# Patient Record
Sex: MALE | Race: WHITE | NOT HISPANIC OR LATINO | Employment: FULL TIME | URBAN - METROPOLITAN AREA
[De-identification: names, ages, dates, MRNs, and addresses within clinical notes are randomized per-mention and may not be internally consistent; named-entity substitution may affect disease eponyms.]

---

## 2019-06-12 ENCOUNTER — CONSULT (OUTPATIENT)
Dept: PLASTIC SURGERY | Facility: HOSPITAL | Age: 59
End: 2019-06-12
Payer: COMMERCIAL

## 2019-06-12 VITALS — WEIGHT: 169.4 LBS | HEIGHT: 72 IN | BODY MASS INDEX: 22.94 KG/M2

## 2019-06-12 DIAGNOSIS — L72.9 CYST OF SKIN: Primary | ICD-10-CM

## 2019-06-12 PROCEDURE — 99204 OFFICE O/P NEW MOD 45 MIN: CPT | Performed by: PHYSICIAN ASSISTANT

## 2019-06-18 PROBLEM — L72.9 FOLLICULAR CYST OF SKIN AND SUBCUTANEOUS TISSUE: Status: ACTIVE | Noted: 2019-06-18

## 2019-07-11 PROCEDURE — NC001 PR NO CHARGE: Performed by: PHYSICIAN ASSISTANT

## 2019-07-11 RX ORDER — LIDOCAINE HYDROCHLORIDE AND EPINEPHRINE 10; 10 MG/ML; UG/ML
20 INJECTION, SOLUTION INFILTRATION; PERINEURAL ONCE
Status: COMPLETED | OUTPATIENT
Start: 2019-07-11 | End: 2019-07-18

## 2019-07-11 NOTE — PRE-PROCEDURE INSTRUCTIONS
Pre-Surgery Instructions:   Medication Instructions    insulin lispro (HUMALOG) 100 units/mL injection Patient was instructed by Physician and understands  Pre shower with hibiclens as instructed by surgeon  You may drive yourself to the hospital   You may take your medications day of surgery  You may eat on the day of your surgery  You may have a dressing, please wear something that will fit over it

## 2019-07-11 NOTE — H&P
Assessment/Plan:  Toshia Brunner is a pleasant 72-year-old male who presents in consultation for a left cheek cyst   He was referred to us by Lucia Au PA-C of Dermatology  Please see HPI  I discussed with him excision of the left cheek cyst with complex closure  He understood and agreed  He would prefer to do this under local anesthesia  We discussed with the patient the options, benefits, and risks of surgery such as anesthesia, bleeding, infection, scarring and the need for additional procedures  Consent was obtained and all questions answered to his satisfaction  We will plan for surgery at his earliest convenience         Diagnoses and all orders for this visit:     Cyst of skin            Subjective:       Patient ID: Blair Faustin is a 64 y o  male      HPI  He reports a cyst on the left side of his chin which has been present for nearly a year  He states that it is increasing in size  It has been infected previously  He sought treatment with Dermatology of whom performed a biopsy  They referred him to us for excision      The following portions of the patient's history were reviewed and updated as appropriate: He  has no past medical history on file  He  has no past surgical history on file  His family history is not on file  He  reports that he has been smoking cigarettes  He has been smoking about 1 00 pack per day  He has never used smokeless tobacco  He reports that he drinks alcohol  He reports that he does not use drugs        Review of Systems   HENT: Negative for hearing loss  Eyes: Negative for visual disturbance  Respiratory: Negative for shortness of breath  Cardiovascular: Negative for chest pain  Gastrointestinal: Negative for abdominal pain, blood in stool, constipation, diarrhea, nausea and vomiting  Genitourinary: Negative for hematuria  Musculoskeletal: Negative for gait problem  Skin:        As per HPI  Neurological: Negative for seizures and headaches  Hematological: Does not bruise/bleed easily  Psychiatric/Behavioral: The patient is not nervous/anxious            Objective:        Ht 6' (1 829 m)   Wt 76 8 kg (169 lb 6 4 oz) Comment: 169 4lb  BMI 22 97 kg/m²             Physical Exam   Constitutional: He is oriented to person, place, and time  He appears well-developed and well-nourished  No distress  HENT:   Head: Normocephalic and atraumatic  Eyes: Pupils are equal, round, and reactive to light  EOM are normal  No scleral icterus  Neck: Neck supple  No tracheal deviation present  No thyromegaly present  Cardiovascular: Normal rate and regular rhythm  Exam reveals no gallop and no friction rub  No murmur heard  Pulmonary/Chest: Effort normal and breath sounds normal  He has no wheezes  He has no rales  Abdominal: Soft  Bowel sounds are normal  He exhibits no distension  There is no tenderness  There is no rebound and no guarding  Musculoskeletal: Normal range of motion  Lymphadenopathy:     He has no cervical adenopathy  Neurological: He is alert and oriented to person, place, and time  No cranial nerve deficit  Skin:   Left jaw just below the ear is a cyst noted  It measures approximately 2 cm  It is currently not infected  Please see photo  It is located approximately 1 cm below the ear lobule  Psychiatric: He has a normal mood and affect

## 2019-07-18 ENCOUNTER — HOSPITAL ENCOUNTER (OUTPATIENT)
Facility: HOSPITAL | Age: 59
Setting detail: OUTPATIENT SURGERY
Discharge: HOME | End: 2019-07-18
Attending: SURGERY | Admitting: SURGERY
Payer: COMMERCIAL

## 2019-07-18 ENCOUNTER — TELEPHONE (OUTPATIENT)
Dept: PLASTIC SURGERY | Facility: CLINIC | Age: 59
End: 2019-07-18

## 2019-07-18 VITALS
SYSTOLIC BLOOD PRESSURE: 180 MMHG | TEMPERATURE: 98.4 F | RESPIRATION RATE: 16 BRPM | HEIGHT: 72 IN | OXYGEN SATURATION: 96 % | HEART RATE: 64 BPM | DIASTOLIC BLOOD PRESSURE: 90 MMHG | WEIGHT: 165 LBS | BODY MASS INDEX: 22.35 KG/M2

## 2019-07-18 DIAGNOSIS — L72.9 FOLLICULAR CYST OF SKIN AND SUBCUTANEOUS TISSUE: ICD-10-CM

## 2019-07-18 LAB — GLUCOSE SERPL-MCNC: 284 MG/DL (ref 65–140)

## 2019-07-18 PROCEDURE — 82948 REAGENT STRIP/BLOOD GLUCOSE: CPT

## 2019-07-18 PROCEDURE — 88304 TISSUE EXAM BY PATHOLOGIST: CPT | Performed by: PATHOLOGY

## 2019-07-18 PROCEDURE — 12052 INTMD RPR FACE/MM 2.6-5.0 CM: CPT | Performed by: SURGERY

## 2019-07-18 PROCEDURE — 11443 EXC FACE-MM B9+MARG 2.1-3 CM: CPT | Performed by: SURGERY

## 2019-07-18 NOTE — INTERIM OP NOTE
EXCISION LEFT CHEEK CYST, COMPLEX CLOSURE LEFT CHEEK  Postoperative Note  PATIENT NAME: Ole Lopez  : 1960  MRN: [id number]  [facility]    Surgery Date: 2019    Preop Diagnosis:  Follicular cyst of skin and subcutaneous tissue [L72 9]    Post-Op Diagnosis Codes:     * Follicular cyst of skin and subcutaneous tissue [L72 9]    Procedure(s) (LRB):  EXCISION LEFT CHEEK CYST (Left)  COMPLEX CLOSURE LEFT CHEEK (Left)    Surgeon(s) and Role:     Shreyas Sebastian MD - Primary    Specimens:  * No specimens in log *    Estimated Blood Loss:   Minimal    Anesthesia Type:   Local     Findings:    None  Complications:   None    SIGNATURE: Kristopher Rider PA-C   DATE: 2019   TIME: 10:24 AM

## 2019-07-18 NOTE — INTERVAL H&P NOTE
H&P reviewed  After examining the patient I find no changes in the patients condition since the H&P had been written  98 7 102 16 181/98 98%

## 2019-07-18 NOTE — DISCHARGE INSTRUCTIONS
[facility]  [facility]   [address] [place] [address] 144, [address]  Phone: [phone]     Postoperative Instructions for Outpatient Surgery     These instructions are being provided by your doctor to give you basic guidelines during your post-op recovery  Please let our office know if your contact information has changed       Please call the office today for an appointment in 7-8 days for postoperative care      Dressings: Xeroform gauze, remove tomorrow afternoon  Then apply bacitracin 3 times daily for 3 days then, stop       Activity Restrictions: Nothing strenuous for 48 hours      Bathing:  May shower tomorrow after dressing removal  Pat incision dry after       Medications:    Resume pre-op medications  You may take tylenol, aleve, or ibuprofen for pain control                 Other: May apply ice to area at 15 minute intervals as needed for pain or swelling  Elevate head of bed at night to sleep over the next 48 hours

## 2019-07-18 NOTE — OP NOTE
OPERATIVE REPORT  PATIENT NAME: Maxi Soto    :  1960  MRN: [id number]  Pt Location:  [facility] ROOM 01    SURGERY DATE: 2019    Surgeon(s) and Role:     Navdeep Young MD - Primary    Preop Diagnosis:  Follicular cyst of skin and subcutaneous tissue [L72 9]    Post-Op Diagnosis Codes:     * Follicular cyst of skin and subcutaneous tissue [L72 9]    Procedure:  Excision subfascial mass/cyst of left cheek to include overlying skin 2 2 cm, 3 0 cm intermediate, layered closure left cheek  Specimen(s):  ID Type Source Tests Collected by Time Destination   1 : left cheek cyst Tissue Head TISSUE EXAM Khadijah Dillon MD 2019 1020        Estimated Blood Loss:   Minimal    Drains:  * No LDAs found *    Anesthesia Type:   Local    Operative Indications: Follicular cyst of skin and subcutaneous tissue [L72 9]      Operative Findings:  As above    Complications:   None    Procedure and Technique:  Danya Plasencia was seen preoperatively, the surgical site was marked with his participation, and we reviewed the planned procedure as well as potential risks, complications limitations  The surgical site was prepped and draped in sterile fashion a proper time-out was performed  2 5 loupe magnification was used aid visualization of the area was marked for excision and infiltrated with xylocaine with epinephrine  An ellipse of skin overlying the area of maximum prominence, and including the punctum was excised sharply with 15 blade  This was taken down through the superficial fascia, and dissection proceeded around the circumferential  mass utilizing combination of dissection with a 15 blade as well as curved iris scissors  Portion of the cyst wall was adherent to the deep bed, due to its recurrent nature, this was excised in a cyst at a separate fashion utilizing curved iris scissors  Fragments were sent to pathology  The total size of the mass was 2 2 cm    Hemostasis was assured the Bovie cautery, the wound was thoroughly irrigated and closure was then undertaken with 5 O Vicryl sutures buried at the level of the deep dermis this was followed by 6 0 nylon skin sutures  The med and the patient was discharged     I was present for the entire procedure    Patient Disposition:  PACU     SIGNATURE: Jeremias Brandt MD  DATE: July 18, 2019  TIME: 11:01 AM

## 2019-07-18 NOTE — TELEPHONE ENCOUNTER
Spoke with patient today to set up his post op appt and to check on him from his surgery today  He states he is doing well, and is encouraged to call with questions or concerns  He is scheduled for Thursday 7/25

## 2019-07-25 ENCOUNTER — OFFICE VISIT (OUTPATIENT)
Dept: PLASTIC SURGERY | Facility: CLINIC | Age: 59
End: 2019-07-25

## 2019-07-25 DIAGNOSIS — Z98.890 POST-OPERATIVE STATE: Primary | ICD-10-CM

## 2019-07-25 PROCEDURE — 99024 POSTOP FOLLOW-UP VISIT: CPT

## 2019-07-25 NOTE — PROGRESS NOTES
Patient was seen today for suture removal from excision of a cyst on the left cheek on 7/18/19  Sutures were identified and removed  Incision appeared clean, dry and intact  Scar instructions were reviewed and photos were taken  An appointment was set up for the end of August in [facility] per patients preference for a follow up

## 2019-08-28 ENCOUNTER — OFFICE VISIT (OUTPATIENT)
Dept: PLASTIC SURGERY | Facility: HOSPITAL | Age: 59
End: 2019-08-28

## 2019-08-28 DIAGNOSIS — L72.9 FOLLICULAR CYST OF SKIN AND SUBCUTANEOUS TISSUE: Primary | ICD-10-CM

## 2019-08-28 PROCEDURE — 99024 POSTOP FOLLOW-UP VISIT: CPT | Performed by: PHYSICIAN ASSISTANT

## 2019-08-28 NOTE — LETTER
August 28, 2019     Félix Mendez MD  [address] [facility] [address]  [address] [facility]    Patient: Mele Guzmán   YOB: 1960   Date of Visit: 8/28/2019       Dear Dr Roldan Parker: Thank you for referring Mele Guzmán to me for evaluation  Below are my notes for this consultation  If you have questions, please do not hesitate to call me  I look forward to following your patient along with you  Sincerely,        Luke Ernandez PA-C        CC: No Recipients  Ramón Mccabe  8/28/2019  9:05 AM  Sign at close encounter  Assessment/Plan:   Tim Leblanc is a 65-year-old male who is 6 weeks status post excision of a left cheek cyst with complex closure  Please see HPI  I have a reviewed the pathology report which revealed an epidermal inclusion cyst   I recommended he use silicone scar gel however, he is not concerned about the scar  I have also instructed him to avoid sun exposure  He would prefer to follow up as needed  I encouraged him to follow up with any concerns  Diagnoses and all orders for this visit:    Follicular cyst of skin and subcutaneous tissue          Subjective:     Patient ID: Mele Guzmán is a 64 y o  male  HPI   He reports a small bump underneath the skin at the excision site  Otherwise, he feels well  Review of Systems   Skin:        As per HPI  Objective:     Physical Exam   Skin:   Left cheek scar is healing well  No change in pigment noted

## 2019-08-28 NOTE — PROGRESS NOTES
Assessment/Plan:   Jem Rosas is a 16-year-old male who is 6 weeks status post excision of a left cheek cyst with complex closure  Please see HPI  I have a reviewed the pathology report which revealed an epidermal inclusion cyst   I recommended he use silicone scar gel however, he is not concerned about the scar  I have also instructed him to avoid sun exposure  He would prefer to follow up as needed  I encouraged him to follow up with any concerns  Diagnoses and all orders for this visit:    Follicular cyst of skin and subcutaneous tissue          Subjective:     Patient ID: Rommel Purvis is a 64 y o  male  HPI   He reports a small bump underneath the skin at the excision site  Otherwise, he feels well  Review of Systems   Skin:        As per HPI  Objective:     Physical Exam   Skin:   Left cheek scar is healing well  No change in pigment noted

## 2021-01-15 DIAGNOSIS — Z23 ENCOUNTER FOR IMMUNIZATION: ICD-10-CM

## 2021-01-19 ENCOUNTER — IMMUNIZATIONS (OUTPATIENT)
Dept: FAMILY MEDICINE CLINIC | Facility: HOSPITAL | Age: 61
End: 2021-01-19

## 2021-01-19 DIAGNOSIS — Z23 ENCOUNTER FOR IMMUNIZATION: Primary | ICD-10-CM

## 2021-01-19 PROCEDURE — 91300 SARS-COV-2 / COVID-19 MRNA VACCINE (PFIZER-BIONTECH) 30 MCG: CPT

## 2021-01-19 PROCEDURE — 0001A SARS-COV-2 / COVID-19 MRNA VACCINE (PFIZER-BIONTECH) 30 MCG: CPT

## 2021-01-20 ENCOUNTER — LAB REQUISITION (OUTPATIENT)
Dept: LAB | Facility: HOSPITAL | Age: 61
End: 2021-01-20
Payer: COMMERCIAL

## 2021-01-20 DIAGNOSIS — Z11.59 ENCOUNTER FOR SCREENING FOR OTHER VIRAL DISEASES: ICD-10-CM

## 2021-01-20 PROCEDURE — U0005 INFEC AGEN DETEC AMPLI PROBE: HCPCS | Performed by: INTERNAL MEDICINE

## 2021-01-20 PROCEDURE — U0003 INFECTIOUS AGENT DETECTION BY NUCLEIC ACID (DNA OR RNA); SEVERE ACUTE RESPIRATORY SYNDROME CORONAVIRUS 2 (SARS-COV-2) (CORONAVIRUS DISEASE [COVID-19]), AMPLIFIED PROBE TECHNIQUE, MAKING USE OF HIGH THROUGHPUT TECHNOLOGIES AS DESCRIBED BY CMS-2020-01-R: HCPCS | Performed by: INTERNAL MEDICINE

## 2021-01-22 LAB — SARS-COV-2 RNA RESP QL NAA+PROBE: NEGATIVE

## 2021-01-26 ENCOUNTER — LAB REQUISITION (OUTPATIENT)
Dept: LAB | Facility: HOSPITAL | Age: 61
End: 2021-01-26
Payer: COMMERCIAL

## 2021-01-26 DIAGNOSIS — Z11.59 ENCOUNTER FOR SCREENING FOR OTHER VIRAL DISEASES: ICD-10-CM

## 2021-01-26 PROCEDURE — U0003 INFECTIOUS AGENT DETECTION BY NUCLEIC ACID (DNA OR RNA); SEVERE ACUTE RESPIRATORY SYNDROME CORONAVIRUS 2 (SARS-COV-2) (CORONAVIRUS DISEASE [COVID-19]), AMPLIFIED PROBE TECHNIQUE, MAKING USE OF HIGH THROUGHPUT TECHNOLOGIES AS DESCRIBED BY CMS-2020-01-R: HCPCS | Performed by: INTERNAL MEDICINE

## 2021-01-26 PROCEDURE — U0005 INFEC AGEN DETEC AMPLI PROBE: HCPCS | Performed by: INTERNAL MEDICINE

## 2021-01-27 LAB — SARS-COV-2 RNA RESP QL NAA+PROBE: NEGATIVE

## 2021-02-03 PROCEDURE — U0005 INFEC AGEN DETEC AMPLI PROBE: HCPCS | Performed by: INTERNAL MEDICINE

## 2021-02-03 PROCEDURE — U0003 INFECTIOUS AGENT DETECTION BY NUCLEIC ACID (DNA OR RNA); SEVERE ACUTE RESPIRATORY SYNDROME CORONAVIRUS 2 (SARS-COV-2) (CORONAVIRUS DISEASE [COVID-19]), AMPLIFIED PROBE TECHNIQUE, MAKING USE OF HIGH THROUGHPUT TECHNOLOGIES AS DESCRIBED BY CMS-2020-01-R: HCPCS | Performed by: INTERNAL MEDICINE

## 2021-02-04 ENCOUNTER — LAB REQUISITION (OUTPATIENT)
Dept: LAB | Facility: HOSPITAL | Age: 61
End: 2021-02-04
Payer: COMMERCIAL

## 2021-02-04 DIAGNOSIS — Z11.59 ENCOUNTER FOR SCREENING FOR OTHER VIRAL DISEASES: ICD-10-CM

## 2021-02-04 LAB — SARS-COV-2 RNA RESP QL NAA+PROBE: NEGATIVE

## 2021-02-08 ENCOUNTER — IMMUNIZATIONS (OUTPATIENT)
Dept: FAMILY MEDICINE CLINIC | Facility: HOSPITAL | Age: 61
End: 2021-02-08

## 2021-02-08 DIAGNOSIS — Z23 ENCOUNTER FOR IMMUNIZATION: Primary | ICD-10-CM

## 2021-02-08 PROCEDURE — 0002A SARS-COV-2 / COVID-19 MRNA VACCINE (PFIZER-BIONTECH) 30 MCG: CPT

## 2021-02-08 PROCEDURE — 91300 SARS-COV-2 / COVID-19 MRNA VACCINE (PFIZER-BIONTECH) 30 MCG: CPT

## 2021-02-10 PROCEDURE — U0003 INFECTIOUS AGENT DETECTION BY NUCLEIC ACID (DNA OR RNA); SEVERE ACUTE RESPIRATORY SYNDROME CORONAVIRUS 2 (SARS-COV-2) (CORONAVIRUS DISEASE [COVID-19]), AMPLIFIED PROBE TECHNIQUE, MAKING USE OF HIGH THROUGHPUT TECHNOLOGIES AS DESCRIBED BY CMS-2020-01-R: HCPCS | Performed by: INTERNAL MEDICINE

## 2021-02-10 PROCEDURE — U0005 INFEC AGEN DETEC AMPLI PROBE: HCPCS | Performed by: INTERNAL MEDICINE

## 2021-02-11 ENCOUNTER — LAB REQUISITION (OUTPATIENT)
Dept: LAB | Facility: HOSPITAL | Age: 61
End: 2021-02-11
Payer: COMMERCIAL

## 2021-02-11 DIAGNOSIS — Z11.59 ENCOUNTER FOR SCREENING FOR OTHER VIRAL DISEASES: ICD-10-CM

## 2021-02-11 LAB — SARS-COV-2 RNA RESP QL NAA+PROBE: NEGATIVE

## 2021-02-16 ENCOUNTER — LAB REQUISITION (OUTPATIENT)
Dept: LAB | Facility: HOSPITAL | Age: 61
End: 2021-02-16
Payer: COMMERCIAL

## 2021-02-16 DIAGNOSIS — U07.1 COVID-19: ICD-10-CM

## 2021-02-16 PROCEDURE — U0003 INFECTIOUS AGENT DETECTION BY NUCLEIC ACID (DNA OR RNA); SEVERE ACUTE RESPIRATORY SYNDROME CORONAVIRUS 2 (SARS-COV-2) (CORONAVIRUS DISEASE [COVID-19]), AMPLIFIED PROBE TECHNIQUE, MAKING USE OF HIGH THROUGHPUT TECHNOLOGIES AS DESCRIBED BY CMS-2020-01-R: HCPCS | Performed by: INTERNAL MEDICINE

## 2021-02-16 PROCEDURE — U0005 INFEC AGEN DETEC AMPLI PROBE: HCPCS | Performed by: INTERNAL MEDICINE

## 2021-02-17 LAB — SARS-COV-2 RNA RESP QL NAA+PROBE: NEGATIVE

## 2021-02-23 PROCEDURE — U0005 INFEC AGEN DETEC AMPLI PROBE: HCPCS | Performed by: INTERNAL MEDICINE

## 2021-02-23 PROCEDURE — U0003 INFECTIOUS AGENT DETECTION BY NUCLEIC ACID (DNA OR RNA); SEVERE ACUTE RESPIRATORY SYNDROME CORONAVIRUS 2 (SARS-COV-2) (CORONAVIRUS DISEASE [COVID-19]), AMPLIFIED PROBE TECHNIQUE, MAKING USE OF HIGH THROUGHPUT TECHNOLOGIES AS DESCRIBED BY CMS-2020-01-R: HCPCS | Performed by: INTERNAL MEDICINE

## 2021-02-24 ENCOUNTER — LAB REQUISITION (OUTPATIENT)
Dept: LAB | Facility: HOSPITAL | Age: 61
End: 2021-02-24
Payer: COMMERCIAL

## 2021-02-24 DIAGNOSIS — Z11.59 ENCOUNTER FOR SCREENING FOR OTHER VIRAL DISEASES: ICD-10-CM

## 2021-02-24 LAB — SARS-COV-2 RNA RESP QL NAA+PROBE: NEGATIVE

## 2021-03-02 ENCOUNTER — LAB REQUISITION (OUTPATIENT)
Dept: LAB | Facility: HOSPITAL | Age: 61
End: 2021-03-02
Payer: COMMERCIAL

## 2021-03-02 DIAGNOSIS — Z11.59 ENCOUNTER FOR SCREENING FOR OTHER VIRAL DISEASES: ICD-10-CM

## 2021-03-02 PROCEDURE — U0003 INFECTIOUS AGENT DETECTION BY NUCLEIC ACID (DNA OR RNA); SEVERE ACUTE RESPIRATORY SYNDROME CORONAVIRUS 2 (SARS-COV-2) (CORONAVIRUS DISEASE [COVID-19]), AMPLIFIED PROBE TECHNIQUE, MAKING USE OF HIGH THROUGHPUT TECHNOLOGIES AS DESCRIBED BY CMS-2020-01-R: HCPCS | Performed by: INTERNAL MEDICINE

## 2021-03-02 PROCEDURE — U0005 INFEC AGEN DETEC AMPLI PROBE: HCPCS | Performed by: INTERNAL MEDICINE

## 2021-03-03 LAB — SARS-COV-2 RNA RESP QL NAA+PROBE: NEGATIVE

## 2021-03-17 ENCOUNTER — LAB REQUISITION (OUTPATIENT)
Dept: LAB | Facility: HOSPITAL | Age: 61
End: 2021-03-17
Payer: COMMERCIAL

## 2021-03-17 DIAGNOSIS — Z11.59 ENCOUNTER FOR SCREENING FOR OTHER VIRAL DISEASES: ICD-10-CM

## 2021-03-17 PROCEDURE — U0003 INFECTIOUS AGENT DETECTION BY NUCLEIC ACID (DNA OR RNA); SEVERE ACUTE RESPIRATORY SYNDROME CORONAVIRUS 2 (SARS-COV-2) (CORONAVIRUS DISEASE [COVID-19]), AMPLIFIED PROBE TECHNIQUE, MAKING USE OF HIGH THROUGHPUT TECHNOLOGIES AS DESCRIBED BY CMS-2020-01-R: HCPCS | Performed by: INTERNAL MEDICINE

## 2021-03-17 PROCEDURE — U0005 INFEC AGEN DETEC AMPLI PROBE: HCPCS | Performed by: INTERNAL MEDICINE

## 2021-03-18 LAB — SARS-COV-2 RNA RESP QL NAA+PROBE: NEGATIVE

## 2021-04-07 ENCOUNTER — LAB REQUISITION (OUTPATIENT)
Dept: LAB | Facility: HOSPITAL | Age: 61
End: 2021-04-07
Payer: COMMERCIAL

## 2021-04-07 DIAGNOSIS — Z11.59 ENCOUNTER FOR SCREENING FOR OTHER VIRAL DISEASES: ICD-10-CM

## 2021-04-07 PROCEDURE — U0003 INFECTIOUS AGENT DETECTION BY NUCLEIC ACID (DNA OR RNA); SEVERE ACUTE RESPIRATORY SYNDROME CORONAVIRUS 2 (SARS-COV-2) (CORONAVIRUS DISEASE [COVID-19]), AMPLIFIED PROBE TECHNIQUE, MAKING USE OF HIGH THROUGHPUT TECHNOLOGIES AS DESCRIBED BY CMS-2020-01-R: HCPCS | Performed by: INTERNAL MEDICINE

## 2021-04-07 PROCEDURE — U0005 INFEC AGEN DETEC AMPLI PROBE: HCPCS | Performed by: INTERNAL MEDICINE

## 2021-04-08 LAB — SARS-COV-2 RNA RESP QL NAA+PROBE: NEGATIVE

## 2021-04-14 ENCOUNTER — LAB REQUISITION (OUTPATIENT)
Dept: LAB | Facility: HOSPITAL | Age: 61
End: 2021-04-14
Payer: COMMERCIAL

## 2021-04-14 DIAGNOSIS — Z11.59 ENCOUNTER FOR SCREENING FOR OTHER VIRAL DISEASES: ICD-10-CM

## 2021-04-14 PROCEDURE — U0003 INFECTIOUS AGENT DETECTION BY NUCLEIC ACID (DNA OR RNA); SEVERE ACUTE RESPIRATORY SYNDROME CORONAVIRUS 2 (SARS-COV-2) (CORONAVIRUS DISEASE [COVID-19]), AMPLIFIED PROBE TECHNIQUE, MAKING USE OF HIGH THROUGHPUT TECHNOLOGIES AS DESCRIBED BY CMS-2020-01-R: HCPCS | Performed by: INTERNAL MEDICINE

## 2021-04-14 PROCEDURE — U0005 INFEC AGEN DETEC AMPLI PROBE: HCPCS | Performed by: INTERNAL MEDICINE

## 2021-04-15 LAB — SARS-COV-2 RNA RESP QL NAA+PROBE: NEGATIVE

## 2021-04-21 PROCEDURE — U0003 INFECTIOUS AGENT DETECTION BY NUCLEIC ACID (DNA OR RNA); SEVERE ACUTE RESPIRATORY SYNDROME CORONAVIRUS 2 (SARS-COV-2) (CORONAVIRUS DISEASE [COVID-19]), AMPLIFIED PROBE TECHNIQUE, MAKING USE OF HIGH THROUGHPUT TECHNOLOGIES AS DESCRIBED BY CMS-2020-01-R: HCPCS | Performed by: INTERNAL MEDICINE

## 2021-04-21 PROCEDURE — U0005 INFEC AGEN DETEC AMPLI PROBE: HCPCS | Performed by: INTERNAL MEDICINE

## 2021-04-22 ENCOUNTER — LAB REQUISITION (OUTPATIENT)
Dept: LAB | Facility: HOSPITAL | Age: 61
End: 2021-04-22
Payer: COMMERCIAL

## 2021-04-22 DIAGNOSIS — Z11.59 ENCOUNTER FOR SCREENING FOR OTHER VIRAL DISEASES: ICD-10-CM

## 2021-04-22 LAB — SARS-COV-2 RNA RESP QL NAA+PROBE: NEGATIVE

## 2021-04-27 ENCOUNTER — LAB REQUISITION (OUTPATIENT)
Dept: LAB | Facility: HOSPITAL | Age: 61
End: 2021-04-27
Payer: COMMERCIAL

## 2021-04-27 DIAGNOSIS — Z11.59 ENCOUNTER FOR SCREENING FOR OTHER VIRAL DISEASES: ICD-10-CM

## 2021-04-27 PROCEDURE — U0003 INFECTIOUS AGENT DETECTION BY NUCLEIC ACID (DNA OR RNA); SEVERE ACUTE RESPIRATORY SYNDROME CORONAVIRUS 2 (SARS-COV-2) (CORONAVIRUS DISEASE [COVID-19]), AMPLIFIED PROBE TECHNIQUE, MAKING USE OF HIGH THROUGHPUT TECHNOLOGIES AS DESCRIBED BY CMS-2020-01-R: HCPCS | Performed by: INTERNAL MEDICINE

## 2021-04-27 PROCEDURE — U0005 INFEC AGEN DETEC AMPLI PROBE: HCPCS | Performed by: INTERNAL MEDICINE

## 2021-04-28 LAB — SARS-COV-2 RNA RESP QL NAA+PROBE: NEGATIVE

## 2021-05-03 PROCEDURE — U0005 INFEC AGEN DETEC AMPLI PROBE: HCPCS | Performed by: INTERNAL MEDICINE

## 2021-05-03 PROCEDURE — U0003 INFECTIOUS AGENT DETECTION BY NUCLEIC ACID (DNA OR RNA); SEVERE ACUTE RESPIRATORY SYNDROME CORONAVIRUS 2 (SARS-COV-2) (CORONAVIRUS DISEASE [COVID-19]), AMPLIFIED PROBE TECHNIQUE, MAKING USE OF HIGH THROUGHPUT TECHNOLOGIES AS DESCRIBED BY CMS-2020-01-R: HCPCS | Performed by: INTERNAL MEDICINE

## 2021-05-04 ENCOUNTER — LAB REQUISITION (OUTPATIENT)
Dept: LAB | Facility: HOSPITAL | Age: 61
End: 2021-05-04
Payer: COMMERCIAL

## 2021-05-04 DIAGNOSIS — Z11.59 ENCOUNTER FOR SCREENING FOR OTHER VIRAL DISEASES: ICD-10-CM

## 2021-05-04 LAB — SARS-COV-2 RNA RESP QL NAA+PROBE: NEGATIVE

## 2021-05-12 ENCOUNTER — LAB REQUISITION (OUTPATIENT)
Dept: LAB | Facility: HOSPITAL | Age: 61
End: 2021-05-12
Payer: COMMERCIAL

## 2021-05-12 DIAGNOSIS — Z11.59 ENCOUNTER FOR SCREENING FOR OTHER VIRAL DISEASES: ICD-10-CM

## 2021-05-12 PROCEDURE — U0005 INFEC AGEN DETEC AMPLI PROBE: HCPCS | Performed by: INTERNAL MEDICINE

## 2021-05-12 PROCEDURE — U0003 INFECTIOUS AGENT DETECTION BY NUCLEIC ACID (DNA OR RNA); SEVERE ACUTE RESPIRATORY SYNDROME CORONAVIRUS 2 (SARS-COV-2) (CORONAVIRUS DISEASE [COVID-19]), AMPLIFIED PROBE TECHNIQUE, MAKING USE OF HIGH THROUGHPUT TECHNOLOGIES AS DESCRIBED BY CMS-2020-01-R: HCPCS | Performed by: INTERNAL MEDICINE

## 2021-05-13 LAB — SARS-COV-2 RNA RESP QL NAA+PROBE: NEGATIVE

## 2021-06-04 ENCOUNTER — HOSPITAL ENCOUNTER (EMERGENCY)
Facility: HOSPITAL | Age: 61
Discharge: HOME | End: 2021-06-04
Attending: EMERGENCY MEDICINE | Admitting: EMERGENCY MEDICINE
Payer: COMMERCIAL

## 2021-06-04 VITALS
WEIGHT: 170 LBS | OXYGEN SATURATION: 97 % | RESPIRATION RATE: 19 BRPM | HEIGHT: 72 IN | TEMPERATURE: 98.1 F | SYSTOLIC BLOOD PRESSURE: 148 MMHG | DIASTOLIC BLOOD PRESSURE: 80 MMHG | HEART RATE: 93 BPM | BODY MASS INDEX: 23.03 KG/M2

## 2021-06-04 DIAGNOSIS — E11.9 DIABETES (HCC): ICD-10-CM

## 2021-06-04 DIAGNOSIS — R53.1 WEAKNESS: ICD-10-CM

## 2021-06-04 DIAGNOSIS — E87.1 HYPONATREMIA: Primary | ICD-10-CM

## 2021-06-04 LAB
ANION GAP SERPL CALCULATED.3IONS-SCNC: 6 MMOL/L (ref 4–13)
ATRIAL RATE: 111 BPM
BASOPHILS # BLD AUTO: 0.12 THOUSANDS/ΜL (ref 0–0.1)
BASOPHILS NFR BLD AUTO: 2 % (ref 0–1)
BILIRUB UR QL STRIP: NEGATIVE
BUN SERPL-MCNC: 10 MG/DL (ref 5–25)
CALCIUM SERPL-MCNC: 8.6 MG/DL (ref 8.3–10.1)
CHLORIDE SERPL-SCNC: 92 MMOL/L (ref 100–108)
CLARITY UR: CLEAR
CO2 SERPL-SCNC: 30 MMOL/L (ref 21–32)
COLOR UR: YELLOW
CREAT SERPL-MCNC: 1.26 MG/DL (ref 0.6–1.3)
EOSINOPHIL # BLD AUTO: 0.31 THOUSAND/ΜL (ref 0–0.61)
EOSINOPHIL NFR BLD AUTO: 4 % (ref 0–6)
ERYTHROCYTE [DISTWIDTH] IN BLOOD BY AUTOMATED COUNT: 11.4 % (ref 11.6–15.1)
GFR SERPL CREATININE-BSD FRML MDRD: 62 ML/MIN/1.73SQ M
GLUCOSE SERPL-MCNC: 95 MG/DL (ref 65–140)
GLUCOSE UR STRIP-MCNC: ABNORMAL MG/DL
HCT VFR BLD AUTO: 42.9 % (ref 36.5–49.3)
HGB BLD-MCNC: 15.1 G/DL (ref 12–17)
HGB UR QL STRIP.AUTO: NEGATIVE
IMM GRANULOCYTES # BLD AUTO: 0.03 THOUSAND/UL (ref 0–0.2)
IMM GRANULOCYTES NFR BLD AUTO: 0 % (ref 0–2)
KETONES UR STRIP-MCNC: NEGATIVE MG/DL
LEUKOCYTE ESTERASE UR QL STRIP: NEGATIVE
LYMPHOCYTES # BLD AUTO: 1.65 THOUSANDS/ΜL (ref 0.6–4.47)
LYMPHOCYTES NFR BLD AUTO: 20 % (ref 14–44)
MAGNESIUM SERPL-MCNC: 1.8 MG/DL (ref 1.6–2.6)
MCH RBC QN AUTO: 33.1 PG (ref 26.8–34.3)
MCHC RBC AUTO-ENTMCNC: 35.2 G/DL (ref 31.4–37.4)
MCV RBC AUTO: 94 FL (ref 82–98)
MONOCYTES # BLD AUTO: 1.28 THOUSAND/ΜL (ref 0.17–1.22)
MONOCYTES NFR BLD AUTO: 16 % (ref 4–12)
NEUTROPHILS # BLD AUTO: 4.76 THOUSANDS/ΜL (ref 1.85–7.62)
NEUTS SEG NFR BLD AUTO: 58 % (ref 43–75)
NITRITE UR QL STRIP: NEGATIVE
NRBC BLD AUTO-RTO: 0 /100 WBCS
P AXIS: 61 DEGREES
PH UR STRIP.AUTO: 7.5 [PH]
PHOSPHATE SERPL-MCNC: 3.2 MG/DL (ref 2.7–4.5)
PLATELET # BLD AUTO: 314 THOUSANDS/UL (ref 149–390)
PMV BLD AUTO: 9.2 FL (ref 8.9–12.7)
POTASSIUM SERPL-SCNC: 4.7 MMOL/L (ref 3.5–5.3)
PR INTERVAL: 170 MS
PROT UR STRIP-MCNC: NEGATIVE MG/DL
QRS AXIS: 99 DEGREES
QRSD INTERVAL: 80 MS
QT INTERVAL: 312 MS
QTC INTERVAL: 424 MS
RBC # BLD AUTO: 4.56 MILLION/UL (ref 3.88–5.62)
SODIUM SERPL-SCNC: 128 MMOL/L (ref 136–145)
SP GR UR STRIP.AUTO: 1.01 (ref 1–1.03)
T WAVE AXIS: 42 DEGREES
UROBILINOGEN UR QL STRIP.AUTO: 0.2 E.U./DL
VENTRICULAR RATE: 111 BPM
WBC # BLD AUTO: 8.15 THOUSAND/UL (ref 4.31–10.16)

## 2021-06-04 PROCEDURE — 84100 ASSAY OF PHOSPHORUS: CPT | Performed by: EMERGENCY MEDICINE

## 2021-06-04 PROCEDURE — 81003 URINALYSIS AUTO W/O SCOPE: CPT | Performed by: EMERGENCY MEDICINE

## 2021-06-04 PROCEDURE — 99284 EMERGENCY DEPT VISIT MOD MDM: CPT | Performed by: EMERGENCY MEDICINE

## 2021-06-04 PROCEDURE — 80048 BASIC METABOLIC PNL TOTAL CA: CPT | Performed by: EMERGENCY MEDICINE

## 2021-06-04 PROCEDURE — 93005 ELECTROCARDIOGRAM TRACING: CPT

## 2021-06-04 PROCEDURE — 85025 COMPLETE CBC W/AUTO DIFF WBC: CPT | Performed by: EMERGENCY MEDICINE

## 2021-06-04 PROCEDURE — 36415 COLL VENOUS BLD VENIPUNCTURE: CPT | Performed by: EMERGENCY MEDICINE

## 2021-06-04 PROCEDURE — 99284 EMERGENCY DEPT VISIT MOD MDM: CPT

## 2021-06-04 PROCEDURE — 83735 ASSAY OF MAGNESIUM: CPT | Performed by: EMERGENCY MEDICINE

## 2021-06-04 PROCEDURE — 93010 ELECTROCARDIOGRAM REPORT: CPT | Performed by: INTERNAL MEDICINE

## 2021-06-04 RX ORDER — ATORVASTATIN CALCIUM 40 MG/1
TABLET, FILM COATED ORAL EVERY 24 HOURS
COMMUNITY
End: 2022-03-07

## 2021-06-04 NOTE — ED PROVIDER NOTES
History  Chief Complaint   Patient presents with    Abnormal Lab     Patient states that he was told to come in due to abnormally high sodium and potassium     57-year-old male presents for evaluation of abnormal outpatient lab work  The patient was told that he had a low sodium and high potassium on outpatient blood work that was drawn  The patient talked to his family doctor because he was having fatigue since the weekend  The patient denies any associated chest pain or pressure  The patient denies any change in urinary output, fevers or chills  The patient is a diabetic on a pump and is compliant  He denies any history of electrolyte problems or specific kidney problems  Prior to Admission Medications   Prescriptions Last Dose Informant Patient Reported? Taking?   atorvastatin (LIPITOR) 40 mg tablet   Yes No   Sig: every 24 hours   insulin lispro (HUMALOG) 100 units/mL injection   Yes No   Sig: Inject 80 units per day via medtronic pump E10 65      Facility-Administered Medications: None       Past Medical History:   Diagnosis Date    Diabetes mellitus ([facility] [address] )     High cholesterol     Hypertension        Past Surgical History:   Procedure Laterality Date    COMPLEX WOUND CLOSURE TO EXTREMITY Left 7/18/2019    Procedure: COMPLEX CLOSURE LEFT CHEEK;  Surgeon: Jasson Guadarrama MD;  [facility];  Service: Plastics    FACIAL/NECK BIOPSY Left 7/18/2019    Procedure: EXCISION LEFT CHEEK CYST;  Surgeon: Jasson Guadarrama MD;  [facility];  Service: Plastics    HERNIA REPAIR      KNEE ARTHROSCOPY Left     KNEE SURGERY         Family History   Problem Relation Age of Onset    Diabetes Father     Heart disease Father      I have reviewed and agree with the history as documented      E-Cigarette/Vaping     E-Cigarette/Vaping Substances     Social History     Tobacco Use    Smoking status: Current Every Day Smoker     Packs/day: 1 00     Types: Cigarettes    Smokeless tobacco: Never Used    Tobacco comment: encouraged smoking cessation   Substance Use Topics    Alcohol use: Yes     Frequency: 4 or more times a week     Drinks per session: 1 or 2    Drug use: Never       Review of Systems   Constitutional: Positive for fatigue  Negative for chills and fever  HENT: Negative for sore throat  Respiratory: Negative for cough, chest tightness and shortness of breath  Cardiovascular: Negative for chest pain and palpitations  Gastrointestinal: Negative for abdominal pain, constipation, diarrhea, nausea and vomiting  Genitourinary: Negative for difficulty urinating and dysuria  Musculoskeletal: Negative for back pain  Skin: Negative for rash  Neurological: Positive for weakness  Negative for dizziness, seizures, syncope and headaches  All other systems reviewed and are negative  Physical Exam  Physical Exam  Vitals signs and nursing note reviewed  Constitutional:       General: He is not in acute distress  Appearance: He is well-developed  HENT:      Head: Normocephalic and atraumatic  Right Ear: External ear normal       Left Ear: External ear normal       Mouth/Throat:      Pharynx: No oropharyngeal exudate  Eyes:      General: No scleral icterus  Pupils: Pupils are equal, round, and reactive to light  Neck:      Musculoskeletal: Normal range of motion and neck supple  Cardiovascular:      Rate and Rhythm: Normal rate and regular rhythm  Heart sounds: Normal heart sounds  Pulmonary:      Effort: Pulmonary effort is normal  No respiratory distress  Breath sounds: Normal breath sounds  Abdominal:      General: Bowel sounds are normal       Palpations: Abdomen is soft  Tenderness: There is no abdominal tenderness  There is no guarding or rebound  Musculoskeletal: Normal range of motion  Skin:     General: Skin is warm and dry  Findings: No rash     Neurological:      Mental Status: He is alert and oriented to person, place, and time           Vital Signs  ED Triage Vitals   Temperature Pulse Respirations Blood Pressure SpO2   06/04/21 0828 06/04/21 0828 06/04/21 0828 06/04/21 0828 06/04/21 0828   98 1 °F (36 7 °C) 105 18 152/72 100 %      Temp Source Heart Rate Source Patient Position - Orthostatic VS BP Location FiO2 (%)   06/04/21 0828 06/04/21 0915 06/04/21 0828 06/04/21 0828 --   Temporal Monitor Lying Right arm       Pain Score       --                  Vitals:    06/04/21 0828 06/04/21 0915 06/04/21 0930   BP: 152/72  148/80   Pulse: 105 94 93   Patient Position - Orthostatic VS: Lying  Sitting         Visual Acuity      ED Medications  Medications - No data to display    Diagnostic Studies  Results Reviewed     Procedure Component Value Units Date/Time    UA (URINE) with reflex to Scope [[id number]]  (Abnormal) Collected: 06/04/21 0947    Lab Status: Final result Specimen: Urine, Clean Catch Updated: 06/04/21 0954     Color, UA Yellow     Clarity, UA Clear     Specific Gravity, UA 1 010     pH, UA 7 5     Leukocytes, UA Negative     Nitrite, UA Negative     Protein, UA Negative mg/dl      Glucose,  (1/10%) mg/dl      Ketones, UA Negative mg/dl      Urobilinogen, UA 0 2 E U /dl      Bilirubin, UA Negative     Blood, UA Negative    Basic metabolic panel [[id number]]  (Abnormal) Collected: 06/04/21 0848    Lab Status: Final result Specimen: Blood from Arm, Left Updated: 06/04/21 0930     Sodium 128 mmol/L      Potassium 4 7 mmol/L      Chloride 92 mmol/L      CO2 30 mmol/L      ANION GAP 6 mmol/L      BUN 10 mg/dL      Creatinine 1 26 mg/dL      Glucose 95 mg/dL      Calcium 8 6 mg/dL      eGFR 62 ml/min/1 73sq m     Narrative:      Ricarda guidelines for Chronic Kidney Disease (CKD):     Stage 1 with normal or high GFR (GFR > 90 mL/min/1 73 square meters)    Stage 2 Mild CKD (GFR = 60-89 mL/min/1 73 square meters)    Stage 3A Moderate CKD (GFR = 45-59 mL/min/1 73 square meters)    Stage 3B Moderate CKD (GFR = 30-44 mL/min/1 73 square meters)    Stage 4 Severe CKD (GFR = 15-29 mL/min/1 73 square meters)    Stage 5 End Stage CKD (GFR <15 mL/min/1 73 square meters)  Note: GFR calculation is accurate only with a steady state creatinine    Magnesium [[id number]]  (Normal) Collected: 06/04/21 0848    Lab Status: Final result Specimen: Blood from Arm, Left Updated: 06/04/21 0930     Magnesium 1 8 mg/dL     Phosphorus [[id number]]  (Normal) Collected: 06/04/21 0848    Lab Status: Final result Specimen: Blood from Arm, Left Updated: 06/04/21 0918     Phosphorus 3 2 mg/dL     CBC and differential [[id number]]  (Abnormal) Collected: 06/04/21 0848    Lab Status: Final result Specimen: Blood from Arm, Left Updated: 06/04/21 0854     WBC 8 15 Thousand/uL      RBC 4 56 Million/uL      Hemoglobin 15 1 g/dL      Hematocrit 42 9 %      MCV 94 fL      MCH 33 1 pg      MCHC 35 2 g/dL      RDW 11 4 %      MPV 9 2 fL      Platelets 956 Thousands/uL      nRBC 0 /100 WBCs      Neutrophils Relative 58 %      Immat GRANS % 0 %      Lymphocytes Relative 20 %      Monocytes Relative 16 %      Eosinophils Relative 4 %      Basophils Relative 2 %      Neutrophils Absolute 4 76 Thousands/µL      Immature Grans Absolute 0 03 Thousand/uL      Lymphocytes Absolute 1 65 Thousands/µL      Monocytes Absolute 1 28 Thousand/µL      Eosinophils Absolute 0 31 Thousand/µL      Basophils Absolute 0 12 Thousands/µL                  No orders to display              Procedures  ECG 12 Lead Documentation Only    Date/Time: 6/4/2021 8:40 AM  Performed by: Karin Plummer DO  Authorized by: Karin Plummer DO     Indications / Diagnosis:  Electrolyte abnormality  ECG reviewed by me, the ED Provider: yes    Patient location:  ED  Previous ECG:     Previous ECG:  Compared to current    Comparison ECG info: Increased amplitude T-waves with slight pointing when compared to previous from 10/14/2011    There are no new acute ischemic changes Similarity:  Changes noted  Interpretation:     Interpretation: normal    Rate:     ECG rate:  111    ECG rate assessment: tachycardic    Rhythm:     Rhythm: sinus tachycardia    Ectopy:     Ectopy: none    QRS:     QRS axis:  Right    QRS intervals:  Normal  Conduction:     Conduction: normal    ST segments:     ST segments:  Normal  T waves:     T waves: peaked               ED Course                             SBIRT 20yo+      Most Recent Value   SBIRT (22 yo +)   In order to provide better care to our patients, we are screening all of our patients for alcohol and drug use  Would it be okay to ask you these screening questions? No Filed at: 06/04/2021 0949                    [facility]  Number of Diagnoses or Management Options  Diabetes [facility]): Hyponatremia:   Weakness:   Diagnosis management comments: Plan is to recheck laboratories and urinalysis as well as EKG    Diagnostics reviewed  I note the patient's potassium is now normal   He has a mildly depressed sodium  I discussed the patient's alcohol use and to decrease his alcohol use as this may be contributing to the hyponatremia  The patient was also advised to follow up with his primary care provider  The patient (and any family present) verbalized understanding of the discharge instructions and warnings that would necessitate return to the Emergency Department  All questions were answered prior to discharge         Amount and/or Complexity of Data Reviewed  Clinical lab tests: reviewed  Review and summarize past medical records: yes        Disposition  Final diagnoses:   Hyponatremia   Weakness   Diabetes ([place] [address] )     Time reflects when diagnosis was documented in both MDM as applicable and the Disposition within this note     Time User Action Codes Description Comment    6/4/2021  9:58 AM Comfort Soja B Add [E87 1] Hyponatremia     6/4/2021  9:58 AM Dmitry Chelsey Add [R53 1] Weakness     6/4/2021  9:58 AM Comfort Soja B Add [E11 9] [facility] Disposition     ED Disposition Condition Date/Time Comment    Discharge Stable Fri Jun 4, 2021  9:58 AM Rafael Car discharge to home/self care  Follow-up Information     Follow up With Specialties Details Why Contact Info    Maggie Galan MD [facility] Medicine Schedule an appointment as soon as possible for a visit  For further evaluation [address] [facility] [address]  [phone]            Discharge Medication List as of 6/4/2021  9:59 AM      CONTINUE these medications which have NOT CHANGED    Details   atorvastatin (LIPITOR) 40 mg tablet every 24 hours, Historical Med      insulin lispro (HUMALOG) 100 units/mL injection Inject 80 units per day via medtronic pump E10 65, Historical Med           No discharge procedures on file      PDMP Review     None          ED Provider  Electronically Signed by           Eric Gandhi DO  06/04/21 [id number]

## 2021-09-14 PROCEDURE — U0005 INFEC AGEN DETEC AMPLI PROBE: HCPCS | Performed by: INTERNAL MEDICINE

## 2021-09-14 PROCEDURE — U0003 INFECTIOUS AGENT DETECTION BY NUCLEIC ACID (DNA OR RNA); SEVERE ACUTE RESPIRATORY SYNDROME CORONAVIRUS 2 (SARS-COV-2) (CORONAVIRUS DISEASE [COVID-19]), AMPLIFIED PROBE TECHNIQUE, MAKING USE OF HIGH THROUGHPUT TECHNOLOGIES AS DESCRIBED BY CMS-2020-01-R: HCPCS | Performed by: INTERNAL MEDICINE

## 2021-09-15 ENCOUNTER — LAB REQUISITION (OUTPATIENT)
Dept: LAB | Facility: HOSPITAL | Age: 61
End: 2021-09-15
Payer: COMMERCIAL

## 2021-09-15 DIAGNOSIS — Z11.59 ENCOUNTER FOR SCREENING FOR OTHER VIRAL DISEASES: ICD-10-CM

## 2021-09-15 LAB — SARS-COV-2 RNA RESP QL NAA+PROBE: NEGATIVE

## 2021-09-22 ENCOUNTER — LAB REQUISITION (OUTPATIENT)
Dept: LAB | Facility: HOSPITAL | Age: 61
End: 2021-09-22
Payer: COMMERCIAL

## 2021-09-22 DIAGNOSIS — Z11.59 ENCOUNTER FOR SCREENING FOR OTHER VIRAL DISEASES: ICD-10-CM

## 2021-09-22 PROCEDURE — U0003 INFECTIOUS AGENT DETECTION BY NUCLEIC ACID (DNA OR RNA); SEVERE ACUTE RESPIRATORY SYNDROME CORONAVIRUS 2 (SARS-COV-2) (CORONAVIRUS DISEASE [COVID-19]), AMPLIFIED PROBE TECHNIQUE, MAKING USE OF HIGH THROUGHPUT TECHNOLOGIES AS DESCRIBED BY CMS-2020-01-R: HCPCS | Performed by: INTERNAL MEDICINE

## 2021-09-22 PROCEDURE — U0005 INFEC AGEN DETEC AMPLI PROBE: HCPCS | Performed by: INTERNAL MEDICINE

## 2021-09-23 LAB — SARS-COV-2 RNA RESP QL NAA+PROBE: NEGATIVE

## 2021-12-09 LAB
LEFT EYE DIABETIC RETINOPATHY: NORMAL
RIGHT EYE DIABETIC RETINOPATHY: NORMAL

## 2022-01-12 ENCOUNTER — APPOINTMENT (OUTPATIENT)
Dept: LAB | Facility: CLINIC | Age: 62
End: 2022-01-12

## 2022-01-12 ENCOUNTER — OCCMED (OUTPATIENT)
Dept: URGENT CARE | Facility: CLINIC | Age: 62
End: 2022-01-12

## 2022-01-12 DIAGNOSIS — Z23 ENCOUNTER FOR IMMUNIZATION: Primary | ICD-10-CM

## 2022-01-12 DIAGNOSIS — Z23 ENCOUNTER FOR IMMUNIZATION: ICD-10-CM

## 2022-01-12 LAB — RUBV IGG SERPL IA-ACNC: >175 IU/ML

## 2022-01-12 PROCEDURE — 86735 MUMPS ANTIBODY: CPT

## 2022-01-12 PROCEDURE — 86762 RUBELLA ANTIBODY: CPT

## 2022-01-12 PROCEDURE — 86480 TB TEST CELL IMMUN MEASURE: CPT

## 2022-01-12 PROCEDURE — 36415 COLL VENOUS BLD VENIPUNCTURE: CPT

## 2022-01-12 PROCEDURE — 86787 VARICELLA-ZOSTER ANTIBODY: CPT

## 2022-01-12 PROCEDURE — 86765 RUBEOLA ANTIBODY: CPT

## 2022-01-13 LAB
GAMMA INTERFERON BACKGROUND BLD IA-ACNC: 0.06 IU/ML
M TB IFN-G BLD-IMP: NEGATIVE
M TB IFN-G CD4+ BCKGRND COR BLD-ACNC: -0.01 IU/ML
M TB IFN-G CD4+ BCKGRND COR BLD-ACNC: -0.01 IU/ML
MEV IGG SER QL: NORMAL
MITOGEN IGNF BCKGRD COR BLD-ACNC: 8.3 IU/ML
MUV IGG SER QL: NORMAL
VZV IGG SER IA-ACNC: NORMAL

## 2022-02-08 ENCOUNTER — APPOINTMENT (OUTPATIENT)
Dept: RADIOLOGY | Facility: CLINIC | Age: 62
End: 2022-02-08
Payer: COMMERCIAL

## 2022-02-08 DIAGNOSIS — M25.571 RIGHT ANKLE PAIN: ICD-10-CM

## 2022-02-08 PROCEDURE — 73610 X-RAY EXAM OF ANKLE: CPT

## 2022-02-21 PROBLEM — E10.3219: Status: ACTIVE | Noted: 2022-02-21

## 2022-02-21 PROBLEM — L97.509 CHRONIC FOOT ULCER (HCC): Status: ACTIVE | Noted: 2018-09-07

## 2022-02-21 PROBLEM — G89.29 CHRONIC PAIN: Status: ACTIVE | Noted: 2022-02-21

## 2022-02-21 PROBLEM — N40.1 LOWER URINARY TRACT SYMPTOMS DUE TO BENIGN PROSTATIC HYPERPLASIA: Status: ACTIVE | Noted: 2022-02-21

## 2022-02-21 PROBLEM — Z96.41 INSULIN PUMP STATUS: Status: ACTIVE | Noted: 2018-09-07

## 2022-02-21 PROBLEM — G93.32 CHRONIC FATIGUE SYNDROME: Status: ACTIVE | Noted: 2022-02-21

## 2022-02-21 PROBLEM — R53.82 CHRONIC FATIGUE SYNDROME: Status: ACTIVE | Noted: 2022-02-21

## 2022-03-07 ENCOUNTER — OFFICE VISIT (OUTPATIENT)
Dept: FAMILY MEDICINE CLINIC | Facility: HOSPITAL | Age: 62
End: 2022-03-07
Payer: COMMERCIAL

## 2022-03-07 ENCOUNTER — TELEPHONE (OUTPATIENT)
Dept: FAMILY MEDICINE CLINIC | Facility: HOSPITAL | Age: 62
End: 2022-03-07

## 2022-03-07 VITALS
HEIGHT: 70 IN | TEMPERATURE: 97.5 F | DIASTOLIC BLOOD PRESSURE: 70 MMHG | WEIGHT: 153.4 LBS | HEART RATE: 99 BPM | SYSTOLIC BLOOD PRESSURE: 118 MMHG | BODY MASS INDEX: 21.96 KG/M2

## 2022-03-07 DIAGNOSIS — I10 BENIGN ESSENTIAL HYPERTENSION: ICD-10-CM

## 2022-03-07 DIAGNOSIS — E78.5 HYPERLIPIDEMIA, UNSPECIFIED HYPERLIPIDEMIA TYPE: ICD-10-CM

## 2022-03-07 DIAGNOSIS — E10.3213 TYPE 1 DIABETES MELLITUS WITH MILD NONPROLIFERATIVE RETINOPATHY OF BOTH EYES AND MACULAR EDEMA (HCC): ICD-10-CM

## 2022-03-07 DIAGNOSIS — Z12.11 SCREENING FOR COLON CANCER: ICD-10-CM

## 2022-03-07 DIAGNOSIS — E10.42 TYPE 1 DM WITH POLYNEUROPATHY (HCC): Primary | ICD-10-CM

## 2022-03-07 DIAGNOSIS — Z96.41 INSULIN PUMP IN PLACE: ICD-10-CM

## 2022-03-07 DIAGNOSIS — M10.9 GOUT OF RIGHT ANKLE, UNSPECIFIED CAUSE, UNSPECIFIED CHRONICITY: ICD-10-CM

## 2022-03-07 DIAGNOSIS — E78.2 MIXED HYPERLIPIDEMIA: ICD-10-CM

## 2022-03-07 DIAGNOSIS — F17.211 CIGARETTE NICOTINE DEPENDENCE IN REMISSION: ICD-10-CM

## 2022-03-07 DIAGNOSIS — Z12.5 SCREENING PSA (PROSTATE SPECIFIC ANTIGEN): ICD-10-CM

## 2022-03-07 DIAGNOSIS — Z11.4 SCREENING FOR HIV (HUMAN IMMUNODEFICIENCY VIRUS): ICD-10-CM

## 2022-03-07 PROBLEM — L97.509 CHRONIC FOOT ULCER (HCC): Status: RESOLVED | Noted: 2018-09-07 | Resolved: 2022-03-07

## 2022-03-07 PROCEDURE — 99204 OFFICE O/P NEW MOD 45 MIN: CPT | Performed by: FAMILY MEDICINE

## 2022-03-07 RX ORDER — ATORVASTATIN CALCIUM 40 MG/1
40 TABLET, FILM COATED ORAL DAILY
Qty: 30 TABLET | Refills: 0 | Status: SHIPPED | OUTPATIENT
Start: 2022-03-07 | End: 2022-04-13 | Stop reason: SDUPTHER

## 2022-03-07 RX ORDER — METOPROLOL SUCCINATE 50 MG/1
50 TABLET, EXTENDED RELEASE ORAL DAILY
Qty: 30 TABLET | Refills: 0 | Status: SHIPPED | OUTPATIENT
Start: 2022-03-07 | End: 2022-03-07 | Stop reason: SDUPTHER

## 2022-03-07 RX ORDER — METOPROLOL SUCCINATE 50 MG/1
50 TABLET, EXTENDED RELEASE ORAL DAILY
Qty: 30 TABLET | Refills: 0 | Status: SHIPPED | OUTPATIENT
Start: 2022-03-07 | End: 2022-04-13 | Stop reason: SDUPTHER

## 2022-03-07 RX ORDER — ATORVASTATIN CALCIUM 40 MG/1
40 TABLET, FILM COATED ORAL DAILY
Qty: 30 TABLET | Refills: 0 | Status: SHIPPED | OUTPATIENT
Start: 2022-03-07 | End: 2022-03-07 | Stop reason: SDUPTHER

## 2022-03-07 RX ORDER — LISINOPRIL 20 MG/1
20 TABLET ORAL DAILY
Qty: 30 TABLET | Refills: 0 | Status: SHIPPED | OUTPATIENT
Start: 2022-03-07 | End: 2022-03-07 | Stop reason: SDUPTHER

## 2022-03-07 RX ORDER — LISINOPRIL 20 MG/1
20 TABLET ORAL DAILY
Qty: 30 TABLET | Refills: 0 | Status: SHIPPED | OUTPATIENT
Start: 2022-03-07 | End: 2022-04-13 | Stop reason: SDUPTHER

## 2022-03-07 NOTE — TELEPHONE ENCOUNTER
Pt was seen this am and meds were called in to [facility]  Pt would like them sent to [address] [place] [address] instead

## 2022-03-07 NOTE — PROGRESS NOTES
Assessment/Plan:      Problem List Items Addressed This Visit        Endocrine    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)    Relevant Orders    CBC    Comprehensive metabolic panel    Hemoglobin A1C    Microalbumin / creatinine urine ratio    TSH, 3rd generation with Free T4 reflex       Cardiovascular and Mediastinum    Benign essential hypertension    Relevant Medications    metoprolol succinate (TOPROL-XL) 50 mg 24 hr tablet    lisinopril (ZESTRIL) 20 mg tablet    Other Relevant Orders    Lipid Panel with Direct LDL reflex    TSH, 3rd generation with Free T4 reflex       Musculoskeletal and Integument    Gout of ankle    Relevant Orders    Uric acid       Other    Cigarette nicotine dependence in remission    Insulin pump in place    Mixed hyperlipidemia    Relevant Medications    atorvastatin (LIPITOR) 40 mg tablet      Other Visit Diagnoses     Type 1 DM with polyneuropathy (HCC)    -  Primary    Screening for colon cancer        Relevant Orders    Cologuard    Hyperlipidemia, unspecified hyperlipidemia type        Relevant Medications    atorvastatin (LIPITOR) 40 mg tablet    Screening for HIV (human immunodeficiency virus)        Relevant Orders    Rapid HIV 1/2 AB-AG Combo    Screening PSA (prostate specific antigen)        Relevant Orders    PSA, Total Screen           Plan/Discussion:  Chronic conditions are stable  Fu with endo as scheduled  Update labs as outlined  No medication changes made  Refills sent  Subjective:   Chief Complaint   Patient presents with    Establish Care        Patient ID: Riddhi Chong is a 63 y o  male  Pt is establishing care  Known type 1 diabetic  On insulin pump  Switching Endo and has apt coming up  Reports doing well with this and has made improvements in diabetic control               The following portions of the patient's history were reviewed and updated as appropriate: allergies, current medications, past family history, past medical history, past social history, past surgical history and problem list     Review of Systems   Constitutional: Negative  Negative for activity change, appetite change, chills and diaphoresis  HENT: Negative for congestion and dental problem  Respiratory: Negative  Negative for apnea, chest tightness, shortness of breath and wheezing  Cardiovascular: Negative  Negative for chest pain, palpitations and leg swelling  Gastrointestinal: Negative  Negative for abdominal distention, abdominal pain, constipation, diarrhea and nausea  Genitourinary: Negative  Negative for difficulty urinating, dysuria and frequency  Objective:  Vitals:    03/07/22 0858   BP: 118/70   Pulse: 99   Temp: 97 5 °F (36 4 °C)   Weight: 69 6 kg (153 lb 6 4 oz)   Height: 5' 10" (1 778 m)     BP Readings from Last 6 Encounters:   03/07/22 118/70   06/04/21 148/80   07/18/19 (!) 180/90      Wt Readings from Last 6 Encounters:   03/07/22 69 6 kg (153 lb 6 4 oz)   06/04/21 77 1 kg (170 lb)   07/18/19 74 8 kg (165 lb)   06/12/19 76 8 kg (169 lb 6 4 oz)             Physical Exam  Vitals and nursing note reviewed  Constitutional:       General: He is not in acute distress  Appearance: He is well-developed  He is not ill-appearing  HENT:      Head: Normocephalic and atraumatic  Right Ear: Tympanic membrane, ear canal and external ear normal       Left Ear: Tympanic membrane, ear canal and external ear normal       Nose: Nose normal  No congestion or rhinorrhea  Mouth/Throat:      Mouth: Mucous membranes are moist       Pharynx: No oropharyngeal exudate or posterior oropharyngeal erythema  Eyes:      Extraocular Movements: Extraocular movements intact  Conjunctiva/sclera: Conjunctivae normal       Pupils: Pupils are equal, round, and reactive to light  Cardiovascular:      Rate and Rhythm: Normal rate and regular rhythm        Pulses: no weak pulses Dorsalis pedis pulses are 1+ on the right side and 1+ on the left side  Posterior tibial pulses are 1+ on the right side and 1+ on the left side  Heart sounds: Normal heart sounds  No murmur heard  No friction rub  No gallop  Pulmonary:      Effort: Pulmonary effort is normal  No respiratory distress  Breath sounds: Normal breath sounds  No wheezing or rales  Chest:      Chest wall: No tenderness  Abdominal:      General: Bowel sounds are normal  There is no distension  Palpations: Abdomen is soft  There is no mass  Tenderness: There is no abdominal tenderness  There is no guarding or rebound  Musculoskeletal:         General: Normal range of motion  Cervical back: Normal range of motion and neck supple  Feet:      Right foot:      Skin integrity: No ulcer, skin breakdown, erythema, warmth, callus or dry skin  Left foot:      Skin integrity: No ulcer, skin breakdown, erythema, warmth, callus or dry skin  Skin:     General: Skin is warm  Capillary Refill: Capillary refill takes less than 2 seconds  Neurological:      Mental Status: He is alert and oriented to person, place, and time  Psychiatric:         Mood and Affect: Mood normal          Behavior: Behavior normal        Patient's shoes and socks removed  Right Foot/Ankle   Right Foot Inspection  Skin Exam: skin normal and skin intact  No dry skin, no warmth, no callus, no erythema, no maceration, no abnormal color, no pre-ulcer, no ulcer and no callus  Toe Exam: ROM and strength within normal limits  Sensory   Vibration: diminished  Proprioception: intact  Monofilament testing: diminished    Vascular  Capillary refills: < 3 seconds  The right DP pulse is 1+  The right PT pulse is 1+  Left Foot/Ankle  Left Foot Inspection  Skin Exam: skin normal and skin intact  No dry skin, no warmth, no erythema, no maceration, normal color, no pre-ulcer, no ulcer and no callus       Toe Exam: ROM and strength within normal limits  Sensory   Vibration: diminished  Proprioception: intact  Monofilament testing: diminished    Vascular  Capillary refills: < 3 seconds  The left DP pulse is 1+  The left PT pulse is 1+       Assign Risk Category  No deformity present  No loss of protective sensation  No weak pulses  Risk: 0

## 2022-04-13 DIAGNOSIS — I10 BENIGN ESSENTIAL HYPERTENSION: ICD-10-CM

## 2022-04-13 DIAGNOSIS — E78.5 HYPERLIPIDEMIA, UNSPECIFIED HYPERLIPIDEMIA TYPE: ICD-10-CM

## 2022-04-14 RX ORDER — ATORVASTATIN CALCIUM 40 MG/1
40 TABLET, FILM COATED ORAL DAILY
Qty: 30 TABLET | Refills: 0 | Status: SHIPPED | OUTPATIENT
Start: 2022-04-14

## 2022-04-14 RX ORDER — LISINOPRIL 20 MG/1
20 TABLET ORAL DAILY
Qty: 30 TABLET | Refills: 0 | Status: SHIPPED | OUTPATIENT
Start: 2022-04-14

## 2022-04-14 RX ORDER — METOPROLOL SUCCINATE 50 MG/1
50 TABLET, EXTENDED RELEASE ORAL DAILY
Qty: 30 TABLET | Refills: 0 | Status: SHIPPED | OUTPATIENT
Start: 2022-04-14

## 2022-04-15 ENCOUNTER — APPOINTMENT (OUTPATIENT)
Dept: LAB | Facility: HOSPITAL | Age: 62
End: 2022-04-15
Payer: COMMERCIAL

## 2022-04-15 DIAGNOSIS — Z12.5 SCREENING PSA (PROSTATE SPECIFIC ANTIGEN): ICD-10-CM

## 2022-04-15 DIAGNOSIS — Z11.4 SCREENING FOR HIV (HUMAN IMMUNODEFICIENCY VIRUS): ICD-10-CM

## 2022-04-15 DIAGNOSIS — M10.9 GOUT OF RIGHT ANKLE, UNSPECIFIED CAUSE, UNSPECIFIED CHRONICITY: ICD-10-CM

## 2022-04-15 DIAGNOSIS — I10 BENIGN ESSENTIAL HYPERTENSION: ICD-10-CM

## 2022-04-15 DIAGNOSIS — E10.3213 TYPE 1 DIABETES MELLITUS WITH MILD NONPROLIFERATIVE RETINOPATHY OF BOTH EYES AND MACULAR EDEMA (HCC): ICD-10-CM

## 2022-04-15 LAB
ALBUMIN SERPL BCP-MCNC: 3.6 G/DL (ref 3.5–5)
ALP SERPL-CCNC: 97 U/L (ref 46–116)
ALT SERPL W P-5'-P-CCNC: 21 U/L (ref 12–78)
ANION GAP SERPL CALCULATED.3IONS-SCNC: 7 MMOL/L (ref 4–13)
AST SERPL W P-5'-P-CCNC: 21 U/L (ref 5–45)
BILIRUB SERPL-MCNC: 0.5 MG/DL (ref 0.2–1)
BUN SERPL-MCNC: 9 MG/DL (ref 5–25)
CALCIUM SERPL-MCNC: 8.3 MG/DL (ref 8.3–10.1)
CHLORIDE SERPL-SCNC: 96 MMOL/L (ref 100–108)
CHOLEST SERPL-MCNC: 145 MG/DL
CO2 SERPL-SCNC: 27 MMOL/L (ref 21–32)
CREAT SERPL-MCNC: 1.54 MG/DL (ref 0.6–1.3)
CREAT UR-MCNC: 70.7 MG/DL
ERYTHROCYTE [DISTWIDTH] IN BLOOD BY AUTOMATED COUNT: 11.9 % (ref 11.6–15.1)
EST. AVERAGE GLUCOSE BLD GHB EST-MCNC: 212 MG/DL
GFR SERPL CREATININE-BSD FRML MDRD: 48 ML/MIN/1.73SQ M
GLUCOSE P FAST SERPL-MCNC: 168 MG/DL (ref 65–99)
HBA1C MFR BLD: 9 %
HCT VFR BLD AUTO: 41.5 % (ref 36.5–49.3)
HDLC SERPL-MCNC: 68 MG/DL
HGB BLD-MCNC: 14 G/DL (ref 12–17)
LDLC SERPL CALC-MCNC: 66 MG/DL (ref 0–100)
MCH RBC QN AUTO: 31.7 PG (ref 26.8–34.3)
MCHC RBC AUTO-ENTMCNC: 33.7 G/DL (ref 31.4–37.4)
MCV RBC AUTO: 94 FL (ref 82–98)
MICROALBUMIN UR-MCNC: 36.1 MG/L (ref 0–20)
MICROALBUMIN/CREAT 24H UR: 51 MG/G CREATININE (ref 0–30)
PLATELET # BLD AUTO: 343 THOUSANDS/UL (ref 149–390)
PMV BLD AUTO: 9.3 FL (ref 8.9–12.7)
POTASSIUM SERPL-SCNC: 5.2 MMOL/L (ref 3.5–5.3)
PROT SERPL-MCNC: 6.5 G/DL (ref 6.4–8.2)
PSA SERPL-MCNC: 2.1 NG/ML (ref 0–4)
RBC # BLD AUTO: 4.41 MILLION/UL (ref 3.88–5.62)
SODIUM SERPL-SCNC: 130 MMOL/L (ref 136–145)
TRIGL SERPL-MCNC: 53 MG/DL
TSH SERPL DL<=0.05 MIU/L-ACNC: 0.98 UIU/ML (ref 0.45–4.5)
URATE SERPL-MCNC: 6.5 MG/DL (ref 4.2–8)
WBC # BLD AUTO: 7.5 THOUSAND/UL (ref 4.31–10.16)

## 2022-04-15 PROCEDURE — 82043 UR ALBUMIN QUANTITATIVE: CPT | Performed by: FAMILY MEDICINE

## 2022-04-15 PROCEDURE — 82570 ASSAY OF URINE CREATININE: CPT | Performed by: FAMILY MEDICINE

## 2022-04-15 PROCEDURE — 83036 HEMOGLOBIN GLYCOSYLATED A1C: CPT

## 2022-04-15 PROCEDURE — 84443 ASSAY THYROID STIM HORMONE: CPT

## 2022-04-15 PROCEDURE — 80061 LIPID PANEL: CPT

## 2022-04-15 PROCEDURE — 80053 COMPREHEN METABOLIC PANEL: CPT

## 2022-04-15 PROCEDURE — 87389 HIV-1 AG W/HIV-1&-2 AB AG IA: CPT

## 2022-04-15 PROCEDURE — G0103 PSA SCREENING: HCPCS

## 2022-04-15 PROCEDURE — 85027 COMPLETE CBC AUTOMATED: CPT

## 2022-04-15 PROCEDURE — 84550 ASSAY OF BLOOD/URIC ACID: CPT

## 2022-04-15 PROCEDURE — 36415 COLL VENOUS BLD VENIPUNCTURE: CPT

## 2022-04-16 LAB — HIV 1+2 AB+HIV1 P24 AG SERPL QL IA: NORMAL

## 2022-04-19 ENCOUNTER — OFFICE VISIT (OUTPATIENT)
Dept: ENDOCRINOLOGY | Facility: HOSPITAL | Age: 62
End: 2022-04-19
Payer: COMMERCIAL

## 2022-04-19 ENCOUNTER — TELEPHONE (OUTPATIENT)
Dept: ADMINISTRATIVE | Facility: OTHER | Age: 62
End: 2022-04-19

## 2022-04-19 VITALS
WEIGHT: 158.2 LBS | HEIGHT: 70 IN | BODY MASS INDEX: 22.65 KG/M2 | DIASTOLIC BLOOD PRESSURE: 84 MMHG | HEART RATE: 104 BPM | SYSTOLIC BLOOD PRESSURE: 164 MMHG

## 2022-04-19 DIAGNOSIS — E78.2 MIXED HYPERLIPIDEMIA: ICD-10-CM

## 2022-04-19 DIAGNOSIS — E10.649 HYPOGLYCEMIA UNAWARENESS ASSOCIATED WITH TYPE 1 DIABETES MELLITUS (HCC): ICD-10-CM

## 2022-04-19 DIAGNOSIS — R80.9 MICROALBUMINURIA DUE TO TYPE 1 DIABETES MELLITUS (HCC): ICD-10-CM

## 2022-04-19 DIAGNOSIS — E10.29 MICROALBUMINURIA DUE TO TYPE 1 DIABETES MELLITUS (HCC): ICD-10-CM

## 2022-04-19 DIAGNOSIS — Z96.41 INSULIN PUMP IN PLACE: ICD-10-CM

## 2022-04-19 DIAGNOSIS — E10.42 DIABETIC POLYNEUROPATHY ASSOCIATED WITH TYPE 1 DIABETES MELLITUS (HCC): ICD-10-CM

## 2022-04-19 DIAGNOSIS — E10.65 TYPE 1 DIABETES MELLITUS WITH HYPERGLYCEMIA (HCC): Primary | ICD-10-CM

## 2022-04-19 DIAGNOSIS — I10 BENIGN ESSENTIAL HYPERTENSION: ICD-10-CM

## 2022-04-19 PROCEDURE — 99245 OFF/OP CONSLTJ NEW/EST HI 55: CPT | Performed by: INTERNAL MEDICINE

## 2022-04-19 PROCEDURE — 95251 CONT GLUC MNTR ANALYSIS I&R: CPT | Performed by: INTERNAL MEDICINE

## 2022-04-19 NOTE — PATIENT INSTRUCTIONS
The last hgba1c is 9 0%  this is too high  I restarted automode, it will take several to kick back in  This will help correct highs  Work on using the bolus wizard regularly  No more manual boluses  Download the pump in about 2 weeks and call us to get the report so I can look at it to see if any changes need to be made  Follow up in 3 months with blood work

## 2022-04-19 NOTE — LETTER
Diabetic Eye Exam Form    Date Requested: 22  Patient: Madeline Gale  Patient : [id number]   Referring Provider: April Funk MD [facility]    DIABETIC Eye Exam Date _______________________________    Type of Exam MUST be documented for Diabetic Eye Exams  Please CHECK ONE  Retinal Exam       Dilated Retinal Exam       OCT       Optomap-Iris Exam      Fundus Photography     Left Eye - Please check Retinopathy AND Type or No Retinopathy      Exam did show retinopathy    Exam did not show retinopathy         Mild     Proliferative           Moderate    Severe            None         Right Eye - Please check Retinopathy AND Type or No Retinopathy     Exam did show retinopathy    Exam did not show retinopathy         Mild     Proliferative        Moderate    Severe        None       Comments __________________________________________________________    Practice Providing Exam ______________________________________________    Exam Performed By (print name) _______________________________________      Provider Signature ___________________________________________________    These reports are needed for  compliance  Please fax this completed form and a copy of the Diabetic Eye Exam report to our office located at [place] [address] as soon as possible via [phone] zabrina Arellano: Phone [phone]  We thank you for your assistance in treating our mutual patient

## 2022-04-19 NOTE — TELEPHONE ENCOUNTER
Upon review of the In Basket request and the patient's chart, initial outreach has been made via fax, please see Contacts section for details       Thank you  Adeel Gutierrez MA

## 2022-04-19 NOTE — PROGRESS NOTES
4/19/2022    Assessment/Plan      Diagnoses and all orders for this visit:    Type 1 diabetes mellitus with hyperglycemia ([place] [address] )  -     HEMOGLOBIN A1C W/ EAG ESTIMATION Lab Collect; Future  -     Comprehensive metabolic panel Lab Collect; Future  -     insulin lispro (HumaLOG) 100 units/mL injection; Use via the insulin pump, use up to 80 units daily    Benign essential hypertension  -     HEMOGLOBIN A1C W/ EAG ESTIMATION Lab Collect; Future  -     Comprehensive metabolic panel Lab Collect; Future    Insulin pump in place  -     HEMOGLOBIN A1C W/ EAG ESTIMATION Lab Collect; Future  -     Comprehensive metabolic panel Lab Collect; Future    Mixed hyperlipidemia  -     HEMOGLOBIN A1C W/ EAG ESTIMATION Lab Collect; Future  -     Comprehensive metabolic panel Lab Collect; Future    Diabetic polyneuropathy associated with type 1 diabetes mellitus (HCC)  -     HEMOGLOBIN A1C W/ EAG ESTIMATION Lab Collect; Future  -     Comprehensive metabolic panel Lab Collect; Future    Hypoglycemia unawareness associated with type 1 diabetes mellitus ([place] [address] )  -     HEMOGLOBIN A1C W/ EAG ESTIMATION Lab Collect; Future  -     Comprehensive metabolic panel Lab Collect; Future    Microalbuminuria due to type 1 diabetes mellitus ([place] [address] )  -     HEMOGLOBIN A1C W/ EAG ESTIMATION Lab Collect; Future  -     Comprehensive metabolic panel Lab Collect; Future        Assessment/Plan:  1  Type 1 diabetes  Hemoglobin A1c is 9% demonstrating poorly controlled diabetes  It is possible that the hemoglobin A1c went up from previous hemoglobin A1c in the fall at 7 9% because his insulin pump is currently not in auto mode and he has not been utilizing the bolus wizard function of his insulin pump and thus he is only been using about 50% of his insulin pump settings  For now, I will not adjust his basal or bolus rates  I have asked him to start using the bolus wizard and counting carbohydrates effectively    I have turned the auto mode function on in his insulin pump today so that auto mode will commence  For now, he will continue the same a Medtronic 770 G insulin pump with Medtronic sensor integrated with the insulin pump  He was offered Diabetes Education if he needs any help with remembering how to utilize his pump or with carbohydrate counting but he has refused at this point  2  Diabetic neuropathy  He has no significant neuropathic symptoms  Diabetic foot exam was performed in the office today and he does have evidence of diabetic neuropathy  Education was performed to encourage him to see Podiatry if needed but to keep a close eye on his feet and visually inspect them regularly  3  Microalbuminuria  He does have mild elevation in urine microalbumin to creatinine ratio and is currently on lisinopril 20 mg daily  Hopefully, with improvement of his hemoglobin A1c, this will improve  4  Hypoglycemic unawareness  He has hypoglycemic unawareness and is utilizing a Medtronic sensor which is appropriate for safety sake  5  Hypertension  He is mildly hypertensive in the office but was nervous seeing a new doctor so for now he will continue same lisinopril and metoprolol  6  Hyperlipidemia  Lipid profile is quite good  He will continue same atorvastatin 40 mg daily  I have asked him to follow up in 3 months with preceding hemoglobin A1c and CMP  CC: Diabetes Consult    History of Present Illness     HPI: Denise Parra is a 63y o  year old male with type 1 diabetes with neuropathy, microalbuminuria, retinopathy, and hypoglycemic unawareness for 30 years, hypertension, hyperlipidemia for evaluation/consult  He reports that he was diagnosed with type 1 diabetes around the age of 29  At that time, he had thirst and 30 lb weight loss in 3 months  He saw Endocrinology and was started on insulin and eventually started on an insulin pump about 15 years ago    He was seeing [facility] endocrinology and last visit there was November 2021  He is here at [facility] endocrinology for transfer of care  He is on insulin at home and takes Humalog insulin via the insulin pump  He denies any polyuria, polydipsia, polyphagia, and blurry vision  He will have nocturia at times  He denies numbness or tingling of the feet but says his feet are always somewhat cold  He denies chest pain or shortness of breath  He denies heart attack, stroke and claudication but does admit to neuropathy, nephropathy and retinopathy and hypoglycemic unawareness  He is currently on insulin pump therapy using a Medtronic 770 G insulin pump with [employer] sensor integrated with pump  He received this pump within the last 6 months and has been on insulin pump therapy for about 15 years  Basal rates:  12:00 a m  to 3:30 a m  0 55 units/hour, 3:30 a m  to 8:00 a m  0 75 units/hour, 8:00 a m  to 12:00 p m  0 9 units/hour, 12:00 p m  to 10:30 p m  0 9 units/hour, 10:30 p m  to 12:00 a m  0 6 units/hour  Bolus rates:  1 unit for every 10 g carbohydrate with each meal     Insulin sensitivity factor:  1 unit for every 32 blood sugar points for a target blood glucose of 110-120  Insulin action:  4 hours  He currently does not have his bolus wizard active and is currently in manual mode 100% of the time and auto mode 0% of the time  Hypoglycemic episodes: Yes occasional USusally if not eating correctly  H/o of hypoglycemia causing hospitalization or intervention such as glucagon injection  or ambulance call  Yes  Over 1 year ago while asleep, wife called and as low as 28  Hypoglycemia symptoms: sweating  Will be sweating in the 40s or less, no symptoms , he just knows when low  Treatment of hypoglycemia: will drink a sugared soda  Glucagon:Yes  Medic alert tag: recommended,Yes  The patient's last eye exam was several months ago for new glasses    The patient's last foot exam was in the distant past when he had a foot ulcer and saw Podiatry but has not seen Podiatry in quite some time  Last A1C was   Lab Results   Component Value Date    HGBA1C 9 0 (H) 04/15/2022     Blood Sugar/Glucometer/Pump/CGM review:  He utilizes a Medtronic sensor integrated with his Medtronic 770 G insulin pump to test his blood sugars at least 6 to 8 times a day and has had this pump for over 6 months  Insulin pump and sensor download from 04/06/2022 through 04/19/2022 was reviewed the office  Average glucose is 180 mg/dL with of variability of 49 mg/dL  47% of blood sugars are in target range, 44% high, 8% very high, 1% low, and 0% very low  He does also test via fingerstick up to 4 to 5 times a day as needed to calibrate his insulin pump  He has hyperlipidemia and takes atorvastatin 40 mg daily  He denies chest pain or shortness of breath  He has hypertension and takes lisinopril 20 mg daily and metoprolol 50 mg daily  He denies headache or stroke-like symptoms  Review of Systems   Constitutional: Negative for fatigue and unexpected weight change  HENT: Positive for hearing loss  Negative for tinnitus and trouble swallowing  Some decrease in hearing  Eyes: Negative for visual disturbance  Wears glasses  Respiratory: Negative for chest tightness and shortness of breath  Cardiovascular: Negative for chest pain and leg swelling  Gastrointestinal: Negative for abdominal pain, constipation, diarrhea and nausea  Endocrine: Negative for polydipsia, polyphagia and polyuria  Nocturia at times  Musculoskeletal: Positive for arthralgias  Negative for back pain  Left knee pain at times  Skin: Negative for rash and wound  Neurological: Negative for dizziness, tremors, weakness, light-headedness, numbness and headaches  Feet are always cold  Psychiatric/Behavioral: Negative for dysphoric mood and sleep disturbance  The patient is not nervous/anxious          Historical Information   Past Medical History: Diagnosis Date    Chronic foot ulcer ([facility] [address] ) 2018    Diabetes mellitus ([facility] )     Gout     High cholesterol     Hypertension      Past Surgical History:   Procedure Laterality Date    COMPLEX WOUND CLOSURE TO EXTREMITY Left 2019    Procedure: COMPLEX CLOSURE LEFT CHEEK;  Surgeon: Khalif Aguirre MD;  Location:  MAIN OR;  Service: Plastics    FACIAL/NECK BIOPSY Left 2019    Procedure: EXCISION LEFT CHEEK CYST;  Surgeon: Khalif Aguirre MD;  Location:  MAIN OR;  Service: Plastics    HERNIA REPAIR Left     several times    KNEE ARTHROSCOPY Left     torn meniscus     Social History   Social History     Substance and Sexual Activity   Alcohol Use Yes    Comment: "some beers"     Social History     Substance and Sexual Activity   Drug Use Never     Social History     Tobacco Use   Smoking Status Former Smoker    Packs/day: 1 00    Types: Cigarettes    Quit date: 2022    Years since quittin 2   Smokeless Tobacco Never Used   Tobacco Comment    encouraged smoking cessation     Family History:   Family History   Problem Relation Age of Onset    Heart disease Father     Diabetes type I Father     Diabetes type II Mother     No Known Problems Sister     Alcohol abuse Brother     Diabetes type I Brother     No Known Problems Brother     No Known Problems Son     No Known Problems Daughter        Meds/Allergies   Current Outpatient Medications   Medication Sig Dispense Refill    atorvastatin (LIPITOR) 40 mg tablet Take 1 tablet (40 mg total) by mouth daily 30 tablet 0    Glucagon, rDNA, (Glucagon Emergency) 1 MG KIT as directed      insulin lispro (HumaLOG) 100 units/mL injection Use via the insulin pump, use up to 80 units daily 60 mL 3    lisinopril (ZESTRIL) 20 mg tablet Take 1 tablet (20 mg total) by mouth daily 30 tablet 0    metoprolol succinate (TOPROL-XL) 50 mg 24 hr tablet Take 1 tablet (50 mg total) by mouth daily 30 tablet 0    colchicine (COLCRYS) 0 6 mg tablet Use as directed for gout flares (Patient not taking: Reported on 4/19/2022 )       No current facility-administered medications for this visit  Allergies   Allergen Reactions    Cefuroxime Other (See Comments)    Augmentin [Amoxicillin-Pot Clavulanate] Nausea Only       Objective   Vitals: Blood pressure 164/84, pulse 104, height 5' 10" (1 778 m), weight 71 8 kg (158 lb 3 2 oz)  Invasive Devices  Report    None                 Physical Exam  Vitals reviewed  Constitutional:       Appearance: Normal appearance  He is well-developed and normal weight  HENT:      Head: Normocephalic and atraumatic  Eyes:      Extraocular Movements: Extraocular movements intact  Conjunctiva/sclera: Conjunctivae normal       Comments: No lid lag, stare, proptosis, or periorbital edema  Neck:      Thyroid: No thyromegaly  Vascular: No carotid bruit  Comments: Thyroid normal in size  No palpable thyroid nodules  No bruits over the thyroid gland  Cardiovascular:      Rate and Rhythm: Normal rate and regular rhythm  Pulses: Pulses are weak  Dorsalis pedis pulses are 1+ on the right side and 1+ on the left side  Posterior tibial pulses are 1+ on the right side and 1+ on the left side  Heart sounds: Normal heart sounds  No murmur heard  Comments: 1+ dorsalis pedis and posterior tibialis pulses bilaterally  Pulmonary:      Effort: Pulmonary effort is normal       Breath sounds: Normal breath sounds  No wheezing  Abdominal:      General: Bowel sounds are normal       Palpations: Abdomen is soft  Tenderness: There is no abdominal tenderness  Musculoskeletal:         General: No deformity  Normal range of motion  Cervical back: Normal range of motion and neck supple  Right lower leg: No edema  Left lower leg: No edema  Comments: Healing scab dorsum left foot  No ulcerations of the feet  No tremor of the outstretched hands    No spinous process tenderness  No CVA tenderness  Feet:      Right foot:      Skin integrity: No ulcer, skin breakdown, erythema, warmth, callus or dry skin  Left foot:      Skin integrity: No ulcer, skin breakdown, erythema, warmth, callus or dry skin  Lymphadenopathy:      Cervical: No cervical adenopathy  Skin:     General: Skin is warm and dry  Findings: No erythema or rash  Comments: Some subcutaneous hypertrophy medially on his abdomen at some of his insulin pump sites   Neurological:      Mental Status: He is alert and oriented to person, place, and time  Deep Tendon Reflexes: Reflexes are normal and symmetric  Comments: Vibration sensation markedly diminished to the 1st toe DIP joint bilaterally  Microfilament sensation intact to the right heel, arch, 1st and 5th MP joint and left heel , arch, and 3rd toe but was otherwise absent bilaterally  Deep tendon reflexes normal       Patient's shoes and socks removed  Right Foot/Ankle   Right Foot Inspection  Skin Exam: skin normal and skin intact  No dry skin, no warmth, no callus, no erythema, no maceration, no abnormal color, no pre-ulcer, no ulcer and no callus  Toe Exam: ROM and strength within normal limits  No swelling and  no right toe deformity    Sensory   Vibration: diminished  Monofilament testing: diminished    Vascular  Capillary refills: < 3 seconds  The right DP pulse is 1+  The right PT pulse is 1+  Left Foot/Ankle  Left Foot Inspection  Skin Exam: skin normal and skin intact  No dry skin, no warmth, no erythema, no maceration, normal color, no pre-ulcer, no ulcer and no callus  Toe Exam: ROM and strength within normal limits  No swelling and no left toe deformity  Sensory   Vibration: diminished  Monofilament testing: diminished    Vascular  Capillary refills: < 3 seconds  The left DP pulse is 1+  The left PT pulse is 1+       Assign Risk Category  No deformity present  Loss of protective sensation  Weak pulses  Risk: 2      The history was obtained from the review of the chart and from the patient  Lab Results:    Most recent Alc is  Lab Results   Component Value Date    HGBA1C 9 0 (H) 04/15/2022           Blood work done on 04/15/2022 showed a CMP with a glucose of 168 fasting, creatinine 1 54, GFR 48, sodium 130, chloride 96, but was otherwise normal     Lab Results   Component Value Date    CREATININE 1 54 (H) 04/15/2022    CREATININE 1 26 06/04/2021    BUN 9 04/15/2022    K 5 2 04/15/2022    CL 96 (L) 04/15/2022    CO2 27 04/15/2022     eGFR   Date Value Ref Range Status   04/15/2022 48 ml/min/1 73sq m Final     Total cholesterol 145, LDL cholesterol 66  Lab Results   Component Value Date    HDL 68 04/15/2022    TRIG 53 04/15/2022       Lab Results   Component Value Date    ALT 21 04/15/2022    AST 21 04/15/2022    ALKPHOS 97 04/15/2022     TSH is 0 981  CBC is normal     Urine microalbumin to creatinine ratio is 51        Future Appointments   Date Time Provider Ching Mcgrawi   6/6/2022  9:00 AM Derek Amaro MD [facility]   7/18/2022  8:40 AM Jan Herrera MD [facility]

## 2022-04-19 NOTE — TELEPHONE ENCOUNTER
----- Message from [address] sent at 4/19/2022 10:08 AM EDT -----  Regarding: DM EYE EXAM  04/19/22 10:09 AM    Hello, our patient Tee Cam has had a DM Eye Exam performed at [address] [place] [address] in [facility]  Their number is [phone]      Thank you,  [address]   [facility] [place]

## 2022-04-19 NOTE — LETTER
Diabetic Eye Exam Form       Second Request  Date Requested: 22  Patient: Gilles Webber  Patient : 1960   Referring Provider: Jan Herrera MD [facility]      DIABETIC Eye Exam Date _______________________________    Type of Exam MUST be documented for Diabetic Eye Exams  Please CHECK ONE  Retinal Exam       Dilated Retinal Exam       OCT       Optomap-Iris Exam      Fundus Photography     Left Eye - Please check Retinopathy AND Type or No Retinopathy      Exam did show retinopathy    Exam did not show retinopathy         Mild     Proliferative           Moderate    Severe            None         Right Eye - Please check Retinopathy AND Type or No Retinopathy     Exam did show retinopathy    Exam did not show retinopathy         Mild     Proliferative        Moderate    Severe        None       Comments __________________________________________________________    Practice Providing Exam ______________________________________________    Exam Performed By (print name) _______________________________________      Provider Signature ___________________________________________________    These reports are needed for  compliance  Please fax this completed form and a copy of the Diabetic Eye Exam report to our office located at [place] [address] as soon as possible via [phone] attention Con Pelt: Phone [phone]  We thank you for your assistance in treating our mutual patient

## 2022-04-22 ENCOUNTER — TELEPHONE (OUTPATIENT)
Dept: ENDOCRINOLOGY | Facility: HOSPITAL | Age: 62
End: 2022-04-22

## 2022-04-25 DIAGNOSIS — E10.65 TYPE 1 DIABETES MELLITUS WITH HYPERGLYCEMIA (HCC): Primary | ICD-10-CM

## 2022-04-27 NOTE — TELEPHONE ENCOUNTER
As a follow-up, a second attempt has been made for outreach via fax, please see Contacts section for details      Thank you  Gabriel Gary MA

## 2022-04-27 NOTE — TELEPHONE ENCOUNTER
Upon review of the In Basket request we were able to locate, review, and update the patient chart as requested for Diabetic Eye Exam     Any additional questions or concerns should be emailed to the Practice Liaisons via [email or web address] email, please do not reply via In Basket      Thank you  Maxine Martinez MA

## 2022-06-06 ENCOUNTER — OFFICE VISIT (OUTPATIENT)
Dept: FAMILY MEDICINE CLINIC | Facility: HOSPITAL | Age: 62
End: 2022-06-06
Payer: COMMERCIAL

## 2022-06-06 VITALS
WEIGHT: 154 LBS | HEART RATE: 66 BPM | BODY MASS INDEX: 22.05 KG/M2 | DIASTOLIC BLOOD PRESSURE: 78 MMHG | TEMPERATURE: 96 F | HEIGHT: 70 IN | SYSTOLIC BLOOD PRESSURE: 130 MMHG

## 2022-06-06 DIAGNOSIS — E10.65 TYPE 1 DIABETES MELLITUS WITH HYPERGLYCEMIA (HCC): ICD-10-CM

## 2022-06-06 DIAGNOSIS — Z12.11 SCREENING FOR COLON CANCER: Primary | ICD-10-CM

## 2022-06-06 DIAGNOSIS — M10.9 GOUT OF ANKLE, UNSPECIFIED CAUSE, UNSPECIFIED CHRONICITY, UNSPECIFIED LATERALITY: ICD-10-CM

## 2022-06-06 DIAGNOSIS — F17.211 CIGARETTE NICOTINE DEPENDENCE IN REMISSION: ICD-10-CM

## 2022-06-06 DIAGNOSIS — I10 BENIGN ESSENTIAL HYPERTENSION: ICD-10-CM

## 2022-06-06 DIAGNOSIS — E78.2 MIXED HYPERLIPIDEMIA: ICD-10-CM

## 2022-06-06 PROCEDURE — 99214 OFFICE O/P EST MOD 30 MIN: CPT | Performed by: FAMILY MEDICINE

## 2022-06-06 NOTE — PROGRESS NOTES
Assessment/Plan:      Problem List Items Addressed This Visit        Endocrine    Type 1 diabetes mellitus with hyperglycemia ([address] )       Cardiovascular and Mediastinum    Benign essential hypertension       Musculoskeletal and Integument    Gout of ankle       Other    Cigarette nicotine dependence in remission    Relevant Orders    CT lung screening program    Mixed hyperlipidemia      Other Visit Diagnoses     Screening for colon cancer    -  Primary    Relevant Orders    [place]           Plan/Discussion:  Chronic patient working with endocrinology for better diabetic control  Reports has had weight loss over the past year  Over the last 3 months that I have seen the patient his weight has been stable  Discussed caloric intake  He should be at at least at 2100 calories per day total   Make sure he is getting at least 35-70 g of protein in today  Which would be 0 5-1 milligram/kilogram   Will continue to monitor  Overall feeling well  However appropriate cancer screening should be done  Cologuard is ordered  He has some long history of nicotine dependence  He quit about 4 years ago  However he should get a CT lung screening  He has agreed to this  Discussed pros and cons of this  Hypertension  Stable  Continue on the same  Hyperlipidemia  On statin  Labs are up-to-date  Gout  Stable  No episodes of gouty attacks  Subjective:   Chief Complaint   Patient presents with    Follow-up     3 month         Patient ID: Esau Fetlon is a 63 y o  male  Patient is here for follow-up of chronic conditions  Over the course of the year he reports having weight loss  He lost about 30 lb over this past year  On review the last time use here he gained about a lb  This was about 3 months ago  Overall he reports he is feeling well  He has no chronic cough  No chest congestion  Bowels are normal   He has no hematuria       He has not had a CT lung screening or colon cancer screening  He reports his diabetes is better controlled  His A1c was elevated last time  It was 9  On review use A1cs have been running about that over the past year  He has had better control with his current regimen  The following portions of the patient's history were reviewed and updated as appropriate: allergies, current medications, past family history, past medical history, past social history, past surgical history and problem list     Review of Systems   Constitutional: Negative  Negative for activity change, appetite change, chills and diaphoresis  HENT: Negative for congestion and dental problem  Respiratory: Negative  Negative for apnea, chest tightness, shortness of breath and wheezing  Cardiovascular: Negative  Negative for chest pain, palpitations and leg swelling  Gastrointestinal: Negative  Negative for abdominal distention, abdominal pain, constipation, diarrhea and nausea  Genitourinary: Negative  Negative for difficulty urinating, dysuria and frequency  Objective:  Vitals:    06/06/22 0901   BP: 130/78   Pulse: 66   Temp: (!) 96 °F (35 6 °C)   Weight: 69 9 kg (154 lb)   Height: 5' 10" (1 778 m)     BP Readings from Last 6 Encounters:   06/06/22 130/78   04/19/22 164/84   03/07/22 118/70   06/04/21 148/80   07/18/19 (!) 180/90      Wt Readings from Last 6 Encounters:   06/06/22 69 9 kg (154 lb)   04/19/22 71 8 kg (158 lb 3 2 oz)   03/07/22 69 6 kg (153 lb 6 4 oz)   06/04/21 77 1 kg (170 lb)   07/18/19 74 8 kg (165 lb)   06/12/19 76 8 kg (169 lb 6 4 oz)             Physical Exam  Vitals and nursing note reviewed  Constitutional:       General: He is not in acute distress  Appearance: Normal appearance  He is well-developed and normal weight  He is not ill-appearing  HENT:      Head: Normocephalic and atraumatic  Right Ear: External ear normal       Left Ear: External ear normal       Nose: Nose normal  No congestion or rhinorrhea  Mouth/Throat:      Mouth: Mucous membranes are moist       Pharynx: No oropharyngeal exudate or posterior oropharyngeal erythema  Eyes:      Extraocular Movements: Extraocular movements intact  Conjunctiva/sclera: Conjunctivae normal       Pupils: Pupils are equal, round, and reactive to light  Cardiovascular:      Rate and Rhythm: Normal rate and regular rhythm  Heart sounds: Normal heart sounds  No murmur heard  No friction rub  No gallop  Pulmonary:      Effort: Pulmonary effort is normal  No respiratory distress  Breath sounds: Normal breath sounds  No wheezing or rales  Chest:      Chest wall: No tenderness  Abdominal:      General: Bowel sounds are normal  There is no distension  Palpations: Abdomen is soft  There is no mass  Tenderness: There is no abdominal tenderness  There is no guarding or rebound  Musculoskeletal:         General: Normal range of motion  Cervical back: Normal range of motion and neck supple  Skin:     General: Skin is warm  Capillary Refill: Capillary refill takes less than 2 seconds  Neurological:      Mental Status: He is alert and oriented to person, place, and time     Psychiatric:         Mood and Affect: Mood normal          Behavior: Behavior normal

## 2022-07-01 ENCOUNTER — APPOINTMENT (OUTPATIENT)
Dept: URGENT CARE | Facility: CLINIC | Age: 62
End: 2022-07-01
Payer: OTHER MISCELLANEOUS

## 2022-07-01 PROCEDURE — 99283 EMERGENCY DEPT VISIT LOW MDM: CPT

## 2022-07-01 PROCEDURE — G0382 LEV 3 HOSP TYPE B ED VISIT: HCPCS

## 2022-07-03 ENCOUNTER — APPOINTMENT (OUTPATIENT)
Dept: URGENT CARE | Facility: CLINIC | Age: 62
End: 2022-07-03
Payer: OTHER MISCELLANEOUS

## 2022-07-03 PROCEDURE — 99213 OFFICE O/P EST LOW 20 MIN: CPT

## 2022-07-08 ENCOUNTER — APPOINTMENT (OUTPATIENT)
Dept: URGENT CARE | Facility: CLINIC | Age: 62
End: 2022-07-08
Payer: OTHER MISCELLANEOUS

## 2022-07-08 PROCEDURE — 99213 OFFICE O/P EST LOW 20 MIN: CPT | Performed by: FAMILY MEDICINE

## 2022-10-07 DIAGNOSIS — E10.65 TYPE 1 DIABETES MELLITUS WITH HYPERGLYCEMIA (HCC): ICD-10-CM

## 2022-10-07 NOTE — TELEPHONE ENCOUNTER
Requested medication(s) are due for refill today: Yes  Patient has already received a courtesy refill: No  Other reason request has been forwarded to provider: failed protocols

## 2022-10-26 ENCOUNTER — APPOINTMENT (OUTPATIENT)
Dept: URGENT CARE | Facility: CLINIC | Age: 62
End: 2022-10-26

## 2022-10-29 ENCOUNTER — APPOINTMENT (OUTPATIENT)
Dept: URGENT CARE | Facility: CLINIC | Age: 62
End: 2022-10-29

## 2022-11-03 DIAGNOSIS — M10.9 GOUT OF ANKLE, UNSPECIFIED CAUSE, UNSPECIFIED CHRONICITY, UNSPECIFIED LATERALITY: Primary | ICD-10-CM

## 2022-11-03 DIAGNOSIS — E78.5 HYPERLIPIDEMIA, UNSPECIFIED HYPERLIPIDEMIA TYPE: ICD-10-CM

## 2022-11-03 DIAGNOSIS — I10 BENIGN ESSENTIAL HYPERTENSION: ICD-10-CM

## 2022-11-03 RX ORDER — METOPROLOL SUCCINATE 50 MG/1
50 TABLET, EXTENDED RELEASE ORAL DAILY
Qty: 30 TABLET | Refills: 0 | Status: SHIPPED | OUTPATIENT
Start: 2022-11-03

## 2022-11-03 RX ORDER — ATORVASTATIN CALCIUM 40 MG/1
40 TABLET, FILM COATED ORAL DAILY
Qty: 30 TABLET | Refills: 0 | Status: SHIPPED | OUTPATIENT
Start: 2022-11-03 | End: 2022-11-26

## 2022-11-04 ENCOUNTER — TELEPHONE (OUTPATIENT)
Dept: FAMILY MEDICINE CLINIC | Facility: HOSPITAL | Age: 62
End: 2022-11-04

## 2022-11-04 DIAGNOSIS — I10 BENIGN ESSENTIAL HYPERTENSION: ICD-10-CM

## 2022-11-04 LAB
LEFT EYE DIABETIC RETINOPATHY: NORMAL
RIGHT EYE DIABETIC RETINOPATHY: NORMAL

## 2022-11-04 RX ORDER — LISINOPRIL 20 MG/1
20 TABLET ORAL DAILY
Qty: 30 TABLET | Refills: 5 | Status: SHIPPED | OUTPATIENT
Start: 2022-11-04

## 2022-11-04 NOTE — TELEPHONE ENCOUNTER
Pt was wondering if he could have something for gout flare up also  Are you able to review, Dr Bessie Matute left for the day already

## 2022-11-07 ENCOUNTER — OFFICE VISIT (OUTPATIENT)
Dept: FAMILY MEDICINE CLINIC | Facility: HOSPITAL | Age: 62
End: 2022-11-07

## 2022-11-07 ENCOUNTER — HOSPITAL ENCOUNTER (OUTPATIENT)
Dept: RADIOLOGY | Facility: HOSPITAL | Age: 62
Discharge: HOME | End: 2022-11-07

## 2022-11-07 VITALS
WEIGHT: 150.2 LBS | SYSTOLIC BLOOD PRESSURE: 160 MMHG | HEIGHT: 70 IN | TEMPERATURE: 97.6 F | BODY MASS INDEX: 21.5 KG/M2 | OXYGEN SATURATION: 98 % | HEART RATE: 102 BPM | DIASTOLIC BLOOD PRESSURE: 82 MMHG

## 2022-11-07 DIAGNOSIS — L03.116 CELLULITIS OF LEFT FOOT: ICD-10-CM

## 2022-11-07 DIAGNOSIS — L03.116 CELLULITIS OF LEFT FOOT: Primary | ICD-10-CM

## 2022-11-07 RX ORDER — COLCHICINE 0.6 MG/1
0.6 TABLET ORAL EVERY 6 HOURS PRN
Qty: 30 TABLET | Refills: 0 | Status: SHIPPED | OUTPATIENT
Start: 2022-11-07

## 2022-11-07 RX ORDER — CLINDAMYCIN HYDROCHLORIDE 300 MG/1
300 CAPSULE ORAL 4 TIMES DAILY
Qty: 40 CAPSULE | Refills: 0 | Status: SHIPPED | OUTPATIENT
Start: 2022-11-07 | End: 2022-11-11

## 2022-11-07 RX ORDER — LISINOPRIL 20 MG/1
20 TABLET ORAL DAILY
Qty: 30 TABLET | Refills: 0 | Status: SHIPPED | OUTPATIENT
Start: 2022-11-07 | End: 2022-11-17 | Stop reason: SDUPTHER

## 2022-11-07 RX ORDER — SULFAMETHOXAZOLE AND TRIMETHOPRIM 800; 160 MG/1; MG/1
1 TABLET ORAL EVERY 12 HOURS SCHEDULED
Qty: 20 TABLET | Refills: 0 | Status: SHIPPED | OUTPATIENT
Start: 2022-11-07 | End: 2022-11-11

## 2022-11-07 NOTE — PROGRESS NOTES
Assessment/Plan:     Appears as cellulitis  Given severity and DM will start with both bactrim and clindamycin  Given extreme pain check foot xray to r/o osteo  He is to go to ER if pain and redness worsens or no better in 48 hours  Diagnoses and all orders for this visit:    Cellulitis of left foot  -     sulfamethoxazole-trimethoprim (BACTRIM DS) 800-160 mg per tablet; Take 1 tablet by mouth every 12 (twelve) hours for 10 days  -     clindamycin (CLEOCIN) 300 MG capsule; Take 1 capsule (300 mg total) by mouth 4 (four) times a day for 10 days  -     XR foot 3+ vw left; Future          Subjective:     Patient ID: Shannon Bush is a 63 y o  male  Started with pain for about 1 week  Swelling and redness started about 2 days ago  Swelling is going up to ankle  Was seen in [address] [place] [address] urgent care and thought to be gout and given colchicine w/o relief  H/o gout in ankle  Denies fever/chills/ malaise  DM  He admits to picking dead skin on the bottom of his left foot 2 days ago  Denies any injury to foot  Review of Systems   Constitutional: Negative for chills and fever  Musculoskeletal: Positive for arthralgias (left foot) and joint swelling (left foot/ankle)  Skin:        Left foot redness         The following portions of the patient's history were reviewed and updated as appropriate: allergies, current medications, past family history, past medical history, past social history, past surgical history and problem list     Objective:  Vitals:    11/07/22 0942   BP: 160/82   Pulse: 102   Temp: 97 6 °F (36 4 °C)   SpO2: 98%      Physical Exam  Vitals reviewed  Constitutional:       Appearance: Normal appearance  Cardiovascular:      Rate and Rhythm: Normal rate and regular rhythm  Heart sounds: Normal heart sounds  Pulmonary:      Effort: Pulmonary effort is normal       Breath sounds: Normal breath sounds  Musculoskeletal:      Left ankle: Swelling present        Left foot: Swelling and tenderness present  Normal pulse  Legs:    Neurological:      Mental Status: He is alert and oriented to person, place, and time  Psychiatric:         Mood and Affect: Mood normal          Behavior: Behavior normal          Thought Content:  Thought content normal          Judgment: Judgment normal

## 2022-11-07 NOTE — LETTER
November 7, 2022     Patient: Edward Rowe  YOB: 1960  Date of Visit: 11/7/2022      To Whom it May Concern:    Edward Rowe is under my professional care  Billyjarrett Escobar was seen in my office on 11/7/2022  Billyjarrett Escobar may return to work on 11/14/22  If you have any questions or concerns, please don't hesitate to call           Sincerely,          KAMILLE Goddard        CC: No Recipients

## 2022-11-10 ENCOUNTER — TELEPHONE (OUTPATIENT)
Dept: FAMILY MEDICINE CLINIC | Facility: HOSPITAL | Age: 62
End: 2022-11-10

## 2022-11-10 NOTE — TELEPHONE ENCOUNTER
Patient states that overall his foot is improving, however, the swelling is now in his ankle  Has not radiated up leg, but has concerns as the swelling as migrated  Please advise if he should continue on same abx

## 2022-11-11 ENCOUNTER — OFFICE VISIT (OUTPATIENT)
Dept: FAMILY MEDICINE CLINIC | Facility: HOSPITAL | Age: 62
End: 2022-11-11

## 2022-11-11 ENCOUNTER — TELEPHONE (OUTPATIENT)
Dept: FAMILY MEDICINE CLINIC | Facility: HOSPITAL | Age: 62
End: 2022-11-11

## 2022-11-11 ENCOUNTER — HOSPITAL ENCOUNTER (OUTPATIENT)
Dept: NON INVASIVE DIAGNOSTICS | Facility: HOSPITAL | Age: 62
Discharge: HOME | End: 2022-11-11

## 2022-11-11 VITALS
SYSTOLIC BLOOD PRESSURE: 152 MMHG | HEART RATE: 102 BPM | BODY MASS INDEX: 21.7 KG/M2 | DIASTOLIC BLOOD PRESSURE: 70 MMHG | TEMPERATURE: 97.1 F | HEIGHT: 70 IN | WEIGHT: 151.6 LBS

## 2022-11-11 DIAGNOSIS — I70.219 ATHEROSCLEROSIS OF LOWER EXTREMITY WITH CLAUDICATION (HCC): ICD-10-CM

## 2022-11-11 DIAGNOSIS — L03.116 CELLULITIS OF LEFT LOWER EXTREMITY: ICD-10-CM

## 2022-11-11 DIAGNOSIS — L03.116 CELLULITIS OF LEFT LOWER EXTREMITY: Primary | ICD-10-CM

## 2022-11-11 DIAGNOSIS — E10.42 DIABETIC POLYNEUROPATHY ASSOCIATED WITH TYPE 1 DIABETES MELLITUS (HCC): ICD-10-CM

## 2022-11-11 RX ORDER — LEVOFLOXACIN 500 MG/1
500 TABLET, FILM COATED ORAL EVERY 24 HOURS
Qty: 7 TABLET | Refills: 0 | Status: SHIPPED | OUTPATIENT
Start: 2022-11-11 | End: 2022-11-18

## 2022-11-11 NOTE — TELEPHONE ENCOUNTER
Please call  Continue with same abx as given  To make sure he does elevate the leg as much as he can  Advise fu visit early next week for reevaluation  ER precuations for further increase redness/swelling or fever

## 2022-11-11 NOTE — PROGRESS NOTES
Name: Raissa Sellers      : 1960      MRN: [id number]  Encounter Provider: Manny Jay MD  Encounter Date: 2022   Encounter department: [facility]      1  Cellulitis of left lower extremity  -     levofloxacin (LEVAQUIN) 500 mg tablet; Take 1 tablet (500 mg total) by mouth every 24 hours for 7 days  -     VAS lower limb arterial duplex, limited/unilateral; Future; Expected date: 2022    2  Diabetic polyneuropathy associated with type 1 diabetes mellitus (HCC)  -     levofloxacin (LEVAQUIN) 500 mg tablet; Take 1 tablet (500 mg total) by mouth every 24 hours for 7 days    3  Atherosclerosis of lower extremity with claudication (HCC)  -     VAS lower limb arterial duplex, limited/unilateral; Future; Expected date: 2022  has not had significant improvement of cellulitis  No improvement with use of bactrim/clindamycin  Patient with underlying diabetes  Will replace with levaquin 500 mg daily x 7 days  Concern for PAD causing sytmpoms /discoloration of the foot rather than cellulitis  Stat arterial duplex to be done today  Fu in our office on Monday  Subjective      Pt seen for fu  Treated for cellulitis 2022  Started on clindamycin and bactrim  Patient has no fever , no chills  Has not seen any singificant benefit from the abx  There is pain   No warmth  Does have underlying type 1 diabetes, neuropathy, smoker, hld  Review of Systems   Constitutional: Positive for activity change  Negative for appetite change, chills, fatigue and unexpected weight change         Current Outpatient Medications on File Prior to Visit   Medication Sig   • atorvastatin (LIPITOR) 40 mg tablet Take 1 tablet (40 mg total) by mouth daily   • colchicine (COLCRYS) 0 6 mg tablet Take 1 tablet (0 6 mg total) by mouth every 6 (six) hours as needed (gouty attack) Use as directed for gout flares   • Glucagon, rDNA, (Glucagon Emergency) 1 MG KIT as directed   • insulin aspart (NovoLOG) 100 units/mL injection Use up to 80 units daily via insulin pump  • insulin lispro (HumaLOG) 100 units/mL injection Use via the insulin pump, use up to 80 units daily   • lisinopril (ZESTRIL) 20 mg tablet Take 1 tablet (20 mg total) by mouth daily   • lisinopril (ZESTRIL) 20 mg tablet Take 1 tablet (20 mg total) by mouth daily   • metoprolol succinate (TOPROL-XL) 50 mg 24 hr tablet Take 1 tablet (50 mg total) by mouth daily       Objective     /70   Pulse 102   Temp (!) 97 1 °F (36 2 °C)   Ht 5' 10" (1 778 m)   Wt 68 8 kg (151 lb 9 6 oz)   BMI 21 75 kg/m²     Physical Exam  Vitals and nursing note reviewed  Constitutional:       Appearance: Normal appearance  Musculoskeletal:        Feet:    Feet:      Comments: Left foot:   A little cool to touch  Unable to palpate dorsalis pedis and PT pulses  Erythematous, dusky appearance over the toes, forefoot, midfoot up to the beginning of the ankle  Callus over the 1st MTP with small skin opening in the area  Neurological:      Mental Status: He is alert         Malina Dent MD

## 2022-11-11 NOTE — TELEPHONE ENCOUNTER
Please call  No significant findings on arterial testing  To take levaquin as prescribed  Dc the other two abx

## 2022-11-11 NOTE — TELEPHONE ENCOUNTER
[facility] CALLED    Testing today showed mild diffuse disease, negative for any acute findings

## 2022-11-14 ENCOUNTER — OFFICE VISIT (OUTPATIENT)
Dept: FAMILY MEDICINE CLINIC | Facility: HOSPITAL | Age: 62
End: 2022-11-14

## 2022-11-14 VITALS
SYSTOLIC BLOOD PRESSURE: 156 MMHG | TEMPERATURE: 97 F | OXYGEN SATURATION: 97 % | BODY MASS INDEX: 21.67 KG/M2 | WEIGHT: 151.4 LBS | DIASTOLIC BLOOD PRESSURE: 84 MMHG | HEIGHT: 70 IN | HEART RATE: 110 BPM

## 2022-11-14 DIAGNOSIS — L03.116 CELLULITIS OF LEFT LOWER EXTREMITY: Primary | ICD-10-CM

## 2022-11-14 DIAGNOSIS — Z23 ENCOUNTER FOR IMMUNIZATION: ICD-10-CM

## 2022-11-14 NOTE — LETTER
November 14, 2022     Patient: Mihc Canchola  YOB: 1960  Date of Visit: 11/14/2022      To Whom it May Concern:    Mich Canchola is under my professional care  Skylar Chamorro was seen in my office on 11/14/2022  Skylar Chamorro may return to work on 11//21/22  If you have any questions or concerns, please don't hesitate to call           Sincerely,          KAMILLE Ernst        CC: No Recipients

## 2022-11-14 NOTE — PROGRESS NOTES
Assessment/Plan: At this point overall infection is slowly resolving  I do feel he would benefit from wound care consultation given blistering, skin sloughing and callus (which was likely source of infection)  Will have office call wound care to get asap appointment  Diagnoses and all orders for this visit:    Cellulitis of left lower extremity  -     Ambulatory Referral to Wound Care; Future          Subjective:     Patient ID: Sabine Lopez is a 63 y o  male  Seen last week for what appeared to be cellulitis of his left foot  He was started on Bactrim and clindamycin  Foot xray was negative  He noticed no improvement and was seen again  Was switched to Levaquin  Thought possible r/t PAD  Stat arterial doppler showed mild disease w/o occlusion  He continues on Levaquin  Swelling has improved  Still with pain in foot  Redness remains  Has blistering on plantar aspect with peeling of skin  He denies fever, chills, malaise  He is a DM type 1  Review of Systems   Constitutional: Negative for chills and fever  Musculoskeletal: Positive for arthralgias (left foot)  Negative for joint swelling  Skin: Positive for color change and wound  The following portions of the patient's history were reviewed and updated as appropriate: allergies, current medications, past family history, past medical history, past social history, past surgical history and problem list     Objective:  Vitals:    11/14/22 0829   BP: 156/84   Pulse: (!) 110   Temp: (!) 97 °F (36 1 °C)   SpO2: 97%      Physical Exam  Vitals reviewed  Constitutional:       Appearance: Normal appearance  Cardiovascular:      Rate and Rhythm: Normal rate and regular rhythm  Heart sounds: Normal heart sounds  Pulmonary:      Effort: Pulmonary effort is normal       Breath sounds: Normal breath sounds  Musculoskeletal:      Left foot: Normal range of motion and normal capillary refill   No swelling, deformity, tenderness or bony tenderness  Normal pulse  Feet:    Skin:     General: Skin is warm and dry  Neurological:      Mental Status: He is alert and oriented to person, place, and time  Psychiatric:         Mood and Affect: Mood normal          Behavior: Behavior normal          Thought Content:  Thought content normal          Judgment: Judgment normal

## 2022-11-16 ENCOUNTER — OFFICE VISIT (OUTPATIENT)
Dept: WOUND CARE | Facility: HOSPITAL | Age: 62
End: 2022-11-16

## 2022-11-16 VITALS
HEART RATE: 102 BPM | RESPIRATION RATE: 16 BRPM | SYSTOLIC BLOOD PRESSURE: 150 MMHG | DIASTOLIC BLOOD PRESSURE: 80 MMHG | TEMPERATURE: 97.7 F | WEIGHT: 155 LBS | HEIGHT: 72 IN | BODY MASS INDEX: 20.99 KG/M2

## 2022-11-16 DIAGNOSIS — L97.422 DIABETIC ULCER OF LEFT MIDFOOT ASSOCIATED WITH TYPE 1 DIABETES MELLITUS, WITH FAT LAYER EXPOSED (HCC): ICD-10-CM

## 2022-11-16 DIAGNOSIS — E10.42 DIABETIC POLYNEUROPATHY ASSOCIATED WITH TYPE 1 DIABETES MELLITUS (HCC): Primary | ICD-10-CM

## 2022-11-16 DIAGNOSIS — E10.621 DIABETIC ULCER OF LEFT MIDFOOT ASSOCIATED WITH TYPE 1 DIABETES MELLITUS, WITH FAT LAYER EXPOSED (HCC): ICD-10-CM

## 2022-11-16 LAB — HBA1C MFR BLD HPLC: 8.5 %

## 2022-11-16 NOTE — PROGRESS NOTES
Patient ID: Lonnie Castellanos is a 63 y o  male Date of Birth 1960       Chief Complaint   Patient presents with   • New Patient Visit     Here for left plantar foot, had cellulitis 2 weeks ago and was told to come here  Allergies  Cefuroxime and Amoxicillin-pot clavulanate    Diagnosis:  1  Diabetic polyneuropathy associated with type 1 diabetes mellitus ([address] )  -     Ambulatory Referral to Podiatry; Future  -     Wound cleansing and dressings; Future    2  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, with fat layer exposed ([address] )  -     Wound cleansing and dressings; Future        Diagnosis ICD-10-CM Associated Orders   1  Diabetic polyneuropathy associated with type 1 diabetes mellitus ([address] )  E10 42 Ambulatory Referral to Podiatry     Wound cleansing and dressings      2  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, with fat layer exposed ([address] )  G92 750 Wound cleansing and dressings    L97 422            Assessment & Plan:  1  Initial wound care consultation, diabetic foot exam   2  Diabetic Veloz grade 2 ulceration plantar submetatarsal head 1/to left foot, wound was debrided subcuticular tissues and dressed with Medihoney dry dressing, wife will do the same daily  Patient to use a cast cover, do not get the foot wet in shower  3  Patient given a work excuse note to excuse from work for 2 weeks  4  Deep culture taken of tissue for culture and sensitivity  Patient complete all Levaquin as prescribed by PCP  5  X-ray ordered to rule out osteomyelitis  6  I personally reviewed patient's lower extremity arterial Doppler results from November 11th and his x-ray results and images from November 7th as well as reviewed his medical history and chart in epic    7  Patient educated on proper diabetic foot care, shoe gear, daily foot inspection, frequent elevation, low-sodium diet, proper protein intake, proper glycemic control (most recent A1c was 9 0 on April 15th) minimal to nonweightbearing to left foot to remove pressure from area of ulceration to allow to heal   Smoking cessation was also discussed and   8  Patient wife understand and agree with the plan and will follow up in 1 week  They were advised if they notice any signs of infection, nausea, vomiting, fever, chills, increase in blood sugars, purulence to go directly to the ER  Debridement   Wound 11/16/22 Diabetic Ulcer Left;Posterior    Universal Protocol:  Consent: Verbal consent obtained  Risks and benefits: risks, benefits and alternatives were discussed  Consent given by: patient  Time out: Immediately prior to procedure a "time out" was called to verify the correct patient, procedure, equipment, support staff and site/side marked as required    Patient understanding: patient states understanding of the procedure being performed  Patient identity confirmed: verbally with patient      Performed by: physician  Debridement type: surgical  Level of debridement: subcutaneous tissue  Pain control: none  Pre-debridement measurements  Depth (cm): 0      Post-debridement measurements  Length (cm): 2  Width (cm): 0 9  Depth (cm): 1 1  Percent debrided: 100%  Surface Area (cm^2): 1 8  Area debrided (cm^2): 1 8  Volume (cm^3): 1 98  Tissue and other material debrided: subcutaneous tissue  Devitalized tissue debrided: biofilm, callus, fibrin, necrotic debris, slough and eschar  Instrument(s) utilized: blade, forceps and scissors  Bleeding: small  Hemostasis obtained with: pressure  Procedural pain (0-10): 3  Post-procedural pain: 0   Response to treatment: procedure was tolerated well                 Subjective:   Celyfloyd Potter presents today for care of left foot cellulitis, he states that started approximately 2-3 weeks ago, he does not know what may have caused it or how it started, he does not recall any trauma or history of open wounds to the area, he states after he got the cellulitis he developed a big blister on the bottom of his foot  He states he has been on to different antibiotics and most recent 1 which he started on November 11th has very much helped his foot and the swelling has decreased  He states he has been out of work for 2 weeks  Patient continues to smoke, he states he is diabetic and well controlled        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)   • Insulin pump in place   • ED (erectile dysfunction) of organic origin   • Chronic pain   • Benign essential hypertension   • Cigarette nicotine dependence in remission   • Gout of ankle   • Type 1 diabetes mellitus with hyperglycemia ([facility])   • Diabetic polyneuropathy associated with type 1 diabetes mellitus ([address] )   • Microalbuminuria due to type 1 diabetes mellitus ([address] )   • Hypoglycemia unawareness associated with type 1 diabetes mellitus ([address] )     Past Medical History:   Diagnosis Date   • Chronic foot ulcer ([facility] ) 9/7/2018   • Diabetes mellitus ([facility] )    • Gout    • High cholesterol    • Hypertension      Past Surgical History:   Procedure Laterality Date   • COMPLEX WOUND CLOSURE TO EXTREMITY Left 7/18/2019    Procedure: COMPLEX CLOSURE LEFT CHEEK;  Surgeon: Sena Najera MD;  Location:  MAIN OR;  Service: Plastics   • FACIAL/NECK BIOPSY Left 7/18/2019    Procedure: EXCISION LEFT CHEEK CYST;  Surgeon: Sena Najera MD;  Location:  MAIN OR;  Service: Plastics   • HERNIA REPAIR Left     several times   • KNEE ARTHROSCOPY Left     torn meniscus     Social History     Socioeconomic History   • Marital status: /Civil Union     Spouse name: Not on file   • Number of children: Not on file   • Years of education: Not on file   • Highest education level: Not on file   Occupational History   • Occupation: cafeteria/kitchen at [employer]     Comment: [employer]   Tobacco Use   • Smoking status: Every Day     Packs/day: 1 00     Types: Cigarettes     Last attempt to quit: 2022     Years since quittin 8   • Smokeless tobacco: Never   • Tobacco comments:     encouraged smoking cessation   Vaping Use   • Vaping Use: Never used   Substance and Sexual Activity   • Alcohol use: Yes     Comment: "some beers"   • Drug use: Never   • Sexual activity: Not on file   Other Topics Concern   • Not on file   Social History Narrative   • Not on file     Social Determinants of Health     Financial Resource Strain: Not on file   Food Insecurity: Not on file   Transportation Needs: Not on file   Physical Activity: Not on file   Stress: Not on file   Social Connections: Not on file   Intimate Partner Violence: Not on file   Housing Stability: Not on file        Current Outpatient Medications:   •  atorvastatin (LIPITOR) 40 mg tablet, Take 1 tablet (40 mg total) by mouth daily, Disp: 30 tablet, Rfl: 0  •  colchicine (COLCRYS) 0 6 mg tablet, Take 1 tablet (0 6 mg total) by mouth every 6 (six) hours as needed (gouty attack) Use as directed for gout flares, Disp: 30 tablet, Rfl: 0  •  Glucagon, rDNA, (Glucagon Emergency) 1 MG KIT, as directed, Disp: , Rfl:   •  insulin aspart (NovoLOG) 100 units/mL injection, Use up to 80 units daily via insulin pump , Disp: 60 mL, Rfl: 3  •  insulin lispro (HumaLOG) 100 units/mL injection, Use via the insulin pump, use up to 80 units daily, Disp: 60 mL, Rfl: 3  •  levofloxacin (LEVAQUIN) 500 mg tablet, Take 1 tablet (500 mg total) by mouth every 24 hours for 7 days, Disp: 7 tablet, Rfl: 0  •  lisinopril (ZESTRIL) 20 mg tablet, Take 1 tablet (20 mg total) by mouth daily, Disp: 30 tablet, Rfl: 0  •  lisinopril (ZESTRIL) 20 mg tablet, Take 1 tablet (20 mg total) by mouth daily, Disp: 30 tablet, Rfl: 5  •  metoprolol succinate (TOPROL-XL) 50 mg 24 hr tablet, Take 1 tablet (50 mg total) by mouth daily, Disp: 30 tablet, Rfl: 0  Family History   Problem Relation Age of Onset   • Heart disease Father    • Diabetes type I Father    • Diabetes type II Mother    • No Known Problems Sister    • Alcohol abuse Brother    • Diabetes type I Brother    • No Known Problems Brother    • No Known Problems Son    • No Known Problems Daughter        Review of Systems   Constitutional: Negative for chills and fever  HENT: Negative for ear pain and sore throat  Eyes: Negative for pain and visual disturbance  Respiratory: Negative for cough and shortness of breath  Cardiovascular: Negative for chest pain and palpitations  Gastrointestinal: Negative for abdominal pain and vomiting  Genitourinary: Negative for dysuria and hematuria  Musculoskeletal: Negative for arthralgias and back pain  Skin: Positive for color change and wound  Negative for rash  Neurological: Positive for numbness  Negative for seizures and syncope  Psychiatric/Behavioral: Negative  All other systems reviewed and are negative  Objective:  /80   Pulse 102   Temp 97 7 °F (36 5 °C)   Resp 16   Ht 6' (1 829 m)   Wt 70 3 kg (155 lb)   BMI 21 02 kg/m²     Physical Exam  Constitutional:       Appearance: Normal appearance  He is normal weight  HENT:      Head: Normocephalic and atraumatic  Right Ear: External ear normal       Left Ear: External ear normal       Nose: Nose normal    Eyes:      Conjunctiva/sclera: Conjunctivae normal    Cardiovascular:      Pulses: Normal pulses  Dorsalis pedis pulses are 2+ on the right side and 2+ on the left side  Posterior tibial pulses are 2+ on the right side and 2+ on the left side  Pulmonary:      Effort: Pulmonary effort is normal    Musculoskeletal:      Cervical back: Normal range of motion  Right lower leg: No edema  Left lower leg: No edema     Feet:      Right foot:      Skin integrity: No ulcer, skin breakdown, erythema, warmth, callus or dry skin  Left foot:      Skin integrity: Ulcer present  Skin:     General: Skin is warm and dry  Capillary Refill: Capillary refill takes less than 2 seconds  Comments: See detailed wound assessment, post debridement the wound probes deep, very close to bone in interspace 1 into, but does not probe to bone  Neurological:      General: No focal deficit present  Mental Status: He is alert and oriented to person, place, and time  Mental status is at baseline  Sensory: Sensory deficit present  Coordination: Coordination abnormal       Gait: Gait abnormal    Psychiatric:         Mood and Affect: Mood normal          Behavior: Behavior normal          Thought Content: Thought content normal          Judgment: Judgment normal        Diabetic Foot Exam    Patient's shoes and socks removed  Right Foot/Ankle   Right Foot Inspection  Skin Exam: skin normal and skin intact  No dry skin, no warmth, no callus, no erythema, no maceration, no abnormal color, no pre-ulcer, no ulcer and no callus  Toe Exam: ROM and strength within normal limits  Sensory   Vibration: diminished  Proprioception: diminished  Monofilament testing: absent    Vascular  Capillary refills: < 3 seconds  The right DP pulse is 2+  The right PT pulse is 2+  Right Toe  - Comprehensive Exam  Ecchymosis: none  Arch: pes cavus  Hammertoes: first toe, second toe, fourth toe, fifth toe and third toe  Swelling: none         Left Foot/Ankle  Left Foot Inspection  Skin Exam: ulcer  Toe Exam: ROM and strength within normal limits  Sensory   Vibration: diminished  Proprioception: diminished  Monofilament testing: absent    Vascular  Capillary refills: < 3 seconds  The left DP pulse is 2+  The left PT pulse is 2+       Left Toe  - Comprehensive Exam  Ecchymosis: none  Arch: pes cavus  Hammertoes: first toe, second toe, third toe, fourth toe and fifth toe  Swelling: none           Assign Risk Category  Deformity present  Loss of protective sensation  Risk: 3      Wound 11/16/22 Diabetic Ulcer Left;Posterior (Active)   Wound Image   11/16/22 1353   Wound Description Granulation tissue;Slough 11/16/22 1353   Dee Dee-wound Assessment Intact 11/16/22 1353   Wound Length (cm) 2 cm 11/16/22 1353   Wound Width (cm) 0 9 cm 11/16/22 1353   Wound Depth (cm) 1 1 cm 11/16/22 1353   Wound Surface Area (cm^2) 1 8 cm^2 11/16/22 1353   Wound Volume (cm^3) 1 98 cm^3 11/16/22 1353   Calculated Wound Volume (cm^3) 1 98 cm^3 11/16/22 1353   Drainage Amount Moderate 11/16/22 1353   Drainage Description Serosanguineous; Yellow 11/16/22 1353   Non-staged Wound Description Full thickness 11/16/22 1353                 XR foot 3+ vw left    Result Date: 11/7/2022  Narrative: LEFT FOOT INDICATION:   L03 116: Cellulitis of left lower limb   redness on left foot with pain since this past saturday / no injury COMPARISON:  None VIEWS:  XR FOOT 3+ VW LEFT FINDINGS: There is no acute fracture or dislocation  No significant degenerative changes  No periosteal reaction or cortical destruction to suggest osteomyelitis  There are atherosclerotic calcifications  Soft tissues are otherwise unremarkable  Impression: No radiographic evidence of osteomyelitis  Workstation performed: [id number]     VAS lower limb arterial duplex, limited/unilateral    Result Date: 11/12/2022  Narrative:  [facility] REPORT CLINICAL: Indications:  Patient presents with diminished/absent pedal pulses on physical examination  Patient reports sudden onset of left foot pain a week ago that has not improved  He reports no leg pain with walking  Operative History: no reported cardiovascular surgeries Risk Factors The patient has history of HTN, Diabetes (IDDM), Hyperlipidemia and smoking (current) 1-2 ppd  Clinical Right Pressure:  141/ mm Hg, Left Pressure:  153/ mm Hg    FINDINGS:  Left                   Waveform PSV (cm/s)  Common Femoral Artery  Triphasic         146  Prox Profunda          Triphasic         181  Prox SFA               Triphasic         138  Mid SFA                Triphasic         115  Dist SFA               Triphasic          76  Proximal Pop           Triphasic         122  Distal Pop             Triphasic          84  Tibioperoneal          Triphasic          78  Prox Post Tibial       Triphasic         107  Dist Post Tibial       Triphasic          74  Prox  Ant  Tibial      Triphasic          79  Dist  Ant  Tibial      Triphasic          78     CONCLUSION: Impression: RIGHT LOWER LIMB: Ankle/Brachial index: 1 04 Metatarsal pressure of 62mmHg Great toe pressure of 78mmHg, within healing range  PVR/ PPG tracings are normal   LEFT LOWER LIMB: This resting evaluation shows evidence of diffuse disease throughout the femoral, popliteal and tibial arteries with no areas of focal stenosis  Ankle/Brachial index: 1 25 Metatarsal pressure of 97mmHg Great toe pressure of 56mmHg, within healing range  PVR/ PPG tracings are normal   There is no previous study for comparison  Jared Manifold SIGNATURE: Electronically Signed by: Nita Gaffney on 2022-11-12 10:42:06 AM      Wound Instructions:  Orders Placed This Encounter   Procedures   • Wound cleansing and dressings     Left foot wound  Wash your hands with soap and water  Remove old dressing, discard into plastic bag and place in trash  Cleanse the wound with saline prior to applying a clean dressing  Do not use tissue or cotton balls  Do not scrub the wound  Pat dry using gauze  Shower no     Apply medioney to the left foot wound    Cover with abd  Secure with taya and tape  Change dressing daily     Standing Status:   Future     Standing Expiration Date:   11/16/2023   • Ambulatory Referral to Podiatry     Standing Status:   Future     Standing Expiration Date:   11/16/2023     Referral Priority:   Routine     Referral Type:   Consult - AMB     Referral Reason: Specialty Services Required     Referred to Provider:   Kayleen Graham DPM     Requested Specialty:   Podiatry     Number of Visits Requested:   1     Expiration Date:   11/16/2023         Kayleen Graham DPM,NOAH, NIA    Portions of the record may have been created with voice recognition software  Occasional wrong word or "sound a like" substitutions may have occurred due to the inherent limitations of voice recognition software  Read the chart carefully and recognize, using context, where substitutions have occurred

## 2022-11-16 NOTE — LETTER
November 16, 2022     Patient: Kalyani Boogie  YOB: 1960  Date of Visit: 11/16/2022      To Whom it May Concern:    Kalyani Boogie is under my professional care  Veronica Clement was seen in my office on 11/16/2022  Veronica Vaughan is advised to be out of work for 2 weeks with the return date of 11/30/22  If you have any questions or concerns, please don't hesitate to call           Sincerely,          Ailyn Brock DPM        CC: No Recipients

## 2022-11-16 NOTE — PATIENT INSTRUCTIONS
Orders Placed This Encounter   Procedures    Wound cleansing and dressings     Left foot wound  Wash your hands with soap and water  Remove old dressing, discard into plastic bag and place in trash  Cleanse the wound with saline prior to applying a clean dressing  Do not use tissue or cotton balls  Do not scrub the wound  Pat dry using gauze  Shower no     Apply medioney to the left foot wound    Cover with abd  Secure with taya and tape  Change dressing daily     Standing Status:   Future     Standing Expiration Date:   11/16/2023    Ambulatory Referral to Podiatry     Standing Status:   Future     Standing Expiration Date:   11/16/2023     Referral Priority:   Routine     Referral Type:   Consult - AMB     Referral Reason:   Specialty Services Required     Referred to Provider:   Marina Murillo DPM     Requested Specialty:   Podiatry     Number of Visits Requested:   1     Expiration Date:   11/16/2023

## 2022-11-17 ENCOUNTER — HOSPITAL ENCOUNTER (OUTPATIENT)
Dept: RADIOLOGY | Facility: HOSPITAL | Age: 62
Discharge: HOME | End: 2022-11-17
Attending: PODIATRIST

## 2022-11-17 ENCOUNTER — OFFICE VISIT (OUTPATIENT)
Dept: ENDOCRINOLOGY | Facility: HOSPITAL | Age: 62
End: 2022-11-17

## 2022-11-17 VITALS
SYSTOLIC BLOOD PRESSURE: 116 MMHG | BODY MASS INDEX: 20.99 KG/M2 | HEIGHT: 72 IN | WEIGHT: 155 LBS | HEART RATE: 106 BPM | DIASTOLIC BLOOD PRESSURE: 70 MMHG

## 2022-11-17 DIAGNOSIS — E78.2 MIXED HYPERLIPIDEMIA: ICD-10-CM

## 2022-11-17 DIAGNOSIS — E10.42 DIABETIC POLYNEUROPATHY ASSOCIATED WITH TYPE 1 DIABETES MELLITUS (HCC): ICD-10-CM

## 2022-11-17 DIAGNOSIS — E10.621 DIABETIC ULCER OF LEFT MIDFOOT ASSOCIATED WITH TYPE 1 DIABETES MELLITUS, WITH FAT LAYER EXPOSED (HCC): ICD-10-CM

## 2022-11-17 DIAGNOSIS — L97.422 DIABETIC ULCER OF LEFT MIDFOOT ASSOCIATED WITH TYPE 1 DIABETES MELLITUS, WITH FAT LAYER EXPOSED (HCC): ICD-10-CM

## 2022-11-17 DIAGNOSIS — E10.65 TYPE 1 DIABETES MELLITUS WITH HYPERGLYCEMIA (HCC): Primary | ICD-10-CM

## 2022-11-17 DIAGNOSIS — I10 BENIGN ESSENTIAL HYPERTENSION: ICD-10-CM

## 2022-11-17 DIAGNOSIS — E10.29 MICROALBUMINURIA DUE TO TYPE 1 DIABETES MELLITUS (HCC): ICD-10-CM

## 2022-11-17 DIAGNOSIS — E10.649 HYPOGLYCEMIA UNAWARENESS ASSOCIATED WITH TYPE 1 DIABETES MELLITUS (HCC): ICD-10-CM

## 2022-11-17 DIAGNOSIS — R80.9 MICROALBUMINURIA DUE TO TYPE 1 DIABETES MELLITUS (HCC): ICD-10-CM

## 2022-11-17 NOTE — PATIENT INSTRUCTIONS
I await the hgba1c, I bet it is going to be better  The potassium is very high  This can affect the heart  Decrease the lisinopril 20 mg to 1/2 tablet daily and follow up with Dr Cassandra Hickey  We'll recheck the blood work in 1-2 weeks  No change in insulin pump for now  Follow up in 3 months with blood work

## 2022-11-17 NOTE — PROGRESS NOTES
11/19/2022    Assessment/Plan      Diagnoses and all orders for this visit:    Type 1 diabetes mellitus with hyperglycemia ([place] [address] )  -     Comprehensive metabolic panel Lab Collect; Future  -     HEMOGLOBIN A1C W/ EAG ESTIMATION Lab Collect; Future  -     Comprehensive metabolic panel Lab Collect; Future  -     CBC and differential Lab Collect; Future  -     insulin lispro (HumaLOG) 100 units/mL injection; Use via the insulin pump, use up to 80 units daily    Microalbuminuria due to type 1 diabetes mellitus ([place] [address] )  -     HEMOGLOBIN A1C W/ EAG ESTIMATION Lab Collect; Future  -     Comprehensive metabolic panel Lab Collect; Future  -     CBC and differential Lab Collect; Future    Diabetic polyneuropathy associated with type 1 diabetes mellitus (HCC)  -     HEMOGLOBIN A1C W/ EAG ESTIMATION Lab Collect; Future  -     Comprehensive metabolic panel Lab Collect; Future  -     CBC and differential Lab Collect; Future    Hypoglycemia unawareness associated with type 1 diabetes mellitus ([place] [address] )  -     HEMOGLOBIN A1C W/ EAG ESTIMATION Lab Collect; Future  -     Comprehensive metabolic panel Lab Collect; Future  -     CBC and differential Lab Collect; Future    Benign essential hypertension  -     HEMOGLOBIN A1C W/ EAG ESTIMATION Lab Collect; Future  -     Comprehensive metabolic panel Lab Collect; Future  -     CBC and differential Lab Collect; Future    Mixed hyperlipidemia  -     HEMOGLOBIN A1C W/ EAG ESTIMATION Lab Collect; Future  -     Comprehensive metabolic panel Lab Collect; Future  -     CBC and differential Lab Collect; Future        Assessment/Plan:  1  Type 1 diabetes  Blood work is still pending so I do not have a hemoglobin A1c report at time of the visit  Based on his insulin pump and sensor download, his blood sugars are doing much better although there is still some variability and for now I will not adjust insulin rates in his pump until I get his hemoglobin A1c    We may need to adjust some bolus rates in the future  2  Diabetic neuropathy  He denies neuropathic symptoms  Diabetic foot exams are up-to-date  3  Diabetic microalbuminuria  He has an elevated potassium on his most recent blood work and I have asked him to at least temporarily early decrease his lisinopril to 20 mg half tablet daily  I will repeat his blood work in 1-2 weeks and have him follow up with his primary care physician  4  Hypoglycemic unawareness  He is utilizing his Med[employer] sensor continuously at this point  5  Hypertension  He is normotensive in the office on his current dose of lisinopril and metoprolol  6  Hyperlipidemia  He will continue the same atorvastatin 40 mg daily  I will have him repeat a CMP in 1-2 weeks  I have asked him to follow up in 3 months with preceding hemoglobin A1c, CMP, and CBC  CC: Diabetes type 1, blood pressure, lipid follow-up    History of Present Illness     HPI: Willard Armando is a 63y o  year old male with type 1 diabetes with retinopathy, neuropathy, hypoglycemic unawareness, microalbuminuria for 30 years, hypertension, hyperlipidemia for follow-up visit  He is on insulin at home and takes Humalog insulin via the insulin pump  He denies any polyuria, polydipsia, polyphagia, and blurry vision  He has occasional nocturia  He denies numbness or tingling of the feet but feet are always cold  He denies chest pain or shortness of breath  He denies heart attack, stroke and claudication but does admit to neuropathy, nephropathy and retinopathy hypoglycemic unawareness  He is currently on the insulin pump therapy using the Medtronic 770 G insulin pump with Medtronic sensor integrated with the pump  He received this pump within the last year and has been on insulin pump therapy for 15 years  He is on automode now  Auto mode is in used 98% of the time      Basal rates:  12:00 a m  to 3:30 a m  0 55 units/hour, 3:30 a m  to 8:00 a m  0 75 units/hour, 8:00 a m  to 8:30 p  m  0 9 units/hour, 8:30 p m  to 12:00 a m  0 6 units/hour  Bolus rates:  1 unit per 10 g carbohydrate with each meal     Insulin sensitivity factor:  1 unit for 32 blood sugar points for a target blood glucose of 110-120  Insulin action:  4 hours  Total daily insulin dosage 29 5 units with 44% bolus and 56% basal     Had foot edema and foot paralysis of the feet  Has an infection in the foot and was to wound care yesterday  Has a lot of left foot pain  Hypoglycemic episodes: Yes occasional     The patient's last eye exam was in spring 2022  The patient's last foot exam was in April 2022 at endocrine office visit  Last A1C was   Lab Results   Component Value Date    HGBA1C 9 0 (H) 04/15/2022     Blood Sugar/Glucometer/Pump/CGM review:  He is utilizing a [employer] insulin pump and sensor integrated with the insulin pump to test his blood sugars throughout the day  Insulin pump and sensor download from 11/04/2022 through 11/17/2022 was reviewed the office today  Average glucose was 200 mg/dL with 51 mg/dL variability  68% of blood sugars are in target range, 28% high, 4% very high, and 0% low  He has hyperlipidemia and takes atorvastatin 40 mg daily  He has hypertension and takes lisinopril 20 mg daily and metoprolol 50 mg daily  Review of Systems   Constitutional: Negative for fatigue and unexpected weight change  HENT: Negative for trouble swallowing  Eyes: Negative for visual disturbance  Wears glasses  Has cataracts  For surgery in jan 2023  Respiratory: Negative for chest tightness and shortness of breath  Cardiovascular: Negative for chest pain and leg swelling  Gastrointestinal: Negative for abdominal pain, constipation, diarrhea and nausea  Endocrine: Negative for polydipsia, polyphagia and polyuria  Nocturia occasionally  Musculoskeletal: Positive for arthralgias  Skin: Positive for wound  Left foot pain with a wound at present  Neurological: Negative for dizziness, weakness, light-headedness, numbness and headaches  Feet always cold  Psychiatric/Behavioral: Negative for sleep disturbance         Historical Information   Past Medical History:   Diagnosis Date   • Chronic foot ulcer ([facility] [address] ) 2018   • Diabetes mellitus ([facility] )    • Gout    • High cholesterol    • Hypertension      Past Surgical History:   Procedure Laterality Date   • COMPLEX WOUND CLOSURE TO EXTREMITY Left 2019    Procedure: COMPLEX CLOSURE LEFT CHEEK;  Surgeon: Sena García MD;  Location:  MAIN OR;  Service: Plastics   • FACIAL/NECK BIOPSY Left 2019    Procedure: EXCISION LEFT CHEEK CYST;  Surgeon: Sena García MD;  Location:  MAIN OR;  Service: Plastics   • HERNIA REPAIR Left     several times   • KNEE ARTHROSCOPY Left     torn meniscus     Social History   Social History     Substance and Sexual Activity   Alcohol Use Yes    Comment: "some beers"     Social History     Substance and Sexual Activity   Drug Use Never     Social History     Tobacco Use   Smoking Status Every Day   • Packs/day: 1 00   • Types: Cigarettes   • Last attempt to quit: 2022   • Years since quittin 8   Smokeless Tobacco Never   Tobacco Comments    encouraged smoking cessation     Family History:   Family History   Problem Relation Age of Onset   • Heart disease Father    • Diabetes type I Father    • Diabetes type II Mother    • No Known Problems Sister    • Alcohol abuse Brother    • Diabetes type I Brother    • No Known Problems Brother    • No Known Problems Son    • No Known Problems Daughter        Meds/Allergies   Current Outpatient Medications   Medication Sig Dispense Refill   • atorvastatin (LIPITOR) 40 mg tablet Take 1 tablet (40 mg total) by mouth daily 30 tablet 0   • colchicine (COLCRYS) 0 6 mg tablet Take 1 tablet (0 6 mg total) by mouth every 6 (six) hours as needed (gouty attack) Use as directed for gout flares 30 tablet 0   • Glucagon, rDNA, (Glucagon Emergency) 1 MG KIT as directed     • insulin lispro (HumaLOG) 100 units/mL injection Use via the insulin pump, use up to 80 units daily 60 mL 3   • lisinopril (ZESTRIL) 20 mg tablet Take 1 tablet (20 mg total) by mouth daily 30 tablet 5   • metoprolol succinate (TOPROL-XL) 50 mg 24 hr tablet Take 1 tablet (50 mg total) by mouth daily 30 tablet 0     No current facility-administered medications for this visit  Allergies   Allergen Reactions   • Cefuroxime Other (See Comments) and GI Intolerance   • Amoxicillin-Pot Clavulanate Nausea Only and GI Intolerance       Objective   Vitals: Blood pressure 116/70, pulse (!) 106, height 6' (1 829 m), weight 70 3 kg (155 lb)  Invasive Devices     None                 Physical Exam  Vitals reviewed  Constitutional:       Appearance: Normal appearance  He is well-developed and well-nourished  HENT:      Head: Normocephalic and atraumatic  Eyes:      Extraocular Movements: EOM normal       Conjunctiva/sclera: Conjunctivae normal    Neck:      Thyroid: No thyromegaly  Vascular: No carotid bruit  Comments: Thyroid normal in size  Cardiovascular:      Rate and Rhythm: Normal rate and regular rhythm  Pulses: Intact distal pulses  Heart sounds: Normal heart sounds  No murmur heard  Pulmonary:      Effort: Pulmonary effort is normal       Breath sounds: Normal breath sounds  No wheezing  Abdominal:      Palpations: Abdomen is soft  Musculoskeletal:         General: No deformity  Cervical back: Normal range of motion and neck supple  Right lower leg: No edema  Left lower leg: Edema present  Comments: Trace left lower extremity edema  Lymphadenopathy:      Cervical: No cervical adenopathy  Skin:     General: Skin is warm and dry  Findings: No erythema or rash  Neurological:      Mental Status: He is alert and oriented to person, place, and time  Deep Tendon Reflexes: Reflexes are normal and symmetric  The history was obtained from the review of the chart and from the patient  Lab Results:     Hemoglobin A1c is still pending today      CMP done on 11/16/2022 showed a glucose of 75 fasting, creatinine 1 33, sodium 130, potassium 6, chloride 97, alkaline phosphatase 164, GFR 61, but was otherwise normal          Most recent Alc is  Lab Results   Component Value Date    HGBA1C 9 0 (H) 04/15/2022             Lab Results   Component Value Date    CREATININE 1 54 (H) 04/15/2022    CREATININE 1 26 06/04/2021    BUN 9 04/15/2022    K 5 2 04/15/2022    CL 96 (L) 04/15/2022    CO2 27 04/15/2022     eGFR   Date Value Ref Range Status   04/15/2022 48 ml/min/1 73sq m Final         Lab Results   Component Value Date    HDL 68 04/15/2022    TRIG 53 04/15/2022       Lab Results   Component Value Date    ALT 21 04/15/2022    AST 21 04/15/2022    ALKPHOS 97 04/15/2022       No results found for: TSH, Rashida Anthony          Future Appointments   Date Time Provider Ching Cardenas   11/23/2022  2:15 PM Malina Kothari DPM [facility]   12/7/2022  8:00 AM Danie Valles MD [place] [id number] [facility]   3/16/2023  8:40 AM Jigna Kilpatrick MD [facility]

## 2022-11-19 LAB — BACTERIA TISS AEROBE CULT: NORMAL

## 2022-11-23 ENCOUNTER — OFFICE VISIT (OUTPATIENT)
Dept: WOUND CARE | Facility: HOSPITAL | Age: 62
End: 2022-11-23

## 2022-11-23 VITALS
DIASTOLIC BLOOD PRESSURE: 70 MMHG | RESPIRATION RATE: 18 BRPM | TEMPERATURE: 97.5 F | SYSTOLIC BLOOD PRESSURE: 158 MMHG | HEART RATE: 82 BPM

## 2022-11-23 DIAGNOSIS — E10.621 DIABETIC ULCER OF LEFT MIDFOOT ASSOCIATED WITH TYPE 1 DIABETES MELLITUS, WITH FAT LAYER EXPOSED (HCC): ICD-10-CM

## 2022-11-23 DIAGNOSIS — E10.42 DIABETIC POLYNEUROPATHY ASSOCIATED WITH TYPE 1 DIABETES MELLITUS (HCC): ICD-10-CM

## 2022-11-23 DIAGNOSIS — L97.422 DIABETIC ULCER OF LEFT MIDFOOT ASSOCIATED WITH TYPE 1 DIABETES MELLITUS, WITH FAT LAYER EXPOSED (HCC): ICD-10-CM

## 2022-11-23 RX ORDER — LIDOCAINE HYDROCHLORIDE 40 MG/ML
5 SOLUTION TOPICAL ONCE
Status: COMPLETED | OUTPATIENT
Start: 2022-11-23 | End: 2022-11-23

## 2022-11-23 RX ADMIN — LIDOCAINE HYDROCHLORIDE 5 ML: 40 SOLUTION TOPICAL at 14:30

## 2022-11-23 NOTE — PROGRESS NOTES
Patient ID: Gilles Webber is a 63 y o  male Date of Birth 1960       Chief Complaint   Patient presents with   • Follow Up Wound Care Visit     Wound care       Allergies:  Cefuroxime and Amoxicillin-pot clavulanate    Diagnosis:  1  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, with fat layer exposed ([address] )  -     Wound cleansing and dressings; Future  -     lidocaine (XYLOCAINE) 4 % topical solution 5 mL    2  Diabetic polyneuropathy associated with type 1 diabetes mellitus (HCC)  -     Wound cleansing and dressings; Future       Diagnosis ICD-10-CM Associated Orders   1  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, with fat layer exposed ([facility] [address] )  R55 980 Wound cleansing and dressings    L97 422 lidocaine (XYLOCAINE) 4 % topical solution 5 mL      2  Diabetic polyneuropathy associated with type 1 diabetes mellitus (HCC)  E10 42 Wound cleansing and dressings           Assessment & Plan:  1  Diabetic Veloz grade 2 ulceration plantar submetatarsal head 1 left foot, wound was debrided through subcuticular tissues and dressed with alginate dry dressing, he will continue with Medihoney daily at home  We will use a Tubigrip to help keep dressing in place  He will use a cast cover, do not get the foot wet in the shower  Frequent elevation, low-sodium diet, proper protein intake, minimal weightbear only for ADLs was reviewed with patient  He understands and agrees with the plan  2  I personally reviewed patient's culture and x-ray results and x-ray images from last week's visit, x-ray does not show any signs of osteomyelitis or infection, culture had no growth  No further antibiotics necessary  Debridement   Wound 11/16/22 Diabetic Ulcer Left;Posterior    Universal Protocol:  Consent: Verbal consent obtained    Risks and benefits: risks, benefits and alternatives were discussed  Consent given by: patient  Time out: Immediately prior to procedure a "time out" was called to verify the correct patient, procedure, equipment, support staff and site/side marked as required  Patient understanding: patient states understanding of the procedure being performed  Patient identity confirmed: verbally with patient      Performed by: physician  Debridement type: surgical  Level of debridement: subcutaneous tissue  Pain control: lidocaine 4%  Pre-debridement measurements  Length (cm): 1 5  Width (cm): 0 7  Depth (cm): 0 2  Surface Area (cm^2): 1 05  Volume (cm^3): 0 21    Post-debridement measurements  Length (cm): 1 7  Width (cm): 0 9  Depth (cm): 0 9  Percent debrided: 100%  Surface Area (cm^2): 1 53  Area debrided (cm^2): 1 53  Volume (cm^3): 1 38  Tissue and other material debrided: subcutaneous tissue  Devitalized tissue debrided: biofilm, callus, fibrin, necrotic debris and slough  Instrument(s) utilized: blade and forceps  Bleeding: small  Hemostasis obtained with: pressure  Procedural pain (0-10): insensate  Post-procedural pain: insensate   Response to treatment: procedure was tolerated well                   Subjective:   Patient presents today for care of left foot diabetic ulceration, he has complete all antibiotics, he has been changing his dressings daily although he states he only wore cast cover for few days and then got his foot wet in the shower  He states he is having a hard time keeping the dressing in place  He states blood sugars are well controlled no new complaints          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)   • Insulin pump in place   • ED (erectile dysfunction) of organic origin   • Chronic pain   • Benign essential hypertension   • Cigarette nicotine dependence in remission   • Gout of ankle   • Type 1 diabetes mellitus with hyperglycemia (HCC)   • Diabetic polyneuropathy associated with type 1 diabetes mellitus (HCC)   • Microalbuminuria due to type 1 diabetes mellitus ([facility] [address] )   • Hypoglycemia unawareness associated with type 1 diabetes mellitus ([facility] [address] )     Past Medical History:   Diagnosis Date   • Chronic foot ulcer ([facility] [address] ) 2018   • Diabetes mellitus ([facility] )    • Gout    • High cholesterol    • Hypertension      Past Surgical History:   Procedure Laterality Date   • COMPLEX WOUND CLOSURE TO EXTREMITY Left 2019    Procedure: COMPLEX CLOSURE LEFT CHEEK;  Surgeon: Zohreh Campos MD;  Location:  MAIN OR;  Service: Plastics   • FACIAL/NECK BIOPSY Left 2019    Procedure: EXCISION LEFT CHEEK CYST;  Surgeon: Zohreh Campos MD;  Location:  MAIN OR;  Service: Plastics   • HERNIA REPAIR Left     several times   • KNEE ARTHROSCOPY Left     torn meniscus     Social History     Socioeconomic History   • Marital status: /Civil Union     Spouse name: Not on file   • Number of children: Not on file   • Years of education: Not on file   • Highest education level: Not on file   Occupational History   • Occupation: cafeteria/kitchen at [employer]     Comment: [employer]   Tobacco Use   • Smoking status: Every Day     Packs/day: 1 00     Types: Cigarettes     Last attempt to quit: 2022     Years since quittin 8   • Smokeless tobacco: Never   • Tobacco comments:     encouraged smoking cessation   Vaping Use   • Vaping Use: Never used   Substance and Sexual Activity   • Alcohol use: Yes     Comment: "some beers"   • Drug use: Never   • Sexual activity: Not on file   Other Topics Concern   • Not on file   Social History Narrative   • Not on file     Social Determinants of Health     Financial Resource Strain: Not on file   Food Insecurity: Not on file   Transportation Needs: Not on file   Physical Activity: Not on file   Stress: Not on file Social Connections: Not on file   Intimate Partner Violence: Not on file   Housing Stability: Not on file        Current Outpatient Medications:   •  atorvastatin (LIPITOR) 40 mg tablet, Take 1 tablet (40 mg total) by mouth daily, Disp: 30 tablet, Rfl: 0  •  colchicine (COLCRYS) 0 6 mg tablet, Take 1 tablet (0 6 mg total) by mouth every 6 (six) hours as needed (gouty attack) Use as directed for gout flares, Disp: 30 tablet, Rfl: 0  •  Glucagon, rDNA, (Glucagon Emergency) 1 MG KIT, as directed, Disp: , Rfl:   •  insulin lispro (HumaLOG) 100 units/mL injection, Use via the insulin pump, use up to 80 units daily, Disp: 60 mL, Rfl: 3  •  lisinopril (ZESTRIL) 20 mg tablet, Take 1 tablet (20 mg total) by mouth daily, Disp: 30 tablet, Rfl: 5  •  metoprolol succinate (TOPROL-XL) 50 mg 24 hr tablet, Take 1 tablet (50 mg total) by mouth daily, Disp: 30 tablet, Rfl: 0  No current facility-administered medications for this visit  Family History   Problem Relation Age of Onset   • Heart disease Father    • Diabetes type I Father    • Diabetes type II Mother    • No Known Problems Sister    • Alcohol abuse Brother    • Diabetes type I Brother    • No Known Problems Brother    • No Known Problems Son    • No Known Problems Daughter        Review of Systems   Constitutional: Negative for chills and fever  HENT: Negative for ear pain and sore throat  Eyes: Negative for pain and visual disturbance  Respiratory: Negative for cough and shortness of breath  Cardiovascular: Negative for chest pain and palpitations  Gastrointestinal: Negative for abdominal pain and vomiting  Genitourinary: Negative for dysuria and hematuria  Musculoskeletal: Negative for arthralgias and back pain  Skin: Positive for color change and wound  Negative for rash  Neurological: Positive for numbness  Negative for seizures and syncope  Psychiatric/Behavioral: Negative  All other systems reviewed and are negative          Objective:  BP 158/70   Pulse 82   Temp 97 5 °F (36 4 °C)   Resp 18     Physical Exam  Constitutional:       Appearance: Normal appearance  He is normal weight  HENT:      Head: Normocephalic and atraumatic  Right Ear: External ear normal       Left Ear: External ear normal       Nose: Nose normal    Eyes:      Conjunctiva/sclera: Conjunctivae normal    Cardiovascular:      Pulses: Normal pulses  Dorsalis pedis pulses are 2+ on the right side and 2+ on the left side  Posterior tibial pulses are 2+ on the right side and 2+ on the left side  Pulmonary:      Effort: Pulmonary effort is normal    Musculoskeletal:         General: Normal range of motion  Cervical back: Normal range of motion  Right lower leg: No edema  Left lower leg: No edema  Comments: Pes cavus foot type with fat pad atrophy submetatarsal heads 1 through 5 bilateral   Skin:     General: Skin is warm and dry  Capillary Refill: Capillary refill takes less than 2 seconds  Comments: See detailed wound assessment   Neurological:      General: No focal deficit present  Mental Status: He is alert and oriented to person, place, and time  Mental status is at baseline  Sensory: Sensory deficit present  Coordination: Coordination abnormal       Gait: Gait abnormal    Psychiatric:         Mood and Affect: Mood normal          Behavior: Behavior normal          Thought Content:  Thought content normal          Judgment: Judgment normal            Wound 11/16/22 Diabetic Ulcer Left;Posterior (Active)   Wound Image   11/23/22 1452   Wound Description Slough;Pink 11/23/22 1414   Dee Dee-wound Assessment Callus 11/23/22 1414   Wound Length (cm) 1 5 cm 11/23/22 1414   Wound Width (cm) 0 7 cm 11/23/22 1414   Wound Depth (cm) 0 2 cm 11/23/22 1414   Wound Surface Area (cm^2) 1 05 cm^2 11/23/22 1414   Wound Volume (cm^3) 0 21 cm^3 11/23/22 1414   Calculated Wound Volume (cm^3) 0 21 cm^3 11/23/22 1414   Drainage Amount Small 11/23/22 1414   Drainage Description Serosanguineous 11/23/22 1414   Non-staged Wound Description Full thickness 11/23/22 1414   Treatments Cleansed 11/23/22 1414   Wound packed? No 11/23/22 1414   Dressing Changed Changed 11/23/22 1414   Patient Tolerance Tolerated well 11/23/22 1414   Dressing Status Intact 11/23/22 1414                XR foot 3+ vw left    Result Date: 11/17/2022  Narrative: LEFT FOOT INDICATION:   E10 621: Type 1 diabetes mellitus with foot ulcer L97 422: Non-pressure chronic ulcer of left heel and midfoot with fat layer exposed  Wound on plantar aspect of left foot near 1st and 2nd metatarsals  Evaluate for osteomyelitis  COMPARISON:  Radiographs of the left foot from 11/7/2022  VIEWS:  XR FOOT 3+ VW LEFT performed with weightbearing Images: 3 FINDINGS: No fractures or dislocations  The joint spaces are preserved with smooth articular margins  No lytic or sclerotic osseous lesions  There is no periosteal reaction  There is no subcutaneous emphysema  Vascular calcifications are seen  Impression: No radiographic evidence of osteomyelitis  No subcutaneous emphysema  Workstation performed: [id number]     XR foot 3+ vw left    Result Date: 11/7/2022  Narrative: LEFT FOOT INDICATION:   L03 116: Cellulitis of left lower limb   redness on left foot with pain since this past saturday / no injury COMPARISON:  None VIEWS:  XR FOOT 3+ VW LEFT FINDINGS: There is no acute fracture or dislocation  No significant degenerative changes  No periosteal reaction or cortical destruction to suggest osteomyelitis  There are atherosclerotic calcifications  Soft tissues are otherwise unremarkable  Impression: No radiographic evidence of osteomyelitis  Workstation performed: [id number]     VAS lower limb arterial duplex, limited/unilateral    Result Date: 11/12/2022  Narrative:  [facility] REPORT CLINICAL: Indications:  Patient presents with diminished/absent pedal pulses on physical examination  Patient reports sudden onset of left foot pain a week ago that has not improved  He reports no leg pain with walking  Operative History: no reported cardiovascular surgeries Risk Factors The patient has history of HTN, Diabetes (IDDM), Hyperlipidemia and smoking (current) 1-2 ppd  Clinical Right Pressure:  141/ mm Hg, Left Pressure:  153/ mm Hg  FINDINGS:  Left                   Waveform   PSV (cm/s)  Common Femoral Artery  Triphasic         146  Prox Profunda          Triphasic         181  Prox SFA               Triphasic         138  Mid SFA                Triphasic         115  Dist SFA               Triphasic          76  Proximal Pop           Triphasic         122  Distal Pop             Triphasic          84  Tibioperoneal          Triphasic          78  Prox Post Tibial       Triphasic         107  Dist Post Tibial       Triphasic          74  Prox  Ant  Tibial      Triphasic          79  Dist  Ant  Tibial      Triphasic          78     CONCLUSION: Impression: RIGHT LOWER LIMB: Ankle/Brachial index: 1 04 Metatarsal pressure of 62mmHg Great toe pressure of 78mmHg, within healing range  PVR/ PPG tracings are normal   LEFT LOWER LIMB: This resting evaluation shows evidence of diffuse disease throughout the femoral, popliteal and tibial arteries with no areas of focal stenosis  Ankle/Brachial index: 1 25 Metatarsal pressure of 97mmHg Great toe pressure of 56mmHg, within healing range  PVR/ PPG tracings are normal   There is no previous study for comparison  Madie Lynch SIGNATURE: Electronically Signed by: Evaristo Austin on 2022-11-12 10:42:06 AM      Wound Instructions:  Orders Placed This Encounter   Procedures   • Wound cleansing and dressings     Wound cleansing and dressings      Left foot wound  Wash your hands with soap and water  Remove old dressing, discard into plastic bag and place in trash  Cleanse the wound with saline prior to applying a clean dressing  Do not use tissue or cotton balls   Do not scrub the wound  Pat dry using gauze  Shower no      Apply medioney to the left foot wound  Cover with gauze and abd  Secure with taya and tape  Secure with Spandagrip G  Change dressing daily    Compression Stocking  Spandagrip G    Remove compression stockings every night HS and re-apply first thing qAM  Follow daily skin care as instructed  Avoid prolonged standing in one place  Elevate leg(s) above the level of the heart when sitting or as much as possible  Done today   Return in 1 week  For  today visit only  Medihoney and maxorb applied  Standing Status:   Future     Standing Expiration Date:   11/23/2023   • Debridement     This order was created via procedure documentation         Cynthia Griffin, DPKEV, DPM, FACFAS    Portions of the record may have been created with voice recognition software  Occasional wrong word or "sound a like" substitutions may have occurred due to the inherent limitations of voice recognition software  Read the chart carefully and recognize, using context, where substitutions have occurred

## 2022-11-23 NOTE — PATIENT INSTRUCTIONS
Orders Placed This Encounter   Procedures    Wound cleansing and dressings     Wound cleansing and dressings      Left foot wound  Wash your hands with soap and water  Remove old dressing, discard into plastic bag and place in trash  Cleanse the wound with saline prior to applying a clean dressing  Do not use tissue or cotton balls  Do not scrub the wound  Pat dry using gauze  Shower no      Apply medioney to the left foot wound  Cover with gauze and abd  Secure with taya and tape  Secure with Spandagrip G  Change dressing daily    Compression Stocking  Spandagrip G    Remove compression stockings every night HS and re-apply first thing qAM  Follow daily skin care as instructed  Avoid prolonged standing in one place  Elevate leg(s) above the level of the heart when sitting or as much as possible  Done today   Return in 1 week  For  today visit only  Medihoney and maxorb applied       Standing Status:   Future     Standing Expiration Date:   11/23/2023    Debridement     This order was created via procedure documentation

## 2022-11-26 DIAGNOSIS — E78.5 HYPERLIPIDEMIA, UNSPECIFIED HYPERLIPIDEMIA TYPE: ICD-10-CM

## 2022-11-26 RX ORDER — ATORVASTATIN CALCIUM 40 MG/1
TABLET, FILM COATED ORAL
Qty: 90 TABLET | Refills: 1 | Status: SHIPPED | OUTPATIENT
Start: 2022-11-26

## 2022-11-30 ENCOUNTER — OFFICE VISIT (OUTPATIENT)
Dept: WOUND CARE | Facility: HOSPITAL | Age: 62
End: 2022-11-30

## 2022-11-30 VITALS
HEART RATE: 92 BPM | DIASTOLIC BLOOD PRESSURE: 76 MMHG | TEMPERATURE: 96.2 F | SYSTOLIC BLOOD PRESSURE: 160 MMHG | RESPIRATION RATE: 18 BRPM

## 2022-11-30 DIAGNOSIS — L97.422 DIABETIC ULCER OF LEFT MIDFOOT ASSOCIATED WITH TYPE 1 DIABETES MELLITUS, WITH FAT LAYER EXPOSED (HCC): Primary | ICD-10-CM

## 2022-11-30 DIAGNOSIS — E10.42 DIABETIC POLYNEUROPATHY ASSOCIATED WITH TYPE 1 DIABETES MELLITUS (HCC): ICD-10-CM

## 2022-11-30 DIAGNOSIS — E10.621 DIABETIC ULCER OF LEFT MIDFOOT ASSOCIATED WITH TYPE 1 DIABETES MELLITUS, WITH FAT LAYER EXPOSED (HCC): Primary | ICD-10-CM

## 2022-11-30 NOTE — LETTER
November 30, 2022     Patient: Madeline Gale  YOB: 1960  Date of Visit: 11/30/2022      To Whom it May Concern:    Madeline Gale is under my professional care  Lisaroselia Aguilaalejandra was seen in my office on 11/30/2022  Krystle Cummins cannot return to work until 12/8/22  If you have any questions or concerns, please don't hesitate to call           Sincerely,          Jonathan Beckett DPM        CC: No Recipients

## 2022-11-30 NOTE — PROGRESS NOTES
Patient ID: Jerardo Cooley is a 63 y o  male Date of Birth 1960       Chief Complaint   Patient presents with   • Follow Up Wound Care Visit     Left plantar foot ulcer       Allergies:  Cefuroxime and Amoxicillin-pot clavulanate    Diagnosis:  1  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, with fat layer exposed ([facility] [address] )  -     Wound cleansing and dressings; Future  -     Wound off loading; Future    2  Diabetic polyneuropathy associated with type 1 diabetes mellitus (HCC)  -     Wound cleansing and dressings; Future  -     Wound off loading; Future       Diagnosis ICD-10-CM Associated Orders   1  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, with fat layer exposed ([facility] [address] )  S04 946 Wound cleansing and dressings    L97 422 Wound off loading      2  Diabetic polyneuropathy associated with type 1 diabetes mellitus (HCC)  E10 42 Wound cleansing and dressings     Wound off loading           Assessment & Plan:  1  Diabetic Veloz grade 2 ulceration plantar submetatarsal head 1 left foot, wound was debrided subcuticular tissues dressed with Endoform, alginate dry dressing and modified bulky dressing was applied, patient will leave this dry clean intact we surgical shoe for offloading and pressure reduction  2  Frequent elevation, low-sodium diet, proper protein intake, proper glycemic control, weightbear only for ADLs was reviewed with patient, he understands and agrees with the plan and will follow up in 1 week  Debridement   Wound 11/16/22 Diabetic Ulcer Foot Left;Plantar    Universal Protocol:  Consent: Verbal consent obtained  Risks and benefits: risks, benefits and alternatives were discussed  Consent given by: patient  Time out: Immediately prior to procedure a "time out" was called to verify the correct patient, procedure, equipment, support staff and site/side marked as required    Patient understanding: patient states understanding of the procedure being performed  Patient identity confirmed: verbally with patient      Performed by: physician  Debridement type: surgical  Level of debridement: subcutaneous tissue  Pain control: lidocaine 4%  Pre-debridement measurements  Length (cm): 1 4  Width (cm): 0 4  Depth (cm): 1  Surface Area (cm^2): 0 56  Volume (cm^3): 0 56    Post-debridement measurements  Length (cm): 1 5  Width (cm): 0 7  Depth (cm): 1 1  Percent debrided: 100%  Surface Area (cm^2): 1 05  Area debrided (cm^2): 1 05  Volume (cm^3): 1 16  Tissue and other material debrided: subcutaneous tissue  Devitalized tissue debrided: biofilm, callus, fibrin, necrotic debris and slough  Instrument(s) utilized: blade  Bleeding: small  Hemostasis obtained with: pressure  Procedural pain (0-10): insensate  Post-procedural pain: insensate   Response to treatment: procedure was tolerated well           Subjective:   Patient presents today for care of left foot diabetic ulceration, he has been changing dressings as directed  No new complaints          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)   • Insulin pump in place   • ED (erectile dysfunction) of organic origin   • Chronic pain   • Benign essential hypertension   • Cigarette nicotine dependence in remission   • Gout of ankle   • Type 1 diabetes mellitus with hyperglycemia ([facility])   • Diabetic polyneuropathy associated with type 1 diabetes mellitus ([facility] [address] )   • Microalbuminuria due to type 1 diabetes mellitus ([facility] [address] )   • Hypoglycemia unawareness associated with type 1 diabetes mellitus ([address] )     Past Medical History:   Diagnosis Date   • Chronic foot ulcer ([address] ) 9/7/2018   • Diabetes mellitus ([address] )    • Gout    • High cholesterol • Hypertension      Past Surgical History:   Procedure Laterality Date   • COMPLEX WOUND CLOSURE TO EXTREMITY Left 2019    Procedure: COMPLEX CLOSURE LEFT CHEEK;  Surgeon: Mayra Nichols MD;  Location: [facility];  Service: Plastics   • FACIAL/NECK BIOPSY Left 2019    Procedure: EXCISION LEFT CHEEK CYST;  Surgeon: Mayra Nichols MD;  Location: [facility];  Service: Plastics   • HERNIA REPAIR Left     several times   • KNEE ARTHROSCOPY Left     torn meniscus     Social History     Socioeconomic History   • Marital status: /Civil Union     Spouse name: None   • Number of children: None   • Years of education: None   • Highest education level: None   Occupational History   • Occupation: cafeteria/kitchen at [employer]     Comment: [employer]   Tobacco Use   • Smoking status: Every Day     Packs/day: 1 00     Types: Cigarettes     Last attempt to quit: 2022     Years since quittin 9   • Smokeless tobacco: Never   • Tobacco comments:     encouraged smoking cessation   Vaping Use   • Vaping Use: Never used   Substance and Sexual Activity   • Alcohol use: Yes     Comment: "some beers"   • Drug use: Never   • Sexual activity: None   Other Topics Concern   • None   Social History Narrative   • None     Social Determinants of Health     Financial Resource Strain: Not on file   Food Insecurity: Not on file   Transportation Needs: Not on file   Physical Activity: Not on file   Stress: Not on file   Social Connections: Not on file   Intimate Partner Violence: Not on file   Housing Stability: Not on file        Current Outpatient Medications:   •  atorvastatin (LIPITOR) 40 mg tablet, TAKE 1 TABLET BY MOUTH EVERY DAY, Disp: 90 tablet, Rfl: 1  •  colchicine (COLCRYS) 0 6 mg tablet, Take 1 tablet (0 6 mg total) by mouth every 6 (six) hours as needed (gouty attack) Use as directed for gout flares, Disp: 30 tablet, Rfl: 0  •  Glucagon, rDNA, (Glucagon Emergency) 1 MG KIT, as directed, Disp: , Rfl: •  insulin lispro (HumaLOG) 100 units/mL injection, Use via the insulin pump, use up to 80 units daily, Disp: 60 mL, Rfl: 3  •  lisinopril (ZESTRIL) 20 mg tablet, Take 1 tablet (20 mg total) by mouth daily, Disp: 30 tablet, Rfl: 5  •  metoprolol succinate (TOPROL-XL) 50 mg 24 hr tablet, Take 1 tablet (50 mg total) by mouth daily, Disp: 30 tablet, Rfl: 0  Family History   Problem Relation Age of Onset   • Heart disease Father    • Diabetes type I Father    • Diabetes type II Mother    • No Known Problems Sister    • Alcohol abuse Brother    • Diabetes type I Brother    • No Known Problems Brother    • No Known Problems Son    • No Known Problems Daughter        Review of Systems   Constitutional: Negative for chills and fever  HENT: Negative for ear pain and sore throat  Eyes: Negative for pain and visual disturbance  Respiratory: Negative for cough and shortness of breath  Cardiovascular: Negative for chest pain and palpitations  Gastrointestinal: Negative for abdominal pain and vomiting  Genitourinary: Negative for dysuria and hematuria  Musculoskeletal: Positive for gait problem  Negative for arthralgias and back pain  Skin: Positive for color change and wound  Negative for rash  Neurological: Negative for seizures and syncope  Psychiatric/Behavioral: Negative  All other systems reviewed and are negative  Objective:  /76   Pulse 92   Temp (!) 96 2 °F (35 7 °C)   Resp 18     Physical Exam  Constitutional:       Appearance: Normal appearance  He is normal weight  HENT:      Head: Normocephalic and atraumatic  Right Ear: External ear normal       Left Ear: External ear normal       Nose: Nose normal    Eyes:      Conjunctiva/sclera: Conjunctivae normal    Cardiovascular:      Pulses: Normal pulses  Dorsalis pedis pulses are 2+ on the right side and 2+ on the left side  Posterior tibial pulses are 2+ on the right side and 2+ on the left side  Pulmonary:      Effort: Pulmonary effort is normal    Musculoskeletal:      Cervical back: Normal range of motion  Right lower leg: No edema  Left lower leg: No edema  Comments: Pes cavus bilateral  Bilateral fat pad atrophy submetatarsal heads 1 through 5   Skin:     General: Skin is warm and dry  Capillary Refill: Capillary refill takes less than 2 seconds  Comments: See detailed wound assessment   Neurological:      General: No focal deficit present  Mental Status: He is alert and oriented to person, place, and time  Mental status is at baseline  Sensory: Sensory deficit present  Coordination: Coordination abnormal       Gait: Gait abnormal       Deep Tendon Reflexes: Reflexes abnormal    Psychiatric:         Mood and Affect: Mood normal          Behavior: Behavior normal          Thought Content: Thought content normal          Judgment: Judgment normal            Wound 11/16/22 Diabetic Ulcer Foot Left;Plantar (Active)   Wound Image   11/30/22 1517   Wound Description Yellow 11/30/22 1517   Dee Dee-wound Assessment Callus;Pink 11/30/22 1517   Wound Length (cm) 1 4 cm 11/30/22 1517   Wound Width (cm) 0 4 cm 11/30/22 1517   Wound Depth (cm) 1 cm 11/30/22 1517   Wound Surface Area (cm^2) 0 56 cm^2 11/30/22 1517   Wound Volume (cm^3) 0 56 cm^3 11/30/22 1517   Calculated Wound Volume (cm^3) 0 56 cm^3 11/30/22 1517   Drainage Amount Small 11/30/22 1517   Drainage Description Carbajal;Yellow 11/30/22 1517   Non-staged Wound Description Full thickness 11/30/22 1517   Treatments Cleansed 11/23/22 1414   Wound packed?  No 11/23/22 1414   Dressing Changed Changed 11/23/22 1414   Patient Tolerance Tolerated well 11/23/22 1414   Dressing Status Intact 11/30/22 1517                XR foot 3+ vw left    Result Date: 11/17/2022  Narrative: LEFT FOOT INDICATION:   E10 621: Type 1 diabetes mellitus with foot ulcer L97 422: Non-pressure chronic ulcer of left heel and midfoot with fat layer exposed  Wound on plantar aspect of left foot near 1st and 2nd metatarsals  Evaluate for osteomyelitis  COMPARISON:  Radiographs of the left foot from 11/7/2022  VIEWS:  XR FOOT 3+ VW LEFT performed with weightbearing Images: 3 FINDINGS: No fractures or dislocations  The joint spaces are preserved with smooth articular margins  No lytic or sclerotic osseous lesions  There is no periosteal reaction  There is no subcutaneous emphysema  Vascular calcifications are seen  Impression: No radiographic evidence of osteomyelitis  No subcutaneous emphysema  Workstation performed: [id number]     XR foot 3+ vw left    Result Date: 11/7/2022  Narrative: LEFT FOOT INDICATION:   L03 116: Cellulitis of left lower limb   redness on left foot with pain since this past saturday / no injury COMPARISON:  None VIEWS:  XR FOOT 3+ VW LEFT FINDINGS: There is no acute fracture or dislocation  No significant degenerative changes  No periosteal reaction or cortical destruction to suggest osteomyelitis  There are atherosclerotic calcifications  Soft tissues are otherwise unremarkable  Impression: No radiographic evidence of osteomyelitis  Workstation performed: [id number]     VAS lower limb arterial duplex, limited/unilateral    Result Date: 11/12/2022  Narrative:  [facility] REPORT CLINICAL: Indications:  Patient presents with diminished/absent pedal pulses on physical examination  Patient reports sudden onset of left foot pain a week ago that has not improved  He reports no leg pain with walking  Operative History: no reported cardiovascular surgeries Risk Factors The patient has history of HTN, Diabetes (IDDM), Hyperlipidemia and smoking (current) 1-2 ppd  Clinical Right Pressure:  141/ mm Hg, Left Pressure:  153/ mm Hg    FINDINGS:  Left                   Waveform   PSV (cm/s)  Common Femoral Artery  Triphasic         146  Prox Profunda          Triphasic         181  Prox SFA               Triphasic         138  Mid SFA Triphasic         115  Dist SFA               Triphasic          76  Proximal Pop           Triphasic         122  Distal Pop             Triphasic          84  Tibioperoneal          Triphasic          78  Prox Post Tibial       Triphasic         107  Dist Post Tibial       Triphasic          74  Prox  Ant  Tibial      Triphasic          79  Dist  Ant  Tibial      Triphasic          78     CONCLUSION: Impression: RIGHT LOWER LIMB: Ankle/Brachial index: 1 04 Metatarsal pressure of 62mmHg Great toe pressure of 78mmHg, within healing range  PVR/ PPG tracings are normal   LEFT LOWER LIMB: This resting evaluation shows evidence of diffuse disease throughout the femoral, popliteal and tibial arteries with no areas of focal stenosis  Ankle/Brachial index: 1 25 Metatarsal pressure of 97mmHg Great toe pressure of 56mmHg, within healing range  PVR/ PPG tracings are normal   There is no previous study for comparison  Ta Cuello SIGNATURE: Electronically Signed by: Aurora Joseph on 2022-11-12 10:42:06 AM      Wound Instructions:  Orders Placed This Encounter   Procedures   • Wound cleansing and dressings     LEFT FOOT WOUND  Wash your hands with soap and water  Remove old dressing, discard into plastic bag and place in trash  Cleanse the wound with saline prior to applying a clean dressing  Do not use tissue or cotton balls  Do not scrub the wound  Pat dry using gauze  Shower Yes use cast cover       Discontinue medihoney  Apply endoform and maxorb to the left foot wound    Cover with gauze and abd  Secure with cast padding, kerlix, coflex   Change dressing weekly at the [facility]  Done today   Return in 1 week         Standing Status:   Future     Standing Expiration Date:   11/30/2023   • Wound off loading     Wear post op shoe as much as possible to offload wound     Standing Status:   Future     Standing Expiration Date:   11/30/2023         Loren Skinner DPM, DPM, FACFAS    Portions of the record may have been created with voice recognition software  Occasional wrong word or "sound a like" substitutions may have occurred due to the inherent limitations of voice recognition software  Read the chart carefully and recognize, using context, where substitutions have occurred

## 2022-11-30 NOTE — PATIENT INSTRUCTIONS
Orders Placed This Encounter   Procedures    Wound cleansing and dressings     LEFT FOOT WOUND  Wash your hands with soap and water  Remove old dressing, discard into plastic bag and place in trash  Cleanse the wound with saline prior to applying a clean dressing  Do not use tissue or cotton balls  Do not scrub the wound  Pat dry using gauze  Shower Yes use cast cover  Discontinue medihoney  Apply endoform and maxorb to the left foot wound  Cover with gauze and abd  Secure with cast padding, kerlix, coflex   Change dressing weekly at the [facility]  Done today   Return in 1 week  Standing Status:   Future     Standing Expiration Date:   11/30/2023    Wound off loading     Wear post op shoe as much as possible to offload wound     Standing Status:   Future     Standing Expiration Date:   11/30/2023    Wound compression and edema control     Compression Stocking  Spandagrip G     Remove compression stockings every night HS and re-apply first thing qAM  Follow daily skin care as instructed  Avoid prolonged standing in one place  Elevate leg(s) above the level of the heart when sitting or as much as possible       Standing Status:   Future     Standing Expiration Date:   11/30/2023

## 2022-12-07 ENCOUNTER — OFFICE VISIT (OUTPATIENT)
Dept: WOUND CARE | Facility: HOSPITAL | Age: 62
End: 2022-12-07

## 2022-12-07 VITALS
DIASTOLIC BLOOD PRESSURE: 76 MMHG | HEART RATE: 80 BPM | TEMPERATURE: 97.6 F | RESPIRATION RATE: 16 BRPM | SYSTOLIC BLOOD PRESSURE: 140 MMHG

## 2022-12-07 DIAGNOSIS — E10.621 DIABETIC ULCER OF LEFT MIDFOOT ASSOCIATED WITH TYPE 1 DIABETES MELLITUS, WITH FAT LAYER EXPOSED (HCC): Primary | ICD-10-CM

## 2022-12-07 DIAGNOSIS — L97.422 DIABETIC ULCER OF LEFT MIDFOOT ASSOCIATED WITH TYPE 1 DIABETES MELLITUS, WITH FAT LAYER EXPOSED (HCC): Primary | ICD-10-CM

## 2022-12-07 DIAGNOSIS — E10.42 DIABETIC POLYNEUROPATHY ASSOCIATED WITH TYPE 1 DIABETES MELLITUS (HCC): ICD-10-CM

## 2022-12-07 RX ORDER — LIDOCAINE HYDROCHLORIDE 40 MG/ML
5 SOLUTION TOPICAL ONCE
Status: COMPLETED | OUTPATIENT
Start: 2022-12-07 | End: 2022-12-07

## 2022-12-07 RX ADMIN — LIDOCAINE HYDROCHLORIDE 5 ML: 40 SOLUTION TOPICAL at 15:50

## 2022-12-07 NOTE — PATIENT INSTRUCTIONS
Orders Placed This Encounter   Procedures    Wound cleansing and dressings     Left foot  Wash your hands with soap and water  Remove old dressing, discard into plastic bag and place in trash  Cleanse the wound with soap and water prior to applying a clean dressing  Do not use tissue or cotton balls  Do not scrub the wound  Pat dry using gauze  Shower yes with protection  Apply felt offloading pad to skin surrounding wound  Apply endoform, masorb ag, foam and abd to the left foot wound    Cover with football dressing, cast padding, kerlix coban    Change dressing weekly  Done today     Standing Status:   Future     Standing Expiration Date:   12/7/2023

## 2022-12-07 NOTE — PROGRESS NOTES
Patient ID: Wilman Becerril is a 63 y o  male Date of Birth 1960       Chief Complaint   Patient presents with   • Follow Up Wound Care Visit     Football dressing intact on arrival   Complains of some soreness in the foot  Allergies:  Cefuroxime and Amoxicillin-pot clavulanate    Diagnosis:  1  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, with fat layer exposed ([address] )  -     lidocaine (XYLOCAINE) 4 % topical solution 5 mL  -     Wound cleansing and dressings; Future    2  Diabetic polyneuropathy associated with type 1 diabetes mellitus (HCC)  -     lidocaine (XYLOCAINE) 4 % topical solution 5 mL  -     Wound cleansing and dressings; Future       Diagnosis ICD-10-CM Associated Orders   1  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, with fat layer exposed ([facility] [address] )  E10 621 lidocaine (XYLOCAINE) 4 % topical solution 5 mL    L97 422 Wound cleansing and dressings      2  Diabetic polyneuropathy associated with type 1 diabetes mellitus ([facility])  E10 42 lidocaine (XYLOCAINE) 4 % topical solution 5 mL     Wound cleansing and dressings           Assessment & Plan:  1  Diabetic Veloz grade 2 ulceration plantar submetatarsal head 1 left foot, wound was debrided subcuticular tissues dressed with Endoform, alginate dry dressing with 1/4 inch felt offloading to forefoot and modified bulky dressing was applied, patient will leave this dry clean intact we surgical shoe for offloading and pressure reduction  2  Frequent elevation, low-sodium diet, proper protein intake, proper glycemic control, weightbear only for ADLs was reviewed with patient, he understands and agrees with the plan and will follow up in 1 week  Debridement   Wound 11/16/22 Diabetic Ulcer Foot Left;Plantar    Universal Protocol:  Consent: Verbal consent obtained    Risks and benefits: risks, benefits and alternatives were discussed  Consent given by: patient  Time out: Immediately prior to procedure a "time out" was called to verify the correct patient, procedure, equipment, support staff and site/side marked as required    Patient understanding: patient states understanding of the procedure being performed  Patient identity confirmed: verbally with patient      Performed by: physician  Debridement type: surgical  Level of debridement: subcutaneous tissue  Pain control: lidocaine 4%  Pre-debridement measurements  Length (cm): 1  Width (cm): 0 2  Depth (cm): 0 5  Surface Area (cm^2): 0 2  Volume (cm^3): 0 1    Post-debridement measurements  Length (cm): 1 3  Width (cm): 0 5  Depth (cm): 0 6  Percent debrided: 100%  Surface Area (cm^2): 0 65  Area debrided (cm^2): 0 65  Volume (cm^3): 0 39  Tissue and other material debrided: subcutaneous tissue  Devitalized tissue debrided: biofilm, callus, fibrin, necrotic debris and slough  Instrument(s) utilized: blade  Bleeding: small  Hemostasis obtained with: pressure and silver nitrate  Procedural pain (0-10): insensate  Post-procedural pain: insensate   Response to treatment: procedure was tolerated well                   Subjective:   HPI      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)   • Insulin pump in place   • ED (erectile dysfunction) of organic origin   • Chronic pain   • Benign essential hypertension   • Cigarette nicotine dependence in remission   • Gout of ankle   • Type 1 diabetes mellitus with hyperglycemia (HCC)   • Diabetic polyneuropathy associated with type 1 diabetes mellitus ([facility] [address] )   • Microalbuminuria due to type 1 diabetes mellitus ([facility] [address] )   • Hypoglycemia unawareness associated with type 1 diabetes mellitus ([facility] [address] )     Past Medical History:   Diagnosis Date • Chronic foot ulcer ([facility] [address] ) 2018   • Diabetes mellitus ([facility] )    • Gout    • High cholesterol    • Hypertension      Past Surgical History:   Procedure Laterality Date   • COMPLEX WOUND CLOSURE TO EXTREMITY Left 2019    Procedure: COMPLEX CLOSURE LEFT CHEEK;  Surgeon: Akilah Yi MD;  Location:  MAIN OR;  Service: Plastics   • FACIAL/NECK BIOPSY Left 2019    Procedure: EXCISION LEFT CHEEK CYST;  Surgeon: Akilah Yi MD;  Location:  MAIN OR;  Service: Plastics   • HERNIA REPAIR Left     several times   • KNEE ARTHROSCOPY Left     torn meniscus     Social History     Socioeconomic History   • Marital status: /Civil Union     Spouse name: Not on file   • Number of children: Not on file   • Years of education: Not on file   • Highest education level: Not on file   Occupational History   • Occupation: cafeteria/kitchen at [employer]     Comment: [employer]   Tobacco Use   • Smoking status: Every Day     Packs/day: 1 00     Types: Cigarettes     Last attempt to quit: 2022     Years since quittin 9   • Smokeless tobacco: Never   • Tobacco comments:     encouraged smoking cessation   Vaping Use   • Vaping Use: Never used   Substance and Sexual Activity   • Alcohol use: Yes     Comment: "some beers"   • Drug use: Never   • Sexual activity: Not on file   Other Topics Concern   • Not on file   Social History Narrative   • Not on file     Social Determinants of Health     Financial Resource Strain: Not on file   Food Insecurity: Not on file   Transportation Needs: Not on file   Physical Activity: Not on file   Stress: Not on file   Social Connections: Not on file   Intimate Partner Violence: Not on file   Housing Stability: Not on file        Current Outpatient Medications:   •  atorvastatin (LIPITOR) 40 mg tablet, TAKE 1 TABLET BY MOUTH EVERY DAY, Disp: 90 tablet, Rfl: 1  •  colchicine (COLCRYS) 0 6 mg tablet, Take 1 tablet (0 6 mg total) by mouth every 6 (six) hours as needed (gouty attack) Use as directed for gout flares, Disp: 30 tablet, Rfl: 0  •  Glucagon, rDNA, (Glucagon Emergency) 1 MG KIT, as directed, Disp: , Rfl:   •  insulin lispro (HumaLOG) 100 units/mL injection, Use via the insulin pump, use up to 80 units daily, Disp: 60 mL, Rfl: 3  •  lisinopril (ZESTRIL) 20 mg tablet, Take 1 tablet (20 mg total) by mouth daily, Disp: 30 tablet, Rfl: 5  •  metoprolol succinate (TOPROL-XL) 50 mg 24 hr tablet, Take 1 tablet (50 mg total) by mouth daily, Disp: 30 tablet, Rfl: 0  No current facility-administered medications for this visit  Family History   Problem Relation Age of Onset   • Heart disease Father    • Diabetes type I Father    • Diabetes type II Mother    • No Known Problems Sister    • Alcohol abuse Brother    • Diabetes type I Brother    • No Known Problems Brother    • No Known Problems Son    • No Known Problems Daughter        Review of Systems   Constitutional: Negative for chills and fever  HENT: Negative for ear pain and sore throat  Eyes: Negative for pain and visual disturbance  Respiratory: Negative for cough and shortness of breath  Cardiovascular: Negative for chest pain and palpitations  Gastrointestinal: Negative for abdominal pain and vomiting  Genitourinary: Negative for dysuria and hematuria  Musculoskeletal: Negative for arthralgias and back pain  Skin: Positive for color change and wound  Negative for rash  Neurological: Positive for numbness  Negative for seizures and syncope  Psychiatric/Behavioral: Negative  All other systems reviewed and are negative  Objective:  /76   Pulse 80   Temp 97 6 °F (36 4 °C)   Resp 16     Physical Exam  Constitutional:       Appearance: Normal appearance  He is normal weight  HENT:      Head: Normocephalic and atraumatic        Right Ear: External ear normal       Left Ear: External ear normal       Nose: Nose normal       Mouth/Throat:      Mouth: Mucous membranes are moist  Pharynx: Oropharynx is clear  Eyes:      Conjunctiva/sclera: Conjunctivae normal    Cardiovascular:      Pulses: Normal pulses  Dorsalis pedis pulses are 2+ on the right side and 2+ on the left side  Posterior tibial pulses are 2+ on the right side and 2+ on the left side  Pulmonary:      Effort: Pulmonary effort is normal    Musculoskeletal:      Cervical back: Normal range of motion  Right lower leg: No edema  Left lower leg: No edema  Skin:     General: Skin is warm and dry  Capillary Refill: Capillary refill takes less than 2 seconds  Comments: See detailed wound assessment   Neurological:      General: No focal deficit present  Mental Status: He is alert and oriented to person, place, and time  Mental status is at baseline  Sensory: Sensory deficit present  Coordination: Coordination abnormal       Gait: Gait abnormal       Deep Tendon Reflexes: Reflexes abnormal    Psychiatric:         Mood and Affect: Mood normal          Behavior: Behavior normal          Thought Content: Thought content normal          Judgment: Judgment normal            Wound 11/16/22 Diabetic Ulcer Foot Left;Plantar (Active)   Wound Image   12/07/22 1609   Wound Description Pink 12/07/22 1540   Dee Dee-wound Assessment Intact 12/07/22 1540   Wound Length (cm) 1 cm 12/07/22 1540   Wound Width (cm) 0 2 cm 12/07/22 1540   Wound Depth (cm) 0 5cm 12/07/22 1540   Wound Surface Area (cm^2) 0 2 cm^2 12/07/22 1540   Wound Volume (cm^3) 1 cm^3 12/07/22 1540   Calculated Wound Volume (cm^3) 1 cm^3 12/07/22 1540   Drainage Amount Moderate 12/07/22 1540   Drainage Description Serosanguineous 12/07/22 1540   Non-staged Wound Description Full thickness 12/07/22 1540   Treatments Cleansed 11/23/22 1414   Wound packed?  No 11/23/22 1414   Dressing Changed Changed 11/23/22 1414   Patient Tolerance Tolerated well 11/23/22 1414   Dressing Status Intact 12/07/22 1540                XR foot 3+ vw left    Result Date: 11/17/2022  Narrative: LEFT FOOT INDICATION:   E10 621: Type 1 diabetes mellitus with foot ulcer L97 422: Non-pressure chronic ulcer of left heel and midfoot with fat layer exposed  Wound on plantar aspect of left foot near 1st and 2nd metatarsals  Evaluate for osteomyelitis  COMPARISON:  Radiographs of the left foot from 11/7/2022  VIEWS:  XR FOOT 3+ VW LEFT performed with weightbearing Images: 3 FINDINGS: No fractures or dislocations  The joint spaces are preserved with smooth articular margins  No lytic or sclerotic osseous lesions  There is no periosteal reaction  There is no subcutaneous emphysema  Vascular calcifications are seen  Impression: No radiographic evidence of osteomyelitis  No subcutaneous emphysema  Workstation performed: [id number]     VAS lower limb arterial duplex, limited/unilateral    Result Date: 11/12/2022  Narrative:  [facility] REPORT CLINICAL: Indications:  Patient presents with diminished/absent pedal pulses on physical examination  Patient reports sudden onset of left foot pain a week ago that has not improved  He reports no leg pain with walking  Operative History: no reported cardiovascular surgeries Risk Factors The patient has history of HTN, Diabetes (IDDM), Hyperlipidemia and smoking (current) 1-2 ppd  Clinical Right Pressure:  141/ mm Hg, Left Pressure:  153/ mm Hg  FINDINGS:  Left                   Waveform   PSV (cm/s)  Common Femoral Artery  Triphasic         146  Prox Profunda          Triphasic         181  Prox SFA               Triphasic         138  Mid SFA                Triphasic         115  Dist SFA               Triphasic          76  Proximal Pop           Triphasic         122  Distal Pop             Triphasic          84  Tibioperoneal          Triphasic          78  Prox Post Tibial       Triphasic         107  Dist Post Tibial       Triphasic          74  Prox  Ant  Tibial      Triphasic          79  Dist  Ant   Tibial Triphasic          78     CONCLUSION: Impression: RIGHT LOWER LIMB: Ankle/Brachial index: 1 04 Metatarsal pressure of 62mmHg Great toe pressure of 78mmHg, within healing range  PVR/ PPG tracings are normal   LEFT LOWER LIMB: This resting evaluation shows evidence of diffuse disease throughout the femoral, popliteal and tibial arteries with no areas of focal stenosis  Ankle/Brachial index: 1 25 Metatarsal pressure of 97mmHg Great toe pressure of 56mmHg, within healing range  PVR/ PPG tracings are normal   There is no previous study for comparison  Oziel Alexis SIGNATURE: Electronically Signed by: Janet Zhong on 2022-11-12 10:42:06 AM      Wound Instructions:  Orders Placed This Encounter   Procedures   • Wound cleansing and dressings     Left foot  Wash your hands with soap and water  Remove old dressing, discard into plastic bag and place in trash  Cleanse the wound with soap and water prior to applying a clean dressing  Do not use tissue or cotton balls  Do not scrub the wound  Pat dry using gauze  Shower yes with protection  Apply felt offloading pad to skin surrounding wound  Apply endoform, masorb ag, foam and abd to the left foot wound  Cover with football dressing, cast padding, kerlix coban    Change dressing weekly  Done today     Standing Status:   Future     Standing Expiration Date:   12/7/2023         Darline Edward DPM, DPKEV, FACFAS    Portions of the record may have been created with voice recognition software  Occasional wrong word or "sound a like" substitutions may have occurred due to the inherent limitations of voice recognition software  Read the chart carefully and recognize, using context, where substitutions have occurred

## 2022-12-07 NOTE — LETTER
December 7, 2022     Patient: Camille Allen  YOB: 1960  Date of Visit: 12/7/2022      To Whom it May Concern:    Camille Allen is under my professional care  Susanrajinder Mcclelland was seen in my office on 12/7/2022  Susan Mcclelland may return to work on 12/15/2022  If you have any questions or concerns, please don't hesitate to call           Sincerely,          Luis Jeffrey DPM        CC: No Recipients

## 2022-12-12 ENCOUNTER — OFFICE VISIT (OUTPATIENT)
Dept: WOUND CARE | Facility: HOSPITAL | Age: 62
End: 2022-12-12

## 2022-12-12 VITALS
TEMPERATURE: 98.3 F | DIASTOLIC BLOOD PRESSURE: 78 MMHG | SYSTOLIC BLOOD PRESSURE: 138 MMHG | RESPIRATION RATE: 18 BRPM | HEART RATE: 104 BPM

## 2022-12-12 DIAGNOSIS — E10.621 DIABETIC ULCER OF LEFT MIDFOOT ASSOCIATED WITH TYPE 1 DIABETES MELLITUS, WITH FAT LAYER EXPOSED (HCC): Primary | ICD-10-CM

## 2022-12-12 DIAGNOSIS — L97.422 DIABETIC ULCER OF LEFT MIDFOOT ASSOCIATED WITH TYPE 1 DIABETES MELLITUS, WITH FAT LAYER EXPOSED (HCC): Primary | ICD-10-CM

## 2022-12-12 DIAGNOSIS — E10.42 DIABETIC POLYNEUROPATHY ASSOCIATED WITH TYPE 1 DIABETES MELLITUS (HCC): ICD-10-CM

## 2022-12-12 RX ORDER — LIDOCAINE 40 MG/G
CREAM TOPICAL ONCE
Status: COMPLETED | OUTPATIENT
Start: 2022-12-12 | End: 2022-12-12

## 2022-12-12 RX ADMIN — LIDOCAINE 1 APPLICATION: 40 CREAM TOPICAL at 12:30

## 2022-12-12 NOTE — LETTER
December 12, 2022     Patient: Lin Bowman  YOB: 1960  Date of Visit: 12/12/2022      To Whom it May Concern:    Lin Bowman is under the professional care of the physicians and providers at the [address] [place] [address], mainly Dr Isabel Miles  He was seen here today, 12/12/2022, and will remain under the care of myself and the other providers here until further notice  Cely Potter may return to work on 12/12/2022 with no restrictions with the total contact cast in place  His main provider, Dr Kenna Cha, or any other provider here, has the liberty to direct his care otherwise, if necessary  If you have any questions or concerns, please don't hesitate to call           Sincerely,          Chavez Gonzalez DO        CC: No Recipients

## 2022-12-12 NOTE — PATIENT INSTRUCTIONS
Orders Placed This Encounter   Procedures    Wound cleansing and dressings     Wound cleansing and dressings       Left foot  Wash your hands with soap and water  Remove old dressing, discard into plastic bag and place in trash  Cleanse the wound with soap and water prior to applying a clean dressing  Do not use tissue or cotton balls  Do not scrub the wound  Pat dry using gauze  Shower yes with protection  Apply felt offloading pad to skin surrounding wound  Apply endoform, Maxorb AG, foam and ABD to the left foot wound  Total contact cast applied today  Return to wound center on Wednesday for visit with Dr Mckinley Oneill for cast change and wound assessment     Change dressing weekly  Done today     Standing Status:   Future     Standing Expiration Date:   12/12/2023

## 2022-12-12 NOTE — PROGRESS NOTES
Patient ID: Latrice Cardenas is a 63 y o  male Date of Birth 1960     Chief Complaint  Chief Complaint   Patient presents with   • Follow Up Wound Care Visit     Left foot wound       Allergies  Cefuroxime and Amoxicillin-pot clavulanate    Assessment:    No problem-specific Assessment & Plan notes found for this encounter  Diagnoses and all orders for this visit:    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, with fat layer exposed ([address] )  -     Cancel: Wound cleansing and dressings; Future  -     lidocaine (LMX) 4 % cream  -     Debridement    Diabetic polyneuropathy associated with type 1 diabetes mellitus (HCC)  -     Cancel: Wound cleansing and dressings; Future  -     lidocaine (LMX) 4 % cream              Debridement   Wound 11/16/22 Diabetic Ulcer Foot Left;Plantar    Universal Protocol:  Consent: Verbal consent obtained  Consent given by: patient  Time out: Immediately prior to procedure a "time out" was called to verify the correct patient, procedure, equipment, support staff and site/side marked as required    Timeout called at: 12/12/2022 11:51 AM   Patient understanding: patient states understanding of the procedure being performed  Patient identity confirmed: verbally with patient      Performed by: physician  Debridement type: surgical  Level of debridement: subcutaneous tissue  Pain control: lidocaine 4%  Post-debridement measurements  Length (cm): 1 4  Width (cm): 0 5  Depth (cm): 0 8  Percent debrided: 100%  Surface Area (cm^2): 0 7  Area debrided (cm^2): 0 7  Volume (cm^3): 0 56  Tissue and other material debrided: subcutaneous tissue  Devitalized tissue debrided: exudate and slough  Instrument(s) utilized: curette  Bleeding: small  Hemostasis obtained with: pressure  Response to treatment: procedure was tolerated well          Plan:  Wound is larger today  Surgical debridement completed today  Discussed the case with Dr Shaista Marquez with endoform and silver alginate, see wound orders below  Initial application of TCC completed today for proper offloading  Extensively discussed the importance of tight diabetic control  A1C results reviewed with the patient today  Letter written for patient today to allow him to return to work while still under the care of Dr Mckinley Oneill and the wound management team  Follow-up in 2 days with Dr Mckinley Oneill recheck of the wound after initial application of TCC  Call sooner with any questions or concerns    Wound 11/16/22 Diabetic Ulcer Foot Left;Plantar (Active)   Wound Image Images linked 12/12/22 1214   Wound Description Pink;Yellow 12/12/22 1215   Dee Dee-wound Assessment Maceration 12/12/22 1215   Wound Length (cm) 1 4 cm 12/12/22 1215   Wound Width (cm) 0 5 cm 12/12/22 1215   Wound Depth (cm) 0 7 cm 12/12/22 1215   Wound Surface Area (cm^2) 0 7 cm^2 12/12/22 1215   Wound Volume (cm^3) 0 49 cm^3 12/12/22 1215   Calculated Wound Volume (cm^3) 0 49 cm^3 12/12/22 1215   Drainage Amount Moderate 12/12/22 1215   Drainage Description Serosanguineous 12/12/22 1215   Non-staged Wound Description Full thickness 12/12/22 1215       Wound 11/16/22 Diabetic Ulcer Foot Left;Plantar (Active)   Date First Assessed/Time First Assessed: 11/16/22 1315   Pre-Existing Wound: Yes  Primary Wound Type: Diabetic Ulcer  Location: Foot  Wound Location Orientation: Left;Plantar       [REMOVED] Wound 07/18/19 Other (Comment) Left (Removed)   Resolved Date/Resolved Time: 11/16/22 1314  Date First Assessed/Time First Assessed: 07/18/19 1035   Location: Other (Comment)  Wound Location Orientation: Left  Wound Description (Comments): left cheek sutured , triple antibiotic ointment and xerofor    Subjective:        Patient is a patient of Dr Mckinley Oneill who presents today for follow-up of a Veloz 1 diabetic foot ulcer of the left plantar foot  Patient reports no significant pain  No increased drainage    Has had football dressing in place this past week with endoform and alginate on the wound itself  The following portions of the patient's history were reviewed and updated as appropriate:   He  has a past medical history of Chronic foot ulcer ([facility] ) (9/7/2018), Diabetes mellitus ([facility] ), Gout, High cholesterol, and Hypertension  He   Patient Active Problem List    Diagnosis Date Noted   • Type 1 diabetes mellitus with hyperglycemia ([place] [address] ) 04/19/2022   • Diabetic polyneuropathy associated with type 1 diabetes mellitus ([place] [address] ) 04/19/2022   • Microalbuminuria due to type 1 diabetes mellitus ([place] [address] ) 04/19/2022   • Hypoglycemia unawareness associated with type 1 diabetes mellitus ([place] [address] ) 04/19/2022   • Cigarette nicotine dependence without complication 49/46/3146   • Gout of ankle 03/07/2022   • Chronic fatigue syndrome 02/21/2022   • Lower urinary tract symptoms due to benign prostatic hyperplasia 02/21/2022   • 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 ([place] [address] ) 02/21/2022   • Chronic pain 02/21/2022   • Insulin pump in place 09/07/2018   • Benign essential hypertension 01/17/2011   • Mixed hyperlipidemia 10/25/2010   • ED (erectile dysfunction) of organic origin 10/25/2010     He  reports that he has been smoking cigarettes  He has been smoking an average of 1 pack per day  He has never used smokeless tobacco  He reports current alcohol use  He reports that he does not use drugs    Current Outpatient Medications   Medication Sig Dispense Refill   • atorvastatin (LIPITOR) 40 mg tablet TAKE 1 TABLET BY MOUTH EVERY DAY 90 tablet 1   • colchicine (COLCRYS) 0 6 mg tablet Take 1 tablet (0 6 mg total) by mouth every 6 (six) hours as needed (gouty attack) Use as directed for gout flares 30 tablet 0   • Glucagon, rDNA, (Glucagon Emergency) 1 MG KIT as directed     • insulin lispro (HumaLOG) 100 units/mL injection Use via the insulin pump, use up to 80 units daily 60 mL 3   • lisinopril (ZESTRIL) 20 mg tablet Take 0 5 tablets (10 mg total) by mouth daily 30 tablet 5   • metoprolol succinate (TOPROL-XL) 100 mg 24 hr tablet Take 1 tablet (100 mg total) by mouth daily 30 tablet 1     No current facility-administered medications for this visit  He is allergic to cefuroxime and amoxicillin-pot clavulanate       Review of Systems   Constitutional: Negative for chills and fever  HENT: Negative for congestion and sneezing  Respiratory: Negative for cough  Skin: Positive for wound  Psychiatric/Behavioral: Negative for agitation  Objective:       Wound 11/16/22 Diabetic Ulcer Foot Left;Plantar (Active)   Wound Image Images linked 12/12/22 1214   Wound Description Pink;Yellow 12/12/22 1215   Dee Dee-wound Assessment Maceration 12/12/22 1215   Wound Length (cm) 1 4 cm 12/12/22 1215   Wound Width (cm) 0 5 cm 12/12/22 1215   Wound Depth (cm) 0 7 cm 12/12/22 1215   Wound Surface Area (cm^2) 0 7 cm^2 12/12/22 1215   Wound Volume (cm^3) 0 49 cm^3 12/12/22 1215   Calculated Wound Volume (cm^3) 0 49 cm^3 12/12/22 1215   Drainage Amount Moderate 12/12/22 1215   Drainage Description Serosanguineous 12/12/22 1215   Non-staged Wound Description Full thickness 12/12/22 1215       /78 (BP Location: Left arm)   Pulse 104   Temp 98 3 °F (36 8 °C)   Resp 18     Physical Exam        Wound 11/16/22 Diabetic Ulcer Foot Left;Plantar (Active)   Wound Image   12/14/22 1559   Wound Description Granulation tissue 12/14/22 1559   Dee Dee-wound Assessment Maceration 12/14/22 1559   Wound Length (cm) 1 cm 12/14/22 1559   Wound Width (cm) 0 2 cm 12/14/22 1559   Wound Depth (cm) 0 3 cm 12/14/22 1559   Wound Surface Area (cm^2) 0 2 cm^2 12/14/22 1559   Wound Volume (cm^3) 0 06 cm^3 12/14/22 1559   Calculated Wound Volume (cm^3) 0 06 cm^3 12/14/22 1559   Drainage Amount Moderate 12/14/22 1559   Drainage Description Tan 12/14/22 1559   Non-staged Wound Description Full thickness 12/14/22 1559   Treatments Cleansed 11/23/22 1414   Wound packed?  No 11/23/22 1414   Dressing Changed Changed 11/23/22 1414   Patient Tolerance Tolerated well 11/23/22 1414   Dressing Status Intact 12/14/22 1553                       Wound Instructions:  Orders Placed This Encounter   Procedures   • Debridement     This order was created via procedure documentation        Diagnosis ICD-10-CM Associated Orders   1  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, with fat layer exposed ([facility] )  E10 621 lidocaine (LMX) 4 % cream    L97 422 Debridement      2   Diabetic polyneuropathy associated with type 1 diabetes mellitus ([facility])  E10 42 lidocaine (LMX) 4 % cream

## 2022-12-14 ENCOUNTER — OFFICE VISIT (OUTPATIENT)
Dept: WOUND CARE | Facility: HOSPITAL | Age: 62
End: 2022-12-14

## 2022-12-14 VITALS
DIASTOLIC BLOOD PRESSURE: 84 MMHG | SYSTOLIC BLOOD PRESSURE: 140 MMHG | RESPIRATION RATE: 16 BRPM | TEMPERATURE: 97.4 F | HEART RATE: 100 BPM

## 2022-12-14 DIAGNOSIS — E10.42 DIABETIC POLYNEUROPATHY ASSOCIATED WITH TYPE 1 DIABETES MELLITUS (HCC): ICD-10-CM

## 2022-12-14 DIAGNOSIS — E10.621 DIABETIC ULCER OF LEFT MIDFOOT ASSOCIATED WITH TYPE 1 DIABETES MELLITUS, WITH FAT LAYER EXPOSED (HCC): Primary | ICD-10-CM

## 2022-12-14 DIAGNOSIS — L97.422 DIABETIC ULCER OF LEFT MIDFOOT ASSOCIATED WITH TYPE 1 DIABETES MELLITUS, WITH FAT LAYER EXPOSED (HCC): Primary | ICD-10-CM

## 2022-12-14 RX ORDER — LIDOCAINE HYDROCHLORIDE 40 MG/ML
5 SOLUTION TOPICAL ONCE
Status: COMPLETED | OUTPATIENT
Start: 2022-12-14 | End: 2022-12-14

## 2022-12-14 RX ADMIN — LIDOCAINE HYDROCHLORIDE 5 ML: 40 SOLUTION TOPICAL at 16:07

## 2022-12-14 NOTE — PATIENT INSTRUCTIONS
Orders Placed This Encounter   Procedures    Wound cleansing and dressings     Left foot wound    Wash your hands with soap and water  Remove old dressing, discard into plastic bag and place in trash  Cleanse the wound with dakins solution at [facility] prior to applying a clean dressing  Do not use tissue or cotton balls  Do not scrub the wound  Pat dry using gauze  Shower yes with protection  Apply felt offloading pad to skin surrounding wound  Apply endoform and quick to the left foot wound  Cover with abd  Secure with medfix tape and TCC EZ  Change dressing weekly  Done today  Total Contact Cast Procedure:    Before application, JIGNESH and/or TBI determined to be adequate for healing and application of a Total Contact cast   A Total Contact Cast procedure was performed for to the left foot  The procedure was tolerated well with pain level of 0 throughout and a pain level of 0 following the procedure       Standing Status:   Future     Standing Expiration Date:   12/14/2023

## 2022-12-14 NOTE — PROGRESS NOTES
Patient ID: Will Root is a 63 y o  male Date of Birth 1960       Chief Complaint   Patient presents with   • Follow Up Wound Care Visit     TCC EZ intact on arrival, denies any problem with wound or TCC  Reports he is back to work  Allergies:  Cefuroxime and Amoxicillin-pot clavulanate    Diagnosis:  1  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, with fat layer exposed ([address] )  -     lidocaine (XYLOCAINE) 4 % topical solution 5 mL  -     Wound cleansing and dressings; Future    2  Diabetic polyneuropathy associated with type 1 diabetes mellitus ([address] )       Diagnosis ICD-10-CM Associated Orders   1  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, with fat layer exposed ([address] )  E10 621 lidocaine (XYLOCAINE) 4 % topical solution 5 mL    L97 422 Wound cleansing and dressings      2  Diabetic polyneuropathy associated with type 1 diabetes mellitus ([facility])  E10 42            Assessment & Plan:  1  Diabetic Veloz grade 2 ulceration submetatarsal head 1 left foot, wound was debrided subcuticular tissues, 1/4 inch felt offloading pad was applied, wound was dressed with qwik, dry dressing and TCC EZ cast, he may use a cast cover to shower, he is to maintain the cast in place, he patient is going to work, I have asked him to try and minimize his weightbearing although he states if he does not go to work he will lose his home  I explained it will take longer for his wound to heal secondary to the weightbearing status although he is maximally offloaded with a total contact cast   Frequent elevation, low-sodium diet, proper protein intake, proper glycemic control, smoking cessation was reviewed with patient, he understands and agrees with the plan and will follow up in 1 week  Debridement   Wound 11/16/22 Diabetic Ulcer Foot Left;Plantar    Universal Protocol:  Consent: Verbal consent obtained    Risks and benefits: risks, benefits and alternatives were discussed  Consent given by: patient  Time out: Immediately prior to procedure a "time out" was called to verify the correct patient, procedure, equipment, support staff and site/side marked as required  Patient understanding: patient states understanding of the procedure being performed  Patient identity confirmed: verbally with patient      Performed by: physician  Debridement type: surgical  Level of debridement: subcutaneous tissue  Pain control: lidocaine 4%  Pre-debridement measurements  Length (cm): 1  Width (cm): 0 2  Depth (cm): 0 3  Surface Area (cm^2): 0 2  Volume (cm^3): 0 06    Post-debridement measurements  Length (cm): 1 2  Width (cm): 0 4  Depth (cm): 0 4  Percent debrided: 100%  Surface Area (cm^2): 0 48  Area debrided (cm^2): 0 48  Volume (cm^3): 0 19  Tissue and other material debrided: subcutaneous tissue  Devitalized tissue debrided: biofilm, callus, fibrin, necrotic debris and slough  Instrument(s) utilized: blade  Bleeding: small  Hemostasis obtained with: pressure  Procedural pain (0-10): insensate  Post-procedural pain: insensate   Response to treatment: procedure was tolerated well           Subjective:   Patient presents today for care of left foot diabetic ulceration, he had TCC EZ cast placed on Monday, states it is tolerable, he is just tired of this wound  He is states he is gone back to work even though he knows he needs to stay off of it but if he does not work he loses home  He does not complain of any nausea, vomiting, fever, chills, patient states he still continues to smoke but he has reduced          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)   • Insulin pump in place   • ED (erectile dysfunction) of organic origin   • Chronic pain   • Benign essential hypertension   • Cigarette nicotine dependence in remission   • Gout of ankle   • Type 1 diabetes mellitus with hyperglycemia (HCC)   • Diabetic polyneuropathy associated with type 1 diabetes mellitus (HCC)   • Microalbuminuria due to type 1 diabetes mellitus ([facility] )   • Hypoglycemia unawareness associated with type 1 diabetes mellitus ([facility] )     Past Medical History:   Diagnosis Date   • Chronic foot ulcer ([facility] ) 2018   • Diabetes mellitus ([facility] )    • Gout    • High cholesterol    • Hypertension      Past Surgical History:   Procedure Laterality Date   • COMPLEX WOUND CLOSURE TO EXTREMITY Left 2019    Procedure: COMPLEX CLOSURE LEFT CHEEK;  Surgeon: Callie Urrutia MD;  Location: [facility];  Service: Plastics   • FACIAL/NECK BIOPSY Left 2019    Procedure: EXCISION LEFT CHEEK CYST;  Surgeon: Callie Urrutia MD;  Location: [facility];  Service: Plastics   • HERNIA REPAIR Left     several times   • KNEE ARTHROSCOPY Left     torn meniscus     Social History     Socioeconomic History   • Marital status: /Civil Union     Spouse name: Not on file   • Number of children: Not on file   • Years of education: Not on file   • Highest education level: Not on file   Occupational History   • Occupation: cafeteria/kitchen at [employer]     Comment: [employer]   Tobacco Use   • Smoking status: Every Day     Packs/day: 1 00     Types: Cigarettes     Last attempt to quit: 2022     Years since quittin 9   • Smokeless tobacco: Never   • Tobacco comments:     encouraged smoking cessation   Vaping Use   • Vaping Use: Never used   Substance and Sexual Activity   • Alcohol use: Yes     Comment: "some beers"   • Drug use: Never   • Sexual activity: Not on file   Other Topics Concern   • Not on file   Social History Narrative   • Not on file     Social Determinants of Health     Financial Resource Strain: Not on file   Food Insecurity: Not on file   Transportation Needs: Not on file   Physical Activity: Not on file   Stress: Not on file   Social Connections: Not on file   Intimate Partner Violence: Not on file   Housing Stability: Not on file        Current Outpatient Medications:   •  atorvastatin (LIPITOR) 40 mg tablet, TAKE 1 TABLET BY MOUTH EVERY DAY, Disp: 90 tablet, Rfl: 1  •  colchicine (COLCRYS) 0 6 mg tablet, Take 1 tablet (0 6 mg total) by mouth every 6 (six) hours as needed (gouty attack) Use as directed for gout flares, Disp: 30 tablet, Rfl: 0  •  Glucagon, rDNA, (Glucagon Emergency) 1 MG KIT, as directed, Disp: , Rfl:   •  insulin lispro (HumaLOG) 100 units/mL injection, Use via the insulin pump, use up to 80 units daily, Disp: 60 mL, Rfl: 3  •  lisinopril (ZESTRIL) 20 mg tablet, Take 1 tablet (20 mg total) by mouth daily, Disp: 30 tablet, Rfl: 5  •  metoprolol succinate (TOPROL-XL) 50 mg 24 hr tablet, Take 1 tablet (50 mg total) by mouth daily, Disp: 30 tablet, Rfl: 0  No current facility-administered medications for this visit  Family History   Problem Relation Age of Onset   • Heart disease Father    • Diabetes type I Father    • Diabetes type II Mother    • No Known Problems Sister    • Alcohol abuse Brother    • Diabetes type I Brother    • No Known Problems Brother    • No Known Problems Son    • No Known Problems Daughter        Review of Systems   Constitutional: Negative for chills and fever  HENT: Negative for ear pain and sore throat  Eyes: Negative for pain and visual disturbance  Respiratory: Negative for cough and shortness of breath  Cardiovascular: Negative for chest pain and palpitations  Gastrointestinal: Negative for abdominal pain and vomiting  Genitourinary: Negative for dysuria and hematuria  Musculoskeletal: Positive for gait problem  Negative for arthralgias and back pain  Skin: Positive for color change and wound  Negative for rash  Neurological: Positive for numbness   Negative for seizures and syncope  Psychiatric/Behavioral: Negative  All other systems reviewed and are negative  Objective:  /84   Pulse 100   Temp (!) 97 4 °F (36 3 °C)   Resp 16     Physical Exam  Constitutional:       Appearance: Normal appearance  He is normal weight  HENT:      Head: Normocephalic and atraumatic  Right Ear: External ear normal       Left Ear: External ear normal       Nose: Nose normal       Mouth/Throat:      Mouth: Mucous membranes are moist       Pharynx: Oropharynx is clear  Eyes:      Conjunctiva/sclera: Conjunctivae normal    Cardiovascular:      Pulses: Normal pulses  Dorsalis pedis pulses are 2+ on the right side and 2+ on the left side  Posterior tibial pulses are 2+ on the right side and 2+ on the left side  Pulmonary:      Effort: Pulmonary effort is normal    Musculoskeletal:      Cervical back: Normal range of motion  Right lower leg: No edema  Left lower leg: No edema  Comments: Fat pad atrophy submetatarsal heads 1 through 5 bilateral   Skin:     General: Skin is warm and dry  Capillary Refill: Capillary refill takes less than 2 seconds  Comments: See detailed wound assessment   Neurological:      General: No focal deficit present  Mental Status: He is alert and oriented to person, place, and time  Mental status is at baseline  Sensory: Sensory deficit present  Coordination: Coordination abnormal       Gait: Gait abnormal       Deep Tendon Reflexes: Reflexes abnormal    Psychiatric:         Mood and Affect: Mood normal          Behavior: Behavior normal          Thought Content:  Thought content normal          Judgment: Judgment normal            Wound 11/16/22 Diabetic Ulcer Foot Left;Plantar (Active)   Wound Image   12/14/22 0179   Wound Description Granulation tissue 12/14/22 1559   Dee Dee-wound Assessment Maceration 12/14/22 1559   Wound Length (cm) 1 cm 12/14/22 1559   Wound Width (cm) 0 2 cm 12/14/22 1559   Wound Depth (cm) 0 3 cm 12/14/22 1559   Wound Surface Area (cm^2) 0 2 cm^2 12/14/22 1559   Wound Volume (cm^3) 0 06 cm^3 12/14/22 1559   Calculated Wound Volume (cm^3) 0 06 cm^3 12/14/22 1559   Drainage Amount Moderate 12/14/22 1559   Drainage Description Tan 12/14/22 1559   Non-staged Wound Description Full thickness 12/14/22 1559   Treatments Cleansed 11/23/22 1414   Wound packed? No 11/23/22 1414   Dressing Changed Changed 11/23/22 1414   Patient Tolerance Tolerated well 11/23/22 1414   Dressing Status Intact 12/14/22 1559                XR foot 3+ vw left    Result Date: 11/17/2022  Narrative: LEFT FOOT INDICATION:   E10 621: Type 1 diabetes mellitus with foot ulcer L97 422: Non-pressure chronic ulcer of left heel and midfoot with fat layer exposed  Wound on plantar aspect of left foot near 1st and 2nd metatarsals  Evaluate for osteomyelitis  COMPARISON:  Radiographs of the left foot from 11/7/2022  VIEWS:  XR FOOT 3+ VW LEFT performed with weightbearing Images: 3 FINDINGS: No fractures or dislocations  The joint spaces are preserved with smooth articular margins  No lytic or sclerotic osseous lesions  There is no periosteal reaction  There is no subcutaneous emphysema  Vascular calcifications are seen  Impression: No radiographic evidence of osteomyelitis  No subcutaneous emphysema  Workstation performed: [id number]       Wound Instructions:  Orders Placed This Encounter   Procedures   • Wound cleansing and dressings     Left foot wound    Wash your hands with soap and water  Remove old dressing, discard into plastic bag and place in trash  Cleanse the wound with dakins solution at [facility] prior to applying a clean dressing  Do not use tissue or cotton balls  Do not scrub the wound  Pat dry using gauze  Shower yes with protection  Apply felt offloading pad to skin surrounding wound  Apply endoform and quick to the left foot wound    Cover with abd  Secure with medfix tape and TCC EZ  Change dressing weekly  Done today  Total Contact Cast Procedure:    Before application, JIGNESH and/or TBI determined to be adequate for healing and application of a Total Contact cast   A Total Contact Cast procedure was performed for to the left foot  The procedure was tolerated well with pain level of 0 throughout and a pain level of 0 following the procedure  Standing Status:   Future     Standing Expiration Date:   12/14/2023         Teodoro Angle, DPM, DPM, FACFAS    Portions of the record may have been created with voice recognition software  Occasional wrong word or "sound a like" substitutions may have occurred due to the inherent limitations of voice recognition software  Read the chart carefully and recognize, using context, where substitutions have occurred

## 2022-12-15 ENCOUNTER — OFFICE VISIT (OUTPATIENT)
Dept: FAMILY MEDICINE CLINIC | Facility: HOSPITAL | Age: 62
End: 2022-12-15

## 2022-12-15 VITALS
TEMPERATURE: 96.1 F | BODY MASS INDEX: 22.56 KG/M2 | HEART RATE: 82 BPM | HEIGHT: 70 IN | SYSTOLIC BLOOD PRESSURE: 140 MMHG | WEIGHT: 157.6 LBS | DIASTOLIC BLOOD PRESSURE: 92 MMHG

## 2022-12-15 DIAGNOSIS — I10 BENIGN ESSENTIAL HYPERTENSION: ICD-10-CM

## 2022-12-15 DIAGNOSIS — F17.210 CIGARETTE NICOTINE DEPENDENCE WITHOUT COMPLICATION: ICD-10-CM

## 2022-12-15 DIAGNOSIS — Z01.818 PRE-OP EXAMINATION: ICD-10-CM

## 2022-12-15 DIAGNOSIS — E10.3213 TYPE 1 DIABETES MELLITUS WITH MILD NONPROLIFERATIVE RETINOPATHY OF BOTH EYES AND MACULAR EDEMA (HCC): ICD-10-CM

## 2022-12-15 DIAGNOSIS — Z23 ENCOUNTER FOR IMMUNIZATION: ICD-10-CM

## 2022-12-15 DIAGNOSIS — Z12.11 SCREENING FOR COLON CANCER: Primary | ICD-10-CM

## 2022-12-15 RX ORDER — METOPROLOL SUCCINATE 100 MG/1
100 TABLET, EXTENDED RELEASE ORAL DAILY
Qty: 30 TABLET | Refills: 1 | Status: SHIPPED | OUTPATIENT
Start: 2022-12-15

## 2022-12-15 RX ORDER — LISINOPRIL 20 MG/1
10 TABLET ORAL DAILY
Qty: 30 TABLET | Refills: 5
Start: 2022-12-15

## 2022-12-16 ENCOUNTER — HOSPITAL ENCOUNTER (EMERGENCY)
Facility: HOSPITAL | Age: 62
Discharge: HOME | End: 2022-12-16
Attending: EMERGENCY MEDICINE

## 2022-12-16 ENCOUNTER — TELEPHONE (OUTPATIENT)
Dept: FAMILY MEDICINE CLINIC | Facility: HOSPITAL | Age: 62
End: 2022-12-16

## 2022-12-16 VITALS
RESPIRATION RATE: 18 BRPM | WEIGHT: 157 LBS | DIASTOLIC BLOOD PRESSURE: 87 MMHG | OXYGEN SATURATION: 100 % | HEIGHT: 70 IN | TEMPERATURE: 97.6 F | BODY MASS INDEX: 22.48 KG/M2 | SYSTOLIC BLOOD PRESSURE: 192 MMHG | HEART RATE: 94 BPM

## 2022-12-16 DIAGNOSIS — Z48.00 ENCOUNTER FOR CAST CARE: Primary | ICD-10-CM

## 2022-12-16 PROBLEM — F17.210 CIGARETTE NICOTINE DEPENDENCE WITHOUT COMPLICATION: Status: ACTIVE | Noted: 2022-03-07

## 2022-12-16 NOTE — Clinical Note
Nicole Hernandez was seen and treated in our emergency department on 12/16/2022  Diagnosis:     Roman Ruffin  may return to work on return date  He may return on this date: 12/19/2022         If you have any questions or concerns, please don't hesitate to call        Laith Walker, DO    ______________________________           _______________          _______________  Hospital Representative                              Date                                Time

## 2022-12-16 NOTE — ED PROVIDER NOTES
History  Chief Complaint   Patient presents with   • Cast Check     Pt to er with reports of cast being put on left foot on Wednesday for an ulcer, and states that he notied today that it "buckled"  Patient states that he "just needs a new cast put back on"  States his ankle hurts from the cast       Patient has left lower extremity cast for diabetic foot ulcer  He is walking on it with using a boot as well  This morning he woke up and noticed it had collapsed and he has pain at the ankle where it is collapsed  Cast was placed at wound care in [facility] 2 days ago  Prior to Admission Medications   Prescriptions Last Dose Informant Patient Reported? Taking?    Glucagon, rDNA, (Glucagon Emergency) 1 MG KIT   Yes No   Sig: as directed   atorvastatin (LIPITOR) 40 mg tablet   No No   Sig: TAKE 1 TABLET BY MOUTH EVERY DAY   colchicine (COLCRYS) 0 6 mg tablet   No No   Sig: Take 1 tablet (0 6 mg total) by mouth every 6 (six) hours as needed (gouty attack) Use as directed for gout flares   insulin lispro (HumaLOG) 100 units/mL injection   No No   Sig: Use via the insulin pump, use up to 80 units daily   lisinopril (ZESTRIL) 20 mg tablet   No No   Sig: Take 0 5 tablets (10 mg total) by mouth daily   metoprolol succinate (TOPROL-XL) 100 mg 24 hr tablet   No No   Sig: Take 1 tablet (100 mg total) by mouth daily      Facility-Administered Medications: None       Past Medical History:   Diagnosis Date   • Chronic foot ulcer ([facility] [address] ) 9/7/2018   • Diabetes mellitus ([facility] [address] )    • Gout    • High cholesterol    • Hypertension        Past Surgical History:   Procedure Laterality Date   • COMPLEX WOUND CLOSURE TO EXTREMITY Left 7/18/2019    Procedure: COMPLEX CLOSURE LEFT CHEEK;  Surgeon: Meron Castaneda MD;  Location: [facility];  Service: Plastics   • FACIAL/NECK BIOPSY Left 7/18/2019    Procedure: EXCISION LEFT CHEEK CYST;  Surgeon: Meron Castaneda MD;  Location: [facility];  Service: Plastics   • HERNIA REPAIR Left several times   • KNEE ARTHROSCOPY Left     torn meniscus       Family History   Problem Relation Age of Onset   • Heart disease Father    • Diabetes type I Father    • Diabetes type II Mother    • No Known Problems Sister    • Alcohol abuse Brother    • Diabetes type I Brother    • No Known Problems Brother    • No Known Problems Son    • No Known Problems Daughter      I have reviewed and agree with the history as documented  E-Cigarette/Vaping   • E-Cigarette Use Never User      E-Cigarette/Vaping Substances     Social History     Tobacco Use   • Smoking status: Every Day     Packs/day: 1 00     Types: Cigarettes     Last attempt to quit: 2022     Years since quittin 9   • Smokeless tobacco: Never   • Tobacco comments:     encouraged smoking cessation   Vaping Use   • Vaping Use: Never used   Substance Use Topics   • Alcohol use: Yes     Comment: "some beers"   • Drug use: Never       Review of Systems   Constitutional: Negative for chills and fever  HENT: Negative for rhinorrhea and sore throat  Respiratory: Negative for shortness of breath  Cardiovascular: Negative for chest pain  Gastrointestinal: Negative for constipation, diarrhea, nausea and vomiting  Genitourinary: Negative for dysuria and frequency  Skin: Negative for rash  All other systems reviewed and are negative  Physical Exam  Physical Exam  Vitals and nursing note reviewed  Constitutional:       Appearance: He is well-developed  HENT:      Head: Normocephalic and atraumatic  Right Ear: External ear normal       Left Ear: External ear normal       Nose: Nose normal    Eyes:      Conjunctiva/sclera: Conjunctivae normal       Pupils: Pupils are equal, round, and reactive to light  Cardiovascular:      Rate and Rhythm: Normal rate and regular rhythm  Heart sounds: Normal heart sounds  Pulmonary:      Effort: Pulmonary effort is normal  No respiratory distress  Breath sounds: Normal breath sounds  No wheezing  Abdominal:      General: Bowel sounds are normal  There is no distension  Palpations: Abdomen is soft  Tenderness: There is no abdominal tenderness  Musculoskeletal:         General: No deformity  Normal range of motion  Cervical back: Normal range of motion and neck supple  No spinous process tenderness  Comments: Left lower extremity cast appears deformed and collapsed in  Cast removed by me in the ER with a cast saw without any complications  Dressings removed except for the wound care that was applied to the sole of the foot  That area appears clean and dry  A new posterior splint was placed and it fit well into patient's boot  He had relief of pain once cast was removed  Skin:     General: Skin is warm and dry  Findings: No rash  Neurological:      General: No focal deficit present  Mental Status: He is alert  GCS: GCS eye subscore is 4  GCS verbal subscore is 5  GCS motor subscore is 6  Sensory: No sensory deficit  Psychiatric:         Mood and Affect: Mood normal          Vital Signs  ED Triage Vitals [12/16/22 1224]   Temperature Pulse Respirations Blood Pressure SpO2   97 6 °F (36 4 °C) 101 18 (!) 191/91 100 %      Temp Source Heart Rate Source Patient Position - Orthostatic VS BP Location FiO2 (%)   Temporal Monitor Sitting Left arm --      Pain Score       --           Vitals:    12/16/22 1224 12/16/22 1304   BP: (!) 191/91 (!) 192/87   Pulse: 101 94   Patient Position - Orthostatic VS: Sitting          Visual Acuity      ED Medications  Medications - No data to display    Diagnostic Studies  Results Reviewed     None                 No orders to display              Procedures  Splint application    Date/Time: 12/16/2022 2:06 PM  Performed by: Vallie Dubin, DO  Authorized by: Vallie Dubin, DO   Universal Protocol:  Consent: Verbal consent obtained    Risks and benefits: risks, benefits and alternatives were discussed  Consent given by: patient  Patient understanding: patient states understanding of the procedure being performed  Patient identity confirmed: verbally with patient      Pre-procedure details:     Sensation:  Decreased circulation    Skin color:  Normal - no skin breakdown at ankle/ lower leg where cast was pushing against patient  Procedure details:     Laterality:  Left    Location:  Ankle    Ankle:  L ankle    Splint type:  Short leg    Supplies:  Cotton padding and Ortho-Glass             ED Course                               SBIRT 20yo+    Flowsheet Row Most Recent Value   SBIRT (25 yo +)    In order to provide better care to our patients, we are screening all of our patients for alcohol and drug use  Would it be okay to ask you these screening questions? Yes Filed at: 12/16/2022 1303   Initial Alcohol Screen: US AUDIT-C     1  How often do you have a drink containing alcohol? 0 Filed at: 12/16/2022 1303   2  How many drinks containing alcohol do you have on a typical day you are drinking? 0 Filed at: 12/16/2022 1303   3a  Male UNDER 65: How often do you have five or more drinks on one occasion? 0 Filed at: 12/16/2022 1303   3b  FEMALE Any Age, or MALE 65+: How often do you have 4 or more drinks on one occassion? 0 Filed at: 12/16/2022 1303   Audit-C Score 0 Filed at: 12/16/2022 1303   PRINCESS: How many times in the past year have you    Used an illegal drug or used a prescription medication for non-medical reasons?  Never Filed at: 12/16/2022 1303                    MDM  Number of Diagnoses or Management Options  Encounter for cast care: new and does not require workup  Patient Progress  Patient progress: improved      Disposition  Final diagnoses:   Encounter for cast care - left lower extremity     Time reflects when diagnosis was documented in both MDM as applicable and the Disposition within this note     Time User Action Codes Description Comment    12/16/2022  1:36 PM Katy Portal Add [Z48 00] Encounter for cast care 12/16/2022  1:36 PM Néstor Cano Modify [Z48 00] Encounter for cast care left lower extremity      ED Disposition     ED Disposition   Discharge    Condition   Stable    Date/Time   Fri Dec 16, 2022  1:36 PM    Comment   Boby Payton discharge to home/self care  Follow-up Information     Follow up With Specialties Details Why Contact Info    as scheduled Monday with wound care  Go to             Discharge Medication List as of 12/16/2022  1:37 PM      CONTINUE these medications which have NOT CHANGED    Details   atorvastatin (LIPITOR) 40 mg tablet TAKE 1 TABLET BY MOUTH EVERY DAY, Normal      colchicine (COLCRYS) 0 6 mg tablet Take 1 tablet (0 6 mg total) by mouth every 6 (six) hours as needed (gouty attack) Use as directed for gout flares, Starting Mon 11/7/2022, Normal      Glucagon, rDNA, (Glucagon Emergency) 1 MG KIT as directed, Historical Med      insulin lispro (HumaLOG) 100 units/mL injection Use via the insulin pump, use up to 80 units daily, Normal      lisinopril (ZESTRIL) 20 mg tablet Take 0 5 tablets (10 mg total) by mouth daily, Starting Thu 12/15/2022, No Print      metoprolol succinate (TOPROL-XL) 100 mg 24 hr tablet Take 1 tablet (100 mg total) by mouth daily, Starting u 12/15/2022, Normal             No discharge procedures on file      PDMP Review     None          ED Provider  Electronically Signed by           Bolivar Buchanan DO  12/16/22 3060

## 2022-12-16 NOTE — TELEPHONE ENCOUNTER
Spoke with pt, states that when he woke up this morning and took the boot off he had on over the cast, around the ankle it was collapsed on one side and bulging on the other side  Pt is also complaining of a lot of pain in that ankle  Wound care at [facility] in closed today, was unable to reach anyone at the [place] location  I called Dr Asher King [place] office in [place], pt saw her at wound care, they said they were unable to help me because pt wasn't seen in their office  I advised pt to go to ER to have them cut cast off and see what the issue is underneath  Pt aware   JOEY

## 2022-12-16 NOTE — TELEPHONE ENCOUNTER
Pt had cast put on by wound care on Wednesday  States that when he woke up this am it seems like it "collapsed" at the bottom  Didn't get it wet or anything but is bulging and causing pain  Wound care is not in, asking for advice from PCP what to do   PCB

## 2022-12-16 NOTE — PROGRESS NOTES
Name: Chris Hartmann      : 1960      MRN: [id number]  Encounter Provider: Montse De La Rosa MD  Encounter Date: 12/15/2022   Encounter department: [facility] [employer]      1  Screening for colon cancer  -     Cologuard    2  Encounter for immunization  -     Zoster Vaccine Recombinant IM  -     Pneumococcal Conjugate Vaccine 20-valent (Pcv20)    3  Benign essential hypertension  -     lisinopril (ZESTRIL) 20 mg tablet; Take 0 5 tablets (10 mg total) by mouth daily  -     metoprolol succinate (TOPROL-XL) 100 mg 24 hr tablet; Take 1 tablet (100 mg total) by mouth daily    4  Pre-op examination    5  Type 1 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 (HCC)    6  Cigarette nicotine dependence without complication     Patient is advised to proceed with cataract surgery as scheduled  Medically clear  EKG completed: NSR, LAD  Continue fu with Endocrinology  Recent decrease in lisinopril due to elevated K    bp is elevated  Increase toprol to 100 mg daily  cotninue with lisinopril 10 mg daily  Advised regarding smoking cessation  Fu in 6 months  Subjective      Pt here for fu of chronic conditions and preop  He will be having cataract surgery  No hx of cad  No chest pain, no sob, no palpitations  Surgical hx reviewed: no complications from anesthesia  Functionally independent of iadls and adls  Can do > 4 mets  Not on anticoagulation, no easy bleeding or bruising  Chronic conditions to include diabetes, type 1  Doing well improved although last A1c was elevated his blood sugar monitoring has improved  Working with   Endo on this  Insulin dosing recently adjusted  Review of Systems   Constitutional: Negative  Negative for activity change, appetite change, chills and diaphoresis  HENT: Negative for congestion and dental problem  Respiratory: Negative  Negative for apnea, chest tightness, shortness of breath and wheezing  Cardiovascular: Negative  Negative for chest pain, palpitations and leg swelling  Gastrointestinal: Negative  Negative for abdominal distention, abdominal pain, constipation, diarrhea and nausea  Genitourinary: Negative  Negative for difficulty urinating, dysuria and frequency  Current Outpatient Medications on File Prior to Visit   Medication Sig   • atorvastatin (LIPITOR) 40 mg tablet TAKE 1 TABLET BY MOUTH EVERY DAY   • colchicine (COLCRYS) 0 6 mg tablet Take 1 tablet (0 6 mg total) by mouth every 6 (six) hours as needed (gouty attack) Use as directed for gout flares   • Glucagon, rDNA, (Glucagon Emergency) 1 MG KIT as directed   • insulin lispro (HumaLOG) 100 units/mL injection Use via the insulin pump, use up to 80 units daily       Objective     /92   Pulse 82   Temp (!) 96 1 °F (35 6 °C)   Ht 5' 10" (1 778 m)   Wt 71 5 kg (157 lb 9 6 oz)   BMI 22 61 kg/m²     Physical Exam  Vitals and nursing note reviewed  Constitutional:       General: He is not in acute distress  Appearance: He is well-developed  He is not ill-appearing  HENT:      Head: Normocephalic and atraumatic  Right Ear: Tympanic membrane, ear canal and external ear normal       Left Ear: Tympanic membrane, ear canal and external ear normal       Nose: Nose normal  No congestion or rhinorrhea  Mouth/Throat:      Mouth: Mucous membranes are moist       Pharynx: No oropharyngeal exudate or posterior oropharyngeal erythema  Eyes:      Extraocular Movements: Extraocular movements intact  Conjunctiva/sclera: Conjunctivae normal       Pupils: Pupils are equal, round, and reactive to light  Cardiovascular:      Rate and Rhythm: Normal rate and regular rhythm  Heart sounds: Normal heart sounds  No murmur heard  No friction rub  No gallop  Pulmonary:      Effort: Pulmonary effort is normal  No respiratory distress  Breath sounds: Normal breath sounds  No wheezing or rales  Chest:      Chest wall: No tenderness  Abdominal:      General: Bowel sounds are normal  There is no distension  Palpations: Abdomen is soft  There is no mass  Tenderness: There is no abdominal tenderness  There is no guarding or rebound  Musculoskeletal:         General: Normal range of motion  Cervical back: Normal range of motion and neck supple  Skin:     General: Skin is warm  Capillary Refill: Capillary refill takes less than 2 seconds  Neurological:      Mental Status: He is alert and oriented to person, place, and time     Psychiatric:         Mood and Affect: Mood normal          Behavior: Behavior normal        Ruben Corado MD

## 2022-12-21 ENCOUNTER — OFFICE VISIT (OUTPATIENT)
Dept: WOUND CARE | Facility: HOSPITAL | Age: 62
End: 2022-12-21

## 2022-12-21 VITALS
DIASTOLIC BLOOD PRESSURE: 70 MMHG | SYSTOLIC BLOOD PRESSURE: 120 MMHG | HEART RATE: 80 BPM | RESPIRATION RATE: 16 BRPM | TEMPERATURE: 97.2 F

## 2022-12-21 DIAGNOSIS — E10.42 DIABETIC POLYNEUROPATHY ASSOCIATED WITH TYPE 1 DIABETES MELLITUS (HCC): ICD-10-CM

## 2022-12-21 DIAGNOSIS — L97.422 DIABETIC ULCER OF LEFT MIDFOOT ASSOCIATED WITH TYPE 1 DIABETES MELLITUS, WITH FAT LAYER EXPOSED (HCC): Primary | ICD-10-CM

## 2022-12-21 DIAGNOSIS — E10.621 DIABETIC ULCER OF LEFT MIDFOOT ASSOCIATED WITH TYPE 1 DIABETES MELLITUS, WITH FAT LAYER EXPOSED (HCC): Primary | ICD-10-CM

## 2022-12-21 NOTE — PROGRESS NOTES
Patient ID: Alberto Potts is a 63 y o  male Date of Birth 1960       Chief Complaint   Patient presents with   • Follow Up Wound Care Visit     Had TCC taken off on Friday due to pain in ankle  Arrives with dressing and splint applied in ER  Denies any pain in wound but states the pain is in his ankle  Allergies:  Cefuroxime and Amoxicillin-pot clavulanate    Diagnosis:  1  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, with fat layer exposed ([address] )  -     Wound cleansing and dressings; Future  -     Cast Application    2  Diabetic polyneuropathy associated with type 1 diabetes mellitus ([address] )       Diagnosis ICD-10-CM Associated Orders   1  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, with fat layer exposed ([facility] [address] )  V24 602 Wound cleansing and dressings    P97 900 Cast Application      2  Diabetic polyneuropathy associated with type 1 diabetes mellitus ([facility])  E10 42            Assessment & Plan:  1  Diabetic Veloz grade 2 ulceration plantar submetatarsal head 1 left foot, wound is with granular base, no debridement was performed  TCC EZ cast was applied  Patient was instructed on how to ambulate in the total contact cast with outer walking boot, he is to wear this at all times when he is weightbearing  Patient advised to get a new cast cover or to avoid putting his leg in the shower to avoid getting wet and recurrence of breakdown of his cast   2   Today I reviewed frequent elevation, low-sodium diet, proper protein intake and proper glycemic control, patient's most recent A1c was 8 5 on November 16, I explained to him how uncontrolled blood sugars will cause complications with healing as well as put him at higher risk for recurrence and or new wounds  Patient understands and agrees with the plan and will follow up in 1 week      Cast Application    Date/Time: 12/21/2022 1:40 PM  Performed by: Emigdio Rodgers DPM  Authorized by: Emigdio Rodgers DPM   Universal Protocol:  Consent: Verbal consent obtained  Risks and benefits: risks, benefits and alternatives were discussed  Consent given by: patient  Time out: Immediately prior to procedure a "time out" was called to verify the correct patient, procedure, equipment, support staff and site/side marked as required  Patient understanding: patient states understanding of the procedure being performed  Patient identity confirmed: verbally with patient      Pre-procedure details:     Pre-procedure CMS: Patient with known peripheral neuropathy  Procedure details:     Laterality:  Left    Location:  Leg    Leg:  L lower leg    Strapping: no  Cast type: TCC EZ cast     Supplies used: TCC EZ cast   Post-procedure details:     Post-procedure CMS: At baseline  Patient tolerance of procedure: Tolerated well, no immediate complications         Subjective:   Cammy Richardson presents today for care of diabetic ulceration plantar aspect left foot, he states he had to go to the emergency room on Friday as the cast was dented in and putting pressure over his ankle  He states it was fine on Wednesday, Thursday and when he woke up on Friday he noticed this  He states he has been wearing the walking boot at all times  He does state he noticed the cast cover was leaking, after much discussion we suspect the cast cover was leaking when cast was in place last week causing the breakdown of the cast   He states he is working to get his blood sugars under control  No nausea, vomiting, fever, chills          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)   • Insulin pump in place   • ED (erectile dysfunction) of organic origin   • Chronic pain   • Benign essential hypertension   • Cigarette nicotine dependence without complication   • Gout of ankle   • Type 1 diabetes mellitus with hyperglycemia (HCC)   • Diabetic polyneuropathy associated with type 1 diabetes mellitus (HCC)   • Microalbuminuria due to type 1 diabetes mellitus ([facility] )   • Hypoglycemia unawareness associated with type 1 diabetes mellitus ([facility] )     Past Medical History:   Diagnosis Date   • Chronic foot ulcer ([facility] ) 2018   • Diabetes mellitus ([facility] )    • Gout    • High cholesterol    • Hypertension      Past Surgical History:   Procedure Laterality Date   • COMPLEX WOUND CLOSURE TO EXTREMITY Left 2019    Procedure: COMPLEX CLOSURE LEFT CHEEK;  Surgeon: Mike Faye MD;  Location:  MAIN OR;  Service: Plastics   • FACIAL/NECK BIOPSY Left 2019    Procedure: EXCISION LEFT CHEEK CYST;  Surgeon: Mike Faye MD;  Location:  MAIN OR;  Service: Plastics   • HERNIA REPAIR Left     several times   • KNEE ARTHROSCOPY Left     torn meniscus     Social History     Socioeconomic History   • Marital status: /Civil Union     Spouse name: Not on file   • Number of children: Not on file   • Years of education: Not on file   • Highest education level: Not on file   Occupational History   • Occupation: cafeteria/kitchen at [employer]     Comment: [employer]   Tobacco Use   • Smoking status: Every Day     Packs/day: 1 00     Types: Cigarettes     Last attempt to quit: 2022     Years since quittin 9   • Smokeless tobacco: Never   • Tobacco comments:     encouraged smoking cessation   Vaping Use   • Vaping Use: Never used   Substance and Sexual Activity   • Alcohol use: Yes     Comment: "some beers"   • Drug use: Never   • Sexual activity: Not on file   Other Topics Concern   • Not on file   Social History Narrative   • Not on file     Social Determinants of Health     Financial Resource Strain: Not on file   Food Insecurity: Not on file   Transportation Needs: Not on file   Physical Activity: Not on file   Stress: Not on file   Social Connections: Not on file   Intimate Partner Violence: Not on file   Housing Stability: Not on file        Current Outpatient Medications:   •  atorvastatin (LIPITOR) 40 mg tablet, TAKE 1 TABLET BY MOUTH EVERY DAY, Disp: 90 tablet, Rfl: 1  •  colchicine (COLCRYS) 0 6 mg tablet, Take 1 tablet (0 6 mg total) by mouth every 6 (six) hours as needed (gouty attack) Use as directed for gout flares, Disp: 30 tablet, Rfl: 0  •  Glucagon, rDNA, (Glucagon Emergency) 1 MG KIT, as directed, Disp: , Rfl:   •  insulin lispro (HumaLOG) 100 units/mL injection, Use via the insulin pump, use up to 80 units daily, Disp: 60 mL, Rfl: 3  •  lisinopril (ZESTRIL) 20 mg tablet, Take 0 5 tablets (10 mg total) by mouth daily, Disp: 30 tablet, Rfl: 5  •  metoprolol succinate (TOPROL-XL) 100 mg 24 hr tablet, Take 1 tablet (100 mg total) by mouth daily, Disp: 30 tablet, Rfl: 1  Family History   Problem Relation Age of Onset   • Heart disease Father    • Diabetes type I Father    • Diabetes type II Mother    • No Known Problems Sister    • Alcohol abuse Brother    • Diabetes type I Brother    • No Known Problems Brother    • No Known Problems Son    • No Known Problems Daughter        Review of Systems   Constitutional: Negative for chills and fever  HENT: Negative for ear pain and sore throat  Eyes: Negative for pain and visual disturbance  Respiratory: Negative for cough and shortness of breath  Cardiovascular: Negative for chest pain and palpitations  Gastrointestinal: Negative for abdominal pain and vomiting  Genitourinary: Negative for dysuria and hematuria  Musculoskeletal: Negative for arthralgias and back pain  Skin: Positive for color change  Negative for rash  Neurological: Positive for numbness  Negative for seizures and syncope  Psychiatric/Behavioral: Negative  All other systems reviewed and are negative          Objective:  /70   Pulse 80   Temp (!) 97 2 °F (36 2 °C)   Resp 16     Physical Exam  Constitutional:       Appearance: Normal appearance  He is normal weight  HENT:      Head: Normocephalic and atraumatic  Right Ear: External ear normal       Left Ear: External ear normal       Nose: Nose normal       Mouth/Throat:      Mouth: Mucous membranes are moist       Pharynx: Oropharynx is clear  Eyes:      Conjunctiva/sclera: Conjunctivae normal    Cardiovascular:      Pulses: Normal pulses  Dorsalis pedis pulses are 2+ on the right side and 2+ on the left side  Posterior tibial pulses are 2+ on the right side and 2+ on the left side  Pulmonary:      Effort: Pulmonary effort is normal    Musculoskeletal:      Cervical back: Normal range of motion  Right lower leg: No edema  Left lower leg: No edema  Skin:     General: Skin is warm and dry  Capillary Refill: Capillary refill takes less than 2 seconds  Comments: See detailed wound assessment   Neurological:      General: No focal deficit present  Mental Status: He is alert and oriented to person, place, and time  Mental status is at baseline  Sensory: Sensory deficit present  Coordination: Coordination abnormal       Gait: Gait abnormal       Deep Tendon Reflexes: Reflexes abnormal    Psychiatric:         Mood and Affect: Mood normal          Behavior: Behavior normal          Thought Content:  Thought content normal          Judgment: Judgment normal            Wound 11/16/22 Diabetic Ulcer Foot Left;Plantar (Active)   Wound Image   12/21/22 1302   Wound Description [place] 12/21/22 1302   Dee Dee-wound Assessment Maceration 12/21/22 1302   Wound Length (cm) 0 8 cm 12/21/22 1302   Wound Width (cm) 0 4 cm 12/21/22 1302   Wound Depth (cm) 0 1 cm 12/21/22 1302   Wound Surface Area (cm^2) 0 32 cm^2 12/21/22 1302   Wound Volume (cm^3) 0 032 cm^3 12/21/22 1302   Calculated Wound Volume (cm^3) 0 03 cm^3 12/21/22 1302   Drainage Amount Small 12/21/22 1302   Drainage Description Serosanguineous 12/21/22 1302   Non-staged Wound Description Full thickness 12/21/22 1302   Treatments Cleansed 11/23/22 1414   Wound packed? No 11/23/22 1414   Dressing Changed Changed 11/23/22 1414   Patient Tolerance Tolerated well 11/23/22 1414   Dressing Status Intact 12/21/22 1302                No results found  Wound Instructions:  Orders Placed This Encounter   Procedures   • Wound cleansing and dressings     Left foot wound      Shower yes   Apply felt off loading pad to skin surrounding wound  Apply endoform to wound bed, covered with quick and abd to the left foot wound  Secure with medfix tape  Change dressing weekly    Total Contact Cast Procedure:    Before application, JIGNESH and/or TBI determined to be adequate for healing and application of a Total Contact cast   A Total Contact Cast procedure was performed for to the left foot and lower leg  The procedure was tolerated well with pain level of 0 throughout and a pain level of 0 following the procedure  Done today     Standing Status:   Future     Standing Expiration Date:   67/11/0878   • Cast Application     This order was created via procedure documentation         Sukhdev Bullock DPM, NOAH, FACFAS    Portions of the record may have been created with voice recognition software  Occasional wrong word or "sound a like" substitutions may have occurred due to the inherent limitations of voice recognition software  Read the chart carefully and recognize, using context, where substitutions have occurred

## 2022-12-21 NOTE — PATIENT INSTRUCTIONS
Orders Placed This Encounter   Procedures    Wound cleansing and dressings     Left foot wound      Shower yes   Apply felt off loading pad to skin surrounding wound  Apply endoform to wound bed, covered with quick and abd to the left foot wound  Secure with medfix tape  Change dressing weekly    Total Contact Cast Procedure:    Before application, JIGNESH and/or TBI determined to be adequate for healing and application of a Total Contact cast   A Total Contact Cast procedure was performed for to the left foot and lower leg  The procedure was tolerated well with pain level of 0 throughout and a pain level of 0 following the procedure       Done today     Standing Status:   Future     Standing Expiration Date:   12/21/2023

## 2022-12-28 ENCOUNTER — OFFICE VISIT (OUTPATIENT)
Dept: WOUND CARE | Facility: HOSPITAL | Age: 62
End: 2022-12-28

## 2022-12-28 VITALS
DIASTOLIC BLOOD PRESSURE: 84 MMHG | SYSTOLIC BLOOD PRESSURE: 130 MMHG | RESPIRATION RATE: 16 BRPM | TEMPERATURE: 98.2 F | HEART RATE: 72 BPM

## 2022-12-28 DIAGNOSIS — E10.40 TYPE 1 DIABETES MELLITUS WITH DIABETIC NEUROPATHY (HCC): ICD-10-CM

## 2022-12-28 DIAGNOSIS — E10.621 DIABETIC ULCER OF OTHER PART OF LEFT FOOT ASSOCIATED WITH TYPE 1 DIABETES MELLITUS, WITH FAT LAYER EXPOSED (HCC): Primary | ICD-10-CM

## 2022-12-28 DIAGNOSIS — L97.522 DIABETIC ULCER OF OTHER PART OF LEFT FOOT ASSOCIATED WITH TYPE 1 DIABETES MELLITUS, WITH FAT LAYER EXPOSED (HCC): Primary | ICD-10-CM

## 2022-12-28 NOTE — LETTER
January 2, 2023     Patient: Rafael Hutchison  YOB: 1960  Date of Visit: 12/28/2022      To Whom it May Concern:    Rafael Hutchison is under my professional care  Laure Vicenta was seen in my office on 12/28/2022  Laure Yadav may return to work on 1/5/2023  If you have any questions or concerns, please don't hesitate to call           Sincerely,          Pankaj De La Rosa, NOAH        CC: No Recipients

## 2022-12-28 NOTE — PATIENT INSTRUCTIONS
Orders Placed This Encounter   Procedures    Wound cleansing and dressings     Left foot wound    Wash your hands with soap and water  Remove old dressing, discard into plastic bag and place in trash  Cleanse the wound with soap and water prior to applying a clean dressing  Do not use tissue or cotton balls  Do not scrub the wound  Pat dry using gauze  Shower yes with protection  Apply felt offloading to skin surrounding wound  Apply endoform to the left foot wound  Cover with qwick and abd  Secure with medfix tape and TCC EZ  Change dressing weekly    Total Contact Cast Procedure:    Before application, JIGNESH and/or TBI determined to be adequate for healing and application of a Total Contact cast   A Total Contact Cast procedure was performed for to the left foot and lower leg  The procedure was tolerated well with pain level of 0 throughout and a pain level of 0     following the procedure       Done today     Standing Status:   Future     Standing Expiration Date:   12/28/2023

## 2023-01-02 NOTE — PROGRESS NOTES
Patient ID: Sabine Lopez is a 63 y o  male Date of Birth 1960     Chief Complaint  Chief Complaint   Patient presents with   • Follow Up Wound Care Visit     TCC intact on arrival, denies any problem with TCC this week  Allergies  Cefuroxime and Amoxicillin-pot clavulanate    Assessment:    Diabetic ulcer of other part of left foot associated with type 1 diabetes mellitus, with fat layer exposed ([facility] [address] )  -     Wound cleansing and dressings; Future    Type 1 diabetes mellitus with diabetic neuropathy ([facility] [address] )    Other orders  -     Cast Application        Plan:  Continue with total contact cast with endoform/quick/ABD applied under the cast   Follow-up in 1 week with Dr Becka Smith Application    Date/Time: 12/28/2022 2:53 PM  Performed by: Lisseth Shearer DPM  Authorized by: Lisseth Shearer DPM   Universal Protocol:  Consent: Verbal consent obtained  Written consent obtained  Risks and benefits: risks, benefits and alternatives were discussed  Consent given by: patient  Time out: Immediately prior to procedure a "time out" was called to verify the correct patient, procedure, equipment, support staff and site/side marked as required  Patient understanding: patient states understanding of the procedure being performed  Patient identity confirmed: verbally with patient      Procedure details:     Laterality:  Left    Location:  Foot    Foot:  L footCast type: Total contact    Post-procedure details:     Pain:  Improved    Sensation:  Numbness    Patient tolerance of procedure:   Tolerated well, no immediate complications        Plan:     Wound 11/16/22 Diabetic Ulcer Foot Left;Plantar (Active)   Wound Image Images linked 12/28/22 1453   Wound Description Granulation tissue 12/28/22 1453   Dee Dee-wound Assessment Intact 12/28/22 1453   Wound Length (cm) 0 2 cm 12/28/22 1453   Wound Width (cm) 2 cm 12/28/22 1453   Wound Depth (cm) 0 cm 12/28/22 1453   Wound Surface Area (cm^2) 0 4 cm^2 12/28/22 1453 Wound Volume (cm^3) 0 cm^3 12/28/22 1453   Calculated Wound Volume (cm^3) 0 cm^3 12/28/22 1453   Drainage Amount Small 12/28/22 1453   Non-staged Wound Description Full thickness 12/28/22 1453   Dressing Status Intact 12/28/22 1453       Wound 11/16/22 Diabetic Ulcer Foot Left;Plantar (Active)   Date First Assessed/Time First Assessed: 11/16/22 1315   Pre-Existing Wound: Yes  Primary Wound Type: Diabetic Ulcer  Location: Foot  Wound Location Orientation: Left;Plantar       [REMOVED] Wound 07/18/19 Other (Comment) Left (Removed)   Resolved Date/Resolved Time: 11/16/22 1314  Date First Assessed/Time First Assessed: 07/18/19 1035   Location: Other (Comment)  Wound Location Orientation: Left  Wound Description (Comments): left cheek sutured , triple antibiotic ointment and xerofor    Subjective:           63year-old type I diabetic presents for follow-up evaluation of chronic forefoot ulcer involving his left foot  Reports good progress over the past week  Denies any fever or shortness of breath  Presents today with total contact cast intact with no breakdown  The following portions of the patient's history were reviewed and updated as appropriate: allergies, current medications, past family history, past medical history, past social history, past surgical history and problem list     Review of Systems   Constitutional: Negative for chills and fever  HENT: Negative for ear pain and sore throat  Eyes: Negative for pain and visual disturbance  Respiratory: Negative for cough and shortness of breath  Cardiovascular: Negative for chest pain and palpitations  Gastrointestinal: Negative for abdominal pain and vomiting  Genitourinary: Negative for dysuria and hematuria  Musculoskeletal: Negative for arthralgias and back pain  Skin: Positive for wound  Negative for color change and rash  Neurological: Positive for numbness  Negative for seizures and syncope     All other systems reviewed and are negative  Objective:       Wound 11/16/22 Diabetic Ulcer Foot Left;Plantar (Active)   Wound Image Images linked 12/28/22 1453   Wound Description Granulation tissue 12/28/22 1453   Dee Dee-wound Assessment Intact 12/28/22 1453   Wound Length (cm) 0 2 cm 12/28/22 1453   Wound Width (cm) 2 cm 12/28/22 1453   Wound Depth (cm) 0 cm 12/28/22 1453   Wound Surface Area (cm^2) 0 4 cm^2 12/28/22 1453   Wound Volume (cm^3) 0 cm^3 12/28/22 1453   Calculated Wound Volume (cm^3) 0 cm^3 12/28/22 1453   Drainage Amount Small 12/28/22 1453   Non-staged Wound Description Full thickness 12/28/22 1453   Dressing Status Intact 12/28/22 1453                 /84   Pulse 72   Temp 98 2 °F (36 8 °C)   Resp 16     Physical Exam  Constitutional:       Appearance: Normal appearance  HENT:      Head: Normocephalic  Right Ear: Tympanic membrane normal       Left Ear: Tympanic membrane normal       Nose: No congestion  Mouth/Throat:      Mouth: Mucous membranes are moist       Pharynx: No oropharyngeal exudate or posterior oropharyngeal erythema  Eyes:      Extraocular Movements: Extraocular movements intact  Conjunctiva/sclera: Conjunctivae normal       Pupils: Pupils are equal, round, and reactive to light  Cardiovascular:      Rate and Rhythm: Normal rate and regular rhythm  Pulses:           Dorsalis pedis pulses are 1+ on the right side and 1+ on the left side  Posterior tibial pulses are 0 on the right side and 0 on the left side  Pulmonary:      Effort: Pulmonary effort is normal    Abdominal:      Palpations: Abdomen is soft  Musculoskeletal:      Left foot: Prominent metatarsal heads present  Feet:    Feet:      Right foot:      Protective Sensation: 3 sites tested  9 sites sensed  Left foot:      Protective Sensation: 3 sites tested  9 sites sensed        Skin integrity: Ulcer (Partial depth breakdown subfirst met, nearly healed, no debridement necessary, good new skin formation noted ) present  Comments: Arterial duplex from 11/11/2022 showed adequate perfusion for healing with triphasic waveforms DP and PT left foot  Skin:     General: Skin is warm and dry  Capillary Refill: Capillary refill takes 2 to 3 seconds  Coloration: Skin is not pale  Findings: No bruising, erythema, lesion or rash  Neurological:      General: No focal deficit present  Mental Status: He is alert  Cranial Nerves: No cranial nerve deficit  Sensory: No sensory deficit  Motor: No weakness  Gait: Gait normal       Deep Tendon Reflexes: Reflexes normal    Psychiatric:         Mood and Affect: Mood normal          Behavior: Behavior normal          Judgment: Judgment normal            Wound Instructions:  Orders Placed This Encounter   Procedures   • Wound cleansing and dressings     Left foot wound    Wash your hands with soap and water  Remove old dressing, discard into plastic bag and place in trash  Cleanse the wound with soap and water prior to applying a clean dressing  Do not use tissue or cotton balls  Do not scrub the wound  Pat dry using gauze  Shower yes with protection  Apply felt offloading to skin surrounding wound  Apply endoform to the left foot wound  Cover with qwick and abd  Secure with medfix tape and TCC EZ  Change dressing weekly    Total Contact Cast Procedure:    Before application, JIGNESH and/or TBI determined to be adequate for healing and application of a Total Contact cast   A Total Contact Cast procedure was performed for to the left foot and lower leg  The procedure was tolerated well with pain level of 0 throughout and a pain level of 0     following the procedure  Done today     Standing Status:   Future     Standing Expiration Date:   13/19/1439   • Cast Application     This order was created via procedure documentation        Diagnosis ICD-10-CM Associated Orders   1   Diabetic ulcer of other part of left foot associated with type 1 diabetes mellitus, with fat layer exposed [facility] )  E27 359 Wound cleansing and dressings    M12 809 Cast Application      2   Type 1 diabetes mellitus with diabetic neuropathy (HCC)  E10 40

## 2023-01-04 ENCOUNTER — OFFICE VISIT (OUTPATIENT)
Dept: WOUND CARE | Facility: HOSPITAL | Age: 63
End: 2023-01-04

## 2023-01-04 VITALS
HEART RATE: 96 BPM | TEMPERATURE: 96.8 F | SYSTOLIC BLOOD PRESSURE: 130 MMHG | DIASTOLIC BLOOD PRESSURE: 80 MMHG | RESPIRATION RATE: 20 BRPM

## 2023-01-04 DIAGNOSIS — L97.422 DIABETIC ULCER OF LEFT MIDFOOT ASSOCIATED WITH TYPE 1 DIABETES MELLITUS, WITH FAT LAYER EXPOSED (HCC): ICD-10-CM

## 2023-01-04 DIAGNOSIS — E10.40 TYPE 1 DIABETES MELLITUS WITH DIABETIC NEUROPATHY (HCC): Primary | ICD-10-CM

## 2023-01-04 DIAGNOSIS — E10.621 DIABETIC ULCER OF LEFT MIDFOOT ASSOCIATED WITH TYPE 1 DIABETES MELLITUS, WITH FAT LAYER EXPOSED (HCC): ICD-10-CM

## 2023-01-04 NOTE — PROGRESS NOTES
Patient ID: Will Root is a 63 y o  male Date of Birth 1960       Chief Complaint   Patient presents with   • Follow Up Wound Care Visit     Wound left foot       Allergies:  Cefuroxime and Amoxicillin-pot clavulanate    Diagnosis:  1  Type 1 diabetes mellitus with diabetic neuropathy (HCC)  -     Wound cleansing and dressings; Future  -     Diabetic Shoe    2  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, with fat layer exposed ([address] )  -     Wound cleansing and dressings; Future       Diagnosis ICD-10-CM Associated Orders   1  Type 1 diabetes mellitus with diabetic neuropathy (HCC)  E10 40 Wound cleansing and dressings     Diabetic Shoe      2  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, with fat layer exposed ([address] )  G10 392 Wound cleansing and dressings    L97 422            Assessment & Plan:  1  Diabetic Veloz grade 0 ulceration plantar left foot, wound is well epithelialized and healed, discontinue all dressings  He may get the foot wet in shower, he is to moisturize  2   Patient with fat pad atrophy plantar bilateral feet, he is advised to keep extra padding in the area of the wound, 4 x 4 and paper tape when he is at work  He is advised to add memory foam inserts to his sneakers  3   Patient given a prescription for 1 pair diabetic shoes, 3 pair custom molded inserts and the information for  to obtain these  4   Patient to call [facility] podiatry to be scheduled for diabetic foot care, I have asked him to make this appointment to be seen in the next 3 to 4 weeks  We offered to make an appointment, he deferred as he does not have his work schedule  5   Today I discussed frequent elevation, low-sodium diet, proper protein intake, strict pressure and friction reduction, proper glycemic control, biomechanics and high risk for recurrence of ulceration    At this time as patient's wound is well-healed he is discharged from [facility] wound management center, he is advised if he has any new or recurrent wounds he is to call us immediately  Patient understands and agrees with the plan  Procedures  -none      Subjective:   Edna Nguyen presents today for care of left foot diabetic ulceration, he is tolerating TCC well  He states his blood sugars are well controlled  He states he has not been seen by  for diabetic shoes yet as he has had a total contact cast in place          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)   • Insulin pump in place   • ED (erectile dysfunction) of organic origin   • Chronic pain   • Benign essential hypertension   • Cigarette nicotine dependence without complication   • Gout of ankle   • Type 1 diabetes mellitus with hyperglycemia ([facility])   • Diabetic polyneuropathy associated with type 1 diabetes mellitus [address] )   • Microalbuminuria due to type 1 diabetes mellitus ([facility] )   • Hypoglycemia unawareness associated with type 1 diabetes mellitus ([address] )     Past Medical History:   Diagnosis Date   • Chronic foot ulcer ([address] ) 9/7/2018   • Diabetes mellitus ([address] )    • Gout    • High cholesterol    • Hypertension      Past Surgical History:   Procedure Laterality Date   • COMPLEX WOUND CLOSURE TO EXTREMITY Left 7/18/2019    Procedure: COMPLEX CLOSURE LEFT CHEEK;  Surgeon: Alvarez Liao MD;  Location:  MAIN OR;  Service: Plastics   • FACIAL/NECK BIOPSY Left 7/18/2019    Procedure: EXCISION LEFT CHEEK CYST;  Surgeon: Alvarez Liao MD;  Location:  MAIN OR;  Service: Plastics   • HERNIA REPAIR Left     several times   • KNEE ARTHROSCOPY Left     torn meniscus     Social History     Socioeconomic History   • Marital status: /Civil Union     Spouse name: Not on file   • Number of children: Not on file   • Years of education: Not on file   • Highest education level: Not on file   Occupational History   • Occupation: cafeteria/kitchen at [employer]     Comment: [employer]   Tobacco Use   • Smoking status: Every Day     Packs/day: 1 00     Types: Cigarettes     Last attempt to quit: 2022     Years since quittin 0   • Smokeless tobacco: Never   • Tobacco comments:     encouraged smoking cessation   Vaping Use   • Vaping Use: Never used   Substance and Sexual Activity   • Alcohol use: Yes     Comment: "some beers"   • Drug use: Never   • Sexual activity: Not on file   Other Topics Concern   • Not on file   Social History Narrative   • Not on file     Social Determinants of Health     Financial Resource Strain: Not on file   Food Insecurity: Not on file   Transportation Needs: Not on file   Physical Activity: Not on file   Stress: Not on file   Social Connections: Not on file   Intimate Partner Violence: Not on file   Housing Stability: Not on file        Current Outpatient Medications:   •  atorvastatin (LIPITOR) 40 mg tablet, TAKE 1 TABLET BY MOUTH EVERY DAY, Disp: 90 tablet, Rfl: 1  •  colchicine (COLCRYS) 0 6 mg tablet, Take 1 tablet (0 6 mg total) by mouth every 6 (six) hours as needed (gouty attack) Use as directed for gout flares, Disp: 30 tablet, Rfl: 0  •  Glucagon, rDNA, (Glucagon Emergency) 1 MG KIT, as directed, Disp: , Rfl:   •  insulin lispro (HumaLOG) 100 units/mL injection, Use via the insulin pump, use up to 80 units daily, Disp: 60 mL, Rfl: 3  •  lisinopril (ZESTRIL) 20 mg tablet, Take 0 5 tablets (10 mg total) by mouth daily, Disp: 30 tablet, Rfl: 5  •  metoprolol succinate (TOPROL-XL) 100 mg 24 hr tablet, Take 1 tablet (100 mg total) by mouth daily, Disp: 30 tablet, Rfl: 1  Family History   Problem Relation Age of Onset   • Heart disease Father    • Diabetes type I Father    • Diabetes type II Mother • No Known Problems Sister    • Alcohol abuse Brother    • Diabetes type I Brother    • No Known Problems Brother    • No Known Problems Son    • No Known Problems Daughter        Review of Systems   Constitutional: Negative for chills and fever  HENT: Negative for ear pain and sore throat  Eyes: Negative for pain and visual disturbance  Respiratory: Negative for cough and shortness of breath  Cardiovascular: Negative for chest pain and palpitations  Gastrointestinal: Negative for abdominal pain and vomiting  Genitourinary: Negative for dysuria and hematuria  Musculoskeletal: Positive for gait problem  Negative for arthralgias and back pain  Skin: Positive for color change and wound  Negative for rash  Neurological: Positive for numbness  Negative for seizures and syncope  Psychiatric/Behavioral: Negative  All other systems reviewed and are negative  Objective:  /80   Pulse 96   Temp (!) 96 8 °F (36 °C)   Resp 20     Physical Exam  Constitutional:       Appearance: Normal appearance  He is normal weight  HENT:      Head: Normocephalic and atraumatic  Right Ear: External ear normal       Left Ear: External ear normal       Nose: Nose normal       Mouth/Throat:      Mouth: Mucous membranes are moist       Pharynx: Oropharynx is clear  Eyes:      Conjunctiva/sclera: Conjunctivae normal    Cardiovascular:      Pulses: Normal pulses  Dorsalis pedis pulses are 2+ on the right side and 2+ on the left side  Posterior tibial pulses are 2+ on the right side and 2+ on the left side  Pulmonary:      Effort: Pulmonary effort is normal    Musculoskeletal:      Cervical back: Normal range of motion  Right lower leg: No edema  Left lower leg: No edema  Comments: Fat pad atrophy submetatarsal heads 1 through 5 bilateral, hallux limitus bilateral, pes planovalgus bilateral   Skin:     General: Skin is warm and dry        Capillary Refill: Capillary refill takes less than 2 seconds  Comments: See detailed wound assessment   Neurological:      General: No focal deficit present  Mental Status: He is alert and oriented to person, place, and time  Mental status is at baseline  Sensory: Sensory deficit present  Coordination: Coordination abnormal       Gait: Gait abnormal       Deep Tendon Reflexes: Reflexes abnormal    Psychiatric:         Mood and Affect: Mood normal          Behavior: Behavior normal          Thought Content: Thought content normal          Judgment: Judgment normal            Wound 11/16/22 Diabetic Ulcer Foot Left;Plantar (Active)   Wound Image   01/04/23 1308   Wound Description Epithelialization;Pink 01/04/23 1311   Dee Dee-wound Assessment Callus 01/04/23 1311   Wound Length (cm) 0 2 cm 12/28/22 1453   Wound Width (cm) 2 cm 12/28/22 1453   Wound Depth (cm) 0 cm 12/28/22 1453   Wound Surface Area (cm^2) 0 4 cm^2 12/28/22 1453   Wound Volume (cm^3) 0 cm^3 12/28/22 1453   Calculated Wound Volume (cm^3) 0 cm^3 12/28/22 1453   Drainage Amount None 01/04/23 1311   Drainage Description Serosanguineous 12/21/22 1302   Non-staged Wound Description Full thickness 12/28/22 1453   Treatments Irrigation with NSS 01/04/23 1311   Wound packed? No 11/23/22 1414   Dressing Changed Changed 11/23/22 1414   Patient Tolerance Tolerated well 11/23/22 1414   Dressing Status Intact 12/28/22 1453                No results found  Wound Instructions:  Orders Placed This Encounter   Procedures   • Wound cleansing and dressings     Wound cleansing and dressings    Wound is healed on left foot  May wash and shower  Dry well  Apply gauze and paper tape on bottom of foot  Go to  and get feet measured for diabetic shoes  Get Dr Lindsay Asp shoe inserts in the meantime for shoes until diabetic shoes are obtained  Memory foam slipper is also OK  Do not walk with barefoot  Follow up with podiatrist in next 3-4 weeks   Referred to   Mendez Hannah    Please call us if any new wounds reopen  [facility]  Standing Status:   Future     Standing Expiration Date:   1/4/2024   • Diabetic Shoe     1 pair diabetic shoes  3 pair custom molded inserts, patient does have fat pad atrophy submetatarsal heads 1 through 5 bilateral, please offload, patient has history of ulceration plantar left submetatarsal head 1  Order Specific Question:   Type     Answer: Off the [facility], DPM, DPM, FACFAS    Portions of the record may have been created with voice recognition software  Occasional wrong word or "sound a like" substitutions may have occurred due to the inherent limitations of voice recognition software  Read the chart carefully and recognize, using context, where substitutions have occurred

## 2023-01-18 DIAGNOSIS — E10.65 TYPE 1 DIABETES MELLITUS WITH HYPERGLYCEMIA (HCC): ICD-10-CM

## 2023-01-27 ENCOUNTER — OFFICE VISIT (OUTPATIENT)
Dept: PODIATRY | Facility: CLINIC | Age: 63
End: 2023-01-27

## 2023-01-27 VITALS
BODY MASS INDEX: 22.19 KG/M2 | DIASTOLIC BLOOD PRESSURE: 72 MMHG | HEIGHT: 70 IN | WEIGHT: 155 LBS | SYSTOLIC BLOOD PRESSURE: 140 MMHG

## 2023-01-27 DIAGNOSIS — B35.1 ONYCHOMYCOSIS: ICD-10-CM

## 2023-01-27 DIAGNOSIS — E10.42 DIABETIC POLYNEUROPATHY ASSOCIATED WITH TYPE 1 DIABETES MELLITUS (HCC): Primary | ICD-10-CM

## 2023-01-30 NOTE — PROGRESS NOTES
Assessment/Plan:    Diabetic polyneuropathy associated with type 1 diabetes mellitus ([address] )  Recommend diabetic foot care every 3 months  Importance of preventive foot care reviewed with patient in detail  Call immediately if any signs of recurrent ulceration noted  Follow-up in 3 months  Onychomycosis    Debridement mycotic nails x6+, see note below       Procedure: All mycotic toenails were reduced and debrided in length, width, and girth using a nail nipper and electric dremel  Patient tolerated procedure(s) well without complications  Class Findings  A)  Has the patient had a previous amputation of the toe, foot, or leg? No  B)  Does the patient have absent pedal pulses DP or PT? Yes        Does the patient have three of the following? Yes        1  Hair growth (increased or decreased), 2   Nail changes (thickening) and 4  Skin texture (thin, shiny)  C)  Does the patient have two of the following and one above? No             Subjective:      Patient ID: Alberto Potts is a 63 y o  male  81-year-old white male type II I diabetic presents for diabetic foot care having recently been discharged from the wound care center for an ulceration  Reports no further issues with the wounds but has difficulty with his nails due to thickness  The following portions of the patient's history were reviewed and updated as appropriate: allergies, current medications, past family history, past medical history, past social history, past surgical history and problem list     Review of Systems   Constitutional: Negative for chills and fever  HENT: Negative for ear pain and sore throat  Eyes: Negative for pain and visual disturbance  Respiratory: Negative for cough and shortness of breath  Cardiovascular: Negative for chest pain and palpitations  Gastrointestinal: Negative for abdominal pain and vomiting  Genitourinary: Negative for dysuria and hematuria     Musculoskeletal: Negative for arthralgias and back pain  Digital contractures consistent with hammertoes   Skin: Negative for color change and rash  Thick painful nails  Neurological: Negative for seizures and syncope  All other systems reviewed and are negative  Objective:      /72   Ht 5' 10" (1 778 m)   Wt 70 3 kg (155 lb)   BMI 22 24 kg/m²          Physical Exam  Constitutional:       Appearance: Normal appearance  HENT:      Head: Normocephalic  Right Ear: Tympanic membrane normal       Left Ear: Tympanic membrane normal       Nose: No congestion  Mouth/Throat:      Mouth: Mucous membranes are moist       Pharynx: No oropharyngeal exudate or posterior oropharyngeal erythema  Eyes:      Extraocular Movements: Extraocular movements intact  Conjunctiva/sclera: Conjunctivae normal       Pupils: Pupils are equal, round, and reactive to light  Cardiovascular:      Rate and Rhythm: Normal rate and regular rhythm  Pulses:           Dorsalis pedis pulses are 1+ on the right side and 1+ on the left side  Posterior tibial pulses are 0 on the right side and 0 on the left side  Pulmonary:      Effort: Pulmonary effort is normal    Abdominal:      Palpations: Abdomen is soft  Feet:      Right foot:      Protective Sensation: 10 sites tested  10 sites sensed  Toenail Condition: Right toenails are abnormally thick  Fungal disease present  Left foot:      Protective Sensation: 10 sites tested  10 sites sensed  Toenail Condition: Left toenails are abnormally thick  Fungal disease present  Skin:     General: Skin is warm and dry  Capillary Refill: Capillary refill takes 2 to 3 seconds  Coloration: Skin is not pale  Findings: No bruising, erythema, lesion or rash  Comments: Severely dystrophic nails x6+0 4 cm average thickness, moderate subungual debris, yellow discoloration, and brittle nature noted  Fungal odor is appreciated    Right foot 1-2 3 and 5, left foot 1-3 and 5 also  Diminished texture tone and turgor bilateral no digital hair noted  Moderate atrophic changes noted  Neurological:      Mental Status: He is alert and oriented to person, place, and time  Cranial Nerves: No cranial nerve deficit  Sensory: Sensory deficit (No vibratory sensation appreciated  Monofilament intact ) present  Motor: No weakness        Gait: Gait normal       Deep Tendon Reflexes: Reflexes normal    Psychiatric:         Mood and Affect: Mood normal          Behavior: Behavior normal          Judgment: Judgment normal

## 2023-03-15 ENCOUNTER — OFFICE VISIT (OUTPATIENT)
Dept: FAMILY MEDICINE CLINIC | Facility: HOSPITAL | Age: 63
End: 2023-03-15

## 2023-03-15 VITALS
HEIGHT: 70 IN | HEART RATE: 87 BPM | SYSTOLIC BLOOD PRESSURE: 132 MMHG | TEMPERATURE: 96.6 F | BODY MASS INDEX: 21.62 KG/M2 | WEIGHT: 151 LBS | DIASTOLIC BLOOD PRESSURE: 80 MMHG

## 2023-03-15 DIAGNOSIS — I10 BENIGN ESSENTIAL HYPERTENSION: ICD-10-CM

## 2023-03-15 DIAGNOSIS — E10.42 DIABETIC POLYNEUROPATHY ASSOCIATED WITH TYPE 1 DIABETES MELLITUS (HCC): ICD-10-CM

## 2023-03-15 DIAGNOSIS — Z12.11 SCREENING FOR COLON CANCER: Primary | ICD-10-CM

## 2023-03-15 DIAGNOSIS — E10.65 TYPE 1 DIABETES MELLITUS WITH HYPERGLYCEMIA (HCC): ICD-10-CM

## 2023-03-15 DIAGNOSIS — Z23 ENCOUNTER FOR IMMUNIZATION: ICD-10-CM

## 2023-03-15 DIAGNOSIS — M25.512 ACUTE PAIN OF LEFT SHOULDER: ICD-10-CM

## 2023-03-15 RX ORDER — LISINOPRIL 10 MG/1
10 TABLET ORAL DAILY
Qty: 90 TABLET | Refills: 1 | Status: SHIPPED | OUTPATIENT
Start: 2023-03-15

## 2023-03-15 RX ORDER — METOPROLOL SUCCINATE 100 MG/1
100 TABLET, EXTENDED RELEASE ORAL DAILY
Qty: 90 TABLET | Refills: 1 | Status: SHIPPED | OUTPATIENT
Start: 2023-03-15

## 2023-03-15 NOTE — PROGRESS NOTES
Name: Kalyani Boogie      : 1960      MRN: [id number]  Encounter Provider: Nilson Connelly MD  Encounter Date: 3/15/2023   Encounter department: [facility]      1  Screening for colon cancer  -     Cologuard    2  Acute pain of left shoulder  -     XR shoulder 2+ vw left; Future; Expected date: 03/15/2023    3  Benign essential hypertension  -     lisinopril (ZESTRIL) 10 mg tablet; Take 1 tablet (10 mg total) by mouth daily  -     metoprolol succinate (TOPROL-XL) 100 mg 24 hr tablet; Take 1 tablet (100 mg total) by mouth daily    4  Type 1 diabetes mellitus with hyperglycemia ([facility])    5  Diabetic polyneuropathy associated with type 1 diabetes mellitus ([facility] )    6  Encounter for immunization  -     Zoster Vaccine Recombinant IM       Shoulder pain  Check plain filsm  May continue with aleve prn    sa bursitis vs supraspinatus tendonitis  Consider steroid injection  To call if pain not improving and would like to try this  considedr physical therapy  Subjective      Patient is here for follow-up  Since the last visit his blood pressure has been improved  Continues on lisinopril and metoprolol  Diabetes mellitus  Managed by endocrinology  Has an appointment in April  Blood work is pending  Diabetic foot ulcer  Improved  Reviewed podiatry consultations  Today patient complains of left shoulder pain  No trauma no fall  Pain on certain movements especially above the head activities  No numbness tingling or weakness  Review of Systems   Constitutional: Negative  Negative for activity change, appetite change, chills and diaphoresis  HENT: Negative for congestion and dental problem  Respiratory: Negative  Negative for apnea, chest tightness, shortness of breath and wheezing  Cardiovascular: Negative  Negative for chest pain, palpitations and leg swelling  Gastrointestinal: Negative    Negative for abdominal distention, abdominal pain, constipation, diarrhea and nausea  Genitourinary: Negative  Negative for difficulty urinating, dysuria and frequency  Current Outpatient Medications on File Prior to Visit   Medication Sig   • atorvastatin (LIPITOR) 40 mg tablet TAKE 1 TABLET BY MOUTH EVERY DAY   • colchicine (COLCRYS) 0 6 mg tablet Take 1 tablet (0 6 mg total) by mouth every 6 (six) hours as needed (gouty attack) Use as directed for gout flares   • Glucagon, rDNA, (Glucagon Emergency) 1 MG KIT as directed   • insulin lispro (HumaLOG) 100 units/mL injection Use via the insulin pump, use up to 80 units daily   • [DISCONTINUED] lisinopril (ZESTRIL) 20 mg tablet Take 0 5 tablets (10 mg total) by mouth daily   • [DISCONTINUED] metoprolol succinate (TOPROL-XL) 100 mg 24 hr tablet Take 1 tablet (100 mg total) by mouth daily       Objective     /80   Pulse 87   Temp (!) 96 6 °F (35 9 °C)   Ht 5' 10" (1 778 m)   Wt 68 5 kg (151 lb)   BMI 21 67 kg/m²     Physical Exam  Vitals and nursing note reviewed  Constitutional:       General: He is not in acute distress  Appearance: Normal appearance  He is well-developed  He is not ill-appearing  HENT:      Head: Normocephalic and atraumatic  Right Ear: Tympanic membrane, ear canal and external ear normal       Left Ear: Tympanic membrane, ear canal and external ear normal       Nose: Nose normal  No congestion or rhinorrhea  Mouth/Throat:      Mouth: Mucous membranes are moist       Pharynx: No oropharyngeal exudate or posterior oropharyngeal erythema  Eyes:      Extraocular Movements: Extraocular movements intact  Conjunctiva/sclera: Conjunctivae normal       Pupils: Pupils are equal, round, and reactive to light  Cardiovascular:      Rate and Rhythm: Normal rate and regular rhythm  Pulses: no weak pulses          Dorsalis pedis pulses are 1+ on the right side and 1+ on the left side  Heart sounds: Normal heart sounds   No murmur heard     No friction rub  No gallop  Pulmonary:      Effort: Pulmonary effort is normal  No respiratory distress  Breath sounds: Normal breath sounds  No wheezing or rales  Chest:      Chest wall: No tenderness  Abdominal:      General: Bowel sounds are normal  There is no distension  Palpations: Abdomen is soft  There is no mass  Tenderness: There is no abdominal tenderness  There is no guarding or rebound  Musculoskeletal:      Left shoulder: Tenderness present  No swelling, deformity, effusion or laceration  Decreased range of motion  Cervical back: Normal range of motion and neck supple  Comments: ttp over ac joint and sa joint   Feet:      Right foot:      Skin integrity: Erythema, callus and dry skin present  Left foot:      Skin integrity: Erythema, callus and dry skin present  Skin:     General: Skin is warm  Capillary Refill: Capillary refill takes less than 2 seconds  Neurological:      Mental Status: He is alert and oriented to person, place, and time  Psychiatric:         Mood and Affect: Mood normal          Behavior: Behavior normal       Diabetic Foot Exam    Patient's shoes and socks removed  Right Foot/Ankle   Right Foot Inspection  Skin Exam: skin intact, dry skin, callus, erythema and callus  Toe Exam: right toe deformity  No tenderness    Sensory   Vibration: diminished  Proprioception: intact  Monofilament testing: intact    Vascular  Capillary refills: < 3 seconds  The right DP pulse is 1+  Left Foot/Ankle  Left Foot Inspection  Skin Exam: skin intact, dry skin, erythema and callus  Toe Exam: left toe deformity  No swelling and no erythema  Sensory   Vibration: diminished  Proprioception: intact  Monofilament testing: intact    Vascular  Capillary refills: < 3 seconds  The left DP pulse is 1+  Assign Risk Category  Deformity present  No loss of protective sensation  No weak pulses  Risk: 3  with hx of foot ulcerat ion  Juanito Lopez MD

## 2023-03-22 ENCOUNTER — OFFICE VISIT (OUTPATIENT)
Dept: FAMILY MEDICINE CLINIC | Facility: HOSPITAL | Age: 63
End: 2023-03-22

## 2023-03-22 VITALS
BODY MASS INDEX: 21.67 KG/M2 | SYSTOLIC BLOOD PRESSURE: 162 MMHG | HEIGHT: 70 IN | WEIGHT: 151.4 LBS | TEMPERATURE: 96.2 F | DIASTOLIC BLOOD PRESSURE: 80 MMHG | HEART RATE: 112 BPM

## 2023-03-22 DIAGNOSIS — M25.512 ACUTE PAIN OF LEFT SHOULDER: Primary | ICD-10-CM

## 2023-03-22 RX ORDER — METHYLPREDNISOLONE ACETATE 40 MG/ML
1 INJECTION, SUSPENSION INTRA-ARTICULAR; INTRALESIONAL; INTRAMUSCULAR; SOFT TISSUE
Status: COMPLETED | OUTPATIENT
Start: 2023-03-22 | End: 2023-03-22

## 2023-03-22 RX ADMIN — METHYLPREDNISOLONE ACETATE 1 ML: 40 INJECTION, SUSPENSION INTRA-ARTICULAR; INTRALESIONAL; INTRAMUSCULAR; SOFT TISSUE at 17:17

## 2023-03-22 NOTE — PROGRESS NOTES
Name: Tyler Josue      : 1960      MRN: [id number]  Encounter Provider: Dhara Grace MD  Encounter Date: 3/22/2023   Encounter department: [facility] [address] [facility]  Acute pain of left shoulder  -     Large joint arthrocentesis: L subacromial bursa  -     methylPREDNISolone acetate (DEPO-MEDROL) injection 1 mL     concern for rotator cuff tendonitis  Discussed use of steroi injection  Completed today  Large joint arthrocentesis: L subacromial bursa  Universal Protocol:  Consent given by: patient  Time out: Immediately prior to procedure a "time out" was called to verify the correct patient, procedure, equipment, support staff and site/side marked as required  Site marked: the operative site was marked  Patient identity confirmed: verbally with patient    Supporting Documentation  Indications: pain and diagnostic evaluation   Procedure Details  Location: shoulder - L subacromial bursa  Needle size: 22 G  Ultrasound guidance: no  Approach: posterior  Medications administered: 1 mL methylPREDNISolone acetate 40 mg/mL    Patient tolerance: patient tolerated the procedure well with no immediate complications  Dressing:  Sterile dressing applied            Subjective      Pt with ongoing shoulder pain  As discussed previously we can do a steroid injection to possibly help  Review of Systems   Constitutional: Negative  Negative for activity change, appetite change, chills and diaphoresis  HENT: Negative for congestion and dental problem  Respiratory: Negative  Negative for apnea, chest tightness, shortness of breath and wheezing  Cardiovascular: Negative  Negative for chest pain, palpitations and leg swelling  Gastrointestinal: Negative  Negative for abdominal distention, abdominal pain, constipation, diarrhea and nausea  Genitourinary: Negative  Negative for difficulty urinating, dysuria and frequency         Current Outpatient Medications on File Prior to Visit   Medication Sig   • atorvastatin (LIPITOR) 40 mg tablet TAKE 1 TABLET BY MOUTH EVERY DAY   • colchicine (COLCRYS) 0 6 mg tablet Take 1 tablet (0 6 mg total) by mouth every 6 (six) hours as needed (gouty attack) Use as directed for gout flares   • Glucagon, rDNA, (Glucagon Emergency) 1 MG KIT as directed   • insulin lispro (HumaLOG) 100 units/mL injection Use via the insulin pump, use up to 80 units daily   • lisinopril (ZESTRIL) 10 mg tablet Take 1 tablet (10 mg total) by mouth daily   • metoprolol succinate (TOPROL-XL) 100 mg 24 hr tablet Take 1 tablet (100 mg total) by mouth daily       Objective     /80   Pulse (!) 112   Temp (!) 96 2 °F (35 7 °C)   Ht 5' 10" (1 778 m)   Wt 68 7 kg (151 lb 6 4 oz)   BMI 21 72 kg/m²     Physical Exam  Vitals and nursing note reviewed         Nilson Connelly MD

## 2023-03-31 LAB
EXTERNAL EGFR: 59
HBA1C MFR BLD HPLC: 7.9 %

## 2023-04-03 ENCOUNTER — OFFICE VISIT (OUTPATIENT)
Dept: FAMILY MEDICINE CLINIC | Facility: HOSPITAL | Age: 63
End: 2023-04-03

## 2023-04-03 VITALS
WEIGHT: 149.4 LBS | SYSTOLIC BLOOD PRESSURE: 162 MMHG | HEART RATE: 110 BPM | TEMPERATURE: 97.2 F | HEIGHT: 70 IN | BODY MASS INDEX: 21.39 KG/M2 | DIASTOLIC BLOOD PRESSURE: 84 MMHG

## 2023-04-03 DIAGNOSIS — E10.621 DIABETIC ULCER OF LEFT MIDFOOT ASSOCIATED WITH TYPE 1 DIABETES MELLITUS, UNSPECIFIED ULCER STAGE (HCC): ICD-10-CM

## 2023-04-03 DIAGNOSIS — M25.512 ACUTE PAIN OF LEFT SHOULDER: ICD-10-CM

## 2023-04-03 DIAGNOSIS — E10.65 TYPE 1 DIABETES MELLITUS WITH HYPERGLYCEMIA (HCC): Primary | ICD-10-CM

## 2023-04-03 DIAGNOSIS — L97.429 DIABETIC ULCER OF LEFT MIDFOOT ASSOCIATED WITH TYPE 1 DIABETES MELLITUS, UNSPECIFIED ULCER STAGE (HCC): ICD-10-CM

## 2023-04-03 RX ORDER — DOXYCYCLINE 100 MG/1
100 TABLET ORAL 2 TIMES DAILY
Qty: 20 TABLET | Refills: 0 | Status: SHIPPED | OUTPATIENT
Start: 2023-04-03 | End: 2023-04-06

## 2023-04-03 NOTE — LETTER
April 3, 2023     Patient: Gissel Stack  YOB: 1960  Date of Visit: 4/3/2023      To Whom it May Concern:    Gissel Stack is under my professional care  Roseanne Ortiz was seen in my office on 4/3/2023  Roseanne Ortiz may return to work on April 10, 2023         Sincerely,          Ashish Loya MD        CC: No Recipients

## 2023-04-03 NOTE — PROGRESS NOTES
Name: Bucky Clark      : 1960      MRN: [id number]  Encounter Provider: Hernandez Shannon MD  Encounter Date: 4/3/2023   [facility] [address]     1  Type 1 diabetes mellitus with hyperglycemia ([place] [address] )  -     Ambulatory Referral to Wound Care; Future    2  Acute pain of left shoulder  -     Ambulatory referral to Physical Therapy; Future    3  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, unspecified ulcer stage ([place] [address] )  -     Ambulatory Referral to Wound Care; Future  -     doxycycline (ADOXA) 100 MG tablet; Take 1 tablet (100 mg total) by mouth 2 (two) times a day for 10 days  -     Wound culture and Gram stain; Future     diabetic foot ulcer with concern for infection  Wound culture taken  Start on doxy  Referral to wound center  Left shoulder pain  No improvement with steroid injection  Referral to physical therapy  No work at this time as he mostly is up on his feet for several hours  Needs to offload the wound  Subjective      Patient is here with a recurrence of a foot ulcer  There has been purulent discharge  Blister formation  Pain in the foot  No fever no chills  Waiting on his new diabetic shoes  At work he is on his feet all day standing  Review of Systems   Constitutional: Negative  Negative for activity change, appetite change, chills and diaphoresis  HENT: Negative for congestion and dental problem  Respiratory: Negative  Negative for apnea, chest tightness, shortness of breath and wheezing  Cardiovascular: Negative  Negative for chest pain, palpitations and leg swelling  Gastrointestinal: Negative  Negative for abdominal distention, abdominal pain, constipation, diarrhea and nausea  Genitourinary: Negative  Negative for difficulty urinating, dysuria and frequency         Current Outpatient Medications on File Prior to Visit   Medication Sig   • "atorvastatin (LIPITOR) 40 mg tablet TAKE 1 TABLET BY MOUTH EVERY DAY   • colchicine (COLCRYS) 0 6 mg tablet Take 1 tablet (0 6 mg total) by mouth every 6 (six) hours as needed (gouty attack) Use as directed for gout flares   • Glucagon, rDNA, (Glucagon Emergency) 1 MG KIT as directed   • insulin lispro (HumaLOG) 100 units/mL injection Use via the insulin pump, use up to 80 units daily   • lisinopril (ZESTRIL) 10 mg tablet Take 1 tablet (10 mg total) by mouth daily   • metoprolol succinate (TOPROL-XL) 100 mg 24 hr tablet Take 1 tablet (100 mg total) by mouth daily       Objective     /84   Pulse (!) 110   Temp (!) 97 2 °F (36 2 °C)   Ht 5' 10\" (1 778 m)   Wt 67 8 kg (149 lb 6 4 oz)   BMI 21 44 kg/m²     Physical Exam  Vitals and nursing note reviewed  Constitutional:       Appearance: Normal appearance  HENT:      Head: Normocephalic  Feet:      Comments: Ulceration over the first MTP  Open wound  Mild fluctuance with purulence seen on palpation and addition of a little pressure  Neurological:      Mental Status: He is alert         Zaria Rocha MD  "

## 2023-04-06 ENCOUNTER — OFFICE VISIT (OUTPATIENT)
Dept: WOUND CARE | Facility: HOSPITAL | Age: 63
End: 2023-04-06

## 2023-04-06 VITALS
SYSTOLIC BLOOD PRESSURE: 132 MMHG | DIASTOLIC BLOOD PRESSURE: 70 MMHG | RESPIRATION RATE: 18 BRPM | BODY MASS INDEX: 20.32 KG/M2 | HEIGHT: 72 IN | WEIGHT: 150 LBS | HEART RATE: 100 BPM | TEMPERATURE: 98.8 F

## 2023-04-06 DIAGNOSIS — E10.621 DIABETIC ULCER OF LEFT MIDFOOT ASSOCIATED WITH TYPE 1 DIABETES MELLITUS, WITH FAT LAYER EXPOSED (HCC): Primary | ICD-10-CM

## 2023-04-06 DIAGNOSIS — L97.422 DIABETIC ULCER OF LEFT MIDFOOT ASSOCIATED WITH TYPE 1 DIABETES MELLITUS, WITH FAT LAYER EXPOSED (HCC): Primary | ICD-10-CM

## 2023-04-06 DIAGNOSIS — E10.40 TYPE 1 DIABETES MELLITUS WITH DIABETIC NEUROPATHY (HCC): ICD-10-CM

## 2023-04-06 DIAGNOSIS — L08.9 DIABETIC FOOT INFECTION (HCC): Primary | ICD-10-CM

## 2023-04-06 DIAGNOSIS — E11.628 DIABETIC FOOT INFECTION (HCC): Primary | ICD-10-CM

## 2023-04-06 LAB
BACTERIA WND AEROBE CULT: ABNORMAL
BACTERIA WND AEROBE CULT: ABNORMAL
GRAM STN SPEC: ABNORMAL
GRAM STN SPEC: ABNORMAL

## 2023-04-06 RX ORDER — SULFAMETHOXAZOLE AND TRIMETHOPRIM 800; 160 MG/1; MG/1
1 TABLET ORAL EVERY 12 HOURS SCHEDULED
Qty: 20 TABLET | Refills: 0 | Status: SHIPPED | OUTPATIENT
Start: 2023-04-06 | End: 2023-04-16

## 2023-04-06 NOTE — LETTER
April 6, 2023     Patient: Tracy Broussard  YOB: 1960  Date of Visit: 4/6/2023      To Whom it May Concern:    Tracy Broussard is under my professional care  Supriya Miranda was seen in my office on 4/6/2023  Supriya Jean may return to work on 4/13/2023  If you have any questions or concerns, please don't hesitate to call           Sincerely,          Imelda Gonzales DO        CC: No Recipients

## 2023-04-06 NOTE — PROGRESS NOTES
"Patient ID: Geneva Flor is a 66 y o  male Date of Birth 1960     Chief Complaint  Chief Complaint   Patient presents with   • Follow Up Wound Care Visit     Left foot wound   • New Patient Visit     Left foot wound       Allergies  Cefuroxime and Amoxicillin-pot clavulanate    Assessment:    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, with fat layer exposed ([address] )    Lab Results   Component Value Date    HGBA1C 7 9 (H) 03/31/2023        Diagnoses and all orders for this visit:    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, with fat layer exposed ([address] )  -     Wound cleansing and dressings; Future  -     Wound off loading; Future    Type 1 diabetes mellitus with diabetic neuropathy (HCC)  -     Wound cleansing and dressings; Future  -     Wound off loading; Future    Other orders  -     Debridement              Debridement   Wound 04/06/23 Diabetic Ulcer Heel Left    Universal Protocol:  Consent: Verbal consent obtained  Consent given by: patient  Time out: Immediately prior to procedure a \"time out\" was called to verify the correct patient, procedure, equipment, support staff and site/side marked as required    Timeout called at: 4/6/2023 3:10 PM   Patient understanding: patient states understanding of the procedure being performed  Patient identity confirmed: verbally with patient      Performed by: physician  Debridement type: surgical  Level of debridement: subcutaneous tissue  Pain control: lidocaine 4%  Post-debridement measurements  Length (cm): 5  Width (cm): 5 3  Depth (cm): 0 2  Percent debrided: 100%  Surface Area (cm^2): 26 5  Area debrided (cm^2): 26 5  Volume (cm^3): 5 3  Tissue and other material debrided: subcutaneous tissue  Devitalized tissue debrided: callus, exudate and slough  Instrument(s) utilized: curette  Bleeding: small  Hemostasis obtained with: pressure  Response to treatment: procedure was tolerated " well          Plan:  Initial evaluation  Wound debrided as above  Wound management with silver alginate, see wound orders below  Discussed the importance of proper offloading and tight diabetic control   A1C results reviewed with the patient today  Follow-up in 1 week with Dr Daniel Schultz or call sooner with questions or concerns    Wound 04/06/23 Diabetic Ulcer Foot Left (Active)   Wound Image Images linked 04/06/23 1524   Wound Description Dry;Epithelialization (open and closed blisters) 04/06/23 1501   Dee Dee-wound Assessment Dry 04/06/23 1501   Wound Length (cm) 5 cm 04/06/23 1501   Wound Width (cm) 5 3 cm 04/06/23 1501   Wound Depth (cm) 0 1 cm 04/06/23 1501   Wound Surface Area (cm^2) 26 5 cm^2 04/06/23 1501   Wound Volume (cm^3) 2 65 cm^3 04/06/23 1501   Calculated Wound Volume (cm^3) 2 65 cm^3 04/06/23 1501   Drainage Amount Scant 04/06/23 1501   Drainage Description Serous 04/06/23 1501   Non-staged Wound Description Full thickness 04/06/23 1501       Wound 04/06/23 Diabetic Ulcer Foot Left (Active)   Date First Assessed/Time First Assessed: 04/06/23 1501   Primary Wound Type: Diabetic Ulcer  Location: Foot  Wound Location Orientation: Left       [REMOVED] Wound 07/18/19 Other (Comment) Left (Removed)   Resolved Date/Resolved Time: 11/16/22 1314  Date First Assessed/Time First Assessed: 07/18/19 1035   Location: Other (Comment)  Wound Location Orientation: Left  Wound Description (Comments): left cheek sutured , triple antibiotic ointment and xerofor    [REMOVED] Wound 11/16/22 Diabetic Ulcer Foot Left;Plantar (Removed)   Resolved Date: 01/04/23  Date First Assessed/Time First Assessed: 11/16/22 1315   Pre-Existing Wound: Yes  Primary Wound Type: Diabetic Ulcer  Location: Foot  Wound Location Orientation: Left;Plantar  Wound Outcome: Healed       Subjective:        Patient presents for initial evaluation of a diabetic foot ulcer of the left midfoot that he noticed about 4-5 days ago    Has not been putting anything on the wound itself  Saw PCP who did a culture which grew MRSA  PCP started doxycycline 3 days ago which she changed to Bactrim based on culture results today  Patient works as a   He had a wound in this area that was healed as of January 2023 after which time he saw Dr Lorenzo Rojas and diabetic shoes were ordered but patient did not obtain these yet  Blood sugars are typically in the low to mid 100s  The following portions of the patient's history were reviewed and updated as appropriate:   He  has a past medical history of Chronic foot ulcer ([facility] ) (09/07/2018), Diabetes mellitus ([facility] ), Gout, High cholesterol, Hypertension, and Visual impairment (11/1/2022)  He   Patient Active Problem List    Diagnosis Date Noted   • 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, with fat layer exposed ([place] [address] ) 04/06/2023   • Type 1 diabetes mellitus with hyperglycemia ([place] [address] ) 04/19/2022   • Diabetic polyneuropathy associated with type 1 diabetes mellitus ([place] [address] ) 04/19/2022   • Microalbuminuria due to type 1 diabetes mellitus ([place] [address] ) 04/19/2022   • Hypoglycemia unawareness associated with type 1 diabetes mellitus ([place] [address] ) 04/19/2022   • Cigarette nicotine dependence without complication 33/86/5837   • Gout of ankle 03/07/2022   • Chronic fatigue syndrome 02/21/2022   • Lower urinary tract symptoms due to benign prostatic hyperplasia 02/21/2022   • 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 ([place] [address] ) 02/21/2022   • Chronic pain 02/21/2022   • Insulin pump in place 09/07/2018   • Benign essential hypertension 01/17/2011   • Mixed hyperlipidemia 10/25/2010   • ED (erectile dysfunction) of organic origin 10/25/2010     He  reports that he has been smoking cigarettes  He has a 20 00 pack-year smoking history  He has never used smokeless tobacco  He reports current alcohol use  He reports that he does not use drugs    Current Outpatient Medications   Medication Sig Dispense Refill   • atorvastatin (LIPITOR) 40 mg tablet TAKE 1 TABLET BY MOUTH EVERY DAY 90 tablet 1   • colchicine (COLCRYS) 0 6 mg tablet Take 1 tablet (0 6 mg total) by mouth every 6 (six) hours as needed (gouty attack) Use as directed for gout flares 30 tablet 0   • Glucagon, rDNA, (Glucagon Emergency) 1 MG KIT as directed     • insulin lispro (HumaLOG) 100 units/mL injection Use via the insulin pump, use up to 80 units daily 80 mL 1   • lisinopril (ZESTRIL) 10 mg tablet Take 1 tablet (10 mg total) by mouth daily 90 tablet 1   • metoprolol succinate (TOPROL-XL) 100 mg 24 hr tablet Take 1 tablet (100 mg total) by mouth daily 90 tablet 1   • sulfamethoxazole-trimethoprim (BACTRIM DS) 800-160 mg per tablet Take 1 tablet by mouth every 12 (twelve) hours for 10 days 20 tablet 0     No current facility-administered medications for this visit  He is allergic to cefuroxime and amoxicillin-pot clavulanate       Review of Systems   Constitutional: Negative for chills and fever  HENT: Negative for congestion and sneezing  Respiratory: Negative for cough  Musculoskeletal: Negative for gait problem  Skin: Positive for wound  Psychiatric/Behavioral: Negative for agitation           Objective:       Wound 04/06/23 Diabetic Ulcer Foot Left (Active)   Wound Image Images linked 04/06/23 1524   Wound Description Dry;Epithelialization (open and closed blisters) 04/06/23 1501   Dee Dee-wound Assessment Dry 04/06/23 1501   Wound Length (cm) 5 cm 04/06/23 1501   Wound Width (cm) 5 3 cm 04/06/23 1501   Wound Depth (cm) 0 1 cm 04/06/23 1501   Wound Surface Area (cm^2) 26 5 cm^2 04/06/23 1501   Wound Volume (cm^3) 2 65 cm^3 04/06/23 1501   Calculated Wound Volume (cm^3) 2 65 cm^3 04/06/23 1501   Drainage Amount Scant 04/06/23 1501   Drainage Description Serous 04/06/23 1501   Non-staged Wound Description Full thickness 04/06/23 1501       /70   Pulse 100   Temp 98 8 °F (37 1 °C)   Resp 18   Ht 6' (1 829 m)   [facility] 68 kg (150 lb)   BMI 20 34 kg/m²     Physical Exam  Vitals reviewed  Constitutional:       General: He is not in acute distress  Appearance: Normal appearance  He is not ill-appearing, toxic-appearing or diaphoretic  HENT:      Head: Normocephalic and atraumatic  Right Ear: External ear normal       Left Ear: External ear normal    Eyes:      Conjunctiva/sclera: Conjunctivae normal    Cardiovascular:      Pulses:           Dorsalis pedis pulses are 2+ on the left side  Posterior tibial pulses are 2+ on the left side  Pulmonary:      Effort: Pulmonary effort is normal  No respiratory distress  Musculoskeletal:      Cervical back: Neck supple  Skin:     Comments: See wound assessment   Neurological:      Mental Status: He is alert  Gait: Gait normal    Psychiatric:         Mood and Affect: Mood normal          Behavior: Behavior normal            Wound 04/06/23 Diabetic Ulcer Foot Left (Active)   Wound Image   04/06/23 1524   Wound Description Dry;Epithelialization 04/06/23 1501   Dee Dee-wound Assessment Dry 04/06/23 1501   Wound Length (cm) 5 cm 04/06/23 1501   Wound Width (cm) 5 3 cm 04/06/23 1501   Wound Depth (cm) 0 1 cm 04/06/23 1501   Wound Surface Area (cm^2) 26 5 cm^2 04/06/23 1501   Wound Volume (cm^3) 2 65 cm^3 04/06/23 1501   Calculated Wound Volume (cm^3) 2 65 cm^3 04/06/23 1501   Drainage Amount Scant 04/06/23 1501   Drainage Description Serous 04/06/23 1501   Non-staged Wound Description Full thickness 04/06/23 1501                         Wound Instructions:  Orders Placed This Encounter   Procedures   • Wound cleansing and dressings     Wash your hands with soap and water  Remove old dressing, discard into plastic bag and place in trash  Cleanse the wound with mild soap and water prior to applying a clean dressing  Do not use tissue or cotton balls  Do not scrub the wound  Pat dry using gauze    Shower no (unless protected from water)     Apply silver alginate to the left foot wound  Cover with ABD  Secure with kerlix and tape  Change dressing every other day  This was done today  Standing Status:   Future     Standing Expiration Date:   4/6/2024   • Wound off loading     Off-loading Instructions:    Keep weight and pressure off wound as much as possible  Put pressure on left heel for walking  Standing Status:   Future     Standing Expiration Date:   4/6/2024   • Debridement     This order was created via procedure documentation        Diagnosis ICD-10-CM Associated Orders   1  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, with fat layer exposed ([facility] )  N91 526 Wound cleansing and dressings    L97 422 Wound off loading      2   Type 1 diabetes mellitus with diabetic neuropathy (HCC)  E10 40 Wound cleansing and dressings     Wound off loading

## 2023-04-12 ENCOUNTER — OFFICE VISIT (OUTPATIENT)
Dept: WOUND CARE | Facility: HOSPITAL | Age: 63
End: 2023-04-12

## 2023-04-12 VITALS
RESPIRATION RATE: 18 BRPM | TEMPERATURE: 98.5 F | SYSTOLIC BLOOD PRESSURE: 138 MMHG | HEART RATE: 88 BPM | DIASTOLIC BLOOD PRESSURE: 70 MMHG

## 2023-04-12 DIAGNOSIS — L97.522 DIABETIC ULCER OF OTHER PART OF LEFT FOOT ASSOCIATED WITH TYPE 1 DIABETES MELLITUS, WITH FAT LAYER EXPOSED (HCC): Primary | ICD-10-CM

## 2023-04-12 DIAGNOSIS — E10.40 TYPE 1 DIABETES MELLITUS WITH DIABETIC NEUROPATHY (HCC): ICD-10-CM

## 2023-04-12 DIAGNOSIS — E10.621 DIABETIC ULCER OF OTHER PART OF LEFT FOOT ASSOCIATED WITH TYPE 1 DIABETES MELLITUS, WITH FAT LAYER EXPOSED (HCC): Primary | ICD-10-CM

## 2023-04-12 RX ORDER — LIDOCAINE 40 MG/G
CREAM TOPICAL ONCE
Status: COMPLETED | OUTPATIENT
Start: 2023-04-12 | End: 2023-04-12

## 2023-04-12 RX ADMIN — LIDOCAINE: 40 CREAM TOPICAL at 10:03

## 2023-04-12 NOTE — PATIENT INSTRUCTIONS
Orders Placed This Encounter   Procedures    Wound cleansing and dressings     LEFT PLANTAR FOOT    Wash your hands with soap and water  Remove old dressing, discard into plastic bag and place in trash  Cleanse the wound with mild soap and water prior to applying a clean dressing  Do not use tissue or cotton balls  Do not scrub the wound  Pat dry using gauze  Shower no (unless protected from water)     Apply DERMAGRANto the left foot wound  Cover with GAUZE  Secure with MEDFIX TAPE OR CELI AND TAPE  Change dressing every other day  Dressing above applied today at [facility]  Next appt  In 1 weeks  Bring left shoe and insert to next visit  Standing Status:   Future     Standing Expiration Date:   4/12/2024    Wound off loading     Off-loading Instructions:     Keep weight and pressure off wound as much as possible  Put pressure on left heel for walking       Standing Status:   Future     Standing Expiration Date:   4/12/2024

## 2023-04-12 NOTE — PROGRESS NOTES
"Patient ID: Beck Villalba is a 66 y o  male Date of Birth 1960     Chief Complaint  Chief Complaint   Patient presents with   • Follow Up Wound Care Visit     Left foot wound       Allergies  Cefuroxime and Amoxicillin-pot clavulanate    Assessment: :    Diabetic ulcer of other part of left foot associated with type 1 diabetes mellitus, with fat layer exposed ([address] )  -     lidocaine (LMX) 4 % cream  -     Wound cleansing and dressings; Future  -     Wound off loading; Future    Type 1 diabetes mellitus with diabetic neuropathy (HCC)  -     Encouraged good blood sugar management and regular diabetic foot care for preventing pedal complications     Other orders  -     Debridement      Plan:  Dermagran gauze every other day left foot  Follow-up in 2 weeks  Recommend patient obtain diabetic shoe gear  Call if any signs of infection are noted  Debridement   Universal Protocol:  Consent: Verbal consent obtained  Risks and benefits: risks, benefits and alternatives were discussed  Consent given by: patient  Time out: Immediately prior to procedure a \"time out\" was called to verify the correct patient, procedure, equipment, support staff and site/side marked as required    Patient understanding: patient states understanding of the procedure being performed  Patient identity confirmed: verbally with patient      Performed by: physician  Debridement type: surgical  Level of debridement: subcutaneous tissue  Pain control: lidocaine 4%  Post-debridement measurements  Length (cm): 0 7  Width (cm): 0 8  Depth (cm): 0 2  Percent debrided: 100%  Surface Area (cm^2): 0 56  Area debrided (cm^2): 0 56  Volume (cm^3): 0 11  Tissue and other material debrided: subcutaneous tissue  Devitalized tissue debrided: callus, necrotic debris and slough  Instrument(s) utilized: nippers, curette and blade  Bleeding: medium  Hemostasis obtained with: pressure  Procedural pain (0-10): 3  Post-procedural pain: 3   Response to " treatment: procedure was tolerated well           Wound 04/06/23 Diabetic Ulcer Foot Left (Active)   Wound Image Images linked 04/12/23 1050   Wound Description Slough;Pink;Granulation tissue; Yellow 04/12/23 0959   Dee Dee-wound Assessment Callus;Dry 04/12/23 0959   Wound Length (cm) 0 7 cm 04/12/23 0959   Wound Width (cm) 0 8 cm 04/12/23 0959   Wound Depth (cm) 0 1 cm 04/12/23 0959   Wound Surface Area (cm^2) 0 56 cm^2 04/12/23 0959   Wound Volume (cm^3) 0 056 cm^3 04/12/23 0959   Calculated Wound Volume (cm^3) 0 06 cm^3 04/12/23 0959   Change in Wound Size % 97 74 04/12/23 0959   Drainage Amount Small 04/12/23 0959   Drainage Description Serosanguineous 04/12/23 0959   Non-staged Wound Description Full thickness 04/12/23 0959   Dressing Status Intact 04/12/23 0959       Wound 04/06/23 Diabetic Ulcer Foot Left (Active)   Date First Assessed/Time First Assessed: 04/06/23 1501   Primary Wound Type: Diabetic Ulcer  Location: Foot  Wound Location Orientation: Left       Subjective:           4/12/2023: 66year-old type I diabetic presents for evaluation and treatment of recurrent DFU left forefoot  Patient saw Dr Pernell Hanna 1 week ago and was referred for continued care  Patient has not yet obtained his new diabetic shoes which were ordered back in January  Patient reports his wound has diminished in size since he was last seen a week ago  Reports his blood sugars average between 70 and 200 and he currently has an insulin pump  4/6/2023: (Dr Pernell Hanna) Patient presents for initial evaluation of a diabetic foot ulcer of the left midfoot that he noticed about 4-5 days ago  Has not been putting anything on the wound itself  Saw PCP who did a culture which grew MRSA  PCP started doxycycline 3 days ago which she changed to Bactrim based on culture results today  Patient works as a     He had a wound in this area that was healed as of January 2023 after which time he saw Dr Anne Rivero and diabetic shoes were ordered but patient did not obtain these yet  Blood sugars are typically in the low to mid 100s  The following portions of the patient's history were reviewed and updated as appropriate: allergies, current medications, past family history, past medical history, past social history, past surgical history and problem list     Review of Systems   Constitutional: Negative for chills and fever  HENT: Negative for ear pain and sore throat  Eyes: Negative for pain and visual disturbance  Respiratory: Negative for cough and shortness of breath  Cardiovascular: Negative for chest pain and palpitations  Gastrointestinal: Negative for abdominal pain and vomiting  Endocrine:        Type 1 diabetes   Genitourinary: Negative for dysuria and hematuria  Musculoskeletal: Negative for arthralgias and back pain  Skin: Positive for wound (Diabetic ulcer left forefoot)  Negative for color change and rash  Neurological: Positive for numbness  Negative for seizures and syncope  All other systems reviewed and are negative  Objective:       Wound 04/06/23 Diabetic Ulcer Foot Left (Active)   Wound Image Images linked 04/12/23 1050   Wound Description Slough;Pink;Granulation tissue; Yellow 04/12/23 0959   Dee Dee-wound Assessment Callus;Dry 04/12/23 0959   Wound Length (cm) 0 7 cm 04/12/23 0959   Wound Width (cm) 0 8 cm 04/12/23 0959   Wound Depth (cm) 0 1 cm 04/12/23 0959   Wound Surface Area (cm^2) 0 56 cm^2 04/12/23 0959   Wound Volume (cm^3) 0 056 cm^3 04/12/23 0959   Calculated Wound Volume (cm^3) 0 06 cm^3 04/12/23 0959   Change in Wound Size % 97 74 04/12/23 0959   Drainage Amount Small 04/12/23 0959   Drainage Description Serosanguineous 04/12/23 0959   Non-staged Wound Description Full thickness 04/12/23 0959   Dressing Status Intact 04/12/23 0959                 /70   Pulse 88   Temp 98 5 °F (36 9 °C)   Resp 18     Physical Exam  Constitutional:       Appearance: Normal appearance   He is normal weight  HENT:      Head: Normocephalic  Right Ear: Tympanic membrane normal       Left Ear: Tympanic membrane normal       Nose: No congestion  Mouth/Throat:      Pharynx: No oropharyngeal exudate or posterior oropharyngeal erythema  Eyes:      Extraocular Movements: Extraocular movements intact  Conjunctiva/sclera: Conjunctivae normal       Pupils: Pupils are equal, round, and reactive to light  Cardiovascular:      Rate and Rhythm: Normal rate and regular rhythm  Pulses:           Dorsalis pedis pulses are 1+ on the right side and 1+ on the left side  Posterior tibial pulses are 0 on the right side and 0 on the left side  Pulmonary:      Effort: Pulmonary effort is normal    Musculoskeletal:         General: Normal range of motion  Feet:      Right foot:      Protective Sensation: 10 sites tested  10 sites sensed  Toenail Condition: Right toenails are abnormally thick  Fungal disease present  Left foot:      Protective Sensation: 10 sites tested  10 sites sensed  Skin integrity: Ulcer (Left foot Sub first MPJ full-thickness ulcerative breakdown, no signs of infection, hyperkeratotic margin, diminished size when compared to measurements from last week ) present  Toenail Condition: Left toenails are abnormally thick  Fungal disease present  Skin:     General: Skin is warm and dry  Capillary Refill: Capillary refill takes 2 to 3 seconds  Coloration: Skin is not pale  Findings: No bruising, erythema, lesion or rash  Comments:   Diminished texture tone and turgor bilateral, no digital hair noted  Moderate atrophic changes noted  Neurological:      Mental Status: He is alert and oriented to person, place, and time  Cranial Nerves: No cranial nerve deficit  Sensory: Sensory deficit (No vibratory sensation appreciated  Monofilament intact ) present  Motor: No weakness        Gait: Gait normal       Deep Tendon Reflexes: Reflexes normal    Psychiatric:         Mood and Affect: Mood normal          Behavior: Behavior normal          Judgment: Judgment normal            Wound Instructions:  Orders Placed This Encounter   Procedures   • Wound cleansing and dressings     LEFT PLANTAR FOOT    Wash your hands with soap and water  Remove old dressing, discard into plastic bag and place in trash  Cleanse the wound with mild soap and water prior to applying a clean dressing  Do not use tissue or cotton balls  Do not scrub the wound  Pat dry using gauze  Shower no (unless protected from water)     Apply DERMAGRANto the left foot wound  Cover with GAUZE  Secure with MEDFIX TAPE OR CELI AND TAPE  Change dressing every other day      Dressing above applied today at [facility]  Next appt  In 1 weeks  Bring left shoe and insert to next visit  Standing Status:   Future     Standing Expiration Date:   4/12/2024   • Wound off loading     Off-loading Instructions:     Keep weight and pressure off wound as much as possible  Put pressure on left heel for walking  Standing Status:   Future     Standing Expiration Date:   4/12/2024   • Debridement     This order was created via procedure documentation        Diagnosis ICD-10-CM Associated Orders   1  Diabetic ulcer of other part of left foot associated with type 1 diabetes mellitus, with fat layer exposed ([facility] )  E10 621 lidocaine (LMX) 4 % cream    L97 522 Wound cleansing and dressings     Wound off loading     Debridement      2   Type 1 diabetes mellitus with diabetic neuropathy (HCC)  E10 40

## 2023-04-12 NOTE — LETTER
April 12, 2023     Patient: Connie Espinoza  YOB: 1960  Date of Visit: 4/12/2023      To Whom it May Concern:    Connie Espinoza is under my professional care  Asaf Johnson was seen in my office on 4/12/2023  Asaf Johnson may return to work on April 25  This is assuming that his foot has adequately healed to withstand the stress/pressure of standing on his foot for 8 to 9 hours a day  Next appointment is scheduled for April 24  If you have any questions or concerns, please don't hesitate to call           Sincerely,          Shiloh Drake DPM        CC: No Recipients

## 2023-04-24 ENCOUNTER — OFFICE VISIT (OUTPATIENT)
Dept: FAMILY MEDICINE CLINIC | Facility: HOSPITAL | Age: 63
End: 2023-04-24

## 2023-04-24 ENCOUNTER — OFFICE VISIT (OUTPATIENT)
Dept: WOUND CARE | Facility: HOSPITAL | Age: 63
End: 2023-04-24

## 2023-04-24 VITALS
DIASTOLIC BLOOD PRESSURE: 78 MMHG | HEIGHT: 72 IN | WEIGHT: 151.4 LBS | SYSTOLIC BLOOD PRESSURE: 158 MMHG | HEART RATE: 80 BPM | TEMPERATURE: 98.2 F | BODY MASS INDEX: 20.51 KG/M2

## 2023-04-24 VITALS
DIASTOLIC BLOOD PRESSURE: 78 MMHG | TEMPERATURE: 97.6 F | SYSTOLIC BLOOD PRESSURE: 142 MMHG | RESPIRATION RATE: 18 BRPM | HEART RATE: 76 BPM

## 2023-04-24 DIAGNOSIS — E10.621 DIABETIC ULCER OF OTHER PART OF LEFT FOOT ASSOCIATED WITH TYPE 1 DIABETES MELLITUS, WITH FAT LAYER EXPOSED (HCC): Primary | ICD-10-CM

## 2023-04-24 DIAGNOSIS — L97.522 DIABETIC ULCER OF OTHER PART OF LEFT FOOT ASSOCIATED WITH TYPE 1 DIABETES MELLITUS, WITH FAT LAYER EXPOSED (HCC): Primary | ICD-10-CM

## 2023-04-24 DIAGNOSIS — R23.3 EASY BRUISING: Primary | ICD-10-CM

## 2023-04-24 DIAGNOSIS — E10.40 TYPE 1 DIABETES MELLITUS WITH DIABETIC NEUROPATHY (HCC): ICD-10-CM

## 2023-04-24 DIAGNOSIS — F17.210 CIGARETTE NICOTINE DEPENDENCE WITHOUT COMPLICATION: ICD-10-CM

## 2023-04-24 RX ORDER — INSULIN LISPRO 100 [IU]/ML
INJECTION, SOLUTION INTRAVENOUS; SUBCUTANEOUS
COMMUNITY
Start: 2023-03-31

## 2023-04-24 RX ORDER — LIDOCAINE 40 MG/G
CREAM TOPICAL ONCE
Status: COMPLETED | OUTPATIENT
Start: 2023-04-24 | End: 2023-04-24

## 2023-04-24 RX ADMIN — LIDOCAINE: 40 CREAM TOPICAL at 14:14

## 2023-04-24 NOTE — PROGRESS NOTES
Name: Sweta Lo      : 1960      MRN: [id number]  Encounter Provider: KAMILLE Flores  Encounter Date: 2023   Encounter department: [address] [place] [address]  203     Assessment & Plan     1  Easy bruising  Comments:  evaluate further w/labs as ordered & will determine further plan pending results  Orders:  -     CBC and differential; Future  -     Comprehensive metabolic panel; Future  -     Sedimentation rate, automated; Future  -     Protime-INR; Future  -     APTT; Future    2  Cigarette nicotine dependence without complication  Assessment & Plan:  Previously ordered lung CT scan order reprinted and advise he call to schedule           Subjective      Woke with bruised left arm 2 days ago  Denies injury or reason for bruises  Has had bruises on arms previously for no reason  States they normally resolve in about a week  No bruising elsewhere  No bleeding elsewhere - from nose, gums or in stool  Denies aspirin or blood thinners  Takes advil once/day for his shoulder  Last labs done in March for diabetes  States A1C is finally good  Takes all meds as rx'd  No other vitamins or supplements  Review of Systems   HENT: Negative for nosebleeds  Gastrointestinal: Negative for blood in stool  Hematological: Bruises/bleeds easily         Current Outpatient Medications on File Prior to Visit   Medication Sig   • atorvastatin (LIPITOR) 40 mg tablet TAKE 1 TABLET BY MOUTH EVERY DAY   • Glucagon, rDNA, (Glucagon Emergency) 1 MG KIT as directed   • HumaLOG 100 UNIT/ML injection inject up to 80 units in INSULIN PUMP daily as directed by prescriber   • insulin lispro (HumaLOG) 100 units/mL injection Use via the insulin pump, use up to 80 units daily   • lisinopril (ZESTRIL) 10 mg tablet Take 1 tablet (10 mg total) by mouth daily   • metoprolol succinate (TOPROL-XL) 100 mg 24 hr tablet Take 1 tablet (100 mg total) by mouth daily   • colchicine (COLCRYS) 0 6 mg tablet Take 1 tablet (0 6 mg total) by mouth every 6 (six) hours as needed (gouty attack) Use as directed for gout flares (Patient not taking: Reported on 4/24/2023)       Objective     /78   Pulse 80   Temp 98 2 °F (36 8 °C)   Ht 6' (1 829 m)   Wt 68 7 kg (151 lb 6 4 oz)   BMI 20 53 kg/m²       Physical Exam  Vitals reviewed  Constitutional:       General: He is not in acute distress  Appearance: Normal appearance  Eyes:      General: No scleral icterus  Pulmonary:      Effort: Pulmonary effort is normal  No respiratory distress  Comments: No cough  Skin:     General: Skin is warm and dry  Findings: Bruising present  Neurological:      General: No focal deficit present  Mental Status: He is alert and oriented to person, place, and time     Psychiatric:         Mood and Affect: Mood normal           KAMILLE Zarate

## 2023-04-24 NOTE — PATIENT INSTRUCTIONS
Orders Placed This Encounter   Procedures    Wound cleansing and dressings     Wound cleansing and dressings      LEFT PLANTAR FOOT     Wash your hands with soap and water  Remove old dressing, discard into plastic bag and place in trash  Cleanse the wound with mild soap and water prior to applying a clean dressing  Do not use tissue or cotton balls  Do not scrub the wound  Pat dry using gauze  Shower no (unless protected from water)     [address] the left foot wound  Cover with GAUZE  Secure with MEDFIX TAPE OR CELI AND TAPE  Change dressing every other day  Dressing above applied today at [facility]  Next appt  In 2 weeks          Standing Status:   Future     Standing Expiration Date:   4/24/2024

## 2023-04-24 NOTE — PROGRESS NOTES
"Patient ID: Jude Meng is a 66 y o  male Date of Birth 1960     Chief Complaint  Chief Complaint   Patient presents with   • Follow Up Wound Care Visit     Wound care       Allergies  Cefuroxime and Amoxicillin-pot clavulanate    Assessment:    Diabetic ulcer of other part of left foot associated with type 1 diabetes mellitus, with fat layer exposed ([address] )  -     Wound cleansing and dressings; Future  -     lidocaine (LMX) 4 % cream    Type 1 diabetes mellitus with diabetic neuropathy (HCC)    Plan:  Puracol/Dermagran dressing to be applied every other day  Continue with diabetic shoes for proper offloading  Follow-up in 1 week  Return to work next Monday though patient is advised that should the wound start to increase in size that we will have to revisit that decision  Debridement   Universal Protocol:  Consent: Verbal consent obtained  Risks and benefits: risks, benefits and alternatives were discussed  Consent given by: patient  Time out: Immediately prior to procedure a \"time out\" was called to verify the correct patient, procedure, equipment, support staff and site/side marked as required    Patient understanding: patient states understanding of the procedure being performed  Patient identity confirmed: verbally with patient      Performed by: physician  Debridement type: surgical  Level of debridement: subcutaneous tissue  Pain control: lidocaine 4%  Post-debridement measurements  Length (cm): 0 8  Width (cm): 0 8  Depth (cm): 0 2  Percent debrided: 100%  Surface Area (cm^2): 0 64  Area debrided (cm^2): 0 64  Volume (cm^3): 0 13  Tissue and other material debrided: subcutaneous tissue  Devitalized tissue debrided: callus, necrotic debris and slough  Instrument(s) utilized: nippers, curette and blade  Bleeding: medium  Hemostasis obtained with: pressure  Procedural pain (0-10): 3  Post-procedural pain: 3   Response to treatment: procedure was tolerated well           Wound 04/06/23 Diabetic " Ulcer Foot Left (Active)   Wound Image Images linked 04/24/23 1354   Wound Description Pink;Granulation tissue 04/24/23 1400   Dee Dee-wound Assessment Callus 04/24/23 1354   Wound Length (cm) 0 8 cm 04/24/23 1400   Wound Width (cm) 0 8 cm 04/24/23 1400   Wound Depth (cm) 0 2 cm 04/24/23 1400   Wound Surface Area (cm^2) 0 64 cm^2 04/24/23 1400   Wound Volume (cm^3) 0 128 cm^3 04/24/23 1400   Calculated Wound Volume (cm^3) 0 13 cm^3 04/24/23 1400   Change in Wound Size % 95 09 04/24/23 1400   Drainage Amount Small 04/24/23 1354   Drainage Description Serosanguineous 04/24/23 1354   Non-staged Wound Description Full thickness 04/24/23 1354   Treatments Cleansed 04/24/23 1354   Dressing Changed Changed 04/24/23 1354   Patient Tolerance Tolerated well 04/24/23 1354   Dressing Status Intact 04/24/23 1354       Wound 04/06/23 Diabetic Ulcer Foot Left (Active)   Date First Assessed/Time First Assessed: 04/06/23 1501   Primary Wound Type: Diabetic Ulcer  Location: Foot  Wound Location Orientation: Left       Subjective:           4/24/2023: 66year-old type I diabetic seen for follow-up chronic DFU left forefoot Sub first MPJ  Reports good progress and has since acquiring new diabetic shoes  Request return to work next Monday 4/12/2023: 53-year-old type I diabetic presents for evaluation and treatment of recurrent DFU left forefoot  Patient saw Dr Sarah Perry 1 week ago and was referred for continued care  Patient has not yet obtained his new diabetic shoes which were ordered back in January  Patient reports his wound has diminished in size since he was last seen a week ago  Reports his blood sugars average between 70 and 200 and he currently has an insulin pump  4/6/2023: (Dr Sarah Perry) Patient presents for initial evaluation of a diabetic foot ulcer of the left midfoot that he noticed about 4-5 days ago  Has not been putting anything on the wound itself  Saw PCP who did a culture which grew MRSA    PCP started doxycycline 3 days ago which she changed to Bactrim based on culture results today  Patient works as a   He had a wound in this area that was healed as of January 2023 after which time he saw Dr Veronika Zheng and diabetic shoes were ordered but patient did not obtain these yet  Blood sugars are typically in the low to mid 100s  The following portions of the patient's history were reviewed and updated as appropriate: allergies, current medications, past family history, past medical history, past social history, past surgical history and problem list     Review of Systems   Constitutional: Negative for chills and fever  HENT: Negative for ear pain and sore throat  Eyes: Negative for pain and visual disturbance  Respiratory: Negative for cough and shortness of breath  Cardiovascular: Negative for chest pain and palpitations  Gastrointestinal: Negative for abdominal pain and vomiting  Endocrine:        Type 1 diabetes   Genitourinary: Negative for dysuria and hematuria  Musculoskeletal: Negative for arthralgias and back pain  Skin: Positive for wound (Diabetic ulcer left forefoot)  Negative for color change and rash  Neurological: Positive for numbness  Negative for seizures and syncope  All other systems reviewed and are negative          Objective:       Wound 04/06/23 Diabetic Ulcer Foot Left (Active)   Wound Image Images linked 04/24/23 1354   Wound Description Pink;Granulation tissue 04/24/23 1400   Dee Dee-wound Assessment Callus 04/24/23 1354   Wound Length (cm) 0 8 cm 04/24/23 1400   Wound Width (cm) 0 8 cm 04/24/23 1400   Wound Depth (cm) 0 2 cm 04/24/23 1400   Wound Surface Area (cm^2) 0 64 cm^2 04/24/23 1400   Wound Volume (cm^3) 0 128 cm^3 04/24/23 1400   Calculated Wound Volume (cm^3) 0 13 cm^3 04/24/23 1400   Change in Wound Size % 95 09 04/24/23 1400   Drainage Amount Small 04/24/23 1354   Drainage Description Serosanguineous 04/24/23 1354   Non-staged Wound Description Full thickness 04/24/23 1354   Treatments Cleansed 04/24/23 1354   Dressing Changed Changed 04/24/23 1354   Patient Tolerance Tolerated well 04/24/23 1354   Dressing Status Intact 04/24/23 1354       /78   Pulse 76   Temp 97 6 °F (36 4 °C)   Resp 18     Physical Exam  Constitutional:       Appearance: Normal appearance  He is normal weight  HENT:      Head: Normocephalic  Right Ear: Tympanic membrane normal       Left Ear: Tympanic membrane normal       Nose: No congestion  Mouth/Throat:      Pharynx: No oropharyngeal exudate or posterior oropharyngeal erythema  Eyes:      Extraocular Movements: Extraocular movements intact  Conjunctiva/sclera: Conjunctivae normal       Pupils: Pupils are equal, round, and reactive to light  Cardiovascular:      Rate and Rhythm: Normal rate and regular rhythm  Pulses:           Dorsalis pedis pulses are 1+ on the right side and 1+ on the left side  Posterior tibial pulses are 0 on the right side and 0 on the left side  Pulmonary:      Effort: Pulmonary effort is normal    Musculoskeletal:         General: Normal range of motion  Feet:      Right foot:      Protective Sensation: 10 sites tested  10 sites sensed  Toenail Condition: Right toenails are abnormally thick  Fungal disease present  Left foot:      Protective Sensation: 10 sites tested  10 sites sensed  Skin integrity: Ulcer (Left foot Sub first MPJ SQ breakdown, no signs of infection, hyperkeratotic margin, diminished size (0 3 x 0 3 x 0 1 cm) when compared to measurements from last week ) present  Toenail Condition: Left toenails are abnormally thick  Fungal disease present  Skin:     General: Skin is warm and dry  Capillary Refill: Capillary refill takes 2 to 3 seconds  Coloration: Skin is not pale  Findings: No bruising, erythema, lesion or rash  Comments:   Diminished texture tone and turgor bilateral, no digital hair noted    Moderate atrophic changes noted  Neurological:      Mental Status: He is alert and oriented to person, place, and time  Cranial Nerves: No cranial nerve deficit  Sensory: Sensory deficit (No vibratory sensation appreciated  Monofilament intact ) present  Motor: No weakness  Gait: Gait normal       Deep Tendon Reflexes: Reflexes normal    Psychiatric:         Mood and Affect: Mood normal          Behavior: Behavior normal          Judgment: Judgment normal            Wound Instructions:  Orders Placed This Encounter   Procedures   • Wound cleansing and dressings     Wound cleansing and dressings      LEFT PLANTAR FOOT     Wash your hands with soap and water  Remove old dressing, discard into plastic bag and place in trash  Cleanse the wound with mild soap and water prior to applying a clean dressing  Do not use tissue or cotton balls  Do not scrub the wound  Pat dry using gauze  Shower no (unless protected from water)     [address] the left foot wound  Cover with GAUZE  Secure with MEDFIX TAPE OR CELI AND TAPE  Change dressing every other day      Dressing above applied today at [facility]  Next appt  In 2 weeks         Standing Status:   Future     Standing Expiration Date:   4/24/2024   • Debridement     This order was created via procedure documentation        Diagnosis ICD-10-CM Associated Orders   1  Diabetic ulcer of other part of left foot associated with type 1 diabetes mellitus, with fat layer exposed ([facility] )  N65 582 Wound cleansing and dressings    L97 522 lidocaine (LMX) 4 % cream     Debridement      2   Type 1 diabetes mellitus with diabetic neuropathy (HCC)  E10 40

## 2023-04-24 NOTE — LETTER
April 24, 2023     Patient: Nakul Valerio  YOB: 1960  Date of Visit: 4/24/2023      To Whom it May Concern:    Nakul Valerio is under my professional care  Elba Mohr was seen in my office on 4/24/2023  Elba Mohr may return to work on May 1, 2023  If you have any questions or concerns, please don't hesitate to call           Sincerely,          Laurie Alvarado DPM        CC: No Recipients

## 2023-04-28 ENCOUNTER — TELEPHONE (OUTPATIENT)
Dept: FAMILY MEDICINE CLINIC | Facility: HOSPITAL | Age: 63
End: 2023-04-28

## 2023-05-01 ENCOUNTER — EVALUATION (OUTPATIENT)
Dept: PHYSICAL THERAPY | Facility: CLINIC | Age: 63
End: 2023-05-01

## 2023-05-01 DIAGNOSIS — M25.512 ACUTE PAIN OF LEFT SHOULDER: ICD-10-CM

## 2023-05-01 NOTE — PROGRESS NOTES
PT Evaluation     Today's date: 2023  Patient name: Bautista Ji  : 1960  MRN: [id number]  Referring provider: Breanna Garcia MD  Dx:   Encounter Diagnosis     ICD-10-CM    1  Acute pain of left shoulder  M25 512 Ambulatory referral to Physical Therapy                     Assessment  Assessment details: Bautista Ji is a pleasant 66 y o  male who presents with acute left shoulder pain beginning about 6 weeks ago  They have a primary movement diagnosis of left shoulder hypomobility resulting in limited shoulder abduction, flexion, and external rotation ROM and strength limiting his ability to perform household tasks, ADLs, 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  These signs and symptoms are consistent with Acute pain of left shoulder and possible RTC pathology  He will benefit from skilled PT to address impairments and return to PLOF        Impairments: abnormal or restricted ROM, activity intolerance, impaired physical strength, lacks appropriate home exercise program, pain with function and poor body mechanics  Prognosis details: Positive prognostic indicators include positive attitude toward recovery, motivated to improve, high self-efficacy, good understanding of condition, realistic expectations  Negative prognostic indicators include high symptom irritability      Goals  STG to be met in 4 weeks  -Patient will be independent with HEP  -Patient will have full L shoulder AROM with little pain  -Patient will have improved L shoulder strength to 4+/5    LTG to be met in 8 weeks  -Patient will be able to complete work tasks with little pain/difficulty  -Patient will be able to open doors with little pain  -Patient will be able to complete daily activities with little pain    Plan  Plan details: Prognosis above based on 2x/week tapering to 1x/week over the next 8 weeks  Patient would benefit from: skilled physical therapy  Planned modality interventions: cryotherapy and thermotherapy: hydrocollator packs  Planned therapy interventions: balance, home exercise program, gait training, functional ROM exercises, body mechanics training, joint mobilization, manual therapy, neuromuscular re-education, therapeutic exercise, therapeutic activities, stretching, strengthening and patient education  Plan of Care beginning date: 2023  Plan of Care expiration date: 2023  Treatment plan discussed with: patient        Subjective Evaluation    History of Present Illness  Date of onset: 3/20/2023  Mechanism of injury: Patient reports onset of L shoulder pain beginning about 6 weeks ago  Notes no trauma or injury - he notes waking up one morning with shoulder pain  Notes a constant throbbing pain in his shoulder that worsens with movement like opening his car door, reaching overhead, and doing any daily or work tasks with his left arm  He is right handing  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 [employer]    He had a subacromial bursa injection on 3/22 which provided no relief  Pain  Current pain rating: 3  At worst pain ratin  Quality: dull ache      Diagnostic Tests  No diagnostic tests performed  Treatments  Previous treatment: injection treatment  Patient Goals  Patient goal: Patient would like to make his shoulder feel better  Objective     Tenderness     Additional Tenderness Details  TTP to L posterolateral arm    Active Range of Motion   Cervical/Thoracic Spine       Cervical    Flexion:  WFL  Extension:  WFL  Left rotation:  WFL  Right rotation:  [facility]  Left Shoulder   Flexion: with pain  Abduction: with pain  External rotation 90°: with pain    Right Shoulder   Normal active range of motion    Additional Active Range of Motion Details  Mildly limited L shoulder ER, flexion, and abduction primarily due to pain  No change with distraction or posterior glide    Joint Play   Left Shoulder  Hypomobile in the posterior capsule      Strength/Myotome Testing     Left Shoulder     Planes of Motion   Flexion: 4 (pain)   Abduction: 3+ (pain)   External rotation at 0°: 4- (pain)   Internal rotation at 0°: 5     Right Shoulder     Planes of Motion   Flexion: 5   Abduction: 5   External rotation at 0°: 5   Internal rotation at 0°: 5     Left Elbow   Flexion: 5  Extension: 5 (pain)    Right Elbow   Flexion: 5  Extension: 5             Precautions: Diabetes, HTN  Functional Limitations: opening doors, household chores, daily tasks, work tasks  Impairments: left shoulder mobility and strength  [facility] [id number]   POC expiration: 6/26/2023      Manuals 5/1            L shoulder PROM                                                    Neuro Re-Ed                                                                                                        Ther Ex             Posterior capsule stretch HEP            Doorway pec stretch HEP                                                                                          Ther Activity                                       Gait Training                                       Modalities

## 2023-05-04 ENCOUNTER — OFFICE VISIT (OUTPATIENT)
Dept: PHYSICAL THERAPY | Facility: CLINIC | Age: 63
End: 2023-05-04

## 2023-05-04 DIAGNOSIS — M25.512 ACUTE PAIN OF LEFT SHOULDER: Primary | ICD-10-CM

## 2023-05-04 NOTE — PROGRESS NOTES
"Daily Note     Today's date: 2023  Patient name: Julieta Vaz  : 1960  MRN: [id number]  Referring provider: Ruth Ramirez MD  Dx:   Encounter Diagnosis     ICD-10-CM    1  Acute pain of left shoulder  M25 512                      Subjective: Patient reports shoulder is about the same - feels sore overall  No issues with HEP      Objective: See treatment diary below      Assessment: Patient tolerated treatment fair today  Soreness in left lateral arm reported with STM to this area  He tolerated joint mobilizations well overall  Report of pain with theraband shoulder extension - performed isometric exercises instead which he reported increased soreness with shoulder abduction isometric  No changes to HEP   Will benefit from continued skilled PT to address impairments to return to PLOF      Plan: Continue per POC     Precautions: Diabetes, HTN  Functional Limitations: opening doors, household chores, daily tasks, work tasks  Impairments: left shoulder mobility and strength  [facility] [employer] Code: [id number]   POC expiration: 2023      Manuals  5/           L shoulder PROM  4'                                                  Neuro Re-Ed                                                                                                        Ther Ex             Posterior capsule stretch HEP            Doorway pec stretch HEP            TB row  GTB 2x10           TB shld  Hold, pain           shld flex and abd iso  5\"x10                                                  Ther Activity                                       Gait Training                                       Modalities                                            "

## 2023-05-08 ENCOUNTER — OFFICE VISIT (OUTPATIENT)
Dept: WOUND CARE | Facility: HOSPITAL | Age: 63
End: 2023-05-08

## 2023-05-08 VITALS — HEART RATE: 68 BPM | TEMPERATURE: 98.5 F | RESPIRATION RATE: 18 BRPM

## 2023-05-08 DIAGNOSIS — L97.522 DIABETIC ULCER OF OTHER PART OF LEFT FOOT ASSOCIATED WITH TYPE 1 DIABETES MELLITUS, WITH FAT LAYER EXPOSED (HCC): Primary | ICD-10-CM

## 2023-05-08 DIAGNOSIS — S91.312A TEAR OF SKIN OF PLANTAR ASPECT OF LEFT FOOT, INITIAL ENCOUNTER: ICD-10-CM

## 2023-05-08 DIAGNOSIS — E10.621 DIABETIC ULCER OF OTHER PART OF LEFT FOOT ASSOCIATED WITH TYPE 1 DIABETES MELLITUS, WITH FAT LAYER EXPOSED (HCC): Primary | ICD-10-CM

## 2023-05-08 PROBLEM — L97.509 DIABETIC FOOT ULCER (HCC): Status: ACTIVE | Noted: 2023-05-08

## 2023-05-08 PROBLEM — E11.621 DIABETIC FOOT ULCER (HCC): Status: ACTIVE | Noted: 2023-05-08

## 2023-05-08 RX ORDER — LIDOCAINE HYDROCHLORIDE 40 MG/ML
5 SOLUTION TOPICAL ONCE
Status: COMPLETED | OUTPATIENT
Start: 2023-05-08 | End: 2023-05-08

## 2023-05-08 RX ADMIN — LIDOCAINE HYDROCHLORIDE 5 ML: 40 SOLUTION TOPICAL at 15:39

## 2023-05-08 NOTE — PROGRESS NOTES
"Patient ID: Diego Lewis is a 66 y o  male Date of Birth 1960     Chief Complaint  Chief Complaint   Patient presents with   • Follow Up Wound Care Visit     WOUND CARE       Allergies  Cefuroxime and Amoxicillin-pot clavulanate    Assessment:    Diabetic ulcer of other part of left foot associated with type 1 diabetes mellitus, with fat layer exposed ([address] )  -     lidocaine (XYLOCAINE) 4 % topical solution 5 mL  -     Wound cleansing and dressings; Future    Tear of skin of plantar aspect of left foot, initial encounter  -     Wound cleansing and dressings; Future    Other orders  -     Debridement  - Cam Boot      Plan:  Puracol/Dermagran gauze dressing every other day for both wounds  Felt accommodative padding applied to offload avoiding skin tear  Follow-up in 2 weeks  Rx Cam walker which should help offload his foot, patient should wear this to work instead of his regular diabetic shoes which did not proved to be adequate offloading for the past 2 weeks  Request patient speak with his employer to help facilitate an easier schedule for him, returning to work and going 6 days straight was too much for his foot to handle  Will add accommodative padding to CAM Walker to offload further Sub first MPJ left foot          Debridement   Universal Protocol:  Consent: Verbal consent obtained  Risks and benefits: risks, benefits and alternatives were discussed  Consent given by: patient  Time out: Immediately prior to procedure a \"time out\" was called to verify the correct patient, procedure, equipment, support staff and site/side marked as required    Patient understanding: patient states understanding of the procedure being performed  Patient identity confirmed: verbally with patient      Performed by: physician  Debridement type: surgical  Level of debridement: subcutaneous tissue  Pain control: lidocaine 4%  Post-debridement measurements  Length (cm): 1 2  Width (cm): 1  Depth (cm): 0 3  Percent debrided: " 100%  Surface Area (cm^2): 1 2  Area debrided (cm^2): 1 2  Volume (cm^3): 0 36  Tissue and other material debrided: subcutaneous tissue  Devitalized tissue debrided: callus, necrotic debris and slough  Instrument(s) utilized: nippers, curette and blade  Bleeding: small  Hemostasis obtained with: pressure  Procedural pain (0-10): 3  Post-procedural pain: 3   Response to treatment: procedure was tolerated well             Wound 04/06/23 Diabetic Ulcer Foot Left (Active)   Wound Image Images linked 05/08/23 1505   Wound Description Granulation tissue 05/08/23 1505   Dee Dee-wound Assessment Callus 05/08/23 1431   Wound Length (cm) 1 2 cm 05/08/23 1505   Wound Width (cm) 1 cm 05/08/23 1505   Wound Depth (cm) 0 3 cm 05/08/23 1505   Wound Surface Area (cm^2) 1 2 cm^2 05/08/23 1505   Wound Volume (cm^3) 0 36 cm^3 05/08/23 1505   Calculated Wound Volume (cm^3) 0 36 cm^3 05/08/23 1505   Change in Wound Size % 86 42 05/08/23 1505   Drainage Amount None 05/08/23 1431   Non-staged Wound Description Full thickness 05/08/23 1431   Treatments Cleansed 05/08/23 1431   Wound packed? No 05/08/23 1431   Dressing Changed Changed 05/08/23 1431   Patient Tolerance Tolerated well 05/08/23 1431   Dressing Status Intact 05/08/23 1431       Wound 05/08/23 Skin Tear Foot Left;Plantar (Active)   Wound Image Images linked 05/08/23 1439   Wound Description Granulation tissue 05/08/23 1437   Dee Dee-wound Assessment Intact 05/08/23 1437   Wound Length (cm) 0 2 cm 05/08/23 1437   Wound Width (cm) 0 5 cm 05/08/23 1437   Wound Depth (cm) 0 1 cm 05/08/23 1437   Wound Surface Area (cm^2) 0 1 cm^2 05/08/23 1437   Wound Volume (cm^3) 0 01 cm^3 05/08/23 1437   Calculated Wound Volume (cm^3) 0 01 cm^3 05/08/23 1437   Drainage Amount Small 05/08/23 1437   Drainage Description Serous 05/08/23 1437   Non-staged Wound Description Full thickness 05/08/23 1437   Treatments Cleansed 05/08/23 1437   Wound packed?  No 05/08/23 1437   Dressing Changed Changed 05/08/23 1437   Patient Tolerance Tolerated well 05/08/23 1437   Dressing Status Intact 05/08/23 1437       Wound 04/06/23 Diabetic Ulcer Foot Left (Active)   Date First Assessed/Time First Assessed: 04/06/23 1501   Primary Wound Type: Diabetic Ulcer  Location: Foot  Wound Location Orientation: Left       Wound 05/08/23 Skin Tear Foot Left;Plantar (Active)   Date First Assessed/Time First Assessed: 05/08/23 1437   Primary Wound Type: Skin Tear  Location: Foot  Wound Location Orientation: Left;Plantar       Subjective:           118/2023: 66year-old type I diabetic seen for follow-up DFU left foot Sub first MPJ and new wound lateral forefoot  Feels it was the tape that was holding his bandage on that gave him the skin tear  Reports only scant drainage from ulcer site but reports that when he went back to work he had to work 6 days straight     4/24/2023: 46-year-old type I diabetic seen for follow-up chronic DFU left forefoot Sub first MPJ  Reports good progress and has since acquiring new diabetic shoes  Request return to work next Monday 4/12/2023: 46-year-old type I diabetic presents for evaluation and treatment of recurrent DFU left forefoot  Patient saw Dr Soha Bae 1 week ago and was referred for continued care  Patient has not yet obtained his new diabetic shoes which were ordered back in January  Patient reports his wound has diminished in size since he was last seen a week ago  Reports his blood sugars average between 70 and 200 and he currently has an insulin pump  4/6/2023: (Dr Soha Bae) Patient presents for initial evaluation of a diabetic foot ulcer of the left midfoot that he noticed about 4-5 days ago  Has not been putting anything on the wound itself  Saw PCP who did a culture which grew MRSA  PCP started doxycycline 3 days ago which she changed to Bactrim based on culture results today  Patient works as a     He had a wound in this area that was healed as of January 2023 after which time he saw Dr Belle Floras and diabetic shoes were ordered but patient did not obtain these yet  Blood sugars are typically in the low to mid 100s  The following portions of the patient's history were reviewed and updated as appropriate: allergies, current medications, past family history, past medical history, past social history, past surgical history and problem list     Review of Systems   Constitutional: Negative for chills and fever  HENT: Negative for ear pain and sore throat  Eyes: Negative for pain and visual disturbance  Respiratory: Negative for cough and shortness of breath  Cardiovascular: Negative for chest pain and palpitations  Gastrointestinal: Negative for abdominal pain and vomiting  Endocrine:        Type 1 diabetes   Genitourinary: Negative for dysuria and hematuria  Musculoskeletal: Negative for arthralgias and back pain  Skin: Positive for wound (Diabetic ulcer left forefoot and new wound)  Negative for color change and rash  Neurological: Positive for numbness  Negative for seizures and syncope  All other systems reviewed and are negative  Objective:       Wound 04/06/23 Diabetic Ulcer Foot Left (Active)   Wound Image Images linked 05/08/23 1505   Wound Description Granulation tissue 05/08/23 1505   Dee Dee-wound Assessment Callus 05/08/23 1431   Wound Length (cm) 1 2 cm 05/08/23 1505   Wound Width (cm) 1 cm 05/08/23 1505   Wound Depth (cm) 0 3 cm 05/08/23 1505   Wound Surface Area (cm^2) 1 2 cm^2 05/08/23 1505   Wound Volume (cm^3) 0 36 cm^3 05/08/23 1505   Calculated Wound Volume (cm^3) 0 36 cm^3 05/08/23 1505   Change in Wound Size % 86 42 05/08/23 1505   Drainage Amount None 05/08/23 1431   Non-staged Wound Description Full thickness 05/08/23 1431   Treatments Cleansed 05/08/23 1431   Wound packed?  No 05/08/23 1431   Dressing Changed Changed 05/08/23 1431   Patient Tolerance Tolerated well 05/08/23 1431   Dressing Status Intact 05/08/23 1431       Wound 05/08/23 Skin Tear Foot Left;Plantar (Active)   Wound Image Images linked 05/08/23 1439   Wound Description Granulation tissue 05/08/23 1437   Dee Dee-wound Assessment Intact 05/08/23 1437   Wound Length (cm) 0 2 cm 05/08/23 1437   Wound Width (cm) 0 5 cm 05/08/23 1437   Wound Depth (cm) 0 1 cm 05/08/23 1437   Wound Surface Area (cm^2) 0 1 cm^2 05/08/23 1437   Wound Volume (cm^3) 0 01 cm^3 05/08/23 1437   Calculated Wound Volume (cm^3) 0 01 cm^3 05/08/23 1437   Drainage Amount Small 05/08/23 1437   Drainage Description Serous 05/08/23 1437   Non-staged Wound Description Full thickness 05/08/23 1437   Treatments Cleansed 05/08/23 1437   Wound packed? No 05/08/23 1437   Dressing Changed Changed 05/08/23 1437   Patient Tolerance Tolerated well 05/08/23 1437   Dressing Status Intact 05/08/23 1437       Pulse 68   Temp 98 5 °F (36 9 °C)   Resp 18     Physical Exam  Constitutional:       Appearance: Normal appearance  He is normal weight  HENT:      Head: Normocephalic  Right Ear: Tympanic membrane normal       Left Ear: Tympanic membrane normal       Nose: No congestion  Mouth/Throat:      Pharynx: No oropharyngeal exudate or posterior oropharyngeal erythema  Eyes:      Extraocular Movements: Extraocular movements intact  Conjunctiva/sclera: Conjunctivae normal       Pupils: Pupils are equal, round, and reactive to light  Cardiovascular:      Rate and Rhythm: Normal rate and regular rhythm  Pulses:           Dorsalis pedis pulses are 1+ on the right side and 1+ on the left side  Posterior tibial pulses are 0 on the right side and 0 on the left side  Pulmonary:      Effort: Pulmonary effort is normal    Musculoskeletal:         General: Normal range of motion  Feet:    Feet:      Right foot:      Protective Sensation: 10 sites tested  5 sites sensed  Toenail Condition: Right toenails are abnormally thick  Fungal disease present       Left foot:      Protective Sensation: 10 sites tested  5 sites sensed  Skin integrity: Ulcer (Left foot Sub first MPJ SQ breakdown, no signs of infection, hyperkeratotic margin, increased size since last evaluation ) and skin breakdown (Small skin tear Sub 4/5 MPJ) present  Toenail Condition: Left toenails are abnormally thick  Fungal disease present  Skin:     General: Skin is warm and dry  Capillary Refill: Capillary refill takes 2 to 3 seconds  Coloration: Skin is not pale  Findings: No bruising, erythema, lesion or rash  Comments:   Diminished texture tone and turgor bilateral, no digital hair noted  Moderate atrophic changes noted  Neurological:      Mental Status: He is alert and oriented to person, place, and time  Cranial Nerves: No cranial nerve deficit  Sensory: Sensory deficit (No vibratory sensation appreciated  Monofilament intact ) present  Motor: No weakness  Gait: Gait normal       Deep Tendon Reflexes: Reflexes normal    Psychiatric:         Mood and Affect: Mood normal          Behavior: Behavior normal          Judgment: Judgment normal            Wound Instructions:  Orders Placed This Encounter   Procedures   • Wound cleansing and dressings     Wound cleansing and dressings                           LEFT PLANTAR FOOT WOUNDS     Wash your hands with soap and water  Remove old dressing, discard into plastic bag and place in trash  Cleanse the wound with mild soap and water prior to applying a clean dressing  Do not use tissue or cotton balls  Do not scrub the wound  Pat dry using gauze  Shower no (unless protected from water)     Apply felt to off load wound site  Apply PURACOL THEN  Rebbecca Brady to the left foot wounds  Cover with GAUZE  Secure with MEDFIX TAPE OR CELI AND TAPE  Change dressing every other day      Dressing above applied today at [facility]  Next appt  In 2 weeks       Standing Status:   Future     Standing Expiration Date:   5/8/2024   • Debridement     This order was created via procedure documentation   • Cam Boot     Order Specific Question:   Size     Answer:   Medium     Order Specific Question:   Type     Answer:   [place]ide          Diagnosis ICD-10-CM Associated Orders   1  Diabetic ulcer of other part of left foot associated with type 1 diabetes mellitus, with fat layer exposed ([facility] )  E10 621 lidocaine (XYLOCAINE) 4 % topical solution 5 mL    L97 522 Wound cleansing and dressings     Debridement     Cam Boot      2   Tear of skin of plantar aspect of left foot, initial encounter  S91 312A Wound cleansing and dressings

## 2023-05-08 NOTE — PATIENT INSTRUCTIONS
Orders Placed This Encounter   Procedures    Wound cleansing and dressings     Wound cleansing and dressings                           LEFT PLANTAR FOOT WOUNDS     Wash your hands with soap and water  Remove old dressing, discard into plastic bag and place in trash  Cleanse the wound with mild soap and water prior to applying a clean dressing  Do not use tissue or cotton balls  Do not scrub the wound  Pat dry using gauze  Shower no (unless protected from water)     Apply felt to off load wound site  Apply PURACOL THEN  Donnita Lesser to the left foot wounds  Cover with GAUZE  Secure with MEDFIX TAPE OR CELI AND TAPE  Change dressing every other day  Dressing above applied today at [facility]  Next appt  In 2 weeks       Standing Status:   Future     Standing Expiration Date:   5/8/2024

## 2023-05-11 ENCOUNTER — OFFICE VISIT (OUTPATIENT)
Dept: PHYSICAL THERAPY | Facility: CLINIC | Age: 63
End: 2023-05-11

## 2023-05-11 DIAGNOSIS — M25.512 ACUTE PAIN OF LEFT SHOULDER: Primary | ICD-10-CM

## 2023-05-11 NOTE — PROGRESS NOTES
"Daily Note     Today's date: 2023  Patient name: Spring Gross  : 1960  MRN: [id number]  Referring provider: Lincoln Irene MD  Dx:   Encounter Diagnosis     ICD-10-CM    1  Acute pain of left shoulder  M25 512                      Subjective: Patient reports shoulder feels about the same  Objective: See treatment diary below      Assessment: Patient tolerated treatment fair today  TTP to anterior shoulder/proximal arm which decreased with STM  Able to perform theraband shoulder extension for scapular strengthening today which he was unable to do last visit due to increased pain  Note significant soreness after appointment today due to fatigue   Will benefit from continued skilled PT to address impairments and return to PLOF      Plan: Continue per POC     Precautions: Diabetes, HTN  Functional Limitations: opening doors, household chores, daily tasks, work tasks  Impairments: left shoulder mobility and strength  [facility] [id number]   POC expiration: 2023      Manuals 5/ 5          L shoulder PROM  4' 4'          L anterior shoulder STM   4'          L GHJ AP mobs   2'  Grade 4                       Neuro Re-Ed                                                                                                        Ther Ex             Posterior capsule stretch HEP            Doorway pec stretch HEP            TB row  GTB 2x10 GTB 2x10          TB shld ext  Hold, pain GTB 2x10          shld flex and abd iso  5\"x10 5\"x10          shld IR/ER iso                                       Ther Activity                                       Gait Training                                       Modalities                                            "

## 2023-05-15 ENCOUNTER — OFFICE VISIT (OUTPATIENT)
Dept: PHYSICAL THERAPY | Facility: CLINIC | Age: 63
End: 2023-05-15

## 2023-05-15 DIAGNOSIS — M25.512 ACUTE PAIN OF LEFT SHOULDER: Primary | ICD-10-CM

## 2023-05-15 NOTE — PROGRESS NOTES
"Daily Note     Today's date: 5/15/2023  Patient name: Noe Aguilar  : 1960  MRN: [id number]  Referring provider: Starla Apgar, MD  Dx:   Encounter Diagnosis     ICD-10-CM    1  Acute pain of left shoulder  M25 512                      Subjective: Patient reports shoulder feels extra sore today - he is not sure why      Objective: See treatment diary below      Assessment: Patient tolerated treatment fair today  Trial of pulleys for ROM which increased pain  Did not tolerate exercise today  Applied moist hot pack to left shoulder for pain  Will assess response to treatment next visit and progress as tolerated        Plan: Continue per POC     Precautions: Diabetes, HTN  Functional Limitations: opening doors, household chores, daily tasks, work tasks  Impairments: left shoulder mobility and strength  [facility] Code: [id number]   POC expiration: 2023      Manuals 5/1 5/4 5/11 5/15         L shoulder PROM  4' 4' 4'         L anterior shoulder STM   4' 5'         L GHJ AP mobs   2'  Grade 4 2' grade 4                      Neuro Re-Ed                                                                                                        Ther Ex             Posterior capsule stretch HEP            Doorway pec stretch HEP            TB row  GTB 2x10 GTB 2x10          TB shld ext  Hold, pain GTB 2x10          shld flex and abd iso  5\"x10 5\"x10          shld IR/ER iso             Seated thoracic ext    10x                      Ther Activity                                       Gait Training                                       Modalities             MHP    10'                           " You can reach your [facility] Team any time of the day by calling [phone]. This number will put you in touch with the 24 hour nurse line if the clinic is closed.    To contact your OB/GYN Surgery Scheduler please call [phone]. This is a direct number for your care team between 8 a.m. and 4 p.m. Monday through Friday.    [facility] is open for your convenience: [phone]  Monday through Friday 8 a.m. to 8:30 p.m.  Saturday 9 a.m. to 6 p.m.  Sunday Noon to 6 p.m.    They are closed on all major holidays.

## 2023-05-18 ENCOUNTER — APPOINTMENT (OUTPATIENT)
Dept: PHYSICAL THERAPY | Facility: CLINIC | Age: 63
End: 2023-05-18
Payer: COMMERCIAL

## 2023-05-18 ENCOUNTER — OFFICE VISIT (OUTPATIENT)
Dept: WOUND CARE | Facility: HOSPITAL | Age: 63
End: 2023-05-18

## 2023-05-18 VITALS
DIASTOLIC BLOOD PRESSURE: 80 MMHG | TEMPERATURE: 97.8 F | SYSTOLIC BLOOD PRESSURE: 145 MMHG | HEART RATE: 75 BPM | RESPIRATION RATE: 12 BRPM

## 2023-05-18 DIAGNOSIS — E10.621 DIABETIC ULCER OF OTHER PART OF LEFT FOOT ASSOCIATED WITH TYPE 1 DIABETES MELLITUS, WITH FAT LAYER EXPOSED (HCC): Primary | ICD-10-CM

## 2023-05-18 DIAGNOSIS — E10.40 TYPE 1 DIABETES MELLITUS WITH DIABETIC NEUROPATHY (HCC): ICD-10-CM

## 2023-05-18 DIAGNOSIS — L97.522 DIABETIC ULCER OF OTHER PART OF LEFT FOOT ASSOCIATED WITH TYPE 1 DIABETES MELLITUS, WITH FAT LAYER EXPOSED (HCC): Primary | ICD-10-CM

## 2023-05-18 RX ORDER — LIDOCAINE 40 MG/G
CREAM TOPICAL ONCE
Status: COMPLETED | OUTPATIENT
Start: 2023-05-18 | End: 2023-05-18

## 2023-05-18 RX ORDER — DOXYCYCLINE HYCLATE 100 MG/1
100 CAPSULE ORAL EVERY 12 HOURS SCHEDULED
Qty: 14 CAPSULE | Refills: 0 | Status: SHIPPED | OUTPATIENT
Start: 2023-05-18 | End: 2023-05-24 | Stop reason: SDUPTHER

## 2023-05-18 RX ADMIN — LIDOCAINE 1 APPLICATION.: 40 CREAM TOPICAL at 11:12

## 2023-05-18 NOTE — PATIENT INSTRUCTIONS
Orders Placed This Encounter   Procedures    Wound cleansing and dressings     Left Foot plantar wound is healed  Wound location: LEFT FOOT WOUNDS  The dressing must stay dry and intact  Shower no  You may shower with dressing protected by cast cover or bag  Or you may sponge bathe  Wash your hands with soap and water  Remove old dressing, discard into plastic bag and place in trash  Cleanse the wound with mild soap and water prior to applying a clean dressing  Do not use tissue or cotton balls  Do not scrub the wound  Pat dry using gauze  Apply Betadine to emigdio wound  Gently pack with Mesalt strip to the Left foot wound  Cover with Abd pad  Secure with Role gauze and tape  Change dressing daily    X-ray and Antibiotics were ordered  Dressing above applied today at [facility]  Standing Status:   Future     Standing Expiration Date:   5/18/2024    Wound off loading     Off-loading Instructions:    Keep weight and pressure off wound at all times  Wear Cam boot on left lower leg as directed by your physician  Do Not Stand for Long Periods of Time  Standing Status:   Future     Standing Expiration Date:   5/18/2024    XR foot 3+ vw left     Worsening diabetic foot ulcer of left first met head  Rule out osteomyelitis     Standing Status:   Future     Standing Expiration Date:   5/18/2027     Scheduling Instructions:      Bring along any outside films relating to this procedure

## 2023-05-18 NOTE — LETTER
May 18, 2023     Patient: Ge Delvalle  YOB: 1960  Date of Visit: 5/18/2023      To Whom it May Concern:    Ge Delvalle is under my professional care  Du Kincaid was seen in my office on 5/18/2023  Du Codi may return to work on 5/25/23  If you have any questions or concerns, please don't hesitate to call           Sincerely,          KAMILLE Snider        CC: No Recipients

## 2023-05-18 NOTE — PROGRESS NOTES
"Patient ID: Primitivo Young is a 66 y o  male Date of Birth 1960     Chief Complaint  Chief Complaint   Patient presents with   • Follow Up Wound Care Visit     LLE wound       Allergies  Cefuroxime and Amoxicillin-pot clavulanate    Assessment:     Diagnoses and all orders for this visit:    Diabetic ulcer of other part of left foot associated with type 1 diabetes mellitus, with fat layer exposed ([address] )  -     lidocaine (LMX) 4 % cream  -     Wound cleansing and dressings; Future  -     Wound off loading; Future  -     XR foot 3+ vw left; Future  -     doxycycline hyclate (VIBRAMYCIN) 100 mg capsule; Take 1 capsule (100 mg total) by mouth every 12 (twelve) hours for 7 days    Type 1 diabetes mellitus with diabetic neuropathy ([facility] )    Other orders  -     Debridement              Debridement   Wound 04/06/23 Diabetic Ulcer Foot Left    Universal Protocol:  Consent: Written consent obtained  Consent given by: patient  Time out: Immediately prior to procedure a \"time out\" was called to verify the correct patient, procedure, equipment, support staff and site/side marked as required  Timeout called at: 5/18/2023 12:51 PM   Patient identity confirmed: verbally with patient      Performed by: NP  Debridement type: selective  Pain control: lidocaine 4%  Pre-debridement measurements  Length (cm): 0 5  Width (cm): 0 7  Depth (cm): 1 3  Surface Area (cm^2): 0 35  Volume (cm^3): 0 46    Post-debridement measurements  Length (cm): 0 5  Width (cm): 0 7  Depth (cm): 1 3  Percent debrided: 100%  Surface Area (cm^2): 0 35  Area debrided (cm^2): 0 35  Volume (cm^3): 0 46  Devitalized tissue debrided: biofilm, callus, fibrin and slough  Instrument(s) utilized: blade and curette  Bleeding: small  Hemostasis obtained with: pressure  Procedural pain (0-10): 0  Post-procedural pain: 0   Response to treatment: procedure was tolerated well          Plan:  1  F/U visit  Wound debrided    Wound measuring larger with increased periwound " maceration possibly due to drainage getting trapped underneath foam adhesive pad  Patient also has increased erythema of left great toe  Concern for possible infection  2   We will change treatment plan to Mesalt gently tucked into wound covered with ABD and Neal change daily  Periwound is to be painted with Betadine daily  3  We will prescribe 100 mg doxycycline p o  twice daily x7 days  4  We will also order an x-ray of his left foot to rule out osteomyelitis  5  Patient will be excused from work x1 week to offload pressure from his wound  Counseled patient to minimize ambulation is much as possible  6  Patient will follow-up in 1 week with Dr Pete Grover     Wound 04/06/23 Diabetic Ulcer Foot Left (Active)   Wound Image Images linked 05/18/23 1043   Wound Description Granulation tissue; Epithelialization 05/18/23 1040   Dee Dee-wound Assessment Maceration; White;Callus 05/18/23 1040   Wound Length (cm) 0 5 cm 05/18/23 1040   Wound Width (cm) 0 7 cm 05/18/23 1040   Wound Depth (cm) 0 3 cm 05/18/23 1040   Wound Surface Area (cm^2) 0 35 cm^2 05/18/23 1040   Wound Volume (cm^3) 0 105 cm^3 05/18/23 1040   Calculated Wound Volume (cm^3) 0 11 cm^3 05/18/23 1040   Change in Wound Size % 95 85 05/18/23 1040   Drainage Amount Moderate 05/18/23 1040   Drainage Description Serosanguineous 05/18/23 1040   Non-staged Wound Description Full thickness 05/18/23 1040   Patient Tolerance Tolerated well 05/18/23 1040   Dressing Status Intact 05/18/23 1040       Wound 05/08/23 Skin Tear Foot Left;Plantar (Active)   Wound Image Images linked 05/18/23 1043   Wound Description Dry;Eschar;Brown 05/18/23 1041   Dee Dee-wound Assessment Dry; Intact 05/18/23 1041   Wound Length (cm) 0 cm 05/18/23 1041   Wound Width (cm) 0 cm 05/18/23 1041   Wound Depth (cm) 0 cm 05/18/23 1041   Wound Surface Area (cm^2) 0 cm^2 05/18/23 1041   Wound Volume (cm^3) 0 cm^3 05/18/23 1041   Calculated Wound Volume (cm^3) 0 cm^3 05/18/23 1041   Change in Wound Size % 100 05/18/23 1041   Drainage Amount None 05/18/23 1041   Non-staged Wound Description Not applicable 06/37/34 1647   Patient Tolerance Tolerated well 05/18/23 1041   Dressing Status Removed 05/18/23 1041       Wound 04/06/23 Diabetic Ulcer Foot Left (Active)   Date First Assessed/Time First Assessed: 04/06/23 1501   Primary Wound Type: Diabetic Ulcer  Location: Foot  Wound Location Orientation: Left       Wound 05/08/23 Skin Tear Foot Left;Plantar (Active)   Date First Assessed/Time First Assessed: 05/08/23 1437   Primary Wound Type: Skin Tear  Location: Foot  Wound Location Orientation: Left;Plantar       Subjective:        F/u visit for diabetic foot ulcer of first metatarsal head  Patient presents to the wound center today concerned about his wound  He reports he is noticing increased odor from his wound  Patient feels since he started wearing his new cam boot 2 weeks ago he feels his wound has deteriorated  He also reports 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  He has been changing his dressing as recommended  He denies any pain, fevers, or chills  The following portions of the patient's history were reviewed and updated as appropriate:   He  has a past medical history of Chronic foot ulcer ([facility] ) (09/07/2018), Diabetes mellitus ([facility] ), Gout, High cholesterol, Hypertension, and Visual impairment (11/1/2022)    He   Patient Active Problem List    Diagnosis Date Noted   • Diabetic foot ulcer ([facility] ) 05/08/2023   • 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, with fat layer exposed ([facility] ) 04/06/2023   • Type 1 diabetes mellitus with hyperglycemia ([facility] ) 04/19/2022   • Diabetic polyneuropathy associated with type 1 diabetes mellitus ([facility] ) 04/19/2022   • Microalbuminuria due to type 1 diabetes mellitus ([facility] ) 04/19/2022   • Hypoglycemia unawareness associated with type 1 diabetes mellitus ([facility] ) 04/19/2022   • Cigarette nicotine dependence without complication 33/40/7781   • Gout of ankle 03/07/2022   • Chronic fatigue syndrome 02/21/2022   • Lower urinary tract symptoms due to benign prostatic hyperplasia 02/21/2022   • 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 ([facility] ) 02/21/2022   • Chronic pain 02/21/2022   • Insulin pump in place 09/07/2018   • Benign essential hypertension 01/17/2011   • Mixed hyperlipidemia 10/25/2010   • ED (erectile dysfunction) of organic origin 10/25/2010     He  has a past surgical history that includes Hernia repair (Left); Knee arthroscopy (Left); FACIAL/NECK BIOPSY (Left, 07/18/2019); COMPLEX WOUND CLOSURE TO EXTREMITY (Left, 07/18/2019); and Cataract extraction (Right, 01/2023)  His family history includes Alcohol abuse in his brother; Diabetes type I in his brother and father; Diabetes type II in his mother; Heart disease in his father; No Known Problems in his brother, daughter, sister, and son  He  reports that he has been smoking cigarettes  He has a 20 00 pack-year smoking history  He has never used smokeless tobacco  He reports current alcohol use  He reports that he does not use drugs    Current Outpatient Medications   Medication Sig Dispense Refill   • doxycycline hyclate (VIBRAMYCIN) 100 mg capsule Take 1 capsule (100 mg total) by mouth every 12 (twelve) hours for 7 days 14 capsule 0   • atorvastatin (LIPITOR) 40 mg tablet TAKE 1 TABLET BY MOUTH EVERY DAY 90 tablet 1   • colchicine (COLCRYS) 0 6 mg tablet Take 1 tablet (0 6 mg total) by mouth every 6 (six) hours as needed (gouty attack) Use as directed for gout flares (Patient not taking: Reported on 4/24/2023) 30 tablet 0   • Glucagon, rDNA, (Glucagon Emergency) 1 MG KIT as directed     • HumaLOG 100 UNIT/ML injection inject up to 80 units in INSULIN PUMP daily as directed by prescriber     • insulin lispro (HumaLOG) 100 units/mL injection Use via the insulin pump, use up to 80 units daily 80 mL 1   • lisinopril (ZESTRIL) 10 mg tablet Take 1 tablet (10 mg total) by mouth daily 90 tablet 1   • metoprolol succinate (TOPROL-XL) 100 mg 24 hr tablet Take 1 tablet (100 mg total) by mouth daily 90 tablet 1     No current facility-administered medications for this visit  He is allergic to cefuroxime and amoxicillin-pot clavulanate       Review of Systems   Constitutional: Negative  HENT: Negative for ear pain and hearing loss  Eyes: Negative for pain  Respiratory: Negative for chest tightness and shortness of breath  Cardiovascular: Negative for chest pain, palpitations and leg swelling  Gastrointestinal: Negative for diarrhea, nausea and vomiting  Genitourinary: Negative for dysuria  Musculoskeletal: Negative for gait problem  Skin: Positive for wound  Neurological: Positive for numbness  Negative for tremors and weakness  Psychiatric/Behavioral: Negative for behavioral problems, confusion and suicidal ideas  Objective:       Wound 04/06/23 Diabetic Ulcer Foot Left (Active)   Wound Image Images linked 05/18/23 1043   Wound Description Granulation tissue; Epithelialization 05/18/23 1040   Dee Dee-wound Assessment Maceration; White;Callus 05/18/23 1040   Wound Length (cm) 0 5 cm 05/18/23 1040   Wound Width (cm) 0 7 cm 05/18/23 1040   Wound Depth (cm) 0 3 cm 05/18/23 1040   Wound Surface Area (cm^2) 0 35 cm^2 05/18/23 1040   Wound Volume (cm^3) 0 105 cm^3 05/18/23 1040   Calculated Wound Volume (cm^3) 0 11 cm^3 05/18/23 1040   Change in Wound Size % 95 85 05/18/23 1040   Drainage Amount Moderate 05/18/23 1040   Drainage Description Serosanguineous 05/18/23 1040   Non-staged Wound Description Full thickness 05/18/23 1040   Patient Tolerance Tolerated well 05/18/23 1040   Dressing Status Intact 05/18/23 1040       Wound 05/08/23 Skin Tear Foot Left;Plantar (Active)   Wound Image Images linked 05/18/23 1043   Wound Description Dry;Eschar;Brown 05/18/23 1041   Dee Dee-wound Assessment Dry; Intact 05/18/23 1041 Wound Length (cm) 0 cm 05/18/23 1041   Wound Width (cm) 0 cm 05/18/23 1041   Wound Depth (cm) 0 cm 05/18/23 1041   Wound Surface Area (cm^2) 0 cm^2 05/18/23 1041   Wound Volume (cm^3) 0 cm^3 05/18/23 1041   Calculated Wound Volume (cm^3) 0 cm^3 05/18/23 1041   Change in Wound Size % 100 05/18/23 1041   Drainage Amount None 05/18/23 1041   Non-staged Wound Description Not applicable 57/35/31 0843   Patient Tolerance Tolerated well 05/18/23 1041   Dressing Status Removed 05/18/23 1041       /80   Pulse 75   Temp 97 8 °F (36 6 °C)   Resp 12                 Wound Instructions:  Orders Placed This Encounter   Procedures   • Wound cleansing and dressings     Left Foot plantar wound is healed  Wound location: LEFT FOOT WOUNDS  The dressing must stay dry and intact  Shower no  You may shower with dressing protected by cast cover or bag  Or you may sponge bathe     Wash your hands with soap and water  Remove old dressing, discard into plastic bag and place in trash  Cleanse the wound with mild soap and water prior to applying a clean dressing  Do not use tissue or cotton balls  Do not scrub the wound  Pat dry using gauze      Apply Betadine to emigdio wound  Gently pack with Mesalt strip to the Left foot wound  Cover with Abd pad  Secure with Role gauze and tape  Change dressing daily    X-ray and Antibiotics were ordered      Dressing above applied today at [facility]  Standing Status:   Future     Standing Expiration Date:   5/18/2024   • Wound off loading     Off-loading Instructions:    Keep weight and pressure off wound at all times  Wear Cam boot on left lower leg as directed by your physician  Do Not Stand for Long Periods of Time  Standing Status:   Future     Standing Expiration Date:   5/18/2024   • Debridement     This order was created via procedure documentation   • XR foot 3+ vw left     Worsening diabetic foot ulcer of left first met head   Rule out osteomyelitis     Standing Status:   Future Standing Expiration Date:   5/18/2027     Scheduling Instructions:      Bring along any outside films relating to this procedure  Diagnosis ICD-10-CM Associated Orders   1  Diabetic ulcer of other part of left foot associated with type 1 diabetes mellitus, with fat layer exposed ([facility] )  E10 621 lidocaine (LMX) 4 % cream    L97 522 Wound cleansing and dressings     Wound off loading     XR foot 3+ vw left     doxycycline hyclate (VIBRAMYCIN) 100 mg capsule      2   Type 1 diabetes mellitus with diabetic neuropathy ([facility])  E10 40

## 2023-05-19 ENCOUNTER — HOSPITAL ENCOUNTER (OUTPATIENT)
Dept: RADIOLOGY | Facility: HOSPITAL | Age: 63
Discharge: HOME | End: 2023-05-19

## 2023-05-19 DIAGNOSIS — L97.522 DIABETIC ULCER OF OTHER PART OF LEFT FOOT ASSOCIATED WITH TYPE 1 DIABETES MELLITUS, WITH FAT LAYER EXPOSED (HCC): ICD-10-CM

## 2023-05-19 DIAGNOSIS — E10.621 DIABETIC ULCER OF OTHER PART OF LEFT FOOT ASSOCIATED WITH TYPE 1 DIABETES MELLITUS, WITH FAT LAYER EXPOSED (HCC): ICD-10-CM

## 2023-05-22 ENCOUNTER — OFFICE VISIT (OUTPATIENT)
Dept: PHYSICAL THERAPY | Facility: CLINIC | Age: 63
End: 2023-05-22

## 2023-05-22 DIAGNOSIS — M25.512 ACUTE PAIN OF LEFT SHOULDER: Primary | ICD-10-CM

## 2023-05-22 NOTE — PROGRESS NOTES
"Daily Note     Today's date: 2023  Patient name: Shannan Maynard  : 1960  MRN: [id number]  Referring provider: Summer Lindquist MD  Dx:   Encounter Diagnosis     ICD-10-CM    1  Acute pain of left shoulder  M25 512                      Subjective: Patient reports shoulder feeling better       Objective: See treatment diary below      Assessment: Patient tolerated treatment poor today  Reports worsened pain after appointment  No change after repeated cervical retraction or thoracic extension  Due to worsening symptoms consistently after physical therapy, referred patient to orthopedic surgery and will hold PT until consultation         Plan: hold PT until consultation with orthopedic surgery     Precautions: Diabetes, HTN  Functional Limitations: opening doors, household chores, daily tasks, work tasks  Impairments: left shoulder mobility and strength  MedBridge Code: [email or web address]   POC expiration: 2023      Manuals 5/1 5/4 5/11 5/15 5/22        L shoulder PROM  4' 4' 4'         L anterior shoulder STM   4' 5' 6'        L GHJ AP mobs   2'  Grade 4 2' grade 4         L shoulder posterior capsule stretch     2'        Neuro Re-Ed                                                                                                        Ther Ex             Posterior capsule stretch HEP            Doorway pec stretch HEP            TB row  GTB 2x10 GTB 2x10          TB shld ext  Hold, pain GTB 2x10          shld flex and abd iso  5\"x10 5\"x10          shld IR/ER iso             Seated thoracic ext    10x 10x        Prone row     10x        Prone horz abd     10x        TB horz abd     10x        Supine horz add     10x                                  Ther Activity                                       Gait Training                                       Modalities             MHP    10'                           "

## 2023-05-24 ENCOUNTER — OFFICE VISIT (OUTPATIENT)
Dept: WOUND CARE | Facility: HOSPITAL | Age: 63
End: 2023-05-24

## 2023-05-24 VITALS — TEMPERATURE: 97.6 F | RESPIRATION RATE: 18 BRPM | DIASTOLIC BLOOD PRESSURE: 72 MMHG | SYSTOLIC BLOOD PRESSURE: 105 MMHG

## 2023-05-24 DIAGNOSIS — E10.621 DIABETIC ULCER OF OTHER PART OF LEFT FOOT ASSOCIATED WITH TYPE 1 DIABETES MELLITUS, WITH FAT LAYER EXPOSED (HCC): Primary | ICD-10-CM

## 2023-05-24 DIAGNOSIS — E10.40 TYPE 1 DIABETES MELLITUS WITH DIABETIC NEUROPATHY (HCC): ICD-10-CM

## 2023-05-24 DIAGNOSIS — L97.522 DIABETIC ULCER OF OTHER PART OF LEFT FOOT ASSOCIATED WITH TYPE 1 DIABETES MELLITUS, WITH FAT LAYER EXPOSED (HCC): Primary | ICD-10-CM

## 2023-05-24 RX ORDER — DOXYCYCLINE HYCLATE 100 MG/1
100 CAPSULE ORAL EVERY 12 HOURS SCHEDULED
Qty: 14 CAPSULE | Refills: 0 | Status: ON HOLD | OUTPATIENT
Start: 2023-05-24 | End: 2023-06-01

## 2023-05-24 RX ORDER — LIDOCAINE HYDROCHLORIDE 40 MG/ML
5 SOLUTION TOPICAL ONCE
Status: COMPLETED | OUTPATIENT
Start: 2023-05-24 | End: 2023-05-24

## 2023-05-24 RX ADMIN — LIDOCAINE HYDROCHLORIDE 5 ML: 40 SOLUTION TOPICAL at 10:19

## 2023-05-24 NOTE — PATIENT INSTRUCTIONS
Orders Placed This Encounter   Procedures    Wound cleansing and dressings     Left Foot plantar wound is healed  Wound location: LEFT FOOT WOUNDS  The dressing must stay dry and intact  Shower no  You may shower with dressing protected by cast cover or bag  Or you may sponge bathe  Wash your hands with soap and water  Remove old dressing, discard into plastic bag and place in trash  Cleanse the wound with mild soap and water prior to applying a clean dressing  Do not use tissue or cotton balls  Do not scrub the wound  Pat dry using gauze  Pack left foot wound with silver alginate  Cover with Abd pad and secure with rolled gauze and tape  Change dressing daily      Dressing above applied today at [facility]  Offloading with CAM boot       Standing Status:   Future     Standing Expiration Date:   5/24/2024

## 2023-05-24 NOTE — LETTER
May 24, 2023     Patient: Osmin Castillo  YOB: 1960  Date of Visit: 5/24/2023      To Whom it May Concern:    Osmin Castillo is under my professional care  Liat Yepez was seen in my office on 5/24/2023  Liat Yepez is advised to stay off of his left foot and scheduled for a foot surgery on Monday  Please excuse him from work starting tomorrow, 5/24/2003  If you have any questions or concerns, please don't hesitate to call           Sincerely,          Bayron Parks DPM        CC: No Recipients

## 2023-05-24 NOTE — PROGRESS NOTES
Patient ID: Citlaly Lubin is a 66 y o  male Date of Birth 1960       Chief Complaint   Patient presents with   • Follow Up Wound Care Visit     Left foot wound       Allergies:  Cefuroxime and Amoxicillin-pot clavulanate    Diagnosis:  1  Diabetic ulcer of other part of left foot associated with type 1 diabetes mellitus, with fat layer exposed ([facility] [address] )  -     lidocaine (XYLOCAINE) 4 % topical solution 5 mL  -     Wound cleansing and dressings; Future  -     Transfer to other facility  -     doxycycline hyclate (VIBRAMYCIN) 100 mg capsule; Take 1 capsule (100 mg total) by mouth every 12 (twelve) hours for 7 days    2  Type 1 diabetes mellitus with diabetic neuropathy (HCC)       Diagnosis ICD-10-CM Associated Orders   1  Diabetic ulcer of other part of left foot associated with type 1 diabetes mellitus, with fat layer exposed ([facility] [address] )  E10 621 lidocaine (XYLOCAINE) 4 % topical solution 5 mL    L97 522 Wound cleansing and dressings     Transfer to other facility     doxycycline hyclate (VIBRAMYCIN) 100 mg capsule      2  Type 1 diabetes mellitus with diabetic neuropathy (HCC)  E10 40            Assessment & Plan:  1  Diabetic Veloz grade 3 ulceration submetatarsal head 1 left foot, wound was debrided through subcuticular tissues and dressed with alginate dry dressing  Patient will do the same every other day  2   I personally reviewed patient's x-ray images from 5/19/2023, I believe there are some early signs of osteomyelitis of the medial first metatarsal head with cortical destruction this is seen on the AP view, on the oblique view, there seems to be cortex intact although demineralization of the medial first metatarsal head is noted  Wound has tendon exposed, questionable probe to bone    At this time I discussed with patient because there is significant undermining at minimum he would benefit from an operating room debridement as this area is quite tender, he would need a wide excision, possible cellular "tissue product, possible wound VAC application  I explained patient should be admitted to [place] [address] for IV antibiotics, MRI to rule out osteomyelitis and surgical planning, explained if MRI does show osteomyelitis he will need a partial first ray resection contingent upon the amount of bone involved  Patient states he would like to defer admission until Monday as his wife is out of town and will not be back until Sunday night and he needs her support and would like her to be present  Patient is currently on doxycycline, I have extended the prescription by a renewal and patient was educated on signs of infection, erythema, edema, warmth, purulence, malodor, increased blood sugars, nausea, vomiting, fever, chills, he is advised if he notices any of these do not hesitate and go directly to the emergency department  He states he absolutely will  ADT order was placed for patient to be evaluated at the emergency department and admission under [facility], they are advised to start patient on IV antibiotics and order x-ray and MRI  I explained to patient I would likely take him to surgery on Wednesday or Thursday next week and he will be in the hospital for approximately 1 week  Patient given a work excuse note  Patient to maintain use of Cam boot at all times, avoid pressure to forefoot, he may heel touch weight-bear with cam boot  3   Skin tear submetatarsal head 5 is well-healed and epithelialized, discontinue dressings  4  Patient understands and agrees with the plan and will follow-up after discharge from hospital     Debridement   Wound 04/06/23 Diabetic Ulcer Foot Left    Universal Protocol:  Consent: Verbal consent obtained    Risks and benefits: risks, benefits and alternatives were discussed  Consent given by: patient  Time out: Immediately prior to procedure a \"time out\" was called to verify the correct patient, procedure, equipment, support staff and site/side marked as " required  Patient understanding: patient states understanding of the procedure being performed  Patient identity confirmed: verbally with patient      Performed by: physician  Debridement type: surgical  Level of debridement: subcutaneous tissue  Pain control: benzocaine 20% and lidocaine 4%  Pre-debridement measurements  Length (cm): 1  Width (cm): 0 8  Depth (cm): 0 8  Surface Area (cm^2): 0 8  Volume (cm^3): 0 64    Post-debridement measurements  Length (cm): 1 4  Width (cm): 1 3  Depth (cm): 1 2  Percent debrided: 100%  Surface Area (cm^2): 1 82  Area debrided (cm^2): 1 82  Volume (cm^3): 2 18  Tissue and other material debrided: subcutaneous tissue  Devitalized tissue debrided: biofilm, callus, fibrin, necrotic debris and slough  Instrument(s) utilized: blade and nippers  Bleeding: large  Procedural pain (0-10): insensate  Post-procedural pain: insensate   Response to treatment: procedure was tolerated well  Debridement Comments: Patient with undermining from 9:00 to 3:00 approximately 1 2 cm                 Subjective:   Fabricio Sultana presents today for left foot diabetic ulceration, he has been changing dressings as directed and continue to use cam boot  He is concerned because the wound is not improving  Patient states when I last treated him for diabetic ulcer it stayed healed for a few months, and then the wound reopened  Patient does have new diabetic shoes but he states the wound has been open since before he got his diabetic shoes  He is taking doxycycline as directed and does not have any nausea, vomiting, fever, chills          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)   • Insulin pump in place   • ED (erectile dysfunction) of organic origin   • Chronic pain   • Benign essential hypertension   • Cigarette nicotine dependence without complication   • Gout of ankle   • Type 1 diabetes mellitus with hyperglycemia (HCC)   • Diabetic polyneuropathy associated with type 1 diabetes mellitus ([facility] [address] )   • Microalbuminuria due to type 1 diabetes mellitus ([facility] [address] )   • Hypoglycemia unawareness associated with type 1 diabetes mellitus ([facility] [address] )   • 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, with fat layer exposed ([facility] [address] )   • Diabetic foot ulcer ([facility] )     Past Medical History:   Diagnosis Date   • Chronic foot ulcer ([facility] ) 2018   • Diabetes mellitus ([facility] )    • Gout    • High cholesterol    • Hypertension    • Visual impairment 2022    Cateract     Past Surgical History:   Procedure Laterality Date   • CATARACT EXTRACTION Right 2023   • COMPLEX WOUND CLOSURE TO EXTREMITY Left 2019    Procedure: COMPLEX CLOSURE LEFT CHEEK;  Surgeon: Prateek Cortez MD;  Location: [facility];  Service: Plastics   • FACIAL/NECK BIOPSY Left 2019    Procedure: EXCISION LEFT CHEEK CYST;  Surgeon: Prateek Cortez MD;  Location: [facility];  Service: Plastics   • HERNIA REPAIR Left     several times   • KNEE ARTHROSCOPY Left     torn meniscus     Social History     Socioeconomic History   • Marital status: /Civil Union     Spouse name: None   • Number of children: 2   • Years of education: 12   • Highest education level: High school graduate   Occupational History   • Occupation: cafeteria/kitchen at [employer]     Comment: [employer]   Tobacco Use   • Smoking status: Every Day     Packs/day: 1 00     Years: 20 00     Total pack years: 20 00     Types: Cigarettes     Last attempt to quit: 2022     Years since quittin 3   • Smokeless tobacco: Never   • Tobacco comments:     encouraged smoking cessation   Vaping Use   • Vaping Use: Never used   Substance and Sexual Activity   • Alcohol use: "Yes     Comment: \"some beers\"   • Drug use: Never   • Sexual activity: None   Other Topics Concern   • None   Social History Narrative   • None     Social Determinants of Health     Financial Resource Strain: Not on file   Food Insecurity: Not on file   Transportation Needs: Not on file   Physical Activity: Not on file   Stress: Not on file   Social Connections: Not on file   Intimate Partner Violence: Not on file   Housing Stability: Not on file        Current Outpatient Medications:   •  doxycycline hyclate (VIBRAMYCIN) 100 mg capsule, Take 1 capsule (100 mg total) by mouth every 12 (twelve) hours for 7 days, Disp: 14 capsule, Rfl: 0  •  atorvastatin (LIPITOR) 40 mg tablet, TAKE 1 TABLET BY MOUTH EVERY DAY, Disp: 90 tablet, Rfl: 1  •  colchicine (COLCRYS) 0 6 mg tablet, Take 1 tablet (0 6 mg total) by mouth every 6 (six) hours as needed (gouty attack) Use as directed for gout flares (Patient not taking: Reported on 4/24/2023), Disp: 30 tablet, Rfl: 0  •  Glucagon, rDNA, (Glucagon Emergency) 1 MG KIT, as directed, Disp: , Rfl:   •  HumaLOG 100 UNIT/ML injection, inject up to 80 units in INSULIN PUMP daily as directed by prescriber, Disp: , Rfl:   •  insulin lispro (HumaLOG) 100 units/mL injection, Use via the insulin pump, use up to 80 units daily, Disp: 80 mL, Rfl: 1  •  lisinopril (ZESTRIL) 10 mg tablet, Take 1 tablet (10 mg total) by mouth daily, Disp: 90 tablet, Rfl: 1  •  metoprolol succinate (TOPROL-XL) 100 mg 24 hr tablet, Take 1 tablet (100 mg total) by mouth daily, Disp: 90 tablet, Rfl: 1  No current facility-administered medications for this visit    Family History   Problem Relation Age of Onset   • Heart disease Father    • Diabetes type I Father    • Diabetes type II Mother    • No Known Problems Sister    • Alcohol abuse Brother    • Diabetes type I Brother    • No Known Problems Brother    • No Known Problems Son    • No Known Problems Daughter        Review of Systems   Constitutional: Negative for " chills and fever  HENT: Negative for ear pain and sore throat  Eyes: Negative for pain and visual disturbance  Respiratory: Negative for cough and shortness of breath  Cardiovascular: Negative for chest pain and palpitations  Gastrointestinal: Negative for abdominal pain and vomiting  Genitourinary: Negative for dysuria and hematuria  Musculoskeletal: Positive for gait problem  Negative for arthralgias and back pain  Skin: Positive for color change and wound  Negative for rash  Neurological: Positive for numbness  Negative for seizures and syncope  Psychiatric/Behavioral: Negative  All other systems reviewed and are negative  Objective:  /72   Temp 97 6 °F (36 4 °C)   Resp 18     Physical Exam  Constitutional:       Appearance: Normal appearance  He is normal weight  HENT:      Head: Normocephalic and atraumatic  Right Ear: External ear normal       Left Ear: External ear normal       Nose: Nose normal       Mouth/Throat:      Mouth: Mucous membranes are moist       Pharynx: Oropharynx is clear  Eyes:      Conjunctiva/sclera: Conjunctivae normal    Cardiovascular:      Pulses: Normal pulses  Dorsalis pedis pulses are 2+ on the right side and 2+ on the left side  Posterior tibial pulses are 2+ on the right side and 2+ on the left side  Pulmonary:      Effort: Pulmonary effort is normal    Musculoskeletal:      Cervical back: Normal range of motion  Right lower leg: No edema  Left lower leg: No edema  Comments: Pes cavus with fat pad atrophy submetatarsal heads 1 through 5 bilateral   Skin:     General: Skin is warm and dry  Capillary Refill: Capillary refill takes less than 2 seconds  Comments: See detailed wound assessment   Neurological:      General: No focal deficit present  Mental Status: He is alert and oriented to person, place, and time  Mental status is at baseline  Sensory: Sensory deficit present  Coordination: Coordination abnormal       Gait: Gait abnormal       Deep Tendon Reflexes: Reflexes abnormal    Psychiatric:         Mood and Affect: Mood normal          Behavior: Behavior normal          Thought Content: Thought content normal          Judgment: Judgment normal            Wound 04/06/23 Diabetic Ulcer Foot Left (Active)   Wound Image   05/24/23 1048   Wound Description Exposed tendon 05/24/23 1013   Dee Dee-wound Assessment Maceration; White;Callus 05/24/23 1013   Wound Length (cm) 1 cm 05/24/23 1013   Wound Width (cm) 0 8 cm 05/24/23 1013   Wound Depth (cm) 0 8 cm 05/24/23 1013   Wound Surface Area (cm^2) 0 8 cm^2 05/24/23 1013   Wound Volume (cm^3) 0 64 cm^3 05/24/23 1013   Calculated Wound Volume (cm^3) 0 64 cm^3 05/24/23 1013   Change in Wound Size % 75 85 05/24/23 1013   Undermining 1 2 05/24/23 1013   Undermining is depth extending from 7-4 oc 05/24/23 1013   Drainage Amount Moderate 05/24/23 1013   Drainage Description Serosanguineous 05/18/23 1040   Non-staged Wound Description Full thickness 05/24/23 1013   Treatments Cleansed 05/08/23 1431   Wound packed? No 05/08/23 1431   Dressing Changed Changed 05/08/23 1431   Patient Tolerance Tolerated well 05/18/23 1040   Dressing Status Intact; Old drainage 05/24/23 1013       Wound 05/08/23 Skin Tear Foot Left;Plantar (Active)   Wound Image   05/24/23 1016   Wound Description Epithelialization 05/24/23 1016   Dee Dee-wound Assessment Intact 05/24/23 1016   Wound Length (cm) 0 cm 05/24/23 1016   Wound Width (cm) 0 cm 05/24/23 1016   Wound Depth (cm) 0 cm 05/24/23 1016   Wound Surface Area (cm^2) 0 cm^2 05/24/23 1016   Wound Volume (cm^3) 0 cm^3 05/24/23 1016   Calculated Wound Volume (cm^3) 0 cm^3 05/24/23 1016   Change in Wound Size % 100 05/24/23 1016   Drainage Amount None 05/24/23 1016   Drainage Description Serous 05/08/23 1437   Non-staged Wound Description Partial thickness 05/24/23 1016   Treatments Cleansed 05/08/23 1437   Wound packed?  No 05/08/23 1437   Dressing Changed Changed 05/08/23 1437   Patient Tolerance Tolerated well 05/18/23 1041   Dressing Status Intact 05/24/23 1016          Results from last 6 Months   Lab Units 04/03/23  1038   WOUND CULTURE  3+ Growth of Methicillin Resistant Staphylococcus aureus*  3+ Growth of       XR foot 3+ vw left    Result Date: 5/19/2023  Narrative: LEFT FOOT INDICATION:   E10 621: Type 1 diabetes mellitus with foot ulcer L97 522: Non-pressure chronic ulcer of other part of left foot with fat layer exposed  COMPARISON: 11/17/2022 VIEWS:  XR FOOT 3+ VW LEFT FINDINGS: There is no acute fracture or dislocation  No periosteal reaction or cortical destruction to suggest osteomyelitis  No lytic or blastic osseous lesion  Plantar forefoot soft tissue ulceration noted  Impression: Plantar forefoot soft tissue ulceration  No radiographic evidence of osteomyelitis  Workstation performed: [id number]       Wound Instructions:  Orders Placed This Encounter   Procedures   • Wound cleansing and dressings     Left Foot plantar wound is healed      Wound location: LEFT FOOT WOUNDS  The dressing must stay dry and intact  Shower no  You may shower with dressing protected by cast cover or bag  Or you may sponge bathe     Wash your hands with soap and water  Remove old dressing, discard into plastic bag and place in trash  Cleanse the wound with mild soap and water prior to applying a clean dressing  Do not use tissue or cotton balls  Do not scrub the wound  Pat dry using gauze      Pack left foot wound with silver alginate  Cover with Abd pad and secure with rolled gauze and tape  Change dressing daily      Dressing above applied [facility] at [facility]  Offloading with CAM boot       Standing Status:   Future     Standing Expiration Date:   5/24/2024   • Transfer to other facility     Patient to be evaluated by ED and admitted by [facility] for osteomyelitis first metatarsal left foot, he will need new x-ray, MRI, podiatry consult, IV "antibiotics  Patient will go to ED Monday 5/29/23     Order Specific Question:   Call back Phone Number:     Answer:   [phone]     Order Specific Question:   Request Type: Answer:   Transfer to [address] [place] [address] Road     Order Specific Question:   Hospital Area: Answer:   [place] [[id number]]     Order Specific Question:   Reason for Transfer:     Answer:   ED Eval [17]     Order Specific Question:   Service: Answer:   Emergency Medicine [105]     Order Specific Question:   Level of Care: Answer:   Med Surg [16]         Rodolfo Guevara DPM, ARTUROM, FACFAS    Portions of the record may have been created with voice recognition software  Occasional wrong word or \"sound a like\" substitutions may have occurred due to the inherent limitations of voice recognition software  Read the chart carefully and recognize, using context, where substitutions have occurred      "

## 2023-05-25 ENCOUNTER — APPOINTMENT (OUTPATIENT)
Dept: PHYSICAL THERAPY | Facility: CLINIC | Age: 63
End: 2023-05-25
Payer: COMMERCIAL

## 2023-05-29 ENCOUNTER — APPOINTMENT (INPATIENT)
Dept: MRI IMAGING | Facility: HOSPITAL | Age: 63
DRG: 854 | End: 2023-05-29
Payer: COMMERCIAL

## 2023-05-29 ENCOUNTER — HOSPITAL ENCOUNTER (INPATIENT)
Facility: HOSPITAL | Age: 63
LOS: 3 days | Discharge: HOME HEALTH | DRG: 854 | End: 2023-06-01
Attending: EMERGENCY MEDICINE | Admitting: STUDENT IN AN ORGANIZED HEALTH CARE EDUCATION/TRAINING PROGRAM
Payer: COMMERCIAL

## 2023-05-29 ENCOUNTER — APPOINTMENT (EMERGENCY)
Dept: RADIOLOGY | Facility: HOSPITAL | Age: 63
DRG: 854 | End: 2023-05-29
Payer: COMMERCIAL

## 2023-05-29 DIAGNOSIS — L97.509 DIABETIC FOOT ULCER (HCC): Primary | ICD-10-CM

## 2023-05-29 DIAGNOSIS — Z72.0 NICOTINE ABUSE: ICD-10-CM

## 2023-05-29 DIAGNOSIS — L97.522 DIABETIC ULCER OF OTHER PART OF LEFT FOOT ASSOCIATED WITH TYPE 1 DIABETES MELLITUS, WITH FAT LAYER EXPOSED (HCC): ICD-10-CM

## 2023-05-29 DIAGNOSIS — E10.65 TYPE 1 DIABETES MELLITUS WITH HYPERGLYCEMIA (HCC): ICD-10-CM

## 2023-05-29 DIAGNOSIS — M86.9 OSTEOMYELITIS (HCC): ICD-10-CM

## 2023-05-29 DIAGNOSIS — E10.621 DIABETIC ULCER OF OTHER PART OF LEFT FOOT ASSOCIATED WITH TYPE 1 DIABETES MELLITUS, WITH FAT LAYER EXPOSED (HCC): ICD-10-CM

## 2023-05-29 DIAGNOSIS — E11.621 DIABETIC FOOT ULCER (HCC): Primary | ICD-10-CM

## 2023-05-29 PROBLEM — A41.9 SEVERE SEPSIS (HCC): Status: ACTIVE | Noted: 2023-05-29

## 2023-05-29 PROBLEM — F10.10 ALCOHOL ABUSE: Status: ACTIVE | Noted: 2023-05-29

## 2023-05-29 PROBLEM — R65.20 SEVERE SEPSIS (HCC): Status: ACTIVE | Noted: 2023-05-29

## 2023-05-29 PROBLEM — N18.9 CKD (CHRONIC KIDNEY DISEASE): Status: ACTIVE | Noted: 2023-05-29

## 2023-05-29 PROBLEM — E87.1 HYPONATREMIA: Status: ACTIVE | Noted: 2023-05-29

## 2023-05-29 LAB
ALBUMIN SERPL BCP-MCNC: 4.4 G/DL (ref 3.5–5)
ALP SERPL-CCNC: 99 U/L (ref 34–104)
ALT SERPL W P-5'-P-CCNC: 35 U/L (ref 7–52)
ANION GAP SERPL CALCULATED.3IONS-SCNC: 13 MMOL/L (ref 4–13)
APTT PPP: 28 SECONDS (ref 23–37)
AST SERPL W P-5'-P-CCNC: 68 U/L (ref 13–39)
ATRIAL RATE: 106 BPM
BACTERIA UR QL AUTO: ABNORMAL /HPF
BASOPHILS # BLD AUTO: 0.08 THOUSANDS/ÂΜL (ref 0–0.1)
BASOPHILS NFR BLD AUTO: 1 % (ref 0–1)
BILIRUB SERPL-MCNC: 1.97 MG/DL (ref 0.2–1)
BILIRUB UR QL STRIP: NEGATIVE
BUN SERPL-MCNC: 24 MG/DL (ref 5–25)
CALCIUM SERPL-MCNC: 9.9 MG/DL (ref 8.4–10.2)
CHLORIDE SERPL-SCNC: 92 MMOL/L (ref 96–108)
CLARITY UR: CLEAR
CO2 SERPL-SCNC: 25 MMOL/L (ref 21–32)
COLOR UR: YELLOW
CREAT SERPL-MCNC: 1.38 MG/DL (ref 0.6–1.3)
CRP SERPL QL: <1 MG/L
EOSINOPHIL # BLD AUTO: 0.01 THOUSAND/ÂΜL (ref 0–0.61)
EOSINOPHIL NFR BLD AUTO: 0 % (ref 0–6)
ERYTHROCYTE [DISTWIDTH] IN BLOOD BY AUTOMATED COUNT: 13.4 % (ref 11.6–15.1)
GFR SERPL CREATININE-BSD FRML MDRD: 54 ML/MIN/1.73SQ M
GLUCOSE SERPL-MCNC: 190 MG/DL (ref 65–140)
GLUCOSE UR STRIP-MCNC: ABNORMAL MG/DL
HCT VFR BLD AUTO: 42 % (ref 36.5–49.3)
HGB BLD-MCNC: 14.5 G/DL (ref 12–17)
HGB UR QL STRIP.AUTO: NEGATIVE
IMM GRANULOCYTES # BLD AUTO: 0.05 THOUSAND/UL (ref 0–0.2)
IMM GRANULOCYTES NFR BLD AUTO: 0 % (ref 0–2)
INR PPP: 0.86 (ref 0.84–1.19)
KETONES UR STRIP-MCNC: ABNORMAL MG/DL
LACTATE SERPL-SCNC: 1.7 MMOL/L (ref 0.5–2)
LACTATE SERPL-SCNC: 2.5 MMOL/L (ref 0.5–2)
LEUKOCYTE ESTERASE UR QL STRIP: NEGATIVE
LYMPHOCYTES # BLD AUTO: 0.82 THOUSANDS/ÂΜL (ref 0.6–4.47)
LYMPHOCYTES NFR BLD AUTO: 6 % (ref 14–44)
MCH RBC QN AUTO: 33 PG (ref 26.8–34.3)
MCHC RBC AUTO-ENTMCNC: 34.5 G/DL (ref 31.4–37.4)
MCV RBC AUTO: 96 FL (ref 82–98)
MONOCYTES # BLD AUTO: 1.55 THOUSAND/ÂΜL (ref 0.17–1.22)
MONOCYTES NFR BLD AUTO: 11 % (ref 4–12)
NEUTROPHILS # BLD AUTO: 11.72 THOUSANDS/ÂΜL (ref 1.85–7.62)
NEUTS SEG NFR BLD AUTO: 82 % (ref 43–75)
NITRITE UR QL STRIP: NEGATIVE
NON-SQ EPI CELLS URNS QL MICRO: ABNORMAL /HPF
NRBC BLD AUTO-RTO: 0 /100 WBCS
P AXIS: 79 DEGREES
PH UR STRIP.AUTO: 5.5 [PH]
PLATELET # BLD AUTO: 315 THOUSANDS/UL (ref 149–390)
PMV BLD AUTO: 9.1 FL (ref 8.9–12.7)
POTASSIUM SERPL-SCNC: 4.8 MMOL/L (ref 3.5–5.3)
PR INTERVAL: 164 MS
PROCALCITONIN SERPL-MCNC: 0.35 NG/ML
PROT SERPL-MCNC: 8.1 G/DL (ref 6.4–8.4)
PROT UR STRIP-MCNC: NEGATIVE MG/DL
PROTHROMBIN TIME: 12.3 SECONDS (ref 11.6–14.5)
QRS AXIS: 109 DEGREES
QRSD INTERVAL: 80 MS
QT INTERVAL: 334 MS
QTC INTERVAL: 443 MS
RBC # BLD AUTO: 4.4 MILLION/UL (ref 3.88–5.62)
RBC #/AREA URNS AUTO: ABNORMAL /HPF
SODIUM 24H UR-SCNC: 35 MOL/L
SODIUM SERPL-SCNC: 130 MMOL/L (ref 135–147)
SP GR UR STRIP.AUTO: 1.01 (ref 1–1.03)
T WAVE AXIS: 74 DEGREES
UROBILINOGEN UR STRIP-ACNC: <2 MG/DL
VENTRICULAR RATE: 106 BPM
WBC # BLD AUTO: 14.23 THOUSAND/UL (ref 4.31–10.16)
WBC #/AREA URNS AUTO: ABNORMAL /HPF

## 2023-05-29 PROCEDURE — 96365 THER/PROPH/DIAG IV INF INIT: CPT

## 2023-05-29 PROCEDURE — 73630 X-RAY EXAM OF FOOT: CPT

## 2023-05-29 PROCEDURE — 81001 URINALYSIS AUTO W/SCOPE: CPT | Performed by: PHYSICIAN ASSISTANT

## 2023-05-29 PROCEDURE — 93005 ELECTROCARDIOGRAM TRACING: CPT

## 2023-05-29 PROCEDURE — 85730 THROMBOPLASTIN TIME PARTIAL: CPT | Performed by: EMERGENCY MEDICINE

## 2023-05-29 PROCEDURE — 99285 EMERGENCY DEPT VISIT HI MDM: CPT

## 2023-05-29 PROCEDURE — 84300 ASSAY OF URINE SODIUM: CPT | Performed by: STUDENT IN AN ORGANIZED HEALTH CARE EDUCATION/TRAINING PROGRAM

## 2023-05-29 PROCEDURE — 85025 COMPLETE CBC W/AUTO DIFF WBC: CPT | Performed by: EMERGENCY MEDICINE

## 2023-05-29 PROCEDURE — 80053 COMPREHEN METABOLIC PANEL: CPT | Performed by: EMERGENCY MEDICINE

## 2023-05-29 PROCEDURE — 84145 PROCALCITONIN (PCT): CPT | Performed by: EMERGENCY MEDICINE

## 2023-05-29 PROCEDURE — 36415 COLL VENOUS BLD VENIPUNCTURE: CPT | Performed by: EMERGENCY MEDICINE

## 2023-05-29 PROCEDURE — 83935 ASSAY OF URINE OSMOLALITY: CPT | Performed by: STUDENT IN AN ORGANIZED HEALTH CARE EDUCATION/TRAINING PROGRAM

## 2023-05-29 PROCEDURE — 85610 PROTHROMBIN TIME: CPT | Performed by: EMERGENCY MEDICINE

## 2023-05-29 PROCEDURE — 86140 C-REACTIVE PROTEIN: CPT | Performed by: EMERGENCY MEDICINE

## 2023-05-29 PROCEDURE — 87040 BLOOD CULTURE FOR BACTERIA: CPT | Performed by: EMERGENCY MEDICINE

## 2023-05-29 PROCEDURE — 83605 ASSAY OF LACTIC ACID: CPT | Performed by: EMERGENCY MEDICINE

## 2023-05-29 PROCEDURE — 93010 ELECTROCARDIOGRAM REPORT: CPT | Performed by: INTERNAL MEDICINE

## 2023-05-29 PROCEDURE — 96366 THER/PROPH/DIAG IV INF ADDON: CPT

## 2023-05-29 PROCEDURE — 73718 MRI LOWER EXTREMITY W/O DYE: CPT

## 2023-05-29 RX ORDER — LEVOFLOXACIN 5 MG/ML
750 INJECTION, SOLUTION INTRAVENOUS ONCE
Status: COMPLETED | OUTPATIENT
Start: 2023-05-29 | End: 2023-05-29

## 2023-05-29 RX ORDER — METOPROLOL TARTRATE 50 MG/1
50 TABLET, FILM COATED ORAL EVERY 12 HOURS SCHEDULED
Status: DISCONTINUED | OUTPATIENT
Start: 2023-05-29 | End: 2023-06-01 | Stop reason: HOSPADM

## 2023-05-29 RX ORDER — HEPARIN SODIUM 5000 [USP'U]/ML
5000 INJECTION, SOLUTION INTRAVENOUS; SUBCUTANEOUS EVERY 8 HOURS SCHEDULED
Status: DISCONTINUED | OUTPATIENT
Start: 2023-05-30 | End: 2023-06-01 | Stop reason: HOSPADM

## 2023-05-29 RX ORDER — ATORVASTATIN CALCIUM 40 MG/1
40 TABLET, FILM COATED ORAL
Status: DISCONTINUED | OUTPATIENT
Start: 2023-05-29 | End: 2023-05-30

## 2023-05-29 RX ORDER — LANOLIN ALCOHOL/MO/W.PET/CERES
100 CREAM (GRAM) TOPICAL DAILY
Status: DISCONTINUED | OUTPATIENT
Start: 2023-05-29 | End: 2023-06-01 | Stop reason: HOSPADM

## 2023-05-29 RX ORDER — HYDROMORPHONE HCL/PF 1 MG/ML
0.5 SYRINGE (ML) INJECTION EVERY 6 HOURS PRN
Status: DISCONTINUED | OUTPATIENT
Start: 2023-05-29 | End: 2023-06-01 | Stop reason: HOSPADM

## 2023-05-29 RX ORDER — VANCOMYCIN HYDROCHLORIDE 1 G/200ML
15 INJECTION, SOLUTION INTRAVENOUS ONCE
Status: DISCONTINUED | OUTPATIENT
Start: 2023-05-29 | End: 2023-05-29

## 2023-05-29 RX ORDER — CEFEPIME HYDROCHLORIDE 2 G/50ML
2000 INJECTION, SOLUTION INTRAVENOUS EVERY 12 HOURS
Status: DISCONTINUED | OUTPATIENT
Start: 2023-05-29 | End: 2023-05-30

## 2023-05-29 RX ORDER — ACETAMINOPHEN 325 MG/1
650 TABLET ORAL EVERY 6 HOURS PRN
Status: DISCONTINUED | OUTPATIENT
Start: 2023-05-29 | End: 2023-06-01 | Stop reason: HOSPADM

## 2023-05-29 RX ORDER — FOLIC ACID 1 MG/1
1 TABLET ORAL DAILY
Status: DISCONTINUED | OUTPATIENT
Start: 2023-05-29 | End: 2023-06-01 | Stop reason: HOSPADM

## 2023-05-29 RX ORDER — OXYCODONE HYDROCHLORIDE 5 MG/1
5 TABLET ORAL EVERY 4 HOURS PRN
Status: DISCONTINUED | OUTPATIENT
Start: 2023-05-29 | End: 2023-05-31

## 2023-05-29 RX ORDER — ENOXAPARIN SODIUM 100 MG/ML
40 INJECTION SUBCUTANEOUS DAILY
Status: DISCONTINUED | OUTPATIENT
Start: 2023-05-29 | End: 2023-05-29

## 2023-05-29 RX ORDER — NICOTINE 21 MG/24HR
1 PATCH, TRANSDERMAL 24 HOURS TRANSDERMAL DAILY
Status: DISCONTINUED | OUTPATIENT
Start: 2023-05-29 | End: 2023-06-01 | Stop reason: HOSPADM

## 2023-05-29 RX ADMIN — OXYCODONE HYDROCHLORIDE 5 MG: 5 TABLET ORAL at 23:54

## 2023-05-29 RX ADMIN — METOPROLOL TARTRATE 50 MG: 50 TABLET ORAL at 21:34

## 2023-05-29 RX ADMIN — MULTIPLE VITAMINS W/ MINERALS TAB 1 TABLET: TAB ORAL at 18:29

## 2023-05-29 RX ADMIN — SODIUM CHLORIDE 1000 ML: 0.9 INJECTION, SOLUTION INTRAVENOUS at 13:21

## 2023-05-29 RX ADMIN — OXYCODONE HYDROCHLORIDE 5 MG: 5 TABLET ORAL at 19:35

## 2023-05-29 RX ADMIN — CEFEPIME HYDROCHLORIDE 2000 MG: 2 INJECTION, SOLUTION INTRAVENOUS at 15:07

## 2023-05-29 RX ADMIN — NICOTINE 1 PATCH: 21 PATCH, EXTENDED RELEASE TRANSDERMAL at 15:01

## 2023-05-29 RX ADMIN — VANCOMYCIN HYDROCHLORIDE 1750 MG: 5 INJECTION, POWDER, LYOPHILIZED, FOR SOLUTION INTRAVENOUS at 18:33

## 2023-05-29 RX ADMIN — ATORVASTATIN CALCIUM 40 MG: 40 TABLET, FILM COATED ORAL at 18:29

## 2023-05-29 RX ADMIN — OXYCODONE HYDROCHLORIDE 5 MG: 5 TABLET ORAL at 15:33

## 2023-05-29 RX ADMIN — HYDROMORPHONE HYDROCHLORIDE 0.5 MG: 1 INJECTION, SOLUTION INTRAMUSCULAR; INTRAVENOUS; SUBCUTANEOUS at 18:33

## 2023-05-29 RX ADMIN — LEVOFLOXACIN 750 MG: 750 INJECTION, SOLUTION INTRAVENOUS at 12:55

## 2023-05-29 RX ADMIN — THIAMINE HCL TAB 100 MG 100 MG: 100 TAB at 18:29

## 2023-05-29 RX ADMIN — ENOXAPARIN SODIUM 40 MG: 100 INJECTION SUBCUTANEOUS at 15:06

## 2023-05-29 RX ADMIN — FOLIC ACID 1 MG: 1 TABLET ORAL at 18:29

## 2023-05-29 NOTE — SEPSIS NOTE
"  Sepsis Note   Vance Lopez 66 y o  male MRN: [id number]  Unit/Bed#: ED 03 Encounter: [id number]      Lactic pending, hemodynamically stable  Initial Sepsis Screening     Row Name 05/29/23 1346                Is the patient's history suggestive of a new or worsening infection? Yes (Proceed)  -FIORELLA        Suspected source of infection soft tissue  -FIORELLA        Indicate SIRS criteria Tachycardia > 90 bpm;Leukocytosis (WBC > 63756 IJL) OR Leukopenia (WBC <4000 IJL) OR Bandemia (WBC >10% bands)  -FIORELLA        Are two or more of the above signs & symptoms of infection both present and new to the patient? Yes (Proceed)  -FIORELLA        Assess for evidence of organ dysfunction: Are any of the below criteria present within 6 hours of suspected infection and SIRS criteria that are NOT considered to be chronic conditions? Lactate > 2 0  -FIORELLA        Date of presentation of severe sepsis 05/29/23  -FIORELLA        Time of presentation of severe sepsis 1355  -FIORELLA        Sepsis Note: Click \"NEXT\" below (NOT \"close\") to generate sepsis note based on above information  --              User Key  (r) = Recorded By, (t) = Taken By, (c) = Cosigned By    [address] Name Provider Type    Ely D 25, DO Physician                Default Flowsheet Data (last 720 hours)     Sepsis Reassess     Row Name 05/29/23 1624                   Repeat Volume Status and Tissue Perfusion Assessment Performed    Date of Reassessment: 05/29/23  -        Time of Reassessment: 1624  -SB        Sepsis Reassessment Note: Click \"NEXT\" below (NOT \"close\") to generate sepsis reassessment note  YES (proceed by clicking \"NEXT\")  -SB        Repeat Volume Status and Tissue Perfusion Assessment Performed --              User Key  (r) = Recorded By, (t) = Taken By, (c) = Cosigned By    [address] Name Provider Type    SB Gema Simental PA-C Physician Assistant                Body mass index is 20 48 kg/m²    Wt Readings from Last 1 Encounters:   05/29/23 68 5 kg (151 lb) " IBW (Ideal Body Weight): 77 6 kg    Ideal body weight: 77 6 kg (171 lb 1 2 oz)

## 2023-05-29 NOTE — ASSESSMENT & PLAN NOTE
· SIRS: tachycardia and leukocytosis  · Source: likely osteomyelitis of L foot  · Lactic: 2 5  · Procal: 0 35  · Blood/Wound cultures pending  · Given levofloxacin and vancomycin in ED  · Start Cefepime/Vanc  · Podiatry consulted for surgical planning  · Trend fever curve and WBC count

## 2023-05-29 NOTE — PLAN OF CARE
Problem: Potential for Falls  Goal: Patient will remain free of falls  Description: INTERVENTIONS:  - Educate patient/family on patient safety including physical limitations  - Instruct patient to call for assistance with activity   - Consult OT/PT to assist with strengthening/mobility   - Keep Call bell within reach  - Keep bed low and locked with side rails adjusted as appropriate  - Keep care items and personal belongings within reach  - Initiate and maintain comfort rounds  - Make Fall Risk Sign visible to staff  - Offer Toileting every 2 Hours, in advance of need  - Initiate/Maintain alarm  - Obtain necessary fall risk management equipment  - Apply yellow socks and bracelet for high fall risk patients  - Consider moving patient to room near nurses station  Outcome: Progressing     Problem: PAIN - ADULT  Goal: Verbalizes/displays adequate comfort level or baseline comfort level  Description: Interventions:  - Encourage patient to monitor pain and request assistance  - Assess pain using appropriate pain scale  - Administer analgesics based on type and severity of pain and evaluate response  - Implement non-pharmacological measures as appropriate and evaluate response  - Consider cultural and social influences on pain and pain management  - Notify physician/advanced practitioner if interventions unsuccessful or patient reports new pain  Outcome: Progressing     Problem: INFECTION - ADULT  Goal: Absence or prevention of progression during hospitalization  Description: INTERVENTIONS:  - Assess and monitor for signs and symptoms of infection  - Monitor lab/diagnostic results  - Monitor all insertion sites, i e  indwelling lines, tubes, and drains  - Monitor endotracheal if appropriate and nasal secretions for changes in amount and color  - Tracy appropriate cooling/warming therapies per order  - Administer medications as ordered  - Instruct and encourage patient and family to use good hand hygiene technique  - Identify and instruct in appropriate isolation precautions for identified infection/condition  Outcome: Progressing     Problem: DISCHARGE PLANNING  Goal: Discharge to home or other facility with appropriate resources  Description: INTERVENTIONS:  - Identify barriers to discharge w/patient and caregiver  - Arrange for needed discharge resources and transportation as appropriate  - Identify discharge learning needs (meds, wound care, etc )  - Arrange for interpretive services to assist at discharge as needed  - Refer to Case Management Department for coordinating discharge planning if the patient needs post-hospital services based on physician/advanced practitioner order or complex needs related to functional status, cognitive ability, or social support system  Outcome: Progressing     Problem: METABOLIC, FLUID AND ELECTROLYTES - ADULT  Goal: Electrolytes maintained within normal limits  Description: INTERVENTIONS:  - Monitor labs and assess patient for signs and symptoms of electrolyte imbalances  - Administer electrolyte replacement as ordered  - Monitor response to electrolyte replacements, including repeat lab results as appropriate  - Instruct patient on fluid and nutrition as appropriate  Outcome: Progressing  Goal: Fluid balance maintained  Description: INTERVENTIONS:  - Monitor labs   - Monitor I/O and WT  - Instruct patient on fluid and nutrition as appropriate  - Assess for signs & symptoms of volume excess or deficit  Outcome: Progressing  Goal: Glucose maintained within target range  Description: INTERVENTIONS:  - Monitor Blood Glucose as ordered  - Assess for signs and symptoms of hyperglycemia and hypoglycemia  - Administer ordered medications to maintain glucose within target range  - Assess nutritional intake and initiate nutrition service referral as needed  Outcome: Progressing     Problem: SKIN/TISSUE INTEGRITY - ADULT  Goal: Skin Integrity remains intact(Skin Breakdown Prevention)  Description: Assess:  -Perform Pineda assessment  -Clean and moisturize skin  -Inspect skin when repositioning, toileting, and assisting with ADLS  -Assess under medical devices  -Assess extremities for adequate circulation and sensation     Bed Management:  -Have minimal linens on bed & keep smooth, unwrinkled  -Change linens as needed when moist or perspiring  -Avoid sitting or lying in one position for more than 2 hours while in bed  -Keep HOB at 30 degrees     Toileting:  -Offer bedside commode  -Assess for incontinence  -Use incontinent care products after each incontinent episode    Activity:  -Mobilize patient 3 times a day  -Encourage activity and walks on unit  -Encourage or provide ROM exercises   -Turn and reposition patient every 2 Hours  -Use appropriate equipment to lift or move patient in bed  -Instruct/ Assist with weight shifting when out of bed in chair  -Consider limitation of chair time 2 hour intervals    Skin Care:  -Avoid use of baby powder, tape, friction and shearing, hot water or constrictive clothing  -Relieve pressure over bony prominences  -Do not massage red bony areas    Next Steps:  -Teach patient strategies to minimize risks   -Consider consults to  interdisciplinary teams  Outcome: Progressing  Goal: Incision(s), wounds(s) or drain site(s) healing without S/S of infection  Description: INTERVENTIONS  - Assess and document dressing, incision, wound bed, drain sites and surrounding tissue  - Provide patient and family education  - Perform skin care/dressing changes  Outcome: Progressing     Problem: HEMATOLOGIC - ADULT  Goal: Maintains hematologic stability  Description: INTERVENTIONS  - Assess for signs and symptoms of bleeding or hemorrhage  - Monitor labs  - Administer supportive blood products/factors as ordered and appropriate  Outcome: Progressing

## 2023-05-29 NOTE — ASSESSMENT & PLAN NOTE
· Follows with outpatient podiatry  · Xray concerning for possible osteomyelitis  · Check MRI foot  · Podiatry consulted  · Abx as outlined above

## 2023-05-29 NOTE — ASSESSMENT & PLAN NOTE
"Lab Results   Component Value Date    HGBA1C 7 9 (H) 03/31/2023       No results for input(s): \"POCGLU\" in the last 72 hours      Blood Sugar Average: Last 72 hrs:     · Patient is on home insulin pump  · If plans to go to the OR, transition to SSI vs insulin gtt  · Maintain -180  "

## 2023-05-29 NOTE — SEPSIS NOTE
"  Sepsis Note   Libia Blanton 66 y o  male MRN: [id number]  Unit/Bed#: ED 03 Encounter: [id number]       Initial Sepsis Screening     Row Name 05/29/23 1346                Is the patient's history suggestive of a new or worsening infection? Yes (Proceed)  -FIORELLA        Suspected source of infection soft tissue  -FIORELLA        Indicate SIRS criteria Tachycardia > 90 bpm;Leukocytosis (WBC > 62317 IJL) OR Leukopenia (WBC <4000 IJL) OR Bandemia (WBC >10% bands)  -FIORELLA        Are two or more of the above signs & symptoms of infection both present and new to the patient? Yes (Proceed)  -FIORELLA        Assess for evidence of organ dysfunction: Are any of the below criteria present within 6 hours of suspected infection and SIRS criteria that are NOT considered to be chronic conditions? Lactate > 2 0  -FIORELLA        Date of presentation of severe sepsis 05/29/23  -FIORLELA        Time of presentation of severe sepsis 1355  -FIORELLA        Sepsis Note: Click \"NEXT\" below (NOT \"close\") to generate sepsis note based on above information  --              User Key  (r) = Recorded By, (t) = Taken By, (c) = Cosigned By    [address] Name Provider Type    Ely HATCH 25, DO Physician                    Body mass index is 20 48 kg/m²    Wt Readings from Last 1 Encounters:   05/29/23 68 5 kg (151 lb)        Ideal body weight: 77 6 kg (171 lb 1 2 oz)  "

## 2023-05-29 NOTE — ED PROVIDER NOTES
History  Chief Complaint   Patient presents with   • Wound Check     Pt to ED for wound check of L foot  Pt podiatrist sent him to ED for admission w IV abx, MRI to r/o osteo and surgery per pt  Pt c/o increased pain in foot     This is a 81-year-old male with a history of diabetes who presents for evaluation of ulceration to the left foot was evaluated by podiatry who reviewed his x-rays from the 19th of this month and felt that patient likely had osteomyelitis and recommended admission under medicine for evaluation including MRI and probable surgical revision      History provided by:  Patient and spouse  Medical Problem  Location:  Left foot  Quality:  Ulcer  Severity:  Severe  Onset quality:  Gradual  Timing:  Constant  Progression:  Waxing and waning  Chronicity:  Recurrent  Context:  Left foot ulceration with clinical suspicion for osteomyelitis  Ineffective treatments:  Outpatient antibiotics  Associated symptoms: no fever        Prior to Admission Medications   Prescriptions Last Dose Informant Patient Reported? Taking?    Glucagon, rDNA, (Glucagon Emergency) 1 MG KIT  Self Yes No   Sig: as directed   HumaLOG 100 UNIT/ML injection   Yes No   Sig: inject up to 80 units in INSULIN PUMP daily as directed by prescriber   atorvastatin (LIPITOR) 40 mg tablet  Self No No   Sig: TAKE 1 TABLET BY MOUTH EVERY DAY   colchicine (COLCRYS) 0 6 mg tablet  Self No No   Sig: Take 1 tablet (0 6 mg total) by mouth every 6 (six) hours as needed (gouty attack) Use as directed for gout flares   Patient not taking: Reported on 4/24/2023   doxycycline hyclate (VIBRAMYCIN) 100 mg capsule   No No   Sig: Take 1 capsule (100 mg total) by mouth every 12 (twelve) hours for 7 days   insulin lispro (HumaLOG) 100 units/mL injection  Self No No   Sig: Use via the insulin pump, use up to 80 units daily   lisinopril (ZESTRIL) 10 mg tablet  Self No No   Sig: Take 1 tablet (10 mg total) by mouth daily   metoprolol succinate (TOPROL-XL) 100 mg 24 hr tablet  Self No No   Sig: Take 1 tablet (100 mg total) by mouth daily      Facility-Administered Medications: None       Past Medical History:   Diagnosis Date   • Chronic foot ulcer ([facility] [address] ) 2018   • Diabetes mellitus ([facility] )    • Gout    • High cholesterol    • Hypertension    • Visual impairment 2022    Cateract       Past Surgical History:   Procedure Laterality Date   • CATARACT EXTRACTION Right 2023   • COMPLEX WOUND CLOSURE TO EXTREMITY Left 2019    Procedure: COMPLEX CLOSURE LEFT CHEEK;  Surgeon: Jordan Galindo MD;  Location:  MAIN OR;  Service: Plastics   • FACIAL/NECK BIOPSY Left 2019    Procedure: EXCISION LEFT CHEEK CYST;  Surgeon: Jordan Galindo MD;  Location:  MAIN OR;  Service: Plastics   • HERNIA REPAIR Left     several times   • KNEE ARTHROSCOPY Left     torn meniscus       Family History   Problem Relation Age of Onset   • Heart disease Father    • Diabetes type I Father    • Diabetes type II Mother    • No Known Problems Sister    • Alcohol abuse Brother    • Diabetes type I Brother    • No Known Problems Brother    • No Known Problems Son    • No Known Problems Daughter      I have reviewed and agree with the history as documented  E-Cigarette/Vaping   • E-Cigarette Use Never User      E-Cigarette/Vaping Substances   • Nicotine No    • THC No    • CBD No    • Flavoring No    • Other No    • Unknown No      Social History     Tobacco Use   • Smoking status: Every Day     Packs/day: 1 00     Years: 20 00     Total pack years: 20 00     Types: Cigarettes     Last attempt to quit: 2022     Years since quittin 4   • Smokeless tobacco: Never   • Tobacco comments:     encouraged smoking cessation   Vaping Use   • Vaping Use: Never used   Substance Use Topics   • Alcohol use: Yes     Comment: 3/4 beers a day   • Drug use: Never       Review of Systems   Constitutional: Negative for chills and fever  Skin: Positive for wound     All other systems reviewed and are negative  Physical Exam  Physical Exam  Vitals and nursing note reviewed  Constitutional:       General: He is not in acute distress  Appearance: He is not ill-appearing, toxic-appearing or diaphoretic  HENT:      Head: Normocephalic  Right Ear: External ear normal       Left Ear: External ear normal    Eyes:      General: No scleral icterus  Right eye: No discharge  Left eye: No discharge  Extraocular Movements: Extraocular movements intact  Pupils: Pupils are equal, round, and reactive to light  Cardiovascular:      Rate and Rhythm: Regular rhythm  Tachycardia present  Heart sounds: No murmur heard  No friction rub  No gallop  Comments: Faint bilateral dorsalis pedis pulses by palpation feet are warm to touch  Pulmonary:      Effort: Pulmonary effort is normal  No respiratory distress  Breath sounds: No stridor  No wheezing, rhonchi or rales  Abdominal:      General: Abdomen is flat  There is no distension  Palpations: Abdomen is soft  Tenderness: There is no abdominal tenderness  There is no guarding or rebound  Musculoskeletal:         General: No swelling or deformity  Cervical back: Normal range of motion and neck supple  No rigidity or tenderness  Right lower leg: No edema  Left lower leg: No edema  Skin:     General: Skin is warm  Coloration: Skin is not jaundiced  Findings: Lesion present  No bruising  Comments: Diabetic foot ulceration plantar aspect of the left foot without any drainage   Neurological:      General: No focal deficit present  Mental Status: He is alert and oriented to person, place, and time  Cranial Nerves: No cranial nerve deficit  Coordination: Coordination normal    Psychiatric:         Mood and Affect: Mood normal          Behavior: Behavior normal          Thought Content:  Thought content normal              Vital Signs  ED Triage Vitals [05/29/23 1103] Temperature Pulse Respirations Blood Pressure SpO2   98 4 °F (36 9 °C) (!) 110 18 136/79 98 %      Temp Source Heart Rate Source Patient Position - Orthostatic VS BP Location FiO2 (%)   Oral Monitor Sitting Left arm --      Pain Score       6           Vitals:    05/29/23 1103 05/29/23 1300 05/29/23 1400 05/29/23 1430   BP: 136/79 155/74 161/76 165/77   Pulse: (!) 110 104 105 105   Patient Position - Orthostatic VS: Sitting Sitting Sitting Sitting         Visual Acuity      ED Medications  Medications   cefepime (MAXIPIME) IVPB (premix in dextrose) 2,000 mg 50 mL (0 mg Intravenous Stopped 5/29/23 1537)   vancomycin (VANCOCIN) 1,750 mg in sodium chloride 0 9 % 500 mL IVPB (has no administration in time range)   nicotine (NICODERM CQ) 21 mg/24 hr TD 24 hr patch 1 patch (1 patch Transdermal Medication Applied 5/29/23 1501)   vancomycin (VANCOCIN) 1250 mg in sodium chloride 0 9% 250 mL IVPB (has no administration in time range)   acetaminophen (TYLENOL) tablet 650 mg (has no administration in time range)   oxyCODONE (ROXICODONE) split tablet 2 5 mg ( Oral See Alternative 5/29/23 1533)     Or   oxyCODONE (ROXICODONE) IR tablet 5 mg (5 mg Oral Given 5/29/23 1533)   HYDROmorphone (DILAUDID) injection 0 5 mg (has no administration in time range)   atorvastatin (LIPITOR) tablet 40 mg (has no administration in time range)   metoprolol tartrate (LOPRESSOR) tablet 50 mg (has no administration in time range)   thiamine tablet 100 mg (has no administration in time range)   folic acid (FOLVITE) tablet 1 mg (has no administration in time range)   multivitamin-minerals (CENTRUM) tablet 1 tablet (has no administration in time range)   PATIENT MAINTAINED INSULIN PUMP 1 each (has no administration in time range)   insulin lispro (HumaLOG) FOR PUMP REFILLS 1,000 Units (has no administration in time range)   heparin (porcine) subcutaneous injection 5,000 Units (has no administration in time range)   sodium chloride 0 9 % bolus 1,000 mL "(1,000 mL Intravenous New Bag 5/29/23 1321)   levofloxacin (LEVAQUIN) IVPB (premix in dextrose) 750 mg 150 mL (0 mg Intravenous Stopped 5/29/23 1455)       Diagnostic Studies  Results Reviewed     Procedure Component Value Units Date/Time    Urinalysis with microscopic [[id number]] Collected: 05/29/23 1630    Lab Status: No result Specimen: Urine, Clean Catch     Osmolality-\"If this is regarding a toxic alcohol, STOP  Test is not routinely indicated  Please consult medical  on call for further guidance  \" [[id number]]     Lab Status: No result Specimen: Blood     Sodium, urine, random [[id number]]     Lab Status: No result Specimen: Urine     Osmolality, urine [[id number]]     Lab Status: No result Specimen: Urine     Wound culture and Gram stain [[id number]]     Lab Status: No result Specimen: Wound     Lactic acid, plasma (w/reflex if result > 2 0) [[id number]]  (Abnormal) Collected: 05/29/23 1252    Lab Status: Final result Specimen: Blood from Arm, Right Updated: 05/29/23 1343     LACTIC ACID 2 5 mmol/L     Narrative:      Result may be elevated if tourniquet was used during collection      Lactic acid 2 Hours [[id number]]     Lab Status: No result Specimen: Blood     Procalcitonin [[id number]]  (Abnormal) Collected: 05/29/23 1252    Lab Status: Final result Specimen: Blood from Arm, Right Updated: 05/29/23 1342     Procalcitonin 0 35 ng/ml     Comprehensive metabolic panel [[id number]]  (Abnormal) Collected: 05/29/23 1252    Lab Status: Final result Specimen: Blood from Arm, Right Updated: 05/29/23 1334     Sodium 130 mmol/L      Potassium 4 8 mmol/L      Chloride 92 mmol/L      CO2 25 mmol/L      ANION GAP 13 mmol/L      BUN 24 mg/dL      Creatinine 1 38 mg/dL      Glucose 190 mg/dL      Calcium 9 9 mg/dL      AST 68 U/L      ALT 35 U/L      Alkaline Phosphatase 99 U/L      Total Protein 8 1 g/dL      Albumin 4 4 g/dL      Total Bilirubin 1 97 mg/dL      eGFR 54 ml/min/1 73sq m     Narrative:      National " Kidney Disease Foundation guidelines for Chronic Kidney Disease (CKD):   •  Stage 1 with normal or high GFR (GFR > 90 mL/min/1 73 square meters)  •  Stage 2 Mild CKD (GFR = 60-89 mL/min/1 73 square meters)  •  Stage 3A Moderate CKD (GFR = 45-59 mL/min/1 73 square meters)  •  Stage 3B Moderate CKD (GFR = 30-44 mL/min/1 73 square meters)  •  Stage 4 Severe CKD (GFR = 15-29 mL/min/1 73 square meters)  •  Stage 5 End Stage CKD (GFR <15 mL/min/1 73 square meters)  Note: GFR calculation is accurate only with a steady state creatinine    C-reactive protein [[id number]]  (Normal) Collected: 05/29/23 1252    Lab Status: Final result Specimen: Blood from Arm, Right Updated: 05/29/23 1334     CRP <1 0 mg/L     Protime-INR [[id number]]  (Normal) Collected: 05/29/23 1252    Lab Status: Final result Specimen: Blood from Arm, Right Updated: 05/29/23 1327     Protime 12 3 seconds      INR 0 86    APTT [[id number]]  (Normal) Collected: 05/29/23 1252    Lab Status: Final result Specimen: Blood from Arm, Right Updated: 05/29/23 1327     PTT 28 seconds     CBC and differential [[id number]]  (Abnormal) Collected: 05/29/23 1252    Lab Status: Final result Specimen: Blood from Arm, Right Updated: 05/29/23 1306     WBC 14 23 Thousand/uL      RBC 4 40 Million/uL      Hemoglobin 14 5 g/dL      Hematocrit 42 0 %      MCV 96 fL      MCH 33 0 pg      MCHC 34 5 g/dL      RDW 13 4 %      MPV 9 1 fL      Platelets 429 Thousands/uL      nRBC 0 /100 WBCs      Neutrophils Relative 82 %      Immat GRANS % 0 %      Lymphocytes Relative 6 %      Monocytes Relative 11 %      Eosinophils Relative 0 %      Basophils Relative 1 %      Neutrophils Absolute 11 72 Thousands/µL      Immature Grans Absolute 0 05 Thousand/uL      Lymphocytes Absolute 0 82 Thousands/µL      Monocytes Absolute 1 55 Thousand/µL      Eosinophils Absolute 0 01 Thousand/µL      Basophils Absolute 0 08 Thousands/µL     Blood culture #1 [[id number]] Collected: 05/29/23 1252    Lab Status:  In "process Specimen: Blood from Arm, Right Updated: 05/29/23 1303    Blood culture #2 [[id number]] Collected: 05/29/23 1252    Lab Status: In process Specimen: Blood from Hand, Right Updated: 05/29/23 1303                 XR foot 3+ views LEFT   ED Interpretation by Jackelin Rae DO (05/29 1324)   Early bony destruction head of the third metatarsal consistent with osteomyelitis      MRI foot/forefoot toes left wo contrast    (Results Pending)              Procedures  ECG 12 Lead Documentation Only    Date/Time: 5/29/2023 1:10 PM    Performed by: Jackelin Rae DO  Authorized by: Jackelin Rae DO    ECG reviewed by me, the ED Provider: yes    Patient location:  ED  Rate:     ECG rate:  106    ECG rate assessment: tachycardic    Rhythm:     Rhythm: sinus tachycardia    Conduction:     Conduction: normal    T waves:     T waves: normal               ED Course  ED Course as of 05/29/23 1637   Mon May 29, 2023   1343 Lactic acid 2 5 sepsis alert called patient was given antibiotics                            Initial Sepsis Screening     Row Name 05/29/23 1346                Is the patient's history suggestive of a new or worsening infection? Yes (Proceed)  -FIORELLA        Suspected source of infection soft tissue  -FIORELLA        Indicate SIRS criteria Tachycardia > 90 bpm;Leukocytosis (WBC > 79120 IJL) OR Leukopenia (WBC <4000 IJL) OR Bandemia (WBC >10% bands)  -FIORELLA        Are two or more of the above signs & symptoms of infection both present and new to the patient? Yes (Proceed)  -FIORELLA        Assess for evidence of organ dysfunction: Are any of the below criteria present within 6 hours of suspected infection and SIRS criteria that are NOT considered to be chronic conditions? Lactate > 2 0  -FIORELLA        Date of presentation of severe sepsis 05/29/23  -FIORELLA        Time of presentation of severe sepsis 1355  -FIORELLA        Sepsis Note: Click \"NEXT\" below (NOT \"close\") to generate sepsis note based on above information   --              " "User Key  (r) = Recorded By, (t) = Taken By, (c) = Cosigned By    [address] Name Provider Type    Ely HATCH 25, DO Physician              Default Flowsheet Data (last 720 hours)     Sepsis Reassess     [address] Name 05/29/23 1624                   Repeat Volume Status and Tissue Perfusion Assessment Performed    Date of Reassessment: 05/29/23  -        Time of Reassessment: 1624  -        Sepsis Reassessment Note: Click \"NEXT\" below (NOT \"close\") to generate sepsis reassessment note  YES (proceed by clicking \"NEXT\")  -SB        Repeat Volume Status and Tissue Perfusion Assessment Performed --              User Key  (r) = Recorded By, (t) = Taken By, (c) = Cosigned By    [address]  Name Provider Type    GEOFF Enamorado PA-C Physician Assistant              SBIRT 20yo+    Flowsheet Row Most Recent Value   Initial Alcohol Screen: US AUDIT-C     1  How often do you have a drink containing alcohol? 0 Filed at: 05/29/2023 1105   2  How many drinks containing alcohol do you have on a typical day you are drinking? 0 Filed at: 05/29/2023 1105   3a  Male UNDER 65: How often do you have five or more drinks on one occasion? 0 Filed at: 05/29/2023 1105   3b  FEMALE Any Age, or MALE 65+: How often do you have 4 or more drinks on one occassion? 0 Filed at: 05/29/2023 1105   Audit-C Score 0 Filed at: 05/29/2023 1105   PRINCESS: How many times in the past year have you    Used an illegal drug or used a prescription medication for non-medical reasons? Never Filed at: 05/29/2023 1105                    Medical Decision Making  Diabetic left foot ulceration patient to be admitted for probable osteomyelitis and surgical revision    Amount and/or Complexity of Data Reviewed  Labs: ordered  Radiology: ordered  Risk  Prescription drug management            Disposition  Final diagnoses:   Diabetic foot ulcer ([facility] [address] )   Osteomyelitis ([facility] [address] ) - By clinical suspicion by podiatry     Time reflects when diagnosis was documented in both " MDM as applicable and the Disposition within this note     Time User Action Codes Description Comment    5/29/2023 12:48 PM [address] [O59 [id number],  [id number]] Diabetic foot ulcer ([facility] )     5/29/2023 12:48 PM Lender Bi Add [M86 9] Osteomyelitis ([facility] )     5/29/2023 12:49 PM [address] Bi Modify [M86 9] Osteomyelitis [facility]) By clinical suspicion by podiatry      ED Disposition     ED Disposition   Admit    Condition   Stable    Date/Time   Mon May 29, 2023  2:22 PM    Comment   Case was discussed with Ginny Singh and the patient's admission status was agreed to be Admission Status: inpatient status to the service of Dr Mike Flannery   Follow-up Information    None         Patient's Medications   Discharge Prescriptions    No medications on file       No discharge procedures on file      PDMP Review     None          ED Provider  Electronically Signed by           Dakota Au DO  05/29/23 Eyad Shelley DO  05/29/23 [id number]

## 2023-05-29 NOTE — ASSESSMENT & PLAN NOTE
· Home regimen: TOPROL  mg daily  · Transition to Lopressor 50 mg BID for easier titration in the setting of severe sepsis  · Hold lisinopril

## 2023-05-29 NOTE — ASSESSMENT & PLAN NOTE
· Na 130 on admission  · Chronic hyponatremia, likely partially related to beer potomania  · 128 in 2021, 130 in 2022  · Check urine sodium, urine/serum osms  · Trend daily

## 2023-05-29 NOTE — ASSESSMENT & PLAN NOTE
· Patient admits to drinking 3-4 beers every night  · Last drink last night  · Denies hx of withdrawal  · Monitor on CIWA  · Start thiamine and folic acid

## 2023-05-29 NOTE — H&P
"[place]  H&P  Name: Brittaney Farga 66 y o  male I MRN: [id number]  Unit/Bed#: ED 03 I Date of Admission: 5/29/2023   Date of Service: 5/29/2023 I Hospital Day: 0      Assessment/Plan   * Severe sepsis (HCC)  Assessment & Plan  · SIRS: tachycardia and leukocytosis  · Source: likely osteomyelitis of L foot  · Lactic: 2 5  · Procal: 0 35  · Blood/Wound cultures pending  · Given levofloxacin and vancomycin in ED  · Start Cefepime/Vanc  · Podiatry consulted for surgical planning  · Trend fever curve and WBC count    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, with fat layer exposed ([facility] [address] )  Assessment & Plan  · Follows with outpatient podiatry  · Xray concerning for possible osteomyelitis  · Check MRI foot  · Podiatry consulted  · Abx as outlined above    CKD (chronic kidney disease)  Assessment & Plan  Lab Results   Component Value Date    CREATININE 1 38 (H) 05/29/2023    CREATININE 1 54 (H) 04/15/2022    CREATININE 1 26 06/04/2021    EGFR 54 05/29/2023    EGFR 48 04/15/2022    EGFR 62 06/04/2021     · Baseline Cr 1 1-1 3  · Cr on admission 1 38  · Monitor I/O  · Trend renal indices    Hyponatremia  Assessment & Plan  · Na 130 on admission  · Chronic hyponatremia, likely partially related to beer potomania  · 128 in 2021, 130 in 2022  · Check urine sodium, urine/serum osms  · Trend daily    Type 1 diabetes mellitus with hyperglycemia (HCC)  Assessment & Plan  Lab Results   Component Value Date    HGBA1C 7 9 (H) 03/31/2023       No results for input(s): \"POCGLU\" in the last 72 hours      Blood Sugar Average: Last 72 hrs:     · Patient is on home insulin pump  · If plans to go to the OR, transition to SSI vs insulin gtt  · Maintain -180    Alcohol abuse  Assessment & Plan  · Patient admits to drinking 3-4 beers every night  · Last drink last night  · Denies hx of withdrawal  · Monitor on CIWA  · Start thiamine and folic acid    Nicotine abuse  Assessment & " Plan  · Nicotine patch ordered  · Encourage cessation    Benign essential hypertension  Assessment & Plan  · Home regimen: TOPROL  mg daily  · Transition to Lopressor 50 mg BID for easier titration in the setting of severe sepsis  · Hold lisinopril    Mixed hyperlipidemia  Assessment & Plan  · Continue home statin           History of Present Illness     HPI: Urbano Canas is a 66 y o  with past medical history significant for type 1 diabetes, gout, hypertension, hyperlipidemia who presented to the ED with left foot ulceration  Patient was seen by podiatry on 5/24 with concerns for possible osteomyelitis of the left first submetatarsal head  Podiatry recommended the patient come to the ER at that time for IV antibiotics and MRI to rule out osteomyelitis and surgical planning  The patient decided to hold off until today as his wife was out of town  He has been on doxycycline while outpatient  In the ED he met SIRS criteria with tachycardia and leukocytosis  He was treated with levofloxacin and vancomycin  X-ray of the foot concerning for osteomyelitis  He will be admitted for IV antibiotics, MRI of the foot, and podiatry consult for surgical planning  History obtained from chart review and the patient  Review of Systems   Constitutional: Negative for chills and fever  Respiratory: Negative for cough and shortness of breath  Cardiovascular: Negative for chest pain and palpitations  Gastrointestinal: Negative for abdominal distention, abdominal pain, constipation, diarrhea, nausea and vomiting  Genitourinary: Negative for dysuria  Musculoskeletal:        Foot pain   Neurological: Negative for dizziness, weakness, light-headedness and headaches  All other systems reviewed and are negative        Historical Information   Past Medical History:  09/07/2018: Chronic foot ulcer ([facility] [address] )  No date: Diabetes mellitus ([facility] )  No date: Gout  No date: High cholesterol  No date: Hypertension  11/1/2022: Visual impairment      Comment:  C[facility] Past Surgical History:  2023: CATARACT EXTRACTION; Right  2019: COMPLEX WOUND CLOSURE TO EXTREMITY; Left      Comment:  Procedure: COMPLEX CLOSURE LEFT CHEEK;  Surgeon: Nolan Curling, MD;  Location: [facility];  Service: Plastics  2019: FACIAL/NECK BIOPSY; Left      Comment:  Procedure: EXCISION LEFT CHEEK CYST;  Surgeon: Nolan Curling, MD;  Location: [facility];  Service: Plastics  No date: HERNIA REPAIR; Left      Comment:  several times  No date: KNEE ARTHROSCOPY;  Left      Comment:  torn meniscus   Current Outpatient Medications   Medication Instructions   • atorvastatin (LIPITOR) 40 mg tablet TAKE 1 TABLET BY MOUTH EVERY DAY   • colchicine (COLCRYS) 0 6 mg, Oral, Every 6 hours PRN, Use as directed for gout flares   • doxycycline hyclate (VIBRAMYCIN) 100 mg, Oral, Every 12 hours scheduled   • Glucagon, rDNA, (Glucagon Emergency) 1 MG KIT as directed   • HumaLOG 100 UNIT/ML injection inject up to 80 units in INSULIN PUMP daily as directed by prescriber   • insulin lispro (HumaLOG) 100 units/mL injection Use via the insulin pump, use up to 80 units daily   • lisinopril (ZESTRIL) 10 mg, Oral, Daily   • metoprolol succinate (TOPROL-XL) 100 mg, Oral, Daily    Allergies   Allergen Reactions   • Cefuroxime Other (See Comments) and GI Intolerance   • Amoxicillin-Pot Clavulanate Nausea Only and GI Intolerance      Social History     Tobacco Use   • Smoking status: Every Day     Packs/day: 1 00     Years: 20 00     Total pack years: 20 00     Types: Cigarettes     Last attempt to quit: 2022     Years since quittin 4   • Smokeless tobacco: Never   • Tobacco comments:     encouraged smoking cessation   Vaping Use   • Vaping Use: Never used   Substance Use Topics   • Alcohol use: Yes     Comment: 3/4 beers a day   • Drug use: Never    Family History   Problem Relation Age of Onset   • Heart disease Father    • Diabetes type I Father    • Diabetes type II Mother    • No Known Problems Sister    • Alcohol abuse Brother    • Diabetes type I Brother    • No Known Problems Brother    • No Known Problems Son    • No Known Problems Daughter           Objective                            Vitals I/O      Most Recent Min/Max in 24hrs   Temp 98 4 °F (36 9 °C) Temp  Min: 98 4 °F (36 9 °C)  Max: 98 4 °F (36 9 °C)   Pulse 105 Pulse  Min: 104  Max: 110   Resp 20 Resp  Min: 18  Max: 20   /77 BP  Min: 136/79  Max: 165/77   O2 Sat 98 % SpO2  Min: 97 %  Max: 99 %      Intake/Output Summary (Last 24 hours) at 5/29/2023 1550  Last data filed at 5/29/2023 1537  Gross per 24 hour   Intake 250 ml   Output --   Net 250 ml         Diet Todd/CHO Controlled; Consistent Carbohydrate Diet Level 2 (5 carb servings/75 grams CHO/meal)     Invasive Monitoring Physical exam    Physical Exam  Vitals reviewed  Constitutional:       General: He is not in acute distress  Appearance: Normal appearance  He is not ill-appearing, toxic-appearing or diaphoretic  HENT:      Head: Normocephalic and atraumatic  Nose: Nose normal       Mouth/Throat:      Mouth: Mucous membranes are moist    Eyes:      Extraocular Movements: Extraocular movements intact  Pupils: Pupils are equal, round, and reactive to light  Cardiovascular:      Rate and Rhythm: Normal rate and regular rhythm  Heart sounds: No murmur heard  No friction rub  No gallop  Pulmonary:      Effort: No respiratory distress  Breath sounds: No wheezing, rhonchi or rales  Chest:      Chest wall: No tenderness  Abdominal:      General: Abdomen is flat  There is no distension  Palpations: Abdomen is soft  Tenderness: There is no abdominal tenderness  There is no guarding or rebound  Musculoskeletal:      Right lower leg: No edema  Left lower leg: No edema  Feet:    Skin:     General: Skin is warm and dry        Capillary Refill: Capillary refill takes less than 2 seconds  Neurological:      General: No focal deficit present  Mental Status: He is alert and oriented to person, place, and time  Cranial Nerves: No cranial nerve deficit  Sensory: No sensory deficit  Motor: No weakness  Coordination: Coordination normal    Psychiatric:         Mood and Affect: Mood normal          Behavior: Behavior normal             Diagnostic Studies      EKG: NSR  Imaging:  I have personally reviewed pertinent reports         Medications:  Scheduled PRN   atorvastatin, 40 mg, Daily With Dinner  cefepime, 2,000 mg, Q12H  enoxaparin, 40 mg, Daily  folic acid, 1 mg, Daily  metoprolol tartrate, 50 mg, Q12H ANGELICA  multivitamin-minerals, 1 tablet, Daily  nicotine, 1 patch, Daily  thiamine, 100 mg, Daily  vancomycin, 25 mg/kg, Once  [START ON 5/30/2023] vancomycin, 17 5 mg/kg, Q24H      acetaminophen, 650 mg, Q6H PRN  HYDROmorphone, 0 5 mg, Q6H PRN  oxyCODONE, 2 5 mg, Q4H PRN   Or  oxyCODONE, 5 mg, Q4H PRN       Continuous          Labs:    CBC    Recent Labs     05/29/23  1252   HCT 42 0   HGB 14 5      WBC 14 23*     BMP    Recent Labs     05/29/23  1252   AGAP 13   BUN 24   CALCIUM 9 9   CL 92*   CO2 25   CREATININE 1 38*   K 4 8   SODIUM 130*       Coags    Recent Labs     05/29/23  1252   INR 0 86   PTT 28        Additional Electrolytes  No recent results       Blood Gas    No recent results  No recent results LFTs  Recent Labs     05/29/23  1252   ALB 4 4   ALKPHOS 99   ALT 35   AST 68*   TBILI 1 97*       Infectious  Recent Labs     05/29/23  1252   PROCALCITONI 0 35*     Glucose  Recent Labs     05/29/23  1252   GLUC 190*           Anticipated Length of Stay is > 2 midnights  Amanda Miramontes PA-C

## 2023-05-29 NOTE — PROGRESS NOTES
Chio Banegas is a 66 y o  male who is currently ordered Vancomycin IV with management by the Pharmacy Consult service  Relevant clinical data and objective / subjective history reviewed  Vancomycin Assessment:  Indication and Goal AUC/Trough: Bone/joint infection (goal -600, trough >10), -600, trough >10  Clinical Status: stable  Micro:   Blood culture x2: pending  Renal Function:  SCr: 1 38 mg/dL  CrCl: 54 3 mL/min  Renal replacement: Not on dialysis  Days of Therapy: 1  Current Dose: 1750mg IV once  Vancomycin Plan:  New Dosinmg IV Q24H  Estimated AUC: 472 mcg*hr/mL  Estimated Trough: 13 8 mcg/mL  Next Level: With morning labs at 0600 on 23  Renal Function Monitoring: Daily BMP and [place] will continue to follow closely for s/sx of nephrotoxicity, infusion reactions and appropriateness of therapy  BMP and CBC will be ordered per protocol  We will continue to follow the patient’s culture results and clinical progress daily      Lester Whitten, Pharmacist

## 2023-05-29 NOTE — ASSESSMENT & PLAN NOTE
Lab Results   Component Value Date    CREATININE 1 38 (H) 05/29/2023    CREATININE 1 54 (H) 04/15/2022    CREATININE 1 26 06/04/2021    EGFR 54 05/29/2023    EGFR 48 04/15/2022    EGFR 62 06/04/2021     · Baseline Cr 1 1-1 3  · Cr on admission 1 38  · Monitor I/O  · Trend renal indices

## 2023-05-30 ENCOUNTER — APPOINTMENT (INPATIENT)
Dept: RADIOLOGY | Facility: HOSPITAL | Age: 63
DRG: 854 | End: 2023-05-30
Payer: COMMERCIAL

## 2023-05-30 ENCOUNTER — APPOINTMENT (OUTPATIENT)
Dept: PHYSICAL THERAPY | Facility: CLINIC | Age: 63
End: 2023-05-30
Payer: COMMERCIAL

## 2023-05-30 ENCOUNTER — ANESTHESIA (INPATIENT)
Dept: PERIOP | Facility: HOSPITAL | Age: 63
End: 2023-05-30

## 2023-05-30 ENCOUNTER — ANESTHESIA EVENT (INPATIENT)
Dept: PERIOP | Facility: HOSPITAL | Age: 63
End: 2023-05-30

## 2023-05-30 LAB
ANION GAP SERPL CALCULATED.3IONS-SCNC: 10 MMOL/L (ref 4–13)
BUN SERPL-MCNC: 18 MG/DL (ref 5–25)
CALCIUM SERPL-MCNC: 8.7 MG/DL (ref 8.4–10.2)
CHLORIDE SERPL-SCNC: 99 MMOL/L (ref 96–108)
CO2 SERPL-SCNC: 25 MMOL/L (ref 21–32)
CREAT SERPL-MCNC: 1.14 MG/DL (ref 0.6–1.3)
ERYTHROCYTE [DISTWIDTH] IN BLOOD BY AUTOMATED COUNT: 13.3 % (ref 11.6–15.1)
GFR SERPL CREATININE-BSD FRML MDRD: 69 ML/MIN/1.73SQ M
GLUCOSE SERPL-MCNC: 142 MG/DL (ref 65–140)
GLUCOSE SERPL-MCNC: 145 MG/DL (ref 65–140)
GLUCOSE SERPL-MCNC: 154 MG/DL (ref 65–140)
GLUCOSE SERPL-MCNC: 157 MG/DL (ref 65–140)
HCT VFR BLD AUTO: 39.7 % (ref 36.5–49.3)
HGB BLD-MCNC: 13.5 G/DL (ref 12–17)
MAGNESIUM SERPL-MCNC: 1.8 MG/DL (ref 1.9–2.7)
MCH RBC QN AUTO: 32.9 PG (ref 26.8–34.3)
MCHC RBC AUTO-ENTMCNC: 34 G/DL (ref 31.4–37.4)
MCV RBC AUTO: 97 FL (ref 82–98)
OSMOLALITY UR/SERPL-RTO: 290 MMOL/KG (ref 282–298)
OSMOLALITY UR: 244 MMOL/KG
PLATELET # BLD AUTO: 264 THOUSANDS/UL (ref 149–390)
PMV BLD AUTO: 9.3 FL (ref 8.9–12.7)
POTASSIUM SERPL-SCNC: 4.4 MMOL/L (ref 3.5–5.3)
PROCALCITONIN SERPL-MCNC: 1.2 NG/ML
RBC # BLD AUTO: 4.1 MILLION/UL (ref 3.88–5.62)
SODIUM SERPL-SCNC: 134 MMOL/L (ref 135–147)
WBC # BLD AUTO: 8.39 THOUSAND/UL (ref 4.31–10.16)

## 2023-05-30 PROCEDURE — 85027 COMPLETE CBC AUTOMATED: CPT | Performed by: PHYSICIAN ASSISTANT

## 2023-05-30 PROCEDURE — 36415 COLL VENOUS BLD VENIPUNCTURE: CPT | Performed by: STUDENT IN AN ORGANIZED HEALTH CARE EDUCATION/TRAINING PROGRAM

## 2023-05-30 PROCEDURE — 80048 BASIC METABOLIC PNL TOTAL CA: CPT | Performed by: PHYSICIAN ASSISTANT

## 2023-05-30 PROCEDURE — 87081 CULTURE SCREEN ONLY: CPT | Performed by: STUDENT IN AN ORGANIZED HEALTH CARE EDUCATION/TRAINING PROGRAM

## 2023-05-30 PROCEDURE — 83735 ASSAY OF MAGNESIUM: CPT | Performed by: PHYSICIAN ASSISTANT

## 2023-05-30 PROCEDURE — 88304 TISSUE EXAM BY PATHOLOGIST: CPT | Performed by: PATHOLOGY

## 2023-05-30 PROCEDURE — 99233 SBSQ HOSP IP/OBS HIGH 50: CPT | Performed by: STUDENT IN AN ORGANIZED HEALTH CARE EDUCATION/TRAINING PROGRAM

## 2023-05-30 PROCEDURE — 82948 REAGENT STRIP/BLOOD GLUCOSE: CPT

## 2023-05-30 PROCEDURE — 83930 ASSAY OF BLOOD OSMOLALITY: CPT | Performed by: STUDENT IN AN ORGANIZED HEALTH CARE EDUCATION/TRAINING PROGRAM

## 2023-05-30 PROCEDURE — 0QBN0Z2 EXCISION OF RIGHT METATARSAL, SESAMOID BONE(S) 1ST TOE, OPEN APPROACH: ICD-10-PCS | Performed by: PODIATRIST

## 2023-05-30 PROCEDURE — 73630 X-RAY EXAM OF FOOT: CPT

## 2023-05-30 PROCEDURE — 88311 DECALCIFY TISSUE: CPT | Performed by: PATHOLOGY

## 2023-05-30 PROCEDURE — 73620 X-RAY EXAM OF FOOT: CPT

## 2023-05-30 PROCEDURE — 99223 1ST HOSP IP/OBS HIGH 75: CPT | Performed by: PODIATRIST

## 2023-05-30 PROCEDURE — 28315 REMOVAL OF SESAMOID BONE: CPT | Performed by: PODIATRIST

## 2023-05-30 PROCEDURE — 84145 PROCALCITONIN (PCT): CPT | Performed by: PHYSICIAN ASSISTANT

## 2023-05-30 RX ORDER — CEFEPIME HYDROCHLORIDE 2 G/50ML
2000 INJECTION, SOLUTION INTRAVENOUS EVERY 8 HOURS
Status: DISCONTINUED | OUTPATIENT
Start: 2023-05-30 | End: 2023-06-01 | Stop reason: HOSPADM

## 2023-05-30 RX ORDER — SODIUM CHLORIDE, SODIUM LACTATE, POTASSIUM CHLORIDE, CALCIUM CHLORIDE 600; 310; 30; 20 MG/100ML; MG/100ML; MG/100ML; MG/100ML
50 INJECTION, SOLUTION INTRAVENOUS CONTINUOUS
Status: DISCONTINUED | OUTPATIENT
Start: 2023-05-30 | End: 2023-05-31

## 2023-05-30 RX ORDER — FENTANYL CITRATE/PF 50 MCG/ML
25 SYRINGE (ML) INJECTION
Status: DISCONTINUED | OUTPATIENT
Start: 2023-05-30 | End: 2023-05-30

## 2023-05-30 RX ORDER — SODIUM CHLORIDE, SODIUM LACTATE, POTASSIUM CHLORIDE, CALCIUM CHLORIDE 600; 310; 30; 20 MG/100ML; MG/100ML; MG/100ML; MG/100ML
INJECTION, SOLUTION INTRAVENOUS CONTINUOUS PRN
Status: DISCONTINUED | OUTPATIENT
Start: 2023-05-30 | End: 2023-05-30

## 2023-05-30 RX ORDER — ATORVASTATIN CALCIUM 40 MG/1
40 TABLET, FILM COATED ORAL
Status: DISCONTINUED | OUTPATIENT
Start: 2023-05-30 | End: 2023-06-01 | Stop reason: HOSPADM

## 2023-05-30 RX ORDER — PROPOFOL 10 MG/ML
INJECTION, EMULSION INTRAVENOUS AS NEEDED
Status: DISCONTINUED | OUTPATIENT
Start: 2023-05-30 | End: 2023-05-30

## 2023-05-30 RX ORDER — FENTANYL CITRATE 50 UG/ML
INJECTION, SOLUTION INTRAMUSCULAR; INTRAVENOUS AS NEEDED
Status: DISCONTINUED | OUTPATIENT
Start: 2023-05-30 | End: 2023-05-30

## 2023-05-30 RX ORDER — LIDOCAINE HYDROCHLORIDE 10 MG/ML
INJECTION, SOLUTION EPIDURAL; INFILTRATION; INTRACAUDAL; PERINEURAL AS NEEDED
Status: DISCONTINUED | OUTPATIENT
Start: 2023-05-30 | End: 2023-05-30

## 2023-05-30 RX ORDER — MAGNESIUM SULFATE 1 G/100ML
1 INJECTION INTRAVENOUS ONCE
Status: COMPLETED | OUTPATIENT
Start: 2023-05-30 | End: 2023-05-30

## 2023-05-30 RX ORDER — MIDAZOLAM HYDROCHLORIDE 2 MG/2ML
INJECTION, SOLUTION INTRAMUSCULAR; INTRAVENOUS AS NEEDED
Status: DISCONTINUED | OUTPATIENT
Start: 2023-05-30 | End: 2023-05-30

## 2023-05-30 RX ORDER — ONDANSETRON 2 MG/ML
4 INJECTION INTRAMUSCULAR; INTRAVENOUS ONCE AS NEEDED
Status: DISCONTINUED | OUTPATIENT
Start: 2023-05-30 | End: 2023-05-30

## 2023-05-30 RX ORDER — SODIUM CHLORIDE, SODIUM LACTATE, POTASSIUM CHLORIDE, CALCIUM CHLORIDE 600; 310; 30; 20 MG/100ML; MG/100ML; MG/100ML; MG/100ML
100 INJECTION, SOLUTION INTRAVENOUS CONTINUOUS
Status: DISCONTINUED | OUTPATIENT
Start: 2023-05-30 | End: 2023-05-31

## 2023-05-30 RX ORDER — PROPOFOL 10 MG/ML
INJECTION, EMULSION INTRAVENOUS CONTINUOUS PRN
Status: DISCONTINUED | OUTPATIENT
Start: 2023-05-30 | End: 2023-05-30

## 2023-05-30 RX ADMIN — Medication 1250 MG: at 17:31

## 2023-05-30 RX ADMIN — PROPOFOL 40 MG: 10 INJECTION, EMULSION INTRAVENOUS at 14:14

## 2023-05-30 RX ADMIN — OXYCODONE HYDROCHLORIDE 5 MG: 5 TABLET ORAL at 22:01

## 2023-05-30 RX ADMIN — PROPOFOL 120 MCG/KG/MIN: 10 INJECTION, EMULSION INTRAVENOUS at 14:14

## 2023-05-30 RX ADMIN — METOPROLOL TARTRATE 50 MG: 50 TABLET ORAL at 09:39

## 2023-05-30 RX ADMIN — LIDOCAINE HYDROCHLORIDE 50 MG: 10 INJECTION, SOLUTION EPIDURAL; INFILTRATION; INTRACAUDAL; PERINEURAL at 14:14

## 2023-05-30 RX ADMIN — METOPROLOL TARTRATE 50 MG: 50 TABLET ORAL at 20:11

## 2023-05-30 RX ADMIN — ATORVASTATIN CALCIUM 40 MG: 40 TABLET, FILM COATED ORAL at 17:27

## 2023-05-30 RX ADMIN — SODIUM CHLORIDE, SODIUM LACTATE, POTASSIUM CHLORIDE, AND CALCIUM CHLORIDE: .6; .31; .03; .02 INJECTION, SOLUTION INTRAVENOUS at 13:59

## 2023-05-30 RX ADMIN — CEFEPIME HYDROCHLORIDE 2000 MG: 2 INJECTION, SOLUTION INTRAVENOUS at 20:11

## 2023-05-30 RX ADMIN — MAGNESIUM SULFATE IN DEXTROSE 1 G: 10 INJECTION, SOLUTION INTRAVENOUS at 09:34

## 2023-05-30 RX ADMIN — OXYCODONE HYDROCHLORIDE 5 MG: 5 TABLET ORAL at 04:06

## 2023-05-30 RX ADMIN — FENTANYL CITRATE 25 MCG: 50 INJECTION, SOLUTION INTRAMUSCULAR; INTRAVENOUS at 14:18

## 2023-05-30 RX ADMIN — MIDAZOLAM 2 MG: 1 INJECTION INTRAMUSCULAR; INTRAVENOUS at 14:07

## 2023-05-30 RX ADMIN — OXYCODONE HYDROCHLORIDE 5 MG: 5 TABLET ORAL at 18:35

## 2023-05-30 RX ADMIN — CEFEPIME HYDROCHLORIDE 2000 MG: 2 INJECTION, SOLUTION INTRAVENOUS at 11:58

## 2023-05-30 RX ADMIN — CEFEPIME HYDROCHLORIDE 2000 MG: 2 INJECTION, SOLUTION INTRAVENOUS at 02:33

## 2023-05-30 RX ADMIN — NICOTINE 1 PATCH: 21 PATCH, EXTENDED RELEASE TRANSDERMAL at 09:36

## 2023-05-30 RX ADMIN — HEPARIN SODIUM 5000 UNITS: 5000 INJECTION INTRAVENOUS; SUBCUTANEOUS at 22:02

## 2023-05-30 RX ADMIN — HYDROMORPHONE HYDROCHLORIDE 0.5 MG: 1 INJECTION, SOLUTION INTRAMUSCULAR; INTRAVENOUS; SUBCUTANEOUS at 09:39

## 2023-05-30 RX ADMIN — FENTANYL CITRATE 25 MCG: 50 INJECTION, SOLUTION INTRAMUSCULAR; INTRAVENOUS at 14:56

## 2023-05-30 RX ADMIN — OXYCODONE HYDROCHLORIDE 5 MG: 5 TABLET ORAL at 12:06

## 2023-05-30 NOTE — PROGRESS NOTES
[place]  Progress Note  Name: Lisa James  MRN: [id number]  Unit/Bed#: -01 I Date of Admission: 5/29/2023   Date of Service: 5/30/2023 I Hospital Day: 1    Assessment/Plan   * Severe sepsis ([facility] [address] )  Assessment & Plan  · SIRS: tachycardia and leukocytosis  · Source: likely osteomyelitis of L foot  · Lactic: 2 5-> cleared  · Procal: 0 35  · Blood cultures x2 NGTD @ 24h  · Wound cx ordered but not obtained   · continue Cefepime/Vanc day 1  · Trend fever curve and WBC count    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, with fat layer exposed ([facility] )  Assessment & Plan  · Follows with outpatient podiatry  · Xray concerning for possible osteomyelitis  · MRI foot consistent with osteomyelitis  · Podiatry following  · Postop day 0 of resection of proximal medial bipartite sesamoid for surgical cure of osteomyelitis with wound VAC application  · IS  · Pain control  · PT OT eval  · Abx as outlined above    Nicotine abuse  Assessment & Plan  · Nicotine patch ordered  · Encourage cessation    Alcohol abuse  Assessment & Plan  · Patient admits to drinking 3-4 beers every night  · Last drink last night  · Denies hx of withdrawal  · Monitor on CIWA  · Start thiamine and folic acid    Hyponatremia  Assessment & Plan  · Na 130 on admission  · Chronic hyponatremia, likely partially related to beer potomania  · 128 in 2021, 130 in 2022  · Check urine sodium 35, urine 244  · Trend daily  · improved    CKD (chronic kidney disease)  Assessment & Plan  Lab Results   Component Value Date    CREATININE 1 14 05/30/2023    CREATININE 1 38 (H) 05/29/2023    CREATININE 1 54 (H) 04/15/2022    EGFR 69 05/30/2023    EGFR 54 05/29/2023    EGFR 48 04/15/2022     · Baseline Cr 1 1-1 3  · Cr on admission 1 38  · Monitor I/O  · Trend renal indices  · improved    Type 1 diabetes mellitus with hyperglycemia (HCC)  Assessment & Plan  Lab Results   Component Value Date    HGBA1C 7 9 (H) "03/31/2023       No results for input(s): \"POCGLU\" in the last 72 hours  Blood Sugar Average: Last 72 hrs:     · Patient is on home insulin pump  · If plans to go to the OR, transition to SSI vs insulin gtt  · Maintain -180    Benign essential hypertension  Assessment & Plan  · Home regimen: TOPROL  mg daily  · Transition to Lopressor 50 mg BID for easier titration in the setting of severe sepsis  · Hold lisinopril    Mixed hyperlipidemia  Assessment & Plan  · Continue home statin           VTE Pharmacologic Prophylaxis:   Moderate Risk (Score 3-4) - Pharmacological DVT Prophylaxis Ordered: heparin  Patient Centered Rounds: I performed bedside rounds with nursing staff today  Discussions with Specialists or Other Care Team Provider: Podiatry endo PT OT Case management    Education and Discussions with Family / Patient: Patient declined call to   Total Time Spent on Date of Encounter in care of patient: 45 minutes This time was spent on one or more of the following: performing physical exam; counseling and coordination of care; obtaining or reviewing history; documenting in the medical record; reviewing/ordering tests, medications or procedures; communicating with other healthcare professionals and discussing with patient's family/caregivers  Current Length of Stay: 1 day(s)  Current Patient Status: Inpatient   Certification Statement: The patient will continue to require additional inpatient hospital stay due to osteo  Discharge Plan: Anticipate discharge in 24-48 hrs to discharge location to be determined pending rehab evaluations  Code Status: Level 1 - Full Code    Subjective:   Jesusita Kaur was seen and examined at bedside  No acute events overnight  Discussed plan of care  All questions and concerns were answered and addressed  Discussed insulin regimen and that we will continue the pump placement during OR procedure    Discussed continuation of IV " antibiotics  Objective:     Vitals:   Temp (24hrs), Av °F (36 7 °C), Min:97 6 °F (36 4 °C), Max:98 2 °F (36 8 °C)    Temp:  [97 6 °F (36 4 °C)-98 2 °F (36 8 °C)] 98 2 °F (36 8 °C)  HR:  [] 100  Resp:  [12-22] 18  BP: (122-176)/(64-88) 150/72  SpO2:  [96 %-100 %] 97 %  Body mass index is 20 48 kg/m²  Input and Output Summary (last 24 hours): Intake/Output Summary (Last 24 hours) at 2023 1938  Last data filed at 2023 1822  Gross per 24 hour   Intake 437 54 ml   Output 100 ml   Net 337 54 ml       Physical Exam:   Physical Exam  Vitals and nursing note reviewed  Constitutional:       General: He is not in acute distress  Appearance: Normal appearance  He is not ill-appearing  HENT:      Head: Normocephalic and atraumatic  Cardiovascular:      Rate and Rhythm: Normal rate and regular rhythm  Pulses: Normal pulses  Heart sounds: Normal heart sounds  Pulmonary:      Effort: Pulmonary effort is normal       Breath sounds: Normal breath sounds  Abdominal:      General: Abdomen is flat  Bowel sounds are normal       Palpations: Abdomen is soft  Musculoskeletal:      Right lower leg: No edema  Left lower leg: No edema  Skin:     General: Skin is warm  Findings: Erythema and lesion present  Neurological:      General: No focal deficit present  Mental Status: He is alert and oriented to person, place, and time            Additional Data:     Labs:  Results from last 7 days   Lab Units 23  0613 23  1252   EOS PCT %  --  0   HEMATOCRIT % 39 7 42 0   HEMOGLOBIN g/dL 13 5 14 5   LYMPHS PCT %  --  6*   MONOS PCT %  --  11   NEUTROS PCT %  --  82*   PLATELETS Thousands/uL 264 315   WBC Thousand/uL 8 39 14 23*     Results from last 7 days   Lab Units 23  0613 23  1252   ANION GAP mmol/L 10 13   ALBUMIN g/dL  --  4 4   ALK PHOS U/L  --  99   ALT U/L  --  35   AST U/L  --  68*   BUN mg/dL 18 24   CALCIUM mg/dL 8 7 9 9   CHLORIDE mmol/L 99 92*   CO2 mmol/L 25 25   CREATININE mg/dL 1 14 1 38*   GLUCOSE RANDOM mg/dL 142* 190*   POTASSIUM mmol/L 4 4 4 8   SODIUM mmol/L 134* 130*   TOTAL BILIRUBIN mg/dL  --  1 97*     Results from last 7 days   Lab Units 05/29/23  1252   INR  0 86     Results from last 7 days   Lab Units 05/30/23  1739 05/30/23  1359 05/30/23  1205   POC GLUCOSE mg/dl 145* 157* 154*         Results from last 7 days   Lab Units 05/30/23  0613 05/29/23  1640 05/29/23  1252   LACTIC ACID mmol/L  --  1 7 2 5*   PROCALCITONIN ng/ml 1 20*  --  0 35*       Lines/Drains:  Invasive Devices     Peripheral Intravenous Line  Duration           Peripheral IV 05/29/23 Right Antecubital 1 day                      Imaging: Reviewed radiology reports from this admission including: procedure reports    Recent Cultures (last 7 days):   Results from last 7 days   Lab Units 05/29/23  1252   BLOOD CULTURE  No Growth at 24 hrs  No Growth at 24 hrs         Last 24 Hours Medication List:   Current Facility-Administered Medications   Medication Dose Route Frequency Provider Last Rate   • acetaminophen  650 mg Oral Q6H PRN Asaf Valle DPM     • atorvastatin  40 mg Oral Daily With Yamil Almazan MD     • cefepime  2,000 mg Intravenous Q8H Asaf Valle DPM 2,000 mg (67/33/63 0011)   • folic acid  1 mg Oral Daily Khang Wilson DPM     • heparin (porcine)  5,000 Units Subcutaneous [facility] Asaf Valle DPM     • HYDROmorphone  0 5 mg Intravenous Q6H PRN Asaf Valle DPM     • insulin lispro  10 mL Subcutaneous Insulin Pump Once PRN Asaf Valle DPM     • lactated ringers  50 mL/hr Intravenous Continuous Leeanne , CRNA Stopped (05/30/23 1522)   • lactated ringers  100 mL/hr Intravenous Continuous [place] , CRNA Stopped (05/30/23 1522)   • metoprolol tartrate  50 mg Oral Q12H [facility] Khang Wilson DPM     • multivitamin-minerals  1 tablet Oral Daily Asaf Valle DPM     • nicotine  1 patch Transdermal Daily Cirilo Valle DPM     • oxyCODONE  2 5 mg Oral Q4H PRN Cirilo Valle DPM      Or   • oxyCODONE  5 mg Oral Q4H PRN Yana Traore DPM     • patient maintained insulin pump  1 each Subcutaneous [facility] Cirilo PAK DPM     • thiamine  100 mg Oral Daily Cirilo Valle DPM     • vancomycin  17 5 mg/kg Intravenous Q24H Mckinley Martinez MD 1,250 mg (05/30/23 1656)        Today, Patient Was Seen By: Mckinley Martinez MD    **Please Note: This note may have been constructed using a voice recognition system  **

## 2023-05-30 NOTE — ANESTHESIA PREPROCEDURE EVALUATION
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)      /RENAL   (+) CKD (chronic kidney disease)   (+) Microalbuminuria due to type 1 diabetes mellitus (HCC)      MUSCULOSKELETAL   (+) Gout of ankle      NEURO/PSYCH   (+) Chronic pain   (+) Diabetic polyneuropathy associated with type 1 diabetes mellitus (HCC)      Other   (+) Alcohol abuse   (+) Insulin pump in place   (+) Nicotine abuse        Physical Exam    Airway    Mallampati score: II  TM Distance: >3 FB  Neck ROM: full     Dental   upper dentures and lower dentures,     Cardiovascular      Pulmonary      Other Findings        Anesthesia Plan  ASA Score- 3     Anesthesia Type- IV sedation with anesthesia with ASA Monitors  Additional Monitors:   Airway Plan:     Comment: On Insulin Pump at Basal Rate  [id number]  Plan Factors-Exercise tolerance (METS): >4 METS  Chart reviewed  EKG reviewed  Existing labs reviewed  Patient summary reviewed  Patient is a current smoker (1ppd)  Patient smoked on day of surgery  Induction- intravenous  Postoperative Plan- Plan for postoperative opioid use  Informed Consent- Anesthetic plan and risks discussed with patient and spouse  I personally reviewed this patient with the CRNA  Discussed and agreed on the Anesthesia Plan with the CRNA             Lab Results   Component Value Date    ALT 35 05/29/2023    AST 68 (H) 05/29/2023    BUN 18 05/30/2023    CALCIUM 8 7 05/30/2023    CL 99 05/30/2023    CO2 25 05/30/2023    CREATININE 1 14 05/30/2023    GLUC 142 (H) 05/30/2023    GLUF 168 (H) 04/15/2022    HCT 39 7 05/30/2023    HDL 68 04/15/2022    HGB 13 5 05/30/2023    HGBA1C 7 9 (H) 03/31/2023    INR 0 86 05/29/2023    K 4 4 05/30/2023    MG 1 8 (L) 05/30/2023    PHOS 3 2 06/04/2021     05/30/2023    PSA 2 1 04/15/2022    TRIG 53 04/15/2022    WBC 8 39 05/30/2023

## 2023-05-30 NOTE — ASSESSMENT & PLAN NOTE
· Na 130 on admission  · Chronic hyponatremia, likely partially related to beer potomania  · 128 in 2021, 130 in 2022  · Check urine sodium 35, urine 244  · Trend daily  · improved

## 2023-05-30 NOTE — CONSULTS
[address] [facility] - Consultation    Patient Information:   Madeline Pavon 66 y o  male MRN: [id number]  Unit/Bed#: ED 08 Encounter: [id number]  PCP: Peter Richard MD  Date of Admission:  5/29/2023  Date of Consultation: 05/30/23  Requesting Physician: Phan Magallanes MD      ASSESSMENT:    Madeline Pavon is a 66 y o  male with:    1  Diabetic Veloz grade 3 ulceration submetatarsal head 1 left foot with osteomyelitis of proximal medial (bipartite) sesamoid  2  Sepsis  3  Type 1 diabetes with peripheral neuropathy  4  CKD, hypertension, hyperlipidemia, alcohol use disorder, nicotine abuse    PLAN:    · Initial inpatient hospital consultation and bilateral lower extremity and pedal examination  · I personally reviewed patient's medical records, I reviewed his x-ray from 5/19/23, 5/29/2023, MRI from 5/29/2023, I reviewed images and radiology interpretation on all of these images  I agree with osteomyelitis of proximal medial bipartite sesamoid left foot, of note there is some focal marrow edema plantar aspect second and third metatarsals, there are no signs of an abscess, I believe this is from stress from compensation for changing his gait  · Wound evaluated, Adaptic dry dressing applied  · Today discussed surgical plan with patient for wound debridement, resection of proximal medial bipartite sesamoid for surgical cure of osteomyelitis with wound VAC application  I discussed the risk, benefits and alternatives with patient, consent form will be signed in preop holding area  All questions were answered, he is understanding and agreeing with the plan  · Patient is n p o  from midnight, he will continue to stay this  · Case was discussed with primary team as patient does have insulin pump and will require adjustments of either removing pump fluids or leaving pump intact and close monitoring of his blood sugars  Primary team related they will discuss with endocrinology and critical care    · Patient with resolved leukocytosis, continue IV antibiotics per primary team   · Rest of care per primary team         SUBJECTIVE    History of Present Illness:    Quiana Ratliff is a 66 y o  male with past medical history of type 1 diabetes, gout, hypertension, hyperlipidemia  who presented to emergency department yesterday with left foot ulceration, at his last visit on 5/24/2023 I was concerned for osteomyelitis and recommended IV antibiotics and MRI, patient was noted to have leukocytosis yesterday, he states he was very nervous which is why his heart rate was still high but he is coming down and glad his MRI was done yesterday        Review of Systems:    Constitutional: Negative  HENT: Negative  Eyes: Negative  Respiratory: Negative  Cardiovascular: Negative  Gastrointestinal: Negative  Musculoskeletal: Left foot pain  Skin: diabetic foot ulcer left  Neurological: Numbness to BL feet   Psych: Negative       Past Medical and Surgical History:     Past Medical History:   Diagnosis Date   • Chronic foot ulcer ([facility] [address] ) 09/07/2018   • Diabetes mellitus ([facility] [address] )    • Gout    • High cholesterol    • Hypertension    • Visual impairment 11/1/2022    Cateract       Past Surgical History:   Procedure Laterality Date   • CATARACT EXTRACTION Right 01/2023   • COMPLEX WOUND CLOSURE TO EXTREMITY Left 07/18/2019    Procedure: COMPLEX CLOSURE LEFT CHEEK;  Surgeon: Cathy Main MD;  Location:  MAIN OR;  Service: Plastics   • FACIAL/NECK BIOPSY Left 07/18/2019    Procedure: EXCISION LEFT CHEEK CYST;  Surgeon: Cathy Main MD;  Location:  MAIN OR;  Service: Plastics   • HERNIA REPAIR Left     several times   • KNEE ARTHROSCOPY Left     torn meniscus       Meds/Allergies:    (Not in a hospital admission)      Allergies   Allergen Reactions   • Cefuroxime Other (See Comments) and GI Intolerance   • Amoxicillin-Pot Clavulanate Nausea Only and GI Intolerance       Social History:     Marital Status: /Civil Union    Substance Use History:   Social History     Substance and Sexual Activity   Alcohol Use Yes    Comment: 3/4 beers a day     Social History     Tobacco Use   Smoking Status Every Day   • Packs/day: 1 00   • Years: 20    • Total pack years:    • Types: Cigarettes   • Last attempt to quit: 2022   • Years since quittin 4   Smokeless Tobacco Never   Tobacco Comments    encouraged smoking cessation     Social History     Substance and Sexual Activity   Drug Use Never       Family History:    Family History   Problem Relation Age of Onset   • Heart disease Father    • Diabetes type I Father    • Diabetes type II Mother    • No Known Problems Sister    • Alcohol abuse Brother    • Diabetes type I Brother    • No Known Problems Brother    • No Known Problems Son    • No Known Problems Daughter          OBJECTIVE:    Vitals:   Blood Pressure: 158/88 (23 0410)  Pulse: (!) 108 (23 0410)  Temperature: 98 4 °F (36 9 °C) (23 1103)  Temp Source: Oral (23 1103)  Respirations: 18 (23 0410)  Weight - Scale: 68 5 kg (151 lb) (23 1103)  SpO2: 98 % (23 0410)    Physical Exam    General Appearance: Alert, cooperative, no distress  HEENT: Head normocephalic, atraumatic, without obvious abnormality  Heart: Normal rate and rhythm  Lungs: Non-labored breathing  No respiratory distress  Abdomen: Without distension  Psychiatric: AAOx3  Lower Extremity:  Vascular:   Pedal pulses are biphasic by Doppler  CRT < 3 seconds at the digits  +0/4 edema noted at bilateral lower extremities  Pedal hair is absent  Skin temperature is WNL bilaterally  Musculoskeletal:  MMT is 5/5 in all muscle compartments bilaterally  ROM at the 1st MPJ and ankle joint are WNL bilaterally with the leg extended  No Pain on palpation of BL LE  No gross deformities noted   + pes cavus BL, + hallux limitus BL    Dermatological:   Lower extremity wounds as noted below:    Wound (1) located submet 1 left, "measures approximately 1 5 cm x 1 8 cm x 0 5 cm  without sinus tracking or undermining  Wound bed appears pink/red with serous drainage  Deepest tissue noted in base is sub q  Malodor is not noticed  Wound edge appears Attached  Dee Dee-wound skin appears intact and calloused  Probe to bone is positive  Signs of infection are not present at this time  Neurological:  Gross sensation is absent  Protective sensation is absent  Patient Reports numbness and/or paresthesias  Clinical Images 05/30/23: Additional Data:     Lab Results: I have personally reviewed pertinent labs including:    Results from last 7 days   Lab Units 05/30/23  0613 05/29/23  1252   EOS PCT %  --  0   HEMATOCRIT % 39 7 42 0   HEMOGLOBIN g/dL 13 5 14 5   LYMPHS PCT %  --  6*   MONOS PCT %  --  11   NEUTROS PCT %  --  82*   PLATELETS Thousands/uL 264 315   WBC Thousand/uL 8 39 14 23*     Results from last 7 days   Lab Units 05/30/23  0613 05/29/23  1252   ALK PHOS U/L  --  99   ALT U/L  --  35   AST U/L  --  68*   BUN mg/dL 18 24   CALCIUM mg/dL 8 7 9 9   CHLORIDE mmol/L 99 92*   CO2 mmol/L 25 25   CREATININE mg/dL 1 14 1 38*   POTASSIUM mmol/L 4 4 4 8     Results from last 7 days   Lab Units 05/29/23  1252   INR  0 86       Cultures: I have personally reviewed pertinent cultures including:    Results from last 7 days   Lab Units 05/29/23  1252   BLOOD CULTURE  Received in Microbiology Lab  Culture in Progress  Received in Microbiology Lab  Culture in Progress  Imaging: I have personally reviewed pertinent films in PACS  Pathology, and Other Studies: I have personally reviewed pertinent reports  ** Please Note: Portions of the record may have been created with voice recognition software  Occasional wrong word or \"sound a like\" substitutions may have occurred due to the inherent limitations of voice recognition software  Read the chart carefully and recognize, using context, where substitutions have occurred   **  "

## 2023-05-30 NOTE — PROGRESS NOTES
Dante Wu is a 66 y o  male who is currently ordered Vancomycin IV with management by the Pharmacy Consult service  Relevant clinical data and objective / subjective history reviewed  Vancomycin Assessment:  Indication and Goal AUC/Trough: Bone/joint infection (goal -600, trough >10), -600, trough >10  Clinical Status: stable  Micro:   Pending  Renal Function:  SCr: 1 14 mg/dL  CrCl: 65 9 mL/min  Renal replacement: Not on dialysis  Days of Therapy: 2  Current Dose: 1250mg every 24 hours  Vancomycin Plan:  New Dosinmg IV Q24H  Estimated AUC: 448 mcg*hr/mL  Estimated Trough: 12 5 mcg/mL  Next Level: 23 at 0600  Renal Function Monitoring: Daily BMP and [place] will continue to follow closely for s/sx of nephrotoxicity, infusion reactions and appropriateness of therapy  BMP and CBC will be ordered per protocol  We will continue to follow the patient’s culture results and clinical progress daily  Also, cefepime will be adjusted renally if necessary      Camille Christy, Pharmacist, PharmD, BCPS

## 2023-05-30 NOTE — ASSESSMENT & PLAN NOTE
· SIRS: tachycardia and leukocytosis  · Source: likely osteomyelitis of L foot  · Lactic: 2 5-> cleared  · Procal: 0 35  · Blood cultures x2 NGTD @ 48h  · MRSA cx pending  · Wound cx ordered but not obtained   · continue Cefepime/Vanc day 2  · Trend fever curve and WBC count

## 2023-05-30 NOTE — QUICK NOTE
Patient not seen today, Will most likely see tomorrow as scheduled for surgery today at 2pm      Chart reviewed  Patient with t1DM suboptimally controlled on Picitups 770G- guardian sensor with HLE9N 4 6% with complications of neuropathy, retinopathy, microalbuminuria and hypoglycemia unawareness who is currently admitted for left first submetatarsal head OM scheduled for surgery this afternoon  Endocrine consulted for pump management  Discussed patient on close loop pump and therefore ok to continue insulin pump as long as surgery <3hrs, if >4hrs procedure please d/c pump and start on non DKA drip  Post op period either continue with resumed pump function use if patient able to manipulate pump and is alert, awake and orientated but if under influence of anesthesia and unable to manipulate pump- again start on insulin drip  Pump settings per last Note  Basal rates:  12 AM to 3:30 AM 0 55 units/h, 3:30 AM to 8 AM 0 75 units/h, 8 AM to 12 PM 0 9 units/h, 12 PM to 8:30 PM 0 9 units/h, 8:30 PM to 12 AM 0 6 units/h  Bolus rates:  1 unit per 9-10 g carbohydrate  Insulin sensitivity factor:  1 unit for 32 blood sugar points for target blood glucose of 1 10-1 20  Insulin action:  4 hours  Total daily insulin dosage 27 3 units ±4 9 units with 57% basal and 43% bolus

## 2023-05-30 NOTE — ASSESSMENT & PLAN NOTE
· Follows with outpatient podiatry  · Xray concerning for possible osteomyelitis  · MRI foot consistent with osteomyelitis  · Podiatry following  · Postop day 1 of resection of proximal medial bipartite sesamoid for surgical cure of osteomyelitis with wound VAC application  · IS  · Pain control  · PT OT eval  · Abx as outlined above    D/c on 5 days of doxy

## 2023-05-30 NOTE — ASSESSMENT & PLAN NOTE
· Follows with outpatient podiatry  · Xray concerning for possible osteomyelitis  · MRI foot consistent with osteomyelitis  · Podiatry following  · Postop day 0 of resection of proximal medial bipartite sesamoid for surgical cure of osteomyelitis with wound VAC application  · IS  · Pain control  · PT OT eval  · Abx as outlined above

## 2023-05-30 NOTE — OP NOTE
OPERATIVE REPORT  PATIENT NAME: Marino Mcmillan    :  1960  MRN: [id number]  Pt Location: [facility] OR ROOM 03    SURGERY DATE: 2023    Surgeon(s) and Role:     * Bobo Arizmendi DPM - Primary     * Mesha Montague DPM - Assisting    Preop Diagnosis:  Diabetic foot ulcer ([address] ) [W64 623, L97 509]    Post-Op Diagnosis Codes:     * Diabetic foot ulcer ([address] ) [E11 621, E82 940]    Procedure(s):  Left - DEBRIDEMENT WOUND LEFT FOOT WOUND WITH EXCISION OF INFECTED BONE AND WOUND VAC APPLICATION    Specimen(s):  ID Type Source Tests Collected by Time Destination   1 : Infected Bone Left foot Tissue Foot, Left TISSUE EXAM Bobo Arizmendi DPM 2023 1428        Estimated Blood Loss:   Minimal    Drains:  * No LDAs found *    Anesthesia Type:   Choice    Operative Indications:  Diabetic foot ulcer ([address] ) [K41 409, L97 509]      Operative Findings:  Tibial sesamoid removed, assume cure from osteomyelitis  Follow up tissue exam   No purulence or proximal tracking noted   Plan for VAC change  to left foot  NWB left foot     Complications:   None    Procedure and Technique:  Patient was brought back to the operating room under light sedation and remained on the hospital stretcher for the entire procedure in the supine position  After anesthesia was administered a preinjection timeout was completed with all parties in agreement  10 cc of 1% lidocaine and 0 5% Marcaine in a one-to-one mix was injected as a local block to the left lower extremity  A tourniquet was then applied to the left lower extremity with ample Webril padding  The foot was then prepped and draped in sterile manner  A preincision timeout was completed with all parties in agreement  Attention then was drawn to the left foot  The foot was exsanguinated utilizing an Esmarch bandage and the tourniquet was inflated to 250mmhg  Using a Cashion elevator the edges of the wound were probed  Undermining was noted to the distal aspect of the wound  Utilizing a sterile 15 blade the wound and undermining skin was sharply debrided  Pre-excision measurements were 2 cm x 2 cm x 0 5 cm  Postdebridement measurements 2 5 cm x 2 5 cm x 1 cm  Wound tissue removed includes devitalized tissue, undermining skin and fibrotic tissue  Wound appeared 50% improved  Utilizing C arm evaluation the tibial sesamoid was identified  Utilizing a 15 blade a linear incision was made overlying the tibial sesamoid down to bone  Utilizing a 15 blade and tenotomy scissors all soft tissue structures were reflected off of the sesamoid  Both pieces of the bipartite sesamoid were removed as there is a syndesmosis between the 2 pieces  This was passed for pathological examination  No proximal tracking was noted  The metatarsal head cleared clean hard and white  The foot was then copiously flushed utilizing sterile saline  Deep closure was achieve utilizing 3-0 Monocryl  The tourniquet was then deflated for a time 37 minutes  All bleeding visible vessels were then cauterized  A wound VAC was then applied per  recommendations to the plantar foot wound  The foot was then dressed utilizing 4 x 4 Curlex and lightly wrapped 4 inch Ace wrap  Patient tolerated the procedure well with no immediate complications from anesthesia or surgery  Dr Keiry Washington was present for the entire procedure      Patient Disposition:  PACU         SIGNATURE: Mesha Montague DPM  DATE: May 30, 2023  TIME: 3:21 PM

## 2023-05-30 NOTE — ASSESSMENT & PLAN NOTE
Lab Results   Component Value Date    CREATININE 1 14 05/30/2023    CREATININE 1 38 (H) 05/29/2023    CREATININE 1 54 (H) 04/15/2022    EGFR 69 05/30/2023    EGFR 54 05/29/2023    EGFR 48 04/15/2022     · Baseline Cr 1 1-1 3  · Cr on admission 1 38  · Monitor I/O  · Trend renal indices  · improved

## 2023-05-30 NOTE — ASSESSMENT & PLAN NOTE
· SIRS: tachycardia and leukocytosis  · Source: likely osteomyelitis of L foot  · Lactic: 2 5-> cleared  · Procal: 0 35  · Blood cultures x2 NGTD @ 24h  · Wound cx ordered but not obtained   · continue Cefepime/Vanc day 1  · Trend fever curve and WBC count

## 2023-05-30 NOTE — UTILIZATION REVIEW
Initial Clinical Review    Admission: Date/Time/Statement:   Admission Orders (From admission, onward)     Ordered        05/29/23 1429  INPATIENT ADMISSION  Once                      Orders Placed This Encounter   Procedures   • INPATIENT ADMISSION     Standing Status:   Standing     Number of Occurrences:   1     Order Specific Question:   Level of Care     Answer:   Med Surg [16]     Order Specific Question:   Estimated length of stay     Answer:   More than 2 Midnights     Order Specific Question:   Certification     Answer:   I certify that inpatient services are medically necessary for this patient for a duration of greater than two midnights  See H&P and MD Progress Notes for additional information about the patient's course of treatment  ED Arrival Information     Expected   -    Arrival   5/29/2023 10:51    Acuity   Urgent            Means of arrival   Walk-In    Escorted by   Spouse    Service   Hospitalist    Admission type   Emergency            Arrival complaint   wound check           Chief Complaint   Patient presents with   • Wound Check     Pt to ED for wound check of L foot  Pt podiatrist sent him to ED for admission w IV abx, MRI to r/o osteo and surgery per pt  Pt c/o increased pain in foot       Initial Presentation: 66 y o  male presents to ED from home with left foot ulceration  Saw podiatry on  5/24 with concern for possible osteo  And ED recommended  Patient  Held  Off  ED until  5/29  Was on  Po doxy  Met SIRS  Criteria in ED with  Tachycardia and leukocytosis, treated with  KELECHI  X ray concerning for osteo  PMH  Is nicotine/alcohol  abuse,  CKD, chronic hyponatremia,   DM1, HTN and gout  Last drink the  Night prior to admission  Admit  Ip with  Severe sepsis, source  Osteo l foot, Diabetic ulcer  Left midfoot, Hyponatremia and plan is   Podiatry consult,  KELECHI, monitor labs and vital signs, blood/wound cultures, MVI, CIWA scores and continue home meds            Date:   5/30 Day 2:   Podiatry consult  Continue  KELECHI  Plan is surgical intervention   5/31   for wound debridement, resection of proximal medial bipartite sesamoid for surgical cure of osteomyelitis with wound VAC application  Continue current meds/treatment plan for now  ED Triage Vitals [05/29/23 1103]   Temperature Pulse Respirations Blood Pressure SpO2   98 4 °F (36 9 °C) (!) 110 18 136/79 98 %      Temp Source Heart Rate Source Patient Position - Orthostatic VS BP Location FiO2 (%)   Oral Monitor Sitting Left arm --      Pain Score       6          Wt Readings from Last 1 Encounters:   05/29/23 68 5 kg (151 lb)     Additional Vital Signs:    108 Abnormal  18 158/88 -- 98 % -- --    05/30/23 0007 -- 99 18 176/81 Abnormal  117 99 % -- --   05/29/23 1641 -- 101 20 159/77 111 98 % None (Room air) Sitting   05/29/23 1430 -- 105 20 165/77 111 98 % None (Room air) Sitting   05/29/23 1400 -- 105 20 161/76 111 97 % None (Room air) Sitting   05/29/23 1300 -- 104 20 155/74 107 99 % None (Room air) Sitting   05/29/23 1226 -- -- -- -- -- -- None (Room air) --   05/29/23 1103 98 4 °F (36 9 °C) 110 Abnormal  18 136/79 -- 98 % None (Room air) Sitting     CIWA-Ar Total 0  -CL 0  -CL 0  -DL 1  -DL --   Row Name 05/29/23 1400 05/29/23 1300 05/29/23 1103        Pertinent Labs/Diagnostic Test Results:   MRI foot/forefoot toes left wo contrast   Final Result by Coretta Anthony MD (05/30 0805)      Associated with the first submetatarsal ulcer (series 8 image 23), there is confluent hypointense marrow signal in the proximal half of the bipartite tibial sesamoid bone, consistent with osteomyelitis (series 7 image 23 and series 6 images 20-21 )      No evidence of osteomyelitis in the rest of the sesamoids, nor in the first metatarsal head and hallux proximal phalanx base        There is focal marrow edema on the plantar aspect of the third metatarsal (series 6 image 13,) and second metatarsal (series 6 image 14 ) These are not adjacent to ulcers, therefore are probably stress reaction due to altered weightbearing rather than    osteomyelitis  Workstation performed: [id number]         XR foot 3+ views LEFT   ED Interpretation by Rema Osuna DO (05/29 1324)   Early bony destruction head of the third metatarsal consistent with osteomyelitis      Final Result by Ana Carcamo MD (05/30 4226)      No erosive changes   Ulceration at the plantar aspect of the foot   No radiographic findings of osteomyelitis   MRI may be performed as needed         Workstation performed: [id number]               Results from last 7 days   Lab Units 05/30/23  0613 05/29/23  1252   HEMATOCRIT % 39 7 42 0   HEMOGLOBIN g/dL 13 5 14 5   NEUTROS ABS Thousands/µL  --  11 72*   PLATELETS Thousands/uL 264 315   WBC Thousand/uL 8 39 14 23*         Results from last 7 days   Lab Units 05/30/23  0613 05/29/23  1252   ANION GAP mmol/L 10 13   BUN mg/dL 18 24   CALCIUM mg/dL 8 7 9 9   CHLORIDE mmol/L 99 92*   CO2 mmol/L 25 25   CREATININE mg/dL 1 14 1 38*   EGFR ml/min/1 73sq m 69 54   POTASSIUM mmol/L 4 4 4 8   MAGNESIUM mg/dL 1 8*  --    SODIUM mmol/L 134* 130*     Results from last 7 days   Lab Units 05/29/23  1252   ALBUMIN g/dL 4 4   ALK PHOS U/L 99   ALT U/L 35   AST U/L 68*   TOTAL BILIRUBIN mg/dL 1 97*   TOTAL PROTEIN g/dL 8 1         Results from last 7 days   Lab Units 05/30/23  0613 05/29/23  1252   GLUCOSE RANDOM mg/dL 142* 190*                   Results from last 7 days   Lab Units 05/29/23  1252   INR  0 86   PROTIME seconds 12 3   PTT seconds 28         Results from last 7 days   Lab Units 05/30/23  0613 05/29/23  1252   PROCALCITONIN ng/ml 1 20* 0 35*     Results from last 7 days   Lab Units 05/29/23  1640 05/29/23  1252   LACTIC ACID mmol/L 1 7 2 5*               Results from last 7 days   Lab Units 05/29/23  1252   CRP mg/L <1 0         Results from last 7 days   Lab Units 05/29/23  1630   OSMO UR mmol/*     Results from last 7 days   Lab Units 05/29/23  1630   BACTERIA UA /hpf Occasional   BILIRUBIN UA  Negative   BLOOD UA  Negative   CLARITY UA  Clear   COLOR UA  Yellow   EPITHELIAL CELLS WET PREP /hpf Occasional   GLUCOSE UA mg/dl 300 (3/10%)*   KETONES UA mg/dl Trace*   LEUKOCYTES UA  Negative   SODIUM UR  35   NITRITE UA  Negative   PH UA  5 5   PROTEIN UA mg/dl Negative   RBC UA /hpf 0-1*   SPEC GRAV UA  1 010   UROBILINOGEN UA (BE) mg/dl <2 0   WBC UA /hpf None Seen                                 Results from last 7 days   Lab Units 05/29/23  1252   BLOOD CULTURE  Received in Microbiology Lab  Culture in Progress  Received in Microbiology Lab  Culture in Progress                     ED Treatment:   Medication Administration from 05/29/2023 1051 to 05/30/2023 0949       Date/Time Order Dose Route Action Comments     05/29/2023 1321 EDT sodium chloride 0 9 % bolus 1,000 mL 1,000 mL Intravenous New Bag --     05/29/2023 1455 EDT vancomycin (VANCOCIN) IVPB (premix in dextrose) 1,000 mg 200 mL 1,000 mg Intravenous Not Given order discontinued     05/29/2023 1455 EDT levofloxacin (LEVAQUIN) IVPB (premix in dextrose) 750 mg 150 mL 0 mg Intravenous Stopped --     05/29/2023 1255 EDT levofloxacin (LEVAQUIN) IVPB (premix in dextrose) 750 mg 150 mL 750 mg Intravenous New Bag --     05/30/2023 0303 EDT cefepime (MAXIPIME) IVPB (premix in dextrose) 2,000 mg 50 mL 0 mg Intravenous Stopped --     05/30/2023 0233 EDT cefepime (MAXIPIME) IVPB (premix in dextrose) 2,000 mg 50 mL 2,000 mg Intravenous New Bag --     05/29/2023 1537 EDT cefepime (MAXIPIME) IVPB (premix in dextrose) 2,000 mg 50 mL 0 mg Intravenous Stopped --     05/29/2023 1507 EDT cefepime (MAXIPIME) IVPB (premix in dextrose) 2,000 mg 50 mL 2,000 mg Intravenous New Bag --     05/29/2023 1833 EDT vancomycin (VANCOCIN) 1,750 mg in sodium chloride 0 9 % 500 mL IVPB 1,750 mg Intravenous New Bag --     05/30/2023 0936 EDT nicotine (NICODERM CQ) 21 mg/24 hr TD 24 hr patch 1 patch 1 patch Transdermal Medication Applied --     05/30/2023 0935 EDT nicotine (NICODERM CQ) 21 mg/24 hr TD 24 hr patch 1 patch 1 patch Transdermal Patch Removed --     05/29/2023 1501 EDT nicotine (NICODERM CQ) 21 mg/24 hr TD 24 hr patch 1 patch 1 patch Transdermal Medication Applied --     05/29/2023 1506 EDT enoxaparin (LOVENOX) subcutaneous injection 40 mg 40 mg Subcutaneous Given --     05/30/2023 0406 EDT oxyCODONE (ROXICODONE) split tablet 2 5 mg -- Oral See Alternative --     05/29/2023 2354 EDT oxyCODONE (ROXICODONE) split tablet 2 5 mg -- Oral See Alternative --     05/29/2023 1935 EDT oxyCODONE (ROXICODONE) split tablet 2 5 mg -- Oral See Alternative --     05/29/2023 1533 EDT oxyCODONE (ROXICODONE) split tablet 2 5 mg -- Oral See Alternative --     05/30/2023 0406 EDT oxyCODONE (ROXICODONE) IR tablet 5 mg 5 mg Oral Given --     05/29/2023 2354 EDT oxyCODONE (ROXICODONE) IR tablet 5 mg 5 mg Oral Given --     05/29/2023 1935 EDT oxyCODONE (ROXICODONE) IR tablet 5 mg 5 mg Oral Given --     05/29/2023 1533 EDT oxyCODONE (ROXICODONE) IR tablet 5 mg 5 mg Oral Given --     05/30/2023 0939 EDT HYDROmorphone (DILAUDID) injection 0 5 mg 0 5 mg Intravenous Given --     05/29/2023 1833 EDT HYDROmorphone (DILAUDID) injection 0 5 mg 0 5 mg Intravenous Given --     05/29/2023 1829 EDT atorvastatin (LIPITOR) tablet 40 mg 40 mg Oral Given --     05/30/2023 0939 EDT metoprolol tartrate (LOPRESSOR) tablet 50 mg 50 mg Oral Given --     05/29/2023 2134 EDT metoprolol tartrate (LOPRESSOR) tablet 50 mg 50 mg Oral Given --     05/30/2023 0804 EDT thiamine tablet 100 mg 0 mg Oral Hold --     05/29/2023 1829 EDT thiamine tablet 100 mg 100 mg Oral Given --     82/72/7888 8262 EDT folic acid (FOLVITE) tablet 1 mg 0 mg Oral Hold --     87/87/4742 7316 EDT folic acid (FOLVITE) tablet 1 mg 1 mg Oral Given --     05/30/2023 0804 EDT multivitamin-minerals (CENTRUM) tablet 1 tablet 0 tablet Oral Hold --     05/29/2023 1829 EDT multivitamin-minerals (CENTRUM) tablet 1 tablet 1 tablet Oral Given --     05/30/2023 0641 EDT PATIENT MAINTAINED INSULIN PUMP 1 each 1 each Subcutaneous Not Given bsg 117     05/29/2023 2134 EDT PATIENT MAINTAINED INSULIN PUMP 1 each 1 each Subcutaneous Given Maintained by pt     05/29/2023 1829 EDT PATIENT MAINTAINED INSULIN PUMP 1 each 1 each Subcutaneous Given Self managed     05/30/2023 1400 EDT heparin (porcine) subcutaneous injection 5,000 Units 0 Units Subcutaneous Hold possible OR today     05/30/2023 0557 EDT heparin (porcine) subcutaneous injection 5,000 Units 5,000 Units Subcutaneous Not Given pt to have I&D     05/30/2023 0934 EDT magnesium sulfate IVPB (premix) SOLN 1 g 1 g Intravenous New Bag --        Present on Admission:  • Mixed hyperlipidemia  • Benign essential hypertension  • 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, with fat layer exposed ([facility] [address] )  • Type 1 diabetes mellitus with hyperglycemia (HCC)      Admitting Diagnosis: Visit for wound check [Z51 89]  Age/Sex: 66 y o  male  Admission Orders:  Scheduled Medications:  atorvastatin, 40 mg, Oral, Daily With Dinner  cefepime, 2,000 mg, Intravenous, S4C  folic acid, 1 mg, Oral, Daily  heparin (porcine), 5,000 Units, Subcutaneous, Q8H [facility]  magnesium sulfate, 1 g, Intravenous, Once  metoprolol tartrate, 50 mg, Oral, Q12H [facility]  multivitamin-minerals, 1 tablet, Oral, Daily  nicotine, 1 patch, Transdermal, Daily  patient maintained insulin pump, 1 each, Subcutaneous, Q8H [facility]  thiamine, 100 mg, Oral, Daily  vancomycin, 17 5 mg/kg, Intravenous, Q24H      Continuous IV Infusions:     PRN Meds:  acetaminophen, 650 mg, Oral, Q6H PRN  HYDROmorphone, 0 5 mg, Intravenous, Q6H PRN  insulin lispro, 10 mL, Subcutaneous Insulin Pump, Once PRN  oxyCODONE, 2 5 mg, Oral, Q4H PRN   Or  oxyCODONE, 5 mg, Oral, Q4H PRN        IP CONSULT TO PHARMACY  IP CONSULT TO PODIATRY  IP CONSULT TO ENDOCRINOLOGY    Network Utilization Review Department  ATTENTION: Please call with any questions or concerns to [phone] and carefully listen to the prompts so that you are directed to the right person  All voicemails are confidential   Ravi Daniels all requests for admission clinical reviews, approved or denied determinations and any other requests to dedicated fax number below belonging to the campus where the patient is receiving treatment   List of dedicated fax numbers for the Facilities:  [address] DENIALS (Administrative/Medical Necessity) [phone]   [address] (Maternity/NICU/Pediatrics) [phone]   7 Kareen Damon [phone]   [facility] 77 [phone]   [address] [address] Art Haley [facility] 28 [phone]   [facility]9 [facility] [place] [facility] 134 815 [facility] [phone]

## 2023-05-30 NOTE — ANESTHESIA POSTPROCEDURE EVALUATION
Post-Op Assessment Note    CV Status:  Stable    Pain management: adequate     Mental Status:  Awake and sleepy   Hydration Status:  Euvolemic   PONV Controlled:  Controlled   Airway Patency:  Patent      Post Op Vitals Reviewed: Yes      Staff: Anesthesiologist, CRNA         There were no known notable events for this encounter      /80 (05/30/23 1522)    Temp      Pulse 95 (05/30/23 1522)   Resp 16 (05/30/23 1522)    SpO2 100 % (05/30/23 1522)

## 2023-05-30 NOTE — ASSESSMENT & PLAN NOTE
Lab Results   Component Value Date    HGBA1C 7 9 (H) 03/31/2023       Recent Labs     05/31/23  0411 05/31/23  0735 05/31/23  1229 05/31/23  1609   POCGLU 181* 132 165* 115       Blood Sugar Average: Last 72 hrs:     · Patient is on home insulin pump  · If plans to go to the OR, transition to SSI vs insulin gtt  · Maintain -180  · Endo s/o

## 2023-05-30 NOTE — ASSESSMENT & PLAN NOTE
Lab Results   Component Value Date    CREATININE 1 11 05/31/2023    CREATININE 1 14 05/30/2023    CREATININE 1 38 (H) 05/29/2023    EGFR 71 05/31/2023    EGFR 69 05/30/2023    EGFR 54 05/29/2023     · Baseline Cr 1 1-1 3  · Cr on admission 1 38  · Monitor I/O  · Trend renal indices  · Cr back to baseline

## 2023-05-30 NOTE — PROGRESS NOTES
Pt transported to [address] [place] [address] ([facility]) via stretcher with PCA  Report called to Alaina Mclean RN

## 2023-05-31 ENCOUNTER — HOME HEALTH ADMISSION (OUTPATIENT)
Dept: HOME HEALTH SERVICES | Facility: HOME HEALTHCARE | Age: 63
End: 2023-05-31
Payer: COMMERCIAL

## 2023-05-31 LAB
ALBUMIN SERPL BCP-MCNC: 3.6 G/DL (ref 3.5–5)
ALP SERPL-CCNC: 76 U/L (ref 34–104)
ALT SERPL W P-5'-P-CCNC: 32 U/L (ref 7–52)
ANION GAP SERPL CALCULATED.3IONS-SCNC: 8 MMOL/L (ref 4–13)
AST SERPL W P-5'-P-CCNC: 41 U/L (ref 13–39)
BASOPHILS # BLD AUTO: 0.14 THOUSANDS/ÂΜL (ref 0–0.1)
BASOPHILS NFR BLD AUTO: 1 % (ref 0–1)
BILIRUB SERPL-MCNC: 1.14 MG/DL (ref 0.2–1)
BUN SERPL-MCNC: 13 MG/DL (ref 5–25)
CALCIUM SERPL-MCNC: 8.8 MG/DL (ref 8.4–10.2)
CHLORIDE SERPL-SCNC: 98 MMOL/L (ref 96–108)
CO2 SERPL-SCNC: 28 MMOL/L (ref 21–32)
CREAT SERPL-MCNC: 1.11 MG/DL (ref 0.6–1.3)
EOSINOPHIL # BLD AUTO: 0.22 THOUSAND/ÂΜL (ref 0–0.61)
EOSINOPHIL NFR BLD AUTO: 2 % (ref 0–6)
ERYTHROCYTE [DISTWIDTH] IN BLOOD BY AUTOMATED COUNT: 13.5 % (ref 11.6–15.1)
GFR SERPL CREATININE-BSD FRML MDRD: 71 ML/MIN/1.73SQ M
GLUCOSE SERPL-MCNC: 115 MG/DL (ref 65–140)
GLUCOSE SERPL-MCNC: 132 MG/DL (ref 65–140)
GLUCOSE SERPL-MCNC: 165 MG/DL (ref 65–140)
GLUCOSE SERPL-MCNC: 174 MG/DL (ref 65–140)
GLUCOSE SERPL-MCNC: 181 MG/DL (ref 65–140)
GLUCOSE SERPL-MCNC: 253 MG/DL (ref 65–140)
GLUCOSE SERPL-MCNC: 270 MG/DL (ref 65–140)
GLUCOSE SERPL-MCNC: 277 MG/DL (ref 65–140)
GLUCOSE SERPL-MCNC: 84 MG/DL (ref 65–140)
HCT VFR BLD AUTO: 38.3 % (ref 36.5–49.3)
HGB BLD-MCNC: 13 G/DL (ref 12–17)
IMM GRANULOCYTES # BLD AUTO: 0.05 THOUSAND/UL (ref 0–0.2)
IMM GRANULOCYTES NFR BLD AUTO: 1 % (ref 0–2)
LYMPHOCYTES # BLD AUTO: 1.44 THOUSANDS/ÂΜL (ref 0.6–4.47)
LYMPHOCYTES NFR BLD AUTO: 14 % (ref 14–44)
MCH RBC QN AUTO: 33.2 PG (ref 26.8–34.3)
MCHC RBC AUTO-ENTMCNC: 33.9 G/DL (ref 31.4–37.4)
MCV RBC AUTO: 98 FL (ref 82–98)
MONOCYTES # BLD AUTO: 1.49 THOUSAND/ÂΜL (ref 0.17–1.22)
MONOCYTES NFR BLD AUTO: 15 % (ref 4–12)
NEUTROPHILS # BLD AUTO: 6.74 THOUSANDS/ÂΜL (ref 1.85–7.62)
NEUTS SEG NFR BLD AUTO: 67 % (ref 43–75)
NRBC BLD AUTO-RTO: 0 /100 WBCS
PLATELET # BLD AUTO: 263 THOUSANDS/UL (ref 149–390)
PMV BLD AUTO: 9.1 FL (ref 8.9–12.7)
POTASSIUM SERPL-SCNC: 4.3 MMOL/L (ref 3.5–5.3)
PROT SERPL-MCNC: 6.5 G/DL (ref 6.4–8.4)
RBC # BLD AUTO: 3.91 MILLION/UL (ref 3.88–5.62)
SODIUM SERPL-SCNC: 134 MMOL/L (ref 135–147)
WBC # BLD AUTO: 10.08 THOUSAND/UL (ref 4.31–10.16)

## 2023-05-31 PROCEDURE — 99233 SBSQ HOSP IP/OBS HIGH 50: CPT | Performed by: STUDENT IN AN ORGANIZED HEALTH CARE EDUCATION/TRAINING PROGRAM

## 2023-05-31 PROCEDURE — 80053 COMPREHEN METABOLIC PANEL: CPT | Performed by: STUDENT IN AN ORGANIZED HEALTH CARE EDUCATION/TRAINING PROGRAM

## 2023-05-31 PROCEDURE — 85025 COMPLETE CBC W/AUTO DIFF WBC: CPT | Performed by: STUDENT IN AN ORGANIZED HEALTH CARE EDUCATION/TRAINING PROGRAM

## 2023-05-31 PROCEDURE — 99222 1ST HOSP IP/OBS MODERATE 55: CPT | Performed by: STUDENT IN AN ORGANIZED HEALTH CARE EDUCATION/TRAINING PROGRAM

## 2023-05-31 PROCEDURE — 97167 OT EVAL HIGH COMPLEX 60 MIN: CPT

## 2023-05-31 PROCEDURE — 97163 PT EVAL HIGH COMPLEX 45 MIN: CPT

## 2023-05-31 PROCEDURE — 97760 ORTHOTIC MGMT&TRAING 1ST ENC: CPT

## 2023-05-31 PROCEDURE — 99024 POSTOP FOLLOW-UP VISIT: CPT | Performed by: PODIATRIST

## 2023-05-31 PROCEDURE — 82948 REAGENT STRIP/BLOOD GLUCOSE: CPT

## 2023-05-31 RX ORDER — OXYCODONE HYDROCHLORIDE 5 MG/1
10 TABLET ORAL EVERY 4 HOURS PRN
Status: DISCONTINUED | OUTPATIENT
Start: 2023-05-31 | End: 2023-06-01 | Stop reason: HOSPADM

## 2023-05-31 RX ORDER — OXYCODONE HYDROCHLORIDE 5 MG/1
5 TABLET ORAL EVERY 4 HOURS PRN
Status: DISCONTINUED | OUTPATIENT
Start: 2023-05-31 | End: 2023-06-01 | Stop reason: HOSPADM

## 2023-05-31 RX ADMIN — METOPROLOL TARTRATE 50 MG: 50 TABLET ORAL at 08:57

## 2023-05-31 RX ADMIN — Medication 1250 MG: at 17:27

## 2023-05-31 RX ADMIN — HYDROMORPHONE HYDROCHLORIDE 0.5 MG: 1 INJECTION, SOLUTION INTRAMUSCULAR; INTRAVENOUS; SUBCUTANEOUS at 16:15

## 2023-05-31 RX ADMIN — OXYCODONE HYDROCHLORIDE 10 MG: 5 TABLET ORAL at 17:27

## 2023-05-31 RX ADMIN — OXYCODONE HYDROCHLORIDE 10 MG: 5 TABLET ORAL at 08:57

## 2023-05-31 RX ADMIN — CEFEPIME HYDROCHLORIDE 2000 MG: 2 INJECTION, SOLUTION INTRAVENOUS at 12:14

## 2023-05-31 RX ADMIN — ATORVASTATIN CALCIUM 40 MG: 40 TABLET, FILM COATED ORAL at 16:15

## 2023-05-31 RX ADMIN — METOPROLOL TARTRATE 50 MG: 50 TABLET ORAL at 20:38

## 2023-05-31 RX ADMIN — OXYCODONE HYDROCHLORIDE 5 MG: 5 TABLET ORAL at 03:58

## 2023-05-31 RX ADMIN — HEPARIN SODIUM 5000 UNITS: 5000 INJECTION INTRAVENOUS; SUBCUTANEOUS at 05:53

## 2023-05-31 RX ADMIN — CEFEPIME HYDROCHLORIDE 2000 MG: 2 INJECTION, SOLUTION INTRAVENOUS at 03:59

## 2023-05-31 RX ADMIN — CEFEPIME HYDROCHLORIDE 2000 MG: 2 INJECTION, SOLUTION INTRAVENOUS at 20:38

## 2023-05-31 RX ADMIN — HEPARIN SODIUM 5000 UNITS: 5000 INJECTION INTRAVENOUS; SUBCUTANEOUS at 22:22

## 2023-05-31 RX ADMIN — NICOTINE 1 PATCH: 21 PATCH, EXTENDED RELEASE TRANSDERMAL at 08:58

## 2023-05-31 RX ADMIN — OXYCODONE HYDROCHLORIDE 10 MG: 5 TABLET ORAL at 22:22

## 2023-05-31 RX ADMIN — THIAMINE HCL TAB 100 MG 100 MG: 100 TAB at 08:56

## 2023-05-31 RX ADMIN — FOLIC ACID 1 MG: 1 TABLET ORAL at 08:56

## 2023-05-31 RX ADMIN — OXYCODONE HYDROCHLORIDE 10 MG: 5 TABLET ORAL at 12:43

## 2023-05-31 RX ADMIN — HEPARIN SODIUM 5000 UNITS: 5000 INJECTION INTRAVENOUS; SUBCUTANEOUS at 14:53

## 2023-05-31 RX ADMIN — MULTIPLE VITAMINS W/ MINERALS TAB 1 TABLET: TAB ORAL at 08:56

## 2023-05-31 NOTE — CASE MANAGEMENT
Case Management Assessment & Discharge Planning Note    Patient name Marshall Cho  Location /-01 MRN [id number]  : 1960 Date 2023       Current Admission Date: 2023  Current Admission Diagnosis:Severe sepsis [facility])   Patient Active Problem List    Diagnosis Date Noted   • Severe sepsis ([address] ) 2023   • CKD (chronic kidney disease) 2023   • Hyponatremia 2023   • Alcohol abuse 2023   • Nicotine abuse 2023   • Diabetic foot ulcer ([address] ) 2023   • 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, with fat layer exposed ([address] ) 2023   • Type 1 diabetes mellitus with hyperglycemia ([address] ) 2022   • Diabetic polyneuropathy associated with type 1 diabetes mellitus ([address] ) 2022   • Microalbuminuria due to type 1 diabetes mellitus ([address] ) 2022   • Hypoglycemia unawareness associated with type 1 diabetes mellitus ([address] ) 2022   • Cigarette nicotine dependence without complication    • Gout of ankle 2022   • Chronic fatigue syndrome 2022   • Lower urinary tract symptoms due to benign prostatic hyperplasia 2022   • 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 ([address] ) 2022   • Chronic pain 2022   • Insulin pump in place 2018   • Benign essential hypertension 2011   • Mixed hyperlipidemia 10/25/2010   • ED (erectile dysfunction) of organic origin 10/25/2010      LOS (days): 2  Geometric Mean LOS (GMLOS) (days):   Days to GMLOS:     OBJECTIVE:    Risk of Unplanned Readmission Score: 11 62         Current admission status: Inpatient  Referral Reason: Other (post acute needs)    Preferred Pharmacy:   [facility] [id number] [place] [id number]  Phone: [phone] Fax: [phone]    Primary Care Provider: Frederick Lovett MD    Primary Insurance: [employer]  Secondary Insurance:     ASSESSMENT:  Active Health Care Proxies    There are no active Health Care Proxies on file  Advance Directives  Does patient have a [address] [facility] [address]?: No  Was patient offered paperwork?: Yes (declined)  Does patient currently have a Health Care decision maker?: Yes, please see Health Care Proxy section  Does patient have Advance Directives?: No  Was patient offered paperwork?: Yes (declined)  Primary Contact: Pete Hannahcassia         Readmission Root Cause  30 Day Readmission: No    Patient Information  Admitted from[de-identified] Home  Mental Status: Alert  During Assessment patient was accompanied by: Not accompanied during assessment  Assessment information provided by[de-identified] Patient  Primary Caregiver: Self  Support Systems: Spouse/significant other, Family members  [place] of Residence: [address] [facility] do you live in?: [address] [place] [address] entry access options   Select all that apply : Stairs  Number of steps to enter home : 2  Do the steps have railings?: No  Type of Current Residence: [address] [place] [address] home  Upon entering residence, is there a bedroom on the main floor (no further steps)?: Yes  Upon entering residence, is there a bathroom on the main floor (no further steps)?: Yes  In the last 12 months, was there a time when you were not able to pay the mortgage or rent on time?: No  In the last 12 months, how many places have you lived?: 1  In the last 12 months, was there a time when you did not have a steady place to sleep or slept in a shelter (including now)?: No  Homeless/housing insecurity resource given?: N/A  Living Arrangements: Lives w/ Spouse/significant other  Is patient a ?: Yes  Is patient active with [facility])?: No    Activities of Daily Living Prior to Admission  Functional Status: Independent  Completes ADLs independently?: Yes  Ambulates independently?: Yes  Does patient use assisted devices?: No  Does patient currently own DME?: No  Does patient have a history of Outpatient Therapy (PT/OT)?: Yes  Does the patient have a history of Short-Term Rehab?: No  Does patient have a history of HHC?: No  Does [facility] have [facility] No         Patient Information Continued  Income Source: Employed  Does patient have prescription coverage?: Yes  Within the past 12 months, you worried that your food would run out before you got the money to buy more : Never true  Within the past 12 months, the food you bought just didn't last and you didn't have money to get more : Never true  Food insecurity resource given?: N/A  Does patient receive dialysis treatments?: No  Does patient have a history of substance abuse?: No         Means of Transportation  Means of Transport to Appts[de-identified] Drives Self  In the past 12 months, has lack of transportation kept you from medical appointments or from getting medications?: No  In the past 12 months, has lack of transportation kept you from meetings, work, or from getting things needed for daily living?: No  Was application for public transport provided?: N/A        DISCHARGE DETAILS:    Discharge planning discussed with[de-identified] patient  Freedom of Choice: Yes  Comments - Freedom of Choice: discussed dc planning and role of CM  CM contacted family/caregiver?: No- see comments (pt alert and indpt in room)  Were Treatment Team discharge recommendations reviewed with patient/caregiver?: Yes  Did patient/caregiver verbalize understanding of patient care needs?: Yes       Contacts  Reason/Outcome: Discharge [address]         Is the patient interested in [facility] at discharge? :  (pending surgical plan)    DME Referral Provided  Referral made for DME?: No    Other Referral/Resources/Interventions Provided:  Interventions: HHC  Referral Comments: Pt to have left foot wound debridment today   Needs TBD            Discharge Destination Plan[de-identified] Home with [place] at Discharge : Family                                      Additional Comments: Met with pt in ED  Pt to have left foot wound debridment  Lives with wife in 2[address] with 2ste  Bed and bath on first floor  Flight of stairs down to basement  No ambulatory DME at home  Pt has an insulin pump and gloucometer  Pt reports that he drives and is indpt at baseline  He works fulltime and sees [facility] PCP  Pt uses [employer]   podiatry consulted

## 2023-05-31 NOTE — PROGRESS NOTES
Scarlet Bagley is a 66 y o  male who is currently ordered Vancomycin IV with management by the Pharmacy Consult service  Relevant clinical data and objective / subjective history reviewed  Vancomycin Assessment:  Indication and Goal AUC/Trough: Bone/joint infection (goal -600, trough >10)  Clinical Status: stable  Micro:     Renal Function:  SCr: 1 11 mg/dL  CrCl: 64 9 mL/min  Renal replacement: Not on dialysis  Days of Therapy: 3  Current Dose: 1250 mg q24h  Vancomycin Plan:  Estimated AUC: 450 mcg*hr/mL  Estimated Trough: 12 5 mcg/mL  Next Level: AM labs on 06/01/23  Renal Function Monitoring: Daily BMP and [place] will continue to follow closely for s/sx of nephrotoxicity, infusion reactions and appropriateness of therapy  BMP and CBC will be ordered per protocol  We will continue to follow the patient’s culture results and clinical progress daily      Justus Briceno, Pharmacist

## 2023-05-31 NOTE — PLAN OF CARE
Problem: PAIN - ADULT  Goal: Verbalizes/displays adequate comfort level or baseline comfort level  Description: Interventions:  - Encourage patient to monitor pain and request assistance  - Assess pain using appropriate pain scale  - Administer analgesics based on type and severity of pain and evaluate response  - Implement non-pharmacological measures as appropriate and evaluate response  - Consider cultural and social influences on pain and pain management  - Notify physician/advanced practitioner if interventions unsuccessful or patient reports new pain  Outcome: Progressing     Problem: INFECTION - ADULT  Goal: Absence or prevention of progression during hospitalization  Description: INTERVENTIONS:  - Assess and monitor for signs and symptoms of infection  - Monitor lab/diagnostic results  - Monitor all insertion sites, i e  indwelling lines, tubes, and drains  - Monitor endotracheal if appropriate and nasal secretions for changes in amount and color  - [place] appropriate cooling/warming therapies per order  - Administer medications as ordered  - Instruct and encourage patient and family to use good hand hygiene technique  - Identify and instruct in appropriate isolation precautions for identified infection/condition  Outcome: Progressing  Goal: Absence of fever/infection during neutropenic period  Description: INTERVENTIONS:  - Monitor WBC    Outcome: Progressing     Problem: DISCHARGE PLANNING  Goal: Discharge to home or other facility with appropriate resources  Description: INTERVENTIONS:  - Identify barriers to discharge w/patient and caregiver  - Arrange for needed discharge resources and transportation as appropriate  - Identify discharge learning needs (meds, wound care, etc )  - Arrange for interpretive services to assist at discharge as needed  - Refer to Case Management Department for coordinating discharge planning if the patient needs post-hospital services based on physician/advanced practitioner order or complex needs related to functional status, cognitive ability, or social support system  Outcome: Progressing     Problem: SAFETY ADULT  Goal: Maintain or return to baseline ADL function  Description: INTERVENTIONS:  -  Assess patient's ability to carry out ADLs; assess patient's baseline for ADL function and identify physical deficits which impact ability to perform ADLs (bathing, care of mouth/teeth, toileting, grooming, dressing, etc )  - Assess/evaluate cause of self-care deficits   - Assess range of motion  - Assess patient's mobility; develop plan if impaired  - Assess patient's need for assistive devices and provide as appropriate  - Encourage maximum independence but intervene and supervise when necessary  - Involve family in performance of ADLs  - Assess for home care needs following discharge   - Consider OT consult to assist with ADL evaluation and planning for discharge  - Provide patient education as appropriate  Outcome: Progressing     Problem: Knowledge Deficit  Goal: Patient/family/caregiver demonstrates understanding of disease process, treatment plan, medications, and discharge instructions  Description: Complete learning assessment and assess knowledge base    Interventions:  - Provide teaching at level of understanding  - Provide teaching via preferred learning methods  Outcome: Progressing     Problem: METABOLIC, FLUID AND ELECTROLYTES - ADULT  Goal: Electrolytes maintained within normal limits  Description: INTERVENTIONS:  - Monitor labs and assess patient for signs and symptoms of electrolyte imbalances  - Administer electrolyte replacement as ordered  - Monitor response to electrolyte replacements, including repeat lab results as appropriate  - Instruct patient on fluid and nutrition as appropriate  Outcome: Progressing  Goal: Fluid balance maintained  Description: INTERVENTIONS:  - Monitor labs   - Monitor I/O and WT  - Instruct patient on fluid and nutrition as appropriate  - Assess for signs & symptoms of volume excess or deficit  Outcome: Progressing  Goal: Glucose maintained within target range  Description: INTERVENTIONS:  - Monitor Blood Glucose as ordered  - Assess for signs and symptoms of hyperglycemia and hypoglycemia  - Administer ordered medications to maintain glucose within target range  - Assess nutritional intake and initiate nutrition service referral as needed  Outcome: Progressing     Problem: HEMATOLOGIC - ADULT  Goal: Maintains hematologic stability  Description: INTERVENTIONS  - Assess for signs and symptoms of bleeding or hemorrhage  - Monitor labs  - Administer supportive blood products/factors as ordered and appropriate  Outcome: Progressing     Problem: MOBILITY - ADULT  Goal: Maintain or return to baseline ADL function  Description: INTERVENTIONS:  -  Assess patient's ability to carry out ADLs; assess patient's baseline for ADL function and identify physical deficits which impact ability to perform ADLs (bathing, care of mouth/teeth, toileting, grooming, dressing, etc )  - Assess/evaluate cause of self-care deficits   - Assess range of motion  - Assess patient's mobility; develop plan if impaired  - Assess patient's need for assistive devices and provide as appropriate  - Encourage maximum independence but intervene and supervise when necessary  - Involve family in performance of ADLs  - Assess for home care needs following discharge   - Consider OT consult to assist with ADL evaluation and planning for discharge  - Provide patient education as appropriate  Outcome: Progressing

## 2023-05-31 NOTE — OCCUPATIONAL THERAPY NOTE
Occupational Therapy Evaluation     Patient Name: Libia Blanton  MXOVS'Z Date: 5/31/2023  Problem List  Principal Problem:    Severe sepsis [facility])  Active Problems:    Mixed hyperlipidemia    Benign essential hypertension    Type 1 diabetes mellitus with hyperglycemia ([facility] [address] )    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, with fat layer exposed ([facility] [address] )    CKD (chronic kidney disease)    Hyponatremia    Alcohol abuse    Nicotine abuse    Past Medical History  Past Medical History:   Diagnosis Date    Chronic foot ulcer ([facility] [address] ) 09/07/2018    Diabetes mellitus ([facility] )     Gout     High cholesterol     Hypertension     Visual impairment 11/1/2022    Cateract     Past Surgical History  Past Surgical History:   Procedure Laterality Date    CATARACT EXTRACTION Right 01/2023    COMPLEX WOUND CLOSURE TO EXTREMITY Left 07/18/2019    Procedure: COMPLEX CLOSURE LEFT CHEEK;  Surgeon: Corey Gupta MD;  Location: [facility];  Service: Plastics    FACIAL/NECK BIOPSY Left 07/18/2019    Procedure: EXCISION LEFT CHEEK CYST;  Surgeon: Corey Gupta MD;  [facility];  Service: Plastics    HERNIA REPAIR Left     several times    KNEE ARTHROSCOPY Left     torn meniscus    WOUND DEBRIDEMENT Left 5/30/2023    Procedure: DEBRIDEMENT WOUND LEFT FOOT WOUND WITH EXCISION OF INFECTED BONE AND WOUND VAC APPLICATION;  Surgeon: Tom Valdovinos DPM;  Location: [facility];  Service: Podiatry           05/31/23 1138   OT Last Visit   OT Visit Date 05/31/23   Note Type   Note type Evaluation   Pain Assessment   Pain Assessment Tool 0-10   Pain Score 8   Pain Location/Orientation Orientation: Left; Location: Foot   Restrictions/Precautions   Weight Bearing Precautions Per Order Yes   LLE Weight Bearing Per Order   (Heel weight bearing with darco wedge shoe)   Home Living   Type of [address] [place] [address] Two level;Bed/bath upstairs;1/2 bath on main level  (2 KASSY)   Bathroom Shower/Tub Tub/shower unit "  Bathroom Toilet Standard   Bathroom Equipment Grab bars in shower   Additional Comments No DME   Prior Function   Level of [place] Independent with ADLs; Independent with functional mobility; Independent with IADLS   Lives With Spouse   Receives Help From Family   IADLs Independent with driving; Independent with meal prep; Independent with medication management   Falls in the last 6 months 0   Vocational Full time employment   Lifestyle   Autonomy Independent at baseline   Reciprocal Relationships Lives with spouse   Service to Others [address]   Family/Caregiver Present No   Subjective   Subjective \"I don't want you to waste your time with me  I've done this before\"   ADL   Eating Assistance 7  Independent   Grooming Assistance 7  Independent   UB Bathing Assistance 7  Independent   LB Bathing Assistance 7  Independent   UB Dressing Assistance 7  Independent   LB Dressing Assistance 7  [address]  7  Independent   Bed Mobility   Supine to Sit 6  Modified independent   Additional items HOB elevated   Sit to Supine 6  Modified independent   Additional items HOB elevated   Additional Comments Pt declines sitting OOB & returns to supine   Transfers   Sit to Stand 6  Modified independent   Additional items Armrests   Stand to Sit 6  Modified independent   Additional items Armrests   Functional Mobility   Functional Mobility 5  Supervision   Additional Comments with & without DME  Pt attempted to walk over/around wound vac cords to \"test\" himself  Educated pt this is unsafe   S for line management   Additional items [facility]   Balance   Static Sitting Normal   Dynamic Sitting Normal   Static Standing Good   Dynamic Standing Good   Ambulatory Good   Activity Tolerance   Activity Tolerance Patient tolerated treatment well   Medical Staff Made Aware PT Emi   Nurse Made Aware MEGAN Canchola   RUE Assessment   RUE Assessment WFL   LUE Assessment   LUE Assessment " WFL   Vision-Basic Assessment   Current Vision Wears glasses all the time   Cognition   Overall Cognitive Status WFL   Arousal/Participation Alert   Attention Within functional limits   Orientation Level Oriented X4   Memory Within functional limits   Following Commands Follows all commands and directions without difficulty   Assessment   Prognosis Good   Assessment Pt is a 66 y o  male seen for OT evaluation at [facility], admitted 5/29/2023 w/ wound on L foot  Pt is POD#1 L foot wound debridement & excision of infected bone with wound vac  OT completed extensive review of pt's medical and social history  Comorbidities affecting pt's functional performance at time of assessment include: HLD, HTN, DM1, CKD, hyponatremia, alcohol abuse, nicotine abuse, chronic fatigue syndrome, chronic pain, polyneuropathy  Prior to admission, pt was living with his spouse in a [facility] with 2 KASSY  Pt was independent with ADL/IADLs/Mobility  Upon evaluation, pt presents to OT at an overall Mod I level for ADLs/Mobility  Based on findings, pt is of high complexity  The patient's raw score on the AM-PAC Daily Activity inpatient short form is 23, standardized score is 51 12, greater than 39 4  Patients at this level are likely to benefit from DC to home  Please refer to the recommendation of the Occupational Therapist for safe DC planning  At this time, OT recommendations at time of discharge are DC with level 4 resources  No further acute OT needs indicated at this time - Recommend pt continue to be OOB for meals, ambulation to/from BR, perform self care tasks, and mobility in hallway with nursing  D/C from OT caseload with above recommendations     Goals   Patient Goals Pt wants to go home today   Plan   OT Frequency Eval only   Recommendation   UB Rehab Discharge Recommendation (PT/OT) Level 4   AM-PAC Daily Activity Inpatient   Lower Body Dressing 3   Bathing 4   Toileting 4   Upper Body Dressing 4   Grooming 4   Eating 4 Daily Activity Raw Score 23   Daily Activity Standardized Score (Calc for Raw Score >=11) 51 12   AM-PAC Applied Cognition Inpatient   Following a Speech/Presentation 4   Understanding Ordinary Conversation 4   Taking Medications 4   Remembering Where Things Are Placed or Put Away 4   Remembering List of 4-5 Errands 4   Taking Care of Complicated Tasks 4   Applied Cognition Raw Score 24   Applied Cognition Standardized Score 62 21   End of Consult   Education Provided Yes   Patient Position at End of Consult Supine;Bed/Chair alarm activated; All needs within reach   Nurse Communication Nurse aware of consult     ERUM Mg/DELFINO

## 2023-05-31 NOTE — CASE MANAGEMENT
Case Management Discharge Planning Note    Patient name Scarlet Fuel  Location [address] [id number] MRN [id number]  : 1960 Date 2023       Current Admission Date: 2023  Current Admission Diagnosis:Severe sepsis [facility])   Patient Active Problem List    Diagnosis Date Noted   • Severe sepsis ([address] ) 2023   • CKD (chronic kidney disease) 2023   • Hyponatremia 2023   • Alcohol abuse 2023   • Nicotine abuse 2023   • Diabetic foot ulcer ([address] ) 2023   • 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, with fat layer exposed ([address] ) 2023   • Type 1 diabetes mellitus with hyperglycemia ([address] ) 2022   • Diabetic polyneuropathy associated with type 1 diabetes mellitus ([address] ) 2022   • Microalbuminuria due to type 1 diabetes mellitus ([address] ) 2022   • Hypoglycemia unawareness associated with type 1 diabetes mellitus ([address] ) 2022   • Cigarette nicotine dependence without complication    • Gout of ankle 2022   • Chronic fatigue syndrome 2022   • Lower urinary tract symptoms due to benign prostatic hyperplasia 2022   • 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 ([facility] [address] ) 2022   • Chronic pain 2022   • Insulin pump in place 2018   • Benign essential hypertension 2011   • Mixed hyperlipidemia 10/25/2010   • ED (erectile dysfunction) of organic origin 10/25/2010      LOS (days): 2  Geometric Mean LOS (GMLOS) (days):   Days to GMLOS:     OBJECTIVE:  Risk of Unplanned Readmission Score: 11 4      Current admission status: Inpatient   Preferred Pharmacy:   [facility] [id number], [address] - [address] [facility]  [address] [place] [address],[address] [place] [address] [address] [address]  Phone: [phone] Fax: [phone]    Primary Care Provider: Car Arauz MD    Primary Insurance: [place]  Secondary Insurance:     DISCHARGE DETAILS:    Discharge planning discussed with[de-identified] pt  Freedom of Choice: Yes  Comments - Freedom of Choice: chose [facility]  CM contacted family/caregiver?: No- see comments  Were Treatment Team discharge recommendations reviewed with patient/caregiver?: Yes  Did patient/caregiver verbalize understanding of patient care needs?: Yes  Were patient/caregiver advised of the risks associated with not following Treatment Team discharge recommendations?: Yes    [address]         Is the patient interested in [facility] at discharge?: Yes  [address] requested[de-identified] [address] Name[de-identified] [address] [facility] Provider[de-identified] PCP  Home Health Services Needed[de-identified] Wound/Ostomy Care  Homebound Criteria Met[de-identified] Requires the Assistance of Another Person for Safe Ambulation or to Leave the Home, Uses an Assist Device (i e  cane, walker, etc)  Supporting Clincal Findings[de-identified] Limited Endurance, Fatigues Easliy in [employer]    DME Referral Provided  Referral made for DME?: No    Other Referral/Resources/Interventions Provided:  Interventions: C  Referral Comments: Pt will have wound vac and [facility]  Treatment Team Recommendation: Home  Discharge Destination Plan[de-identified] Home  Transport at Discharge : Family     Additional Comments: Pt had wound vac placed yesterday and cm placed referral to [id number] to order through [facility]  Cm also reserved [facility] [address] per pt choice

## 2023-05-31 NOTE — PHYSICAL THERAPY NOTE
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)   • Diabetic polyneuropathy associated with type 1 diabetes mellitus ([facility])   • Microalbuminuria due to type 1 diabetes mellitus ([address] )   • Hypoglycemia unawareness associated with type 1 diabetes mellitus ([address] )   • 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, with fat layer exposed ([facility] [address] )   • Diabetic foot ulcer ([facility] [address] )   • Severe sepsis ([facility] [address] )   • CKD (chronic kidney disease)   • Hyponatremia   • Alcohol abuse   • Nicotine abuse       Past Medical History:   Diagnosis Date   • Chronic foot ulcer ([facility] [address] ) 09/07/2018   • Diabetes mellitus ([facility] [address] )    • Gout    • High cholesterol    • Hypertension    • Visual impairment 11/1/2022    Cateract       Past Surgical History:   Procedure Laterality Date   • CATARACT EXTRACTION Right 01/2023   • COMPLEX WOUND CLOSURE TO EXTREMITY Left 07/18/2019    Procedure: COMPLEX CLOSURE LEFT CHEEK;  Surgeon: Angel Iqbal MD;  Location:  MAIN OR;  Service: Plastics   • FACIAL/NECK BIOPSY Left 07/18/2019    Procedure: EXCISION LEFT CHEEK CYST;  Surgeon: Angel Iqbal MD;  Location:  MAIN OR;  Service: Plastics   • HERNIA REPAIR Left     several times   • KNEE ARTHROSCOPY Left     torn meniscus   • WOUND DEBRIDEMENT Left 5/30/2023    Procedure: DEBRIDEMENT WOUND LEFT "FOOT WOUND WITH EXCISION OF INFECTED BONE AND WOUND VAC APPLICATION;  Surgeon: Juan Carlos Nash DPM;  Location: [facility];  Service: Podiatry          05/31/23 1158   PT Last Visit   PT Visit Date 05/31/23   Note Type   Note type Evaluation   Pain Assessment   Pain Assessment Tool 0-10   Pain Score 8   Pain Location/Orientation Orientation: Left; Location: Foot   Hospital Pain Intervention(s) Medication (See MAR)   Restrictions/Precautions   Weight Bearing Precautions Per Order Yes   LLE Weight Bearing Per Order   (Heel weight bearing with darco wedge shoe)   Home Living   Type of [address] [place] [address] Two level;Bed/bath upstairs;1/2 bath on main level  (2STE)   Bathroom Shower/Tub Tub/shower unit   Bathroom Toilet Standard   Bathroom Equipment Grab bars in shower   Additional Comments no DME   Prior Function   Level of [place] Independent with ADLs   Lives With Spouse   Receives Help From Family   IADLs Independent with driving; Independent with meal prep; Independent with medication management   Falls in the last 6 months 0   Vocational Full time employment   General   Family/Caregiver Present No   Cognition   Overall Cognitive Status WFL   Arousal/Participation Alert   Attention Within functional limits   Orientation Level Oriented X4   Memory Within functional limits   Following Commands Follows all commands and directions without difficulty   Comments Impulsive, not open to education  Subjective   Subjective \"I've done this many times before  \"   RLE Assessment   RLE Assessment WFL   Bed Mobility   Supine to Sit 6  Modified independent   Additional items HOB elevated   Sit to Supine 6  Modified independent   Additional items HOB elevated   Transfers   Sit to Stand 6  Modified independent   Additional items Armrests; Verbal cues   Stand to Sit 6  Modified independent   Additional items Armrests; Verbal cues   Ambulation/Elevation   Gait pattern   (Step to pattern)   Gait Assistance 6  Modified " independent  (Initially supervision and progressed to a mod I level)   Assistive Device None  (SPC)   Distance 30ft without an AD, 10ft with SPC   Ambulation/Elevation Additional Comments Issued SPC   Balance   Static Sitting Normal   Dynamic Sitting Normal   Static Standing Good   Dynamic Standing Good   Ambulatory Good   Endurance Deficit   Endurance Deficit No   Activity Tolerance   Activity Tolerance Patient tolerated treatment well   Medical Staff Made Aware OT, Tere   Nurse Made Aware RN   AssessmentPatient is a 67y/o M who is s/p L foot wound debridement and wound vac placement for osteomyelitis  Hx of alcohol abuse  Patient resides with spouse in a home with steps to enter  He was independent prior to admission  Current medical status includes mild agitation, heel weight bearing with darco shoe, wound vac, poor insight, decreased balance and mobility  Patient mildly agitated and not open to education  Darco shoe was issued and instruction provided for how to don/doff  Educated on weight bearing status  Unclear if patient will be compliant  Therapist moved lines out of way during ambulation, and patient reported he wanted to make a point by stepping over them despite patient being educated on fall risk  Patient did perform all mobility at a mod I level and was issued a SPC  At this time, there are no further inpatient P T  needs  Will discharge orders  The patient's AM-PAC Basic Mobility Inpatient Short Form Raw Score is 24  A Raw score of greater than 17suggests the patient may benefit from discharge to home  Please also refer to the recommendation of the Physical Therapist for safe discharge planning           Prognosis Good   Problem List Impaired balance;Decreased mobility;Pain;Orthopedic restrictions   Barriers to Discharge None   Goals   Patient Goals To go home   Plan   Treatment/Interventions   (D/C P T )   Recommendation   UB Rehab Discharge Recommendation (PT/OT) Level 4   AM-PAC Basic Mobility Inpatient   Turning in Flat Bed Without Bedrails 4   Lying on Back to Sitting on Edge of Flat Bed Without Bedrails 4   Moving Bed to Chair 4   Standing Up From Chair Using Arms 4   Walk in Room 4   Climb 3-5 Stairs With Railing 4   Basic Mobility Inpatient Raw Score 24   Basic Mobility Standardized Score 57 68   Highest Level Of Mobility   JH-HLM Goal 8: Walk 250 feet or more   JH-HLM Achieved 7: Walk 25 feet or more   Additional Treatment Session   Start Time 1149   End Time 1157   Treatment Assessment Issued darco wedge shoe  Sized appropriately and educated on how to don/doff   End of Consult   Patient Position at End of Consult Supine; All needs within reach     Daina Medina, PT             Patient Name: Ellie MOHAMUDY Date: 5/31/2023

## 2023-05-31 NOTE — PLAN OF CARE
Problem: Potential for Falls  Goal: Patient will remain free of falls  Description: INTERVENTIONS:  - Educate patient/family on patient safety including physical limitations  - Instruct patient to call for assistance with activity   - Consult OT/PT to assist with strengthening/mobility   - Keep Call bell within reach  - Keep bed low and locked with side rails adjusted as appropriate  - Keep care items and personal belongings within reach  - Initiate and maintain comfort rounds  - Make Fall Risk Sign visible to staff  - Offer Toileting every 2 Hours, in advance of need  - Initiate/Maintain alarm  - Obtain necessary fall risk management equipment:   - Apply yellow socks and bracelet for high fall risk patients  - Consider moving patient to room near nurses station  Outcome: Progressing     Problem: PAIN - ADULT  Goal: Verbalizes/displays adequate comfort level or baseline comfort level  Description: Interventions:  - Encourage patient to monitor pain and request assistance  - Assess pain using appropriate pain scale  - Administer analgesics based on type and severity of pain and evaluate response  - Implement non-pharmacological measures as appropriate and evaluate response  - Consider cultural and social influences on pain and pain management  - Notify physician/advanced practitioner if interventions unsuccessful or patient reports new pain  Outcome: Progressing     Problem: INFECTION - ADULT  Goal: Absence or prevention of progression during hospitalization  Description: INTERVENTIONS:  - Assess and monitor for signs and symptoms of infection  - Monitor lab/diagnostic results  - Monitor all insertion sites, i e  indwelling lines, tubes, and drains  - Monitor endotracheal if appropriate and nasal secretions for changes in amount and color  - [place] appropriate cooling/warming therapies per order  - Administer medications as ordered  - Instruct and encourage patient and family to use good hand hygiene technique  - Identify and instruct in appropriate isolation precautions for identified infection/condition  Outcome: Progressing  Goal: Absence of fever/infection during neutropenic period  Description: INTERVENTIONS:  - Monitor WBC    Outcome: Progressing     Problem: DISCHARGE PLANNING  Goal: Discharge to home or other facility with appropriate resources  Description: INTERVENTIONS:  - Identify barriers to discharge w/patient and caregiver  - Arrange for needed discharge resources and transportation as appropriate  - Identify discharge learning needs (meds, wound care, etc )  - Arrange for interpretive services to assist at discharge as needed  - Refer to Case Management Department for coordinating discharge planning if the patient needs post-hospital services based on physician/advanced practitioner order or complex needs related to functional status, cognitive ability, or social support system  Outcome: Progressing     Problem: METABOLIC, FLUID AND ELECTROLYTES - ADULT  Goal: Electrolytes maintained within normal limits  Description: INTERVENTIONS:  - Monitor labs and assess patient for signs and symptoms of electrolyte imbalances  - Administer electrolyte replacement as ordered  - Monitor response to electrolyte replacements, including repeat lab results as appropriate  - Instruct patient on fluid and nutrition as appropriate  Outcome: Progressing  Goal: Fluid balance maintained  Description: INTERVENTIONS:  - Monitor labs   - Monitor I/O and WT  - Instruct patient on fluid and nutrition as appropriate  - Assess for signs & symptoms of volume excess or deficit  Outcome: Progressing  Goal: Glucose maintained within target range  Description: INTERVENTIONS:  - Monitor Blood Glucose as ordered  - Assess for signs and symptoms of hyperglycemia and hypoglycemia  - Administer ordered medications to maintain glucose within target range  - Assess nutritional intake and initiate nutrition service referral as needed  Outcome: Progressing     Problem: SKIN/TISSUE INTEGRITY - ADULT  Goal: Skin Integrity remains intact(Skin Breakdown Prevention)  Description: Assess:  -Perform Pineda assessment every   -Clean and moisturize skin every   -Inspect skin when repositioning, toileting, and assisting with ADLS  -Assess under medical devices   -Assess extremities for adequate circulation and sensation     Bed Management:  -Have minimal linens on bed & keep smooth, unwrinkled  -Change linens as needed when moist or perspiring  -Avoid sitting or lying in one position for more than hours while in bed  -Keep HOB elevated    Toileting:  -Offer bedside commode  -Assess for incontinence every   -Use incontinent care products after each incontinent episode     Activity:  -Mobilize patient 2 times a day  -Encourage activity and walks on unit  -Encourage or provide ROM exercises   -Turn and reposition patient every 2 Hours  -Use appropriate equipment to lift or move patient in bed  -Instruct/ Assist with weight shifting every 2 when out of bed in chair  -Consider limitation of chair time 2 hour intervals    Skin Care:  -Avoid use of baby powder, tape, friction and shearing, hot water or constrictive clothing  -Relieve pressure over bony prominences   -Do not massage red bony areas    Next Steps:  -Teach patient strategies to minimize risks  -Consider consults to  interdisciplinary teams   Outcome: Progressing  Goal: Incision(s), wounds(s) or drain site(s) healing without S/S of infection  Description: INTERVENTIONS  - Assess and document dressing, incision, wound bed, drain sites and surrounding tissue  - Provide patient and family education  - Perform skin care/dressing changes   Outcome: Progressing     Problem: HEMATOLOGIC - ADULT  Goal: Maintains hematologic stability  Description: INTERVENTIONS  - Assess for signs and symptoms of bleeding or hemorrhage  - Monitor labs  - Administer supportive blood products/factors as ordered and appropriate  Outcome: Progressing     Problem: SAFETY ADULT  Goal: Patient will remain free of falls  Description: INTERVENTIONS:  - Educate patient/family on patient safety including physical limitations  - Instruct patient to call for assistance with activity   - Consult OT/PT to assist with strengthening/mobility   - Keep Call bell within reach  - Keep bed low and locked with side rails adjusted as appropriate  - Keep care items and personal belongings within reach  - Initiate and maintain comfort rounds  - Make Fall Risk Sign visible to staff  - Offer Toileting every 2 Hours, in advance of need  - Initiate/Maintain alarm  - Obtain necessary fall risk management equipment:   - Apply yellow socks and bracelet for high fall risk patients  - Consider moving patient to room near nurses station  Outcome: Progressing  Goal: Maintain or return to baseline ADL function  Description: INTERVENTIONS:  - Educate patient/family on patient safety including physical limitations  - Instruct patient to call for assistance with activity   - Consult OT/PT to assist with strengthening/mobility   - Keep Call bell within reach  - Keep bed low and locked with side rails adjusted as appropriate  - Keep care items and personal belongings within reach  - Initiate and maintain comfort rounds  - Make Fall Risk Sign visible to staff  - Offer Toileting every 2 Hours, in advance of need  - Initiate/Maintain alarm  - Obtain necessary fall risk management equipment:   - Apply yellow socks and bracelet for high fall risk patients  - Consider moving patient to room near nurses station  Outcome: Progressing  Goal: Maintains/Returns to pre admission functional level  Description: INTERVENTIONS:  -  Assess patient's ability to carry out ADLs; assess patient's baseline for ADL function and identify physical deficits which impact ability to perform ADLs (bathing, care of mouth/teeth, toileting, grooming, dressing, etc )  - Assess/evaluate cause of self-care deficits   - Assess range of motion  - Assess patient's mobility; develop plan if impaired  - Assess patient's need for assistive devices and provide as appropriate  - Encourage maximum independence but intervene and supervise when necessary  - Involve family in performance of ADLs  - Assess for home care needs following discharge   - Consider OT consult to assist with ADL evaluation and planning for discharge  - Provide patient education as appropriate  Outcome: Progressing     Problem: Knowledge Deficit  Goal: Patient/family/caregiver demonstrates understanding of disease process, treatment plan, medications, and discharge instructions  Description: Complete learning assessment and assess knowledge base    Interventions:  - Provide teaching at level of understanding  - Provide teaching via preferred learning methods  Outcome: Progressing     Problem: MOBILITY - ADULT  Goal: Maintain or return to baseline ADL function  Description: INTERVENTIONS:  - Educate patient/family on patient safety including physical limitations  - Instruct patient to call for assistance with activity   - Consult OT/PT to assist with strengthening/mobility   - Keep Call bell within reach  - Keep bed low and locked with side rails adjusted as appropriate  - Keep care items and personal belongings within reach  - Initiate and maintain comfort rounds  - Make Fall Risk Sign visible to staff  - Offer Toileting every 2 Hours, in advance of need  - Initiate/Maintain alarm  - Obtain necessary fall risk management equipment:   - Apply yellow socks and bracelet for high fall risk patients  - Consider moving patient to room near nurses station  Outcome: Progressing  Goal: Maintains/Returns to pre admission functional level  Description: INTERVENTIONS:  -  Assess patient's ability to carry out ADLs; assess patient's baseline for ADL function and identify physical deficits which impact ability to perform ADLs (bathing, care of mouth/teeth, toileting, grooming, dressing, etc )  - Assess/evaluate cause of self-care deficits   - Assess range of motion  - Assess patient's mobility; develop plan if impaired  - Assess patient's need for assistive devices and provide as appropriate  - Encourage maximum independence but intervene and supervise when necessary  - Involve family in performance of ADLs  - Assess for home care needs following discharge   - Consider OT consult to assist with ADL evaluation and planning for discharge  - Provide patient education as appropriate  Outcome: Progressing

## 2023-05-31 NOTE — PROGRESS NOTES
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, with fat layer exposed ([facility] )  Assessment & Plan  · Follows with outpatient podiatry  · Xray concerning for possible osteomyelitis  · MRI foot consistent with osteomyelitis  · Podiatry following  · Postop day 1 of resection of proximal medial bipartite sesamoid for surgical cure of osteomyelitis with wound VAC application  · IS  · Pain control  · PT OT eval  · Abx as outlined above    D/c on 5 days of doxy    Nicotine abuse  Assessment & Plan  · Nicotine patch ordered  · Encourage cessation    Alcohol abuse  Assessment & Plan  · Patient admits to drinking 3-4 beers every night  · Last drink last night  · Denies hx of withdrawal  · Monitor on CIWA  · Start thiamine and folic acid    Hyponatremia  Assessment & Plan  · Na 130 on admission  · Chronic hyponatremia, likely partially related to beer potomania  · 128 in 2021, 130 in 2022  · Check urine sodium 35, urine 244  · Trend daily  · improved    CKD (chronic kidney disease)  Assessment & Plan  Lab Results   Component Value Date    CREATININE 1 11 05/31/2023    CREATININE 1 14 05/30/2023    CREATININE 1 38 (H) 05/29/2023    EGFR 71 05/31/2023    EGFR 69 05/30/2023    EGFR 54 05/29/2023     · Baseline Cr 1 1-1 3  · Cr on admission 1 38  · Monitor I/O  · Trend renal indices  · Cr back to baseline    Type 1 diabetes mellitus with hyperglycemia (HCC)  Assessment & Plan  Lab Results   Component Value Date    HGBA1C 7 9 (H) 03/31/2023       Recent Labs     05/31/23  0411 05/31/23  0735 05/31/23  1229 05/31/23  1609   POCGLU 181* 132 165* 115       Blood Sugar Average: Last 72 hrs:     · Patient is on home insulin pump  · If plans to go to the OR, transition to SSI vs insulin gtt  · Maintain -180  · Endo s/o    Benign essential hypertension  Assessment & Plan  · Home regimen: TOPROL  mg daily  · Transition to Lopressor 50 mg BID for easier titration in the setting of severe sepsis  · Hold lisinopril    Mixed hyperlipidemia  Assessment & Plan  · Continue home statin           VTE Pharmacologic Prophylaxis:   Moderate Risk (Score 3-4) - Pharmacological DVT Prophylaxis Ordered: heparin  Patient Centered Rounds: I performed bedside rounds with nursing staff today  Discussions with Specialists or Other Care Team Provider: Podiatry endo PT OT CM    Education and Discussions with Family / Patient: Patient declined call to   Total Time Spent on Date of Encounter in care of patient: 35 minutes This time was spent on one or more of the following: performing physical exam; counseling and coordination of care; obtaining or reviewing history; documenting in the medical record; reviewing/ordering tests, medications or procedures; communicating with other healthcare professionals and discussing with patient's family/caregivers  Current Length of Stay: 2 day(s)  Current Patient Status: Inpatient   Certification Statement: The patient will continue to require additional inpatient hospital stay due to osteo  Discharge Plan: Anticipate discharge tomorrow to home with home services  Code Status: Level 1 - Full Code    Subjective:   Mary Malik was seen and examined at bedside  No acute events overnight  Discussed plan of care  All questions and concerns were answered and addressed    He states that he is continuing to have severe pain especially when that there is compression with the wound VAC increased pain management regimen  Objective:     Vitals:   Temp (24hrs), Av 2 °F (36 8 °C), Min:98 °F (36 7 °C), Max:98 4 °F (36 9 °C)    Temp:  [98 °F (36 7 °C)-98 4 °F (36 9 °C)] 98 °F (36 7 °C)  HR:  [] 97  Resp:  [16-18] 16  BP: (129-151)/(72-85) 139/83  SpO2:  [95 %-99 %] 99 %  Body mass index is 19 67 kg/m²  Input and Output Summary (last 24 hours): Intake/Output Summary (Last 24 hours) at 2023 1749  Last data filed at 2023 1501  Gross per 24 hour   Intake 300 ml   Output 150 ml   Net 150 ml       Physical Exam:   Physical Exam   Vitals and nursing note reviewed  Constitutional:       General: He is not in acute distress  Appearance: Normal appearance  He is not ill-appearing  HENT:      Head: Normocephalic and atraumatic  Cardiovascular:      Rate and Rhythm: Normal rate and regular rhythm  Pulses: Normal pulses  Heart sounds: Normal heart sounds  Pulmonary:      Effort: Pulmonary effort is normal       Breath sounds: Normal breath sounds  Abdominal:      General: Abdomen is flat  Bowel sounds are normal       Palpations: Abdomen is soft  Musculoskeletal:      Right lower leg: No edema  Left lower leg: No edema  Skin:     General: Skin is warm  Findings: left foot bandaged with wound vac placed  Neurological:      General: No focal deficit present  Mental Status: He is alert and oriented to person, place, and time       Additional Data:     Labs:  Results from last 7 days   Lab Units 23  0357   EOS PCT % 2   HEMATOCRIT % 38 3   HEMOGLOBIN g/dL 13 0   LYMPHS PCT % 14   MONOS PCT % 15*   NEUTROS PCT % 67   PLATELETS Thousands/uL 263   WBC Thousand/uL 10 08     Results from last 7 days   Lab Units 23  0357   ANION GAP mmol/L 8   ALBUMIN g/dL 3 6   ALK PHOS U/L 76   ALT U/L 32   AST U/L 41*   BUN mg/dL 13   CALCIUM mg/dL 8 8   CHLORIDE mmol/L 98   CO2 mmol/L 28   CREATININE mg/dL 1 11   GLUCOSE RANDOM mg/dL 174*   POTASSIUM mmol/L 4 3   SODIUM mmol/L 134*   TOTAL BILIRUBIN mg/dL 1 14*     Results from last 7 days   Lab Units 05/29/23  1252   INR  0 86     Results from last 7 days   Lab Units 05/31/23  1609 05/31/23  1229 05/31/23  0735 05/31/23  0411 05/31/23  0011 05/30/23  2051 05/30/23  1739 05/30/23  1359 05/30/23  1205   POC GLUCOSE mg/dl 115 165* 132 181* 84 253* 145* 157* 154*         Results from last 7 days   Lab Units 05/30/23  0613 05/29/23  1640 05/29/23  1252   LACTIC ACID mmol/L  --  1 7 2 5*   PROCALCITONIN ng/ml 1 20*  --  0 35*       Lines/Drains:  Invasive Devices     Peripheral Intravenous Line  Duration           Peripheral IV 05/29/23 Right Antecubital 2 days                      Imaging: Reviewed radiology reports from this admission including: procedure reports    Recent Cultures (last 7 days):   Results from last 7 days   Lab Units 05/29/23  1252   BLOOD CULTURE  No Growth at 48 hrs  No Growth at 48 hrs         Last 24 Hours Medication List:   Current Facility-Administered Medications   Medication Dose Route Frequency Provider Last Rate   • acetaminophen  650 mg Oral Q6H PRN Evlyn Maxx Valle DPM     • atorvastatin  40 mg Oral Daily With Shanda Rothman MD     • cefepime  2,000 mg Intravenous Q8H Evlyn Maxx Valle DPM 2,000 mg (28/47/70 3265)   • folic acid  1 mg Oral Daily Liyah Dumont DPM     • heparin (porcine)  5,000 Units Subcutaneous Pending sale to [facility] Evlyn Maxx Valle DPM     • HYDROmorphone  0 5 mg Intravenous Q6H PRN Evlyn Maxx Valle DPM     • insulin lispro  10 mL Subcutaneous Insulin Pump Once PRN Evelainen Maxx Valle DPM     • lactated ringers  100 mL/hr Intravenous Continuous Nick Wick CRNA Stopped (05/30/23 1522)   • metoprolol tartrate  50 mg Oral Q12H [address] Liyah Dumont DPM     • multivitamin-minerals  1 tablet Oral Daily Evlyn Maxx Valle DPM     • nicotine  1 patch Transdermal Daily Evlyn ARTURO MarrufoM     • oxyCODONE  5 mg Oral Q4H PRN Joane Lennox, MD      Or   • oxyCODONE  10 mg Oral Q4H PRN Joane Lennox, MD     • patient maintained insulin pump  1 each Subcutaneous [facility] Landon PAK DPM     • thiamine  100 mg Oral Daily Landon Valle DPM     • vancomycin  17 5 mg/kg Intravenous Q24H Joane Lennox, MD 1,250 mg (05/30/23 8628)        Today, Patient Was Seen By: Joane Lennox, MD    **Please Note: This note may have been constructed using a voice recognition system  **

## 2023-05-31 NOTE — CONSULTS
Consultation - Scarlet Bagley 66 y o  male MRN: [id number]    Unit/Bed#: -01 Encounter: [id number]      Assessment/Plan     Assessment: This is a 66y o -year-old male with diabetes with hyperglycemia On Medtronic 770g with guardian sensor admitted for left foot OM s/p excision POD 1  Patients BG have been well managed on home insulin pump without any pump setting changes  Patient able to manipulate pump, is alert, awake and oriented and pump functioning well  Plan:  - continue home insulin pump use at setting below  - discharge on this  - Endocrine to sign out as BG appears to be well controlled, if concerned please reach out to me via [employer]  Consults    CC: T1DM on insulin Pump    History of Present Illness     HPI: Scarlet Bagley is a 66y o  year old male with type 1DM on Medtronic &&)g with guardian sensor with complications of neuropathy, retinopathy and microalbuminuria who was admitted for left foot OM s/p excision of tibial sesamoid bone with Wound VAC placement POD1  Endocrine consulted for insulin pump management  Given limited OR time, patient was maintained on home insulin pump intra and post op with no setting changes  BG since last 24hrs overall in range    Patient reports eating well, no nausea vomiting, able to manipulate his pump  See's Dr Dawson Duff as OP, HbA1C 7 9% 03/23  Current pump settings:-   Basal rates:  12 AM to 3:30 AM 0 55 units/h, 3:30 AM to 8 AM 0 75 units/h, 8 AM to 12 PM 0 9 units/h, 12 PM to 8:30 PM 0 9 units/h, 8:30 PM to 12 AM 0 6 units/h  Bolus rates:  1 unit per 9-10 g carbohydrate  Insulin sensitivity factor:  1 unit for 32 blood sugar points for target blood glucose of 1 10-1 20  Insulin action:  4 hours  Review of Systems   Constitutional: Negative for diaphoresis, fatigue and unexpected weight change  Respiratory: Negative for shortness of breath  Cardiovascular: Negative for chest pain and palpitations     Gastrointestinal: Negative for constipation and diarrhea  Endocrine: Negative for polydipsia and polyuria         Historical Information   Past Medical History:   Diagnosis Date   • Chronic foot ulcer ([facility] [address] ) 2018   • Diabetes mellitus ([facility] [address] )    • Gout    • High cholesterol    • Hypertension    • Visual impairment 2022    Cateract     Past Surgical History:   Procedure Laterality Date   • CATARACT EXTRACTION Right 2023   • COMPLEX WOUND CLOSURE TO EXTREMITY Left 2019    Procedure: COMPLEX CLOSURE LEFT CHEEK;  Surgeon: Lo Cristina MD;  Location:  MAIN OR;  Service: Plastics   • FACIAL/NECK BIOPSY Left 2019    Procedure: EXCISION LEFT CHEEK CYST;  Surgeon: Lo Cirstina MD;  Location:  MAIN OR;  Service: Plastics   • HERNIA REPAIR Left     several times   • KNEE ARTHROSCOPY Left     torn meniscus   • WOUND DEBRIDEMENT Left 2023    Procedure: DEBRIDEMENT WOUND LEFT FOOT WOUND WITH EXCISION OF INFECTED BONE AND WOUND VAC APPLICATION;  Surgeon: Alexa Stoner DPM;  Location:  MAIN OR;  Service: Podiatry     Social History   Social History     Substance and Sexual Activity   Alcohol Use Yes    Comment: 3/4 beers a day     Social History     Substance and Sexual Activity   Drug Use Never     Social History     Tobacco Use   Smoking Status Every Day   • Packs/day: 1 00   • Years: 20 00   • Total pack years: 20 00   • Types: Cigarettes   • Last attempt to quit: 2022   • Years since quittin 4   Smokeless Tobacco Never   Tobacco Comments    encouraged smoking cessation     Family History:   Family History   Problem Relation Age of Onset   • Heart disease Father    • Diabetes type I Father    • Diabetes type II Mother    • No Known Problems Sister    • Alcohol abuse Brother    • Diabetes type I Brother    • No Known Problems Brother    • No Known Problems Son    • No Known Problems Daughter        Meds/Allergies   Current Facility-Administered Medications   Medication Dose Route Frequency Provider Last Rate Last Admin   • acetaminophen (TYLENOL) tablet 650 mg  650 mg Oral Q6H PRN Jacky Osgood Wotring, DPM       • atorvastatin (LIPITOR) tablet 40 mg  40 mg Oral Daily With Sherly Valladares MD   40 mg at 05/30/23 1727   • cefepime (MAXIPIME) IVPB (premix in dextrose) 2,000 mg 50 mL  2,000 mg Intravenous Q8H Jacky Osgood Wotring,  mL/hr at 05/31/23 1214 2,000 mg at 83/20/16 7024   • folic acid (FOLVITE) tablet 1 mg  1 mg Oral Daily Jacky Osgood Wotring, DPM   1 mg at 05/31/23 8280   • heparin (porcine) subcutaneous injection 5,000 Units  5,000 Units Subcutaneous [facility] HenARTURO ManleyM   5,000 Units at 05/31/23 1453   • HYDROmorphone (DILAUDID) injection 0 5 mg  0 5 mg Intravenous Q6H PRN Jacky Osgood Wotring, DPM   0 5 mg at 05/30/23 5231   • insulin lispro (HumaLOG) FOR PUMP REFILLS 1,000 Units  10 mL Subcutaneous Insulin Pump Once PRN Jacky Osgood Wotring, DPM       • lactated ringers infusion  100 mL/hr Intravenous Continuous Fabby NELI Jerry   Stopped at 05/30/23 1522   • metoprolol tartrate (LOPRESSOR) tablet 50 mg  50 mg Oral Q12H [facility] Jacky Osgood Wotring, DPM   50 mg at 05/31/23 7330   • multivitamin-minerals (CENTRUM) tablet 1 tablet  1 tablet Oral Daily Jacky Osgood Wotring, DPM   1 tablet at 05/31/23 2692   • nicotine (NICODERM CQ) 21 mg/24 hr TD 24 hr patch 1 patch  1 patch Transdermal Daily Jacky Osgood Wotring, DPM   1 patch at 05/31/23 6191   • oxyCODONE (ROXICODONE) IR tablet 5 mg  5 mg Oral Q4H PRN Claudy eRes MD        Or   • oxyCODONE (ROXICODONE) IR tablet 10 mg  10 mg Oral Q4H PRN Claudy Rees MD   10 mg at 05/31/23 1243   • PATIENT MAINTAINED INSULIN PUMP 1 each  1 each Subcutaneous [facility] Dione Fry DPM   1 each at 05/31/23 1453   • thiamine tablet 100 mg  100 mg Oral Daily Jacky Osgood Wotring, DPM   100 mg at 05/31/23 0856   • vancomycin (VANCOCIN) 1250 mg in sodium chloride 0 9% 250 mL IVPB  17 5 mg/kg Intravenous Q24H Claudy Rees  7 mL/hr at 05/30/23 1731 1,250 mg at 05/30/23 1731     Allergies   Allergen Reactions   • Cefuroxime Other (See Comments) and GI Intolerance   • Amoxicillin-Pot Clavulanate Nausea Only and GI Intolerance       Objective   Vitals: Blood pressure 141/80, pulse 87, temperature 98 4 °F (36 9 °C), resp  rate 16, height 6' (1 829 m), weight 65 8 kg (145 lb 1 oz), SpO2 97 %  Intake/Output Summary (Last 24 hours) at 5/31/2023 1518  Last data filed at 5/31/2023 1501  Gross per 24 hour   Intake 337 54 ml   Output 150 ml   Net 187 54 ml     Invasive Devices     Peripheral Intravenous Line  Duration           Peripheral IV 05/29/23 Right Antecubital 2 days                Physical Exam  Constitutional:       Appearance: Normal appearance  Cardiovascular:      Rate and Rhythm: Normal rate and regular rhythm  Pulses: Normal pulses  Pulmonary:      Effort: Pulmonary effort is normal    Abdominal:      General: Abdomen is flat  Palpations: Abdomen is soft  Skin:     General: Skin is warm  Capillary Refill: Capillary refill takes less than 2 seconds  Neurological:      General: No focal deficit present  Mental Status: He is alert  The history was obtained from the review of the chart, patient      Lab Results:    Latest Reference Range & Units Most Recent   Hemoglobin A1C <5 7 % 7 9 (H) (E)  3/31/23 08:16   eAG, EST AVG Glucose mg/dL 180 (E)  3/31/23 08:16   POC Glucose 65 - 140 mg/dl 165 (H)  5/31/23 12:29   (H): Data is abnormally high  (E): External lab result     Latest Reference Range & Units Most Recent   TSH 3RD GENERATON 0 450 - 4 500 uIU/mL 0 981  4/15/22 08:38      Latest Reference Range & Units 04/15/22 08:38 05/29/23 16:30   EXT Creatinine Urine mg/dL 70 7    MICROALBUMIN/CREATININE RATIO 0 - 30 mg/g creatinine 51 (H)    MICROALBUM ,U,RANDOM 0 0 - 20 0 mg/L 36 1 (H)    Osmolality, Ur 250 - 900 mmol/KG  244 (L)   SODIUM URINE   35   (H): Data is abnormally high  (L): Data is abnormally low        Imaging Studies: I have personally reviewed pertinent reports  Portions of the record may have been created with voice recognition software

## 2023-05-31 NOTE — PLAN OF CARE
Problem: Potential for Falls  Goal: Patient will remain free of falls  Description: INTERVENTIONS:  - Educate patient/family on patient safety including physical limitations  - Instruct patient to call for assistance with activity   - Consult OT/PT to assist with strengthening/mobility   - Keep Call bell within reach  - Keep bed low and locked with side rails adjusted as appropriate  - Keep care items and personal belongings within reach  - Initiate and maintain comfort rounds  - Make Fall Risk Sign visible to staff  - Offer Toileting every  Hours, in advance of need  - Initiate/Maintain alarm  - Obtain necessary fall risk management equipment:   - Apply yellow socks and bracelet for high fall risk patients  - Consider moving patient to room near nurses station  Outcome: Progressing     Problem: PAIN - ADULT  Goal: Verbalizes/displays adequate comfort level or baseline comfort level  Description: Interventions:  - Encourage patient to monitor pain and request assistance  - Assess pain using appropriate pain scale  - Administer analgesics based on type and severity of pain and evaluate response  - Implement non-pharmacological measures as appropriate and evaluate response  - Consider cultural and social influences on pain and pain management  - Notify physician/advanced practitioner if interventions unsuccessful or patient reports new pain  Outcome: Progressing     Problem: INFECTION - ADULT  Goal: Absence or prevention of progression during hospitalization  Description: INTERVENTIONS:  - Assess and monitor for signs and symptoms of infection  - Monitor lab/diagnostic results  - Monitor all insertion sites, i e  indwelling lines, tubes, and drains  - Monitor endotracheal if appropriate and nasal secretions for changes in amount and color  - [place] appropriate cooling/warming therapies per order  - Administer medications as ordered  - Instruct and encourage patient and family to use good hand hygiene technique  - Identify and instruct in appropriate isolation precautions for identified infection/condition  Outcome: Progressing  Goal: Absence of fever/infection during neutropenic period  Description: INTERVENTIONS:  - Monitor WBC    Outcome: Progressing     Problem: DISCHARGE PLANNING  Goal: Discharge to home or other facility with appropriate resources  Description: INTERVENTIONS:  - Identify barriers to discharge w/patient and caregiver  - Arrange for needed discharge resources and transportation as appropriate  - Identify discharge learning needs (meds, wound care, etc )  - Arrange for interpretive services to assist at discharge as needed  - Refer to Case Management Department for coordinating discharge planning if the patient needs post-hospital services based on physician/advanced practitioner order or complex needs related to functional status, cognitive ability, or social support system  Outcome: Progressing     Problem: METABOLIC, FLUID AND ELECTROLYTES - ADULT  Goal: Electrolytes maintained within normal limits  Description: INTERVENTIONS:  - Monitor labs and assess patient for signs and symptoms of electrolyte imbalances  - Administer electrolyte replacement as ordered  - Monitor response to electrolyte replacements, including repeat lab results as appropriate  - Instruct patient on fluid and nutrition as appropriate  Outcome: Progressing  Goal: Fluid balance maintained  Description: INTERVENTIONS:  - Monitor labs   - Monitor I/O and WT  - Instruct patient on fluid and nutrition as appropriate  - Assess for signs & symptoms of volume excess or deficit  Outcome: Progressing  Goal: Glucose maintained within target range  Description: INTERVENTIONS:  - Monitor Blood Glucose as ordered  - Assess for signs and symptoms of hyperglycemia and hypoglycemia  - Administer ordered medications to maintain glucose within target range  - Assess nutritional intake and initiate nutrition service referral as needed  Outcome: Progressing     Problem: SKIN/TISSUE INTEGRITY - ADULT  Goal: Skin Integrity remains intact(Skin Breakdown Prevention)  Description: Assess:  -Perform Pineda assessment every   -Clean and moisturize skin every   -Inspect skin when repositioning, toileting, and assisting with ADLS  -Assess under medical devices such as  every   -Assess extremities for adequate circulation and sensation     Bed Management:  -Have minimal linens on bed & keep smooth, unwrinkled  -Change linens as needed when moist or perspiring  -Avoid sitting or lying in one position for more than  hours while in bed  -Keep HOB at degrees     Toileting:  -Offer bedside commode  -Assess for incontinence every   -Use incontinent care products after each incontinent episode such as     Activity:  -Mobilize patient  times a day  -Encourage activity and walks on unit  -Encourage or provide ROM exercises   -Turn and reposition patient every  Hours  -Use appropriate equipment to lift or move patient in bed  -Instruct/ Assist with weight shifting every  when out of bed in chair  -Consider limitation of chair time  hour intervals    Skin Care:  -Avoid use of baby powder, tape, friction and shearing, hot water or constrictive clothing  -Relieve pressure over bony prominences using   -Do not massage red bony areas    Next Steps:  -Teach patient strategies to minimize risks such as    -Consider consults to  interdisciplinary teams such as   Outcome: Progressing  Goal: Incision(s), wounds(s) or drain site(s) healing without S/S of infection  Description: INTERVENTIONS  - Assess and document dressing, incision, wound bed, drain sites and surrounding tissue  - Provide patient and family education  - Perform skin care/dressing changes every   Outcome: Progressing     Problem: HEMATOLOGIC - ADULT  Goal: Maintains hematologic stability  Description: INTERVENTIONS  - Assess for signs and symptoms of bleeding or hemorrhage  - Monitor labs  - Administer supportive blood products/factors as ordered and appropriate  Outcome: Progressing     Problem: SAFETY ADULT  Goal: Patient will remain free of falls  Description: INTERVENTIONS:  - Educate patient/family on patient safety including physical limitations  - Instruct patient to call for assistance with activity   - Consult OT/PT to assist with strengthening/mobility   - Keep Call bell within reach  - Keep bed low and locked with side rails adjusted as appropriate  - Keep care items and personal belongings within reach  - Initiate and maintain comfort rounds  - Make Fall Risk Sign visible to staff  - Offer Toileting every  Hours, in advance of need  - Initiate/Maintain alarm  - Obtain necessary fall risk management equipment:   - Apply yellow socks and bracelet for high fall risk patients  - Consider moving patient to room near nurses station  Outcome: Progressing  Goal: Maintain or return to baseline ADL function  Description: INTERVENTIONS:  -  Assess patient's ability to carry out ADLs; assess patient's baseline for ADL function and identify physical deficits which impact ability to perform ADLs (bathing, care of mouth/teeth, toileting, grooming, dressing, etc )  - Assess/evaluate cause of self-care deficits   - Assess range of motion  - Assess patient's mobility; develop plan if impaired  - Assess patient's need for assistive devices and provide as appropriate  - Encourage maximum independence but intervene and supervise when necessary  - Involve family in performance of ADLs  - Assess for home care needs following discharge   - Consider OT consult to assist with ADL evaluation and planning for discharge  - Provide patient education as appropriate  Outcome: Progressing  Goal: Maintains/Returns to pre admission functional level  Description: INTERVENTIONS:  - Perform BMAT or MOVE assessment daily    - Set and communicate daily mobility goal to care team and patient/family/caregiver     - Collaborate with rehabilitation services on mobility goals if consulted  - Perform Range of Motion  times a day  - Reposition patient every  hours  - Dangle patient  times a day  - Stand patient  times a day  - Ambulate patient  times a day  - Out of bed to chair  times a day   - Out of bed for meals times a day  - Out of bed for toileting  - Record patient progress and toleration of activity level   Outcome: Progressing     Problem: Knowledge Deficit  Goal: Patient/family/caregiver demonstrates understanding of disease process, treatment plan, medications, and discharge instructions  Description: Complete learning assessment and assess knowledge base  Interventions:  - Provide teaching at level of understanding  - Provide teaching via preferred learning methods  Outcome: Progressing     Problem: MOBILITY - ADULT  Goal: Maintain or return to baseline ADL function  Description: INTERVENTIONS:  -  Assess patient's ability to carry out ADLs; assess patient's baseline for ADL function and identify physical deficits which impact ability to perform ADLs (bathing, care of mouth/teeth, toileting, grooming, dressing, etc )  - Assess/evaluate cause of self-care deficits   - Assess range of motion  - Assess patient's mobility; develop plan if impaired  - Assess patient's need for assistive devices and provide as appropriate  - Encourage maximum independence but intervene and supervise when necessary  - Involve family in performance of ADLs  - Assess for home care needs following discharge   - Consider OT consult to assist with ADL evaluation and planning for discharge  - Provide patient education as appropriate  Outcome: Progressing  Goal: Maintains/Returns to pre admission functional level  Description: INTERVENTIONS:  - Perform BMAT or MOVE assessment daily    - Set and communicate daily mobility goal to care team and patient/family/caregiver  - Collaborate with rehabilitation services on mobility goals if consulted  - Perform Range of Motion  times a day    - Reposition patient every  hours    - Dangle patient  times a day  - Stand patient  times a day  - Ambulate patient  times a day  - Out of bed to chair  times a day   - Out of bed for meals  times a day  - Out of bed for toileting  - Record patient progress and toleration of activity level   Outcome: Progressing

## 2023-05-31 NOTE — CASE MANAGEMENT
Monet Hager has received request for [employer] wound vac from Care Manager  Request submitted via [facility] website     Pending order #: [id number]

## 2023-05-31 NOTE — PROGRESS NOTES
[facility] Podiatry - Progress Note  Patient: Libia Blanton 66 y o  male   MRN: [id number]  PCP: Lane Harris MD  Unit/Bed#: MS John Encounter: [id number]  Date Of Visit: 23    ASSESSMENT:    Libia Blanton is a 66 y o  male with:    1  Status post day 1 wound debridement left foot and medial sesamoid excision for osteomyelitis and wound VAC application  2  Type 1 diabetes with peripheral neuropathy  3  Sepsis which is resolved  4  CKD, hypertension, hyperlipidemia, alcohol use disorder, nicotine abuse    PLAN:    · Post-surgical dressing/wound VAC located on surgical site and affected extremity were left intact today  · Will plan for first complete post-surgical dressing change tomorrow  · Vitals signs stable  Patient afebrile with no leukocytosis  No erythema, edema, or lymphangitis noted either proximal or distal to the dressings  · Pain is well controlled  Continue current pain management regimen  · Continue heel touch weightbearing with Darco wedge shoe and walker to affected extremity with elevation while non-ambulatory  Updated PT order and she was ordered  · Wound VAC paperwork was filled out and given to case management  · Rest of care per primary team       SUBJECTIVE:     The patient was seen, evaluated, and assessed at bedside today  The patient was awake, alert, and in no acute distress  The patient reports no pain at this time  Pain is well controlled with current pain management regimen  Patient reports normal appetite and bowel movement  Patient denies N/V/F/chills/SOB/CP  OBJECTIVE:     Vitals:   /81   Pulse 98   Temp 98 4 °F (36 9 °C)   Resp 16   Ht 6' (1 829 m)   Wt 65 8 kg (145 lb 1 oz)   SpO2 98%   BMI 19 67 kg/m²     Temp (24hrs), Av 1 °F (36 7 °C), Min:97 6 °F (36 4 °C), Max:98 4 °F (36 9 °C)      Dressings on affected extremity: C/D/I    Physical Exam  Cardiovascular status at baseline  Neurological status at baseline   No erythema, edema, or "lymphangitis noted from dressing  Lower extremity temperature WNL  Additional Data:     Labs:    Results from last 7 days   Lab Units 05/31/23  0357   EOS PCT % 2   HEMATOCRIT % 38 3   HEMOGLOBIN g/dL 13 0   LYMPHS PCT % 14   MONOS PCT % 15*   NEUTROS PCT % 67   PLATELETS Thousands/uL 263   WBC Thousand/uL 10 08     Results from last 7 days   Lab Units 05/31/23  0357   ALK PHOS U/L 76   ALT U/L 32   AST U/L 41*   BUN mg/dL 13   CALCIUM mg/dL 8 8   CHLORIDE mmol/L 98   CO2 mmol/L 28   CREATININE mg/dL 1 11   POTASSIUM mmol/L 4 3     Results from last 7 days   Lab Units 05/29/23  1252   INR  0 86       * I Have Reviewed All Lab Data Listed Above  Recent Cultures (last 7 days):     Results from last 7 days   Lab Units 05/29/23  1252   BLOOD CULTURE  No Growth at 24 hrs  No Growth at 24 hrs  Imaging: I have personally reviewed pertinent post-operative films in PACS:  EKG, Pathology, and Other Studies: I have personally reviewed pertinent reports  ** Please Note: Portions of the record may have been created with voice recognition software  Occasional wrong word or \"sound a like\" substitutions may have occurred due to the inherent limitations of voice recognition software  Read the chart carefully and recognize, using context, where substitutions have occurred   **    "

## 2023-06-01 VITALS
WEIGHT: 140.8 LBS | BODY MASS INDEX: 19.07 KG/M2 | HEART RATE: 85 BPM | DIASTOLIC BLOOD PRESSURE: 71 MMHG | SYSTOLIC BLOOD PRESSURE: 146 MMHG | RESPIRATION RATE: 18 BRPM | OXYGEN SATURATION: 98 % | TEMPERATURE: 98.3 F | HEIGHT: 72 IN

## 2023-06-01 PROBLEM — R65.20 SEVERE SEPSIS (HCC): Status: RESOLVED | Noted: 2023-05-29 | Resolved: 2023-06-01

## 2023-06-01 PROBLEM — A41.9 SEVERE SEPSIS (HCC): Status: RESOLVED | Noted: 2023-05-29 | Resolved: 2023-06-01

## 2023-06-01 LAB
ALBUMIN SERPL BCP-MCNC: 3.3 G/DL (ref 3.5–5)
ALP SERPL-CCNC: 84 U/L (ref 34–104)
ALT SERPL W P-5'-P-CCNC: 21 U/L (ref 7–52)
ANION GAP SERPL CALCULATED.3IONS-SCNC: 4 MMOL/L (ref 4–13)
AST SERPL W P-5'-P-CCNC: 19 U/L (ref 13–39)
BASOPHILS # BLD AUTO: 0.1 THOUSANDS/ÂΜL (ref 0–0.1)
BASOPHILS NFR BLD AUTO: 1 % (ref 0–1)
BILIRUB SERPL-MCNC: 0.67 MG/DL (ref 0.2–1)
BUN SERPL-MCNC: 12 MG/DL (ref 5–25)
CALCIUM ALBUM COR SERPL-MCNC: 9.2 MG/DL (ref 8.3–10.1)
CALCIUM SERPL-MCNC: 8.6 MG/DL (ref 8.4–10.2)
CHLORIDE SERPL-SCNC: 98 MMOL/L (ref 96–108)
CO2 SERPL-SCNC: 29 MMOL/L (ref 21–32)
CREAT SERPL-MCNC: 1.11 MG/DL (ref 0.6–1.3)
EOSINOPHIL # BLD AUTO: 0.48 THOUSAND/ÂΜL (ref 0–0.61)
EOSINOPHIL NFR BLD AUTO: 6 % (ref 0–6)
ERYTHROCYTE [DISTWIDTH] IN BLOOD BY AUTOMATED COUNT: 13.2 % (ref 11.6–15.1)
GFR SERPL CREATININE-BSD FRML MDRD: 71 ML/MIN/1.73SQ M
GLUCOSE SERPL-MCNC: 184 MG/DL (ref 65–140)
GLUCOSE SERPL-MCNC: 208 MG/DL (ref 65–140)
GLUCOSE SERPL-MCNC: 218 MG/DL (ref 65–140)
GLUCOSE SERPL-MCNC: 266 MG/DL (ref 65–140)
HCT VFR BLD AUTO: 35.3 % (ref 36.5–49.3)
HGB BLD-MCNC: 11.8 G/DL (ref 12–17)
IMM GRANULOCYTES # BLD AUTO: 0.02 THOUSAND/UL (ref 0–0.2)
IMM GRANULOCYTES NFR BLD AUTO: 0 % (ref 0–2)
LYMPHOCYTES # BLD AUTO: 1.03 THOUSANDS/ÂΜL (ref 0.6–4.47)
LYMPHOCYTES NFR BLD AUTO: 14 % (ref 14–44)
MCH RBC QN AUTO: 32.9 PG (ref 26.8–34.3)
MCHC RBC AUTO-ENTMCNC: 33.4 G/DL (ref 31.4–37.4)
MCV RBC AUTO: 98 FL (ref 82–98)
MONOCYTES # BLD AUTO: 1.2 THOUSAND/ÂΜL (ref 0.17–1.22)
MONOCYTES NFR BLD AUTO: 16 % (ref 4–12)
MRSA NOSE QL CULT: NORMAL
NEUTROPHILS # BLD AUTO: 4.81 THOUSANDS/ÂΜL (ref 1.85–7.62)
NEUTS SEG NFR BLD AUTO: 63 % (ref 43–75)
NRBC BLD AUTO-RTO: 0 /100 WBCS
PLATELET # BLD AUTO: 209 THOUSANDS/UL (ref 149–390)
PMV BLD AUTO: 9.5 FL (ref 8.9–12.7)
POTASSIUM SERPL-SCNC: 4.3 MMOL/L (ref 3.5–5.3)
PROT SERPL-MCNC: 6.3 G/DL (ref 6.4–8.4)
RBC # BLD AUTO: 3.59 MILLION/UL (ref 3.88–5.62)
SODIUM SERPL-SCNC: 131 MMOL/L (ref 135–147)
VANCOMYCIN SERPL-MCNC: 14.2 UG/ML (ref 10–20)
WBC # BLD AUTO: 7.64 THOUSAND/UL (ref 4.31–10.16)

## 2023-06-01 PROCEDURE — 80053 COMPREHEN METABOLIC PANEL: CPT | Performed by: STUDENT IN AN ORGANIZED HEALTH CARE EDUCATION/TRAINING PROGRAM

## 2023-06-01 PROCEDURE — 99239 HOSP IP/OBS DSCHRG MGMT >30: CPT | Performed by: STUDENT IN AN ORGANIZED HEALTH CARE EDUCATION/TRAINING PROGRAM

## 2023-06-01 PROCEDURE — 80202 ASSAY OF VANCOMYCIN: CPT | Performed by: STUDENT IN AN ORGANIZED HEALTH CARE EDUCATION/TRAINING PROGRAM

## 2023-06-01 PROCEDURE — 82948 REAGENT STRIP/BLOOD GLUCOSE: CPT

## 2023-06-01 PROCEDURE — 97605 NEG PRS WND THER DME<=50SQCM: CPT | Performed by: PODIATRIST

## 2023-06-01 PROCEDURE — 85025 COMPLETE CBC W/AUTO DIFF WBC: CPT | Performed by: STUDENT IN AN ORGANIZED HEALTH CARE EDUCATION/TRAINING PROGRAM

## 2023-06-01 RX ORDER — DOXYCYCLINE HYCLATE 100 MG/1
100 CAPSULE ORAL EVERY 12 HOURS SCHEDULED
Qty: 10 CAPSULE | Refills: 0 | Status: SHIPPED | OUTPATIENT
Start: 2023-06-01 | End: 2023-06-06

## 2023-06-01 RX ORDER — OXYCODONE HYDROCHLORIDE 10 MG/1
10 TABLET ORAL EVERY 4 HOURS PRN
Qty: 10 TABLET | Refills: 0 | Status: SHIPPED | OUTPATIENT
Start: 2023-06-01

## 2023-06-01 RX ORDER — TRAMADOL HYDROCHLORIDE 50 MG/1
50 TABLET ORAL EVERY 6 HOURS PRN
Qty: 5 TABLET | Refills: 0 | Status: SHIPPED | OUTPATIENT
Start: 2023-06-01

## 2023-06-01 RX ORDER — NICOTINE 21 MG/24HR
1 PATCH, TRANSDERMAL 24 HOURS TRANSDERMAL DAILY
Qty: 28 PATCH | Refills: 0 | Status: SHIPPED | OUTPATIENT
Start: 2023-06-02

## 2023-06-01 RX ADMIN — METOPROLOL TARTRATE 50 MG: 50 TABLET ORAL at 08:40

## 2023-06-01 RX ADMIN — FOLIC ACID 1 MG: 1 TABLET ORAL at 08:40

## 2023-06-01 RX ADMIN — OXYCODONE HYDROCHLORIDE 10 MG: 5 TABLET ORAL at 08:44

## 2023-06-01 RX ADMIN — CEFEPIME HYDROCHLORIDE 2000 MG: 2 INJECTION, SOLUTION INTRAVENOUS at 12:10

## 2023-06-01 RX ADMIN — MULTIPLE VITAMINS W/ MINERALS TAB 1 TABLET: TAB ORAL at 08:41

## 2023-06-01 RX ADMIN — OXYCODONE HYDROCHLORIDE 10 MG: 5 TABLET ORAL at 04:16

## 2023-06-01 RX ADMIN — NICOTINE 1 PATCH: 21 PATCH, EXTENDED RELEASE TRANSDERMAL at 08:45

## 2023-06-01 RX ADMIN — THIAMINE HCL TAB 100 MG 100 MG: 100 TAB at 08:41

## 2023-06-01 RX ADMIN — HEPARIN SODIUM 5000 UNITS: 5000 INJECTION INTRAVENOUS; SUBCUTANEOUS at 06:28

## 2023-06-01 RX ADMIN — VANCOMYCIN HYDROCHLORIDE 750 MG: 750 INJECTION, SOLUTION INTRAVENOUS at 06:28

## 2023-06-01 RX ADMIN — OXYCODONE HYDROCHLORIDE 10 MG: 5 TABLET ORAL at 13:02

## 2023-06-01 RX ADMIN — CEFEPIME HYDROCHLORIDE 2000 MG: 2 INJECTION, SOLUTION INTRAVENOUS at 04:14

## 2023-06-01 RX ADMIN — HEPARIN SODIUM 5000 UNITS: 5000 INJECTION INTRAVENOUS; SUBCUTANEOUS at 13:02

## 2023-06-01 NOTE — PROGRESS NOTES
[facility] Podiatry - Progress Note  Patient: Scarlet Bagley 66 y o  male   MRN: [id number]  PCP: Car Arauz MD  Unit/Bed#: -01 Encounter: [id number]  Date Of Visit: 06/01/23    ASSESSMENT:    Scarlet Bagley is a 66 y o  male with:    1  Status post day 2 wound debridement left foot and medial sesamoid excision for osteomyelitis and wound VAC application  2  Type 1 diabetes with peripheral neuropathy  3  Sepsis which is resolved  4  CKD, hypertension, hyperlipidemia, alcohol use disorder, nicotine abuse    PLAN:    · Post-surgical dressing/wound VAC was changed on plantar aspect left foot, please see VAC change note  Home VAC was attached  · Vitals signs stable  Patient afebrile with no leukocytosis  No erythema, edema, or lymphangitis noted either proximal or distal to the dressings  · Pain is well controlled  Continue current pain management regimen  · Continue heel touch weightbearing with Darco wedge shoe and walker to affected extremity with elevation while non-ambulatory  Updated PT order and she was ordered  · Patient stable for discharge, this was relayed to primary team     · He will follow at [facility] wound management center on 6/14/2023, visiting nurses will change home VAC 3 times a week  Patient will continue doxycycline for 5 more days  · Rest of care per primary team   WOUND VAC CHANGE      Date 6/1/23  Time 2pm     1 wound with 2 black sponge removed     Wounds inspected and found to be  clean with healthy tissue + bleeding  Measures approximately 2 5x3x0 3     Replaced dressing with 2 # black sponge      Total: 2 black pieces of sponge      Connected to suction running low-continuously at 125mmHg without leaks      Next wound VAC change Saturday by [facility]      SUBJECTIVE:     The patient was seen, evaluated, and assessed at bedside today  The patient was awake, alert, and in no acute distress  The patient reports no pain at this time   Pain is well controlled with current pain management regimen  Patient reports normal appetite and bowel movement  Patient denies N/V/F/chills/SOB/CP  OBJECTIVE:     Vitals:   /71   Pulse 85   Temp 98 3 °F (36 8 °C)   Resp 18   Ht 6' (1 829 m)   Wt 63 9 kg (140 lb 12 8 oz)   SpO2 98%   BMI 19 10 kg/m²     Temp (24hrs), Av 3 °F (36 8 °C), Min:98 °F (36 7 °C), Max:98 5 °F (36 9 °C)      Dressings on affected extremity: C/D/I    Physical Exam  Cardiovascular status at baseline  Neurological status at baseline  No erythema, edema, or lymphangitis noted from dressing  Lower extremity temperature WNL  Wound 1 located plantar submetatarsal head 1 left foot, measures 2 5 cm x 3 cm x 0 3 cm  without sinus tracking or undermining  Wound bed appears granular with slight Serosanguinous exudate  Deepest tissue noted Capsule  Malodor is not noticed  Wound edge appears Attached  Dee Dee-wound skin appears intact  Probe to bone is,  negative   Signs of infection are not present at this time  Additional Data:     Labs:    Results from last 7 days   Lab Units 23  0405   EOS PCT % 6   HEMATOCRIT % 35 3*   HEMOGLOBIN g/dL 11 8*   LYMPHS PCT % 14   MONOS PCT % 16*   NEUTROS PCT % 63   PLATELETS Thousands/uL 209   WBC Thousand/uL 7 64     Results from last 7 days   Lab Units 23  0405   ALK PHOS U/L 84   ALT U/L 21   AST U/L 19   BUN mg/dL 12   CALCIUM mg/dL 8 6   CHLORIDE mmol/L 98   CO2 mmol/L 29   CREATININE mg/dL 1 11   POTASSIUM mmol/L 4 3     Results from last 7 days   Lab Units 23  1252   INR  0 86       * I Have Reviewed All Lab Data Listed Above  Recent Cultures (last 7 days):     Results from last 7 days   Lab Units 23  1252   BLOOD CULTURE  No Growth at 48 hrs  No Growth at 48 hrs  Imaging: I have personally reviewed pertinent post-operative films in PACS:  EKG, Pathology, and Other Studies: I have personally reviewed pertinent reports        ** Please Note: Portions of the record may "have been created with voice recognition software  Occasional wrong word or \"sound a like\" substitutions may have occurred due to the inherent limitations of voice recognition software  Read the chart carefully and recognize, using context, where substitutions have occurred   **    "

## 2023-06-01 NOTE — CASE MANAGEMENT
Case Management Progress Note    Patient name Jose Downing  Location [address] 331/-01 MRN [id number]  : 1960 Date 2023       LOS (days): 3  Geometric Mean LOS (GMLOS) (days):   Days to GMLOS:        OBJECTIVE:     Current admission status: Inpatient  Preferred Pharmacy:   [id number] [address] [place] [id number]  Phone: [phone] Fax: [phone]    Primary Care Provider: Denise Freeman MD    Primary Insurance: [employer]  Secondary Insurance:     PROGRESS NOTE:    Wound Vac #[id number] delivered to pt room from [facility] closet  Pt signed delivery and cm faxed the packet to [phone] and the signature page back to cm d/c support at [phone]

## 2023-06-01 NOTE — ASSESSMENT & PLAN NOTE
· SIRS: tachycardia and leukocytosis  · Source: likely osteomyelitis of L foot  · Lactic: 2 5-> cleared  · Procal: 0 35  · Blood cultures x2 NGTD  · MRSA cx negative  · Wound cx ordered but not obtained   · continue Cefepime/Vanc day 3  · Trend fever curve and WBC count

## 2023-06-01 NOTE — ASSESSMENT & PLAN NOTE
Lab Results   Component Value Date    HGBA1C 7 9 (H) 03/31/2023       Recent Labs     05/31/23 2026 05/31/23  2337 06/01/23  0412 06/01/23  0813   POCGLU 270* 277* 218* 184*       Blood Sugar Average: Last 72 hrs:     · Patient is on home insulin pump  · If plans to go to the OR, transition to SSI vs insulin gtt  · Maintain -180  · Endo s/o

## 2023-06-01 NOTE — ASSESSMENT & PLAN NOTE
Call to patient. Voice mail. Dosing instructions left for patient verbally on voice mail. Clinic number provided and advised to call the clinic with any questions or concerns, medication or dietary changes or if dosing is different than what is listed.     Anticoagulation Summary  As of 2023    INR goal:  2.0-3.0   TTR:  64.9 % (4.9 y)   INR used for dosin.0 (2023)   Warfarin maintenance plan:  1 mg (1 mg x 1) every M, W, F; 0.5 mg (1 mg x 0.5) all other days   Weekly warfarin total:  5 mg   Plan last modified:  Leonor Rosa RN (2022)   Next INR check:  2023   Priority:  Follow-Up - 2 Weeks   Target end date:      Indications    Current use of long term anticoagulation (Resolved) [Z79.01]  Persistent atrial fibrillation (CMS/HCC) [I48.19]  Encounter for therapeutic drug monitoring (Resolved) [Z51.81]  Long term (current) use of anticoagulants (Resolved) [Z79.01]  Long term (current) use of anticoagulants [Z79.01]  Encounter for therapeutic drug monitoring (Resolved) [Z51.81]             Anticoagulation Episode Summary     INR check location:      Preferred lab:      Send INR reminders to:  ANTICOAG (OPEN ENROLLMENT) ACS CARD/EP    Comments:  *New STAC under Damari 22; *CCLab 22; CoaguChek; Keep on the lower end range; 1 mg & 2.5mg tabs; Hematuria on Xarelto; Pt is a nurse; GI bleed on 10/7/19 while on Lovenox. AMIO STOPPED/DIG STARTED 22**      Anticoagulation Care Providers     Provider Role Specialty Phone number    Denise Yoder MD Responsible Cardiovascular Disease [phone]          Supervising provider: Will Lopez MD         · Home regimen: TOPROL  mg daily  · Transition to Lopressor 50 mg BID for easier titration in the setting of severe sepsis  · Hold lisinopril

## 2023-06-01 NOTE — PROGRESS NOTES
Latricia Rodriguez is a 66 y o  male who is currently ordered Vancomycin IV with management by the Pharmacy Consult service  Relevant clinical data and objective / subjective history reviewed  Vancomycin Assessment:  Indication and Goal AUC/Trough: Bone/joint infection (goal -600, trough >10)  Clinical Status: stable  Micro:   NGTD  Renal Function:  SCr: 1 11 mg/dL  CrCl: 63 2 mL/min  Renal replacement: Not on dialysis  Days of Therapy: 4  Current Dose: 1250mg every 24 hours  Vancomycin Plan: random level resulted 14 2- extrapolated trough is ~9, which is subtherapeutic  Thus, dose changed as below  New Dosinmg every 12 hours  Estimated AUC: 464 mcg*hr/mL  Estimated Trough: 14 7 mcg/mL  Next Level: 6/3/23 at 0530  Renal Function Monitoring: Daily BMP and [place] will continue to follow closely for s/sx of nephrotoxicity, infusion reactions and appropriateness of therapy  BMP and CBC will be ordered per protocol  We will continue to follow the patient’s culture results and clinical progress daily  Per hospitalist note, patient will be transitioned to oral doxycyline upon discharge      Radha Soria, Pharmacist, PharmD, BCPS

## 2023-06-01 NOTE — CASE MANAGEMENT
Monet Hager has received approval to release wound vac to patient  Assigned vac #: [id number]  Proof of delivery PPW given to Care Manager to deliver to patient

## 2023-06-01 NOTE — ASSESSMENT & PLAN NOTE
· Follows with outpatient podiatry  · Xray concerning for possible osteomyelitis  · MRI foot consistent with osteomyelitis  · Podiatry following  · Postop day 2 5/30 of resection of proximal medial bipartite sesamoid for surgical cure of osteomyelitis with wound VAC application  · IS  · Pain control  · PT OT eval  · Abx as outlined above    D/c on 5 days of doxy

## 2023-06-01 NOTE — ASSESSMENT & PLAN NOTE
Lab Results   Component Value Date    CREATININE 1 11 06/01/2023    CREATININE 1 11 05/31/2023    CREATININE 1 14 05/30/2023    EGFR 71 06/01/2023    EGFR 71 05/31/2023    EGFR 69 05/30/2023     · Baseline Cr 1 1-1 3  · Cr on admission 1 38  · Monitor I/O  · Trend renal indices  · Cr back to baseline

## 2023-06-01 NOTE — ASSESSMENT & PLAN NOTE
· Na 130 on admission  · Chronic hyponatremia, likely partially related to beer potomania  · 128 in 2021, 130 in 2022  · Check urine sodium 35, urine 244  · Trend daily  · Back to baseline

## 2023-06-01 NOTE — DISCHARGE SUMMARY
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, with fat layer exposed ([address] )  Assessment & Plan  · Follows with outpatient podiatry  · Xray concerning for possible osteomyelitis  · MRI foot consistent with osteomyelitis  · Podiatry following  · Postop day 2 5/30 of resection of proximal medial bipartite sesamoid for surgical cure of osteomyelitis with wound VAC application  · IS  · Pain control  · PT OT eval  · Abx as outlined above    D/c on 5 days of doxy    Nicotine abuse  Assessment & Plan  · Nicotine patch ordered  · Encourage cessation    Alcohol abuse  Assessment & Plan  · Patient admits to drinking 3-4 beers every night  · Last drink last night  · Denies hx of withdrawal  · Monitor on CIWA  · Start thiamine and folic acid    Hyponatremia  Assessment & Plan  · Na 130 on admission  · Chronic hyponatremia, likely partially related to beer potomania  · 128 in 2021, 130 in 2022  · Check urine sodium 35, urine 244  · Trend daily  · Back to baseline    CKD (chronic kidney disease)  Assessment & Plan  Lab Results   Component Value Date    CREATININE 1 11 06/01/2023    CREATININE 1 11 05/31/2023    CREATININE 1 14 05/30/2023    EGFR 71 06/01/2023    EGFR 71 05/31/2023    EGFR 69 05/30/2023     · Baseline Cr 1 1-1 3  · Cr on admission 1 38  · Monitor I/O  · Trend renal indices  · Cr back to baseline    Type 1 diabetes mellitus with hyperglycemia [facility])  Assessment & Plan  Lab Results   Component Value Date    HGBA1C 7 9 (H) 03/31/2023       Recent Labs     05/31/23 2026 05/31/23  2337 06/01/23  0412 06/01/23  0813   POCGLU 270* 277* 218* 184*       Blood Sugar Average: Last 72 hrs:     · Patient is on home insulin pump  · If plans to go to the OR, transition to SSI vs insulin gtt  · Maintain -180  · Endo s/o    Benign essential hypertension  Assessment & Plan  · Home regimen: TOPROL  mg daily  · Transition to Lopressor 50 mg BID for easier titration in the setting of severe sepsis  · Hold lisinopril    Mixed hyperlipidemia  Assessment & Plan  · Continue home statin    Medical Problems     Resolved Problems  Date Reviewed: 5/1/2023          Resolved    * (Principal) Severe sepsis ([facility] [address] ) 6/1/2023     Resolved by  Marilin Craven MD        Discharging Physician / Practitioner: Marilin Craven MD  PCP: Gladys Umaña MD  Admission Date:   Admission Orders (From admission, onward)     Ordered        05/29/23 1429  INPATIENT ADMISSION  Once                      Discharge Date: 06/01/23    Consultations During Hospital Stay:  · Podiatry  · Endocrinology  · Case management  · PT  · OT  ·     Procedures Performed:   XR foot 3+ vw left   Final Result      Postsurgical changes as above  No acute osseous abnormality  Workstation performed: [id number]         XR foot 2 vw left   Final Result      Fluoroscopic guidance provided for procedure guidance  Please refer to the separate procedure notes for additional details           Workstation performed: [id number]         MRI foot/forefoot toes left wo contrast   Final Result      Associated with the first submetatarsal ulcer (series 8 image 23), there is confluent hypointense marrow signal in the proximal half of the bipartite tibial sesamoid bone, consistent with osteomyelitis (series 7 image 23 and series 6 images 20-21 )      No evidence of osteomyelitis in the rest of the sesamoids, nor in the first metatarsal head and hallux proximal phalanx base  There is focal marrow edema on the plantar aspect of the third metatarsal (series 6 image 13,) and second metatarsal (series 6 image 14 ) These are not adjacent to ulcers, therefore are probably stress reaction due to altered weightbearing rather than    osteomyelitis  Workstation performed: [id number]         XR foot 3+ views LEFT   ED Interpretation   Early bony destruction head of the third metatarsal consistent with osteomyelitis      Final Result      No erosive changes   Ulceration at the plantar aspect of the foot   No radiographic findings of osteomyelitis  MRI may be performed as needed         Workstation performed: [id number]         ·   · Postop 5/30 of resection of proximal medial bipartite sesamoid for surgical cure of osteomyelitis with wound VAC application    Significant Findings / Test Results:   · See above    Incidental Findings:   · none   ·     Test Results Pending at Discharge (will require follow up):   · none     Outpatient Tests Requested:  · none    Complications:  None known at this time    Reason for Admission: Osteomyelitis    Hospital Course:   Samira Lubin is a 66 y o  male patient who originally presented to the hospital on 5/29/2023 due to podiatry in the office recommending admission to the ED due to concern for osteomyelitis  In the ED x-rays were ordered and IV antibiotics were initiated  MRI was done to rule out osteomyelitis  Endocrinology was consulted for insulin pump management for preop Intra-Op and postop assessment and management  Following day patient underwent surgical resection and wound VAC was placed  Patient remained hemodynamically stable afebrile and in no acute respiratory distress  Wound was healing well with wound VAC and was medically cleared for discharge by podiatry and internal medicine    He is to follow-up with podiatry outpatient  He will have home health services to help with wound VAC  Please see above list of diagnoses and related plan for additional information  Condition at Discharge: stable    Discharge Day Visit / Exam:   Subjective:  Cristóbal Florez seen and examined at bedside  No acute events overnight  Discussed plan of care  All questions and concerns were answered and addressed  Continues to have significant pain with wound VAC suction  Vitals: Blood Pressure: 146/71 (06/01/23 0717)  Pulse: 85 (06/01/23 0717)  Temperature: 98 3 °F (36 8 °C) (06/01/23 0717)  Temp Source: Oral (06/01/23 0415)  Respirations: 18 (06/01/23 0717)  Height: 6' (182 9 cm) (05/30/23 1353)  Weight - Scale: 63 9 kg (140 lb 12 8 oz) (06/01/23 0423)  SpO2: 98 % (06/01/23 0717)  Exam:   Physical Exam   Vitals and nursing note reviewed  Constitutional:       General: He is not in acute distress      Appearance: Normal appearance  He is not ill-appearing  HENT:      Head: Normocephalic and atraumatic  Cardiovascular:      Rate and Rhythm: Normal rate and regular rhythm       Pulses: Normal pulses       Heart sounds: Normal heart sounds  Pulmonary:      Effort: Pulmonary effort is normal       Breath sounds: Normal breath sounds  Abdominal:      General: Abdomen is flat  Bowel sounds are normal       Palpations: Abdomen is soft  Musculoskeletal:      Right lower leg: No edema       Left lower leg: No edema  Skin:     General: Skin is warm       Findings: left foot bandaged with wound vac placed  Neurological:      General: No focal deficit present       Mental Status: He is alert and oriented to person, place, and time      Discussion with Family: Patient declined call to   Discharge instructions/Information to patient and family:   See after visit summary for information provided to patient and family        Provisions for Follow-Up Care:  See after visit summary for information related to follow-up care and any pertinent home health orders  Disposition:   Home with [facility] Services (Reminder: Complete face to face encounter)    Planned Readmission: no     Discharge Statement:  I spent 35 minutes discharging the patient  This time was spent on the day of discharge  I had direct contact with the patient on the day of discharge  Greater than 50% of the total time was spent examining patient, answering all patient questions, arranging and discussing plan of care with patient as well as directly providing post-discharge instructions  Additional time then spent on discharge activities  Discharge Medications:  See after visit summary for reconciled discharge medications provided to patient and/or family        **Please Note: This note may have been constructed using a voice recognition system**

## 2023-06-01 NOTE — DISCHARGE INSTR - AVS FIRST PAGE
Left foot   Irrigate wound with saline  Apply wound VAC with 1 black sponge in wound tract dorsally with another black sponge, machine set to 125 mmHg low continuous  Dressing is to be changed 3 times a week  Patient may heel touch weight-bear for pivot transfer with use of walker and Darco wedge shoe  Patient to follow-up at [facility] wound management center on 6/14/2023        You are to follow-up with podiatry/wound care    You are to follow-up with family doctor in 1 to 2 weeks    You are to complete 5 days of doxycycline

## 2023-06-02 ENCOUNTER — HOME CARE VISIT (OUTPATIENT)
Dept: HOME HEALTH SERVICES | Facility: HOME HEALTHCARE | Age: 63
End: 2023-06-02

## 2023-06-02 ENCOUNTER — TRANSITIONAL CARE MANAGEMENT (OUTPATIENT)
Dept: FAMILY MEDICINE CLINIC | Facility: HOSPITAL | Age: 63
End: 2023-06-02

## 2023-06-02 NOTE — UTILIZATION REVIEW
NOTIFICATION OF ADMISSION DISCHARGE   This is a Notification of Discharge from [address]  Please be advised that this patient has been discharge from our facility  Below you will find the admission and discharge date and time including the patient’s disposition  UTILIZATION REVIEW CONTACT:  Timoteo Azul  Utilization   Network Utilization Review Department  Phone: [phone] carefully listen to the prompts  All voicemails are confidential   Email: [email or web address]     ADMISSION INFORMATION  PRESENTATION DATE: 5/29/2023 12:21 PM  OBERVATION ADMISSION DATE:   INPATIENT ADMISSION DATE: 5/29/23  2:29 PM   DISCHARGE DATE: 6/1/2023  5:29 PM   DISPOSITION:Home with Home Health Care    IMPORTANT INFORMATION:  Send all requests for admission clinical reviews, approved or denied determinations and any other requests to dedicated fax number below belonging to the campus where the patient is receiving treatment   List of dedicated fax numbers:  [address] DENIALS (Administrative/Medical Necessity) [phone]   [address] (Maternity/NICU/Pediatrics) [phone]   [facility] [phone]   [place] [address] [phone]   [address] [phone]   220 [facility] [phone]   90 [facility] [phone]   [address] [place] [address] [phone]   [facility]  [phone]   4055 [facility] [phone]   412 [facility] [address] [facility] [phone] no

## 2023-06-03 ENCOUNTER — HOME CARE VISIT (OUTPATIENT)
Dept: HOME HEALTH SERVICES | Facility: HOME HEALTHCARE | Age: 63
End: 2023-06-03

## 2023-06-03 VITALS
DIASTOLIC BLOOD PRESSURE: 72 MMHG | HEIGHT: 72 IN | SYSTOLIC BLOOD PRESSURE: 130 MMHG | BODY MASS INDEX: 19.1 KG/M2 | OXYGEN SATURATION: 98 % | TEMPERATURE: 97.7 F | HEART RATE: 88 BPM | RESPIRATION RATE: 18 BRPM

## 2023-06-03 LAB
BACTERIA BLD CULT: NORMAL
BACTERIA BLD CULT: NORMAL

## 2023-06-03 NOTE — CASE COMMUNICATION
[facility] has admitted your patient to [address] [place] [address] service with the following disciplines:      SN  This report is informational only, no responses is needed  Primary focus of home health care: [address]  Patient stated goals of care: wound will heal  Anticipated visit pattern and next visit date: 3w9  next visit: 6/5/23    See medication list - meds in home differ from AVS: patient did not have atorvastatin in home wife to call for refil l monday  Please be advised, EMR (EPIC) identified drug interactions for this patient based on the current medication profile  Potential barriers to goal achievement: pain, anxiety, comorbdities, functional mobility deficit    Thank you for allowing us to participate in the care of your patient        Nirmal Goodman RN with [facility]

## 2023-06-04 ENCOUNTER — HOME CARE VISIT (OUTPATIENT)
Dept: HOME HEALTH SERVICES | Facility: HOME HEALTHCARE | Age: 63
End: 2023-06-04
Payer: COMMERCIAL

## 2023-06-04 VITALS
TEMPERATURE: 97.7 F | DIASTOLIC BLOOD PRESSURE: 82 MMHG | SYSTOLIC BLOOD PRESSURE: 140 MMHG | OXYGEN SATURATION: 99 % | RESPIRATION RATE: 18 BRPM | HEART RATE: 87 BPM

## 2023-06-04 PROCEDURE — G0299 HHS/HOSPICE OF RN EA 15 MIN: HCPCS

## 2023-06-04 NOTE — CASE COMMUNICATION
0237  Basilio Thompson Patient called into [facility] regarding his wound vac  He reports that the vac is running WNL but he is having pain where the wound vac hose is placed  Pain causing patient to have trouble sleeping   Patient to be seen by SN today for assist

## 2023-06-05 PROCEDURE — 88304 TISSUE EXAM BY PATHOLOGIST: CPT | Performed by: PATHOLOGY

## 2023-06-05 PROCEDURE — 88311 DECALCIFY TISSUE: CPT | Performed by: PATHOLOGY

## 2023-06-06 ENCOUNTER — HOME CARE VISIT (OUTPATIENT)
Dept: HOME HEALTH SERVICES | Facility: HOME HEALTHCARE | Age: 63
End: 2023-06-06
Payer: COMMERCIAL

## 2023-06-06 PROCEDURE — G0299 HHS/HOSPICE OF RN EA 15 MIN: HCPCS

## 2023-06-07 ENCOUNTER — OFFICE VISIT (OUTPATIENT)
Dept: WOUND CARE | Facility: HOSPITAL | Age: 63
End: 2023-06-07
Payer: COMMERCIAL

## 2023-06-07 VITALS — RESPIRATION RATE: 18 BRPM | HEART RATE: 72 BPM | TEMPERATURE: 97.7 F

## 2023-06-07 DIAGNOSIS — E10.40 TYPE 1 DIABETES MELLITUS WITH DIABETIC NEUROPATHY (HCC): ICD-10-CM

## 2023-06-07 DIAGNOSIS — L97.522 DIABETIC ULCER OF OTHER PART OF LEFT FOOT ASSOCIATED WITH TYPE 1 DIABETES MELLITUS, WITH FAT LAYER EXPOSED (HCC): Primary | ICD-10-CM

## 2023-06-07 DIAGNOSIS — E10.621 DIABETIC ULCER OF OTHER PART OF LEFT FOOT ASSOCIATED WITH TYPE 1 DIABETES MELLITUS, WITH FAT LAYER EXPOSED (HCC): Primary | ICD-10-CM

## 2023-06-07 PROCEDURE — 11042 DBRDMT SUBQ TIS 1ST 20SQCM/<: CPT | Performed by: PODIATRIST

## 2023-06-07 NOTE — LETTER
June 7, 2023     Patient: Dalila Bhakta  YOB: 1960  Date of Visit: 6/7/2023      To Whom it May Concern:    Dalila Bhakta is under my professional care  Melanie Ruelas was seen in my office on 6/7/2023  Melanierios Ruelas should not return to work until evaluated on 6/16/2023  If you have any questions or concerns, please don't hesitate to call           Sincerely,          Chau Geronimo DPM        CC: No Recipients

## 2023-06-07 NOTE — PROGRESS NOTES
Patient ID: Lisa Walker is a 66 y o  male Date of Birth 1960       Chief Complaint   Patient presents with   • Follow Up Wound Care Visit     Left plantar foot wound       Allergies:  Cefuroxime and Amoxicillin-pot clavulanate    Diagnosis:  1  Diabetic ulcer of other part of left foot associated with type 1 diabetes mellitus, with fat layer exposed ([facility] [address] )  -     Wound cleansing and dressings; Future    2  Type 1 diabetes mellitus with diabetic neuropathy (HCC)       Diagnosis ICD-10-CM Associated Orders   1  Diabetic ulcer of other part of left foot associated with type 1 diabetes mellitus, with fat layer exposed ([facility] [address] )  O34 903 Wound cleansing and dressings    L97 522       2  Type 1 diabetes mellitus with diabetic neuropathy (HCC)  E10 40            Assessment & Plan:  1  Diabetic Veloz grade 3 ulceration plantar left first metatarsal head, wound was debrided through subcuticular tissues and dressed with collagen, alginate dry dressing, wound VAC was put on hold until Wednesday, visiting nurses will resume use of wound VAC with checking a piece of collagen into the depth and reapply wound VAC  2   Patient was educated on proper weightbearing with Darco wedge shoe with a soldier march to keep weight on heel and avoid pressure on forefoot, he is to maintain use of Darco wedge shoe at all times when he is weightbearing  3   I personally reviewed patient's pathology report from excision of sesamoid for osteomyelitis as indicated by MRI  4   Today reviewed frequent elevation, low-sodium diet, proper protein intake, strict pressure infection reduction, proper glycemic control, smoking and alcohol cessation  He understands and agrees with plan and states he will try and do his best   Follow-up in 1 week  Debridement   Wound 05/30/23 Foot Left;Plantar    Universal Protocol:  Consent: Verbal consent obtained    Risks and benefits: risks, benefits and alternatives were discussed  Consent given by: "patient  Time out: Immediately prior to procedure a \"time out\" was called to verify the correct patient, procedure, equipment, support staff and site/side marked as required  Patient understanding: patient states understanding of the procedure being performed  Patient identity confirmed: verbally with patient      Performed by: physician  Debridement type: surgical  Level of debridement: subcutaneous tissue  Pain control: lidocaine 4%  Pre-debridement measurements  Length (cm): 3  Width (cm): 3  Depth (cm): 0 5  Surface Area (cm^2): 9  Volume (cm^3): 4 5    Post-debridement measurements  Length (cm): 3 3  Width (cm): 3 5  Depth (cm): 0 6  Percent debrided: 100%  Surface Area (cm^2): 11 55  Area debrided (cm^2): 11 55  Volume (cm^3): 6 93  Tissue and other material debrided: subcutaneous tissue  Devitalized tissue debrided: biofilm, callus, fibrin, necrotic debris and slough  Instrument(s) utilized: blade, nippers and curette  Bleeding: small  Hemostasis obtained with: pressure  Procedural pain (0-10): insensate  Post-procedural pain: insensate   Response to treatment: procedure was tolerated well                   Subjective:   Patient presents today status post 1 week excision of medial sesamoid for osteomyelitis and nonhealing diabetic ulceration, patient was discharged home last Thursday with wound VAC, visiting nurses have been changing wound VAC as directed, they did reach out to me as he has had some periwound maceration  Patient states he has been minimal weightbearing only within his home for ADLs with use of Darco wedge shoe and not putting any pressure on the forefoot  He states his blood sugars are well controlled  No new complaints          The following portions of the patient's history were reviewed and updated as appropriate:   Patient Active Problem List   Diagnosis   • Chronic fatigue syndrome   • Lower urinary tract symptoms due to benign prostatic hyperplasia   • Mixed hyperlipidemia   • 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)   • Insulin pump in place   • ED (erectile dysfunction) of organic origin   • Chronic pain   • Benign essential hypertension   • Cigarette nicotine dependence without complication   • Gout of ankle   • Type 1 diabetes mellitus with hyperglycemia (HCC)   • Diabetic polyneuropathy associated with type 1 diabetes mellitus ([facility] [address] )   • Microalbuminuria due to type 1 diabetes mellitus ([place] [address] )   • Hypoglycemia unawareness associated with type 1 diabetes mellitus ([facility] )   • 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, with fat layer exposed ([place] [address] )   • Diabetic foot ulcer ([place] [address] )   • CKD (chronic kidney disease)   • Hyponatremia   • Alcohol abuse   • Nicotine abuse     Past Medical History:   Diagnosis Date   • Chronic foot ulcer ([place] [address] ) 09/07/2018   • Diabetes mellitus ([place] [address] )    • Gout    • High cholesterol    • Hypertension    • Visual impairment 11/1/2022    Cateract     Past Surgical History:   Procedure Laterality Date   • CATARACT EXTRACTION Right 01/2023   • COMPLEX WOUND CLOSURE TO EXTREMITY Left 07/18/2019    Procedure: COMPLEX CLOSURE LEFT CHEEK;  Surgeon: Marie Pacheco MD;  Location:  MAIN OR;  Service: Plastics   • FACIAL/NECK BIOPSY Left 07/18/2019    Procedure: EXCISION LEFT CHEEK CYST;  Surgeon: Marie Pacheco MD;  Location:  MAIN OR;  Service: Plastics   • HERNIA REPAIR Left     several times   • KNEE ARTHROSCOPY Left     torn meniscus   • WOUND DEBRIDEMENT Left 5/30/2023    Procedure: DEBRIDEMENT WOUND LEFT FOOT WOUND WITH EXCISION OF INFECTED BONE AND WOUND VAC APPLICATION;  Surgeon: Juan Carlos Nash DPM;  Location:  MAIN OR;  Service: Podiatry     Social History     Socioeconomic History   • Marital status: /Civil Union     Spouse name: Not on file   • Number of children: 2   • Years of education: 12   • Highest education level: High school graduate   Occupational History   • Occupation: cafeteria/kitchen at [employer]     Comment: [employer]   Tobacco Use   • Smoking status: Every Day     Packs/day: 1 00     Years: 20 00     Total pack years: 20 00     Types: Cigarettes     Last attempt to quit: 2022     Years since quittin 4   • Smokeless tobacco: Never   • Tobacco comments:     encouraged smoking cessation   Vaping Use   • Vaping Use: Never used   Substance and Sexual Activity   • Alcohol use: Yes     Comment: 3/4 beers a day   • Drug use: Never   • Sexual activity: Not Currently   Other Topics Concern   • Not on file   Social History Narrative   • Not on file     Social Determinants of Health     Financial Resource Strain: Not on file   Food Insecurity: No Food Insecurity (2023)    Hunger Vital Sign    • Worried About Running Out of Food in the Last Year: Never true    • Ran Out of Food in the Last Year: Never true   Transportation Needs: No Transportation Needs (2023)    PRAPARE - Transportation    • Lack of Transportation (Medical): No    • Lack of Transportation (Non-Medical):  No   Physical Activity: Not on file   Stress: Not on file   Social Connections: Not on file   Intimate Partner Violence: Not on file   Housing Stability: Low Risk  (2023)    Housing Stability Vital Sign    • Unable to Pay for Housing in the Last Year: No    • Number of Places Lived in the Last Year: 1    • Unstable Housing in the Last Year: No        Current Outpatient Medications:   •  atorvastatin (LIPITOR) 40 mg tablet, TAKE 1 TABLET BY MOUTH EVERY DAY, Disp: 90 tablet, Rfl: 1  •  Glucagon, rDNA, (Glucagon Emergency) 1 MG KIT, as directed, Disp: , Rfl:   •  HumaLOG 100 UNIT/ML injection, inject up to 80 units in INSULIN PUMP daily as directed by prescriber, Disp: , Rfl:   •  insulin lispro (HumaLOG) 100 units/mL injection, Use via the insulin pump, use up to 80 units daily, Disp: 80 mL, Rfl: 1  •  lisinopril (ZESTRIL) 10 mg tablet, Take 1 tablet (10 mg total) by mouth daily, Disp: 90 tablet, Rfl: 1  •  metoprolol succinate (TOPROL-XL) 100 mg 24 hr tablet, Take 1 tablet (100 mg total) by mouth daily, Disp: 90 tablet, Rfl: 1  •  nicotine (NICODERM CQ) 21 mg/24 hr TD 24 hr patch, Place 1 patch on the skin over 24 hours daily Do not start before June 2, 2023 , Disp: 28 patch, Rfl: 0  •  oxyCODONE (ROXICODONE) 10 MG TABS, Take 1 tablet (10 mg total) by mouth every 4 (four) hours as needed for severe pain Max Daily Amount: 60 mg, Disp: 10 tablet, Rfl: 0  •  traMADol (Ultram) 50 mg tablet, Take 1 tablet (50 mg total) by mouth every 6 (six) hours as needed for moderate pain, Disp: 5 tablet, Rfl: 0  Family History   Problem Relation Age of Onset   • Heart disease Father    • Diabetes type I Father    • Diabetes type II Mother    • No Known Problems Sister    • Alcohol abuse Brother    • Diabetes type I Brother    • No Known Problems Brother    • No Known Problems Son    • No Known Problems Daughter        Review of Systems   Constitutional: Negative for chills and fever  HENT: Negative for ear pain and sore throat  Eyes: Negative for pain and visual disturbance  Respiratory: Negative for cough and shortness of breath  Cardiovascular: Negative for chest pain and palpitations  Gastrointestinal: Negative for abdominal pain and vomiting  Genitourinary: Negative for dysuria and hematuria  Musculoskeletal: Positive for gait problem  Negative for arthralgias and back pain  Skin: Positive for color change and wound  Negative for rash  Neurological: Positive for numbness  Negative for seizures and syncope  Psychiatric/Behavioral: Negative  All other systems reviewed and are negative  Objective:  Pulse 72   Temp 97 7 °F (36 5 °C)   Resp 18     Physical Exam  Constitutional:       Appearance: Normal appearance  He is normal weight  HENT:      Head: Normocephalic and atraumatic        Right Ear: External ear normal       Left Ear: External ear normal       Nose: Nose normal  Mouth/Throat:      Mouth: Mucous membranes are moist       Pharynx: Oropharynx is clear  Eyes:      Conjunctiva/sclera: Conjunctivae normal    Cardiovascular:      Pulses: Normal pulses  Dorsalis pedis pulses are 2+ on the right side and 2+ on the left side  Posterior tibial pulses are 2+ on the right side and 2+ on the left side  Pulmonary:      Effort: Pulmonary effort is normal    Musculoskeletal:      Cervical back: Normal range of motion  Right lower leg: No edema  Left lower leg: No edema  Skin:     General: Skin is warm and dry  Capillary Refill: Capillary refill takes less than 2 seconds  Comments: See detailed wound assessment   Neurological:      General: No focal deficit present  Mental Status: He is alert and oriented to person, place, and time  Mental status is at baseline  Sensory: Sensory deficit present  Coordination: Coordination abnormal       Gait: Gait abnormal       Deep Tendon Reflexes: Reflexes abnormal    Psychiatric:         Mood and Affect: Mood normal          Behavior: Behavior normal          Thought Content: Thought content normal          Judgment: Judgment normal            Wound 05/30/23 Foot Left;Plantar (Active)   Wound Image   06/07/23 1406   Wound Description Probes to bone;Pink;Granulation tissue;Slough; Yellow 06/07/23 1335   Dee Dee-wound Assessment Maceration; White 06/07/23 1335   Wound Length (cm) 3 cm 06/07/23 1335   Wound Width (cm) 3 cm 06/07/23 1335   Wound Depth (cm) 0 5 cm 06/07/23 1335   Wound Surface Area (cm^2) 9 cm^2 06/07/23 1335   Wound Volume (cm^3) 4 5 cm^3 06/07/23 1335   Calculated Wound Volume (cm^3) 4 5 cm^3 06/07/23 1335   Change in Wound Size % -42 86 06/07/23 1335   Drainage Amount Small 06/07/23 1335   Drainage Description Serosanguineous 06/07/23 1335   Non-staged Wound Description Full thickness 06/07/23 1335   Dressing Status Intact 06/07/23 1335          Results from last 6 Months   Lab Units 04/03/23  1038   WOUND CULTURE  3+ Growth of Methicillin Resistant Staphylococcus aureus*  3+ Growth of       XR foot 3+ vw left    Result Date: 5/31/2023  Narrative: LEFT FOOT INDICATION:  Post operative excision of tibial sesamoid  COMPARISON: Left foot radiographs and MRI 5/29/2023 VIEWS:  XR FOOT 3+ VW LEFT Images: 3 FINDINGS: Interval resection of the tibial sesamoid  Packing/bandage material seen along the postoperative bed  There is no acute fracture or dislocation  No significant degenerative changes  No lytic or blastic osseous lesion  Wound VAC noted along the dorsal midfoot  Atherosclerotic vascular calcifications  Impression: Postsurgical changes as above  No acute osseous abnormality  Workstation performed: [id number]     XR foot 2 vw left    Result Date: 5/30/2023  Narrative: C-ARM -left foot INDICATION: left foot   Procedure guidance  COMPARISON: Left foot radiographs 5/29/2023 TECHNIQUE: FLUOROSCOPY TIME:   13 2 seconds 2 FLUOROSCOPIC IMAGES FINDINGS: Fluoroscopic guidance provided for procedure guidance  Osseous and soft tissue detail limited by technique  Impression: Fluoroscopic guidance provided for procedure guidance  Please refer to the separate procedure notes for additional details  Workstation performed: [id number]     XR foot 3+ views LEFT    Result Date: 5/30/2023  Narrative: LEFT FOOT INDICATION:   infection  COMPARISON:  None VIEWS:  XR FOOT 3+ VW LEFT Images: 3 FINDINGS: There is no acute fracture or dislocation  No significant degenerative changes  No lytic or blastic osseous lesion  Ulceration seen at the plantar aspect of the first MTP  No erosive changes seen Mineralized vasculature Bipartite medial sesamoid    Impression: No erosive changes Ulceration at the plantar aspect of the foot No radiographic findings of osteomyelitis   MRI may be performed as needed Workstation performed: [id number]     MRI foot/forefoot toes left wo contrast    Result Date: 5/30/2023  Narrative: MRI LEFT FOOT INDICATION:   Osteomyelitis (pending surgical decision)  Dr Brianna Jackson note from 5/24/2023 was reviewed  Patient has a first submetatarsal foot ulcer, with wound debrided on that date  There is concern for osteomyelitis  COMPARISON: Left foot plain films from 5/29/2023  TECHNIQUE:  Multiplanar/multisequence MR of the left foot was performed  FINDINGS: SUBCUTANEOUS TISSUES: There is a first submetatarsal ulcer (series 8 image 23 ) BONES: Associated with the first submetatarsal ulcer, there is confluent hypointense marrow signal in the proximal half of the bipartite tibial sesamoid bone, consistent with osteomyelitis (series 7 image 23 and series 6 images 20-21 ) The distal half of the tibial sesamoid is not involved  The fibular sesamoid is not involved  There is also no evidence of osteomyelitis in the first metatarsal head or hallux proximal phalanx base  There is focal marrow edema on the plantar aspect of the third metatarsal (series 6 image 13,) and second metatarsal (series 6 image 14 ) These are not adjacent to ulcers, therefore are probably stress reaction due to altered weightbearing rather than osteomyelitis  FIRST MTP JOINT: Physiologic amount of fluid in this joint  SESAMOID BONES: See above  OTHER ARTICULAR SURFACE:  Normal  PLANTAR FASCIA:  Intact  LISFRANC LIGAMENT:  Intact  FOREFOOT TENDONS: Intact  INTERMETATARSAL REGIONS: No bursitis or Blanton's neuroma  MUSCULATURE: Intact  Impression: Associated with the first submetatarsal ulcer (series 8 image 23), there is confluent hypointense marrow signal in the proximal half of the bipartite tibial sesamoid bone, consistent with osteomyelitis (series 7 image 23 and series 6 images 20-21 ) No evidence of osteomyelitis in the rest of the sesamoids, nor in the first metatarsal head and hallux proximal phalanx base   There is focal marrow edema on the plantar aspect of the third metatarsal (series 6 image 13,) and second "metatarsal (series 6 image 14 ) These are not adjacent to ulcers, therefore are probably stress reaction due to altered weightbearing rather than osteomyelitis  Workstation performed: [id number]     XR foot 3+ vw left    Result Date: 5/19/2023  Narrative: LEFT FOOT INDICATION:   E10 621: Type 1 diabetes mellitus with foot ulcer L97 522: Non-pressure chronic ulcer of other part of left foot with fat layer exposed  COMPARISON: 11/17/2022 VIEWS:  XR FOOT 3+ VW LEFT FINDINGS: There is no acute fracture or dislocation  No periosteal reaction or cortical destruction to suggest osteomyelitis  No lytic or blastic osseous lesion  Plantar forefoot soft tissue ulceration noted  Impression: Plantar forefoot soft tissue ulceration  No radiographic evidence of osteomyelitis  Workstation performed: [id number]       Wound Instructions:  Orders Placed This Encounter   Procedures   • Wound cleansing and dressings     Negative Pressure Wound Therapy Instructions    VAC holiday for 2 days  Reapply on Friday, June 9, 2023  Cleanse with NSS  Pat dry  Pack Puracol to wound depth (centrally) and apply black foam and secure with VAC drape and kerlix wrap  Set NPWT at 125 mmHg continuous  Change 3x/week  Offload left foot using offloading shoe  Today only:  Cleansed wound with NSS  Collagen packed into wound depth and covered with alginate and ABD  Secured with [facility]  Follow up in 1 week  Standing Status:   Future     Standing Expiration Date:   6/7/2024         Gautam Holder DPM, DPM, FACFAS    Portions of the record may have been created with voice recognition software  Occasional wrong word or \"sound a like\" substitutions may have occurred due to the inherent limitations of voice recognition software  Read the chart carefully and recognize, using context, where substitutions have occurred      "

## 2023-06-07 NOTE — PATIENT INSTRUCTIONS
Orders Placed This Encounter   Procedures    Wound cleansing and dressings     Negative Pressure Wound Therapy Instructions    VAC holiday for 2 days  Reapply on Friday, June 9, 2023  Cleanse with NSS  Pat dry  Pack Puracol to wound depth (centrally) and apply black foam and secure with VAC drape and kerlix wrap  Set NPWT at 125 mmHg continuous  Change 3x/week  Offload left foot using offloading shoe  Today only:  Cleansed wound with NSS  Collagen packed into wound depth and covered with alginate and ABD  Secured with [facility]  Follow up in 1 week       Standing Status:   Future     Standing Expiration Date:   6/7/2024

## 2023-06-08 ENCOUNTER — DOCUMENTATION (OUTPATIENT)
Dept: ENDOCRINOLOGY | Facility: HOSPITAL | Age: 63
End: 2023-06-08

## 2023-06-09 ENCOUNTER — HOME CARE VISIT (OUTPATIENT)
Dept: HOME HEALTH SERVICES | Facility: HOME HEALTHCARE | Age: 63
End: 2023-06-09
Payer: COMMERCIAL

## 2023-06-09 VITALS
TEMPERATURE: 97.1 F | RESPIRATION RATE: 18 BRPM | DIASTOLIC BLOOD PRESSURE: 70 MMHG | HEART RATE: 80 BPM | SYSTOLIC BLOOD PRESSURE: 140 MMHG | OXYGEN SATURATION: 98 %

## 2023-06-09 PROCEDURE — G0299 HHS/HOSPICE OF RN EA 15 MIN: HCPCS

## 2023-06-12 ENCOUNTER — HOME CARE VISIT (OUTPATIENT)
Dept: HOME HEALTH SERVICES | Facility: HOME HEALTHCARE | Age: 63
End: 2023-06-12
Payer: COMMERCIAL

## 2023-06-12 VITALS
OXYGEN SATURATION: 97 % | SYSTOLIC BLOOD PRESSURE: 120 MMHG | TEMPERATURE: 97.3 F | RESPIRATION RATE: 20 BRPM | HEART RATE: 88 BPM | DIASTOLIC BLOOD PRESSURE: 60 MMHG

## 2023-06-12 PROCEDURE — G0299 HHS/HOSPICE OF RN EA 15 MIN: HCPCS

## 2023-06-14 ENCOUNTER — TELEPHONE (OUTPATIENT)
Dept: FAMILY MEDICINE CLINIC | Facility: HOSPITAL | Age: 63
End: 2023-06-14

## 2023-06-14 ENCOUNTER — HOME CARE VISIT (OUTPATIENT)
Dept: HOME HEALTH SERVICES | Facility: HOME HEALTHCARE | Age: 63
End: 2023-06-14
Payer: COMMERCIAL

## 2023-06-14 ENCOUNTER — OFFICE VISIT (OUTPATIENT)
Dept: WOUND CARE | Facility: HOSPITAL | Age: 63
End: 2023-06-14
Payer: COMMERCIAL

## 2023-06-14 VITALS
TEMPERATURE: 96.4 F | SYSTOLIC BLOOD PRESSURE: 156 MMHG | RESPIRATION RATE: 16 BRPM | HEART RATE: 70 BPM | DIASTOLIC BLOOD PRESSURE: 70 MMHG

## 2023-06-14 DIAGNOSIS — E10.40 TYPE 1 DIABETES MELLITUS WITH DIABETIC NEUROPATHY (HCC): ICD-10-CM

## 2023-06-14 DIAGNOSIS — L97.522 DIABETIC ULCER OF OTHER PART OF LEFT FOOT ASSOCIATED WITH TYPE 1 DIABETES MELLITUS, WITH FAT LAYER EXPOSED (HCC): Primary | ICD-10-CM

## 2023-06-14 DIAGNOSIS — E10.621 DIABETIC ULCER OF OTHER PART OF LEFT FOOT ASSOCIATED WITH TYPE 1 DIABETES MELLITUS, WITH FAT LAYER EXPOSED (HCC): Primary | ICD-10-CM

## 2023-06-14 PROCEDURE — 11042 DBRDMT SUBQ TIS 1ST 20SQCM/<: CPT | Performed by: PODIATRIST

## 2023-06-14 PROCEDURE — 11045 DBRDMT SUBQ TISS EACH ADDL: CPT | Performed by: PODIATRIST

## 2023-06-14 RX ORDER — LIDOCAINE 40 MG/G
CREAM TOPICAL ONCE
Status: COMPLETED | OUTPATIENT
Start: 2023-06-14 | End: 2023-06-14

## 2023-06-14 RX ADMIN — LIDOCAINE: 40 CREAM TOPICAL at 15:38

## 2023-06-14 NOTE — PROGRESS NOTES
"Patient ID: Kattie Sandifer is a 66 y o  male Date of Birth 1960       Chief Complaint   Patient presents with   • Follow Up Wound Care Visit     Wound care       Allergies:  Cefuroxime and Amoxicillin-pot clavulanate    Diagnosis:  1  Diabetic ulcer of other part of left foot associated with type 1 diabetes mellitus, with fat layer exposed ([address] )  -     lidocaine (LMX) 4 % cream  -     Wound cleansing and dressings; Future    2  Type 1 diabetes mellitus with diabetic neuropathy (HCC)       Diagnosis ICD-10-CM Associated Orders   1  Diabetic ulcer of other part of left foot associated with type 1 diabetes mellitus, with fat layer exposed ([address] )  E10 621 lidocaine (LMX) 4 % cream    L97 522 Wound cleansing and dressings      2  Type 1 diabetes mellitus with diabetic neuropathy (HCC)  E10 40            Assessment & Plan:  1  Diabetic Veloz grade 3 ulceration plantar submetatarsal head 1 left foot, wound was debrided through subcuticular tissues, dressed with collagen, alginate dry dressing, visiting nurses will do the same 3 times a week, discontinue wound VAC, he may send this back  2   Continue with heel touch weight-bear with Darco wedge shoe, he again was reminded to not flip forward into the Darco wedge and to ambulate at the heel by lifting at the knee  3   I have started process of precertification of Apligraf for nonhealing diabetic foot ulceration  4   Patient understands and agrees with the plan and will follow-up in 1 week  Debridement   Wound 05/30/23 Foot Left;Plantar    Universal Protocol:  Consent: Verbal consent obtained  Risks and benefits: risks, benefits and alternatives were discussed  Consent given by: patient  Time out: Immediately prior to procedure a \"time out\" was called to verify the correct patient, procedure, equipment, support staff and site/side marked as required    Patient understanding: patient states understanding of the procedure being performed  Patient identity confirmed: " verbally with patient      Performed by: physician  Debridement type: surgical  Level of debridement: subcutaneous tissue  Pain control: lidocaine 4%  Pre-debridement measurements  Length (cm): 2 8  Width (cm): 2 6  Depth (cm): 0 2  Surface Area (cm^2): 7 28  Volume (cm^3): 1 46    Post-debridement measurements  Length (cm): 2 9  Width (cm): 2 7  Depth (cm): 0 5  Percent debrided: 100%  Surface Area (cm^2): 7 83  Area debrided (cm^2): 7 83  Volume (cm^3): 3 92  Tissue and other material debrided: subcutaneous tissue  Devitalized tissue debrided: biofilm, fibrin, necrotic debris and slough  Instrument(s) utilized: blade and forceps  Bleeding: small  Hemostasis obtained with: pressure  Procedural pain (0-10): insensate  Post-procedural pain: insensate   Response to treatment: procedure was tolerated well           Subjective:   Patient presents today for diabetic foot ulceration plantar aspect left foot status post tibial sesamoidectomy for osteomyelitis, visiting nurses continue with wound VAC, there is periwound maceration and some mild odor and leakage of the wound VAC although is not alarming  Patient states he is tolerating wound VAC well, he continues with Darco wedge shoe and sugars are well controlled          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)   • Insulin pump in place   • ED (erectile dysfunction) of organic origin   • Chronic pain   • Benign essential hypertension   • Cigarette nicotine dependence without complication   • Gout of ankle   • Type 1 diabetes mellitus with hyperglycemia (HCC)   • Diabetic polyneuropathy associated with type 1 diabetes mellitus ([facility] )   • Microalbuminuria due to type 1 diabetes mellitus ([facility] )   • Hypoglycemia unawareness associated with type 1 diabetes mellitus ([place] [address] )   • 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, with fat layer exposed ([place] [address] )   • Diabetic foot ulcer ([place] [address] )   • CKD (chronic kidney disease)   • Hyponatremia   • Alcohol abuse   • Nicotine abuse     Past Medical History:   Diagnosis Date   • Chronic foot ulcer ([place] [address] ) 2018   • Diabetes mellitus ([place] [address] )    • Gout    • High cholesterol    • Hypertension    • Visual impairment 2022    Cateract     Past Surgical History:   Procedure Laterality Date   • CATARACT EXTRACTION Right 2023   • COMPLEX WOUND CLOSURE TO EXTREMITY Left 2019    Procedure: COMPLEX CLOSURE LEFT CHEEK;  Surgeon: Ayo Yanez MD;  Location: [facility];  Service: Plastics   • FACIAL/NECK BIOPSY Left 2019    Procedure: EXCISION LEFT CHEEK CYST;  Surgeon: Ayo Yanez MD;  Location: [facility];  Service: Plastics   • HERNIA REPAIR Left     several times   • KNEE ARTHROSCOPY Left     torn meniscus   • WOUND DEBRIDEMENT Left 2023    Procedure: DEBRIDEMENT WOUND LEFT FOOT WOUND WITH EXCISION OF INFECTED BONE AND WOUND VAC APPLICATION;  Surgeon: Marisabel Hood DPM;  Location:  MAIN OR;  Service: Podiatry     Social History     Socioeconomic History   • Marital status: /Civil Union     Spouse name: Not on file   • Number of children: 2   • Years of education: 12   • Highest education level: High school graduate   Occupational History   • Occupation: cafeteria/kitchen at [employer]     Comment: [employer]   Tobacco Use   • Smoking status: Every Day     Packs/day: 1 00     Years: 20 00     Total pack years: 20 00     Types: Cigarettes     Last attempt to quit: 2022     Years since quittin 4   • Smokeless tobacco: Never   • Tobacco comments:     encouraged smoking cessation   Vaping Use   • Vaping Use: Never used   Substance and Sexual Activity   • Alcohol use: Yes     Comment: 3/4 beers a day   • Drug use: Never   • Sexual activity: Not Currently   Other Topics Concern   • Not on file   Social History Narrative   • Not on file     Social Determinants of Health     Financial Resource Strain: Not on file   Food Insecurity: No Food Insecurity (5/31/2023)    Hunger Vital Sign    • Worried About Running Out of Food in the Last Year: Never true    • Ran Out of Food in the Last Year: Never true   Transportation Needs: No Transportation Needs (5/31/2023)    PRAPARE - Transportation    • Lack of Transportation (Medical): No    • Lack of Transportation (Non-Medical):  No   Physical Activity: Not on file   Stress: Not on file   Social Connections: Not on file   Intimate Partner Violence: Not on file   Housing Stability: Low Risk  (5/31/2023)    Housing Stability Vital Sign    • Unable to Pay for Housing in the Last Year: No    • Number of Places Lived in the Last Year: 1    • Unstable Housing in the Last Year: No        Current Outpatient Medications:   •  atorvastatin (LIPITOR) 40 mg tablet, TAKE 1 TABLET BY MOUTH EVERY DAY, Disp: 90 tablet, Rfl: 1  •  Glucagon, rDNA, (Glucagon Emergency) 1 MG KIT, as directed, Disp: , Rfl:   •  HumaLOG 100 UNIT/ML injection, inject up to 80 units in INSULIN PUMP daily as directed by prescriber, Disp: , Rfl:   •  insulin lispro (HumaLOG) 100 units/mL injection, Use via the insulin pump, use up to 80 units daily, Disp: 80 mL, Rfl: 1  •  lisinopril (ZESTRIL) 10 mg tablet, Take 1 tablet (10 mg total) by mouth daily, Disp: 90 tablet, Rfl: 1  •  metoprolol succinate (TOPROL-XL) 100 mg 24 hr tablet, Take 1 tablet (100 mg total) by mouth daily, Disp: 90 tablet, Rfl: 1  •  nicotine (NICODERM CQ) 21 mg/24 hr TD 24 hr patch, Place 1 patch on the skin over 24 hours daily Do not start before June 2, 2023 , Disp: 28 patch, Rfl: 0  •  oxyCODONE (ROXICODONE) 10 MG TABS, Take 1 tablet (10 mg total) by mouth every 4 (four) hours as needed for severe pain Max Daily Amount: 60 mg, Disp: 10 tablet, Rfl: 0  •  traMADol (Ultram) 50 mg tablet, Take 1 tablet (50 mg total) by mouth every 6 (six) hours as needed for moderate pain, Disp: 5 tablet, Rfl: 0  No current facility-administered medications for this visit  Family History   Problem Relation Age of Onset   • Heart disease Father    • Diabetes type I Father    • Diabetes type II Mother    • No Known Problems Sister    • Alcohol abuse Brother    • Diabetes type I Brother    • No Known Problems Brother    • No Known Problems Son    • No Known Problems Daughter        Review of Systems   Constitutional: Negative for chills and fever  HENT: Negative for ear pain and sore throat  Eyes: Negative for pain and visual disturbance  Respiratory: Negative for cough and shortness of breath  Cardiovascular: Negative for chest pain and palpitations  Gastrointestinal: Negative for abdominal pain and vomiting  Genitourinary: Negative for dysuria and hematuria  Musculoskeletal: Positive for gait problem  Negative for arthralgias and back pain  Skin: Positive for color change and wound  Negative for rash  Neurological: Positive for numbness  Negative for seizures and syncope  Psychiatric/Behavioral: Negative  All other systems reviewed and are negative  Objective:  /70   Pulse 70   Temp (!) 96 4 °F (35 8 °C)   Resp 16     Physical Exam  Constitutional:       Appearance: Normal appearance  He is normal weight  HENT:      Head: Normocephalic and atraumatic  Right Ear: External ear normal       Left Ear: External ear normal       Nose: Nose normal       Mouth/Throat:      Mouth: Mucous membranes are moist    Eyes:      Conjunctiva/sclera: Conjunctivae normal    Cardiovascular:      Pulses: Normal pulses  Dorsalis pedis pulses are 2+ on the right side and 2+ on the left side  Posterior tibial pulses are 2+ on the right side and 2+ on the left side     Pulmonary:      Effort: Pulmonary effort is normal  Musculoskeletal:      Cervical back: Normal range of motion  Right lower leg: No edema  Left lower leg: No edema  Skin:     General: Skin is warm and dry  Capillary Refill: Capillary refill takes less than 2 seconds  Comments: See detailed wound assessment   Neurological:      General: No focal deficit present  Mental Status: He is alert and oriented to person, place, and time  Mental status is at baseline  Sensory: Sensory deficit present  Coordination: Coordination abnormal       Gait: Gait abnormal       Deep Tendon Reflexes: Reflexes abnormal    Psychiatric:         Mood and Affect: Mood normal          Behavior: Behavior normal          Thought Content: Thought content normal          Judgment: Judgment normal            Wound 05/30/23 Foot Left;Plantar (Active)   Wound Image   06/14/23 1516   Wound Description Pink;Granulation tissue;Slough 06/14/23 1516   Dee Dee-wound Assessment White; Maceration 06/14/23 1516   Wound Length (cm) 2 8 cm 06/14/23 1516   Wound Width (cm) 2 6 cm 06/14/23 1516   Wound Depth (cm) 0 2 cm 06/14/23 1516   Wound Surface Area (cm^2) 7 28 cm^2 06/14/23 1516   Wound Volume (cm^3) 1  456 cm^3 06/14/23 1516   Calculated Wound Volume (cm^3) 1 46 cm^3 06/14/23 1516   Change in Wound Size % 53 65 06/14/23 1516   Drainage Amount Small 06/14/23 1516   Drainage Description Serosanguineous; Foul smelling 06/14/23 1516   Non-staged Wound Description Full thickness 06/14/23 1516   Treatments Cleansed 06/14/23 1516   Wound packed? No 06/14/23 1516   Dressing Changed Changed 06/14/23 1516   Patient Tolerance Tolerated well 06/14/23 1516   Dressing Status Intact 06/14/23 1516          Results from last 6 Months   Lab Units 04/03/23  1038   WOUND CULTURE  3+ Growth of Methicillin Resistant Staphylococcus aureus*  3+ Growth of       XR foot 3+ vw left    Result Date: 5/31/2023  Narrative: LEFT FOOT INDICATION:  Post operative excision of tibial sesamoid   COMPARISON: Left foot radiographs and MRI 5/29/2023 VIEWS:  XR FOOT 3+ VW LEFT Images: 3 FINDINGS: Interval resection of the tibial sesamoid  Packing/bandage material seen along the postoperative bed  There is no acute fracture or dislocation  No significant degenerative changes  No lytic or blastic osseous lesion  Wound VAC noted along the dorsal midfoot  Atherosclerotic vascular calcifications  Impression: Postsurgical changes as above  No acute osseous abnormality  Workstation performed: [id number]     XR foot 2 vw left    Result Date: 5/30/2023  Narrative: C-ARM -left foot INDICATION: left foot   Procedure guidance  COMPARISON: Left foot radiographs 5/29/2023 TECHNIQUE: FLUOROSCOPY TIME:   13 2 seconds 2 FLUOROSCOPIC IMAGES FINDINGS: Fluoroscopic guidance provided for procedure guidance  Osseous and soft tissue detail limited by technique  Impression: Fluoroscopic guidance provided for procedure guidance  Please refer to the separate procedure notes for additional details  Workstation performed: [id number]     XR foot 3+ views LEFT    Result Date: 5/30/2023  Narrative: LEFT FOOT INDICATION:   infection  COMPARISON:  None VIEWS:  XR FOOT 3+ VW LEFT Images: 3 FINDINGS: There is no acute fracture or dislocation  No significant degenerative changes  No lytic or blastic osseous lesion  Ulceration seen at the plantar aspect of the first MTP  No erosive changes seen Mineralized vasculature Bipartite medial sesamoid    Impression: No erosive changes Ulceration at the plantar aspect of the foot No radiographic findings of osteomyelitis  MRI may be performed as needed Workstation performed: [id number]     MRI foot/forefoot toes left wo contrast    Result Date: 5/30/2023  Narrative: MRI LEFT FOOT INDICATION:   Osteomyelitis (pending surgical decision)  Dr Donzell Kussmaul note from 5/24/2023 was reviewed  Patient has a first submetatarsal foot ulcer, with wound debrided on that date  There is concern for osteomyelitis  COMPARISON: Left foot plain films from 5/29/2023  TECHNIQUE:  Multiplanar/multisequence MR of the left foot was performed  FINDINGS: SUBCUTANEOUS TISSUES: There is a first submetatarsal ulcer (series 8 image 23 ) BONES: Associated with the first submetatarsal ulcer, there is confluent hypointense marrow signal in the proximal half of the bipartite tibial sesamoid bone, consistent with osteomyelitis (series 7 image 23 and series 6 images 20-21 ) The distal half of the tibial sesamoid is not involved  The fibular sesamoid is not involved  There is also no evidence of osteomyelitis in the first metatarsal head or hallux proximal phalanx base  There is focal marrow edema on the plantar aspect of the third metatarsal (series 6 image 13,) and second metatarsal (series 6 image 14 ) These are not adjacent to ulcers, therefore are probably stress reaction due to altered weightbearing rather than osteomyelitis  FIRST MTP JOINT: Physiologic amount of fluid in this joint  SESAMOID BONES: See above  OTHER ARTICULAR SURFACE:  Normal  PLANTAR FASCIA:  Intact  LISFRANC LIGAMENT:  Intact  FOREFOOT TENDONS: Intact  INTERMETATARSAL REGIONS: No bursitis or Blanton's neuroma  MUSCULATURE: Intact  Impression: Associated with the first submetatarsal ulcer (series 8 image 23), there is confluent hypointense marrow signal in the proximal half of the bipartite tibial sesamoid bone, consistent with osteomyelitis (series 7 image 23 and series 6 images 20-21 ) No evidence of osteomyelitis in the rest of the sesamoids, nor in the first metatarsal head and hallux proximal phalanx base  There is focal marrow edema on the plantar aspect of the third metatarsal (series 6 image 13,) and second metatarsal (series 6 image 14 ) These are not adjacent to ulcers, therefore are probably stress reaction due to altered weightbearing rather than osteomyelitis   Workstation performed: [id number]     XR foot 3+ vw left    Result Date: 5/19/2023  Narrative: "LEFT FOOT INDICATION:   E10 621: Type 1 diabetes mellitus with foot ulcer L97 522: Non-pressure chronic ulcer of other part of left foot with fat layer exposed  COMPARISON: 11/17/2022 VIEWS:  XR FOOT 3+ VW LEFT FINDINGS: There is no acute fracture or dislocation  No periosteal reaction or cortical destruction to suggest osteomyelitis  No lytic or blastic osseous lesion  Plantar forefoot soft tissue ulceration noted  Impression: Plantar forefoot soft tissue ulceration  No radiographic evidence of osteomyelitis  Workstation performed: [id number]       Wound Instructions:  Orders Placed This Encounter   Procedures   • Wound cleansing and dressings     Wound cleansing and dressings      Wash your hands with soap and water  Remove old dressing, discard into plastic bag and place in trash  Cleanse the wound with soap and water prior to applying a clean dressing  Do not use tissue or cotton balls  Do not scrub the wound  Pat dry using gauze  Shower yes with protection  Apply TUCK puracol in depth area  and then apply alginate to the left foot wound  Cover with ABD  Secure with [facility] WEST AND TAPE  Ace wrap to left foot    Change dressing 3XWEEK    RETURN IN 1 WEEK    VAC DC  SENT VAC BACK  Continue visiting nurses for wound care     Standing Status:   Future     Standing Expiration Date:   6/14/2024         Luz Maria Reveles, DPM, DPM, FACFAS    Portions of the record may have been created with voice recognition software  Occasional wrong word or \"sound a like\" substitutions may have occurred due to the inherent limitations of voice recognition software  Read the chart carefully and recognize, using context, where substitutions have occurred      "

## 2023-06-14 NOTE — PATIENT INSTRUCTIONS
Orders Placed This Encounter   Procedures    Wound cleansing and dressings     Wound cleansing and dressings      Wash your hands with soap and water  Remove old dressing, discard into plastic bag and place in trash  Cleanse the wound with soap and water prior to applying a clean dressing  Do not use tissue or cotton balls  Do not scrub the wound  Pat dry using gauze  Shower yes with protection  Apply TUCK puracol in depth area  and then apply alginate to the left foot wound  Cover with ABD  Secure with [facility] WEST AND TAPE  Ace wrap to left foot    Change dressing 3XWEEK    RETURN IN 1 WEEK    VAC DC  SENT VAC BACK    Continue visiting nurses for wound care     Standing Status:   Future     Standing Expiration Date:   6/14/2024

## 2023-06-15 ENCOUNTER — HOME CARE VISIT (OUTPATIENT)
Dept: HOME HEALTH SERVICES | Facility: HOME HEALTHCARE | Age: 63
End: 2023-06-15
Payer: COMMERCIAL

## 2023-06-15 NOTE — TELEPHONE ENCOUNTER
Patient outside of TCM window  I spoke with patient, who states he is doing fine  Does not want to schedule hospital follow-up at this time  Advised to call and let us know if he has any problems or concerns

## 2023-06-15 NOTE — CASE COMMUNICATION
Ship to [facility]  Home   Insurance [employer]     Wound 1 Left Foot      Full   XX         All items are ordered by the each unless otherwise noted    PLEASE DO NOT ORDER BY THE BOX  Request specific quantity needed  For Private Insurances order should be for a 2 week period  For [facility] patients order should be for a 1 week period    Dry Dressings   (Nonsterile gauze not covered by private insurance)  (Sterile gauze may be req uested for insurance rather than nonsterile gauze)  Gauze Neal stretch roll 4inch n/s 12 rolls per unit  [id number]          1  ABD 5x9 [id number]          10      Calcium Alginates  (In place of Aquacel or Maxsorb)  Alginate 4x4 [id number]        2    Sub for Puracol:  TUCK Puracol          2    Miscellaneous Wound Products  4in Ace [id number]        4

## 2023-06-16 ENCOUNTER — HOME CARE VISIT (OUTPATIENT)
Dept: HOME HEALTH SERVICES | Facility: HOME HEALTHCARE | Age: 63
End: 2023-06-16
Payer: COMMERCIAL

## 2023-06-16 VITALS
TEMPERATURE: 98.5 F | HEART RATE: 90 BPM | OXYGEN SATURATION: 97 % | SYSTOLIC BLOOD PRESSURE: 130 MMHG | DIASTOLIC BLOOD PRESSURE: 80 MMHG

## 2023-06-16 PROCEDURE — 10330064 SALINE, IRR SOL STR 100ML (48/CS) MGM37

## 2023-06-16 PROCEDURE — 10330064 TAPE, ADHSV TRANSPORE WHT 1" (12RL/BX 10

## 2023-06-16 PROCEDURE — G0300 HHS/HOSPICE OF LPN EA 15 MIN: HCPCS

## 2023-06-16 PROCEDURE — 10330064 BANDAGE, ELAS SLF-CLSR PREM N/S LF 4X5YD

## 2023-06-16 PROCEDURE — 10330064 SPONGE, GAUZE 8PLY N/S 4"X4" (200/PK 20P

## 2023-06-16 PROCEDURE — 10330064 BANDAGE, GAUZE COTTON  6PLY STR 4"X4YDS

## 2023-06-17 ENCOUNTER — HOME CARE VISIT (OUTPATIENT)
Dept: HOME HEALTH SERVICES | Facility: HOME HEALTHCARE | Age: 63
End: 2023-06-17
Payer: COMMERCIAL

## 2023-06-17 NOTE — CASE COMMUNICATION
1630  Rebeca Jose Ciro Patient called into office regarding his foot wound  States he had wound care yesterday and his dressing is already discolored from drainage and starting to feel wet  He is asking for SN visit tomorrow (Sunday) instead of Monday  Visit moved to Sunday for wound care

## 2023-06-18 ENCOUNTER — HOME CARE VISIT (OUTPATIENT)
Dept: HOME HEALTH SERVICES | Facility: HOME HEALTHCARE | Age: 63
End: 2023-06-18
Payer: COMMERCIAL

## 2023-06-18 VITALS
DIASTOLIC BLOOD PRESSURE: 82 MMHG | RESPIRATION RATE: 16 BRPM | SYSTOLIC BLOOD PRESSURE: 142 MMHG | TEMPERATURE: 97 F | OXYGEN SATURATION: 98 % | HEART RATE: 80 BPM

## 2023-06-18 PROCEDURE — G0299 HHS/HOSPICE OF RN EA 15 MIN: HCPCS

## 2023-06-18 NOTE — CASE COMMUNICATION
Ship to "[employer] Administrators     Wound 1 L foot      Full ___1      Alginate 4x4 [id number] ____1

## 2023-06-19 ENCOUNTER — HOME CARE VISIT (OUTPATIENT)
Dept: HOME HEALTH SERVICES | Facility: HOME HEALTHCARE | Age: 63
End: 2023-06-19
Payer: COMMERCIAL

## 2023-06-19 VITALS
DIASTOLIC BLOOD PRESSURE: 86 MMHG | OXYGEN SATURATION: 98 % | SYSTOLIC BLOOD PRESSURE: 144 MMHG | RESPIRATION RATE: 16 BRPM | TEMPERATURE: 96.4 F

## 2023-06-19 PROCEDURE — 10330064 DRESSING, CALCIUM ALGINATE SHEET 4"X4.75

## 2023-06-21 ENCOUNTER — HOME CARE VISIT (OUTPATIENT)
Dept: HOME HEALTH SERVICES | Facility: HOME HEALTHCARE | Age: 63
End: 2023-06-21
Payer: COMMERCIAL

## 2023-06-21 ENCOUNTER — OFFICE VISIT (OUTPATIENT)
Dept: WOUND CARE | Facility: HOSPITAL | Age: 63
End: 2023-06-21
Payer: COMMERCIAL

## 2023-06-21 VITALS
RESPIRATION RATE: 18 BRPM | TEMPERATURE: 97.8 F | DIASTOLIC BLOOD PRESSURE: 80 MMHG | SYSTOLIC BLOOD PRESSURE: 128 MMHG | HEART RATE: 60 BPM

## 2023-06-21 DIAGNOSIS — L97.522 DIABETIC ULCER OF OTHER PART OF LEFT FOOT ASSOCIATED WITH TYPE 1 DIABETES MELLITUS, WITH FAT LAYER EXPOSED (HCC): Primary | ICD-10-CM

## 2023-06-21 DIAGNOSIS — E10.621 DIABETIC ULCER OF OTHER PART OF LEFT FOOT ASSOCIATED WITH TYPE 1 DIABETES MELLITUS, WITH FAT LAYER EXPOSED (HCC): Primary | ICD-10-CM

## 2023-06-21 DIAGNOSIS — E10.40 TYPE 1 DIABETES MELLITUS WITH DIABETIC NEUROPATHY (HCC): ICD-10-CM

## 2023-06-21 PROCEDURE — 11042 DBRDMT SUBQ TIS 1ST 20SQCM/<: CPT | Performed by: PODIATRIST

## 2023-06-21 PROCEDURE — 11045 DBRDMT SUBQ TISS EACH ADDL: CPT | Performed by: PODIATRIST

## 2023-06-21 PROCEDURE — 99213 OFFICE O/P EST LOW 20 MIN: CPT | Performed by: PODIATRIST

## 2023-06-21 RX ORDER — LIDOCAINE HYDROCHLORIDE 40 MG/ML
5 SOLUTION TOPICAL ONCE
Status: COMPLETED | OUTPATIENT
Start: 2023-06-21 | End: 2023-06-21

## 2023-06-21 RX ADMIN — LIDOCAINE HYDROCHLORIDE 5 ML: 40 SOLUTION TOPICAL at 14:06

## 2023-06-21 NOTE — PROGRESS NOTES
Patient ID: Melissa Grimaldo is a 66 y o  male Date of Birth 1960       Chief Complaint   Patient presents with   • Follow Up Wound Care Visit     LEFT PLANTAR FOOT WOUND       Allergies:  Cefuroxime and Amoxicillin-pot clavulanate    Diagnosis:  1  Diabetic ulcer of other part of left foot associated with type 1 diabetes mellitus, with fat layer exposed ([facility] [address] )  -     lidocaine (XYLOCAINE) 4 % topical solution 5 mL  -     Wound cleansing and dressings; Future  -     Wound off loading; Future  -     Wound home care; Future    2  Type 1 diabetes mellitus with diabetic neuropathy (HCC)  -     lidocaine (XYLOCAINE) 4 % topical solution 5 mL  -     Wound cleansing and dressings; Future  -     Wound off loading; Future  -     Wound home care; Future       Diagnosis ICD-10-CM Associated Orders   1  Diabetic ulcer of other part of left foot associated with type 1 diabetes mellitus, with fat layer exposed ([facility] [address] )  E10 621 lidocaine (XYLOCAINE) 4 % topical solution 5 mL    L97 522 Wound cleansing and dressings     Wound off loading     Wound home care      2  Type 1 diabetes mellitus with diabetic neuropathy (HCC)  E10 40 lidocaine (XYLOCAINE) 4 % topical solution 5 mL     Wound cleansing and dressings     Wound off loading     Wound home care           Assessment & Plan:  1  Diabetic Veloz grade 3 ulceration plantar submetatarsal head 1 left foot, wound was debrided through subcuticular tissues and dressed with Mesalt packing for a week, silver alginate, dry dressing, visiting nurses has been reduced to 2 times a week, patient was shown how to change the outer dressing for drainage  We use prophase as he had mild green drainage and that will be done by visiting nurses at the time of dressing changes as well  2   Patient with new DTI lateral fifth metatarsal head left foot, Skin-Prep and foam was incorporated into dressing    Patient was instructed on how to ambulate properly with Darco wedge shoe, he states he was trying "to walk on the outside of his foot which is likely what causes DTI, explained to him he needs to put his weight on his heel and ambulate from the level of his knee as opposed to trying to have a heel toe gait  3   We will follow-up with insurance on precertification for Apligraf for nonhealing diabetic foot ulceration  4   Today I reviewed frequent elevation, low-sodium diet, proper protein intake, proper glycemic control, smoking and alcohol cessation discussed,  patient understands and agrees with the plan and will follow-up in 1 week  Debridement   Wound 05/30/23 Foot Left;Plantar    Universal Protocol:  Consent: Verbal consent obtained  Consent given by: patient  Time out: Immediately prior to procedure a \"time out\" was called to verify the correct patient, procedure, equipment, support staff and site/side marked as required    Patient understanding: patient states understanding of the procedure being performed  Patient identity confirmed: verbally with patient      Performed by: physician  Debridement type: surgical  Level of debridement: subcutaneous tissue  Pain control: lidocaine 4%  Pre-debridement measurements  Length (cm): 2 4  Width (cm): 3 2  Depth (cm): 0 3  Surface Area (cm^2): 7 68  Volume (cm^3): 2 3    Post-debridement measurements  Length (cm): 2 4  Width (cm): 3 3  Depth (cm): 0 6  Percent debrided: 100%  Surface Area (cm^2): 7 92  Area debrided (cm^2): 7 92  Volume (cm^3): 4 75  Tissue and other material debrided: subcutaneous tissue  Devitalized tissue debrided: biofilm, callus, fibrin, necrotic debris and slough  Instrument(s) utilized: blade and forceps  Bleeding: small  Hemostasis obtained with: pressure  Procedural pain (0-10): insensate  Post-procedural pain: insensate   Response to treatment: procedure was tolerated well                   Subjective:   Patient presents today for care of diabetic foot ulceration plantar left foot, he states he now has some pain and discoloration on " the outside of his foot, he thinks it is from trying to walk on the outside of his foot and avoid pressure under his wound          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 ([facility])   • Insulin pump in place   • ED (erectile dysfunction) of organic origin   • Chronic pain   • Benign essential hypertension   • Cigarette nicotine dependence without complication   • Gout of ankle   • Type 1 diabetes mellitus with hyperglycemia ([facility])   • Diabetic polyneuropathy associated with type 1 diabetes mellitus ([facility] [address] )   • Microalbuminuria due to type 1 diabetes mellitus ([facility] [address] )   • Hypoglycemia unawareness associated with type 1 diabetes mellitus ([facility] [address] )   • 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, with fat layer exposed ([facility] [address] )   • Diabetic foot ulcer ([facility] [address] )   • CKD (chronic kidney disease)   • Hyponatremia   • Alcohol abuse   • Nicotine abuse     Past Medical History:   Diagnosis Date   • Chronic foot ulcer ([facility] [address] ) 09/07/2018   • Diabetes mellitus ([facility] [address] )    • Gout    • High cholesterol    • Hypertension    • Visual impairment 11/1/2022    Cateract     Past Surgical History:   Procedure Laterality Date   • CATARACT EXTRACTION Right 01/2023   • COMPLEX WOUND CLOSURE TO EXTREMITY Left 07/18/2019    Procedure: COMPLEX CLOSURE LEFT CHEEK;  Surgeon: Rebecca Womack MD;  Location:  MAIN OR;  Service: Plastics   • FACIAL/NECK BIOPSY Left 07/18/2019    Procedure: EXCISION LEFT CHEEK CYST;  Surgeon: Rebecca Womack MD;  Location:  MAIN OR;  Service: Plastics   • HERNIA REPAIR Left     several times   • KNEE ARTHROSCOPY Left     torn meniscus   • WOUND DEBRIDEMENT Left 5/30/2023    Procedure: DEBRIDEMENT WOUND LEFT FOOT WOUND WITH EXCISION OF INFECTED BONE AND WOUND VAC APPLICATION;  Surgeon: Martha Viera DPM;  Location:  MAIN OR;  Service: Podiatry     Social History     Socioeconomic History   • Marital status: /Civil Union     Spouse name: None   • Number of children: 2   • Years of education: 12   • Highest education level: High school graduate   Occupational History   • Occupation: cafeteria/kitchen at [employer]     Comment: [employer]   Tobacco Use   • Smoking status: Every Day     Packs/day: 1 00     Years: 20 00     Total pack years: 20 00     Types: Cigarettes     Last attempt to quit: 2022     Years since quittin 4   • Smokeless tobacco: Never   • Tobacco comments:     encouraged smoking cessation   Vaping Use   • Vaping Use: Never used   Substance and Sexual Activity   • Alcohol use: Yes     Comment: 3/4 beers a day   • Drug use: Never   • Sexual activity: Not Currently   Other Topics Concern   • None   Social History Narrative   • None     Social Determinants of Health     Financial Resource Strain: Not on file   Food Insecurity: No Food Insecurity (2023)    Hunger Vital Sign    • Worried About Running Out of Food in the Last Year: Never true    • Ran Out of Food in the Last Year: Never true   Transportation Needs: No Transportation Needs (2023)    PRAPARE - Transportation    • Lack of Transportation (Medical): No    • Lack of Transportation (Non-Medical):  No   Physical Activity: Not on file   Stress: Not on file   Social Connections: Not on file   Intimate Partner Violence: Not on file   Housing Stability: Low Risk  (2023)    Housing Stability Vital Sign    • Unable to Pay for Housing in the Last Year: No    • Number of Places Lived in the Last Year: 1    • Unstable Housing in the Last Year: No        Current Outpatient Medications:   •  atorvastatin (LIPITOR) 40 mg tablet, TAKE 1 TABLET BY MOUTH EVERY DAY, Disp: 90 tablet, Rfl: 1  •  Glucagon, rDNA, (Glucagon Emergency) 1 MG KIT, as directed, Disp: , Rfl:   •  HumaLOG 100 UNIT/ML injection, inject up to 80 units in INSULIN PUMP daily as directed by prescriber, Disp: , Rfl:   •  insulin lispro (HumaLOG) 100 units/mL injection, Use via the insulin pump, use up to 80 units daily, Disp: 80 mL, Rfl: 1  •  lisinopril (ZESTRIL) 10 mg tablet, Take 1 tablet (10 mg total) by mouth daily, Disp: 90 tablet, Rfl: 1  •  metoprolol succinate (TOPROL-XL) 100 mg 24 hr tablet, Take 1 tablet (100 mg total) by mouth daily, Disp: 90 tablet, Rfl: 1  •  nicotine (NICODERM CQ) 21 mg/24 hr TD 24 hr patch, Place 1 patch on the skin over 24 hours daily Do not start before June 2, 2023 , Disp: 28 patch, Rfl: 0  •  oxyCODONE (ROXICODONE) 10 MG TABS, Take 1 tablet (10 mg total) by mouth every 4 (four) hours as needed for severe pain Max Daily Amount: 60 mg, Disp: 10 tablet, Rfl: 0  •  traMADol (Ultram) 50 mg tablet, Take 1 tablet (50 mg total) by mouth every 6 (six) hours as needed for moderate pain, Disp: 5 tablet, Rfl: 0  No current facility-administered medications for this visit  Family History   Problem Relation Age of Onset   • Heart disease Father    • Diabetes type I Father    • Diabetes type II Mother    • No Known Problems Sister    • Alcohol abuse Brother    • Diabetes type I Brother    • No Known Problems Brother    • No Known Problems Son    • No Known Problems Daughter        Review of Systems   Constitutional: Negative for chills and fever  HENT: Negative for ear pain and sore throat  Eyes: Negative for pain and visual disturbance  Respiratory: Negative for cough and shortness of breath  Cardiovascular: Negative for chest pain and palpitations  Gastrointestinal: Negative for abdominal pain and vomiting  Genitourinary: Negative for dysuria and hematuria  Musculoskeletal: Positive for gait problem  Negative for arthralgias and back pain  Skin: Positive for color change and wound  Negative for rash     Neurological: Positive for numbness  Negative for seizures and syncope  Psychiatric/Behavioral: Negative  All other systems reviewed and are negative  Objective:  /80   Pulse 60   Temp 97 8 °F (36 6 °C)   Resp 18     Physical Exam  Constitutional:       Appearance: Normal appearance  He is normal weight  HENT:      Head: Normocephalic and atraumatic  Right Ear: External ear normal       Left Ear: External ear normal       Nose: Nose normal       Mouth/Throat:      Mouth: Mucous membranes are moist       Pharynx: Oropharynx is clear  Eyes:      Conjunctiva/sclera: Conjunctivae normal    Cardiovascular:      Pulses: Normal pulses  Dorsalis pedis pulses are 2+ on the right side and 2+ on the left side  Posterior tibial pulses are 2+ on the right side and 2+ on the left side  Pulmonary:      Effort: Pulmonary effort is normal    Musculoskeletal:      Cervical back: Normal range of motion  Right lower leg: No edema  Left lower leg: No edema  Skin:     General: Skin is warm and dry  Capillary Refill: Capillary refill takes less than 2 seconds  Comments: See detailed wound assessment   Neurological:      General: No focal deficit present  Mental Status: He is alert and oriented to person, place, and time  Mental status is at baseline  Sensory: Sensory deficit present  Coordination: Coordination abnormal       Gait: Gait abnormal    Psychiatric:         Mood and Affect: Mood normal          Behavior: Behavior normal          Thought Content:  Thought content normal          Judgment: Judgment normal              Wound 05/30/23 Foot Left;Plantar (Active)   Wound Image   06/21/23 1414   Wound Description Slough;Pink;Yellow 06/21/23 1358   Dee Dee-wound Assessment Maceration;Callus 06/21/23 1358   Wound Length (cm) 2 4 cm 06/21/23 1358   Wound Width (cm) 3 2 cm 06/21/23 1358   Wound Depth (cm) 0 3 cm 06/21/23 1358   Wound Surface Area (cm^2) 7 68 cm^2 06/21/23 1358   Wound Volume (cm^3) 2  304 cm^3 06/21/23 1358   Calculated Wound Volume (cm^3) 2 3 cm^3 06/21/23 1358   Change in Wound Size % 26 98 06/21/23 1358   Drainage Amount Moderate 06/21/23 1358   Drainage Description Tan;Green;Yellow 06/21/23 1358   Non-staged Wound Description Full thickness 06/21/23 1358   Treatments Cleansed 06/14/23 1516   Wound packed? No 06/14/23 1516   Dressing Changed Changed 06/14/23 1516   Patient Tolerance Tolerated well 06/14/23 1516   Dressing Status Intact; Leaking (Comment) 06/21/23 1358       Wound 06/21/23 Pressure Injury Foot Left;Lateral (Active)   Wound Image   06/21/23 1415   Wound Description Black 06/21/23 1415   Pressure Injury Stage DTPI 06/21/23 1415   Dee Dee-wound Assessment Intact 06/21/23 1415   Wound Length (cm) 1 2 cm 06/21/23 1415   Wound Width (cm) 1 cm 06/21/23 1415   Wound Depth (cm) 0 cm 06/21/23 1415   Wound Surface Area (cm^2) 1 2 cm^2 06/21/23 1415   Wound Volume (cm^3) 0 cm^3 06/21/23 1415   Calculated Wound Volume (cm^3) 0 cm^3 06/21/23 1415   Drainage Amount None 06/21/23 1415   Non-staged Wound Description Not applicable 05/87/57 8649   Dressing Status Other (Comment) 06/21/23 1415          Results from last 6 Months   Lab Units 04/03/23  1038   WOUND CULTURE  3+ Growth of Methicillin Resistant Staphylococcus aureus*  3+ Growth of       XR foot 3+ vw left    Result Date: 5/31/2023  Narrative: LEFT FOOT INDICATION:  Post operative excision of tibial sesamoid  COMPARISON: Left foot radiographs and MRI 5/29/2023 VIEWS:  XR FOOT 3+ VW LEFT Images: 3 FINDINGS: Interval resection of the tibial sesamoid  Packing/bandage material seen along the postoperative bed  There is no acute fracture or dislocation  No significant degenerative changes  No lytic or blastic osseous lesion  Wound VAC noted along the dorsal midfoot  Atherosclerotic vascular calcifications  Impression: Postsurgical changes as above  No acute osseous abnormality   Workstation performed: [id number]     XR foot 2 vw left    Result Date: 5/30/2023  Narrative: C-ARM -left foot INDICATION: left foot   Procedure guidance  COMPARISON: Left foot radiographs 5/29/2023 TECHNIQUE: FLUOROSCOPY TIME:   13 2 seconds 2 FLUOROSCOPIC IMAGES FINDINGS: Fluoroscopic guidance provided for procedure guidance  Osseous and soft tissue detail limited by technique  Impression: Fluoroscopic guidance provided for procedure guidance  Please refer to the separate procedure notes for additional details  Workstation performed: [id number]     XR foot 3+ views LEFT    Result Date: 5/30/2023  Narrative: LEFT FOOT INDICATION:   infection  COMPARISON:  None VIEWS:  XR FOOT 3+ VW LEFT Images: 3 FINDINGS: There is no acute fracture or dislocation  No significant degenerative changes  No lytic or blastic osseous lesion  Ulceration seen at the plantar aspect of the first MTP  No erosive changes seen Mineralized vasculature Bipartite medial sesamoid    Impression: No erosive changes Ulceration at the plantar aspect of the foot No radiographic findings of osteomyelitis  MRI may be performed as needed Workstation performed: [id number]     MRI foot/forefoot toes left wo contrast    Result Date: 5/30/2023  Narrative: MRI LEFT FOOT INDICATION:   Osteomyelitis (pending surgical decision)  Dr Baker General note from 5/24/2023 was reviewed  Patient has a first submetatarsal foot ulcer, with wound debrided on that date  There is concern for osteomyelitis  COMPARISON: Left foot plain films from 5/29/2023  TECHNIQUE:  Multiplanar/multisequence MR of the left foot was performed  FINDINGS: SUBCUTANEOUS TISSUES: There is a first submetatarsal ulcer (series 8 image 23 ) BONES: Associated with the first submetatarsal ulcer, there is confluent hypointense marrow signal in the proximal half of the bipartite tibial sesamoid bone, consistent with osteomyelitis (series 7 image 23 and series 6 images 20-21 ) The distal half of the tibial sesamoid is not involved   The fibular sesamoid is not involved  There is also no evidence of osteomyelitis in the first metatarsal head or hallux proximal phalanx base  There is focal marrow edema on the plantar aspect of the third metatarsal (series 6 image 13,) and second metatarsal (series 6 image 14 ) These are not adjacent to ulcers, therefore are probably stress reaction due to altered weightbearing rather than osteomyelitis  FIRST MTP JOINT: Physiologic amount of fluid in this joint  SESAMOID BONES: See above  OTHER ARTICULAR SURFACE:  Normal  PLANTAR FASCIA:  Intact  LISFRANC LIGAMENT:  Intact  FOREFOOT TENDONS: Intact  INTERMETATARSAL REGIONS: No bursitis or Blanton's neuroma  MUSCULATURE: Intact  Impression: Associated with the first submetatarsal ulcer (series 8 image 23), there is confluent hypointense marrow signal in the proximal half of the bipartite tibial sesamoid bone, consistent with osteomyelitis (series 7 image 23 and series 6 images 20-21 ) No evidence of osteomyelitis in the rest of the sesamoids, nor in the first metatarsal head and hallux proximal phalanx base  There is focal marrow edema on the plantar aspect of the third metatarsal (series 6 image 13,) and second metatarsal (series 6 image 14 ) These are not adjacent to ulcers, therefore are probably stress reaction due to altered weightbearing rather than osteomyelitis  Workstation performed: [id number]       Wound Instructions:  Orders Placed This Encounter   Procedures   • Wound cleansing and dressings     LEFT PLANTAR FOOT  Wash your hands with soap and water  Remove old dressing, discard into plastic bag and place in trash  Cleanse the wound with PROPHASECLEANSER prior to applying a clean dressing  Do not use tissue or cotton balls  Do not scrub the wound  Pat dry using gauze  Shower yes with protection    Apply TUCK MESALT in [id number] [place] [address] (Do not over pack, Sent with patient)  and then apply SILVER ALGINATE to the left foot wound      Cover with "ABD  Secure with [facility] AND TAPE  Ace wrap to left foot     Change dressing 3XWEEK  Patient will change over dressing on off days of VNA or as needed due to drainage  RETURN IN 1 WEEK with Dr Marily Martin    The dressing above applied today at [facility]          Standing Status:   Future     Standing Expiration Date:   6/21/2024   • Wound off loading     CONTINUE TO WEAR WEDGE SHOE  Standing Status:   Future     Standing Expiration Date:   6/21/2024   • Wound home care     Continue visiting nurses for wound care     Standing Status:   Future     Standing Expiration Date:   6/21/2024         Martha Viera DPM, DPM, FACFAS    Portions of the record may have been created with voice recognition software  Occasional wrong word or \"sound a like\" substitutions may have occurred due to the inherent limitations of voice recognition software  Read the chart carefully and recognize, using context, where substitutions have occurred      "

## 2023-06-21 NOTE — PATIENT INSTRUCTIONS
Orders Placed This Encounter   Procedures    Wound cleansing and dressings     LEFT PLANTAR FOOT  Wash your hands with soap and water  Remove old dressing, discard into plastic bag and place in trash  Cleanse the wound with PROPHASECLEANSER prior to applying a clean dressing  Do not use tissue or cotton balls  Do not scrub the wound  Pat dry using gauze  Shower yes with protection    Apply TUCK MESALT in [id number] [facility] (Do not over pack, Sent with patient)  and then apply SILVER ALGINATE to the left foot wound  Cover with ABD  Secure with [facility] AND TAPE  Ace wrap to left foot     Change dressing 3XWEEK  Patient will change over dressing on off days of VNA or as needed due to drainage  RETURN IN 1 WEEK with Dr Marily Martin    The dressing above applied today at [facility]  Standing Status:   Future     Standing Expiration Date:   6/21/2024    Wound off loading     CONTINUE TO WEAR WEDGE SHOE       Standing Status:   Future     Standing Expiration Date:   6/21/2024    Wound home care     Continue visiting nurses for wound care     Standing Status:   Future     Standing Expiration Date:   6/21/2024

## 2023-06-23 ENCOUNTER — HOME CARE VISIT (OUTPATIENT)
Dept: HOME HEALTH SERVICES | Facility: HOME HEALTHCARE | Age: 63
End: 2023-06-23
Payer: COMMERCIAL

## 2023-06-23 VITALS
SYSTOLIC BLOOD PRESSURE: 160 MMHG | OXYGEN SATURATION: 95 % | RESPIRATION RATE: 20 BRPM | DIASTOLIC BLOOD PRESSURE: 80 MMHG | HEART RATE: 108 BPM

## 2023-06-23 PROCEDURE — G0299 HHS/HOSPICE OF RN EA 15 MIN: HCPCS

## 2023-06-26 ENCOUNTER — HOME CARE VISIT (OUTPATIENT)
Dept: HOME HEALTH SERVICES | Facility: HOME HEALTHCARE | Age: 63
End: 2023-06-26
Payer: COMMERCIAL

## 2023-06-26 VITALS
OXYGEN SATURATION: 98 % | DIASTOLIC BLOOD PRESSURE: 68 MMHG | TEMPERATURE: 97.8 F | HEART RATE: 92 BPM | SYSTOLIC BLOOD PRESSURE: 140 MMHG | RESPIRATION RATE: 18 BRPM

## 2023-06-26 DIAGNOSIS — L97.522 DIABETIC ULCER OF OTHER PART OF LEFT FOOT ASSOCIATED WITH TYPE 2 DIABETES MELLITUS, WITH FAT LAYER EXPOSED (HCC): Primary | ICD-10-CM

## 2023-06-26 DIAGNOSIS — E11.621 DIABETIC ULCER OF OTHER PART OF LEFT FOOT ASSOCIATED WITH TYPE 2 DIABETES MELLITUS, WITH FAT LAYER EXPOSED (HCC): Primary | ICD-10-CM

## 2023-06-26 DIAGNOSIS — L03.116 CELLULITIS OF LEFT FOOT: ICD-10-CM

## 2023-06-26 PROCEDURE — G0299 HHS/HOSPICE OF RN EA 15 MIN: HCPCS

## 2023-06-26 RX ORDER — DOXYCYCLINE HYCLATE 100 MG/1
100 CAPSULE ORAL EVERY 12 HOURS SCHEDULED
Qty: 14 CAPSULE | Refills: 0 | Status: SHIPPED | OUTPATIENT
Start: 2023-06-26 | End: 2023-07-04

## 2023-06-26 NOTE — PROGRESS NOTES
Phone call from [facility]NA concerned with increased redness swelling of left foot  Scheduled for wound care this coming Wednesday    Empirically started on doxycycline and VNA to obtain wound culture

## 2023-06-28 ENCOUNTER — OFFICE VISIT (OUTPATIENT)
Dept: WOUND CARE | Facility: HOSPITAL | Age: 63
End: 2023-06-28
Payer: COMMERCIAL

## 2023-06-28 ENCOUNTER — HOME CARE VISIT (OUTPATIENT)
Dept: HOME HEALTH SERVICES | Facility: HOME HEALTHCARE | Age: 63
End: 2023-06-28
Payer: COMMERCIAL

## 2023-06-28 VITALS
TEMPERATURE: 98.6 F | DIASTOLIC BLOOD PRESSURE: 78 MMHG | HEART RATE: 100 BPM | SYSTOLIC BLOOD PRESSURE: 134 MMHG | RESPIRATION RATE: 16 BRPM

## 2023-06-28 DIAGNOSIS — L97.522 DIABETIC ULCER OF OTHER PART OF LEFT FOOT ASSOCIATED WITH TYPE 1 DIABETES MELLITUS, WITH FAT LAYER EXPOSED (HCC): ICD-10-CM

## 2023-06-28 DIAGNOSIS — L03.032 CELLULITIS OF GREAT TOE OF LEFT FOOT: Primary | ICD-10-CM

## 2023-06-28 DIAGNOSIS — E10.621 DIABETIC ULCER OF OTHER PART OF LEFT FOOT ASSOCIATED WITH TYPE 1 DIABETES MELLITUS, WITH FAT LAYER EXPOSED (HCC): ICD-10-CM

## 2023-06-28 DIAGNOSIS — E10.621 DIABETIC ULCER OF OTHER PART OF RIGHT FOOT ASSOCIATED WITH TYPE 1 DIABETES MELLITUS, LIMITED TO BREAKDOWN OF SKIN (HCC): ICD-10-CM

## 2023-06-28 DIAGNOSIS — L97.511 DIABETIC ULCER OF OTHER PART OF RIGHT FOOT ASSOCIATED WITH TYPE 1 DIABETES MELLITUS, LIMITED TO BREAKDOWN OF SKIN (HCC): ICD-10-CM

## 2023-06-28 PROCEDURE — 99024 POSTOP FOLLOW-UP VISIT: CPT | Performed by: PODIATRIST

## 2023-06-28 PROCEDURE — 87205 SMEAR GRAM STAIN: CPT | Performed by: PODIATRIST

## 2023-06-28 PROCEDURE — 87147 CULTURE TYPE IMMUNOLOGIC: CPT | Performed by: PODIATRIST

## 2023-06-28 PROCEDURE — 99213 OFFICE O/P EST LOW 20 MIN: CPT | Performed by: PODIATRIST

## 2023-06-28 PROCEDURE — 87186 SC STD MICRODIL/AGAR DIL: CPT | Performed by: PODIATRIST

## 2023-06-28 PROCEDURE — 87070 CULTURE OTHR SPECIMN AEROBIC: CPT | Performed by: PODIATRIST

## 2023-06-28 RX ORDER — LIDOCAINE HYDROCHLORIDE 40 MG/ML
5 SOLUTION TOPICAL ONCE
Status: DISCONTINUED | OUTPATIENT
Start: 2023-06-28 | End: 2023-06-28

## 2023-06-28 RX ADMIN — LIDOCAINE HYDROCHLORIDE 5 ML: 40 SOLUTION TOPICAL at 12:30

## 2023-06-28 NOTE — PATIENT INSTRUCTIONS
Orders Placed This Encounter   Procedures    Wound cleansing and dressings     Standing Status:   Future     Standing Expiration Date:   6/28/2024    Wound cleansing and dressings     Left foot wounds    bedtadine and silver alginate, abds kerlix and tape  Tubifast yellow  Standing Status:   Future     Standing Expiration Date:   6/28/2024    Wound cleansing and dressings     Left foot wounds    Bedatine, silver alginate abds and kerlix and tape, tubifast yellow  Keep wound dry and intact, go to ER tomorrow morning    Done today     Standing Status:   Future     Standing Expiration Date:   6/28/2024

## 2023-06-28 NOTE — PROGRESS NOTES
Patient ID: Abdullahi Vasquez is a 66 y o  male Date of Birth 1960     Chief Complaint  Chief Complaint   Patient presents with   • Follow Up Wound Care Visit     Dressing intact on arrival, reports taking oral antibiotic since Monday afternoon  VNA changed dressing twice since last visit  Allergies  Cefuroxime and Amoxicillin-pot clavulanate    Assessment:  Wound culture obtained from left foot ulceration which probes deep to bone  Patient instructed to go to ED for hospital admission, patient declined to go today and said he must go home and take care of some stuff but will go to the hospital first thing tomorrow morning  Deep foot [address]oz 3 ulcer infection in a type I diabetic will need IV antibiotics and OR debridement  Possible loss of great toe exists with this infection since the last procedure was intra-articular with the first MPJ  Plan for x-rays and MRI left foot prior to proceeding with any surgical intervention  Most recent operative report and records reviewed from his last sesamoid excision procedure  Arterial studies reviewed, should have adequate perfusion for healing  Most recent x-rays reviewed  No problem-specific Assessment & Plan notes found for this encounter  Diagnoses and all orders for this visit:    Cellulitis of great toe of left foot    Diabetic ulcer of other part of left foot associated with type 1 diabetes mellitus, with fat layer exposed ([address] )  -     lidocaine (XYLOCAINE) 4 % topical solution 5 mL  -     Wound cleansing and dressings; Future  -     Wound cleansing and dressings; Future  -     Wound cleansing and dressings;  Future  -     Wound culture and Gram stain    Diabetic ulcer of other part of right foot associated with type 1 diabetes mellitus, limited to breakdown of skin ([address] )            Procedures    Plan:     Wound 05/30/23 Foot Left;Plantar (Active)   Wound Image Images linked 06/28/23 1218   Wound Description Granulation tissue;Slough 06/28/23 1218 Dee Dee-wound Assessment Erythema;Edema 06/28/23 1218   Wound Length (cm) 2 3 cm 06/28/23 1218   Wound Width (cm) 3 cm 06/28/23 1218   Wound Depth (cm) 1 5 cm 06/28/23 1218   Wound Surface Area (cm^2) 6 9 cm^2 06/28/23 1218   Wound Volume (cm^3) 10 35 cm^3 06/28/23 1218   Calculated Wound Volume (cm^3) 10 35 cm^3 06/28/23 1218   Change in Wound Size % -228 57 06/28/23 1218   Drainage Amount Moderate 06/28/23 1218   Drainage Description Tan 06/28/23 1218   Non-staged Wound Description Full thickness 06/28/23 1218   Dressing Status Intact 06/28/23 1218       Wound 06/21/23 Pressure Injury Foot Left;Lateral (Active)   Wound Image Images linked 06/28/23 1220   Wound Description Epithelialization;Eschar 06/28/23 1220   Dee Dee-wound Assessment Erythema 06/28/23 1220   Wound Length (cm) 2 cm 06/28/23 1220   Wound Width (cm) 3 cm 06/28/23 1220   Wound Depth (cm) 0 1 cm 06/28/23 1220   Wound Surface Area (cm^2) 6 cm^2 06/28/23 1220   Wound Volume (cm^3) 0 6 cm^3 06/28/23 1220   Calculated Wound Volume (cm^3) 0 6 cm^3 06/28/23 1220   Drainage Amount None 06/28/23 1220   Non-staged Wound Description Not applicable 56/78/82 7653   Dressing Status Intact 06/28/23 1220       Wound 05/30/23 Foot Left;Plantar (Active)   Date First Assessed/Time First Assessed: 05/30/23 1458   Location: Foot  Wound Location Orientation: Left;Plantar  Wound Description (Comments):  Wound Vac, 4x4's, Kerlix, Ace       Wound 06/21/23 Pressure Injury Foot Left;Lateral (Active)   Date First Assessed/Time First Assessed: 06/21/23 1414   Primary Wound Type: Pressure Injury  Location: Foot  Wound Location Orientation: Left;Lateral       [REMOVED] Wound 04/06/23 Diabetic Ulcer Foot Left (Removed)   Resolved Date: (c) 06/03/23  Date First Assessed/Time First Assessed: 04/06/23 1501   Primary Wound Type: Diabetic Ulcer  Location: Foot  Wound Location Orientation: Left       [REMOVED] Wound 05/08/23 Skin Tear Foot Left;Plantar (Removed)   Resolved Date: (c) 06/03/23  Date First Assessed/Time First Assessed: 05/08/23 6757   Primary Wound Type: Skin Tear  Location: Foot  Wound Location Orientation: Left;Plantar  Wound Outcome: Healed       Subjective:           29-year-old type I diabetic presents today for evaluation of chronic ulcer plantar aspect of left first MPJ  Patient underwent debridement of sesamoid bones on 5/30/2023 with VAC dressing application  Was progressing well with VNA visits and then this past Monday it was noted become more red and swollen  Was empirically started on doxycycline  Patient reports his foot now looks worse than it did on Monday  The following portions of the patient's history were reviewed and updated as appropriate: allergies, current medications, past family history, past medical history, past social history, past surgical history and problem list     Review of Systems   Constitutional: Negative  HENT: Negative  Eyes: Negative  Respiratory: Negative  Cardiovascular: Negative  Gastrointestinal: Negative  Endocrine: Negative  Genitourinary: Negative  Musculoskeletal: Negative  Skin:        Open wound plantar aspect left foot with yellow-brown drainage, forefoot is hot and swollen   Allergic/Immunologic: Negative  Neurological: Positive for numbness  Hematological: Negative  Psychiatric/Behavioral: Negative            Objective:                 Wound 05/30/23 Foot Left;Plantar (Active)   Wound Image Images linked 06/28/23 1218   Wound Description Granulation tissue;Slough 06/28/23 1218   Dee Dee-wound Assessment Erythema;Edema 06/28/23 1218   Wound Length (cm) 2 3 cm 06/28/23 1218   Wound Width (cm) 3 cm 06/28/23 1218   Wound Depth (cm) 1 5 cm 06/28/23 1218   Wound Surface Area (cm^2) 6 9 cm^2 06/28/23 1218   Wound Volume (cm^3) 10 35 cm^3 06/28/23 1218   Calculated Wound Volume (cm^3) 10 35 cm^3 06/28/23 1218   Change in Wound Size % -228 57 06/28/23 1218   Drainage Amount Moderate 06/28/23 1218 Drainage Description Carbajal 06/28/23 1218   Non-staged Wound Description Full thickness 06/28/23 1218   Dressing Status Intact 06/28/23 1218       Wound 06/21/23 Pressure Injury Foot Left;Lateral (Active)   Wound Image Images linked 06/28/23 1220   Wound Description Epithelialization;Eschar 06/28/23 1220   Dee Dee-wound Assessment Erythema 06/28/23 1220   Wound Length (cm) 2 cm 06/28/23 1220   Wound Width (cm) 3 cm 06/28/23 1220   Wound Depth (cm) 0 1 cm 06/28/23 1220   Wound Surface Area (cm^2) 6 cm^2 06/28/23 1220   Wound Volume (cm^3) 0 6 cm^3 06/28/23 1220   Calculated Wound Volume (cm^3) 0 6 cm^3 06/28/23 1220   Drainage Amount None 06/28/23 1220   Non-staged Wound Description Not applicable 73/00/87 5369   Dressing Status Intact 06/28/23 1220       /78   Pulse 100   Temp 98 6 °F (37 °C)   Resp 16     Physical Exam  Constitutional:       Appearance: Normal appearance  HENT:      Head: Normocephalic  Right Ear: Tympanic membrane normal       Left Ear: Tympanic membrane normal       Nose: No congestion  Mouth/Throat:      Pharynx: No oropharyngeal exudate or posterior oropharyngeal erythema  Eyes:      Conjunctiva/sclera: Conjunctivae normal       Pupils: Pupils are equal, round, and reactive to light  Cardiovascular:      Rate and Rhythm: Normal rate and regular rhythm  Pulses:           Dorsalis pedis pulses are 1+ on the right side and 1+ on the left side  Posterior tibial pulses are 0 on the right side and 0 on the left side  Pulmonary:      Effort: Pulmonary effort is normal    Musculoskeletal:         General: Swelling and tenderness present  Left lower leg: Edema present  Left foot: Decreased range of motion  Comments: Status post sesamoid removal (tibial) on 5/30/2023   Feet:      Right foot:      Protective Sensation: 10 sites tested  3 sites sensed  Left foot:      Protective Sensation: 10 sites tested  3 sites sensed        Skin integrity: Ulcer, erythema and warmth present  Skin:     General: Skin is warm and dry  Capillary Refill: Capillary refill takes 2 to 3 seconds  Coloration: Skin is not pale  Findings: Erythema present  No bruising, lesion or rash  Comments: Full-thickness ulcerative defect left foot Sub first MPJ that probes 1 5 cm to bone  Yellow-brown purulent drainage expressed with palpation and range of motion of the first MPJ  Significant erythema and calor present of the distal medial forefoot including the hallux back to the metatarsal cuneiform joint  Ulceration undermines and probes circumferentially and deepest proximally which I would estimate to be 2 or 3 cm  Neurological:      Mental Status: He is alert  Cranial Nerves: No cranial nerve deficit  Sensory: No sensory deficit  Motor: No weakness  Gait: Gait normal       Deep Tendon Reflexes: Reflexes normal    Psychiatric:         Mood and Affect: Mood normal          Behavior: Behavior normal          Judgment: Judgment normal            Wound Instructions:  Orders Placed This Encounter   Procedures   • Wound cleansing and dressings     Standing Status:   Future     Standing Expiration Date:   6/28/2024   • Wound cleansing and dressings     Left foot wounds    bedtadine and silver alginate, abds kerlix and tape  Tubifast yellow  Standing Status:   Future     Standing Expiration Date:   6/28/2024   • Wound cleansing and dressings     Left foot wounds    Bedatine, silver alginate abds and kerlix and tape, tubifast yellow  Keep wound dry and intact, go to ER tomorrow morning  Done today     Standing Status:   Future     Standing Expiration Date:   6/28/2024   • Wound culture and Gram stain     Left foot Sub 1st MPJ     Order Specific Question:   Release to patient through Mychart     Answer:   Immediate        Diagnosis ICD-10-CM Associated Orders   1  Cellulitis of great toe of left foot  L03 032       2   Diabetic ulcer of other part of left foot associated with type 1 diabetes mellitus, with fat layer exposed ([facility])  E10 621 lidocaine (XYLOCAINE) 4 % topical solution 5 mL    L97 522 Wound cleansing and dressings     Wound cleansing and dressings     Wound cleansing and dressings     Wound culture and Gram stain      3   Diabetic ulcer of other part of right foot associated with type 1 diabetes mellitus, limited to breakdown of skin ([facility] )  V61 082     L96 662

## 2023-06-29 ENCOUNTER — HOME CARE VISIT (OUTPATIENT)
Dept: HOME HEALTH SERVICES | Facility: HOME HEALTHCARE | Age: 63
End: 2023-06-29
Payer: COMMERCIAL

## 2023-06-29 ENCOUNTER — APPOINTMENT (EMERGENCY)
Dept: RADIOLOGY | Facility: HOSPITAL | Age: 63
DRG: 854 | End: 2023-06-29
Payer: COMMERCIAL

## 2023-06-29 ENCOUNTER — HOSPITAL ENCOUNTER (INPATIENT)
Facility: HOSPITAL | Age: 63
LOS: 5 days | Discharge: HOME HEALTH | DRG: 854 | End: 2023-07-04
Attending: EMERGENCY MEDICINE | Admitting: HOSPITALIST
Payer: COMMERCIAL

## 2023-06-29 DIAGNOSIS — E10.621 DIABETIC ULCER OF LEFT MIDFOOT ASSOCIATED WITH TYPE 1 DIABETES MELLITUS, WITH FAT LAYER EXPOSED (HCC): ICD-10-CM

## 2023-06-29 DIAGNOSIS — M86.8X7 OTHER OSTEOMYELITIS OF LEFT FOOT (HCC): ICD-10-CM

## 2023-06-29 DIAGNOSIS — L97.422 DIABETIC ULCER OF LEFT MIDFOOT ASSOCIATED WITH TYPE 1 DIABETES MELLITUS, WITH FAT LAYER EXPOSED (HCC): ICD-10-CM

## 2023-06-29 DIAGNOSIS — Z72.0 NICOTINE ABUSE: ICD-10-CM

## 2023-06-29 DIAGNOSIS — E10.621 DIABETIC ULCER OF OTHER PART OF RIGHT FOOT ASSOCIATED WITH TYPE 1 DIABETES MELLITUS, LIMITED TO BREAKDOWN OF SKIN (HCC): Primary | ICD-10-CM

## 2023-06-29 DIAGNOSIS — L97.511 DIABETIC ULCER OF OTHER PART OF RIGHT FOOT ASSOCIATED WITH TYPE 1 DIABETES MELLITUS, LIMITED TO BREAKDOWN OF SKIN (HCC): Primary | ICD-10-CM

## 2023-06-29 DIAGNOSIS — L97.509 FOOT ULCER (HCC): ICD-10-CM

## 2023-06-29 DIAGNOSIS — E10.649 HYPOGLYCEMIA UNAWARENESS ASSOCIATED WITH TYPE 1 DIABETES MELLITUS (HCC): ICD-10-CM

## 2023-06-29 PROBLEM — A41.9 SEPSIS (HCC): Status: ACTIVE | Noted: 2023-06-29

## 2023-06-29 LAB
ALBUMIN SERPL BCP-MCNC: 3.6 G/DL (ref 3.5–5)
ALP SERPL-CCNC: 112 U/L (ref 34–104)
ALT SERPL W P-5'-P-CCNC: 8 U/L (ref 7–52)
ANION GAP SERPL CALCULATED.3IONS-SCNC: 7 MMOL/L
APTT PPP: 33 SECONDS (ref 23–37)
AST SERPL W P-5'-P-CCNC: 11 U/L (ref 13–39)
BASOPHILS # BLD AUTO: 0.08 THOUSANDS/ÂΜL (ref 0–0.1)
BASOPHILS NFR BLD AUTO: 1 % (ref 0–1)
BILIRUB SERPL-MCNC: 0.55 MG/DL (ref 0.2–1)
BUN SERPL-MCNC: 17 MG/DL (ref 5–25)
CALCIUM SERPL-MCNC: 9.1 MG/DL (ref 8.4–10.2)
CHLORIDE SERPL-SCNC: 91 MMOL/L (ref 96–108)
CO2 SERPL-SCNC: 27 MMOL/L (ref 21–32)
CREAT SERPL-MCNC: 1.22 MG/DL (ref 0.6–1.3)
CRP SERPL QL: 50.4 MG/L
EOSINOPHIL # BLD AUTO: 0.04 THOUSAND/ÂΜL (ref 0–0.61)
EOSINOPHIL NFR BLD AUTO: 0 % (ref 0–6)
ERYTHROCYTE [DISTWIDTH] IN BLOOD BY AUTOMATED COUNT: 12.6 % (ref 11.6–15.1)
ERYTHROCYTE [SEDIMENTATION RATE] IN BLOOD: 69 MM/HOUR (ref 0–19)
GFR SERPL CREATININE-BSD FRML MDRD: 63 ML/MIN/1.73SQ M
GLUCOSE SERPL-MCNC: 182 MG/DL (ref 65–140)
GLUCOSE SERPL-MCNC: 186 MG/DL (ref 65–140)
GLUCOSE SERPL-MCNC: 204 MG/DL (ref 65–140)
GLUCOSE SERPL-MCNC: 218 MG/DL (ref 65–140)
GLUCOSE SERPL-MCNC: 252 MG/DL (ref 65–140)
HCT VFR BLD AUTO: 36.4 % (ref 36.5–49.3)
HGB BLD-MCNC: 12.5 G/DL (ref 12–17)
IMM GRANULOCYTES # BLD AUTO: 0.07 THOUSAND/UL (ref 0–0.2)
IMM GRANULOCYTES NFR BLD AUTO: 1 % (ref 0–2)
INR PPP: 0.93 (ref 0.84–1.19)
LACTATE SERPL-SCNC: 1.8 MMOL/L (ref 0.5–2)
LYMPHOCYTES # BLD AUTO: 1.1 THOUSANDS/ÂΜL (ref 0.6–4.47)
LYMPHOCYTES NFR BLD AUTO: 9 % (ref 14–44)
MCH RBC QN AUTO: 33.5 PG (ref 26.8–34.3)
MCHC RBC AUTO-ENTMCNC: 34.3 G/DL (ref 31.4–37.4)
MCV RBC AUTO: 98 FL (ref 82–98)
MONOCYTES # BLD AUTO: 1.63 THOUSAND/ÂΜL (ref 0.17–1.22)
MONOCYTES NFR BLD AUTO: 13 % (ref 4–12)
NEUTROPHILS # BLD AUTO: 9.43 THOUSANDS/ÂΜL (ref 1.85–7.62)
NEUTS SEG NFR BLD AUTO: 76 % (ref 43–75)
NRBC BLD AUTO-RTO: 0 /100 WBCS
PLATELET # BLD AUTO: 366 THOUSANDS/UL (ref 149–390)
PMV BLD AUTO: 8.5 FL (ref 8.9–12.7)
POTASSIUM SERPL-SCNC: 5 MMOL/L (ref 3.5–5.3)
PROCALCITONIN SERPL-MCNC: 0.15 NG/ML
PROT SERPL-MCNC: 7.6 G/DL (ref 6.4–8.4)
PROTHROMBIN TIME: 13.1 SECONDS (ref 11.6–14.5)
RBC # BLD AUTO: 3.73 MILLION/UL (ref 3.88–5.62)
SODIUM SERPL-SCNC: 125 MMOL/L (ref 135–147)
WBC # BLD AUTO: 12.35 THOUSAND/UL (ref 4.31–10.16)

## 2023-06-29 PROCEDURE — 85025 COMPLETE CBC W/AUTO DIFF WBC: CPT | Performed by: EMERGENCY MEDICINE

## 2023-06-29 PROCEDURE — 99222 1ST HOSP IP/OBS MODERATE 55: CPT | Performed by: HOSPITALIST

## 2023-06-29 PROCEDURE — 85610 PROTHROMBIN TIME: CPT | Performed by: EMERGENCY MEDICINE

## 2023-06-29 PROCEDURE — 85730 THROMBOPLASTIN TIME PARTIAL: CPT | Performed by: EMERGENCY MEDICINE

## 2023-06-29 PROCEDURE — 99285 EMERGENCY DEPT VISIT HI MDM: CPT | Performed by: EMERGENCY MEDICINE

## 2023-06-29 PROCEDURE — 99024 POSTOP FOLLOW-UP VISIT: CPT | Performed by: PODIATRIST

## 2023-06-29 PROCEDURE — 83605 ASSAY OF LACTIC ACID: CPT | Performed by: EMERGENCY MEDICINE

## 2023-06-29 PROCEDURE — 87040 BLOOD CULTURE FOR BACTERIA: CPT | Performed by: EMERGENCY MEDICINE

## 2023-06-29 PROCEDURE — 85652 RBC SED RATE AUTOMATED: CPT | Performed by: EMERGENCY MEDICINE

## 2023-06-29 PROCEDURE — 99222 1ST HOSP IP/OBS MODERATE 55: CPT | Performed by: STUDENT IN AN ORGANIZED HEALTH CARE EDUCATION/TRAINING PROGRAM

## 2023-06-29 PROCEDURE — 86140 C-REACTIVE PROTEIN: CPT | Performed by: EMERGENCY MEDICINE

## 2023-06-29 PROCEDURE — 87081 CULTURE SCREEN ONLY: CPT

## 2023-06-29 PROCEDURE — 84145 PROCALCITONIN (PCT): CPT | Performed by: EMERGENCY MEDICINE

## 2023-06-29 PROCEDURE — 99285 EMERGENCY DEPT VISIT HI MDM: CPT

## 2023-06-29 PROCEDURE — 80053 COMPREHEN METABOLIC PANEL: CPT | Performed by: EMERGENCY MEDICINE

## 2023-06-29 PROCEDURE — 36415 COLL VENOUS BLD VENIPUNCTURE: CPT

## 2023-06-29 PROCEDURE — 73630 X-RAY EXAM OF FOOT: CPT

## 2023-06-29 PROCEDURE — 96365 THER/PROPH/DIAG IV INF INIT: CPT

## 2023-06-29 PROCEDURE — 82948 REAGENT STRIP/BLOOD GLUCOSE: CPT

## 2023-06-29 RX ORDER — HYDROMORPHONE HCL/PF 1 MG/ML
0.5 SYRINGE (ML) INJECTION
Status: DISCONTINUED | OUTPATIENT
Start: 2023-06-29 | End: 2023-06-30

## 2023-06-29 RX ORDER — HYDROMORPHONE HCL/PF 1 MG/ML
0.5 SYRINGE (ML) INJECTION ONCE
Status: COMPLETED | OUTPATIENT
Start: 2023-06-29 | End: 2023-06-29

## 2023-06-29 RX ORDER — NICOTINE 21 MG/24HR
21 PATCH, TRANSDERMAL 24 HOURS TRANSDERMAL DAILY
Status: DISCONTINUED | OUTPATIENT
Start: 2023-06-29 | End: 2023-07-04 | Stop reason: HOSPADM

## 2023-06-29 RX ORDER — ATORVASTATIN CALCIUM 40 MG/1
40 TABLET, FILM COATED ORAL DAILY
Status: DISCONTINUED | OUTPATIENT
Start: 2023-06-29 | End: 2023-07-04 | Stop reason: HOSPADM

## 2023-06-29 RX ORDER — CEFTRIAXONE 1 G/50ML
1000 INJECTION, SOLUTION INTRAVENOUS EVERY 24 HOURS
Status: DISCONTINUED | OUTPATIENT
Start: 2023-06-30 | End: 2023-07-01

## 2023-06-29 RX ORDER — SODIUM CHLORIDE 9 MG/ML
125 INJECTION, SOLUTION INTRAVENOUS CONTINUOUS
Status: DISCONTINUED | OUTPATIENT
Start: 2023-06-29 | End: 2023-07-02

## 2023-06-29 RX ORDER — HEPARIN SODIUM 5000 [USP'U]/ML
5000 INJECTION, SOLUTION INTRAVENOUS; SUBCUTANEOUS EVERY 8 HOURS SCHEDULED
Status: COMPLETED | OUTPATIENT
Start: 2023-06-29 | End: 2023-06-30

## 2023-06-29 RX ORDER — ONDANSETRON 2 MG/ML
4 INJECTION INTRAMUSCULAR; INTRAVENOUS EVERY 6 HOURS PRN
Status: DISCONTINUED | OUTPATIENT
Start: 2023-06-29 | End: 2023-07-04 | Stop reason: HOSPADM

## 2023-06-29 RX ORDER — ACETAMINOPHEN 325 MG/1
650 TABLET ORAL EVERY 6 HOURS PRN
Status: DISCONTINUED | OUTPATIENT
Start: 2023-06-29 | End: 2023-07-04 | Stop reason: HOSPADM

## 2023-06-29 RX ORDER — HEPARIN SODIUM 5000 [USP'U]/ML
5000 INJECTION, SOLUTION INTRAVENOUS; SUBCUTANEOUS EVERY 8 HOURS SCHEDULED
Status: DISCONTINUED | OUTPATIENT
Start: 2023-06-29 | End: 2023-06-29

## 2023-06-29 RX ORDER — METOPROLOL SUCCINATE 50 MG/1
100 TABLET, EXTENDED RELEASE ORAL DAILY
Status: DISCONTINUED | OUTPATIENT
Start: 2023-06-29 | End: 2023-07-04 | Stop reason: HOSPADM

## 2023-06-29 RX ORDER — LISINOPRIL 10 MG/1
10 TABLET ORAL DAILY
Status: DISCONTINUED | OUTPATIENT
Start: 2023-06-29 | End: 2023-07-04 | Stop reason: HOSPADM

## 2023-06-29 RX ADMIN — SODIUM CHLORIDE 125 ML/HR: 0.9 INJECTION, SOLUTION INTRAVENOUS at 15:23

## 2023-06-29 RX ADMIN — METOPROLOL SUCCINATE 100 MG: 50 TABLET, EXTENDED RELEASE ORAL at 15:25

## 2023-06-29 RX ADMIN — PIPERACILLIN AND TAZOBACTAM 3.38 G: 3; .375 INJECTION, POWDER, LYOPHILIZED, FOR SOLUTION INTRAVENOUS at 11:04

## 2023-06-29 RX ADMIN — ATORVASTATIN CALCIUM 40 MG: 40 TABLET, FILM COATED ORAL at 15:25

## 2023-06-29 RX ADMIN — HYDROMORPHONE HYDROCHLORIDE 0.5 MG: 1 INJECTION, SOLUTION INTRAMUSCULAR; INTRAVENOUS; SUBCUTANEOUS at 17:25

## 2023-06-29 RX ADMIN — LISINOPRIL 10 MG: 10 TABLET ORAL at 16:11

## 2023-06-29 RX ADMIN — HEPARIN SODIUM 5000 UNITS: 5000 INJECTION INTRAVENOUS; SUBCUTANEOUS at 21:30

## 2023-06-29 RX ADMIN — VANCOMYCIN HYDROCHLORIDE 1500 MG: 1 INJECTION, POWDER, LYOPHILIZED, FOR SOLUTION INTRAVENOUS at 11:55

## 2023-06-29 RX ADMIN — HYDROMORPHONE HYDROCHLORIDE 0.5 MG: 1 INJECTION, SOLUTION INTRAMUSCULAR; INTRAVENOUS; SUBCUTANEOUS at 20:20

## 2023-06-29 RX ADMIN — HYDROMORPHONE HYDROCHLORIDE 0.5 MG: 1 INJECTION, SOLUTION INTRAMUSCULAR; INTRAVENOUS; SUBCUTANEOUS at 11:58

## 2023-06-29 RX ADMIN — NICOTINE 21 MG: 21 PATCH, EXTENDED RELEASE TRANSDERMAL at 20:20

## 2023-06-29 RX ADMIN — HEPARIN SODIUM 5000 UNITS: 5000 INJECTION INTRAVENOUS; SUBCUTANEOUS at 15:28

## 2023-06-29 RX ADMIN — HYDROMORPHONE HYDROCHLORIDE 0.5 MG: 1 INJECTION, SOLUTION INTRAMUSCULAR; INTRAVENOUS; SUBCUTANEOUS at 23:56

## 2023-06-29 RX ADMIN — SODIUM CHLORIDE 125 ML/HR: 0.9 INJECTION, SOLUTION INTRAVENOUS at 18:47

## 2023-06-29 NOTE — ASSESSMENT & PLAN NOTE
· Meets sepsis criteria with WBC greater than 12 K, heart rate 109 with a likely source infection diabetic foot wound  · We will give gentle IVF  · We will give IV antibiotics (vancomycin and ceftriaxone)  · Lactic acid 1.8  · Procalcitonin 0.5  · CRP 50.4 and sed rate 69  · Afebrile  · We will continue to monitor  · Wound culture and blood cultures pending

## 2023-06-29 NOTE — ASSESSMENT & PLAN NOTE
Lab Results   Component Value Date    HGBA1C 7.9 (H) 03/31/2023       No results for input(s): "POCGLU" in the last 72 hours.     Blood Sugar Average: Last 72 hrs:     Please see note attached to insulin pump in place

## 2023-06-29 NOTE — PLAN OF CARE
Problem: PAIN - ADULT  Goal: Verbalizes/displays adequate comfort level or baseline comfort level  Description: Interventions:  - Encourage patient to monitor pain and request assistance  - Assess pain using appropriate pain scale  - Administer analgesics based on type and severity of pain and evaluate response  - Implement non-pharmacological measures as appropriate and evaluate response  - Consider cultural and social influences on pain and pain management  - Notify physician/advanced practitioner if interventions unsuccessful or patient reports new pain  Outcome: Progressing     Problem: INFECTION - ADULT  Goal: Absence or prevention of progression during hospitalization  Description: INTERVENTIONS:  - Assess and monitor for signs and symptoms of infection  - Monitor lab/diagnostic results  - Monitor all insertion sites, i.e. indwelling lines, tubes, and drains  - Monitor endotracheal if appropriate and nasal secretions for changes in amount and color  - [place] appropriate cooling/warming therapies per order  - Administer medications as ordered  - Instruct and encourage patient and family to use good hand hygiene technique  - Identify and instruct in appropriate isolation precautions for identified infection/condition  Outcome: Progressing     Problem: SAFETY ADULT  Goal: Patient will remain free of falls  Description: INTERVENTIONS:  - Educate patient/family on patient safety including physical limitations  - Instruct patient to call for assistance with activity   - Consult OT/PT to assist with strengthening/mobility   - Keep Call bell within reach  - Keep bed low and locked with side rails adjusted as appropriate  - Keep care items and personal belongings within reach  - Initiate and maintain comfort rounds  - Make Fall Risk Sign visible to staff  - Apply yellow socks and bracelet for high fall risk patients  - Consider moving patient to room near nurses station  Outcome: Progressing

## 2023-06-29 NOTE — ED NOTES
Received call from [facility], states MRI screen form not completed. Attempted to fill out MRI screen form but no form populated for the scan. MRI made aware that screen form cannot be completed because the form did not populate for nursing staff to complete.       Sandra Farias RN  06/29/23 5589

## 2023-06-29 NOTE — ED PROVIDER NOTES
History  Chief Complaint   Patient presents with   • Wound Infection     Patient presents to the ED with c/o left foot infection, states he had surgery on foot in may and states that Monday visiting nurse noticed it was red and swollen, states went to wound care yesterday and wanted patient admitted for infection      21-year-old male presents for evaluation of left foot wound. Patient evaluated at podiatrist office yesterday and advised to come to the hospital for admission and IV antibiotics. Patient reports continued pain at site of open wound. Denies fevers and otherwise has no complaints. Prior to Admission Medications   Prescriptions Last Dose Informant Patient Reported? Taking?    Glucagon, rDNA, (Glucagon Emergency) 1 MG KIT  Self Yes No   Sig: as directed   HumaLOG 100 UNIT/ML injection   Yes No   Sig: inject up to 80 units in INSULIN PUMP daily as directed by prescriber   atorvastatin (LIPITOR) 40 mg tablet 6/28/2023 Self No Yes   Sig: TAKE 1 TABLET BY MOUTH EVERY DAY   doxycycline hyclate (VIBRAMYCIN) 100 mg capsule Not Taking  No No   Sig: Take 1 capsule (100 mg total) by mouth every 12 (twelve) hours for 7 days   Patient not taking: Reported on 6/29/2023   insulin lispro (HumaLOG) 100 units/mL injection  Self No No   Sig: Use via the insulin pump, use up to 80 units daily   lisinopril (ZESTRIL) 10 mg tablet 6/28/2023 Self No Yes   Sig: Take 1 tablet (10 mg total) by mouth daily   metoprolol succinate (TOPROL-XL) 100 mg 24 hr tablet 6/28/2023 Self No Yes   Sig: Take 1 tablet (100 mg total) by mouth daily   oxyCODONE (ROXICODONE) 10 MG TABS Not Taking  No No   Sig: Take 1 tablet (10 mg total) by mouth every 4 (four) hours as needed for severe pain Max Daily Amount: 60 mg   Patient not taking: Reported on 6/29/2023   traMADol (Ultram) 50 mg tablet Not Taking  No No   Sig: Take 1 tablet (50 mg total) by mouth every 6 (six) hours as needed for moderate pain   Patient not taking: Reported on 2023      Facility-Administered Medications Last Administration Doses Remaining   lidocaine (XYLOCAINE) 4 % topical solution 5 mL 2023 12:30 PM 0          Past Medical History:   Diagnosis Date   • Chronic foot ulcer ([address]) 2018   • Diabetes mellitus ([address])    • Gout    • High cholesterol    • Hypertension    • Visual impairment 2022    Cateract       Past Surgical History:   Procedure Laterality Date   • CATARACT EXTRACTION Right 2023   • COMPLEX WOUND CLOSURE TO EXTREMITY Left 2019    Procedure: COMPLEX CLOSURE LEFT CHEEK;  Surgeon: Candace Arce MD;  Location:  MAIN OR;  Service: Plastics   • FACIAL/NECK BIOPSY Left 2019    Procedure: EXCISION LEFT CHEEK CYST;  Surgeon: Candace Arce MD;  Location:  MAIN OR;  Service: Plastics   • HERNIA REPAIR Left     several times   • KNEE ARTHROSCOPY Left     torn meniscus   • WOUND DEBRIDEMENT Left 2023    Procedure: DEBRIDEMENT WOUND LEFT FOOT WOUND WITH EXCISION OF INFECTED BONE AND WOUND VAC APPLICATION;  Surgeon: Richie Mckeon DPM;  Location:  MAIN OR;  Service: Podiatry       Family History   Problem Relation Age of Onset   • Heart disease Father    • Diabetes type I Father    • Diabetes type II Mother    • No Known Problems Sister    • Alcohol abuse Brother    • Diabetes type I Brother    • No Known Problems Brother    • No Known Problems Son    • No Known Problems Daughter      I have reviewed and agree with the history as documented.     E-Cigarette/Vaping   • E-Cigarette Use Never User      E-Cigarette/Vaping Substances   • Nicotine No    • THC No    • CBD No    • Flavoring No    • Other No    • Unknown No      Social History     Tobacco Use   • Smoking status: Every Day     Packs/day: 1.00     Years: 20.00     Total pack years: 20.00     Types: Cigarettes     Last attempt to quit: 2022     Years since quittin.4   • Smokeless tobacco: Never   • Tobacco comments:     encouraged smoking cessation   Vaping Use   • Vaping Use: Never used   Substance Use Topics   • Alcohol use: Yes     Comment: 3/4 beers a day   • Drug use: Never       Review of Systems   Constitutional: Negative for fever. Skin: Positive for wound. Physical Exam  Physical Exam  Vitals and nursing note reviewed. Constitutional:       General: He is not in acute distress. Appearance: He is well-developed. HENT:      Head: Normocephalic and atraumatic. Right Ear: External ear normal.      Left Ear: External ear normal.      Nose: Nose normal.   Eyes:      General: No scleral icterus. Pulmonary:      Effort: Pulmonary effort is normal. No respiratory distress. Abdominal:      General: There is no distension. Palpations: Abdomen is soft. Musculoskeletal:         General: No deformity. Normal range of motion. Cervical back: Normal range of motion and neck supple. Skin:     General: Skin is warm. Findings: Lesion present. No rash. Neurological:      General: No focal deficit present. Mental Status: He is alert.       Gait: Gait normal.   Psychiatric:         Mood and Affect: Mood normal.               Vital Signs  ED Triage Vitals [06/29/23 0902]   Temperature Pulse Respirations Blood Pressure SpO2   98.4 °F (36.9 °C) (!) 108 18 160/87 100 %      Temp Source Heart Rate Source Patient Position - Orthostatic VS BP Location FiO2 (%)   Oral Monitor Sitting Right arm --      Pain Score       7           Vitals:    06/29/23 1545 06/29/23 1611 06/29/23 1615 06/29/23 1839   BP: 141/83 142/70 142/70 123/74   Pulse: 97  94 94   Patient Position - Orthostatic VS:             Visual Acuity      ED Medications  Medications   atorvastatin (LIPITOR) tablet 40 mg (40 mg Oral Given 6/29/23 1525)   lisinopril (ZESTRIL) tablet 10 mg (10 mg Oral Given 6/29/23 1611)   metoprolol succinate (TOPROL-XL) 24 hr tablet 100 mg (100 mg Oral Given 6/29/23 1525)   acetaminophen (TYLENOL) tablet 650 mg (has no administration in time range) ondansetron [facility]) injection 4 mg (has no administration in time range)   sodium chloride 0.9 % infusion (125 mL/hr Intravenous New Bag 6/29/23 1523)   heparin (porcine) subcutaneous injection 5,000 Units (5,000 Units Subcutaneous Given 6/29/23 1528)   vancomycin (VANCOCIN) 1250 mg in sodium chloride 0.9% 250 mL IVPB (has no administration in time range)   cefTRIAXone (ROCEPHIN) IVPB (premix in dextrose) 1,000 mg 50 mL (has no administration in time range)   PATIENT MAINTAINED INSULIN PUMP 1 each (1 each Subcutaneous Given 6/29/23 1531)   insulin lispro (HumaLOG) FOR PUMP REFILLS 25 Units (has no administration in time range)   HYDROmorphone (DILAUDID) injection 0.5 mg (0.5 mg Intravenous Given 6/29/23 1725)   piperacillin-tazobactam (ZOSYN) IVPB 3.375 g (0 g Intravenous Stopped 6/29/23 1205)   vancomycin (VANCOCIN) 1500 mg in sodium chloride 0.9% 250 mL IVPB (0 mg Intravenous Stopped 6/29/23 1522)   HYDROmorphone (DILAUDID) injection 0.5 mg (0.5 mg Intravenous Given 6/29/23 1158)       Diagnostic Studies  Results Reviewed     Procedure Component Value Units Date/Time    MRSA culture [[id number]] Collected: 06/29/23 1838    Lab Status: No result Specimen: Nares from Nose     Blood culture #1 [[id number]] Collected: 06/29/23 1052    Lab Status: Preliminary result Specimen: Blood from Arm, Right Updated: 06/29/23 1801     Blood Culture Received in Microbiology Lab. Culture in Progress. Blood culture #2 [[id number]] Collected: 06/29/23 0906    Lab Status: Preliminary result Specimen: Blood from Arm, Right Updated: 06/29/23 1801     Blood Culture Received in Microbiology Lab. Culture in Progress.     Fingerstick Glucose (POCT) [[id number]]  (Abnormal) Collected: 06/29/23 1435    Lab Status: Final result Updated: 06/29/23 1436     POC Glucose 252 mg/dl     Platelet count [[id number]]     Lab Status: No result Specimen: Blood     Procalcitonin [[id number]]  (Normal) Collected: 06/29/23 1052    Lab Status: Final result Specimen: Blood from Arm, Right Updated: 06/29/23 1133     Procalcitonin 0.15 ng/ml     C-reactive protein [[id number]]  (Abnormal) Collected: 06/29/23 1052    Lab Status: Final result Specimen: Blood from Arm, Right Updated: 06/29/23 1123     CRP 50.4 mg/L     Sedimentation rate, automated [[id number]]  (Abnormal) Collected: 06/29/23 1052    Lab Status: Final result Specimen: Blood from Arm, Right Updated: 06/29/23 1109     Sed Rate 69 mm/hour     Comprehensive metabolic panel [[id number]]  (Abnormal) Collected: 06/29/23 0906    Lab Status: Final result Specimen: Blood from Arm, Right Updated: 06/29/23 0941     Sodium 125 mmol/L      Potassium 5.0 mmol/L      Chloride 91 mmol/L      CO2 27 mmol/L      ANION GAP 7 mmol/L      BUN 17 mg/dL      Creatinine 1.22 mg/dL      Glucose 218 mg/dL      Calcium 9.1 mg/dL      AST 11 U/L      ALT 8 U/L      Alkaline Phosphatase 112 U/L      Total Protein 7.6 g/dL      Albumin 3.6 g/dL      Total Bilirubin 0.55 mg/dL      eGFR 63 ml/min/1.73sq m     Narrative:      [place] guidelines for Chronic Kidney Disease (CKD):   •  Stage 1 with normal or high GFR (GFR > 90 mL/min/1.73 square meters)  •  Stage 2 Mild CKD (GFR = 60-89 mL/min/1.73 square meters)  •  Stage 3A Moderate CKD (GFR = 45-59 mL/min/1.73 square meters)  •  Stage 3B Moderate CKD (GFR = 30-44 mL/min/1.73 square meters)  •  Stage 4 Severe CKD (GFR = 15-29 mL/min/1.73 square meters)  •  Stage 5 End Stage CKD (GFR <15 mL/min/1.73 square meters)  Note: GFR calculation is accurate only with a steady state creatinine    Lactic acid, plasma (w/reflex if result > 2.0) [[id number]]  (Normal) Collected: 06/29/23 0906    Lab Status: Final result Specimen: Blood from Arm, Right Updated: 06/29/23 0941     LACTIC ACID 1.8 mmol/L     Narrative:      Result may be elevated if tourniquet was used during collection.     APTT [[id number]]  (Normal) Collected: 06/29/23 0906    Lab Status: Final result Specimen: Blood from Arm, Right Updated: 06/29/23 0939     PTT 33 seconds     Protime-INR [[id number]]  (Normal) Collected: 06/29/23 0906    Lab Status: Final result Specimen: Blood from Arm, Right Updated: 06/29/23 0939     Protime 13.1 seconds      INR 0.93    CBC and differential [[id number]]  (Abnormal) Collected: 06/29/23 0906    Lab Status: Final result Specimen: Blood from Arm, Right Updated: 06/29/23 0917     WBC 12.35 Thousand/uL      RBC 3.73 Million/uL      Hemoglobin 12.5 g/dL      Hematocrit 36.4 %      MCV 98 fL      MCH 33.5 pg      MCHC 34.3 g/dL      RDW 12.6 %      MPV 8.5 fL      Platelets 081 Thousands/uL      nRBC 0 /100 WBCs      Neutrophils Relative 76 %      Immat GRANS % 1 %      Lymphocytes Relative 9 %      Monocytes Relative 13 %      Eosinophils Relative 0 %      Basophils Relative 1 %      Neutrophils Absolute 9.43 Thousands/µL      Immature Grans Absolute 0.07 Thousand/uL      Lymphocytes Absolute 1.10 Thousands/µL      Monocytes Absolute 1.63 Thousand/µL      Eosinophils Absolute 0.04 Thousand/µL      Basophils Absolute 0.08 Thousands/µL                  XR foot 3+ views LEFT   Final Result by Samantha Simmons MD (06/29 1222)      There is a small cortical defect at the head of the first metatarsal. Cannot exclude osteomyelitis. Consider MRI follow-up. Workstation performed: [id number]         MRI inpatient order    (Results Pending)              Procedures  Procedures         ED Course                               SBIRT 20yo+    Flowsheet Row Most Recent Value   Initial Alcohol Screen: US AUDIT-C     1. How often do you have a drink containing alcohol? 0 Filed at: 06/29/2023 0903   2. How many drinks containing alcohol do you have on a typical day you are drinking? 0 Filed at: 06/29/2023 0903   3a. Male UNDER 65: How often do you have five or more drinks on one occasion? 0 Filed at: 06/29/2023 0903   3b. FEMALE Any Age, or MALE 65+:  How often do you have 4 or more drinks on one occassion? 0 Filed at: 06/29/2023 1918   Audit-C Score 0 Filed at: 06/29/2023 8451   PRINCESS: How many times in the past year have you. .. Used an illegal drug or used a prescription medication for non-medical reasons? Never Filed at: 06/29/2023 4436                    Medical Decision Making  51-year-old male presenting with diabetic foot ulcer. Sepsis evaluation with inflammatory markers. Obtain x-ray. Administer IV antibiotics and admit to [facility]. Foot ulcer ([address]): acute illness or injury  Amount and/or Complexity of Data Reviewed  Labs: ordered. Radiology: ordered. Risk  Prescription drug management. Decision regarding hospitalization. Disposition  Final diagnoses: Foot ulcer ([address])     Time reflects when diagnosis was documented in both MDM as applicable and the Disposition within this note     Time User Action Codes Description Comment    6/29/2023 11:44 AM Shy Appl Add [L97.509] Foot ulcer ([address])     6/29/2023 12:59 PM Wolf Parker Add [E10.621,  L97.422] 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, with fat layer exposed ([address])     6/29/2023 12:59 PM Wolf Parker Add [E10.649] Hypoglycemia unawareness associated with type 1 diabetes mellitus [facility])       ED Disposition     ED Disposition   Admit    Condition   Stable    Date/Time   Thu Jun 29, 2023 11:44 AM    Comment   Case was discussed with [facility] and the patient's admission status was agreed to be Admission Status: inpatient status to the service of Dr. Minnie Haley .            Follow-up Information    None         Current Discharge Medication List      CONTINUE these medications which have NOT CHANGED    Details   atorvastatin (LIPITOR) 40 mg tablet TAKE 1 TABLET BY MOUTH EVERY DAY  Qty: 90 tablet, Refills: 1    Associated Diagnoses: Hyperlipidemia, unspecified hyperlipidemia type      lisinopril (ZESTRIL) 10 mg tablet Take 1 tablet (10 mg total) by mouth daily  Qty: 90 tablet, Refills: 1    Associated Diagnoses: Benign essential hypertension      metoprolol succinate (TOPROL-XL) 100 mg 24 hr tablet Take 1 tablet (100 mg total) by mouth daily  Qty: 90 tablet, Refills: 1    Associated Diagnoses: Benign essential hypertension      doxycycline hyclate (VIBRAMYCIN) 100 mg capsule Take 1 capsule (100 mg total) by mouth every 12 (twelve) hours for 7 days  Qty: 14 capsule, Refills: 0    Associated Diagnoses: Cellulitis of left foot      Glucagon, rDNA, (Glucagon Emergency) 1 MG KIT as directed      HumaLOG 100 UNIT/ML injection inject up to 80 units in INSULIN PUMP daily as directed by prescriber      insulin lispro (HumaLOG) 100 units/mL injection Use via the insulin pump, use up to 80 units daily  Qty: 80 mL, Refills: 1    Associated Diagnoses: Type 1 diabetes mellitus with hyperglycemia (HCC)      oxyCODONE (ROXICODONE) 10 MG TABS Take 1 tablet (10 mg total) by mouth every 4 (four) hours as needed for severe pain Max Daily Amount: 60 mg  Qty: 10 tablet, Refills: 0    Associated Diagnoses: Osteomyelitis (HCC)      traMADol (Ultram) 50 mg tablet Take 1 tablet (50 mg total) by mouth every 6 (six) hours as needed for moderate pain  Qty: 5 tablet, Refills: 0    Associated Diagnoses: Osteomyelitis ([address])             No discharge procedures on file.     PDMP Review     None          ED Provider  Electronically Signed by           Nas Magana DO  06/29/23 9023

## 2023-06-29 NOTE — ASSESSMENT & PLAN NOTE
· Sodium admission is 125, after hyperglycemia correction   · Baseline appears to be hyponatremic at 130  · We will continue to monitor and give IVF

## 2023-06-29 NOTE — LETTER
7/4/2023    Please give this to [facility] when they arrive for your wound care. Thank you  Dr. Rivera Friendly care orders:  [facility] to change dressing at home every 3 or 4 days. Follow-up at wound care in 1 or 2 weeks. Dressing: Betadine, Adaptic, alginate, 4 x 4, Kerlix, and Ace wrap  Must remain completely nonweightbearing on left foot. Call immediately if any signs of increasing infection are noted.  (Dr Selene Gutierrez to [phone])

## 2023-06-29 NOTE — ASSESSMENT & PLAN NOTE
Lab Results   Component Value Date    HGBA1C 7.9 (H) 03/31/2023       No results for input(s): "POCGLU" in the last 72 hours. Blood Sugar Average: Last 72 hrs:     Patient has diabetic foot infection. On 5/30 he had sesamoid bone removal due to diabetic foot infection. Since then he has been following with podiatry and wound care. He reported significant improvement in his wound appeared to be healing, however this Sunday his wound became red, had discharge, and became very tender. He saw Dr. Kellee Meier in office yesterday who recommended he come to the hospital for an MRI and likely amputation of left midfoot.     · Wound culture was obtained in office yesterday, pending results  · Obtain blood cultures  · Obtain MRI  · X-ray of foot showed osseous abnormalities and cannot rule out osteomyelitis  · Consult podiatry  · Obtain MRSA swab  · Start vancomycin and ceftriaxone

## 2023-06-29 NOTE — ASSESSMENT & PLAN NOTE
· Patient currently has insulin pump and is active in the outpatient side, consulted endocrinology and will discontinue insulin pump for likelihood of surgical intervention  · We will start non DKA insulin infusion and will likely transition to injectables tomorrow  · Endocrinology consulted

## 2023-06-29 NOTE — PROGRESS NOTES
Lul Blanton is a 66 y.o. male who is currently ordered Vancomycin IV with management by the Pharmacy Consult service. Relevant clinical data and objective / subjective history reviewed. Vancomycin Assessment:  Indication and Goal AUC/Trough: Bone/joint infection (goal -600, trough >10)  Clinical Status: improving  Micro:     Renal Function:  SCr: 1.22 mg/dL  CrCl: 56.9 mL/min  Renal replacement: Not on dialysis  Days of Therapy: 1  Current Dose: 1500 mg once  Vancomycin Plan:  New Dosin mg Q24H  Estimated AUC: 494 mcg*hr/mL  Estimated Trough: 14.1 mcg/mL  Next Level: on  @0600 AM  Renal Function Monitoring: Daily BMP and [place] will continue to follow closely for s/sx of nephrotoxicity, infusion reactions and appropriateness of therapy. BMP and CBC will be ordered per protocol. We will continue to follow the patient’s culture results and clinical progress daily.     Rosemary Guerrero, Pharmacist

## 2023-06-29 NOTE — ASSESSMENT & PLAN NOTE
Lab Results   Component Value Date    EGFR 63 06/29/2023    EGFR 71 06/01/2023    EGFR 71 05/31/2023    CREATININE 1.22 06/29/2023    CREATININE 1.11 06/01/2023    CREATININE 1.11 05/31/2023     · Creatinine is currently at baseline, can continue to monitor

## 2023-06-29 NOTE — ASSESSMENT & PLAN NOTE
· BP is currently hypertensive at 164/79, patient reports not taking blood pressure medications this a.m.   · Takes metoprolol and lisinopril in the outpatient setting, will continue home regimen  · Continue to monitor BP

## 2023-06-29 NOTE — CONSULTS
Consultation - Peewee Carreon 66 y.o. male MRN: [id number]    Unit/Bed#: ED 12 Encounter: [id number]      Assessment/Plan     Assessment: This is a 66y.o.-year-old male with T1DM on insulin pump, with complication of neuropathy, retinopathy, microalbuminuria  CKD and hypoglycemia unawareness who presents for diabetic foot ulcer pending left mid foot amputation. Endocrine consulted for pump management. Patient currently able to manipulate his pump, on medtronic pump with automode and hence will turn off to avoid low BG. For now ok to continue home insulin pump at home settings. When NPO, given history of hypoglycemic unawareness  recommend d/c insulin pump start on non DKA insulin drip. Once healing from surgery and based on ability to manage pump and PO diet can either resume insulin pump after    Plan:  - c/w insulin pump, d/c start Insulin Drip non DKA when NPO and scheduled for surgery  - HbA1C ordered    Inpatient consult to Endocrinology  Consult performed by: Mary Beth Craft MD  Consult ordered by: Patricia Parker PA-C    Inpatient consult to Endocrinology  Consult performed by: Mary Beth Craft MD  Consult ordered by: Patricia Parker PA-C          CC: T1DM management    History of Present Illness     HPI: Peewee Carreon is a 66y.o. year old male with type 1 DM on medtronic 770g with guardian sensor, with complications neuropathy, retinopathy and microalbuminurria with CKD w history of hypoglycemic unawareness who presented to ED for left foot ulcer pending amputation of left midfoot. Endocrine consulted for diabetes management. HbA1C on admission- 7.9% 03/23  Home regime- Insulin pump  Basal rates:  12 AM to 3:30 AM 0.55 units/h, 3:30 AM to 8 AM 0.75 units/h, 8 AM to 12 PM 0.9 units/h, 12 PM to 8:30 PM 0.9 units/h, 8:30 PM to 12 AM 0.6 units/h. Bolus rates:  1 unit per 9 g carbohydrate. Insulin sensitivity factor:  1 unit for 32 blood sugar points for target blood glucose of 1 10-1 20.     BG since admission- 218  while on insulin pump. Patient admitted for possible surgery, but currently pending MRI and decision regarding when surgery to be performed. He reports BG at home have been well controlled. No hypoglycemia and fasting BG in 120's range. Reports feeling well otherwise, denies any polyuria, polydipsia, fever, chills, nausea, vomiting. Has insulin pump on him currently. Review of Systems   Constitutional: Negative for diaphoresis, fatigue and unexpected weight change. Respiratory: Negative for shortness of breath. Cardiovascular: Negative for chest pain and palpitations. Gastrointestinal: Negative for constipation and diarrhea. Endocrine: Negative for polydipsia and polyuria.        Historical Information   Past Medical History:   Diagnosis Date   • Chronic foot ulcer ([address]) 09/07/2018   • Diabetes mellitus ([address])    • Gout    • High cholesterol    • Hypertension    • Visual impairment 11/1/2022    Cateract     Past Surgical History:   Procedure Laterality Date   • CATARACT EXTRACTION Right 01/2023   • COMPLEX WOUND CLOSURE TO EXTREMITY Left 07/18/2019    Procedure: COMPLEX CLOSURE LEFT CHEEK;  Surgeon: Eris Li MD;  Location:  MAIN OR;  Service: Plastics   • FACIAL/NECK BIOPSY Left 07/18/2019    Procedure: EXCISION LEFT CHEEK CYST;  Surgeon: Eris Li MD;  Location:  MAIN OR;  Service: Plastics   • HERNIA REPAIR Left     several times   • KNEE ARTHROSCOPY Left     torn meniscus   • WOUND DEBRIDEMENT Left 5/30/2023    Procedure: DEBRIDEMENT WOUND LEFT FOOT WOUND WITH EXCISION OF INFECTED BONE AND WOUND VAC APPLICATION;  Surgeon: Adelfo Jules DPM;  Location:  MAIN OR;  Service: Podiatry     Social History   Social History     Substance and Sexual Activity   Alcohol Use Yes    Comment: 3/4 beers a day     Social History     Substance and Sexual Activity   Drug Use Never     Social History     Tobacco Use   Smoking Status Every Day   • Packs/day: 1.00   • Years: 20.00   • Total pack years: 20.00   • Types: Cigarettes   • Last attempt to quit: 2022   • Years since quittin.4   Smokeless Tobacco Never   Tobacco Comments    encouraged smoking cessation     Family History:   Family History   Problem Relation Age of Onset   • Heart disease Father    • Diabetes type I Father    • Diabetes type II Mother    • No Known Problems Sister    • Alcohol abuse Brother    • Diabetes type I Brother    • No Known Problems Brother    • No Known Problems Son    • No Known Problems Daughter        Meds/Allergies   No current facility-administered medications for this encounter.      Current Outpatient Medications   Medication Sig Dispense Refill   • atorvastatin (LIPITOR) 40 mg tablet TAKE 1 TABLET BY MOUTH EVERY DAY 90 tablet 1   • lisinopril (ZESTRIL) 10 mg tablet Take 1 tablet (10 mg total) by mouth daily 90 tablet 1   • metoprolol succinate (TOPROL-XL) 100 mg 24 hr tablet Take 1 tablet (100 mg total) by mouth daily 90 tablet 1   • doxycycline hyclate (VIBRAMYCIN) 100 mg capsule Take 1 capsule (100 mg total) by mouth every 12 (twelve) hours for 7 days (Patient not taking: Reported on 2023) 14 capsule 0   • Glucagon, rDNA, (Glucagon Emergency) 1 MG KIT as directed     • HumaLOG 100 UNIT/ML injection inject up to 80 units in INSULIN PUMP daily as directed by prescriber     • insulin lispro (HumaLOG) 100 units/mL injection Use via the insulin pump, use up to 80 units daily 80 mL 1   • oxyCODONE (ROXICODONE) 10 MG TABS Take 1 tablet (10 mg total) by mouth every 4 (four) hours as needed for severe pain Max Daily Amount: 60 mg (Patient not taking: Reported on 2023) 10 tablet 0   • traMADol (Ultram) 50 mg tablet Take 1 tablet (50 mg total) by mouth every 6 (six) hours as needed for moderate pain (Patient not taking: Reported on 2023) 5 tablet 0     Allergies   Allergen Reactions   • Cefuroxime Other (See Comments) and GI Intolerance   • Amoxicillin-Pot Clavulanate Nausea Only and GI Intolerance       Objective   Vitals: Blood pressure 144/74, pulse 95, temperature 98.5 °F (36.9 °C), temperature source Oral, resp. rate 18, height 6' (1.829 m), weight 63.5 kg (140 lb), SpO2 97 %. No intake or output data in the 24 hours ending 06/29/23 1332  Invasive Devices     Peripheral Intravenous Line  Duration           Peripheral IV 06/29/23 Right Antecubital <1 day                Physical Exam  Constitutional:       Appearance: Normal appearance. He is normal weight. Cardiovascular:      Rate and Rhythm: Normal rate and regular rhythm. Pulses: Normal pulses. Pulmonary:      Effort: Pulmonary effort is normal.   Abdominal:      General: Abdomen is flat. Palpations: Abdomen is soft. Skin:     General: Skin is warm. Capillary Refill: Capillary refill takes less than 2 seconds. Neurological:      General: No focal deficit present. Mental Status: He is alert. The history was obtained from the review of the chart, patient. Lab Results:    Latest Reference Range & Units Most Recent   Hemoglobin A1C <5.7 % 7.9 (H) (E)  3/31/23 08:16   eAG, EST AVG Glucose mg/dL 180 (E)  3/31/23 08:16   POC Glucose 65 - 140 mg/dl 266 (H)  6/1/23 11:19   (H): Data is abnormally high  (E): External lab result  Imaging Studies: I have personally reviewed pertinent reports. Portions of the record may have been created with voice recognition software.

## 2023-06-29 NOTE — H&P
[id number]  H&P  Name: Ta Howe 66 y.o. male I MRN: [id number]  Unit/Bed#: ED 15 I Date of Admission: 6/29/2023   Date of Service: 6/29/2023 I Hospital Day: 0      Assessment/Plan   * 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, with fat layer exposed [facility])  Assessment & Plan  Lab Results   Component Value Date    HGBA1C 7.9 (H) 03/31/2023       No results for input(s): "POCGLU" in the last 72 hours. Blood Sugar Average: Last 72 hrs:     Patient has diabetic foot infection. On 5/30 he had sesamoid bone removal due to diabetic foot infection. Since then he has been following with podiatry and wound care. He reported significant improvement in his wound appeared to be healing, however this Sunday his wound became red, had discharge, and became very tender. He saw Dr. Avelina Apley in office yesterday who recommended he come to the hospital for an MRI and likely amputation of left midfoot.     · Wound culture was obtained in office yesterday, pending results  · Obtain blood cultures  · Obtain MRI  · X-ray of foot showed osseous abnormalities and cannot rule out osteomyelitis  · Consult podiatry  · Obtain MRSA swab  · Start vancomycin and ceftriaxone             Sepsis ([address])  Assessment & Plan  · Meets sepsis criteria with WBC greater than 12 K, heart rate 109 with a likely source infection diabetic foot wound  · We will give gentle IVF  · We will give IV antibiotics (vancomycin and ceftriaxone)  · Lactic acid 1.8  · Procalcitonin 0.5  · CRP 50.4 and sed rate 69  · Afebrile  · We will continue to monitor  · Wound culture and blood cultures pending    Hyponatremia  Assessment & Plan  · Sodium admission is 125, after hyperglycemia correction   · Baseline appears to be hyponatremic at 130  · We will continue to monitor and give IVF    CKD (chronic kidney disease)  Assessment & Plan  Lab Results   Component Value Date    EGFR 63 06/29/2023    EGFR 71 06/01/2023    EGFR 71 05/31/2023    CREATININE 1.22 06/29/2023    CREATININE 1.11 06/01/2023    CREATININE 1.11 05/31/2023     · Creatinine is currently at baseline, can continue to monitor    Type 1 diabetes mellitus with hyperglycemia [facility])  Assessment & Plan  Lab Results   Component Value Date    HGBA1C 7.9 (H) 03/31/2023       No results for input(s): "POCGLU" in the last 72 hours. Blood Sugar Average: Last 72 hrs:     Please see note attached to insulin pump in place    Benign essential hypertension  Assessment & Plan  · BP is currently hypertensive at 164/79, patient reports not taking blood pressure medications this a.m. · Takes metoprolol and lisinopril in the outpatient setting, will continue home regimen  · Continue to monitor BP    Insulin pump in place  Assessment & Plan  · Patient currently has insulin pump and is active in the outpatient side, consulted endocrinology and will discontinue insulin pump for likelihood of surgical intervention  · We will start non DKA insulin infusion and will likely transition to injectables tomorrow  · Endocrinology consulted    Mixed hyperlipidemia  Assessment & Plan  · Continue atorvastatin       VTE Pharmacologic Prophylaxis: VTE Score: 4 Moderate Risk (Score 3-4) - Pharmacological DVT Prophylaxis Ordered: heparin. Code Status: Full Code  Discussion with family: Patient declined call to . Anticipated Length of Stay: Patient will be admitted on an inpatient basis with an anticipated length of stay of greater than 2 midnights secondary to Diabetic foot infection.     Total Time Spent on Date of Encounter in care of patient: 65 minutes This time was spent on one or more of the following: performing physical exam; counseling and coordination of care; obtaining or reviewing history; documenting in the medical record; reviewing/ordering tests, medications or procedures; communicating with other healthcare professionals and discussing with patient's family/caregivers. Chief Complaint: Foot infection    History of Present Illness:  Dandy Gutierrez is a 66 y.o. male with a PMH of gout, type 1 diabetes, hyperlipidemia, hypertension who presents with diabetic foot infection. Patient has diabetic foot infection. On 5/30 he had sesamoid bone removal due to diabetic foot infection. Since then he has been following with podiatry and wound care. He reported significant improvement in his wound appeared to be healing, however this Sunday his wound became red, had discharge, and became very tender. He saw Dr. Tio Lacey in office yesterday who recommended he come to the hospital for an MRI and likely amputation of left midfoot. Review of Systems:  Review of Systems   Constitutional: Negative for chills, fatigue and fever. HENT: Negative for postnasal drip, rhinorrhea and sore throat. Eyes: Negative for photophobia and visual disturbance. Respiratory: Negative for cough, chest tightness, shortness of breath and wheezing. Cardiovascular: Negative for chest pain, palpitations and leg swelling. Gastrointestinal: Negative for abdominal pain, constipation, diarrhea, nausea and vomiting. Endocrine: Negative for polyuria. Genitourinary: Negative for dysuria and frequency. Musculoskeletal: Negative for arthralgias, gait problem and myalgias. Skin: Positive for wound. Negative for pallor and rash. Neurological: Negative for dizziness, weakness and light-headedness.        Past Medical and Surgical History:   Past Medical History:   Diagnosis Date   • Chronic foot ulcer ([address]) 09/07/2018   • Diabetes mellitus ([address] [facility])    • Gout    • High cholesterol    • Hypertension    • Visual impairment 11/1/2022    Cateract       Past Surgical History:   Procedure Laterality Date   • CATARACT EXTRACTION Right 01/2023   • COMPLEX WOUND CLOSURE TO EXTREMITY Left 07/18/2019    Procedure: COMPLEX CLOSURE LEFT CHEEK;  Surgeon: Libia Black MD;  Location:  MAIN OR; Service: Plastics   • FACIAL/NECK BIOPSY Left 07/18/2019    Procedure: EXCISION LEFT CHEEK CYST;  Surgeon: Danya Ibarra MD;  Location:  MAIN OR;  Service: Plastics   • HERNIA REPAIR Left     several times   • KNEE ARTHROSCOPY Left     torn meniscus   • WOUND DEBRIDEMENT Left 5/30/2023    Procedure: DEBRIDEMENT WOUND LEFT FOOT WOUND WITH EXCISION OF INFECTED BONE AND WOUND VAC APPLICATION;  Surgeon: Lord Nelly DPM;  Location:  MAIN OR;  Service: Podiatry       Meds/Allergies:  Prior to Admission medications    Medication Sig Start Date End Date Taking?  Authorizing Provider   atorvastatin (LIPITOR) 40 mg tablet TAKE 1 TABLET BY MOUTH EVERY DAY 11/26/22  Yes Jackson Salgado MD   lisinopril (ZESTRIL) 10 mg tablet Take 1 tablet (10 mg total) by mouth daily 3/15/23  Yes Jackson Salgado MD   metoprolol succinate (TOPROL-XL) 100 mg 24 hr tablet Take 1 tablet (100 mg total) by mouth daily 3/15/23  Yes Jackson Salgado MD   doxycycline hyclate (VIBRAMYCIN) 100 mg capsule Take 1 capsule (100 mg total) by mouth every 12 (twelve) hours for 7 days  Patient not taking: Reported on 6/29/2023 6/26/23 7/3/23  Frutoso CourserNOAH   Glucagon, rDNA, (Glucagon Emergency) 1 MG KIT as directed    Historical Provider, MD   HumaLOG 100 UNIT/ML injection inject up to 80 units in INSULIN PUMP daily as directed by prescriber 3/31/23   Historical Provider, MD   insulin lispro (HumaLOG) 100 units/mL injection Use via the insulin pump, use up to 80 units daily 1/18/23   Erlinda Dubin, MD   oxyCODONE (ROXICODONE) 10 MG TABS Take 1 tablet (10 mg total) by mouth every 4 (four) hours as needed for severe pain Max Daily Amount: 60 mg  Patient not taking: Reported on 6/29/2023 6/1/23   Valdo Arellano MD   traMADol (Ultram) 50 mg tablet Take 1 tablet (50 mg total) by mouth every 6 (six) hours as needed for moderate pain  Patient not taking: Reported on 6/29/2023 6/1/23   Valdo Arellano MD     I have reviewed home medications with patient personally. Allergies: Allergies   Allergen Reactions   • Cefuroxime Other (See Comments) and GI Intolerance   • Amoxicillin-Pot Clavulanate Nausea Only and GI Intolerance       Social History:  Marital Status: /Civil Union   Occupation: N/a  Patient Pre-hospital Living Situation: Home  Patient Pre-hospital Level of Mobility: walks  Patient Pre-hospital Diet Restrictions: None  Substance Use History:   Social History     Substance and Sexual Activity   Alcohol Use Yes    Comment: 3/4 beers a day     Social History     Tobacco Use   Smoking Status Every Day   • Packs/day: 1.00   • Years: 20.00   • Total pack years: 20.00   • Types: Cigarettes   • Last attempt to quit: 2022   • Years since quittin.4   Smokeless Tobacco Never   Tobacco Comments    encouraged smoking cessation     Social History     Substance and Sexual Activity   Drug Use Never       Family History:  Family History   Problem Relation Age of Onset   • Heart disease Father    • Diabetes type I Father    • Diabetes type II Mother    • No Known Problems Sister    • Alcohol abuse Brother    • Diabetes type I Brother    • No Known Problems Brother    • No Known Problems Son    • No Known Problems Daughter        Physical Exam:     Vitals:   Blood Pressure: 144/74 (23 1300)  Pulse: 100 (23 1300)  Temperature: 98.5 °F (36.9 °C) (23 1056)  Temp Source: Oral (23 1056)  Respirations: 18 (23 1300)  Height: 6' (182.9 cm) (23 0902)  Weight - Scale: 63.5 kg (140 lb) (23 0902)  SpO2: 99 % (23 1300)    Physical Exam  Vitals and nursing note reviewed. Constitutional:       Appearance: He is normal weight. HENT:      Head: Normocephalic. Nose: Nose normal.      Mouth/Throat:      Mouth: Mucous membranes are moist.      Pharynx: Oropharynx is clear. Eyes:      General: No scleral icterus.      Conjunctiva/sclera: Conjunctivae normal.      Pupils: Pupils are equal, round, and reactive to light. Cardiovascular:      Rate and Rhythm: Normal rate and regular rhythm. Heart sounds: No murmur heard. No friction rub. No gallop. Pulmonary:      Effort: Pulmonary effort is normal. No respiratory distress. Breath sounds: Normal breath sounds. No stridor. No wheezing, rhonchi or rales. Abdominal:      General: Abdomen is flat. Palpations: Abdomen is soft. Musculoskeletal:         General: Normal range of motion. Cervical back: Normal range of motion and neck supple. Right lower leg: No edema. Left lower leg: No edema. Lymphadenopathy:      Cervical: No cervical adenopathy. Skin:     General: Skin is warm. Coloration: Skin is not jaundiced or pale. Findings: Lesion ( Lesion on left midfoot, purulent discharge, erythema, tender, warmth) present. No bruising or erythema. Neurological:      General: No focal deficit present. Mental Status: He is alert and oriented to person, place, and time. Mental status is at baseline. Cranial Nerves: No cranial nerve deficit. Motor: No weakness. Psychiatric:         Mood and Affect: Mood normal.         Behavior: Behavior normal.         Thought Content:  Thought content normal.          Additional Data:     Lab Results:  Results from last 7 days   Lab Units 06/29/23  0906   WBC Thousand/uL 12.35*   HEMOGLOBIN g/dL 12.5   HEMATOCRIT % 36.4*   PLATELETS Thousands/uL 366   NEUTROS PCT % 76*   LYMPHS PCT % 9*   MONOS PCT % 13*   EOS PCT % 0     Results from last 7 days   Lab Units 06/29/23  0906   SODIUM mmol/L 125*   POTASSIUM mmol/L 5.0   CHLORIDE mmol/L 91*   CO2 mmol/L 27   BUN mg/dL 17   CREATININE mg/dL 1.22   ANION GAP mmol/L 7   CALCIUM mg/dL 9.1   ALBUMIN g/dL 3.6   TOTAL BILIRUBIN mg/dL 0.55   ALK PHOS U/L 112*   ALT U/L 8   AST U/L 11*   GLUCOSE RANDOM mg/dL 218*     Results from last 7 days   Lab Units 06/29/23  0906   INR  0.93             Results from last 7 days   Lab Units 06/29/23  1052 06/29/23  0906   LACTIC ACID mmol/L  --  1.8   PROCALCITONIN ng/ml 0.15  --        Lines/Drains:  Invasive Devices     Peripheral Intravenous Line  Duration           Peripheral IV 06/29/23 Right Antecubital <1 day                    Imaging: Reviewed radiology reports from this admission including: xray(s)  XR foot 3+ views LEFT   Final Result by Quin Link MD (06/29 1222)      There is a small cortical defect at the head of the first metatarsal. Cannot exclude osteomyelitis. Consider MRI follow-up. Workstation performed: [id number]             EKG and Other Studies Reviewed on Admission:   · EKG: No EKG obtained. ** Please Note: This note has been constructed using a voice recognition system.  **

## 2023-06-30 ENCOUNTER — ANESTHESIA (INPATIENT)
Dept: PERIOP | Facility: HOSPITAL | Age: 63
DRG: 854 | End: 2023-06-30
Payer: COMMERCIAL

## 2023-06-30 ENCOUNTER — APPOINTMENT (INPATIENT)
Dept: RADIOLOGY | Facility: HOSPITAL | Age: 63
DRG: 854 | End: 2023-06-30
Payer: COMMERCIAL

## 2023-06-30 ENCOUNTER — APPOINTMENT (INPATIENT)
Dept: MRI IMAGING | Facility: HOSPITAL | Age: 63
DRG: 854 | End: 2023-06-30
Payer: COMMERCIAL

## 2023-06-30 ENCOUNTER — ANESTHESIA EVENT (INPATIENT)
Dept: PERIOP | Facility: HOSPITAL | Age: 63
DRG: 854 | End: 2023-06-30
Payer: COMMERCIAL

## 2023-06-30 PROBLEM — N18.2 STAGE 2 CHRONIC KIDNEY DISEASE: Status: ACTIVE | Noted: 2023-05-29

## 2023-06-30 LAB
ALBUMIN SERPL BCP-MCNC: 3.1 G/DL (ref 3.5–5)
ALP SERPL-CCNC: 93 U/L (ref 34–104)
ALT SERPL W P-5'-P-CCNC: 7 U/L (ref 7–52)
ANION GAP SERPL CALCULATED.3IONS-SCNC: 7 MMOL/L
APTT PPP: 34 SECONDS (ref 23–37)
AST SERPL W P-5'-P-CCNC: 9 U/L (ref 13–39)
BASOPHILS # BLD AUTO: 0.12 THOUSANDS/ÂΜL (ref 0–0.1)
BASOPHILS NFR BLD AUTO: 1 % (ref 0–1)
BILIRUB SERPL-MCNC: 0.6 MG/DL (ref 0.2–1)
BUN SERPL-MCNC: 14 MG/DL (ref 5–25)
CALCIUM ALBUM COR SERPL-MCNC: 9.3 MG/DL (ref 8.3–10.1)
CALCIUM SERPL-MCNC: 8.6 MG/DL (ref 8.4–10.2)
CHLORIDE SERPL-SCNC: 98 MMOL/L (ref 96–108)
CO2 SERPL-SCNC: 26 MMOL/L (ref 21–32)
CREAT SERPL-MCNC: 1.02 MG/DL (ref 0.6–1.3)
EOSINOPHIL # BLD AUTO: 0.18 THOUSAND/ÂΜL (ref 0–0.61)
EOSINOPHIL NFR BLD AUTO: 2 % (ref 0–6)
ERYTHROCYTE [DISTWIDTH] IN BLOOD BY AUTOMATED COUNT: 12.8 % (ref 11.6–15.1)
GFR SERPL CREATININE-BSD FRML MDRD: 78 ML/MIN/1.73SQ M
GLUCOSE SERPL-MCNC: 103 MG/DL (ref 65–140)
GLUCOSE SERPL-MCNC: 111 MG/DL (ref 65–140)
GLUCOSE SERPL-MCNC: 185 MG/DL (ref 65–140)
GLUCOSE SERPL-MCNC: 192 MG/DL (ref 65–140)
GLUCOSE SERPL-MCNC: 211 MG/DL (ref 65–140)
GLUCOSE SERPL-MCNC: 226 MG/DL (ref 65–140)
GLUCOSE SERPL-MCNC: 301 MG/DL (ref 65–140)
GLUCOSE SERPL-MCNC: 58 MG/DL (ref 65–140)
GLUCOSE SERPL-MCNC: 66 MG/DL (ref 65–140)
GLUCOSE SERPL-MCNC: 89 MG/DL (ref 65–140)
HCT VFR BLD AUTO: 31.7 % (ref 36.5–49.3)
HGB BLD-MCNC: 10.3 G/DL (ref 12–17)
IMM GRANULOCYTES # BLD AUTO: 0.04 THOUSAND/UL (ref 0–0.2)
IMM GRANULOCYTES NFR BLD AUTO: 0 % (ref 0–2)
LYMPHOCYTES # BLD AUTO: 1.66 THOUSANDS/ÂΜL (ref 0.6–4.47)
LYMPHOCYTES NFR BLD AUTO: 17 % (ref 14–44)
MCH RBC QN AUTO: 32.5 PG (ref 26.8–34.3)
MCHC RBC AUTO-ENTMCNC: 32.5 G/DL (ref 31.4–37.4)
MCV RBC AUTO: 100 FL (ref 82–98)
MONOCYTES # BLD AUTO: 1.56 THOUSAND/ÂΜL (ref 0.17–1.22)
MONOCYTES NFR BLD AUTO: 16 % (ref 4–12)
NEUTROPHILS # BLD AUTO: 6.26 THOUSANDS/ÂΜL (ref 1.85–7.62)
NEUTS SEG NFR BLD AUTO: 64 % (ref 43–75)
NRBC BLD AUTO-RTO: 0 /100 WBCS
PLATELET # BLD AUTO: 324 THOUSANDS/UL (ref 149–390)
PMV BLD AUTO: 8.7 FL (ref 8.9–12.7)
POTASSIUM SERPL-SCNC: 4.6 MMOL/L (ref 3.5–5.3)
PROT SERPL-MCNC: 6.5 G/DL (ref 6.4–8.4)
RBC # BLD AUTO: 3.17 MILLION/UL (ref 3.88–5.62)
SODIUM SERPL-SCNC: 131 MMOL/L (ref 135–147)
WBC # BLD AUTO: 9.82 THOUSAND/UL (ref 4.31–10.16)

## 2023-06-30 PROCEDURE — 88311 DECALCIFY TISSUE: CPT | Performed by: PATHOLOGY

## 2023-06-30 PROCEDURE — 28805 AMPUTATION THRU METATARSAL: CPT | Performed by: PODIATRIST

## 2023-06-30 PROCEDURE — 82948 REAGENT STRIP/BLOOD GLUCOSE: CPT

## 2023-06-30 PROCEDURE — 73718 MRI LOWER EXTREMITY W/O DYE: CPT

## 2023-06-30 PROCEDURE — 87070 CULTURE OTHR SPECIMN AEROBIC: CPT | Performed by: PODIATRIST

## 2023-06-30 PROCEDURE — 87147 CULTURE TYPE IMMUNOLOGIC: CPT | Performed by: PODIATRIST

## 2023-06-30 PROCEDURE — 73630 X-RAY EXAM OF FOOT: CPT

## 2023-06-30 PROCEDURE — 0Y6N0Z9 DETACHMENT AT LEFT FOOT, PARTIAL 1ST RAY, OPEN APPROACH: ICD-10-PCS | Performed by: PODIATRIST

## 2023-06-30 PROCEDURE — 87077 CULTURE AEROBIC IDENTIFY: CPT | Performed by: HOSPITALIST

## 2023-06-30 PROCEDURE — 88307 TISSUE EXAM BY PATHOLOGIST: CPT | Performed by: PATHOLOGY

## 2023-06-30 PROCEDURE — 87186 SC STD MICRODIL/AGAR DIL: CPT | Performed by: PODIATRIST

## 2023-06-30 PROCEDURE — 99232 SBSQ HOSP IP/OBS MODERATE 35: CPT | Performed by: PHYSICIAN ASSISTANT

## 2023-06-30 PROCEDURE — 87075 CULTR BACTERIA EXCEPT BLOOD: CPT | Performed by: PODIATRIST

## 2023-06-30 PROCEDURE — 80053 COMPREHEN METABOLIC PANEL: CPT

## 2023-06-30 PROCEDURE — NC001 PR NO CHARGE: Performed by: PODIATRIST

## 2023-06-30 PROCEDURE — 85025 COMPLETE CBC W/AUTO DIFF WBC: CPT

## 2023-06-30 PROCEDURE — 87205 SMEAR GRAM STAIN: CPT | Performed by: PODIATRIST

## 2023-06-30 PROCEDURE — 87077 CULTURE AEROBIC IDENTIFY: CPT | Performed by: PODIATRIST

## 2023-06-30 PROCEDURE — 85730 THROMBOPLASTIN TIME PARTIAL: CPT

## 2023-06-30 PROCEDURE — NC001 PR NO CHARGE: Performed by: STUDENT IN AN ORGANIZED HEALTH CARE EDUCATION/TRAINING PROGRAM

## 2023-06-30 RX ORDER — SUCCINYLCHOLINE/SOD CL,ISO/PF 100 MG/5ML
SYRINGE (ML) INTRAVENOUS AS NEEDED
Status: DISCONTINUED | OUTPATIENT
Start: 2023-06-30 | End: 2023-06-30

## 2023-06-30 RX ORDER — PROPOFOL 10 MG/ML
INJECTION, EMULSION INTRAVENOUS AS NEEDED
Status: DISCONTINUED | OUTPATIENT
Start: 2023-06-30 | End: 2023-06-30

## 2023-06-30 RX ORDER — OXYCODONE HCL 10 MG/1
10 TABLET, FILM COATED, EXTENDED RELEASE ORAL EVERY 12 HOURS SCHEDULED
Status: DISCONTINUED | OUTPATIENT
Start: 2023-06-30 | End: 2023-06-30

## 2023-06-30 RX ORDER — LIDOCAINE HYDROCHLORIDE 10 MG/ML
INJECTION, SOLUTION EPIDURAL; INFILTRATION; INTRACAUDAL; PERINEURAL AS NEEDED
Status: DISCONTINUED | OUTPATIENT
Start: 2023-06-30 | End: 2023-06-30

## 2023-06-30 RX ORDER — SODIUM CHLORIDE 9 MG/ML
INJECTION, SOLUTION INTRAVENOUS AS NEEDED
Status: DISCONTINUED | OUTPATIENT
Start: 2023-06-30 | End: 2023-06-30 | Stop reason: HOSPADM

## 2023-06-30 RX ORDER — OXYCODONE HYDROCHLORIDE 5 MG/1
5 TABLET ORAL EVERY 6 HOURS PRN
Status: DISCONTINUED | OUTPATIENT
Start: 2023-06-30 | End: 2023-07-01

## 2023-06-30 RX ORDER — HYDROMORPHONE HCL/PF 1 MG/ML
SYRINGE (ML) INJECTION AS NEEDED
Status: DISCONTINUED | OUTPATIENT
Start: 2023-06-30 | End: 2023-06-30

## 2023-06-30 RX ORDER — OXYCODONE HYDROCHLORIDE 5 MG/1
10 TABLET ORAL EVERY 6 HOURS PRN
Status: DISCONTINUED | OUTPATIENT
Start: 2023-06-30 | End: 2023-07-01

## 2023-06-30 RX ORDER — SODIUM CHLORIDE, SODIUM LACTATE, POTASSIUM CHLORIDE, CALCIUM CHLORIDE 600; 310; 30; 20 MG/100ML; MG/100ML; MG/100ML; MG/100ML
INJECTION, SOLUTION INTRAVENOUS CONTINUOUS PRN
Status: DISCONTINUED | OUTPATIENT
Start: 2023-06-30 | End: 2023-06-30

## 2023-06-30 RX ORDER — ONDANSETRON 2 MG/ML
INJECTION INTRAMUSCULAR; INTRAVENOUS AS NEEDED
Status: DISCONTINUED | OUTPATIENT
Start: 2023-06-30 | End: 2023-06-30

## 2023-06-30 RX ORDER — HYDROMORPHONE HCL/PF 1 MG/ML
1 SYRINGE (ML) INJECTION
Status: DISCONTINUED | OUTPATIENT
Start: 2023-06-30 | End: 2023-07-01

## 2023-06-30 RX ORDER — HYDROMORPHONE HCL/PF 1 MG/ML
0.5 SYRINGE (ML) INJECTION
Status: DISCONTINUED | OUTPATIENT
Start: 2023-06-30 | End: 2023-07-01

## 2023-06-30 RX ORDER — FENTANYL CITRATE/PF 50 MCG/ML
25 SYRINGE (ML) INJECTION
Status: DISCONTINUED | OUTPATIENT
Start: 2023-06-30 | End: 2023-06-30

## 2023-06-30 RX ORDER — FENTANYL CITRATE 50 UG/ML
INJECTION, SOLUTION INTRAMUSCULAR; INTRAVENOUS AS NEEDED
Status: DISCONTINUED | OUTPATIENT
Start: 2023-06-30 | End: 2023-06-30

## 2023-06-30 RX ADMIN — SODIUM CHLORIDE, SODIUM LACTATE, POTASSIUM CHLORIDE, AND CALCIUM CHLORIDE: .6; .31; .03; .02 INJECTION, SOLUTION INTRAVENOUS at 14:08

## 2023-06-30 RX ADMIN — PROPOFOL 200 MG: 10 INJECTION, EMULSION INTRAVENOUS at 14:18

## 2023-06-30 RX ADMIN — ATORVASTATIN CALCIUM 40 MG: 40 TABLET, FILM COATED ORAL at 17:27

## 2023-06-30 RX ADMIN — HYDROMORPHONE HYDROCHLORIDE 0.5 MG: 1 INJECTION, SOLUTION INTRAMUSCULAR; INTRAVENOUS; SUBCUTANEOUS at 03:43

## 2023-06-30 RX ADMIN — FENTANYL CITRATE 25 MCG: 50 INJECTION INTRAMUSCULAR; INTRAVENOUS at 16:16

## 2023-06-30 RX ADMIN — LISINOPRIL 10 MG: 10 TABLET ORAL at 09:20

## 2023-06-30 RX ADMIN — METOPROLOL SUCCINATE 100 MG: 50 TABLET, EXTENDED RELEASE ORAL at 09:20

## 2023-06-30 RX ADMIN — SODIUM CHLORIDE 125 ML/HR: 0.9 INJECTION, SOLUTION INTRAVENOUS at 10:22

## 2023-06-30 RX ADMIN — HYDROMORPHONE HYDROCHLORIDE 1 MG: 1 INJECTION, SOLUTION INTRAMUSCULAR; INTRAVENOUS; SUBCUTANEOUS at 10:12

## 2023-06-30 RX ADMIN — HYDROMORPHONE HYDROCHLORIDE 0.5 MG: 1 INJECTION, SOLUTION INTRAMUSCULAR; INTRAVENOUS; SUBCUTANEOUS at 07:12

## 2023-06-30 RX ADMIN — VANCOMYCIN HYDROCHLORIDE 1250 MG: 5 INJECTION, POWDER, LYOPHILIZED, FOR SOLUTION INTRAVENOUS at 10:22

## 2023-06-30 RX ADMIN — FENTANYL CITRATE 25 MCG: 50 INJECTION INTRAMUSCULAR; INTRAVENOUS at 16:04

## 2023-06-30 RX ADMIN — ONDANSETRON 4 MG: 2 INJECTION INTRAMUSCULAR; INTRAVENOUS at 14:18

## 2023-06-30 RX ADMIN — Medication 100 MG: at 14:18

## 2023-06-30 RX ADMIN — SODIUM CHLORIDE 0.3 UNITS/HR: 9 INJECTION, SOLUTION INTRAVENOUS at 17:00

## 2023-06-30 RX ADMIN — OXYCODONE HYDROCHLORIDE 10 MG: 5 TABLET ORAL at 18:32

## 2023-06-30 RX ADMIN — CEFTRIAXONE 1000 MG: 1 INJECTION, SOLUTION INTRAVENOUS at 05:17

## 2023-06-30 RX ADMIN — PROPOFOL 50 MG: 10 INJECTION, EMULSION INTRAVENOUS at 14:37

## 2023-06-30 RX ADMIN — NICOTINE 21 MG: 21 PATCH, EXTENDED RELEASE TRANSDERMAL at 09:25

## 2023-06-30 RX ADMIN — HYDROMORPHONE HYDROCHLORIDE 1 MG: 1 INJECTION, SOLUTION INTRAMUSCULAR; INTRAVENOUS; SUBCUTANEOUS at 17:00

## 2023-06-30 RX ADMIN — HYDROMORPHONE HYDROCHLORIDE 1 MG: 1 INJECTION, SOLUTION INTRAMUSCULAR; INTRAVENOUS; SUBCUTANEOUS at 23:07

## 2023-06-30 RX ADMIN — FENTANYL CITRATE 100 MCG: 50 INJECTION, SOLUTION INTRAMUSCULAR; INTRAVENOUS at 14:18

## 2023-06-30 RX ADMIN — HYDROMORPHONE HYDROCHLORIDE 1 MG: 1 INJECTION, SOLUTION INTRAMUSCULAR; INTRAVENOUS; SUBCUTANEOUS at 13:48

## 2023-06-30 RX ADMIN — SODIUM CHLORIDE 125 ML/HR: 0.9 INJECTION, SOLUTION INTRAVENOUS at 23:08

## 2023-06-30 RX ADMIN — HYDROMORPHONE HYDROCHLORIDE 0.5 MG: 1 INJECTION, SOLUTION INTRAMUSCULAR; INTRAVENOUS; SUBCUTANEOUS at 15:17

## 2023-06-30 RX ADMIN — SODIUM CHLORIDE 3 UNITS/HR: 9 INJECTION, SOLUTION INTRAVENOUS at 11:06

## 2023-06-30 RX ADMIN — LIDOCAINE HYDROCHLORIDE 50 MG: 10 INJECTION, SOLUTION EPIDURAL; INFILTRATION; INTRACAUDAL; PERINEURAL at 14:16

## 2023-06-30 RX ADMIN — HEPARIN SODIUM 5000 UNITS: 5000 INJECTION INTRAVENOUS; SUBCUTANEOUS at 05:17

## 2023-06-30 RX ADMIN — HYDROMORPHONE HYDROCHLORIDE 1 MG: 1 INJECTION, SOLUTION INTRAMUSCULAR; INTRAVENOUS; SUBCUTANEOUS at 20:03

## 2023-06-30 NOTE — CASE MANAGEMENT
Case Management Assessment & Discharge Planning Note    Patient name Alex Young  Location /-01 MRN [id number]  : 1960 Date 2023       Current Admission Date: 2023  Current Admission Diagnosis: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, with fat layer exposed [facility]   Patient Active Problem List    Diagnosis Date Noted   • Sepsis ([address]) 2023   • CKD (chronic kidney disease) 2023   • Hyponatremia 2023   • Alcohol abuse 2023   • Nicotine abuse 2023   • Diabetic foot ulcer ([address]) 2023   • 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, with fat layer exposed ([address]) 2023   • Type 1 diabetes mellitus with hyperglycemia ([address]) 2022   • Diabetic polyneuropathy associated with type 1 diabetes mellitus ([address]) 2022   • Microalbuminuria due to type 1 diabetes mellitus ([address]) 2022   • Hypoglycemia unawareness associated with type 1 diabetes mellitus ([address]) 2022   • Cigarette nicotine dependence without complication    • Gout of ankle 2022   • Chronic fatigue syndrome 2022   • Lower urinary tract symptoms due to benign prostatic hyperplasia 2022   • 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 ([address]) 2022   • Chronic pain 2022   • Insulin pump in place 2018   • Benign essential hypertension 2011   • Mixed hyperlipidemia 10/25/2010   • ED (erectile dysfunction) of organic origin 10/25/2010      LOS (days): 1  Geometric Mean LOS (GMLOS) (days):   Days to GMLOS:     OBJECTIVE:  PATIENT READMITTED TO HOSPITAL  Risk of Unplanned Readmission Score: 13.62         Current admission status: Inpatient       Preferred [facility]acy:   [id number] [address] [address]  Phone: [phone] Fax: [phone]    Primary Care Provider: Melissa Abrams MD    Primary Insurance: [employer]  Secondary Insurance:     ASSESSMENT:  Active Health Care Proxies    There are no active Health Care Proxies on file. Advance Directives  Does patient have a [address]?: No  Was patient offered paperwork?: Yes (declined)  Does patient currently have a Health Care decision maker?: Yes, please see Health Care Proxy section  Does patient have Advance Directives?: No  Was patient offered paperwork?: Yes (declined)  Primary Contact: Spouse, Dat Wang         Readmission Root Cause  30 Day Readmission: No    Patient Information  Admitted from[de-identified] Home  Mental Status: Alert  During Assessment patient was accompanied by: Not accompanied during assessment  Assessment information provided by[de-identified] Patient  Support Systems: Self, Spouse/significant other, Family members  [place] of Residence: [address] [place] [address] do you live in?: [address] [place] [address] entry access options.  Select all that apply.: Stairs  Number of steps to enter home.: 2  Do the steps have railings?: Yes  Type of Current Residence: [address] [place] [address] home  Upon entering residence, is there a bedroom on the main floor (no further steps)?: Yes  Upon entering residence, is there a bathroom on the main floor (no further steps)?: Yes  In the last 12 months, was there a time when you were not able to pay the mortgage or rent on time?: No  In the last 12 months, how many places have you lived?: 1  In the last 12 months, was there a time when you did not have a steady place to sleep or slept in a shelter (including now)?: No  Living Arrangements: Lives w/ Spouse/significant other  Is patient a ?: Yes  Is patient active with [facility])?: No    Activities of Daily Living Prior to Admission  Functional Status: Independent  Completes ADLs independently?: Yes  Ambulates independently?: Yes  Does patient use assisted devices?: No  Does patient currently own DME?: No  Does patient have a history of Outpatient Therapy (PT/OT)?: Yes  Does the patient have a history of Short-Term Rehab?: No  Does patient have a history of HHC?: No  Does patient currently have [facility]?: No         Patient Information Continued  Income Source: Employed  Does patient have prescription coverage?: Yes  Within the past 12 months, you worried that your food would run out before you got the money to buy more.: Never true  Within the past 12 months, the food you bought just didn't last and you didn't have money to get more.: Never true  Food insecurity resource given?: N/A  Does patient receive dialysis treatments?: No  Does patient have a history of substance abuse?: No  Does patient have a history of Mental Health Diagnosis?: No         Means of Transportation  Means of Transport to Appts[de-identified] Drives Self  In the past 12 months, has lack of transportation kept you from medical appointments or from getting medications?: No  In the past 12 months, has lack of transportation kept you from meetings, work, or from getting things needed for daily living?: No  Was application for public transport provided?: N/A        DISCHARGE DETAILS:    Discharge planning discussed with[de-identified] Patient  Freedom of Choice: Yes     CM contacted family/caregiver?: No- see comments (declined call to contact)  Were Treatment Team discharge recommendations reviewed with patient/caregiver?: Yes  Did patient/caregiver verbalize understanding of patient care needs?: Yes  Were patient/caregiver advised of the risks associated with not following Treatment Team discharge recommendations?: Yes         [address]         Is the patient interested in [facility] at discharge?: No    DME Referral Provided  Referral made for DME?: No              Treatment Team Recommendation: Home  Discharge Destination Plan[de-identified] Home  Transport at Discharge : Family                                      Additional Comments: Met with patient to discuss role of CM and any needs prior to discharge. Pt lives with spouse in [facility] w/ 2STE and 1st flr set up. Indp PTA. Pt has insulin pump and glucometer. Pt works and drives self. Pt has hx OP PT. No hx STR/HH/DA/MH. Pt prefers [employer]. Spouse will transport at discharge.

## 2023-06-30 NOTE — OP NOTE
OPERATIVE REPORT - Podiatry  PATIENT NAME: Lisa Hernández    :  1960  MRN: [id number]  Pt Location: [facility] OR ROOM 02    SURGERY DATE: 2023    Surgeon(s) and Role:     * Jeff Shearer DPM - Primary     * Donta Rust DPM - Assisting    Pre-op Diagnosis:  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, with fat layer exposed ([address]) [M58.466, L97.422]  Other osteomyelitis of left foot ([address]) [M86.8X7]    Post-Op Diagnosis Codes:     * 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, with fat layer exposed ([address]) [J13.149, L97.422]     * Other osteomyelitis of left foot ([address]) [M86.8X7]    Procedure(s) (LRB):  AMPUTATION TRANSMETATARSAL (TMA) (Left)    Specimen(s):  ID Type Source Tests Collected by Time Destination   1 : Left TMA Tissue Foot, Left TISSUE EXAM Jeff Shearer DPM 2023 1530    A : Left TMA swabs Wound Foot, Left ANAEROBIC CULTURE AND GRAM STAIN, WOUND CULTURE ARTURO Phillips 2023 1452    B : Left TMA Bone culture Wound Foot, Left ANAEROBIC CULTURE AND GRAM STAIN, WOUND CULTURE ARTURO Phillips 2023 1452        Estimated Blood Loss:   Minimal    Drains:  * No LDAs found *    Anesthesia Type:   Local ankle block with 15 ml of 0.5% Bupivacaine and 1% Lidocaine in a 1:1 mixture    Hemostasis:  - Atraumatic technique   - Manual compression  - Ankle tourniquet   - Electrocautery    Materials:  * No implants in log *  Nylon suture     Operative Findings:  - Left 1st metatarsal bone grey in appearance with washed out medullary canal, soft on palpation with surgical instruments. Pre-existing wound with purulent drainage, extending into the 1st interspace, with approximately 5cc of purulent fluid expressed intra-operatively. Lesser metatarsals hard, white, and intact. All infected and non-viable tissue resected. Surgical cure obtained with closure.      Complications:   None    Procedure and Technique:     Under mild sedation, the patient was brought into the operating room and placed on the operating room table in the supine position. IV sedation was achieved by anesthesia team and a universal timeout was performed where all parties are in agreement of correct patient, correct procedure and correct site. A pneumatic tourniquet was then placed over the patient's left lower extremity with ample padding. An ankle block was performed consisting of 15 ml of 0.5% Bupivacaine and 1% Lidocaine in a 1:1 mixture. The foot was then prepped and draped in the usual aseptic manner. An esmarch bandage was used to exsangunate the foot and the pneumatic tourniquet was then inflated to 250 mmHg. Attention was then directed to the left foot where a surgical marking pen was utilized to draw a fishmouth type incision over the forefoot. Utilizing a #15 blade a full thickness incision was made down to bone. The forefoot was then sharply dissected out to isolate the metatarsal shafts. Utilizing a key elevator soft tissues were freed from each of the metatarsals. A sagittal saw was then used to make the transmetatarsal amputation osteotomies. Each metatarsal was cut in a dorsal distal to plantar proximal fashion with care taken to maintain the metatarsal parabola. The entire forefoot was then removed from the table and passed off. The 1st metatarsal was then disarticulated from the medial cuneiform, and removed using an osteotome and passed off of the surgical field. The remaining wound bed was then inspected. No remaining purulent sinus tracts were visualized. Any necrotic or nonviable soft tissues were sharply excised from the wound utilizing metzenbaum scissors. Any residual exposed tendons were excised proximally to prevent any infection from tracking proximally. The wound was then flushed with copious amounts of normal sterile saline.      Skin edges were reapproximated and closure was obtained utilizing interrupted retention sutures utilizing Nylon suture. The foot was then cleansed and dried. The incision site was dressed with Betadine soaked Adaptic, 4x4 gauze, ABD, Kerlix, and Coban. The pneumatic ankle tourniquet was deflated and normal hyperemic flush was noted to the plantar flap. The patient tolerated the procedure and anesthesia well without immediate complications and transferred to PACU with vital signs stable. As with many limb salvage procedures, we contemplate the possibility of performing further stages to this procedure. Procedures may include debridements, delayed closure, plastic surgery techniques, or more proximal amputations. This procedure may be considered part of a multi-staged limb salvage treatment plan. Dr. Manolo Landry was present during the entire procedure and participated in all key aspects. SIGNATURE: Angela Ashley DPM  DATE: June 30, 2023  TIME: 3:51 PM      Portions of the record may have been created with voice recognition software. Occasional wrong word or "sound a like" substitutions may have occurred due to the inherent limitations of voice recognition software. Read the chart carefully and recognize, using context, where substitutions have occurred.

## 2023-06-30 NOTE — PROGRESS NOTES
MRI reviewed shows more extensive involvement of bone than previously thought. Will require transmetatarsal amputation. New consent was obtained from patient, all risk benefits alternatives and possible complications to the procedure were reviewed in detail. Chance of nonhealing, delayed healing and infection were reviewed as they are significant risks.

## 2023-06-30 NOTE — CASE MANAGEMENT
Case Management Assessment    Patient name Bryan Hoffman  Location /-01 MRN [id number]  : 1960 Date 2023       Current Admission Date: 2023  Current Admission Diagnosis: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, with fat layer exposed [facility])   Patient Active Problem List    Diagnosis Date Noted   • Sepsis ([address]) 2023   • CKD (chronic kidney disease) 2023   • Hyponatremia 2023   • Alcohol abuse 2023   • Nicotine abuse 2023   • Diabetic foot ulcer ([address]) 2023   • 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, with fat layer exposed ([address]) 2023   • Type 1 diabetes mellitus with hyperglycemia ([address]) 2022   • Diabetic polyneuropathy associated with type 1 diabetes mellitus ([address]) 2022   • Microalbuminuria due to type 1 diabetes mellitus ([address]) 2022   • Hypoglycemia unawareness associated with type 1 diabetes mellitus ([address]) 2022   • Cigarette nicotine dependence without complication    • Gout of ankle 2022   • Chronic fatigue syndrome 2022   • Lower urinary tract symptoms due to benign prostatic hyperplasia 2022   • 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 ([address]) 2022   • Chronic pain 2022   • Insulin pump in place 2018   • Benign essential hypertension 2011   • Mixed hyperlipidemia 10/25/2010   • ED (erectile dysfunction) of organic origin 10/25/2010      LOS (days): 1  Geometric Mean LOS (GMLOS) (days):   Days to GMLOS:     OBJECTIVE:  PATIENT READMITTED TO HOSPITAL  Risk of Unplanned Readmission Score: 13.62         Current admission status: Inpatient       Preferred Pharmacy:   [id number] Abhay Perez [address] [address]  Phone: [phone] Fax: [phone]    Primary Care Provider: Anish Del Valle, MD    Primary Insurance: [employer]  Secondary Insurance:     ASSESSMENT:  Active Health Care Proxies    There are no active Health Care Proxies on file. Advance Directives  Does patient have a [address]?: No  Was patient offered paperwork?: Yes (declined)  Does patient currently have a Health Care decision maker?: Yes, please see Health Care Proxy section  Does patient have Advance Directives?: No  Was patient offered paperwork?: Yes (declined)  Primary Contact: Spouse, Kathryn Leggett         Readmission Root Cause  30 Day Readmission: Yes  Who directed you to return to the hospital?: Specialist (Podiatrist)  Did you understand whom to contact if you had questions or problems?: Yes  Did you get your prescriptions before you left the hospital?: No  Reason[de-identified] Delivery service not offered  Were you able to get your prescriptions filled when you left the hospital?: Yes  Did you take your medications as prescribed?: Yes  Were you able to get to your follow-up appointments?: Yes  During previous admission, was a post-acute recommendation made?: No  Patient was readmitted due to: Diabetic foot ulcer    Patient Information  Admitted from[de-identified] Home  Mental Status: Alert  During Assessment patient was accompanied by: Not accompanied during assessment  Assessment information provided by[de-identified] Patient  Support Systems: Self, Spouse/significant other, Family members  [place] [facility]: [address] [place] [address] Street do you live in?: [address] [facility] entry access options.  Select all that apply.: Stairs  Number of steps to enter home.: 2  Do the steps have railings?: Yes  Type of Current Residence: [address] [place] [address] home  Upon entering residence, is there a bedroom on the main floor (no further steps)?: Yes  Upon entering residence, is there a bathroom on the main floor (no further steps)?: Yes  In the last 12 months, was there a time when you were not able to pay the mortgage or rent on time?: No  In the last 12 months, how many places have you lived?: 1  In the last 12 months, was there a time when you did not have a steady place to sleep or slept in a shelter (including now)?: No  Living Arrangements: Lives w/ Spouse/significant other  Is patient a ?: Yes  Is patient active with [address])?: No    Activities of Daily Living Prior to Admission  Functional Status: Independent  Completes ADLs independently?: Yes  Ambulates independently?: Yes  Does patient use assisted devices?: No  Does patient currently own DME?: No  Does patient have a history of Outpatient Therapy (PT/OT)?: Yes  Does the patient have a history of Short-Term Rehab?: No  Does patient have a history of HHC?: No  Does patient currently have [facility]?: No         Patient Information Continued  Income Source: Employed  Does patient have prescription coverage?: Yes  Within the past 12 months, you worried that your food would run out before you got the money to buy more.: Never true  Within the past 12 months, the food you bought just didn't last and you didn't have money to get more.: Never true  Food insecurity resource given?: N/A  Does patient receive dialysis treatments?: No  Does patient have a history of substance abuse?: No  Does patient have a history of Mental Health Diagnosis?: No         Means of Transportation  Means of Transport to Appts[de-identified] Drives Self  In the past 12 months, has lack of transportation kept you from medical appointments or from getting medications?: No  In the past 12 months, has lack of transportation kept you from meetings, work, or from getting things needed for daily living?: No  Was application for public transport provided?: N/A

## 2023-06-30 NOTE — QUICK NOTE
Patient not seen today, gone for OR. Please follow recommendations regarding insulin drip per consult note.

## 2023-06-30 NOTE — CONSULTS
Consult - Lc Lopez 66 y.o. male MRN: [id number]  Unit/Bed#: -01 Encounter: [id number]    Assessment/Plan     Sepsis/cellulitis left foot/septic arthritis  · Continue with current IV antibiotics  · Infectious disease consult pending  · Plain film x-rays personally reviewed, show changes that are consistent with osteomyelitis with erosions of the medial first metatarsal head. This clinically correlates to the ulceration which probes to this spot with extensive purulent drainage coming from the joint which I believe is septic. 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 We will change MRI to stat with anticipation of taking patient into the OR tomorrow afternoon around 5 PM  · Will most likely require left foot partial first ray amputation. · Case request submitted, will make patient n.p.o. and hold anticoagulation tomorrow  · Consent reviewed in detail with patient and signed all risk benefits alternatives and possible complications discussed. Patient clearly understands that there is a chance of delayed or nonhealing and that he may require further surgical intervention to resolve this bone infection. Pressure ulcer left foot unstageable  · Lateral fifth MPJ left location  · No signs of infection      Hyponatremia, CKD, type 1 diabetes with hyperglycemia, type 1 diabetes with peripheral neuropathy, benign hypertension, mixed hyperlipidemia  · Managed per internal medicine    History of Present Illness     HPI:  Manish Dallas is a 66 y.o. male type I diabetic who presents with a chronic DFU which became infected after sesamoidectomy on 5/30/2023.   Patient underwent debridement of the sesamoid bone with VAC dressing application and was progressing satisfactorily, then over the past week became more red swollen with purulent drainage. [facility] contacted myself regarding his degradation of condition and he was empirically started on doxycycline. Patient presented to the wound care center yesterday where he was noted to have cellulitis with a full-thickness necrotic ulcer probing to bone under the first toe joint. Patient was referred to the ED for IV antibiotics and anticipated surgical intervention. Podiatry consult requested to evaluate left foot infection/chronic ulcer. Patient's chart reviewed noting all pertinent clinical laboratory data. Inpatient consult to Podiatry  Consult performed by: Lauri Gonzalez DPM  Consult ordered by: Hawa Parker PA-C        Review of Systems   Constitutional: Negative. HENT: Negative. Eyes: Negative. Respiratory: Negative. Cardiovascular: History of chronic kidney disease and hypertension, denies any shortness of breath  Gastrointestinal: Negative.     Musculoskeletal: Negative  Skin: Chronic diabetic ulcer left foot  Neurological: Numbness of toes      Historical Information   Past Medical History:   Diagnosis Date   • Chronic foot ulcer ([address]) 09/07/2018   • Diabetes mellitus ([address])    • Gout    • High cholesterol    • Hypertension    • Visual impairment 11/1/2022    Cateract     Past Surgical History:   Procedure Laterality Date   • CATARACT EXTRACTION Right 01/2023   • COMPLEX WOUND CLOSURE TO EXTREMITY Left 07/18/2019    Procedure: COMPLEX CLOSURE LEFT CHEEK;  Surgeon: Melina Brewer MD;  Location:  MAIN OR;  Service: Plastics   • FACIAL/NECK BIOPSY Left 07/18/2019    Procedure: EXCISION LEFT CHEEK CYST;  Surgeon: Melina Brewer MD;  Location:  MAIN OR;  Service: Plastics   • HERNIA REPAIR Left     several times   • KNEE ARTHROSCOPY Left     torn meniscus   • WOUND DEBRIDEMENT Left 5/30/2023    Procedure: DEBRIDEMENT WOUND LEFT FOOT WOUND WITH EXCISION OF INFECTED BONE AND WOUND VAC APPLICATION;  Surgeon: Brett Muse DPM;  Location:  MAIN OR;  Service: Podiatry     Social History   Social History     Substance and Sexual Activity   Alcohol Use Yes    Comment: 3/4 beers a day     Social History     Substance and Sexual Activity   Drug Use Never     Social History     Tobacco Use   Smoking Status Every Day   • Packs/day: 1.00   • Years: 20.00   • Total pack years: 20.00   • Types: Cigarettes   • Last attempt to quit: 2022   • Years since quittin.4   Smokeless Tobacco Never   Tobacco Comments    encouraged smoking cessation     Family History:   Family History   Problem Relation Age of Onset   • Heart disease Father    • Diabetes type I Father    • Diabetes type II Mother    • No Known Problems Sister    • Alcohol abuse Brother    • Diabetes type I Brother    • No Known Problems Brother    • No Known Problems Son    • No Known Problems Daughter        Meds/Allergies   Facility-Administered Medications Prior to Admission   Medication   • [COMPLETED] lidocaine (XYLOCAINE) 4 % topical solution 5 mL     Medications Prior to Admission   Medication   • atorvastatin (LIPITOR) 40 mg tablet   • lisinopril (ZESTRIL) 10 mg tablet   • metoprolol succinate (TOPROL-XL) 100 mg 24 hr tablet   • doxycycline hyclate (VIBRAMYCIN) 100 mg capsule   • Glucagon, rDNA, (Glucagon Emergency) 1 MG KIT   • HumaLOG 100 UNIT/ML injection   • insulin lispro (HumaLOG) 100 units/mL injection   • oxyCODONE (ROXICODONE) 10 MG TABS   • traMADol (Ultram) 50 mg tablet     Allergies   Allergen Reactions   • Cefuroxime Other (See Comments) and GI Intolerance   • Amoxicillin-Pot Clavulanate Nausea Only and GI Intolerance       Objective   First Vitals:   Blood Pressure: 160/87 (23)  Pulse: (!) 108 (23)  Temperature: 98.4 °F (36.9 °C) (23)  Temp Source: Oral (23)  Respirations: 18 (23)  Height: 6' (182.9 cm) (23)  Weight - Scale: 63.5 kg (140 lb) (06/29/23 0902)  SpO2: 100 % (06/29/23 0902)    Current Vitals:   Blood Pressure: 123/74 (06/29/23 1839)  Pulse: 94 (06/29/23 1839)  Temperature: 97.9 °F (36.6 °C) (06/29/23 1839)  Temp Source: Oral (06/29/23 1056)  Respirations: 18 (06/29/23 1615)  Height: 6' (182.9 cm) (06/29/23 0902)  Weight - Scale: 63.5 kg (140 lb) (06/29/23 0902)  SpO2: 98 % (06/29/23 1839)        /74   Pulse 94   Temp 97.9 °F (36.6 °C)   Resp 18   Ht 6' (1.829 m)   Wt 63.5 kg (140 lb)   SpO2 98%   BMI 18.99 kg/m²     General Appearance:    Alert, cooperative, no distress, resting comfortably in bed   Head:    Normocephalic, without obvious abnormality, atraumatic   Eyes:    PERRL, conjunctiva/corneas clear, EOM's intact            Nose:   Moist mucous membranes, no drainage or sinus tenderness   Throat:   No tenderness, no exudates   Neck:   Supple, symmetrical, trachea midline, no JVD   Back:     Symmetric, no CVA tenderness   Lungs:     Respirations unlabored   Chest wall:    No tenderness or deformity   Heart:    Rate and rhythm noted on last vitals. Abdomen:     Soft, non-tender, bowel sounds active all four quadrants,     no masses, no organomegaly       Lower Ext/Ortho:  No gross deformities of either lower extremity noted. Neuro/Vasc: CNII-XII intact. Normal strength  Sensation and reflexes: Diminished  Pulses: Right: DP 1/4, PT 0/4, Left: DP 1/4, PT 0/4  Capillary Filling: 3 sec, Edema: +1 left leg  Multiple vascular calcifications noted on x-ray     Skin: Texture, Tone and Turgor: Slightly diminished, Digital Hair: None  Atrophic Changes: Mild  Lesions: None  Nail Pathology: Multiple dystrophic  Ulcers/Wounds:   · Left DFU Sub 1st MPJ:  Necrotic full-thickness ulcerative breakdown Sub first MPJ measuring 2.3 x 3.0 x 1.5 cm.,  Yellow/brown purulent drainage noted. Probes to joint capsule. Moderate edema, erythema, and calor of the left great toe and distal medial forefoot.         · Left pressure ulcer lateral fifth MPJ: Stable eschar with no drainage or signs of infection. Lab Results:   CBC w/diff  Results from last 7 days   Lab Units 06/29/23  0906   WBC Thousand/uL 12.35*   HEMOGLOBIN g/dL 12.5   HEMATOCRIT % 36.4*   PLATELETS Thousands/uL 366   NEUTROS PCT % 76*   LYMPHS PCT % 9*   MONOS PCT % 13*   EOS PCT % 0     BMP  Results from last 7 days   Lab Units 06/29/23  0906   POTASSIUM mmol/L 5.0   CHLORIDE mmol/L 91*   CO2 mmol/L 27   BUN mg/dL 17   CREATININE mg/dL 1.22   CALCIUM mg/dL 9.1     CMP  Results from last 7 days   Lab Units 06/29/23  0906   POTASSIUM mmol/L 5.0   CHLORIDE mmol/L 91*   CO2 mmol/L 27   BUN mg/dL 17   CREATININE mg/dL 1.22   CALCIUM mg/dL 9.1   ALK PHOS U/L 112*   ALT U/L 8   AST U/L 11*     @Culture@  Lab Results   Component Value Date    BLOODCX Received in Microbiology Lab. Culture in Progress. 06/29/2023    BLOODCX Received in Microbiology Lab. Culture in Progress. 06/29/2023    BLOODCX No Growth After 5 Days. 05/29/2023    BLOODCX No Growth After 5 Days. 05/29/2023     Lab Results   Component Value Date    WOUNDCULT No growth 06/28/2023         Imaging: I have personally reviewed pertinent films in PACS      EKG, Pathology, and Other Studies: I have personally reviewed pertinent reports. Code Status: Level 1 - Full Code  Advance Directive and Living Will:      Power of :      Please Note: Voice to text dictation software was used in the creation of this document.        Tanisha Pitts DPM

## 2023-06-30 NOTE — PLAN OF CARE
Problem: PAIN - ADULT  Goal: Verbalizes/displays adequate comfort level or baseline comfort level  Description: Interventions:  - Encourage patient to monitor pain and request assistance  - Assess pain using appropriate pain scale  - Administer analgesics based on type and severity of pain and evaluate response  - Implement non-pharmacological measures as appropriate and evaluate response  - Consider cultural and social influences on pain and pain management  - Notify physician/advanced practitioner if interventions unsuccessful or patient reports new pain  Outcome: Progressing     Problem: INFECTION - ADULT  Goal: Absence or prevention of progression during hospitalization  Description: INTERVENTIONS:  - Assess and monitor for signs and symptoms of infection  - Monitor lab/diagnostic results  - Monitor all insertion sites, i.e. indwelling lines, tubes, and drains  - Monitor endotracheal if appropriate and nasal secretions for changes in amount and color  - Boaz appropriate cooling/warming therapies per order  - Administer medications as ordered  - Instruct and encourage patient and family to use good hand hygiene technique  - Identify and instruct in appropriate isolation precautions for identified infection/condition  Outcome: Progressing  Goal: Absence of fever/infection during neutropenic period  Description: INTERVENTIONS:  - Monitor WBC    Outcome: Progressing     Problem: SAFETY ADULT  Goal: Patient will remain free of falls  Description: INTERVENTIONS:  - Educate patient/family on patient safety including physical limitations  - Instruct patient to call for assistance with activity   - Consult OT/PT to assist with strengthening/mobility   - Keep Call bell within reach  - Keep bed low and locked with side rails adjusted as appropriate  - Keep care items and personal belongings within reach  - Initiate and maintain comfort rounds  - Make Fall Risk Sign visible to staff  - Offer Toileting every 2 Hours, in advance of need  - Initiate/Maintain alarm  - Obtain necessary fall risk management equipment:   - Apply yellow socks and bracelet for high fall risk patients  - Consider moving patient to room near nurses station  Outcome: Progressing  Goal: Maintain or return to baseline ADL function  Description: INTERVENTIONS:  -  Assess patient's ability to carry out ADLs; assess patient's baseline for ADL function and identify physical deficits which impact ability to perform ADLs (bathing, care of mouth/teeth, toileting, grooming, dressing, etc.)  - Assess/evaluate cause of self-care deficits   - Assess range of motion  - Assess patient's mobility; develop plan if impaired  - Assess patient's need for assistive devices and provide as appropriate  - Encourage maximum independence but intervene and supervise when necessary  - Involve family in performance of ADLs  - Assess for home care needs following discharge   - Consider OT consult to assist with ADL evaluation and planning for discharge  - Provide patient education as appropriate  Outcome: Progressing  Goal: Maintains/Returns to pre admission functional level  Description: INTERVENTIONS:  - Perform BMAT or MOVE assessment daily.   - Set and communicate daily mobility goal to care team and patient/family/caregiver. - Collaborate with rehabilitation services on mobility goals if consulted  - Perform Range of Motion 4 times a day. - Reposition patient every 2 hours.   - Dangle patient 4 times a day  - Stand patient 4 times a day  - Ambulate patient 4 times a day  - Out of bed to chair 4 times a day   - Out of bed for meals 4 times a day  - Out of bed for toileting  - Record patient progress and toleration of activity level   Outcome: Progressing     Problem: DISCHARGE PLANNING  Goal: Discharge to home or other facility with appropriate resources  Description: INTERVENTIONS:  - Identify barriers to discharge w/patient and caregiver  - Arrange for needed discharge resources and transportation as appropriate  - Identify discharge learning needs (meds, wound care, etc.)  - Arrange for interpretive services to assist at discharge as needed  - Refer to Case Management Department for coordinating discharge planning if the patient needs post-hospital services based on physician/advanced practitioner order or complex needs related to functional status, cognitive ability, or social support system  Outcome: Progressing     Problem: Knowledge Deficit  Goal: Patient/family/caregiver demonstrates understanding of disease process, treatment plan, medications, and discharge instructions  Description: Complete learning assessment and assess knowledge base.   Interventions:  - Provide teaching at level of understanding  - Provide teaching via preferred learning methods  Outcome: Progressing

## 2023-06-30 NOTE — ASSESSMENT & PLAN NOTE
Patient has diabetic foot infection. On 5/30 he had sesamoid bone removal due to diabetic foot infection. Since then he has been following with podiatry and wound care. He reported significant improvement in his wound appeared to be healing, however this Sunday his wound became red, had discharge, and became very tender. He saw Dr. Harini Hendrickson in office yesterday who recommended he come to the hospital for an MRI and likely amputation of left midfoot.     · Wound culture pending  · MRI foot pending  · X-ray of foot showed osseous abnormalities and cannot rule out osteomyelitis  · Podiatry following, for OR amputation later today   · Continue vancomycin and ceftriaxone

## 2023-06-30 NOTE — UTILIZATION REVIEW
Initial Clinical Review    Admission: Date/Time/Statement:   Admission Orders (From admission, onward)     Ordered        06/29/23 1144  INPATIENT ADMISSION  Once                      Orders Placed This Encounter   Procedures   • INPATIENT ADMISSION     Standing Status:   Standing     Number of Occurrences:   1     Order Specific Question:   Level of Care     Answer:   Med Surg [16]     Order Specific Question:   Estimated length of stay     Answer:   More than 2 Midnights     Order Specific Question:   Certification     Answer:   I certify that inpatient services are medically necessary for this patient for a duration of greater than two midnights. See H&P and MD Progress Notes for additional information about the patient's course of treatment. ED Arrival Information     Expected   -    Arrival   6/29/2023 08:51    Acuity   Emergent            Means of arrival   Walk-In    Escorted by   Self    Service   Hospitalist    Admission type   Emergency            Arrival complaint   Foot infection            Chief Complaint   Patient presents with   • Wound Infection     Patient presents to the ED with c/o left foot infection, states he had surgery on foot in may and states that Monday visiting nurse noticed it was red and swollen, states went to wound care yesterday and wanted patient admitted for infection        Initial Presentation: 66 y.o. male from home to ED, per Podiatry,  admitted to Sepsis secondary to diabetic ulcer/hyponatremia. PMH of type 1 DM on insulin pump, hpt, CKD. Presented due to left foot wound, has pain at site of open wound. On exam wound bottom of foot as pictured. Na 125. Glucose 218. CRP 50.4. Sed Rate 69. Wbc 12.35. Xray left foot cortical defect head of first metatarsal.   In the ED given Zosyn, vancomycin, Dilaudid. Plan is follow cultures - wound and blood, MRI of foot, consult Podiatry, continue vancomycin and start ceftriaxone. Dc  insulin pump and start IV insulin gtt. .    Start IVF. Trend BMP. Consult Podiatry, Endocrine. 6/29/23 per Endocrine - patient with type 1 DM on insulin pump. Plan is dc insulin pump and  Start non DKA insulin gtt when NPO for surgery     6/29/23 per Podiatry:  Patient with Sepsis/left foot cellulitis/Septic Arthritis/Diabetic foot ulceration with necrosis to bone (Osteomyelitis of first metatarsal)- Veloz 3. Plan is continue antibiotics, consult ID. Check MRI left foot. Plan on OR tomorrow and likely need left foot partial first ray amputation. Date: 6/30/23   Day 2:  Has significant foot pain. On exam erythema left foot, left first metatarsal ulcer. Wbc 9.82. Glucose 185. MRI with extensive involvement of bone. Plan is continued antibiotics. IVF and insulin gtt in progress. Surgical intervention. 6/30/23 ID unable to see patient as in the OR.     6/30/23 Procedure AMPUTATION TRANSMETATARSAL (TMA) (Left)   findings Left 1st metatarsal bone grey I'm appearance with washed out medullary canal, soft on palpation with surgical instruments. Pre-existing wound with purulent drainage, extending into the 1st interspace, with approximately 5cc of purulent fluid expressed intra-operatively.  Lesser metatarsals        ED Triage Vitals [06/29/23 0902]   Temperature Pulse Respirations Blood Pressure SpO2   98.4 °F (36.9 °C) (!) 108 18 160/87 100 %      Temp Source Heart Rate Source Patient Position - Orthostatic VS BP Location FiO2 (%)   Oral Monitor Sitting Right arm --      Pain Score       7          Wt Readings from Last 1 Encounters:   06/29/23 63.5 kg (140 lb)     Additional Vital Signs:   06/30/23 07:23:26 98.1 °F (36.7 °C) 94 16 148/114 Abnormal  125 98 % -- --   06/29/23 22:38:26 98.6 °F (37 °C) 95 -- 135/76 96 99 % -- --   06/29/23 18:39:31 97.9 °F (36.6 °C) 94 -- 123/74 90 98 % -- --   06/29/23 1615 -- 94 18 142/70 99 99 % -- --   06/29/23 1530 -- 98 -- -- -- 100 % None (Room air) --   06/29/23 1300 -- 100 18 144/74 101 99 % None (Room air) Lying   06/29/23 1200 -- 109 Abnormal  18 164/75 108 99 % None (Room air) Lying   06/29/23 1056 98.5 °F (36.9 °C) 100 18 172/82 Abnormal  -- 100 % None (Room air)      Pertinent Labs/Diagnostic Test Results:   MRI foot/forefoot toes left wo contrast   Final Result by Cynthia Pedersen MD (06/30 1038)      Plantar ulceration medial forefoot at the level of the first MTP joint. Surrounding cellulitis and abscess as described above. Osteomyelitis of the great toe proximal phalanx and first metatarsal as described above. Small foci of abnormal marrow signal along the plantar aspects of the second through fourth metatarsals suspicious for osteomyelitis. Osteomyelitis of the lateral hallux sesamoid. Status post excision of the lateral hallux sesamoid. Workstation performed: [id number]         XR foot 3+ views LEFT   Final Result by Penny Salas MD (06/29 1222)      There is a small cortical defect at the head of the first metatarsal. Cannot exclude osteomyelitis. Consider MRI follow-up.             Workstation performed: [id number]             Results from last 7 days   Lab Units 06/30/23  0511 06/29/23  0906   WBC Thousand/uL 9.82 12.35*   HEMOGLOBIN g/dL 10.3* 12.5   HEMATOCRIT % 31.7* 36.4*   PLATELETS Thousands/uL 324 366   NEUTROS ABS Thousands/µL 6.26 9.43*     Results from last 7 days   Lab Units 06/30/23  0511 06/29/23  0906   SODIUM mmol/L 131* 125*   POTASSIUM mmol/L 4.6 5.0   CHLORIDE mmol/L 98 91*   CO2 mmol/L 26 27   ANION GAP mmol/L 7 7   BUN mg/dL 14 17   CREATININE mg/dL 1.02 1.22   EGFR ml/min/1.73sq m 78 63   CALCIUM mg/dL 8.6 9.1     Results from last 7 days   Lab Units 06/30/23  0511 06/29/23  0906   AST U/L 9* 11*   ALT U/L 7 8   ALK PHOS U/L 93 112*   TOTAL PROTEIN g/dL 6.5 7.6   ALBUMIN g/dL 3.1* 3.6   TOTAL BILIRUBIN mg/dL 0.60 0.55     Results from last 7 days   Lab Units 06/30/23  1127 06/30/23  1059 06/30/23  0726 06/29/23  2472 06/29/23 2056 06/29/23  1837 06/29/23  1435   POC GLUCOSE mg/dl 211* 226* 192* 182* 186* 204* 252*     Results from last 7 days   Lab Units 06/30/23  0511 06/29/23  0906   GLUCOSE RANDOM mg/dL 185* 218*     Results from last 7 days   Lab Units 06/30/23  0511 06/29/23  0906   PROTIME seconds  --  13.1   INR   --  0.93   PTT seconds 34 33     Results from last 7 days   Lab Units 06/29/23  1052   PROCALCITONIN ng/ml 0.15     Results from last 7 days   Lab Units 06/29/23  0906   LACTIC ACID mmol/L 1.8     Results from last 7 days   Lab Units 06/29/23  1052   CRP mg/L 50.4*   SED RATE mm/hour 69*     Results from last 7 days   Lab Units 06/29/23  1052 06/29/23  0906 06/28/23  1403   BLOOD CULTURE  Received in Microbiology Lab. Culture in Progress. Received in Microbiology Lab. Culture in Progress.   --    GRAM STAIN RESULT   --   --  2+ Polys  No bacteria seen   WOUND CULTURE   --   --  1+ Growth of Staphylococcus aureus*       ED Treatment:   Medication Administration from 06/29/2023 0851 to 06/29/2023 1825       Date/Time Order Dose Route Action  Comments     06/29/2023 1205 EDT piperacillin-tazobactam (ZOSYN) IVPB 3.375 g 0 g Intravenous Stopped --     06/29/2023 1104 EDT piperacillin-tazobactam (ZOSYN) IVPB 3.375 g 3.375 g Intravenous New Bag --     06/29/2023 1522 EDT vancomycin (VANCOCIN) 1500 mg in sodium chloride 0.9% 250 mL IVPB 0 mg Intravenous Stopped --     06/29/2023 1155 EDT vancomycin (VANCOCIN) 1500 mg in sodium chloride 0.9% 250 mL IVPB 1,500 mg Intravenous New Bag --     06/29/2023 1158 EDT HYDROmorphone (DILAUDID) injection 0.5 mg 0.5 mg Intravenous Given --     06/29/2023 1525 EDT atorvastatin (LIPITOR) tablet 40 mg 40 mg Oral Given --     06/29/2023 1611 EDT lisinopril (ZESTRIL) tablet 10 mg 10 mg Oral Given --     06/29/2023 1525 EDT metoprolol succinate (TOPROL-XL) 24 hr tablet 100 mg 100 mg Oral Given --     06/29/2023 1523 EDT sodium chloride 0.9 % infusion 125 mL/hr Intravenous New Bag -- 06/29/2023 1528 EDT heparin (porcine) subcutaneous injection 5,000 Units 5,000 Units Subcutaneous Given --     06/29/2023 1608 EDT insulin regular (HumuLIN R,NovoLIN R) 1 Units/mL in sodium chloride 0.9 % 100 mL infusion -- Intravenous Not Given --     06/29/2023 1608 EDT insulin regular (HumuLIN R,NovoLIN R) 1 Units/mL in sodium chloride 0.9 % 100 mL infusion -- Intravenous Not Given --     06/29/2023 1531 EDT PATIENT MAINTAINED INSULIN PUMP 1 each 1 each Subcutaneous Given --     06/29/2023 1725 EDT HYDROmorphone (DILAUDID) injection 0.5 mg 0.5 mg Intravenous Given --        Past Medical History:   Diagnosis Date   • Chronic foot ulcer ([address]) 09/07/2018   • Diabetes mellitus ([address])    • Gout    • High cholesterol    • Hypertension    • Visual impairment 11/1/2022    Cateract     Present on Admission:  • Mixed hyperlipidemia  • Type 1 diabetes mellitus with hyperglycemia (HCC)  • 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, with fat layer exposed ([address])  • Hyponatremia  • Stage 2 chronic kidney disease  • Benign essential hypertension      Admitting Diagnosis: Foot ulcer ([address]) [L97.509]  Wound infection [T14. 8XXA, L08.9]  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, with fat layer exposed ([address]) [Y22.021, L97.422]  Hypoglycemia unawareness associated with type 1 diabetes mellitus ([address]) [E10.649]  Age/Sex: 66 y.o. male  Admission Orders:  06/29/23 1144  Inpatient   Scheduled Medications:  atorvastatin, 40 mg, Oral, Daily  cefTRIAXone, 1,000 mg, Intravenous, Q24H  lisinopril, 10 mg, Oral, Daily  metoprolol succinate, 100 mg, Oral, Daily  nicotine, 21 mg, Transdermal, Daily  vancomycin, 1,250 mg, Intravenous, Q24H    PATIENT MAINTAINED INSULIN PUMP 1 each  Dose: 1 each  Freq: Every 8 hours Route: SC  Start: 06/29/23 1500 End: 06/30/23 1019      Continuous IV Infusions:  insulin regular (HumuLIN R,NovoLIN R) 1 Units/mL in sodium chloride 0.9 % 100 mL infusion, 0.3-21 Units/hr, Intravenous, Titrated  sodium chloride, 125 mL/hr, Intravenous, Continuous      PRN Meds:  acetaminophen, 650 mg, Oral, Q6H PRN  HYDROmorphone, 0.5 mg, Intravenous, Q3H PRN x 3 6/29, x 2 6/30/23   HYDROmorphone, 1 mg, Intravenous, Q3H PRN x 2 6/30/23   insulin lispro, 25 Units, Subcutaneous Insulin Pump, Daily PRN  nicotine polacrilex, 2 mg, Oral, Q2H PRN  ondansetron, 4 mg, Intravenous, Q6H PRN        IP CONSULT TO ENDOCRINOLOGY  IP CONSULT TO PODIATRY  IP CONSULT TO INFECTIOUS DISEASES    Network Utilization Review Department  ATTENTION: Please call with any questions or concerns to [phone] and carefully listen to the prompts so that you are directed to the right person. All voicemails are confidential.  Orvel Bibi all requests for admission clinical reviews, approved or denied determinations and any other requests to dedicated fax number below belonging to the campus where the patient is receiving treatment.  List of dedicated fax numbers for the Facilities:  [place] DENIALS (Administrative/Medical Necessity) [phone] [address] [facility] (Maternity/NICU/Pediatrics) [phone]   [address] [place] [address] Drive [phone]   [facility] [address] [facility] [phone] 1505 [facility] [id number] [place] [address] [phone]   [address] [address] [phone]

## 2023-06-30 NOTE — ASSESSMENT & PLAN NOTE
Lab Results   Component Value Date    HGBA1C 7.9 (H) 03/31/2023       Recent Labs     06/29/23  1837 06/29/23 2056 06/29/23  2309 06/30/23  0726   POCGLU 204* 186* 182* 192*       Blood Sugar Average: Last 72 hrs:  (P) 203.2   Please see note attached to insulin pump in place

## 2023-06-30 NOTE — ASSESSMENT & PLAN NOTE
· Meets sepsis criteria with WBC greater than 12 K, heart rate 109 with a likely source infection diabetic foot wound  · Continue vancomycin and ceftriaxone   · wound culture and blood cultures pending

## 2023-06-30 NOTE — ASSESSMENT & PLAN NOTE
· BP stable, 140s  · Takes metoprolol and lisinopril in the outpatient setting, will continue home regimen  · Continue to monitor BP

## 2023-06-30 NOTE — ANESTHESIA POSTPROCEDURE EVALUATION
Post-Op Assessment Note    CV Status:  Stable  Pain Score: 0    Pain management: adequate     Mental Status:  Alert and awake   Hydration Status:  Euvolemic   PONV Controlled:  None   Airway Patency:  Patent      Post Op Vitals Reviewed: Yes      Staff: Anesthesiologist, CRNA   Comments: report given to RN; VSS; 6L/min facemask for transport        No notable events documented      BP  134/63   Temp   99 1   Pulse  99   Resp   20   SpO2   99

## 2023-06-30 NOTE — PROGRESS NOTES
Obdulio Kathleen is a 66 y.o. male who is currently ordered Vancomycin IV with management by the Pharmacy Consult service. Relevant clinical data and objective / subjective history reviewed. Vancomycin Assessment:  Indication and Goal AUC/Trough: Bone/joint infection (goal -600, trough >10)  Clinical Status: stable  Micro:   Pending  Renal Function:  SCr: 1.02 mg/dL  CrCl: 68.3 mL/min  Renal replacement: Not on dialysis  Days of Therapy: 2  Current Dose: 1250mg every 24 hours  Vancomycin Plan:  New Dosing: No change  Estimated AUC: 431 mcg*hr/mL  Estimated Trough: 11.6 mcg/mL  Next Level: 7/1/23 at 0600  Renal Function Monitoring: Daily BMP and [place] will continue to follow closely for s/sx of nephrotoxicity, infusion reactions and appropriateness of therapy. BMP and CBC will be ordered per protocol. We will continue to follow the patient’s culture results and clinical progress daily.     Marcia Engle, Pharmacist, PharmD, BCPS

## 2023-06-30 NOTE — ANESTHESIA PREPROCEDURE EVALUATION
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)      /RENAL   (+) CKD (chronic kidney disease)   (+) Microalbuminuria due to type 1 diabetes mellitus (HCC)      MUSCULOSKELETAL   (+) Gout of ankle      NEURO/PSYCH   (+) Chronic pain   (+) Diabetic polyneuropathy associated with type 1 diabetes mellitus (HCC)      Endocrine   (+) 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, with fat layer exposed (HCC)      Other   (+) Cigarette nicotine dependence without complication   (+) Insulin pump in place        Physical Exam    Airway    Mallampati score: II  TM Distance: >3 FB  Neck ROM: full     Dental   upper dentures and lower dentures,     Cardiovascular      Pulmonary      Other Findings        Anesthesia Plan  ASA Score- 3     Anesthesia Type- general with ASA Monitors  Additional Monitors:   Airway Plan: ETT  Comment: Pt ate at 0800 today  Surgery deemed emergent and necessary per surgeon  Will do ETT/RSI  Nidhi Kaspergeant Plan Factors-    Chart reviewed  EKG reviewed  Existing labs reviewed  Patient summary reviewed  Patient is a current smoker  Induction- intravenous  Postoperative Plan-     Informed Consent- Anesthetic plan and risks discussed with patient  I personally reviewed this patient with the CRNA  Discussed and agreed on the Anesthesia Plan with the CRNA         Lab Results   Component Value Date    GLUC 185 (H) 06/30/2023    GLUF 168 (H) 04/15/2022    ALT 7 06/30/2023    AST 9 (L) 06/30/2023    BUN 14 06/30/2023    CALCIUM 8 6 06/30/2023    CL 98 06/30/2023    CO2 26 06/30/2023    CREATININE 1 02 06/30/2023    HDL 68 04/15/2022    INR 0 93 06/29/2023    HCT 31 7 (L) 06/30/2023    HGB 10 3 (L) 06/30/2023    HGBA1C 7 9 (H) 03/31/2023    MG 1 8 (L) 05/30/2023    PHOS 3 2 06/04/2021     06/30/2023    K 4 6 06/30/2023    PSA 2 1 04/15/2022    TRIG 53 04/15/2022    WBC 9 82 06/30/2023

## 2023-06-30 NOTE — NUTRITION
06/30/23 0741   Recommendations/Interventions   Recommendations to Provider When able to advance PO diet,  please add Glucerna (chocolate and vanilla) BID.  Goal diet: CCD2

## 2023-06-30 NOTE — PROGRESS NOTES
[facility]  Progress Note  Name: Jose Raul Mcclain  MRN: [id number]  Unit/Bed#: -01 I Date of Admission: 6/29/2023   Date of Service: 6/30/2023 I Hospital Day: 1    Assessment/Plan   * 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, with fat layer exposed [facility])  Assessment & Plan  Patient has diabetic foot infection. On 5/30 he had sesamoid bone removal due to diabetic foot infection. Since then he has been following with podiatry and wound care. He reported significant improvement in his wound appeared to be healing, however this Sunday his wound became red, had discharge, and became very tender. He saw Dr. Andrey Arriola in office yesterday who recommended he come to the hospital for an MRI and likely amputation of left midfoot.     · Wound culture pending  · MRI foot pending  · X-ray of foot showed osseous abnormalities and cannot rule out osteomyelitis  · Podiatry following, for OR amputation later today   · Continue vancomycin and ceftriaxone       Sepsis ([address])  Assessment & Plan  · Meets sepsis criteria with WBC greater than 12 K, heart rate 109 with a likely source infection diabetic foot wound  · Continue vancomycin and ceftriaxone   · wound culture and blood cultures pending    Hyponatremia  Assessment & Plan  · Baseline appears to be hyponatremic at 130  · Presently at baseline, monitor BMP    Stage 2 chronic kidney disease  Assessment & Plan  Lab Results   Component Value Date    EGFR 78 06/30/2023    EGFR 63 06/29/2023    EGFR 71 06/01/2023    CREATININE 1.02 06/30/2023    CREATININE 1.22 06/29/2023    CREATININE 1.11 06/01/2023     · Creatinine is currently at baseline, can continue to monitor    Type 1 diabetes mellitus with hyperglycemia [facility])  Assessment & Plan  Lab Results   Component Value Date    HGBA1C 7.9 (H) 03/31/2023       Recent Labs     06/29/23  1837 06/29/23  2056 06/29/23  2309 06/30/23  0726   POCGLU 204* 186* 182* 192* Blood Sugar Average: Last 72 hrs:  (P) 203.2   Please see note attached to insulin pump in place    Benign essential hypertension  Assessment & Plan  · BP stable, 140s  · Takes metoprolol and lisinopril in the outpatient setting, will continue home regimen  · Continue to monitor BP    Insulin pump in place  Assessment & Plan  · Patient currently has insulin pump and is active in the outpatient side, consulted endocrinology and will discontinue insulin pump for likelihood of surgical intervention  · We will start non DKA insulin infusion and will likely transition to injectables tomorrow  · Endocrinology consulted    Mixed hyperlipidemia  Assessment & Plan  · Continue atorvastatin         VTE Pharmacologic Prophylaxis: VTE Score: 4 Moderate Risk (Score 3-4) - Pharmacological DVT Prophylaxis Contraindicated. Sequential Compression Devices Ordered. Patient Centered Rounds: I performed bedside rounds with nursing staff today. Discussions with Specialists or Other Care Team Provider: hill rn    Education and Discussions with Family / Patient: Patient declined call to . Time Spent for Care: 45 minutes. More than 50% of total time spent on counseling and coordination of care as described above. Current Length of Stay: 1 day(s)  Current Patient Status: Inpatient   Certification Statement: The patient will continue to require additional inpatient hospital stay due to Infected diabetic foot ulcer requiring OR intervention, IV antibiotics  Discharge Plan: Anticipate discharge in 48-72 hrs to home with home services. Code Status: Level 1 - Full Code    Subjective:   No overnight events per nursing. Patient is n.p.o. for OR. Still having significant foot pain.     Objective:     Vitals:   Temp (24hrs), Av.3 °F (36.8 °C), Min:97.9 °F (36.6 °C), Max:98.6 °F (37 °C)    Temp:  [97.9 °F (36.6 °C)-98.6 °F (37 °C)] 98.1 °F (36.7 °C)  HR:  [] 94  Resp:  [16-18] 16  BP: (123-192)/() 148/114  SpO2:  [97 %-100 %] 98 %  Body mass index is 18.99 kg/m². Input and Output Summary (last 24 hours): Intake/Output Summary (Last 24 hours) at 6/30/2023 1020  Last data filed at 6/29/2023 1847  Gross per 24 hour   Intake 250.03 ml   Output --   Net 250.03 ml       Physical Exam:   Physical Exam  Vitals and nursing note reviewed. Constitutional:       General: He is not in acute distress. Appearance: Normal appearance. HENT:      Head: Normocephalic. Mouth/Throat:      Mouth: Mucous membranes are moist.   Eyes:      Pupils: Pupils are equal, round, and reactive to light. Cardiovascular:      Rate and Rhythm: Normal rate and regular rhythm. Heart sounds: No murmur heard. Pulmonary:      Effort: Pulmonary effort is normal. No respiratory distress. Breath sounds: Normal breath sounds. No wheezing, rhonchi or rales. Abdominal:      General: Bowel sounds are normal. There is no distension. Palpations: Abdomen is soft. Tenderness: There is no abdominal tenderness. There is no guarding. Musculoskeletal:         General: No deformity. Cervical back: Normal range of motion. Right lower leg: No edema. Left lower leg: No edema. Skin:     Capillary Refill: Capillary refill takes less than 2 seconds. Findings: Erythema (Left first metatarsal ulcer) present. Neurological:      General: No focal deficit present. Mental Status: He is alert and oriented to person, place, and time. Mental status is at baseline.           Additional Data:     Labs:  Results from last 7 days   Lab Units 06/30/23  0511   WBC Thousand/uL 9.82   HEMOGLOBIN g/dL 10.3*   HEMATOCRIT % 31.7*   PLATELETS Thousands/uL 324   NEUTROS PCT % 64   LYMPHS PCT % 17   MONOS PCT % 16*   EOS PCT % 2     Results from last 7 days   Lab Units 06/30/23  0511   SODIUM mmol/L 131*   POTASSIUM mmol/L 4.6   CHLORIDE mmol/L 98   CO2 mmol/L 26   BUN mg/dL 14   CREATININE mg/dL 1.02   ANION GAP mmol/L 7   CALCIUM mg/dL 8.6   ALBUMIN g/dL 3.1*   TOTAL BILIRUBIN mg/dL 0.60   ALK PHOS U/L 93   ALT U/L 7   AST U/L 9*   GLUCOSE RANDOM mg/dL 185*     Results from last 7 days   Lab Units 06/29/23  0906   INR  0.93     Results from last 7 days   Lab Units 06/30/23  0726 06/29/23  2309 06/29/23  2056 06/29/23  1837 06/29/23  1435   POC GLUCOSE mg/dl 192* 182* 186* 204* 252*         Results from last 7 days   Lab Units 06/29/23  1052 06/29/23  0906   LACTIC ACID mmol/L  --  1.8   PROCALCITONIN ng/ml 0.15  --        Lines/Drains:  Invasive Devices     Peripheral Intravenous Line  Duration           Peripheral IV 06/29/23 Right Antecubital <1 day          Line  Duration           Pump Device Insulin pump Anterior; Left Abdomen <1 day                      Imaging: No pertinent imaging reviewed. Recent Cultures (last 7 days):   Results from last 7 days   Lab Units 06/29/23  1052 06/29/23  0906 06/28/23  1403   BLOOD CULTURE  Received in Microbiology Lab. Culture in Progress. Received in Microbiology Lab. Culture in Progress.   --    GRAM STAIN RESULT   --   --  2+ Polys  No bacteria seen   WOUND CULTURE   --   --  No growth       Last 24 Hours Medication List:   Current Facility-Administered Medications   Medication Dose Route Frequency Provider Last Rate   • acetaminophen  650 mg Oral Q6H PRN Yas Parker PA-C     • atorvastatin  40 mg Oral Daily Yas Parker PA-C     • cefTRIAXone  1,000 mg Intravenous Q24H Yas Parker PA-C 1,000 mg (06/30/23 0517)   • HYDROmorphone  0.5 mg Intravenous Q3H PRN Ashly Dickey PA-C     • HYDROmorphone  1 mg Intravenous Q3H PRN Ashly Dickey PA-C     • insulin lispro  25 Units Subcutaneous Insulin Pump Daily PRN Azael Ibarra MD     • insulin regular (HumuLIN R,NovoLIN R) 1 Units/mL in sodium chloride 0.9 % 100 mL infusion  0.3-21 Units/hr Intravenous Titrated Ashly Dickey PA-C     • lisinopril  10 mg Oral Daily Yas Parker PA-C     • metoprolol succinate 100 mg Oral Daily Tyra Parker PA-C     • nicotine  21 mg Transdermal Daily Mahogany Castillo PA-C     • nicotine polacrilex  2 mg Oral Q2H PRN Mahogany Castillo PA-C     • ondansetron  4 mg Intravenous Q6H PRN Tyra Parker PA-C     • sodium chloride  125 mL/hr Intravenous Continuous Jenny Mosqueda 125 mL/hr (06/29/23 1847)   • vancomycin  1,250 mg Intravenous Q24H Tyra Parker PA-C          Today, Patient Was Seen By: Mitchell Shaffer PA-C    **Please Note: This note may have been constructed using a voice recognition system. **

## 2023-06-30 NOTE — ASSESSMENT & PLAN NOTE
Lab Results   Component Value Date    EGFR 78 06/30/2023    EGFR 63 06/29/2023    EGFR 71 06/01/2023    CREATININE 1.02 06/30/2023    CREATININE 1.22 06/29/2023    CREATININE 1.11 06/01/2023     · Creatinine is currently at baseline, can continue to monitor

## 2023-06-30 NOTE — NURSING NOTE
Patient arrived from [facility] for MRI. Patient had a glucose monitor intact to R upper arm, informed him it needed to be removed for MRI. Patient appeared angry that it needed to be removed, but agreed in order to have mri completed. Pt stated he could not remove it himself, so it was removed from arm. Patient was told it was being put in the sharps container due to the needle, said ok, and watched it dropped in. After the scan was completed, the patient stated he needed the monitor back to get the battery from inside it. Told him it was in a container with used needles and was not possible to retreive. Patient insisting he needed it back, that he recharges the battery. Patient MEGAN Emerson notified.

## 2023-07-01 LAB
ANION GAP SERPL CALCULATED.3IONS-SCNC: 7 MMOL/L
BACTERIA SPEC ANAEROBE CULT: NORMAL
BACTERIA SPEC ANAEROBE CULT: NORMAL
BACTERIA WND AEROBE CULT: ABNORMAL
BACTERIA WND AEROBE CULT: ABNORMAL
BASOPHILS # BLD AUTO: 0.07 THOUSANDS/ÂΜL (ref 0–0.1)
BASOPHILS NFR BLD AUTO: 1 % (ref 0–1)
BUN SERPL-MCNC: 12 MG/DL (ref 5–25)
CALCIUM SERPL-MCNC: 8.5 MG/DL (ref 8.4–10.2)
CHLORIDE SERPL-SCNC: 98 MMOL/L (ref 96–108)
CO2 SERPL-SCNC: 27 MMOL/L (ref 21–32)
CREAT SERPL-MCNC: 1.15 MG/DL (ref 0.6–1.3)
EOSINOPHIL # BLD AUTO: 0.14 THOUSAND/ÂΜL (ref 0–0.61)
EOSINOPHIL NFR BLD AUTO: 2 % (ref 0–6)
ERYTHROCYTE [DISTWIDTH] IN BLOOD BY AUTOMATED COUNT: 12.7 % (ref 11.6–15.1)
GFR SERPL CREATININE-BSD FRML MDRD: 68 ML/MIN/1.73SQ M
GLUCOSE SERPL-MCNC: 101 MG/DL (ref 65–140)
GLUCOSE SERPL-MCNC: 112 MG/DL (ref 65–140)
GLUCOSE SERPL-MCNC: 118 MG/DL (ref 65–140)
GLUCOSE SERPL-MCNC: 177 MG/DL (ref 65–140)
GLUCOSE SERPL-MCNC: 178 MG/DL (ref 65–140)
GLUCOSE SERPL-MCNC: 191 MG/DL (ref 65–140)
GLUCOSE SERPL-MCNC: 257 MG/DL (ref 65–140)
GLUCOSE SERPL-MCNC: 51 MG/DL (ref 65–140)
GLUCOSE SERPL-MCNC: 62 MG/DL (ref 65–140)
GLUCOSE SERPL-MCNC: 79 MG/DL (ref 65–140)
GLUCOSE SERPL-MCNC: 85 MG/DL (ref 65–140)
GLUCOSE SERPL-MCNC: 86 MG/DL (ref 65–140)
GLUCOSE SERPL-MCNC: 91 MG/DL (ref 65–140)
GLUCOSE SERPL-MCNC: 96 MG/DL (ref 65–140)
GRAM STN SPEC: ABNORMAL
GRAM STN SPEC: ABNORMAL
HCT VFR BLD AUTO: 30.8 % (ref 36.5–49.3)
HGB BLD-MCNC: 10.1 G/DL (ref 12–17)
IMM GRANULOCYTES # BLD AUTO: 0.05 THOUSAND/UL (ref 0–0.2)
IMM GRANULOCYTES NFR BLD AUTO: 1 % (ref 0–2)
LYMPHOCYTES # BLD AUTO: 1.24 THOUSANDS/ÂΜL (ref 0.6–4.47)
LYMPHOCYTES NFR BLD AUTO: 14 % (ref 14–44)
MCH RBC QN AUTO: 33.4 PG (ref 26.8–34.3)
MCHC RBC AUTO-ENTMCNC: 32.8 G/DL (ref 31.4–37.4)
MCV RBC AUTO: 102 FL (ref 82–98)
MONOCYTES # BLD AUTO: 1.58 THOUSAND/ÂΜL (ref 0.17–1.22)
MONOCYTES NFR BLD AUTO: 18 % (ref 4–12)
MRSA NOSE QL CULT: NORMAL
NEUTROPHILS # BLD AUTO: 5.89 THOUSANDS/ÂΜL (ref 1.85–7.62)
NEUTS SEG NFR BLD AUTO: 64 % (ref 43–75)
NRBC BLD AUTO-RTO: 0 /100 WBCS
PLATELET # BLD AUTO: 334 THOUSANDS/UL (ref 149–390)
PMV BLD AUTO: 8.7 FL (ref 8.9–12.7)
POTASSIUM SERPL-SCNC: 4.2 MMOL/L (ref 3.5–5.3)
RBC # BLD AUTO: 3.02 MILLION/UL (ref 3.88–5.62)
SODIUM SERPL-SCNC: 132 MMOL/L (ref 135–147)
VANCOMYCIN SERPL-MCNC: 10.4 UG/ML (ref 10–20)
WBC # BLD AUTO: 8.97 THOUSAND/UL (ref 4.31–10.16)

## 2023-07-01 PROCEDURE — 82948 REAGENT STRIP/BLOOD GLUCOSE: CPT

## 2023-07-01 PROCEDURE — 99024 POSTOP FOLLOW-UP VISIT: CPT | Performed by: PODIATRIST

## 2023-07-01 PROCEDURE — 80048 BASIC METABOLIC PNL TOTAL CA: CPT

## 2023-07-01 PROCEDURE — 85025 COMPLETE CBC W/AUTO DIFF WBC: CPT

## 2023-07-01 PROCEDURE — 99232 SBSQ HOSP IP/OBS MODERATE 35: CPT | Performed by: INTERNAL MEDICINE

## 2023-07-01 PROCEDURE — 80202 ASSAY OF VANCOMYCIN: CPT | Performed by: INTERNAL MEDICINE

## 2023-07-01 RX ORDER — OXYCODONE HYDROCHLORIDE 5 MG/1
15 TABLET ORAL EVERY 6 HOURS PRN
Status: DISCONTINUED | OUTPATIENT
Start: 2023-07-01 | End: 2023-07-01

## 2023-07-01 RX ORDER — CEFTRIAXONE 2 G/50ML
2000 INJECTION, SOLUTION INTRAVENOUS EVERY 24 HOURS
Status: DISCONTINUED | OUTPATIENT
Start: 2023-07-02 | End: 2023-07-03

## 2023-07-01 RX ORDER — OXYCODONE HYDROCHLORIDE 5 MG/1
10 TABLET ORAL EVERY 6 HOURS PRN
Status: DISCONTINUED | OUTPATIENT
Start: 2023-07-01 | End: 2023-07-01

## 2023-07-01 RX ORDER — HYDROMORPHONE HCL/PF 1 MG/ML
1 SYRINGE (ML) INJECTION
Status: DISCONTINUED | OUTPATIENT
Start: 2023-07-01 | End: 2023-07-04

## 2023-07-01 RX ORDER — HEPARIN SODIUM 5000 [USP'U]/ML
5000 INJECTION, SOLUTION INTRAVENOUS; SUBCUTANEOUS EVERY 8 HOURS SCHEDULED
Status: DISCONTINUED | OUTPATIENT
Start: 2023-07-01 | End: 2023-07-04 | Stop reason: HOSPADM

## 2023-07-01 RX ORDER — OXYCODONE HYDROCHLORIDE 5 MG/1
10 TABLET ORAL EVERY 4 HOURS PRN
Status: DISCONTINUED | OUTPATIENT
Start: 2023-07-01 | End: 2023-07-04 | Stop reason: HOSPADM

## 2023-07-01 RX ORDER — OXYCODONE HYDROCHLORIDE 5 MG/1
15 TABLET ORAL EVERY 4 HOURS PRN
Status: DISCONTINUED | OUTPATIENT
Start: 2023-07-01 | End: 2023-07-04 | Stop reason: HOSPADM

## 2023-07-01 RX ADMIN — OXYCODONE HYDROCHLORIDE 15 MG: 5 TABLET ORAL at 16:37

## 2023-07-01 RX ADMIN — OXYCODONE HYDROCHLORIDE 15 MG: 5 TABLET ORAL at 22:12

## 2023-07-01 RX ADMIN — SODIUM CHLORIDE 125 ML/HR: 0.9 INJECTION, SOLUTION INTRAVENOUS at 08:34

## 2023-07-01 RX ADMIN — NICOTINE 21 MG: 21 PATCH, EXTENDED RELEASE TRANSDERMAL at 08:32

## 2023-07-01 RX ADMIN — CEFTRIAXONE 1000 MG: 1 INJECTION, SOLUTION INTRAVENOUS at 06:39

## 2023-07-01 RX ADMIN — HYDROMORPHONE HYDROCHLORIDE 1 MG: 1 INJECTION, SOLUTION INTRAMUSCULAR; INTRAVENOUS; SUBCUTANEOUS at 14:29

## 2023-07-01 RX ADMIN — VANCOMYCIN HYDROCHLORIDE 1500 MG: 5 INJECTION, POWDER, LYOPHILIZED, FOR SOLUTION INTRAVENOUS at 12:16

## 2023-07-01 RX ADMIN — HYDROMORPHONE HYDROCHLORIDE 0.5 MG: 1 INJECTION, SOLUTION INTRAMUSCULAR; INTRAVENOUS; SUBCUTANEOUS at 05:02

## 2023-07-01 RX ADMIN — SODIUM CHLORIDE 125 ML/HR: 0.9 INJECTION, SOLUTION INTRAVENOUS at 18:14

## 2023-07-01 RX ADMIN — OXYCODONE HYDROCHLORIDE 15 MG: 5 TABLET ORAL at 11:00

## 2023-07-01 RX ADMIN — OXYCODONE HYDROCHLORIDE 10 MG: 5 TABLET ORAL at 00:26

## 2023-07-01 RX ADMIN — HYDROMORPHONE HYDROCHLORIDE 1 MG: 1 INJECTION, SOLUTION INTRAMUSCULAR; INTRAVENOUS; SUBCUTANEOUS at 08:33

## 2023-07-01 RX ADMIN — METOPROLOL SUCCINATE 100 MG: 50 TABLET, EXTENDED RELEASE ORAL at 08:32

## 2023-07-01 RX ADMIN — HEPARIN SODIUM 5000 UNITS: 5000 INJECTION INTRAVENOUS; SUBCUTANEOUS at 20:48

## 2023-07-01 RX ADMIN — OXYCODONE HYDROCHLORIDE 15 MG: 5 TABLET ORAL at 06:38

## 2023-07-01 RX ADMIN — HYDROMORPHONE HYDROCHLORIDE 1 MG: 1 INJECTION, SOLUTION INTRAMUSCULAR; INTRAVENOUS; SUBCUTANEOUS at 20:47

## 2023-07-01 RX ADMIN — LISINOPRIL 10 MG: 10 TABLET ORAL at 08:32

## 2023-07-01 RX ADMIN — HYDROMORPHONE HYDROCHLORIDE 1 MG: 1 INJECTION, SOLUTION INTRAMUSCULAR; INTRAVENOUS; SUBCUTANEOUS at 03:32

## 2023-07-01 RX ADMIN — HEPARIN SODIUM 5000 UNITS: 5000 INJECTION INTRAVENOUS; SUBCUTANEOUS at 14:30

## 2023-07-01 RX ADMIN — ATORVASTATIN CALCIUM 40 MG: 40 TABLET, FILM COATED ORAL at 14:30

## 2023-07-01 NOTE — ASSESSMENT & PLAN NOTE
· Currently on insulin infusion normally on insulin pump  · Insulin pump was discontinued on June 30 and insulin infusion was started for n.p.o. status and surgical procedure

## 2023-07-01 NOTE — ASSESSMENT & PLAN NOTE
Patient has diabetic foot infection. On 5/30 he had sesamoid bone removal due to diabetic foot infection. Since then he has been following with podiatry and wound care. He reported significant improvement in his wound appeared to be healing, however this Sunday his wound became red, had discharge, and became very tender. He saw Dr. J Carlos Schafer in office yesterday who recommended he come to the hospital for an MRI and likely amputation of left midfoot.     · Wound culture MRSA  · MRI foot with abscesses and osteomyelitis  · X-ray of foot showed osseous abnormalities and cannot rule out osteomyelitis  · Podiatry following, post TMA on June 30  · Continue vancomycin and ceftriaxone pending infectious disease final recommendations loading length of therapy

## 2023-07-01 NOTE — ASSESSMENT & PLAN NOTE
Lab Results   Component Value Date    EGFR 68 07/01/2023    EGFR 78 06/30/2023    EGFR 63 06/29/2023    CREATININE 1.15 07/01/2023    CREATININE 1.02 06/30/2023    CREATININE 1.22 06/29/2023     · Creatinine is currently at baseline, can continue to monitor

## 2023-07-01 NOTE — ASSESSMENT & PLAN NOTE
· Meets sepsis criteria with WBC greater than 12 K, heart rate 109 with a likely source infection diabetic foot wound  · Continue vancomycin and ceftriaxone as above  · Sepsis resolved

## 2023-07-01 NOTE — QUICK NOTE
Contacted by RN at [address] [place] [address]. Patient requesting to resume insulin pump. Per RN, patient is awake, alert, overall feeling ok and notes good po intake/appetite. Will enter orders to resume insulin pump and stop insulin gtt 1 hour after pump is started at home settings as below. If patient becomes unable to manage pump, please contact endocrinology for recommendations. Home regimen- Insulin pump  Basal rates:  12 AM to 3:30 AM 0.55 units/h, 3:30 AM to 8 AM 0.75 units/h, 8 AM to 12 PM 0.9 units/h, 12 PM to 8:30 PM 0.9 units/h, 8:30 PM to 12 AM 0.6 units/h. Bolus rates:  1 unit per 9 g carbohydrate. Insulin sensitivity factor:  1 unit for 32 blood sugar points for target blood glucose of 110-1 20.

## 2023-07-01 NOTE — PROGRESS NOTES
Jeannette Chandra is a 66 y.o. male who is currently ordered Vancomycin IV with management by the Pharmacy Consult service. Relevant clinical data and objective / subjective history reviewed. Vancomycin Assessment:  Indication and Goal AUC/Trough: Bone/joint infection (goal -600, trough >10)  Clinical Status: stable  Micro:     Renal Function:  SCr: 1.15 mg/dL  CrCl: 60.6 mL/min  Renal replacement: Not on dialysis  Days of Therapy: 3  Current Dose: 1250mg every 24 hours  Vancomycin Plan: random level resulted 10.4- extrapolated trough is ~ 9.5, subtherapeutic. Thus, dose changed as below. New Dosinmg every 24 hours  Estimated AUC: 525 mcg*hr/mL  Estimated Trough: 14.3 mcg/mL  Next Level: 23 at 1100  Renal Function Monitoring: Daily BMP and [place] will continue to follow closely for s/sx of nephrotoxicity, infusion reactions and appropriateness of therapy. BMP and CBC will be ordered per protocol. We will continue to follow the patient’s culture results and clinical progress daily.     Beck Da Silva, Pharmacist, PharmD, BCPS

## 2023-07-01 NOTE — PHYSICAL THERAPY NOTE
PHYSICAL THERAPY CANCELLATION NOTE    Patient Name: Dandy Gutierrez  NMNWG'O Date: 7/1/2023 07/01/23 1101   PT Last Visit   PT Visit Date 07/01/23   Note Type   Note type Cancelled Session   Additional Comments PT orders received, chart review performed. Spoke to Rigoberto and Mojgan. Pt w/ multiple complaints this AM including significant intractable pain and unable to sleep overnight. Pt to be seen by PT for evaluation. In meantime,pt OK to mobilize w/ RN staff using RW and NWB LLE.        Mathews Severs, PT, DPT

## 2023-07-01 NOTE — PLAN OF CARE
Problem: PAIN - ADULT  Goal: Verbalizes/displays adequate comfort level or baseline comfort level  Description: Interventions:  - Encourage patient to monitor pain and request assistance  - Assess pain using appropriate pain scale  - Administer analgesics based on type and severity of pain and evaluate response  - Implement non-pharmacological measures as appropriate and evaluate response  - Consider cultural and social influences on pain and pain management  - Notify physician/advanced practitioner if interventions unsuccessful or patient reports new pain  Outcome: Progressing     Problem: INFECTION - ADULT  Goal: Absence or prevention of progression during hospitalization  Description: INTERVENTIONS:  - Assess and monitor for signs and symptoms of infection  - Monitor lab/diagnostic results  - Monitor all insertion sites, i.e. indwelling lines, tubes, and drains  - Monitor endotracheal if appropriate and nasal secretions for changes in amount and color  - [place] appropriate cooling/warming therapies per order  - Administer medications as ordered  - Instruct and encourage patient and family to use good hand hygiene technique  - Identify and instruct in appropriate isolation precautions for identified infection/condition  Outcome: Progressing  Goal: Absence of fever/infection during neutropenic period  Description: INTERVENTIONS:  - Monitor WBC    Outcome: Progressing     Problem: SAFETY ADULT  Goal: Patient will remain free of falls  Description: INTERVENTIONS:  - Educate patient/family on patient safety including physical limitations  - Instruct patient to call for assistance with activity   - Consult OT/PT to assist with strengthening/mobility   - Keep Call bell within reach  - Keep bed low and locked with side rails adjusted as appropriate  - Keep care items and personal belongings within reach  - Initiate and maintain comfort rounds  - Make Fall Risk Sign visible to staff  - Offer Toileting every x Hours, in advance of need  - Initiate/Maintain bedalarm  - Obtain necessary fall risk management equipment:   - Apply yellow socks and bracelet for high fall risk patients  - Consider moving patient to room near nurses station  Outcome: Progressing  Goal: Maintain or return to baseline ADL function  Description: INTERVENTIONS:  -  Assess patient's ability to carry out ADLs; assess patient's baseline for ADL function and identify physical deficits which impact ability to perform ADLs (bathing, care of mouth/teeth, toileting, grooming, dressing, etc.)  - Assess/evaluate cause of self-care deficits   - Assess range of motion  - Assess patient's mobility; develop plan if impaired  - Assess patient's need for assistive devices and provide as appropriate  - Encourage maximum independence but intervene and supervise when necessary  - Involve family in performance of ADLs  - Assess for home care needs following discharge   - Consider OT consult to assist with ADL evaluation and planning for discharge  - Provide patient education as appropriate  Outcome: Progressing  Goal: Maintains/Returns to pre admission functional level  Description: INTERVENTIONS:  - Perform BMAT or MOVE assessment daily.   - Set and communicate daily mobility goal to care team and patient/family/caregiver. - Collaborate with rehabilitation services on mobility goals if consulted  - Perform Range of Motion x times a day. - Reposition patient every x hours.   - Dangle patient x times a day  - Stand patient x times a day  - Ambulate patient x times a day  - Out of bed to chair x times a day   - Out of bed for meals x times a day  - Out of bed for toileting  - Record patient progress and toleration of activity level   Outcome: Progressing     Problem: DISCHARGE PLANNING  Goal: Discharge to home or other facility with appropriate resources  Description: INTERVENTIONS:  - Identify barriers to discharge w/patient and caregiver  - Arrange for needed discharge resources and transportation as appropriate  - Identify discharge learning needs (meds, wound care, etc.)  - Arrange for interpretive services to assist at discharge as needed  - Refer to Case Management Department for coordinating discharge planning if the patient needs post-hospital services based on physician/advanced practitioner order or complex needs related to functional status, cognitive ability, or social support system  Outcome: Progressing     Problem: Knowledge Deficit  Goal: Patient/family/caregiver demonstrates understanding of disease process, treatment plan, medications, and discharge instructions  Description: Complete learning assessment and assess knowledge base. Interventions:  - Provide teaching at level of understanding  - Provide teaching via preferred learning methods  Outcome: Progressing     Problem: Nutrition/Hydration-ADULT  Goal: Nutrient/Hydration intake appropriate for improving, restoring or maintaining nutritional needs  Description: Monitor and assess patient's nutrition/hydration status for malnutrition. Collaborate with interdisciplinary team and initiate plan and interventions as ordered. Monitor patient's weight and dietary intake as ordered or per policy. Utilize nutrition screening tool and intervene as necessary. Determine patient's food preferences and provide high-protein, high-caloric foods as appropriate.      INTERVENTIONS:  - Monitor oral intake, urinary output, labs, and treatment plans  - Assess nutrition and hydration status and recommend course of action  - Evaluate amount of meals eaten  - Assist patient with eating if necessary   - Allow adequate time for meals  - Recommend/ encourage appropriate diets, oral nutritional supplements, and vitamin/mineral supplements  - Order, calculate, and assess calorie counts as needed  - Recommend, monitor, and adjust tube feedings and TPN/PPN based on assessed needs  - Assess need for intravenous fluids  - Provide specific nutrition/hydration education as appropriate  - Include patient/family/caregiver in decisions related to nutrition  Outcome: Progressing     Problem: MOBILITY - ADULT  Goal: Maintain or return to baseline ADL function  Description: INTERVENTIONS:  -  Assess patient's ability to carry out ADLs; assess patient's baseline for ADL function and identify physical deficits which impact ability to perform ADLs (bathing, care of mouth/teeth, toileting, grooming, dressing, etc.)  - Assess/evaluate cause of self-care deficits   - Assess range of motion  - Assess patient's mobility; develop plan if impaired  - Assess patient's need for assistive devices and provide as appropriate  - Encourage maximum independence but intervene and supervise when necessary  - Involve family in performance of ADLs  - Assess for home care needs following discharge   - Consider OT consult to assist with ADL evaluation and planning for discharge  - Provide patient education as appropriate  Outcome: Progressing  Goal: Maintains/Returns to pre admission functional level  Description: INTERVENTIONS:  - Perform BMAT or MOVE assessment daily.   - Set and communicate daily mobility goal to care team and patient/family/caregiver. - Collaborate with rehabilitation services on mobility goals if consulted  - Perform Range of Motion x times a day. - Reposition patient every x hours.   - Dangle patient x times a day  - Stand patient x times a day  - Ambulate patient x times a day  - Out of bed to chair x times a day   - Out of bed for meals x times a day  - Out of bed for toileting  - Record patient progress and toleration of activity level   Outcome: Progressing

## 2023-07-01 NOTE — PROGRESS NOTES
[address] [facility]  Progress Note  Name: Leonides Rios  MRN: [id number]  Unit/Bed#: -01 I Date of Admission: 6/29/2023   Date of Service: 7/1/2023 I Hospital Day: 2    Assessment/Plan   Sepsis [facility])  Assessment & Plan  · Meets sepsis criteria with WBC greater than 12 K, heart rate 109 with a likely source infection diabetic foot wound  · Continue vancomycin and ceftriaxone as above  · Sepsis resolved    Hyponatremia  Assessment & Plan  · Sodium stable, will continue to monitor    Stage 2 chronic kidney disease  Assessment & Plan  Lab Results   Component Value Date    EGFR 68 07/01/2023    EGFR 78 06/30/2023    EGFR 63 06/29/2023    CREATININE 1.15 07/01/2023    CREATININE 1.02 06/30/2023    CREATININE 1.22 06/29/2023     · Creatinine is currently at baseline, can continue to monitor    Type 1 diabetes mellitus with hyperglycemia [facility])  Assessment & Plan  Lab Results   Component Value Date    HGBA1C 7.9 (H) 03/31/2023       Recent Labs     07/01/23  0333 07/01/23  0505 07/01/23  0714 07/01/23  0935   POCGLU 118 91 101 177*       Blood Sugar Average: Last 72 hrs:  (P) [id number]   Please see note attached to insulin pump in place    Benign essential hypertension  Assessment & Plan  · BP stable  · Continue metoprolol and lisinopril   · Continue to monitor BP    Insulin pump in place  Assessment & Plan  · Currently on insulin infusion normally on insulin pump  · Insulin pump was discontinued on June 30 and insulin infusion was started for n.p.o. status and surgical procedure    Mixed hyperlipidemia  Assessment & Plan  · Continue atorvastatin    * 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, with fat layer exposed [facility])  Assessment & Plan  Patient has diabetic foot infection. On 5/30 he had sesamoid bone removal due to diabetic foot infection. Since then he has been following with podiatry and wound care.   He reported significant improvement in his wound appeared to be healing, however this  his wound became red, had discharge, and became very tender. He saw Dr. Romina Wilkinson in office yesterday who recommended he come to the hospital for an MRI and likely amputation of left midfoot. · Wound culture MRSA  · MRI foot with abscesses and osteomyelitis  · X-ray of foot showed osseous abnormalities and cannot rule out osteomyelitis  · Podiatry following, post TMA on   · Continue vancomycin and ceftriaxone pending infectious disease final recommendations loading length of therapy                 VTE Pharmacologic Prophylaxis: VTE Score: 4 High Risk (Score >/= 5) - Pharmacological DVT Prophylaxis Ordered: heparin. Sequential Compression Devices Ordered. Patient Centered Rounds: I performed bedside rounds with nursing staff today. Discussions with Specialists or Other Care Team Provider: None    Education and Discussions with Family / Patient: Patient declined call to . Total Time Spent on Date of Encounter in care of patient: 25 minutes This time was spent on one or more of the following: performing physical exam; counseling and coordination of care; obtaining or reviewing history; documenting in the medical record; reviewing/ordering tests, medications or procedures; communicating with other healthcare professionals and discussing with patient's family/caregivers. Current Length of Stay: 2 day(s)  Current Patient Status: Inpatient   Certification Statement: The patient will continue to require additional inpatient hospital stay due to Pending final antibiotic recommendation, PT and OT evaluation after TMA  Discharge Plan: Anticipate discharge in 48 hrs to home with home services.     Code Status: Level 1 - Full Code    Subjective:   Patient seen and examined bedside today, continues to have significant pain at the surgical site    Objective:     Vitals:   Temp (24hrs), Av.2 °F (36.8 °C), Min:97.3 °F (36.3 °C), Max:99.1 °F (37.3 °C)    Temp:  [97.3 °F (36.3 °C)-99.1 °F (37.3 °C)] 97.3 °F (36.3 °C)  HR:  [] 103  Resp:  [14-28] 16  BP: (114-146)/(60-81) 146/76  SpO2:  [93 %-100 %] 93 %  Body mass index is 18.99 kg/m². Input and Output Summary (last 24 hours): Intake/Output Summary (Last 24 hours) at 7/1/2023 1210  Last data filed at 7/1/2023 0843  Gross per 24 hour   Intake 2120 ml   Output 890 ml   Net 1230 ml       Physical Exam  Vitals and nursing note reviewed. Constitutional:       General: He is not in acute distress. Appearance: He is well-developed. HENT:      Head: Normocephalic and atraumatic. Eyes:      Conjunctiva/sclera: Conjunctivae normal.   Cardiovascular:      Rate and Rhythm: Normal rate and regular rhythm. Heart sounds: No murmur heard. Pulmonary:      Effort: Pulmonary effort is normal. No respiratory distress. Breath sounds: Normal breath sounds. Abdominal:      Palpations: Abdomen is soft. Tenderness: There is no abdominal tenderness. Musculoskeletal:         General: No swelling. Cervical back: Neck supple. Comments: Left foot dressed and clean     Skin:     General: Skin is warm and dry. Capillary Refill: Capillary refill takes less than 2 seconds. Neurological:      Mental Status: He is alert.    Psychiatric:         Mood and Affect: Mood normal.           Additional Data:     Labs:  Results from last 7 days   Lab Units 07/01/23  0510   WBC Thousand/uL 8.97   HEMOGLOBIN g/dL 10.1*   HEMATOCRIT % 30.8*   PLATELETS Thousands/uL 334   NEUTROS PCT % 64   LYMPHS PCT % 14   MONOS PCT % 18*   EOS PCT % 2     Results from last 7 days   Lab Units 07/01/23  0510 06/30/23  0511   SODIUM mmol/L 132* 131*   POTASSIUM mmol/L 4.2 4.6   CHLORIDE mmol/L 98 98   CO2 mmol/L 27 26   BUN mg/dL 12 14   CREATININE mg/dL 1.15 1.02   ANION GAP mmol/L 7 7   CALCIUM mg/dL 8.5 8.6   ALBUMIN g/dL  --  3.1*   TOTAL BILIRUBIN mg/dL  --  0.60   ALK PHOS U/L  --  93   ALT U/L  --  7 AST U/L  --  9*   GLUCOSE RANDOM mg/dL 85 185*     Results from last 7 days   Lab Units 06/29/23  0906   INR  0.93     Results from last 7 days   Lab Units 07/01/23  0935 07/01/23  0714 07/01/23  0505 07/01/23  0333 07/01/23  0135 07/01/23  0027 06/30/23  2344 06/30/23  2316 06/30/23  2126 06/30/23  1920 06/30/23  1601 06/30/23  1317   POC GLUCOSE mg/dl 177* 101 91 118 191* 178* 66 58* 111 301* 103 89         Results from last 7 days   Lab Units 06/29/23  1052 06/29/23  0906   LACTIC ACID mmol/L  --  1.8   PROCALCITONIN ng/ml 0.15  --        Lines/Drains:  Invasive Devices     Peripheral Intravenous Line  Duration           Peripheral IV 06/29/23 Right Antecubital 2 days          Line  Duration           Pump Device Insulin pump Anterior; Left Abdomen 1 day                      Imaging: Reviewed radiology reports from this admission including: MRI of the foot    Recent Cultures (last 7 days):   Results from last 7 days   Lab Units 06/30/23  1452 06/29/23  1052 06/29/23  0906 06/28/23  1403   BLOOD CULTURE   --  No Growth at 24 hrs.  No Growth at 24 hrs.  --    GRAM STAIN RESULT  No Polys or Bacteria seen  Rare Polys*  Rare Gram positive rods*  --   --  2+ Polys  No bacteria seen   WOUND CULTURE   --   --   --  1+ Growth of Methicillin Resistant Staphylococcus aureus*  2+ Growth of       Last 24 Hours Medication List:   Current Facility-Administered Medications   Medication Dose Route Frequency Provider Last Rate   • acetaminophen  650 mg Oral Q6H PRN Hero Johnson DPM     • atorvastatin  40 mg Oral Daily Hero Johnson DPM     • cefTRIAXone  1,000 mg Intravenous Q24H Hero Johnson DPM 1,000 mg (07/01/23 6388)   • heparin (porcine)  5,000 Units Subcutaneous [facility] NOAH Lopez     • HYDROmorphone  1 mg Intravenous Q3H PRN Emperatriz Bullock MD     • insulin lispro  25 Units Subcutaneous Insulin Pump Daily PRN Hero Johnson DPM     • insulin regular (HumuLIN R,NovoLIN R) 1 Units/mL in sodium chloride 0.9 % 100 mL infusion  0.3-21 Units/hr Intravenous Titrated Karyn Howard DPM 0.3 Units/hr (07/01/23 0506)   • lisinopril  10 mg Oral Daily Karyn Howard DPM     • metoprolol succinate  100 mg Oral Daily Karyn Howard DPM     • nicotine  21 mg Transdermal Daily Karyn Howard DPM     • nicotine polacrilex  2 mg Oral Q2H PRN Karyn Howard DPM     • ondansetron  4 mg Intravenous Q6H PRN Karyn Howard DPM     • oxyCODONE  10 mg Oral Q4H PRN Juanito Darnell MD     • oxyCODONE  15 mg Oral Q4H PRN Juanito Darnell MD     • sodium chloride  125 mL/hr Intravenous Continuous Karyn Howard  mL/hr (07/01/23 1149)   • vancomycin  1,500 mg Intravenous Q24H Juanito Darnell MD          Today, Patient Was Seen By: Juanito Darnell MD    **Please Note: This note may have been constructed using a voice recognition system. **

## 2023-07-01 NOTE — UTILIZATION REVIEW
NOTIFICATION OF INPATIENT ADMISSION   AUTHORIZATION REQUEST   SERVICING FACILITY:   [address] [place] [address]  [address] [facility],Third [address] [address]  Tax ID: [id number]  NPI: [id number] ATTENDING PROVIDER:  Attending Name and NPI#: Poly Esquivel Md [[id number]]  Address: [facility] [address] [facility]Third [address] [address]  Phone: [phone]   ADMISSION INFORMATION:  Place of [facility]: [facility]patient [address] [facility]   Place of Service Code: 21  Inpatient Admission Date/Time: 6/29/23 11:44 AM  Discharge Date/Time: No discharge date for patient encounter. Admitting Diagnosis Code/Description:  Foot ulcer ([address]) [L97.509]  Wound infection [T14. 8XXA, L08.9]  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, with fat layer exposed ([address]) [H12.038, L97.422]  Hypoglycemia unawareness associated with type 1 diabetes mellitus ([address]) [E10.649]     UTILIZATION REVIEW CONTACT:  Jarrett Ernst Utilization   Network Utilization Review Department  Phone: [phone]  Fax [phone]  Email: [email or web address]  Contact for approvals/pending authorizations, clinical reviews, and discharge. PHYSICIAN ADVISORY SERVICES:  Medical Necessity Denial & [id number]-[id number] Review  Phone: [phone]  Fax: [phone]  Email: [email or web address]

## 2023-07-01 NOTE — PLAN OF CARE
Problem: PAIN - ADULT  Goal: Verbalizes/displays adequate comfort level or baseline comfort level  Description: Interventions:  - Encourage patient to monitor pain and request assistance  - Assess pain using appropriate pain scale  - Administer analgesics based on type and severity of pain and evaluate response  - Implement non-pharmacological measures as appropriate and evaluate response  - Consider cultural and social influences on pain and pain management  - Notify physician/advanced practitioner if interventions unsuccessful or patient reports new pain  Outcome: Progressing     Problem: INFECTION - ADULT  Goal: Absence or prevention of progression during hospitalization  Description: INTERVENTIONS:  - Assess and monitor for signs and symptoms of infection  - Monitor lab/diagnostic results  - Monitor all insertion sites, i.e. indwelling lines, tubes, and drains  - Monitor endotracheal if appropriate and nasal secretions for changes in amount and color  - [place] appropriate cooling/warming therapies per order  - Administer medications as ordered  - Instruct and encourage patient and family to use good hand hygiene technique  - Identify and instruct in appropriate isolation precautions for identified infection/condition  Outcome: Progressing  Goal: Absence of fever/infection during neutropenic period  Description: INTERVENTIONS:  - Monitor WBC    Outcome: Progressing     Problem: SAFETY ADULT  Goal: Patient will remain free of falls  Description: INTERVENTIONS:  - Educate patient/family on patient safety including physical limitations  - Instruct patient to call for assistance with activity   - Consult OT/PT to assist with strengthening/mobility   - Keep Call bell within reach  - Keep bed low and locked with side rails adjusted as appropriate  - Keep care items and personal belongings within reach  - Initiate and maintain comfort rounds  - Make Fall Risk Sign visible to staff  - Offer Toileting every 2 Hours, in advance of need  - Initiate/Maintain bed/ chair alarm  - Obtain necessary fall risk management equipment: walker  - Apply yellow socks and bracelet for high fall risk patients  - Consider moving patient to room near nurses station  Outcome: Progressing  Goal: Maintain or return to baseline ADL function  Description: INTERVENTIONS:  -  Assess patient's ability to carry out ADLs; assess patient's baseline for ADL function and identify physical deficits which impact ability to perform ADLs (bathing, care of mouth/teeth, toileting, grooming, dressing, etc.)  - Assess/evaluate cause of self-care deficits   - Assess range of motion  - Assess patient's mobility; develop plan if impaired  - Assess patient's need for assistive devices and provide as appropriate  - Encourage maximum independence but intervene and supervise when necessary  - Involve family in performance of ADLs  - Assess for home care needs following discharge   - Consider OT consult to assist with ADL evaluation and planning for discharge  - Provide patient education as appropriate  Outcome: Progressing  Goal: Maintains/Returns to pre admission functional level  Description: INTERVENTIONS:  - Perform BMAT or MOVE assessment daily.   - Set and communicate daily mobility goal to care team and patient/family/caregiver. - Collaborate with rehabilitation services on mobility goals if consulted  - Perform Range of Motion 3 times a day. - Reposition patient every 2 hours.   - Dangle patient 3 times a day  - Stand patient 3 times a day  - Ambulate patient 3 times a day  - Out of bed to chair 3 times a day   - Out of bed for meals 3 times a day  - Out of bed for toileting  - Record patient progress and toleration of activity level   Outcome: Progressing     Problem: DISCHARGE PLANNING  Goal: Discharge to home or other facility with appropriate resources  Description: INTERVENTIONS:  - Identify barriers to discharge w/patient and caregiver  - Arrange for needed discharge resources and transportation as appropriate  - Identify discharge learning needs (meds, wound care, etc.)  - Arrange for interpretive services to assist at discharge as needed  - Refer to Case Management Department for coordinating discharge planning if the patient needs post-hospital services based on physician/advanced practitioner order or complex needs related to functional status, cognitive ability, or social support system  Outcome: Progressing     Problem: Knowledge Deficit  Goal: Patient/family/caregiver demonstrates understanding of disease process, treatment plan, medications, and discharge instructions  Description: Complete learning assessment and assess knowledge base. Interventions:  - Provide teaching at level of understanding  - Provide teaching via preferred learning methods  Outcome: Progressing     Problem: Nutrition/Hydration-ADULT  Goal: Nutrient/Hydration intake appropriate for improving, restoring or maintaining nutritional needs  Description: Monitor and assess patient's nutrition/hydration status for malnutrition. Collaborate with interdisciplinary team and initiate plan and interventions as ordered. Monitor patient's weight and dietary intake as ordered or per policy. Utilize nutrition screening tool and intervene as necessary. Determine patient's food preferences and provide high-protein, high-caloric foods as appropriate.      INTERVENTIONS:  - Monitor oral intake, urinary output, labs, and treatment plans  - Assess nutrition and hydration status and recommend course of action  - Evaluate amount of meals eaten  - Assist patient with eating if necessary   - Allow adequate time for meals  - Recommend/ encourage appropriate diets, oral nutritional supplements, and vitamin/mineral supplements  - Order, calculate, and assess calorie counts as needed  - Recommend, monitor, and adjust tube feedings and TPN/PPN based on assessed needs  - Assess need for intravenous fluids  - Provide specific nutrition/hydration education as appropriate  - Include patient/family/caregiver in decisions related to nutrition  Outcome: Progressing     Problem: MOBILITY - ADULT  Goal: Maintain or return to baseline ADL function  Description: INTERVENTIONS:  -  Assess patient's ability to carry out ADLs; assess patient's baseline for ADL function and identify physical deficits which impact ability to perform ADLs (bathing, care of mouth/teeth, toileting, grooming, dressing, etc.)  - Assess/evaluate cause of self-care deficits   - Assess range of motion  - Assess patient's mobility; develop plan if impaired  - Assess patient's need for assistive devices and provide as appropriate  - Encourage maximum independence but intervene and supervise when necessary  - Involve family in performance of ADLs  - Assess for home care needs following discharge   - Consider OT consult to assist with ADL evaluation and planning for discharge  - Provide patient education as appropriate  Outcome: Progressing  Goal: Maintains/Returns to pre admission functional level  Description: INTERVENTIONS:  - Perform BMAT or MOVE assessment daily.   - Set and communicate daily mobility goal to care team and patient/family/caregiver. - Collaborate with rehabilitation services on mobility goals if consulted  - Perform Range of Motion 3 times a day. - Reposition patient every 2 hours. - Dangle patient 3 times a day  - Stand patient 3 times a day  - Ambulate patient 3 times a day  - Out of bed to chair 3 times a day   - Out of bed for meals 3 times a day  - Out of bed for toileting  - Record patient progress and toleration of activity level   Outcome: Progressing     Problem: Nutrition/Hydration-ADULT  Goal: Nutrient/Hydration intake appropriate for improving, restoring or maintaining nutritional needs  Description: Monitor and assess patient's nutrition/hydration status for malnutrition.  Collaborate with interdisciplinary team and initiate plan and interventions as ordered. Monitor patient's weight and dietary intake as ordered or per policy. Utilize nutrition screening tool and intervene as necessary. Determine patient's food preferences and provide high-protein, high-caloric foods as appropriate. INTERVENTIONS:  - Monitor oral intake, urinary output, labs, and treatment plans  - Assess nutrition and hydration status and recommend course of action  - Evaluate amount of meals eaten  - Assist patient with eating if necessary   - Allow adequate time for meals  - Recommend/ encourage appropriate diets, oral nutritional supplements, and vitamin/mineral supplements  - Order, calculate, and assess calorie counts as needed  - Recommend, monitor, and adjust tube feedings and TPN/PPN based on assessed needs  - Assess need for intravenous fluids  - Provide specific nutrition/hydration education as appropriate  - Include patient/family/caregiver in decisions related to nutrition  Outcome: Progressing     Problem: MOBILITY - ADULT  Goal: Maintain or return to baseline ADL function  Description: INTERVENTIONS:  -  Assess patient's ability to carry out ADLs; assess patient's baseline for ADL function and identify physical deficits which impact ability to perform ADLs (bathing, care of mouth/teeth, toileting, grooming, dressing, etc.)  - Assess/evaluate cause of self-care deficits   - Assess range of motion  - Assess patient's mobility; develop plan if impaired  - Assess patient's need for assistive devices and provide as appropriate  - Encourage maximum independence but intervene and supervise when necessary  - Involve family in performance of ADLs  - Assess for home care needs following discharge   - Consider OT consult to assist with ADL evaluation and planning for discharge  - Provide patient education as appropriate  Outcome: Progressing  Goal: Maintains/Returns to pre admission functional level  Description: INTERVENTIONS:  - Perform BMAT or MOVE assessment daily. - Set and communicate daily mobility goal to care team and patient/family/caregiver. - Collaborate with rehabilitation services on mobility goals if consulted  - Perform Range of Motion 3 times a day. - Reposition patient every 2 hours.   - Dangle patient 3 times a day  - Stand patient 3 times a day  - Ambulate patient 3 times a day  - Out of bed to chair 3 times a day   - Out of bed for meals 3 times a day  - Out of bed for toileting  - Record patient progress and toleration of activity level   Outcome: Progressing

## 2023-07-01 NOTE — CONSULTS
Consultation - Infectious Disease   Ta Howe 66 y.o. male MRN: [id number]  Unit/Bed#: -01 Encounter: [id number]      Assessment/Plan   1. Left foot cellulitis/MRSA left great toe wound infxn/Oosteo of left great toe/   Leukocytosis: Pt presented with worsening left great toe wound and redness with pain of left foot c/w cellulitis due to wound infxn. He did not have fever but WBC count was elevated. I reviewed X-ray of left foot which showed defect at head of 1st met. He was placed on Vanco and Rocephin, ESR was 69 and CRP was 50.4. MRI of left foot showed plantar ulcer at 1st MTP joint with cellulitis and osteo with abscess. He went to the OR on 6/30 for TMA. OR cx is pending. Wound cx done PTA on 6/28 grew MRSA. WBC count has decreased to 8.9K. Bld cx's are neg    - Cont current abx for now while OR cx's are pending  - Awaiting final OR cx results - will re-adjust abx according to these results  - Cont local wound care as per Podiatry  - Will discuss with Podiatry whether Pt will need long term IV abx  - Will monitor for the development of toxicity to current abx tx      Discussed case with Dr. Aleida Coelho              History of Present Illness   Physician Requesting Consult: Aleida Coelho MD  Reason for Consult / Principal Problem: Worsening left great toe wound with redness and pain      Discussed case with Dr. Aleida Coelho        HPI: Ta Howe is a 66y.o. year old male with left foot wound following removal of sesamoid bone on 5/30 for tx of infxn, Dm, HTN, HLD, gout, and neuropathy who was admitted on 6/29 with c/o worsening left great toe wound with redness, pain, and drainage since 6/25. On admission, he did not have fever but WBC count was elevated at 12.3K. I reviewed X-ray of left foot which showed defect at head of 1st met. He was placed on Vanco and Rocephin, ESR was 69 and CRP was 50.4.  MRI of left foot showed plantar ulcer at 1st MTP joint with cellulitis and osteo with abscess. He went to the OR on  for TMA. OR cx is pending. Wound cx done PTA on  grew MRSA. WBC count has decreased to 8.9K. Bld cx's are neg    Pt denied fever, chills, cough, SOB, CP, N/V/D, abd pain, or dysuria    Inpatient consult to Infectious Diseases  Consult performed by: Justine Beach MD  Consult ordered by: Albina Bustillo MD          ROS: See HPI. 14 systems reviewed, remainder is neg.     Historical Information   Past Medical History:   Diagnosis Date   • Chronic foot ulcer ([address]) 2018   • Diabetes mellitus ([address])    • Gout    • High cholesterol    • Hypertension    • Visual impairment 2022    Cateract     Past Surgical History:   Procedure Laterality Date   • CATARACT EXTRACTION Right 2023   • COMPLEX WOUND CLOSURE TO EXTREMITY Left 2019    Procedure: COMPLEX CLOSURE LEFT CHEEK;  Surgeon: Soraida Quezada MD;  Location:  MAIN OR;  Service: Plastics   • FACIAL/NECK BIOPSY Left 2019    Procedure: EXCISION LEFT CHEEK CYST;  Surgeon: Soraida Quezada MD;  Location:  MAIN OR;  Service: Plastics   • HERNIA REPAIR Left     several times   • KNEE ARTHROSCOPY Left     torn meniscus   • WOUND DEBRIDEMENT Left 2023    Procedure: DEBRIDEMENT WOUND LEFT FOOT WOUND WITH EXCISION OF INFECTED BONE AND WOUND VAC APPLICATION;  Surgeon: Brett Muse DPM;  Location:  MAIN OR;  Service: Podiatry     Social History   Social History     Substance and Sexual Activity   Alcohol Use Yes    Comment: 3/4 beers a day     Social History     Substance and Sexual Activity   Drug Use Never     Social History     Tobacco Use   Smoking Status Every Day   • Packs/day: 1.00   • Years: 20.00   • Total pack years: 20.00   • Types: Cigarettes   • Last attempt to quit: 2022   • Years since quittin.4   Smokeless Tobacco Never   Tobacco Comments    encouraged smoking cessation     Family History   Problem Relation Age of Onset   • Heart disease Father    • Diabetes type I Father    • Diabetes type II Mother    • No Known Problems Sister    • Alcohol abuse Brother    • Diabetes type I Brother    • No Known Problems Brother    • No Known Problems Son    • No Known Problems Daughter        Meds/Allergies   MEDS:  Vanco: #3  Rocephin; #3      Current Facility-Administered Medications:   •  acetaminophen (TYLENOL) tablet 650 mg, 650 mg, Oral, Q6H PRN, Damaris Tyson DPM  •  atorvastatin (LIPITOR) tablet 40 mg, 40 mg, Oral, Daily, Damaris Tyson DPM, 40 mg at 07/01/23 1430  •  [START ON 7/2/2023] cefTRIAXone (ROCEPHIN) IVPB (premix in dextrose) 2,000 mg 50 mL, 2,000 mg, Intravenous, Q24H, Claud Nissen, MD  •  heparin (porcine) subcutaneous injection 5,000 Units, 5,000 Units, Subcutaneous, Q8H [address], rajinder Chi, DPM, 5,000 Units at 07/01/23 1430  •  HYDROmorphone (DILAUDID) injection 1 mg, 1 mg, Intravenous, Q3H PRN, Favio Martino MD, 1 mg at 07/01/23 1429  •  insulin lispro (HumaLOG) FOR PUMP REFILLS 25 Units, 25 Units, Subcutaneous Insulin Pump, Daily PRN, Damaris Tyson DPM  •  insulin lispro (HumaLOG) FOR PUMP REFILLS 300 Units, 300 Units, Subcutaneous Insulin Pump, Daily PRN, Abraham Estrella DO  •  lisinopril (ZESTRIL) tablet 10 mg, 10 mg, Oral, Daily, Damaris Tyson DPM, 10 mg at 07/01/23 6434  •  metoprolol succinate (TOPROL-XL) 24 hr tablet 100 mg, 100 mg, Oral, Daily, Damaris Tyson DPM, 100 mg at 07/01/23 3144  •  nicotine (NICODERM CQ) 21 mg/24 hr TD 24 hr patch 21 mg, 21 mg, Transdermal, Daily, Damaris Tyson DPM, 21 mg at 07/01/23 3252  •  nicotine polacrilex (NICORETTE) gum 2 mg, 2 mg, Oral, Q2H PRN, Damaris Tyson DPM  •  ondansetron [facility]) injection 4 mg, 4 mg, Intravenous, Q6H PRN, Damaris Tyson DPM  •  oxyCODONE (ROXICODONE) IR tablet 10 mg, 10 mg, Oral, Q4H PRN, Favio Martino MD  •  oxyCODONE (ROXICODONE) IR tablet 15 mg, 15 mg, Oral, Q4H PRN, Favio Martino MD, 15 mg at 07/01/23 1637  •  PATIENT MAINTAINED INSULIN PUMP 1 each, 1 each, Subcutaneous, Q8H, Awilda Samuel, DO, 1 each at 07/01/23 1431  •  sodium chloride 0.9 % infusion, 125 mL/hr, Intravenous, Continuous, ARTURO HassanM, Last Rate: 125 mL/hr at 07/01/23 0834, 125 mL/hr at 07/01/23 0834  •  vancomycin (VANCOCIN) 1500 mg in sodium chloride 0.9% 250 mL IVPB, 1,500 mg, Intravenous, Q24H, Anna Portillo MD, Last Rate: 167.5 mL/hr at 07/01/23 1216, 1,500 mg at 07/01/23 1216    Allergies   Allergen Reactions   • Cefuroxime Other (See Comments) and GI Intolerance   • Amoxicillin-Pot Clavulanate Nausea Only and GI Intolerance         Intake/Output Summary (Last 24 hours) at 7/1/2023 1733  Last data filed at 7/1/2023 1500  Gross per 24 hour   Intake 1540 ml   Output 1565 ml   Net -25 ml       PE:  WD, WN, WM in NAD  VSS, Tmax: 99.1  HEENT:  No scleral icterus, pharynx clear  NECK: Supple  CARDIAC:  RRR, nml S1, S2  LUNGS:  Clear  ABDOMEN:  +BS, soft, nontender  MUSCULOSKELETAL:  No calf tenderness  EXTREMITIES:  +Intact dressing on left foot  SKIN:  No rash  NEURO:  Grossly nonfocal    Invasive Devices:   Peripheral IV 06/29/23 Right Antecubital (Active)   Site Assessment WDL 07/01/23 0800   Dressing Type Transparent 07/01/23 0800   Line Status Flushed; Infusing 07/01/23 0800   Dressing Status Clean;Dry; Intact 07/01/23 0800   Dressing Change Due 07/03/23 07/01/23 0800   Reason Not Rotated Not due 07/01/23 0800       Pump Device Insulin pump Anterior; Left Abdomen (Active)           Lab Results:   Admission on 06/29/2023   Component Date Value   • PTT 06/29/2023 33    • Protime 06/29/2023 13.1    • INR 06/29/2023 0.93    • Blood Culture 06/29/2023 No Growth at 48 hrs. • Blood Culture 06/29/2023 No Growth at 48 hrs.     • WBC 06/29/2023 12.35 (H)    • RBC 06/29/2023 3.73 (L)    • Hemoglobin 06/29/2023 12.5    • Hematocrit 06/29/2023 36.4 (L)    • MCV 06/29/2023 98    • MCH 06/29/2023 33.5    • MCHC 06/29/2023 34.3 • RDW 06/29/2023 12.6    • MPV 06/29/2023 8.5 (L)    • Platelets 60/78/1254 366    • nRBC 06/29/2023 0    • Neutrophils Relative 06/29/2023 76 (H)    • Immat GRANS % 06/29/2023 1    • Lymphocytes Relative 06/29/2023 9 (L)    • Monocytes Relative 06/29/2023 13 (H)    • Eosinophils Relative 06/29/2023 0    • Basophils Relative 06/29/2023 1    • Neutrophils Absolute 06/29/2023 9.43 (H)    • Immature Grans Absolute 06/29/2023 0.07    • Lymphocytes Absolute 06/29/2023 1.10    • Monocytes Absolute 06/29/2023 1.63 (H)    • Eosinophils Absolute 06/29/2023 0.04    • Basophils Absolute 06/29/2023 0.08    • Sodium 06/29/2023 125 (L)    • Potassium 06/29/2023 5.0    • Chloride 06/29/2023 91 (L)    • CO2 06/29/2023 27    • ANION GAP 06/29/2023 7    • BUN 06/29/2023 17    • Creatinine 06/29/2023 1.22    • Glucose 06/29/2023 218 (H)    • Calcium 06/29/2023 9.1    • AST 06/29/2023 11 (L)    • ALT 06/29/2023 8    • Alkaline Phosphatase 06/29/2023 112 (H)    • Total Protein 06/29/2023 7.6    • Albumin 06/29/2023 3.6    • Total Bilirubin 06/29/2023 0.55    • eGFR 06/29/2023 63    • LACTIC ACID 06/29/2023 1.8    • Procalcitonin 06/29/2023 0.15    • Sed Rate 06/29/2023 69 (H)    • CRP 06/29/2023 50.4 (H)    • MRSA Culture Only 06/29/2023 No Methicillin Resistant Staphlyococcus aureus (MRSA) isolated    • POC Glucose 06/29/2023 252 (H)    • POC Glucose 06/29/2023 204 (H)    • POC Glucose 06/29/2023 186 (H)    • POC Glucose 06/29/2023 182 (H)    • Sodium 06/30/2023 131 (L)    • Potassium 06/30/2023 4.6    • Chloride 06/30/2023 98    • CO2 06/30/2023 26    • ANION GAP 06/30/2023 7    • BUN 06/30/2023 14    • Creatinine 06/30/2023 1.02    • Glucose 06/30/2023 185 (H)    • Calcium 06/30/2023 8.6    • Corrected Calcium 06/30/2023 9.3    • AST 06/30/2023 9 (L)    • ALT 06/30/2023 7    • Alkaline Phosphatase 06/30/2023 93    • Total Protein 06/30/2023 6.5    • Albumin 06/30/2023 3.1 (L)    • Total Bilirubin 06/30/2023 0.60    • eGFR 06/30/2023 78    • WBC 06/30/2023 9.82    • RBC 06/30/2023 3.17 (L)    • Hemoglobin 06/30/2023 10.3 (L)    • Hematocrit 06/30/2023 31.7 (L)    • MCV 06/30/2023 100 (H)    • MCH 06/30/2023 32.5    • MCHC 06/30/2023 32.5    • RDW 06/30/2023 12.8    • MPV 06/30/2023 8.7 (L)    • Platelets 89/22/4871 324    • nRBC 06/30/2023 0    • Neutrophils Relative 06/30/2023 64    • Immat GRANS % 06/30/2023 0    • Lymphocytes Relative 06/30/2023 17    • Monocytes Relative 06/30/2023 16 (H)    • Eosinophils Relative 06/30/2023 2    • Basophils Relative 06/30/2023 1    • Neutrophils Absolute 06/30/2023 6.26    • Immature Grans Absolute 06/30/2023 0.04    • Lymphocytes Absolute 06/30/2023 1.66    • Monocytes Absolute 06/30/2023 1.56 (H)    • Eosinophils Absolute 06/30/2023 0.18    • Basophils Absolute 06/30/2023 0.12 (H)    • PTT 06/30/2023 34    • POC Glucose 06/30/2023 192 (H)    • POC Glucose 06/30/2023 226 (H)    • POC Glucose 06/30/2023 211 (H)    • POC Glucose 06/30/2023 89    • Anaerobic Culture 06/30/2023 Culture results to follow. • Anaerobic Culture 06/30/2023 Culture results to follow.     • Wound Culture 06/30/2023 No growth    • Gram Stain Result 06/30/2023 No Polys or Bacteria seen    • Wound Culture 06/30/2023 No growth    • Gram Stain Result 06/30/2023 Rare Polys (A)    • Gram Stain Result 06/30/2023 Rare Gram positive rods (A)    • POC Glucose 06/30/2023 103    • POC Glucose 06/30/2023 301 (H)    • POC Glucose 06/30/2023 111    • POC Glucose 06/30/2023 58 (L)    • POC Glucose 06/30/2023 66    • POC Glucose 07/01/2023 178 (H)    • POC Glucose 07/01/2023 191 (H)    • POC Glucose 07/01/2023 118    • Sodium 07/01/2023 132 (L)    • Potassium 07/01/2023 4.2    • Chloride 07/01/2023 98    • CO2 07/01/2023 27    • ANION GAP 07/01/2023 7    • BUN 07/01/2023 12    • Creatinine 07/01/2023 1.15    • Glucose 07/01/2023 85    • Calcium 07/01/2023 8.5    • eGFR 07/01/2023 68    • WBC 07/01/2023 8.97    • RBC 07/01/2023 3.02 (L) • Hemoglobin 07/01/2023 10.1 (L)    • Hematocrit 07/01/2023 30.8 (L)    • MCV 07/01/2023 102 (H)    • MCH 07/01/2023 33.4    • MCHC 07/01/2023 32.8    • RDW 07/01/2023 12.7    • MPV 07/01/2023 8.7 (L)    • Platelets 55/66/0334 334    • nRBC 07/01/2023 0    • Neutrophils Relative 07/01/2023 64    • Immat GRANS % 07/01/2023 1    • Lymphocytes Relative 07/01/2023 14    • Monocytes Relative 07/01/2023 18 (H)    • Eosinophils Relative 07/01/2023 2    • Basophils Relative 07/01/2023 1    • Neutrophils Absolute 07/01/2023 5.89    • Immature Grans Absolute 07/01/2023 0.05    • Lymphocytes Absolute 07/01/2023 1.24    • Monocytes Absolute 07/01/2023 1.58 (H)    • Eosinophils Absolute 07/01/2023 0.14    • Basophils Absolute 07/01/2023 0.07    • Vancomycin Rm 07/01/2023 10.4    • POC Glucose 07/01/2023 91    • POC Glucose 07/01/2023 101    • POC Glucose 07/01/2023 177 (H)    • POC Glucose 07/01/2023 257 (H)    • POC Glucose 07/01/2023 62 (L)      Imaging Studies: I have personally reviewed pertinent reports. EKG, Pathology, and Other Studies: I have personally reviewed pertinent reports. Culture  Lab Results   Component Value Date    BLOODCX No Growth at 48 hrs. 06/29/2023    BLOODCX No Growth at 48 hrs. 06/29/2023    BLOODCX No Growth After 5 Days. 05/29/2023    BLOODCX No Growth After 5 Days.  05/29/2023     Lab Results   Component Value Date    WOUNDCULT No growth 06/30/2023    WOUNDCULT No growth 06/30/2023    WOUNDCULT (A) 06/28/2023     1+ Growth of Methicillin Resistant Staphylococcus aureus    WOUNDCULT 2+ Growth of 06/28/2023    WOUNDCULT (A) 04/03/2023     3+ Growth of Methicillin Resistant Staphylococcus aureus    WOUNDCULT 3+ Growth of 04/03/2023     No results found for: "URINECX"  No results found for: "SPUTUMCULTUR"    Principal Problem:    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, with fat layer exposed ([address])  Active Problems:    Mixed hyperlipidemia    Insulin pump in place    Benign essential hypertension    Type 1 diabetes mellitus with hyperglycemia (HCC)    Stage 2 chronic kidney disease    Hyponatremia    Sepsis ([address])

## 2023-07-01 NOTE — ASSESSMENT & PLAN NOTE
Lab Results   Component Value Date    HGBA1C 7.9 (H) 03/31/2023       Recent Labs     07/01/23  0333 07/01/23  0505 07/01/23  0714 07/01/23  0935   POCGLU 118 91 101 177*       Blood Sugar Average: Last 72 hrs:  (P) [id number]   Please see note attached to insulin pump in place

## 2023-07-01 NOTE — PROGRESS NOTES
Progress Note - Podiatry  Lion Nuñez 66 y.o. male MRN: [id number]  Unit/Bed#: -01 Encounter: [id number]    Assessment/Plan:    Sepsis/cellulitis left foot/septic arthritis/osteomyelitis  • Continue with current IV antibiotics  • Infectious disease consult ordered  • Wound culture growing MRSA  • MRI showed osteomyelitis of the first metatarsal, second metatarsal and third metatarsal with extensive infection and abscess formation within the first interspace. • WBC 8.9, creatinine 1.15, blood sugar 177    Diabetic foot ulceration with necrosis to bone (osteomyelitis left first metatarsal) -Veloz 3  • POD #1 S/P TMA LFT  • Complete resection of the entire first metatarsal LFTwas performed with the procedure  • Surgical dressing intact with no strikethrough drainage noted  • Plan for first dressing change tomorrow  • PT OT to work with nonweightbearing/crutches     Pressure ulcer left foot unstageable  • Lateral fifth MPJ left location  • No signs of infection        Hyponatremia, CKD, type 1 diabetes with hyperglycemia, type 1 diabetes with peripheral neuropathy, benign hypertension, mixed hyperlipidemia  • Managed per internal medicine  • Heparin 5000 subcu every 8 hours restarted    Subjective/Objective   Chief Complaint:   Chief Complaint   Patient presents with   • Wound Infection     Patient presents to the ED with c/o left foot infection, states he had surgery on foot in may and states that Monday visiting nurse noticed it was red and swollen, states went to wound care yesterday and wanted patient admitted for infection        Subjective: 66year-old type I diabetic postop day #1 status post TMA, reports moderate pain overnight which was fairly well controlled with current pain meds. Denies any shortness of breath or increasing pain/discomfort. Blood pressure 146/76, pulse 103, temperature (!) 97.3 °F (36.3 °C), resp. rate 16, height 6' (1.829 m), weight 63.5 kg (140 lb), SpO2 93 %. ,Body mass index is 18.99 kg/m². Invasive Devices     Peripheral Intravenous Line  Duration           Peripheral IV 06/29/23 Right Antecubital 1 day          Line  Duration           Pump Device Insulin pump Anterior; Left Abdomen 1 day                Physical Exam:   General appearance: alert, cooperative and no distress  Neuro/Vascular: Grossly intact at baseline with no deficiencies, unchanged from preoperative state. Extremity: Surgical dressing intact with no strikethrough drainage noted. Substantially less calor and edema of the left leg noted. Labs and other studies:   CBC w/diff  Results from last 7 days   Lab Units 07/01/23  0510   WBC Thousand/uL 8.97   HEMOGLOBIN g/dL 10.1*   HEMATOCRIT % 30.8*   PLATELETS Thousands/uL 334   NEUTROS PCT % 64   LYMPHS PCT % 14   MONOS PCT % 18*   EOS PCT % 2     BMP  Results from last 7 days   Lab Units 07/01/23  0510   POTASSIUM mmol/L 4.2   CHLORIDE mmol/L 98   CO2 mmol/L 27   BUN mg/dL 12   CREATININE mg/dL 1.15   CALCIUM mg/dL 8.5     CMP  Results from last 7 days   Lab Units 07/01/23  0510 06/30/23  0511   POTASSIUM mmol/L 4.2 4.6   CHLORIDE mmol/L 98 98   CO2 mmol/L 27 26   BUN mg/dL 12 14   CREATININE mg/dL 1.15 1.02   CALCIUM mg/dL 8.5 8.6   ALK PHOS U/L  --  93   ALT U/L  --  7   AST U/L  --  9*       @Culture@  Lab Results   Component Value Date    BLOODCX No Growth at 24 hrs. 06/29/2023    BLOODCX No Growth at 24 hrs. 06/29/2023    BLOODCX No Growth After 5 Days. 05/29/2023    BLOODCX No Growth After 5 Days.  05/29/2023     Lab Results   Component Value Date    WOUNDCULT (A) 06/28/2023     1+ Growth of Methicillin Resistant Staphylococcus aureus    WOUNDCULT 2+ Growth of 06/28/2023         Current Facility-Administered Medications:   •  acetaminophen (TYLENOL) tablet 650 mg, 650 mg, Oral, Q6H PRN, Anjelica Bachelor, DPM  •  atorvastatin (LIPITOR) tablet 40 mg, 40 mg, Oral, Daily, Anjelica Bachelor, DPM, 40 mg at 06/30/23 1727  •  cefTRIAXone (ROCEPHIN) IVPB (premix in dextrose) 1,000 mg 50 mL, 1,000 mg, Intravenous, Q24H, Shala Hunger, DPM, Last Rate: 100 mL/hr at 07/01/23 0639, 1,000 mg at 07/01/23 7953  •  HYDROmorphone (DILAUDID) injection 0.5 mg, 0.5 mg, Intravenous, Q3H PRN, Shala Hunger, DPM, 0.5 mg at 07/01/23 0502  •  HYDROmorphone (DILAUDID) injection 1 mg, 1 mg, Intravenous, Q3H PRN, Marina Elliott MD  •  insulin lispro (HumaLOG) FOR PUMP REFILLS 25 Units, 25 Units, Subcutaneous Insulin Pump, Daily PRN, Shala Hunger, DPM  •  insulin regular (HumuLIN R,NovoLIN R) 1 Units/mL in sodium chloride 0.9 % 100 mL infusion, 0.3-21 Units/hr, Intravenous, Titrated, Shala Hunger, DPM, Last Rate: 0.3 mL/hr at 07/01/23 0506, 0.3 Units/hr at 07/01/23 0506  •  lisinopril (ZESTRIL) tablet 10 mg, 10 mg, Oral, Daily, Shala Hunger, DPM, 10 mg at 07/01/23 5047  •  metoprolol succinate (TOPROL-XL) 24 hr tablet 100 mg, 100 mg, Oral, Daily, Shala Hunger, DPM, 100 mg at 07/01/23 7860  •  nicotine (NICODERM CQ) 21 mg/24 hr TD 24 hr patch 21 mg, 21 mg, Transdermal, Daily, Shala Hunger, DPM, 21 mg at 07/01/23 5753  •  nicotine polacrilex (NICORETTE) gum 2 mg, 2 mg, Oral, Q2H PRN, Shala Hunger, DPM  •  ondansetron [facility]) injection 4 mg, 4 mg, Intravenous, Q6H PRN, Shala Hunger, DPM  •  oxyCODONE (ROXICODONE) IR tablet 10 mg, 10 mg, Oral, Q4H PRN, Marina Elliott MD  •  oxyCODONE (ROXICODONE) IR tablet 15 mg, 15 mg, Oral, Q6H PRN, Marina Elliott MD  •  sodium chloride 0.9 % infusion, 125 mL/hr, Intravenous, Continuous, Shala Matos DPM, Last Rate: 125 mL/hr at 07/01/23 0834, 125 mL/hr at 07/01/23 0834  •  vancomycin (VANCOCIN) 1500 mg in sodium chloride 0.9% 250 mL IVPB, 1,500 mg, Intravenous, Q24H, Marina Elliott MD    Imaging: I have personally reviewed pertinent films in PACS  EKG, Pathology, and Other Studies: I have personally reviewed pertinent reports.   VTE Pharmacologic Prophylaxis: Heparin  VTE Mechanical Prophylaxis: sequential compression device    Eugenio Tsai DPM

## 2023-07-02 LAB
ANION GAP SERPL CALCULATED.3IONS-SCNC: 5 MMOL/L
BASOPHILS # BLD AUTO: 0.07 THOUSANDS/ÂΜL (ref 0–0.1)
BASOPHILS NFR BLD AUTO: 1 % (ref 0–1)
BUN SERPL-MCNC: 9 MG/DL (ref 5–25)
CALCIUM SERPL-MCNC: 8.4 MG/DL (ref 8.4–10.2)
CHLORIDE SERPL-SCNC: 99 MMOL/L (ref 96–108)
CO2 SERPL-SCNC: 25 MMOL/L (ref 21–32)
CREAT SERPL-MCNC: 1.19 MG/DL (ref 0.6–1.3)
EOSINOPHIL # BLD AUTO: 0.29 THOUSAND/ÂΜL (ref 0–0.61)
EOSINOPHIL NFR BLD AUTO: 4 % (ref 0–6)
ERYTHROCYTE [DISTWIDTH] IN BLOOD BY AUTOMATED COUNT: 12.5 % (ref 11.6–15.1)
GFR SERPL CREATININE-BSD FRML MDRD: 65 ML/MIN/1.73SQ M
GLUCOSE SERPL-MCNC: 101 MG/DL (ref 65–140)
GLUCOSE SERPL-MCNC: 103 MG/DL (ref 65–140)
GLUCOSE SERPL-MCNC: 118 MG/DL (ref 65–140)
GLUCOSE SERPL-MCNC: 122 MG/DL (ref 65–140)
GLUCOSE SERPL-MCNC: 124 MG/DL (ref 65–140)
GLUCOSE SERPL-MCNC: 53 MG/DL (ref 65–140)
GLUCOSE SERPL-MCNC: 63 MG/DL (ref 65–140)
GLUCOSE SERPL-MCNC: 66 MG/DL (ref 65–140)
GLUCOSE SERPL-MCNC: 66 MG/DL (ref 65–140)
GLUCOSE SERPL-MCNC: 68 MG/DL (ref 65–140)
GLUCOSE SERPL-MCNC: 70 MG/DL (ref 65–140)
GLUCOSE SERPL-MCNC: 74 MG/DL (ref 65–140)
HCT VFR BLD AUTO: 30.4 % (ref 36.5–49.3)
HGB BLD-MCNC: 9.9 G/DL (ref 12–17)
IMM GRANULOCYTES # BLD AUTO: 0.04 THOUSAND/UL (ref 0–0.2)
IMM GRANULOCYTES NFR BLD AUTO: 1 % (ref 0–2)
LYMPHOCYTES # BLD AUTO: 1.93 THOUSANDS/ÂΜL (ref 0.6–4.47)
LYMPHOCYTES NFR BLD AUTO: 23 % (ref 14–44)
MAGNESIUM SERPL-MCNC: 1.8 MG/DL (ref 1.9–2.7)
MCH RBC QN AUTO: 33.2 PG (ref 26.8–34.3)
MCHC RBC AUTO-ENTMCNC: 32.6 G/DL (ref 31.4–37.4)
MCV RBC AUTO: 102 FL (ref 82–98)
MONOCYTES # BLD AUTO: 1.44 THOUSAND/ÂΜL (ref 0.17–1.22)
MONOCYTES NFR BLD AUTO: 17 % (ref 4–12)
NEUTROPHILS # BLD AUTO: 4.62 THOUSANDS/ÂΜL (ref 1.85–7.62)
NEUTS SEG NFR BLD AUTO: 54 % (ref 43–75)
NRBC BLD AUTO-RTO: 0 /100 WBCS
PHOSPHATE SERPL-MCNC: 3.3 MG/DL (ref 2.3–4.1)
PLATELET # BLD AUTO: 307 THOUSANDS/UL (ref 149–390)
PMV BLD AUTO: 8.5 FL (ref 8.9–12.7)
POTASSIUM SERPL-SCNC: 4.4 MMOL/L (ref 3.5–5.3)
RBC # BLD AUTO: 2.98 MILLION/UL (ref 3.88–5.62)
SODIUM SERPL-SCNC: 129 MMOL/L (ref 135–147)
WBC # BLD AUTO: 8.39 THOUSAND/UL (ref 4.31–10.16)

## 2023-07-02 PROCEDURE — 99024 POSTOP FOLLOW-UP VISIT: CPT | Performed by: PODIATRIST

## 2023-07-02 PROCEDURE — 84100 ASSAY OF PHOSPHORUS: CPT | Performed by: INTERNAL MEDICINE

## 2023-07-02 PROCEDURE — 99232 SBSQ HOSP IP/OBS MODERATE 35: CPT | Performed by: HOSPITALIST

## 2023-07-02 PROCEDURE — 97535 SELF CARE MNGMENT TRAINING: CPT

## 2023-07-02 PROCEDURE — 80048 BASIC METABOLIC PNL TOTAL CA: CPT

## 2023-07-02 PROCEDURE — 85025 COMPLETE CBC W/AUTO DIFF WBC: CPT

## 2023-07-02 PROCEDURE — 97116 GAIT TRAINING THERAPY: CPT

## 2023-07-02 PROCEDURE — 82948 REAGENT STRIP/BLOOD GLUCOSE: CPT

## 2023-07-02 PROCEDURE — 83735 ASSAY OF MAGNESIUM: CPT | Performed by: INTERNAL MEDICINE

## 2023-07-02 PROCEDURE — 97163 PT EVAL HIGH COMPLEX 45 MIN: CPT

## 2023-07-02 RX ADMIN — HEPARIN SODIUM 5000 UNITS: 5000 INJECTION INTRAVENOUS; SUBCUTANEOUS at 21:59

## 2023-07-02 RX ADMIN — CEFTRIAXONE 2000 MG: 2 INJECTION, SOLUTION INTRAVENOUS at 06:01

## 2023-07-02 RX ADMIN — HEPARIN SODIUM 5000 UNITS: 5000 INJECTION INTRAVENOUS; SUBCUTANEOUS at 06:01

## 2023-07-02 RX ADMIN — OXYCODONE HYDROCHLORIDE 15 MG: 5 TABLET ORAL at 06:43

## 2023-07-02 RX ADMIN — HYDROMORPHONE HYDROCHLORIDE 1 MG: 1 INJECTION, SOLUTION INTRAMUSCULAR; INTRAVENOUS; SUBCUTANEOUS at 11:41

## 2023-07-02 RX ADMIN — ATORVASTATIN CALCIUM 40 MG: 40 TABLET, FILM COATED ORAL at 14:33

## 2023-07-02 RX ADMIN — OXYCODONE HYDROCHLORIDE 15 MG: 5 TABLET ORAL at 20:10

## 2023-07-02 RX ADMIN — HYDROMORPHONE HYDROCHLORIDE 1 MG: 1 INJECTION, SOLUTION INTRAMUSCULAR; INTRAVENOUS; SUBCUTANEOUS at 00:05

## 2023-07-02 RX ADMIN — HYDROMORPHONE HYDROCHLORIDE 1 MG: 1 INJECTION, SOLUTION INTRAMUSCULAR; INTRAVENOUS; SUBCUTANEOUS at 21:59

## 2023-07-02 RX ADMIN — OXYCODONE HYDROCHLORIDE 15 MG: 5 TABLET ORAL at 16:08

## 2023-07-02 RX ADMIN — VANCOMYCIN HYDROCHLORIDE 1500 MG: 5 INJECTION, POWDER, LYOPHILIZED, FOR SOLUTION INTRAVENOUS at 11:13

## 2023-07-02 RX ADMIN — HEPARIN SODIUM 5000 UNITS: 5000 INJECTION INTRAVENOUS; SUBCUTANEOUS at 14:24

## 2023-07-02 RX ADMIN — LISINOPRIL 10 MG: 10 TABLET ORAL at 08:02

## 2023-07-02 RX ADMIN — OXYCODONE HYDROCHLORIDE 15 MG: 5 TABLET ORAL at 02:22

## 2023-07-02 RX ADMIN — OXYCODONE HYDROCHLORIDE 15 MG: 5 TABLET ORAL at 10:50

## 2023-07-02 RX ADMIN — NICOTINE 21 MG: 21 PATCH, EXTENDED RELEASE TRANSDERMAL at 08:02

## 2023-07-02 RX ADMIN — HYDROMORPHONE HYDROCHLORIDE 1 MG: 1 INJECTION, SOLUTION INTRAMUSCULAR; INTRAVENOUS; SUBCUTANEOUS at 17:10

## 2023-07-02 RX ADMIN — METOPROLOL SUCCINATE 100 MG: 50 TABLET, EXTENDED RELEASE ORAL at 08:02

## 2023-07-02 RX ADMIN — HYDROMORPHONE HYDROCHLORIDE 1 MG: 1 INJECTION, SOLUTION INTRAMUSCULAR; INTRAVENOUS; SUBCUTANEOUS at 04:28

## 2023-07-02 NOTE — NURSING NOTE
Patients sugar was 70 at lunch, given orange juice. Sugar  At 1615 was 68, patient wanted a Pepsi. Sugar @ 1736 was 66 , patient wanted a Pepsi and a glass of orange juice. Patient only ate 25% of lunch and 25% of dinner trays.

## 2023-07-02 NOTE — PROGRESS NOTES
[facility]  Progress Note  Name: Mallorie Vega  MRN: [id number]  Unit/Bed#: -01 I Date of Admission: 6/29/2023   Date of Service: 7/2/2023 I Hospital Day: 3    Assessment/Plan   Sepsis [facility])  Assessment & Plan  · Meets sepsis criteria with WBC greater than 12 K, heart rate 109 with a likely source infection diabetic foot wound  · Continue vancomycin and ceftriaxone as above  · Sepsis resolved    Hyponatremia  Assessment & Plan  · Sodium stable, will continue to monitor    Stage 2 chronic kidney disease  Assessment & Plan  Lab Results   Component Value Date    EGFR 65 07/02/2023    EGFR 68 07/01/2023    EGFR 78 06/30/2023    CREATININE 1.19 07/02/2023    CREATININE 1.15 07/01/2023    CREATININE 1.02 06/30/2023     · Creatinine is currently at baseline, can continue to monitor    Type 1 diabetes mellitus with hyperglycemia [facility])  Assessment & Plan  Lab Results   Component Value Date    HGBA1C 7.9 (H) 03/31/2023       Recent Labs     07/02/23  0601 07/02/23  0757 07/02/23  1029 07/02/23  1233   POCGLU 103 122 124 70       Blood Sugar Average: Last 72 hrs:  (P) 220.48166   Please see note attached to insulin pump in place    Benign essential hypertension  Assessment & Plan  · BP stable  · Continue metoprolol and lisinopril   · Continue to monitor BP    Insulin pump in place  Assessment & Plan  · Transitioned off gtt back to pump. Mixed hyperlipidemia  Assessment & Plan  · Continue atorvastatin    * 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, with fat layer exposed [facility])  Assessment & Plan  Patient has diabetic foot infection. On 5/30 he had sesamoid bone removal due to diabetic foot infection. Since then he has been following with podiatry and wound care. He reported significant improvement in his wound appeared to be healing, however this Sunday his wound became red, had discharge, and became very tender.   He saw Dr. Manolo Landry in office yesterday who recommended he come to the hospital for an MRI and likely amputation of left midfoot. · Wound culture MRSA  · MRI foot with abscesses and osteomyelitis  · X-ray of foot showed osseous abnormalities and cannot rule out osteomyelitis  · Podiatry following, post TMA on   · Continue vancomycin and ceftriaxone pending infectious disease final recommendations loading length of therapy              VTE  Prophylaxis:   Pharmacologic: in place  Mechanical VTE Prophylaxis in Place: Yes    Patient Centered Rounds: I have performed bedside rounds with nursing staff today. Discussions with Specialists or Other Care Team Provider: case management    Education and Discussions with Family / Patient: pt      Current Length of Stay: 3 day(s)    Current Patient Status: Inpatient        Code Status: Level 1 - Full Code    Discharge Plan: Pt will require continued inpatient hospitalization. Subjective:     Pt denies complaints at this time  Pain control adequate but not exceptional  Afebrile. Patient is seen and examined at bedside. All other ROS are negative. Objective:     Vitals:   Temp (24hrs), Av.9 °F (36.6 °C), Min:97.7 °F (36.5 °C), Max:98.2 °F (36.8 °C)    Temp:  [97.7 °F (36.5 °C)-98.2 °F (36.8 °C)] 98.2 °F (36.8 °C)  HR:  [90-98] 98  Resp:  [16] 16  BP: (108-136)/(67-82) 130/82  SpO2:  [87 %-99 %] 99 %  Body mass index is 18.99 kg/m². Input and Output Summary (last 24 hours): Intake/Output Summary (Last 24 hours) at 2023 1307  Last data filed at 2023 1141  Gross per 24 hour   Intake 1396 ml   Output 1400 ml   Net -4 ml       Physical Exam:       GEN: No acute distress, comfortable  HEEENT: No JVD, PERRLA, no scleral icterus  RESP: Lungs clear to auscultation bilaterally  CV: RRR, +s1/s2   ABD: SOFT NON TENDER, POSITIVE BOWEL SOUNDS, NO DISTENTION  PSYCH: CALM  NEURO: Mentation baseline, NO FOCAL DEFICITS  SKIN: NO RASH  EXTREM: NO EDEMA, TMA site dressed.     Additional Data: Labs:    Results from last 7 days   Lab Units 07/02/23  0436   WBC Thousand/uL 8.39   HEMOGLOBIN g/dL 9.9*   HEMATOCRIT % 30.4*   PLATELETS Thousands/uL 307   NEUTROS PCT % 54   LYMPHS PCT % 23   MONOS PCT % 17*   EOS PCT % 4     Results from last 7 days   Lab Units 07/02/23  0436 07/01/23  0510 06/30/23  0511   SODIUM mmol/L 129*   < > 131*   POTASSIUM mmol/L 4.4   < > 4.6   CHLORIDE mmol/L 99   < > 98   CO2 mmol/L 25   < > 26   BUN mg/dL 9   < > 14   CREATININE mg/dL 1.19   < > 1.02   ANION GAP mmol/L 5   < > 7   CALCIUM mg/dL 8.4   < > 8.6   ALBUMIN g/dL  --   --  3.1*   TOTAL BILIRUBIN mg/dL  --   --  0.60   ALK PHOS U/L  --   --  93   ALT U/L  --   --  7   AST U/L  --   --  9*   GLUCOSE RANDOM mg/dL 53*   < > 185*    < > = values in this interval not displayed. Results from last 7 days   Lab Units 06/29/23  0906   INR  0.93     Results from last 7 days   Lab Units 07/02/23  1233 07/02/23  1029 07/02/23  0757 07/02/23  0601 07/02/23  0417 07/02/23  0204 07/01/23  2354 07/01/23  2207 07/01/23  2049 07/01/23  2008 07/01/23  1801 07/01/23  1617   POC GLUCOSE mg/dl 70 124 122 103 63* 101 86 96 79 51* 112 62*         Results from last 7 days   Lab Units 06/29/23  1052 06/29/23  0906   LACTIC ACID mmol/L  --  1.8   PROCALCITONIN ng/ml 0.15  --        Lines/Drains:  Invasive Devices     Peripheral Intravenous Line  Duration           Peripheral IV 06/29/23 Right Antecubital 3 days          Line  Duration           Pump Device Insulin pump Anterior; Left Abdomen 2 days                Telemetry:        * I Have Reviewed All Lab Data Listed Above. Imaging:     No results found for this or any previous visit. No results found for this or any previous visit. *I have reviewed all imaging reports listed above      Recent Cultures (last 7 days):     Results from last 7 days   Lab Units 06/30/23  1452 06/29/23  1052 06/29/23  0906 06/28/23  1403   BLOOD CULTURE   --  No Growth at 48 hrs.  No Growth at 48 hrs.  --    GRAM STAIN RESULT  Rare Polys*  Rare Gram positive rods*  No Polys or Bacteria seen  --   --  2+ Polys  No bacteria seen   WOUND CULTURE  2 colonies Staphylococcus aureus*  1 colony Staphylococcus aureus*  --   --  1+ Growth of Methicillin Resistant Staphylococcus aureus*  2+ Growth of       Last 24 Hours Medication List:   Current Facility-Administered Medications   Medication Dose Route Frequency Provider Last Rate   • acetaminophen  650 mg Oral Q6H PRN Sandra Mayer DPM     • atorvastatin  40 mg Oral Daily Sandra Mayer DPM     • cefTRIAXone  2,000 mg Intravenous Q24H Justine Beach MD 2,000 mg (07/02/23 0601)   • heparin (porcine)  5,000 Units Subcutaneous [facility] Feliberto Shoulders, DPM     • HYDROmorphone  1 mg Intravenous Q3H PRN Mariama Tsai MD     • insulin lispro  25 Units Subcutaneous Insulin Pump Daily PRN Sandra Mayer DPM     • insulin lispro  300 Units Subcutaneous Insulin Pump Daily PRN Kay Arguelles DO     • lisinopril  10 mg Oral Daily Sandra Mayer DPM     • metoprolol succinate  100 mg Oral Daily Sandra Mayer DPM     • nicotine  21 mg Transdermal Daily Sandra Mayer DPM     • nicotine polacrilex  2 mg Oral Q2H PRN Sandra Mayer DPM     • ondansetron  4 mg Intravenous Q6H PRN Sandra Mayer DPM     • oxyCODONE  10 mg Oral Q4H PRN Mariama Tsai MD     • oxyCODONE  15 mg Oral Q4H PRN Mariama Tsai MD     • patient maintained insulin pump  1 each Subcutaneous Q8H Kay Arguelles DO     • vancomycin  1,500 mg Intravenous Q24H Mariama Tsai MD 1,500 mg (07/01/23 1216)        Today, Patient Was Seen By: Albina Bustillo MD    ** Please Note: Dictation voice to text software may have been used in the creation of this document.  **

## 2023-07-02 NOTE — PHYSICAL THERAPY NOTE
PHYSICAL THERAPY EVALUATION  NAME: Ashley Bernstein  AGE:   66 y.o. MRN:  [id number]  ADMIT DX: Foot ulcer ([address]) [O79.356]  Wound infection [T14. 8XXA, L08.9]  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, with fat layer exposed ([address]) [L61.671, L97.422]  Hypoglycemia unawareness associated with type 1 diabetes mellitus ([address]) [E10.649]    PMH:   Past Medical History:   Diagnosis Date    Chronic foot ulcer ([address]) 09/07/2018    Diabetes mellitus ([address])     Gout     High cholesterol     Hypertension     Visual impairment 11/1/2022    [employer]     LENGTH OF STAY: 3       07/02/23 0941   PT Last Visit   PT Visit Date 07/02/23   Note Type   Note type Evaluation   Pain Assessment   Pain Assessment Tool 0-10   Pain Score 8   Pain Location/Orientation Orientation: Left; Location: Foot   Hospital Pain Intervention(s) Repositioned; Ambulation/increased activity   Restrictions/Precautions   Weight Bearing Precautions Per Order Yes   LLE Weight Bearing Per Order (S)  NWB   Other Precautions Contact/isolation; Impulsive;WBS;Fall Risk;Pain   Home Living   Type of [address] [place] [address] Dr Two level;Stairs to enter with rails  (bilevel home with 1 KASSY and 5 stairs up to main living floor and 5 stairs down to basement)   Bathroom Shower/Tub Tub/shower unit   [employer] Standard   Additional Comments Ambulates independently without AD at baseline. Pt was most recently using a toe offloading shoe in LLE. Prior Function   Level of [place] Independent with ADLs; Independent with functional mobility; Independent with IADLS   Lives With Spouse  (works full time outside of home)   IADLs Independent with driving; Independent with meal prep; Independent with medication management   Falls in the last 6 months 0   General   Additional Pertinent History s/p L TMA   Family/Caregiver Present No   Cognition   Overall Cognitive Status WFL   Arousal/Participation Cooperative   Orientation Level Oriented X4   Memory Within functional limits   Following Commands Follows one step commands without difficulty   Comments Pt identified by name and . Subjective   Subjective Agrees to PT evaluation and is cooperative throughout session. Pt becoming emotional at times during session while talking about his TMA.  "Trust me, I'm going to be as compliant as possible."   RLE Assessment   RLE Assessment X   Strength RLE   RLE Overall Strength 4+/5  (functionally)   LLE Assessment   LLE Assessment X   Strength LLE   LLE Overall Strength 4-/5  (functionally)   Bed Mobility   Supine to Sit Unable to assess  (sitting EOB pre/post session)   Additional Comments Pt initially found standing at side of bed, holding bedrail while using urinal.  Pt weight bearing on LLE in stance. Education provided and pt reports willingness to comply. Transfers   Sit to Stand 5  Supervision   Additional items Verbal cues; Impulsive  (hand placement)   Stand to Sit 5  Supervision   Additional items Verbal cues  (hand placement)   Additional Comments Pt able to maintain NWB LLE during transfers. Ambulation/Elevation   Gait pattern Improper Weight shift;Decreased foot clearance; Short stride; Excessively slow  ("hopping" method to maintain LLE NWB)   Gait Assistance 5  Supervision   Additional items Verbal cues   Assistive Device Rolling walker   Distance 25` x1   Stair Management Assistance Not tested  (Education provided on stair negotiation; see treatment note.)   Ambulation/Elevation Additional Comments Pt at times letting LLE graze the floor as if he was maintaining TTWB. Education provided that the LLE should be completely off of floor to comply with NWB.    Balance   Static Sitting Normal   Dynamic Sitting Good   Static Standing Fair +   Dynamic Standing Fair   Ambulatory Fair -   Endurance Deficit   Endurance Deficit Yes   Endurance Deficit Description limited ambulation distance, fatigue   Activity Tolerance   Activity Tolerance Patient limited by fatigue;Patient limited by pain   Nurse Made Aware Per RN, pt appropriate to evaluate   Assessment   Problem List Decreased strength;Decreased endurance; Impaired balance;Decreased mobility; Decreased safety awareness;Decreased skin integrity;Orthopedic restrictions;Pain   Assessment Pt is a 66 y.o. male seen for PT evaluation s/p admit to [facility] on 8/18/2022 w/ 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, with fat layer exposed ([address] Order placed for PT. Comorbidities affecting pt's physical performance at time of assessment include: DM, HTN and chronic fatigue syndrome. Personal factors affecting pt at time of IE include: multi-level environment, limited home support, inability to perform IADLs and limited insight into impairments. Prior to admission, pt was was independent w/ all functional mobility w/ out AD, lived in multi-level home and lived with wife. Upon evaluation: Pt requires supervision for sit to stand and supervision for ambulation with RW. (Please find full objective findings from PT assessment regarding body systems outlined above). Impairments and limitations also listed above, especially due to  weakness, impaired balance, decreased endurance, gait deviations, pain, decreased activity tolerance, decreased safety awareness, fall risk, orthopedic restrictions and decreased skin integrity. Pt's clinical presentation is currently unstable/unpredictable seen in pt's presentation of fall risk, new WBS, mild impulsivity, and recent TMA. Pt to benefit from continued skilled PT tx while in hospital and upon DC to address deficits as defined above and maximize level of functional mobility. Recommend progression of ambulation and stair training as appropriate.    Goals   Patient Goals to go home, get back to my dog   STG Expiration Date 07/12/23   Short Term Goal #1 Pt will be able to: (1) perform bed mobility with mod I to decrease caregiver burden (2) perform sit to stand with mod I to increase level of independence and promote safe toiletting and transfers (3) ambulate at least 200` with mod I and least restrictive AD to increase activity tolerance and allow for safe community mobilization (4) increase standing balance by 1 grade to decrease risk of falls (5) negotiate at least 5 stairs with supervision while maintaining NWB LLE to allow safe access in and out of home   PT Treatment Day 1   Plan   Treatment/Interventions Functional transfer training;LE strengthening/ROM; Elevations; Therapeutic exercise; Endurance training;Patient/family training;Equipment eval/education; Bed mobility;Gait training   PT Frequency 3-5x/wk   Recommendation   UB Rehab Discharge Recommendation (PT/OT) Level 3   Equipment Recommended Walker   Walker Package Recommended Wheeled walker   Additional Comments shower chair   AM-PAC Basic Mobility Inpatient   Turning in Flat Bed Without Bedrails 4   Lying on Back to Sitting on Edge of Flat Bed Without Bedrails 3   Moving Bed to Chair 3   Standing Up From Chair Using Arms 3   Walk in Room 3   Climb 3-5 Stairs With Railing 3   Basic Mobility Inpatient Raw Score 19   Basic Mobility Standardized Score 42.48   Highest Level Of Mobility   JH-HLM Goal 6: Walk 10 steps or more   JH-HLM Achieved 7: Walk 25 feet or more   Additional Treatment Session   Start Time 0915   End Time 0941   Treatment Assessment Pt agrees to further mobility and treatment. Able to perform sit to stand transfer from low surface toilet with supervision and use of grab bar. Able to ambulate an additional ~25` x1 with supervision and use of RW. Pt able to maintain NWB LLE with 1-2 brief periods of foot grazing the floor. Education reinforced about complete NWB orders. Education provided on use and precautions of RW and emotional support given as needed.   Demonstration and extensive education provided on stair negotiation and technique of "bumping" up/down in order to maintain NWB LLE.  Recommendations made for an additional RW to be left on each level as well as an arm chair near 2nd floor stairwell to assist with floor to standing transfer. Will continue to benefit from ongoing skilled PT to maximize his functional mobility and increase his level of independence. Equipment Use RW   Additional Treatment Day 1   End of Consult   Patient Position at End of Consult Seated edge of bed; All needs within reach     The patient's AM-PAC Basic Mobility Inpatient Short Form Raw Score is 19, Standardized Score is 42.48. A Raw Score of greater than or equal to 16 suggests the patient may benefit from discharge to home. However please refer to therapist recommendation for discharge planning given other factors that may influence destination. Adapted from [facility] of AM-[facility] “6-Clicks” Basic Mobility and Daily Activity Scores With Discharge Destination. Physical Therapy, 2021;101:1-9.  DOI: [id number]      Raysa Greer PT,DPT

## 2023-07-02 NOTE — PROGRESS NOTES
Progress Note - Podiatry  Jeannette Chandra 66 y.o. male MRN: [id number]  Unit/Bed#: -01 Encounter: [id number]    Assessment/Plan:    Sepsis/cellulitis left foot/septic arthritis/osteomyelitis  • Continue with current IV antibiotics, will discuss long-term antibiotic plans with infectious disease. Due to the extensive nature of his infection I would at a minimum like to cover him with antibiotics until he is completely healed with suture removal which would be about 3 weeks from today. • Infectious disease consult ordered  • Wound 6/28/2023 culture growing MRSA, OR cultures pending  • MRI showed osteomyelitis of the first metatarsal, second metatarsal and third metatarsal with extensive infection and abscess formation within the first interspace. • WBC 8.39    Diabetic foot ulceration with necrosis to bone (osteomyelitis left first metatarsal) -Veloz 3  • POD #2 S/P TMA LFT  • Complete resection of the entire first metatarsal LFTwas performed with the procedure. • DSD with Adaptic gauze ABD Kerlix and Ace wrap applied. • PT/OT to work with nonweightbearing/crutches     Pressure ulcer left foot unstageable  • Resolved with surgery     Hyponatremia, CKD, type 1 diabetes with hyperglycemia, type 1 diabetes with peripheral neuropathy, benign hypertension, mixed hyperlipidemia  • Managed per internal medicine    Subjective/Objective   Chief Complaint:   Chief Complaint   Patient presents with   • Wound Infection     Patient presents to the ED with c/o left foot infection, states he had surgery on foot in may and states that Monday visiting nurse noticed it was red and swollen, states went to wound care yesterday and wanted patient admitted for infection        Subjective: 66year-old type I diabetic postop day #2 status post TMA, reports much less pain today than yesterday. Denies any shortness of breath or increasing pain/discomfort. Blood pressure 130/82, pulse 98, temperature 98.2 °F (36.8 °C), resp.  rate 16, height 6' (1.829 m), weight 63.5 kg (140 lb), SpO2 99 %. ,Body mass index is 18.99 kg/m². Invasive Devices     Peripheral Intravenous Line  Duration           Peripheral IV 06/29/23 Right Antecubital 3 days          Line  Duration           Pump Device Insulin pump Anterior; Left Abdomen 2 days                Physical Exam:   General appearance: alert, cooperative and no distress  Neuro/Vascular: Grossly intact at baseline with no deficiencies, unchanged from preoperative state. Extremity: Surgical dressing intact with bloody strikethrough drainage beneath ABDs, no active drainage noted upon removal of current dressing. No maceration or signs of active necrosis, good capillary filling of each respective flap. Diminished edema erythema and calor. Labs and other studies:   CBC w/diff  Results from last 7 days   Lab Units 07/02/23  0436   WBC Thousand/uL 8.39   HEMOGLOBIN g/dL 9.9*   HEMATOCRIT % 30.4*   PLATELETS Thousands/uL 307   NEUTROS PCT % 54   LYMPHS PCT % 23   MONOS PCT % 17*   EOS PCT % 4     BMP  Results from last 7 days   Lab Units 07/02/23  0436   POTASSIUM mmol/L 4.4   CHLORIDE mmol/L 99   CO2 mmol/L 25   BUN mg/dL 9   CREATININE mg/dL 1.19   CALCIUM mg/dL 8.4     CMP  Results from last 7 days   Lab Units 07/02/23  0436 07/01/23  0510 06/30/23  0511   POTASSIUM mmol/L 4.4   < > 4.6   CHLORIDE mmol/L 99   < > 98   CO2 mmol/L 25   < > 26   BUN mg/dL 9   < > 14   CREATININE mg/dL 1.19   < > 1.02   CALCIUM mg/dL 8.4   < > 8.6   ALK PHOS U/L  --   --  93   ALT U/L  --   --  7   AST U/L  --   --  9*    < > = values in this interval not displayed. @Culture@  Lab Results   Component Value Date    BLOODCX No Growth at 48 hrs. 06/29/2023    BLOODCX No Growth at 48 hrs. 06/29/2023    BLOODCX No Growth After 5 Days. 05/29/2023    BLOODCX No Growth After 5 Days.  05/29/2023     Lab Results   Component Value Date    WOUNDCULT 1 colony Staphylococcus aureus (A) 06/30/2023    WOUNDCULT 2 colonies Staphylococcus aureus (A) 06/30/2023         Current Facility-Administered Medications:   •  acetaminophen (TYLENOL) tablet 650 mg, 650 mg, Oral, Q6H PRN, Eyanjume , DPM  •  atorvastatin (LIPITOR) tablet 40 mg, 40 mg, Oral, Daily, Eyvonne , DPM, 40 mg at 07/01/23 1430  •  cefTRIAXone (ROCEPHIN) IVPB (premix in dextrose) 2,000 mg 50 mL, 2,000 mg, Intravenous, Q24H, Jeff Christie MD, Last Rate: 100 mL/hr at 07/02/23 0601, 2,000 mg at 07/02/23 0601  •  heparin (porcine) subcutaneous injection 5,000 Units, 5,000 Units, Subcutaneous, Q8H [address], Gina Allen DPM, 5,000 Units at 07/02/23 0601  •  HYDROmorphone (DILAUDID) injection 1 mg, 1 mg, Intravenous, Q3H PRN, Lyle Rojo MD, 1 mg at 07/02/23 1141  •  insulin lispro (HumaLOG) FOR PUMP REFILLS 25 Units, 25 Units, Subcutaneous Insulin Pump, Daily PRN, Placido Bynum, DPM  •  insulin lispro (HumaLOG) FOR PUMP REFILLS 300 Units, 300 Units, Subcutaneous Insulin Pump, Daily PRN, Nikolay Roger DO  •  lisinopril (ZESTRIL) tablet 10 mg, 10 mg, Oral, Daily, Eyvonne , DPM, 10 mg at 07/02/23 1803  •  metoprolol succinate (TOPROL-XL) 24 hr tablet 100 mg, 100 mg, Oral, Daily, Eyvojanicee , DPM, 100 mg at 07/02/23 3403  •  nicotine (NICODERM CQ) 21 mg/24 hr TD 24 hr patch 21 mg, 21 mg, Transdermal, Daily, Eyvojanicee , DPM, 21 mg at 07/02/23 0005  •  nicotine polacrilex (NICORETTE) gum 2 mg, 2 mg, Oral, Q2H PRN, Eyvojanicee , DPM  •  ondansetron [facility]) injection 4 mg, 4 mg, Intravenous, Q6H PRN, Eyvojanicee , DPM  •  oxyCODONE (ROXICODONE) IR tablet 10 mg, 10 mg, Oral, Q4H PRN, Lyle Rojo MD  •  oxyCODONE (ROXICODONE) IR tablet 15 mg, 15 mg, Oral, Q4H PRN, Lyle Rojo MD, 15 mg at 07/02/23 1050  •  PATIENT MAINTAINED INSULIN PUMP 1 each, 1 each, Subcutaneous, Q8H, Nikolay Roger DO, 1 each at 07/02/23 0601  •  vancomycin (VANCOCIN) 1500 mg in sodium chloride 0.9% 250 mL IVPB, 1,500 mg, Intravenous, Q24H, Juanito Darnell MD, Last Rate: 167.5 mL/hr at 07/01/23 1216, 1,500 mg at 07/02/23 1113    Imaging: I have personally reviewed pertinent films in PACS  EKG, Pathology, and Other Studies: I have personally reviewed pertinent reports.   VTE Pharmacologic Prophylaxis: Heparin  VTE Mechanical Prophylaxis: sequential compression device    Yaneli Adkins DPM

## 2023-07-02 NOTE — PROGRESS NOTES
Jaimee Parr is a 66 y.o. male who is currently ordered Vancomycin IV with management by the Pharmacy Consult service. Relevant clinical data and objective / subjective history reviewed. Vancomycin Assessment:  Indication and Goal AUC/Trough: Bone/joint infection (goal -600, trough >10)  Clinical Status: stable  Micro:     Renal Function:  SCr: 1.19 mg/dL  CrCl: 58.5 mL/min  Renal replacement: Not on dialysis  Days of Therapy: 4  Current Dose: 1500mg every 24 hours  Vancomycin Plan:  New Dosing: No change  Estimated AUC: 544 mcg*hr/mL  Estimated Trough: 15 mcg/mL  Next Level: 7/4/23 at 1100  Renal Function Monitoring: Daily BMP and [place] will continue to follow closely for s/sx of nephrotoxicity, infusion reactions and appropriateness of therapy. BMP and CBC will be ordered per protocol. We will continue to follow the patient’s culture results and clinical progress daily.     Saniya Gary, Pharmacist, PharmD, BCPS Split-Thickness Skin Graft Text: The defect edges were debeveled with a #15 scalpel blade.  Given the location of the defect, shape of the defect and the proximity to free margins a split thickness skin graft was deemed most appropriate.  Using a sterile surgical marker, the primary defect shape was transferred to the donor site. The split thickness graft was then harvested.  The skin graft was then placed in the primary defect and oriented appropriately.

## 2023-07-02 NOTE — PLAN OF CARE
Problem: PAIN - ADULT  Goal: Verbalizes/displays adequate comfort level or baseline comfort level  Description: Interventions:  - Encourage patient to monitor pain and request assistance  - Assess pain using appropriate pain scale  - Administer analgesics based on type and severity of pain and evaluate response  - Implement non-pharmacological measures as appropriate and evaluate response  - Consider cultural and social influences on pain and pain management  - Notify physician/advanced practitioner if interventions unsuccessful or patient reports new pain  Outcome: Progressing     Problem: INFECTION - ADULT  Goal: Absence or prevention of progression during hospitalization  Description: INTERVENTIONS:  - Assess and monitor for signs and symptoms of infection  - Monitor lab/diagnostic results  - Monitor all insertion sites, i.e. indwelling lines, tubes, and drains  - Monitor endotracheal if appropriate and nasal secretions for changes in amount and color  - [place] appropriate cooling/warming therapies per order  - Administer medications as ordered  - Instruct and encourage patient and family to use good hand hygiene technique  - Identify and instruct in appropriate isolation precautions for identified infection/condition  Outcome: Progressing  Goal: Absence of fever/infection during neutropenic period  Description: INTERVENTIONS:  - Monitor WBC    Outcome: Progressing     Problem: SAFETY ADULT  Goal: Patient will remain free of falls  Description: INTERVENTIONS:  - Educate patient/family on patient safety including physical limitations  - Instruct patient to call for assistance with activity   - Consult OT/PT to assist with strengthening/mobility   - Keep Call bell within reach  - Keep bed low and locked with side rails adjusted as appropriate  - Keep care items and personal belongings within reach  - Initiate and maintain comfort rounds  - Make Fall Risk Sign visible to staff  - Offer Toileting every 2 Hours, in advance of need  - Initiate/Maintain bed/ chair alarm  - Obtain necessary fall risk management equipment: walker  - Apply yellow socks and bracelet for high fall risk patients  - Consider moving patient to room near nurses station  Outcome: Progressing  Goal: Maintain or return to baseline ADL function  Description: INTERVENTIONS:  -  Assess patient's ability to carry out ADLs; assess patient's baseline for ADL function and identify physical deficits which impact ability to perform ADLs (bathing, care of mouth/teeth, toileting, grooming, dressing, etc.)  - Assess/evaluate cause of self-care deficits   - Assess range of motion  - Assess patient's mobility; develop plan if impaired  - Assess patient's need for assistive devices and provide as appropriate  - Encourage maximum independence but intervene and supervise when necessary  - Involve family in performance of ADLs  - Assess for home care needs following discharge   - Consider OT consult to assist with ADL evaluation and planning for discharge  - Provide patient education as appropriate  Outcome: Progressing  Goal: Maintains/Returns to pre admission functional level  Description: INTERVENTIONS:  - Perform BMAT or MOVE assessment daily.   - Set and communicate daily mobility goal to care team and patient/family/caregiver. - Collaborate with rehabilitation services on mobility goals if consulted  - Perform Range of Motion 3 times a day. - Reposition patient every 2 hours.   - Dangle patient 3 times a day  - Stand patient 3 times a day  - Ambulate patient 3 times a day  - Out of bed to chair 3 times a day   - Out of bed for meals 3 times a day  - Out of bed for toileting  - Record patient progress and toleration of activity level   Outcome: Progressing     Problem: DISCHARGE PLANNING  Goal: Discharge to home or other facility with appropriate resources  Description: INTERVENTIONS:  - Identify barriers to discharge w/patient and caregiver  - Arrange for needed discharge resources and transportation as appropriate  - Identify discharge learning needs (meds, wound care, etc.)  - Arrange for interpretive services to assist at discharge as needed  - Refer to Case Management Department for coordinating discharge planning if the patient needs post-hospital services based on physician/advanced practitioner order or complex needs related to functional status, cognitive ability, or social support system  Outcome: Progressing     Problem: Knowledge Deficit  Goal: Patient/family/caregiver demonstrates understanding of disease process, treatment plan, medications, and discharge instructions  Description: Complete learning assessment and assess knowledge base. Interventions:  - Provide teaching at level of understanding  - Provide teaching via preferred learning methods  Outcome: Progressing     Problem: Nutrition/Hydration-ADULT  Goal: Nutrient/Hydration intake appropriate for improving, restoring or maintaining nutritional needs  Description: Monitor and assess patient's nutrition/hydration status for malnutrition. Collaborate with interdisciplinary team and initiate plan and interventions as ordered. Monitor patient's weight and dietary intake as ordered or per policy. Utilize nutrition screening tool and intervene as necessary. Determine patient's food preferences and provide high-protein, high-caloric foods as appropriate.      INTERVENTIONS:  - Monitor oral intake, urinary output, labs, and treatment plans  - Assess nutrition and hydration status and recommend course of action  - Evaluate amount of meals eaten  - Assist patient with eating if necessary   - Allow adequate time for meals  - Recommend/ encourage appropriate diets, oral nutritional supplements, and vitamin/mineral supplements  - Order, calculate, and assess calorie counts as needed  - Recommend, monitor, and adjust tube feedings and TPN/PPN based on assessed needs  - Assess need for intravenous fluids  - Provide specific nutrition/hydration education as appropriate  - Include patient/family/caregiver in decisions related to nutrition  Outcome: Progressing     Problem: MOBILITY - ADULT  Goal: Maintain or return to baseline ADL function  Description: INTERVENTIONS:  -  Assess patient's ability to carry out ADLs; assess patient's baseline for ADL function and identify physical deficits which impact ability to perform ADLs (bathing, care of mouth/teeth, toileting, grooming, dressing, etc.)  - Assess/evaluate cause of self-care deficits   - Assess range of motion  - Assess patient's mobility; develop plan if impaired  - Assess patient's need for assistive devices and provide as appropriate  - Encourage maximum independence but intervene and supervise when necessary  - Involve family in performance of ADLs  - Assess for home care needs following discharge   - Consider OT consult to assist with ADL evaluation and planning for discharge  - Provide patient education as appropriate  Outcome: Progressing  Goal: Maintains/Returns to pre admission functional level  Description: INTERVENTIONS:  - Perform BMAT or MOVE assessment daily.   - Set and communicate daily mobility goal to care team and patient/family/caregiver. - Collaborate with rehabilitation services on mobility goals if consulted  - Perform Range of Motion 3 times a day. - Reposition patient every 2 hours.   - Dangle patient 3 times a day  - Stand patient 3 times a day  - Ambulate patient 3 times a day  - Out of bed to chair 3 times a day   - Out of bed for meals 3 times a day  - Out of bed for toileting  - Record patient progress and toleration of activity level   Outcome: Progressing

## 2023-07-02 NOTE — PLAN OF CARE
Problem: PHYSICAL THERAPY ADULT  Goal: Performs mobility at highest level of function for planned discharge setting. See evaluation for individualized goals. Description: Treatment/Interventions: Functional transfer training, LE strengthening/ROM, Elevations, Therapeutic exercise, Endurance training, Patient/family training, Equipment eval/education, Bed mobility, Gait training  Equipment Recommended: Khari Lala       See flowsheet documentation for full assessment, interventions and recommendations. Note:    Problem List: Decreased strength, Decreased endurance, Impaired balance, Decreased mobility, Decreased safety awareness, Decreased skin integrity, Orthopedic restrictions, Pain  Assessment: Pt is a 66 y.o. male seen for PT evaluation s/p admit to [address] [place] [address] on 8/18/2022 w/ 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, with fat layer exposed ([address]). Order placed for PT. Comorbidities affecting pt's physical performance at time of assessment include: DM, HTN and chronic fatigue syndrome. Personal factors affecting pt at time of IE include: multi-level environment, limited home support, inability to perform IADLs and limited insight into impairments. Prior to admission, pt was was independent w/ all functional mobility w/ out AD, lived in multi-level home and lived with wife. Upon evaluation: Pt requires supervision for sit to stand and supervision for ambulation with RW. (Please find full objective findings from PT assessment regarding body systems outlined above). Impairments and limitations also listed above, especially due to  weakness, impaired balance, decreased endurance, gait deviations, pain, decreased activity tolerance, decreased safety awareness, fall risk, orthopedic restrictions and decreased skin integrity. Pt's clinical presentation is currently unstable/unpredictable seen in pt's presentation of fall risk, new WBS, mild impulsivity, and recent TMA.   Pt to benefit from continued skilled PT tx while in hospital and upon DC to address deficits as defined above and maximize level of functional mobility. Recommend progression of ambulation and stair training as appropriate. See flowsheet documentation for full assessment.

## 2023-07-02 NOTE — ASSESSMENT & PLAN NOTE
Lab Results   Component Value Date    HGBA1C 7.9 (H) 03/31/2023       Recent Labs     07/02/23  0601 07/02/23  0757 07/02/23  1029 07/02/23  1233   POCGLU 103 122 124 70       Blood Sugar Average: Last 72 hrs:  (P) 680.54822   Please see note attached to insulin pump in place

## 2023-07-02 NOTE — ASSESSMENT & PLAN NOTE
Lab Results   Component Value Date    EGFR 65 07/02/2023    EGFR 68 07/01/2023    EGFR 78 06/30/2023    CREATININE 1.19 07/02/2023    CREATININE 1.15 07/01/2023    CREATININE 1.02 06/30/2023     · Creatinine is currently at baseline, can continue to monitor

## 2023-07-03 LAB
ALBUMIN SERPL BCP-MCNC: 3.1 G/DL (ref 3.5–5)
ALP SERPL-CCNC: 253 U/L (ref 34–104)
ALT SERPL W P-5'-P-CCNC: 26 U/L (ref 7–52)
ANION GAP SERPL CALCULATED.3IONS-SCNC: 5 MMOL/L
AST SERPL W P-5'-P-CCNC: 59 U/L (ref 13–39)
BACTERIA SPEC ANAEROBE CULT: NORMAL
BACTERIA SPEC ANAEROBE CULT: NORMAL
BACTERIA WND AEROBE CULT: ABNORMAL
BACTERIA WND AEROBE CULT: ABNORMAL
BASOPHILS # BLD AUTO: 0.07 THOUSANDS/ÂΜL (ref 0–0.1)
BASOPHILS NFR BLD AUTO: 1 % (ref 0–1)
BILIRUB SERPL-MCNC: 0.29 MG/DL (ref 0.2–1)
BUN SERPL-MCNC: 6 MG/DL (ref 5–25)
CALCIUM ALBUM COR SERPL-MCNC: 9.5 MG/DL (ref 8.3–10.1)
CALCIUM SERPL-MCNC: 8.8 MG/DL (ref 8.4–10.2)
CHLORIDE SERPL-SCNC: 96 MMOL/L (ref 96–108)
CO2 SERPL-SCNC: 30 MMOL/L (ref 21–32)
CREAT SERPL-MCNC: 1.15 MG/DL (ref 0.6–1.3)
EOSINOPHIL # BLD AUTO: 0.44 THOUSAND/ÂΜL (ref 0–0.61)
EOSINOPHIL NFR BLD AUTO: 6 % (ref 0–6)
ERYTHROCYTE [DISTWIDTH] IN BLOOD BY AUTOMATED COUNT: 12.3 % (ref 11.6–15.1)
GFR SERPL CREATININE-BSD FRML MDRD: 68 ML/MIN/1.73SQ M
GLUCOSE SERPL-MCNC: 102 MG/DL (ref 65–140)
GLUCOSE SERPL-MCNC: 102 MG/DL (ref 65–140)
GLUCOSE SERPL-MCNC: 105 MG/DL (ref 65–140)
GLUCOSE SERPL-MCNC: 123 MG/DL (ref 65–140)
GLUCOSE SERPL-MCNC: 124 MG/DL (ref 65–140)
GLUCOSE SERPL-MCNC: 154 MG/DL (ref 65–140)
GLUCOSE SERPL-MCNC: 160 MG/DL (ref 65–140)
GLUCOSE SERPL-MCNC: 233 MG/DL (ref 65–140)
GLUCOSE SERPL-MCNC: 40 MG/DL (ref 65–140)
GLUCOSE SERPL-MCNC: 51 MG/DL (ref 65–140)
GLUCOSE SERPL-MCNC: 74 MG/DL (ref 65–140)
GLUCOSE SERPL-MCNC: 76 MG/DL (ref 65–140)
GLUCOSE SERPL-MCNC: 98 MG/DL (ref 65–140)
GRAM STN SPEC: ABNORMAL
HCT VFR BLD AUTO: 29.5 % (ref 36.5–49.3)
HGB BLD-MCNC: 9.7 G/DL (ref 12–17)
IMM GRANULOCYTES # BLD AUTO: 0.03 THOUSAND/UL (ref 0–0.2)
IMM GRANULOCYTES NFR BLD AUTO: 0 % (ref 0–2)
LYMPHOCYTES # BLD AUTO: 1.76 THOUSANDS/ÂΜL (ref 0.6–4.47)
LYMPHOCYTES NFR BLD AUTO: 24 % (ref 14–44)
MCH RBC QN AUTO: 33.4 PG (ref 26.8–34.3)
MCHC RBC AUTO-ENTMCNC: 32.9 G/DL (ref 31.4–37.4)
MCV RBC AUTO: 102 FL (ref 82–98)
MONOCYTES # BLD AUTO: 1.37 THOUSAND/ÂΜL (ref 0.17–1.22)
MONOCYTES NFR BLD AUTO: 18 % (ref 4–12)
NEUTROPHILS # BLD AUTO: 3.78 THOUSANDS/ÂΜL (ref 1.85–7.62)
NEUTS SEG NFR BLD AUTO: 51 % (ref 43–75)
NRBC BLD AUTO-RTO: 0 /100 WBCS
PLATELET # BLD AUTO: 339 THOUSANDS/UL (ref 149–390)
PMV BLD AUTO: 8.7 FL (ref 8.9–12.7)
POTASSIUM SERPL-SCNC: 4.7 MMOL/L (ref 3.5–5.3)
PROT SERPL-MCNC: 6.5 G/DL (ref 6.4–8.4)
RBC # BLD AUTO: 2.9 MILLION/UL (ref 3.88–5.62)
SODIUM SERPL-SCNC: 131 MMOL/L (ref 135–147)
WBC # BLD AUTO: 7.45 THOUSAND/UL (ref 4.31–10.16)

## 2023-07-03 PROCEDURE — 85025 COMPLETE CBC W/AUTO DIFF WBC: CPT | Performed by: HOSPITALIST

## 2023-07-03 PROCEDURE — 82948 REAGENT STRIP/BLOOD GLUCOSE: CPT

## 2023-07-03 PROCEDURE — 80053 COMPREHEN METABOLIC PANEL: CPT | Performed by: HOSPITALIST

## 2023-07-03 PROCEDURE — 99222 1ST HOSP IP/OBS MODERATE 55: CPT | Performed by: GENERAL PRACTICE

## 2023-07-03 PROCEDURE — 99232 SBSQ HOSP IP/OBS MODERATE 35: CPT | Performed by: HOSPITALIST

## 2023-07-03 PROCEDURE — 99232 SBSQ HOSP IP/OBS MODERATE 35: CPT | Performed by: INTERNAL MEDICINE

## 2023-07-03 RX ORDER — EPHEDRINE SULFATE/0.9% NACL/PF 25 MG/5 ML
SYRINGE (ML) INTRAVENOUS
Status: CANCELLED | OUTPATIENT
Start: 2023-07-03

## 2023-07-03 RX ADMIN — OXYCODONE HYDROCHLORIDE 15 MG: 5 TABLET ORAL at 12:33

## 2023-07-03 RX ADMIN — HYDROMORPHONE HYDROCHLORIDE 1 MG: 1 INJECTION, SOLUTION INTRAMUSCULAR; INTRAVENOUS; SUBCUTANEOUS at 15:51

## 2023-07-03 RX ADMIN — OXYCODONE HYDROCHLORIDE 15 MG: 5 TABLET ORAL at 00:14

## 2023-07-03 RX ADMIN — HYDROMORPHONE HYDROCHLORIDE 1 MG: 1 INJECTION, SOLUTION INTRAMUSCULAR; INTRAVENOUS; SUBCUTANEOUS at 19:25

## 2023-07-03 RX ADMIN — OXYCODONE HYDROCHLORIDE 15 MG: 5 TABLET ORAL at 17:56

## 2023-07-03 RX ADMIN — OXYCODONE HYDROCHLORIDE 15 MG: 5 TABLET ORAL at 08:30

## 2023-07-03 RX ADMIN — HYDROMORPHONE HYDROCHLORIDE 1 MG: 1 INJECTION, SOLUTION INTRAMUSCULAR; INTRAVENOUS; SUBCUTANEOUS at 11:43

## 2023-07-03 RX ADMIN — HEPARIN SODIUM 5000 UNITS: 5000 INJECTION INTRAVENOUS; SUBCUTANEOUS at 12:33

## 2023-07-03 RX ADMIN — ATORVASTATIN CALCIUM 40 MG: 40 TABLET, FILM COATED ORAL at 13:52

## 2023-07-03 RX ADMIN — OXYCODONE HYDROCHLORIDE 15 MG: 5 TABLET ORAL at 04:29

## 2023-07-03 RX ADMIN — VANCOMYCIN HYDROCHLORIDE 1500 MG: 5 INJECTION, POWDER, LYOPHILIZED, FOR SOLUTION INTRAVENOUS at 11:41

## 2023-07-03 RX ADMIN — HEPARIN SODIUM 5000 UNITS: 5000 INJECTION INTRAVENOUS; SUBCUTANEOUS at 05:58

## 2023-07-03 RX ADMIN — OXYCODONE HYDROCHLORIDE 15 MG: 5 TABLET ORAL at 22:04

## 2023-07-03 RX ADMIN — CEFTRIAXONE 2000 MG: 2 INJECTION, SOLUTION INTRAVENOUS at 05:58

## 2023-07-03 RX ADMIN — HYDROMORPHONE HYDROCHLORIDE 1 MG: 1 INJECTION, SOLUTION INTRAMUSCULAR; INTRAVENOUS; SUBCUTANEOUS at 05:58

## 2023-07-03 RX ADMIN — LISINOPRIL 10 MG: 10 TABLET ORAL at 08:30

## 2023-07-03 RX ADMIN — METOPROLOL SUCCINATE 100 MG: 50 TABLET, EXTENDED RELEASE ORAL at 08:30

## 2023-07-03 RX ADMIN — HEPARIN SODIUM 5000 UNITS: 5000 INJECTION INTRAVENOUS; SUBCUTANEOUS at 20:28

## 2023-07-03 NOTE — CASE MANAGEMENT
Case Management Discharge Planning Note    Patient name Jeannette Cahndra  Location [address] [facility] [place] 213/-01 MRN [id number]  : 1960 Date 7/3/2023       Current Admission Date: 2023  Current Admission Diagnosis: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, with fat layer exposed [facility])   Patient Active Problem List    Diagnosis Date Noted   • Sepsis ([address]) 2023   • Stage 2 chronic kidney disease 2023   • Hyponatremia 2023   • Alcohol abuse 2023   • Nicotine abuse 2023   • Diabetic foot ulcer ([address]) 2023   • 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, with fat layer exposed ([address]) 2023   • Type 1 diabetes mellitus with hyperglycemia ([address]) 2022   • Diabetic polyneuropathy associated with type 1 diabetes mellitus ([address]) 2022   • Microalbuminuria due to type 1 diabetes mellitus ([address]) 2022   • Hypoglycemia unawareness associated with type 1 diabetes mellitus ([address]) 2022   • Cigarette nicotine dependence without complication    • Gout of ankle 2022   • Chronic fatigue syndrome 2022   • Lower urinary tract symptoms due to benign prostatic hyperplasia 2022   • 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 ([address]) 2022   • Chronic pain 2022   • Insulin pump in place 2018   • Benign essential hypertension 2011   • Mixed hyperlipidemia 10/25/2010   • ED (erectile dysfunction) of organic origin 10/25/2010      LOS (days): 4  Geometric Mean LOS (GMLOS) (days):   Days to GMLOS:     OBJECTIVE:  Risk of Unplanned Readmission Score: 14.61         Current admission status: Inpatient   Preferred Pharmacy:   [id number] [place] [id number] [address] [place] [address] [address] [address]  Phone: [phone] Fax: [phone]    Primary Care Provider: Eugenio Pretty MD    Primary Insurance: [employer]  Secondary Insurance:     DISCHARGE DETAILS:        Ji Verma will need a 3 week course of IV antibiotics upon discharge. Referrals made in [facility] to [facility]. [facility] updated as well. Awaiting finalized scripts from [facility].

## 2023-07-03 NOTE — ASSESSMENT & PLAN NOTE
Patient has diabetic foot infection. On 5/30 he had sesamoid bone removal due to diabetic foot infection. Since then he has been following with podiatry and wound care. He reported significant improvement in his wound appeared to be healing, however this Sunday his wound became red, had discharge, and became very tender. He saw Dr. Cristobal Chacko in office yesterday who recommended he come to the hospital for an MRI and likely amputation of left midfoot. · Wound culture MRSA  · MRI foot with abscesses and osteomyelitis  · X-ray of foot showed osseous abnormalities and cannot rule out osteomyelitis  · Podiatry following, post TMA on June 30  · Continue vancomycin and ceftriaxone pending infectious disease final recommendations loading length of therapy. Podiatry note recommending 3 weeks of abx.

## 2023-07-03 NOTE — CONSULTS
TeleConsultation - [address] 66 y.o. male MRN: [id number]  Unit/Bed#: -01 Encounter: [id number]        REQUIRED DOCUMENTATION:     1. This service was provided via Telemedicine. 2. Provider located at [facility].  3. TeleMed provider: Orion Roland MD.  4. Identify all parties in room with patient during tele consult: Patient   5. Patient was then informed that this was a Telemedicine visit and that the exam was being conducted confidentially over secure lines. My office door was closed. No one else was in the room. Patient acknowledged consent and understanding of privacy and security of the Telemedicine visit, and gave us permission to have the assistant stay in the room in order to assist with the history and to conduct the exam.  I informed the patient that I have reviewed their record in Epic and presented the opportunity for them to ask any questions regarding the visit today. The patient agreed to participate. Discussed with Eris Hoyt MD    Assessment/Plan     Present on Admission:  • Mixed hyperlipidemia  • Type 1 diabetes mellitus with hyperglycemia ([address])  • 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, with fat layer exposed ([address])  • Hyponatremia  • Stage 2 chronic kidney disease  • Sepsis ([address])  • Benign essential hypertension    Assessment:    Adjustment Disorder with mixed emotions     Patient presents without any endorsed symptoms of depression but likely some amount of guarding. Patient is attempting to remain smoke free and could consider Bupropion  mg qam for tobacco cessation and mood. Patient may also benefit from psychotherapy if he is willing to engage. Patient denied any current SI/HI/AVH or other acute psychiatric complaints. Patient without any indication for voluntary or involuntary IP psychiatric admission.      Treatment Plan:    Planned Medication Changes:    -Per Above     Current Medications:     Current Facility-Administered Medications   Medication Dose Route Frequency Provider Last Rate   • acetaminophen  650 mg Oral Q6H PRN Marcelino Morillo, DPM     • atorvastatin  40 mg Oral Daily Marcelino Heljoceline, DPM     • heparin (porcine)  5,000 Units Subcutaneous [facility] Ankit Verma, DPM     • HYDROmorphone  1 mg Intravenous Q3H PRN Poly Esquivel MD     • insulin lispro  25 Units Subcutaneous Insulin Pump Daily PRN Marcelino Morillo, DPM     • insulin lispro  300 Units Subcutaneous Insulin Pump Daily PRN Leann Posey DO     • lisinopril  10 mg Oral Daily Marcelino Helm, DPM     • metoprolol succinate  100 mg Oral Daily Marcelino Helm, DPM     • nicotine  21 mg Transdermal Daily Marcelino Heljoceline, DPM     • nicotine polacrilex  2 mg Oral Q2H PRN Marcelino Helm, DPM     • ondansetron  4 mg Intravenous Q6H PRN Marcelino Morillo, DPM     • oxyCODONE  10 mg Oral Q4H PRN Poly Esquivel MD     • oxyCODONE  15 mg Oral Q4H PRN Poly Esquivel MD     • patient maintained insulin pump  1 each Subcutaneous Q8H Barrie Melendez MD     • vancomycin  1,500 mg Intravenous Q24H Poly Esquivel MD 1,500 mg (07/01/23 1216)       Risks / Benefits of Treatment:    Risks, benefits, and possible side effects of medications explained to patient and patient verbalizes understanding. Other treatment modalities recommended as indicated:    · psychotherapy  · outpatient referral      Inpatient consult to Psychiatry  Consult performed by: Keagan Velasco MD  Consult ordered by: Poly Esquivel MD        Physician Requesting Consult: Lacho Shaffer MD  Principal Problem: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, with fat layer exposed [facility])    Reason for Consult: Psych Evaluation       History of Present Illness      Patient states that it was a hard pillow to swallow as he was expecting to only lose his big toe but after the MRI lost half a foot. Patient states that this will require an adjustment period but he feels prepared. Patient states that he does not feel that it will affect his functioning all that much. Patient states that he has kept a positive attitude and does his best to live the best he can. Patient knows that the process will not be easy but he plans to take it one step at a time. Patient denied any current SI/HI/AVH or other acute psychiatric complaints. Psychiatric Review Of Systems:    sleep: no  appetite changes: no  weight changes: no  energy/anergy: no  interest/pleasure/anhedonia: no  somatic symptoms: no  anxiety/panic: no  mitra: no  guilty/hopeless: no  self injurious behavior/risky behavior: no    Historical Information     Past Psychiatric History:     Psychiatric Hospitalizations:   • No history of past inpatient psychiatric admissions  Outpatient Treatment History:   • Denied  Suicide Attempts:   • None  History of self-harm:   • None  Violence History:   • no  Past Psychiatric medication trials: Denied     Substance Abuse History: Patient smokes cigarettes ~1 PPD and has not smokes for a week and denied any illicit substance or alcohol use. Family Psychiatric History: Denied          Social History:    Education: high school diploma/GED  Learning Disabilities: Denied  Marital history:   Children: Yes   Living arrangement, social support: The patient lives in home with wife. Occupational History: unknown occupation  Functioning Relationships: good support system.   Other Pertinent History: None    Traumatic History:     Abuse: None  Other Traumatic Events: none    Past Medical History:   Diagnosis Date   • Chronic foot ulcer ([address]) 09/07/2018   • Diabetes mellitus ([address])    • Gout    • High cholesterol    • Hypertension    • Visual impairment 11/1/2022    Cateract       Medical Review Of Systems:    Review of Systems    Meds/Allergies     all current active meds have been reviewed  Allergies   Allergen Reactions   • Cefuroxime Other (See Comments) and GI Intolerance   • Amoxicillin-Pot Clavulanate Nausea Only and GI Intolerance       Objective     Vital signs in last 24 hours:  Temp:  [97.5 °F (36.4 °C)-98.4 °F (36.9 °C)] 98.4 °F (36.9 °C)  HR:  [] 101  Resp:  [16-17] 16  BP: (131-153)/(79-90) 153/90      Intake/Output Summary (Last 24 hours) at 7/3/2023 1819  Last data filed at 7/3/2023 1301  Gross per 24 hour   Intake 1196 ml   Output 700 ml   Net 496 ml       Mental Status Evaluation:    Appearance:  bearded   Behavior:  normal   Speech:  normal pitch and normal volume   Mood:  normal   Affect:  normal   Language: naming objects   Thought Process:  normal   Associations intact associations   Thought Content:  normal   Perceptual Disturbances: None   Risk Potential: Suicidal Ideations none  Homicidal Ideations none  Potential for Aggression No   Sensorium:  person, place and time/date   Cognition:  recent and remote memory grossly intact   Consciousness:  alert    Attention: attention span and concentration were age appropriate   Intellect: within normal limits   Fund of Knowledge: awareness of current events: President   Insight:  limited   Judgment: limited   Muscle Strength:  Muscle Tone: normal NFT  normal   Gait/Station: NFT   Motor Activity: no abnormal movements       Lab Results: I have personally reviewed all pertinent laboratory/tests results.      Most Recent Labs:   Lab Results   Component Value Date    WBC 7.45 07/03/2023    RBC 2.90 (L) 07/03/2023    HGB 9.7 (L) 07/03/2023    HCT 29.5 (L) 07/03/2023     07/03/2023    RDW 12.3 07/03/2023    NEUTROABS 3.78 07/03/2023    SODIUM 131 (L) 07/03/2023    K 4.7 07/03/2023    CL 96 07/03/2023    CO2 30 07/03/2023    BUN 6 07/03/2023    CREATININE 1.15 07/03/2023    GLUC 40 (L) 07/03/2023    GLUF 168 (H) 04/15/2022    CALCIUM 8.8 07/03/2023    AST 59 (H) 07/03/2023    ALT 26 07/03/2023    ALKPHOS 253 (H) 07/03/2023    TP 6.5 07/03/2023    ALB 3.1 (L) 07/03/2023    TBILI 0.29 07/03/2023    CHOLESTEROL 145 04/15/2022    HDL 68 04/15/2022    TRIG 53 04/15/2022    LDLCALC 66 04/15/2022    DIQ8YPTOGEFN 0.981 04/15/2022    HGBA1C 7.9 (H) 03/31/2023     03/31/2023       Imaging Studies: XR foot left 3+ views    Result Date: 7/3/2023  Narrative: LEFT FOOT INDICATION:   post-op. COMPARISON: MRI 6/30/2022 VIEWS:  XR FOOT 3+ VW LEFT FINDINGS: Patient is status post transmetatarsal amputation. No significant degenerative changes. No lytic or blastic osseous lesion. Soft tissues are unremarkable. Impression: Status post transmetatarsal amputation. Workstation performed: [id number]     MRI foot/forefoot toes left wo contrast    Result Date: 6/30/2023  Narrative: MRI LEFT FOOT INDICATION:   rule out osteo . COMPARISON: Prior MRI study dated 5/29/2023. TECHNIQUE:  Multiplanar/multisequence MR of the left foot was performed. Imaging performed on 1.5T MRI FINDINGS: SUBCUTANEOUS TISSUES: There is a plantar ulceration along the medial forefoot at the level of the first MTP joint. There is surrounding extensive cellulitis. There is also a fluid collection seen dorsal to the first ray measuring approximately 2.9 x 1.2 x 4.3 cm suspicious for abscess. This extends from the mid great toe proximal phalanx to the mid first metatarsal. There is mild dorsal subcutaneous edema. BONES: There is bone marrow edema seen within the great toe proximal phalanx and throughout the first toe metatarsal. There is confluent decreased T1 marrow signal in the first metatarsal head and neck extending into the proximal shaft. There is confluent decreased T1 marrow signal at the base of the first proximal phalanx. There is reticulated patchy areas of decreased T1 marrow signal seen in the more proximal shaft of the first metatarsal. The findings likely reflect osteomyelitis.  In addition, there are small foci of bone marrow edema and decreased T1 marrow signal across the plantar aspect of the second through fourth metatarsals suspicious for osteomyelitis. FIRST MTP JOINT: As mentioned above SESAMOID BONES: Status post medial hallux sesamoid excision. There is bone marrow edema and decreased T1 marrow signal within the lateral hallux sesamoid compatible with osteomyelitis. OTHER ARTICULAR SURFACE:  Normal. PLANTAR FASCIA:  Intact. LISFRANC LIGAMENT:  Intact. FOREFOOT TENDONS: Intact. INTERMETATARSAL REGIONS: No bursitis or Blanton's neuroma. MUSCULATURE: There is mild muscle edema within the tarsal musculature which likely reflects acute on chronic denervation. There is mild muscle fatty atrophy. Impression: Plantar ulceration medial forefoot at the level of the first MTP joint. Surrounding cellulitis and abscess as described above. Osteomyelitis of the great toe proximal phalanx and first metatarsal as described above. Small foci of abnormal marrow signal along the plantar aspects of the second through fourth metatarsals suspicious for osteomyelitis. Osteomyelitis of the lateral hallux sesamoid. Status post excision of the lateral hallux sesamoid. Workstation performed: [id number]     XR foot 3+ views LEFT    Result Date: 6/29/2023  Narrative: LEFT FOOT INDICATION:   osteo. COMPARISON: 5/30/2023 VIEWS:  XR FOOT 3+ VW LEFT Images: 3 FINDINGS: Small cortical defect at the head of the first metatarsal. Overlying soft tissue swelling. No significant degenerative changes. There are atherosclerotic calcifications. Soft tissues are otherwise unremarkable. Impression: There is a small cortical defect at the head of the first metatarsal. Cannot exclude osteomyelitis. Consider MRI follow-up.  Workstation performed: [id number]     EKG/Pathology/Other Studies:   Lab Results   Component Value Date    VENTRATE 106 05/29/2023    ATRIALRATE 106 05/29/2023    PRINT 164 05/29/2023    QRSDINT 80 05/29/2023    QTINT 334 05/29/2023    QTCINT 443 05/29/2023    PAXIS 79 05/29/2023    QRSAXIS 109 05/29/2023 MACHO 74 05/29/2023        Code Status: Level 1 - Full Code  Advance Directive and Living Will:      Power of :    POLST:      Screenings:    1. Nutrition Screening  Nutrition Assessment (completed by Staff): Nutrition  Feeding: Able to feed self    2. Pain Screening  Pain Screening: Pain Assessment  Pain Assessment Tool: 0-10  Pain Score: 8  Pain Location/Orientation: Orientation: Left, Location: Foot  Pain Onset/Description: Onset: Ongoing, Frequency: Constant/Continuous  Hospital Pain Intervention(s): Repositioned, Ambulation/increased activity    3. Suicide Screening  ED Crisis Suicide Risk Assessment:        Counseling / Coordination of Care: Total floor / unit time spent today 30 minutes. Greater than 50% of total time was spent with the patient and / or family counseling and / or coordination of care. A description of the counseling / coordination of care: Direct Patient Care, Chart Review, and Documentation.

## 2023-07-03 NOTE — CASE MANAGEMENT
Case Management Discharge Planning Note    Patient name Obdulio Kathleen  Location [address] [facility] [place] 213/-01 MRN [id number]  : 1960 Date 7/3/2023       Current Admission Date: 2023  Current Admission Diagnosis: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, with fat layer exposed [facility]   Patient Active Problem List    Diagnosis Date Noted   • Sepsis ([address]) 2023   • Stage 2 chronic kidney disease 2023   • Hyponatremia 2023   • Alcohol abuse 2023   • Nicotine abuse 2023   • Diabetic foot ulcer ([address]) 2023   • 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, with fat layer exposed ([address]) 2023   • Type 1 diabetes mellitus with hyperglycemia ([address]) 2022   • Diabetic polyneuropathy associated with type 1 diabetes mellitus ([address]) 2022   • Microalbuminuria due to type 1 diabetes mellitus ([address]) 2022   • Hypoglycemia unawareness associated with type 1 diabetes mellitus ([address]) 2022   • Cigarette nicotine dependence without complication    • Gout of ankle 2022   • Chronic fatigue syndrome 2022   • Lower urinary tract symptoms due to benign prostatic hyperplasia 2022   • 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 ([address]) 2022   • Chronic pain 2022   • Insulin pump in place 2018   • Benign essential hypertension 2011   • Mixed hyperlipidemia 10/25/2010   • ED (erectile dysfunction) of organic origin 10/25/2010      LOS (days): 4  Geometric Mean LOS (GMLOS) (days):   Days to GMLOS:     OBJECTIVE:  Risk of Unplanned Readmission Score: 14.61         Current admission status: Inpatient   Preferred Pharmacy:   [facility] [place] [id number] Lupe Hernandez [address] [address]  Phone: [phone] Fax: [phone]    Primary Care Provider: Deena Welsh MD    Primary Insurance: [employer]  Secondary Insurance:     DISCHARGE DETAILS:                                Requested [address]         Is the patient interested in [facility] at discharge?: Yes  [address] requested[de-identified] Nursing, Occupational Therapy, Physical [address] [facility] Name[de-identified] Jeane Provider[de-identified] PCP  Home Health Services Needed[de-identified] Strengthening/Theraputic Exercises to Improve Function, Gait/ADL Training, Evaluate Functional Status and Safety, Wound/Ostomy Care  Homebound Criteria Met[de-identified] Uses an Assist Device (i.e. cane, walker, etc)  Supporting Clincal Findings[de-identified] Limited Endurance         Other Referral/Resources/Interventions Provided:  Interventions: [facility]         Treatment Team Recommendation: Home with [address]  Discharge Destination Plan[de-identified] Home with [address]          Met with patient and he will need home home health for wound care, PT, and OT. Pt is active with [facility]. Referral made in [place] for ED.

## 2023-07-03 NOTE — ASSESSMENT & PLAN NOTE
Lab Results   Component Value Date    HGBA1C 7.9 (H) 03/31/2023       Recent Labs     07/03/23  0418 07/03/23  0552 07/03/23  0710 07/03/23  1000   POCGLU 51* 102 102 124       Blood Sugar Average: Last 72 hrs:  (P) [id number]   Please see note attached to insulin pump in place

## 2023-07-03 NOTE — PLAN OF CARE
Problem: PAIN - ADULT  Goal: Verbalizes/displays adequate comfort level or baseline comfort level  Description: Interventions:  - Encourage patient to monitor pain and request assistance  - Assess pain using appropriate pain scale  - Administer analgesics based on type and severity of pain and evaluate response  - Implement non-pharmacological measures as appropriate and evaluate response  - Consider cultural and social influences on pain and pain management  - Notify physician/advanced practitioner if interventions unsuccessful or patient reports new pain  Outcome: Progressing     Problem: INFECTION - ADULT  Goal: Absence or prevention of progression during hospitalization  Description: INTERVENTIONS:  - Assess and monitor for signs and symptoms of infection  - Monitor lab/diagnostic results  - Monitor all insertion sites, i.e. indwelling lines, tubes, and drains  - Monitor endotracheal if appropriate and nasal secretions for changes in amount and color  - [place] appropriate cooling/warming therapies per order  - Administer medications as ordered  - Instruct and encourage patient and family to use good hand hygiene technique  - Identify and instruct in appropriate isolation precautions for identified infection/condition  Outcome: Progressing     Problem: SAFETY ADULT  Goal: Patient will remain free of falls  Description: INTERVENTIONS:  - Educate patient/family on patient safety including physical limitations  - Instruct patient to call for assistance with activity   - Consult OT/PT to assist with strengthening/mobility   - Keep Call bell within reach  - Keep bed low and locked with side rails adjusted as appropriate  - Keep care items and personal belongings within reach  - Initiate and maintain comfort rounds  - Make Fall Risk Sign visible to staff  - Offer Toileting every 2 Hours, in advance of need  - Initiate/Maintain bed/ chair alarm  - Obtain necessary fall risk management equipment: walker  - Apply yellow socks and bracelet for high fall risk patients  - Consider moving patient to room near nurses station  Outcome: Progressing     Problem: MOBILITY - ADULT  Goal: Maintain or return to baseline ADL function  Description: INTERVENTIONS:  -  Assess patient's ability to carry out ADLs; assess patient's baseline for ADL function and identify physical deficits which impact ability to perform ADLs (bathing, care of mouth/teeth, toileting, grooming, dressing, etc.)  - Assess/evaluate cause of self-care deficits   - Assess range of motion  - Assess patient's mobility; develop plan if impaired  - Assess patient's need for assistive devices and provide as appropriate  - Encourage maximum independence but intervene and supervise when necessary  - Involve family in performance of ADLs  - Assess for home care needs following discharge   - Consider OT consult to assist with ADL evaluation and planning for discharge  - Provide patient education as appropriate  Outcome: Progressing     Problem: Prexisting or High Potential for Compromised Skin Integrity  Goal: Skin integrity is maintained or improved  Description: INTERVENTIONS:  - Identify patients at risk for skin breakdown  - Assess and monitor skin integrity  - Assess and monitor nutrition and hydration status  - Monitor labs   - Assess for incontinence   - Turn and reposition patient  - Assist with mobility/ambulation  - Relieve pressure over bony prominences  - Avoid friction and shearing  - Provide appropriate hygiene as needed including keeping skin clean and dry  - Evaluate need for skin moisturizer/barrier cream  - Collaborate with interdisciplinary team   - Patient/family teaching  - Consider wound care consult   Outcome: Progressing

## 2023-07-03 NOTE — PLAN OF CARE
Problem: PAIN - ADULT  Goal: Verbalizes/displays adequate comfort level or baseline comfort level  Description: Interventions:  - Encourage patient to monitor pain and request assistance  - Assess pain using appropriate pain scale  - Administer analgesics based on type and severity of pain and evaluate response  - Implement non-pharmacological measures as appropriate and evaluate response  - Consider cultural and social influences on pain and pain management  - Notify physician/advanced practitioner if interventions unsuccessful or patient reports new pain  Outcome: Progressing     Problem: INFECTION - ADULT  Goal: Absence or prevention of progression during hospitalization  Description: INTERVENTIONS:  - Assess and monitor for signs and symptoms of infection  - Monitor lab/diagnostic results  - Monitor all insertion sites, i.e. indwelling lines, tubes, and drains  - Monitor endotracheal if appropriate and nasal secretions for changes in amount and color  - [place] appropriate cooling/warming therapies per order  - Administer medications as ordered  - Instruct and encourage patient and family to use good hand hygiene technique  - Identify and instruct in appropriate isolation precautions for identified infection/condition  Outcome: Progressing  Goal: Absence of fever/infection during neutropenic period  Description: INTERVENTIONS:  - Monitor WBC    Outcome: Progressing     Problem: SAFETY ADULT  Goal: Patient will remain free of falls  Description: INTERVENTIONS:  - Educate patient/family on patient safety including physical limitations  - Instruct patient to call for assistance with activity   - Consult OT/PT to assist with strengthening/mobility   - Keep Call bell within reach  - Keep bed low and locked with side rails adjusted as appropriate  - Keep care items and personal belongings within reach  - Initiate and maintain comfort rounds  - Make Fall Risk Sign visible to staff  - Offer Toileting every 2 Hours, in advance of need  - Initiate/Maintain bed/ chair alarm  - Obtain necessary fall risk management equipment: walker  - Apply yellow socks and bracelet for high fall risk patients  - Consider moving patient to room near nurses station  Outcome: Progressing  Goal: Maintain or return to baseline ADL function  Description: INTERVENTIONS:  -  Assess patient's ability to carry out ADLs; assess patient's baseline for ADL function and identify physical deficits which impact ability to perform ADLs (bathing, care of mouth/teeth, toileting, grooming, dressing, etc.)  - Assess/evaluate cause of self-care deficits   - Assess range of motion  - Assess patient's mobility; develop plan if impaired  - Assess patient's need for assistive devices and provide as appropriate  - Encourage maximum independence but intervene and supervise when necessary  - Involve family in performance of ADLs  - Assess for home care needs following discharge   - Consider OT consult to assist with ADL evaluation and planning for discharge  - Provide patient education as appropriate  Outcome: Progressing  Goal: Maintains/Returns to pre admission functional level  Description: INTERVENTIONS:  - Perform BMAT or MOVE assessment daily.   - Set and communicate daily mobility goal to care team and patient/family/caregiver. - Collaborate with rehabilitation services on mobility goals if consulted  - Perform Range of Motion 3 times a day. - Reposition patient every 2 hours.   - Dangle patient 3 times a day  - Stand patient 3 times a day  - Ambulate patient 3 times a day  - Out of bed to chair 3 times a day   - Out of bed for meals 3 times a day  - Out of bed for toileting  - Record patient progress and toleration of activity level   Outcome: Progressing     Problem: DISCHARGE PLANNING  Goal: Discharge to home or other facility with appropriate resources  Description: INTERVENTIONS:  - Identify barriers to discharge w/patient and caregiver  - Arrange for needed discharge resources and transportation as appropriate  - Identify discharge learning needs (meds, wound care, etc.)  - Arrange for interpretive services to assist at discharge as needed  - Refer to Case Management Department for coordinating discharge planning if the patient needs post-hospital services based on physician/advanced practitioner order or complex needs related to functional status, cognitive ability, or social support system  Outcome: Progressing     Problem: Knowledge Deficit  Goal: Patient/family/caregiver demonstrates understanding of disease process, treatment plan, medications, and discharge instructions  Description: Complete learning assessment and assess knowledge base. Interventions:  - Provide teaching at level of understanding  - Provide teaching via preferred learning methods  Outcome: Progressing     Problem: Nutrition/Hydration-ADULT  Goal: Nutrient/Hydration intake appropriate for improving, restoring or maintaining nutritional needs  Description: Monitor and assess patient's nutrition/hydration status for malnutrition. Collaborate with interdisciplinary team and initiate plan and interventions as ordered. Monitor patient's weight and dietary intake as ordered or per policy. Utilize nutrition screening tool and intervene as necessary. Determine patient's food preferences and provide high-protein, high-caloric foods as appropriate.      INTERVENTIONS:  - Monitor oral intake, urinary output, labs, and treatment plans  - Assess nutrition and hydration status and recommend course of action  - Evaluate amount of meals eaten  - Assist patient with eating if necessary   - Allow adequate time for meals  - Recommend/ encourage appropriate diets, oral nutritional supplements, and vitamin/mineral supplements  - Order, calculate, and assess calorie counts as needed  - Recommend, monitor, and adjust tube feedings and TPN/PPN based on assessed needs  - Assess need for intravenous fluids  - Provide specific nutrition/hydration education as appropriate  - Include patient/family/caregiver in decisions related to nutrition  Outcome: Progressing     Problem: MOBILITY - ADULT  Goal: Maintain or return to baseline ADL function  Description: INTERVENTIONS:  -  Assess patient's ability to carry out ADLs; assess patient's baseline for ADL function and identify physical deficits which impact ability to perform ADLs (bathing, care of mouth/teeth, toileting, grooming, dressing, etc.)  - Assess/evaluate cause of self-care deficits   - Assess range of motion  - Assess patient's mobility; develop plan if impaired  - Assess patient's need for assistive devices and provide as appropriate  - Encourage maximum independence but intervene and supervise when necessary  - Involve family in performance of ADLs  - Assess for home care needs following discharge   - Consider OT consult to assist with ADL evaluation and planning for discharge  - Provide patient education as appropriate  Outcome: Progressing  Goal: Maintains/Returns to pre admission functional level  Description: INTERVENTIONS:  - Perform BMAT or MOVE assessment daily.   - Set and communicate daily mobility goal to care team and patient/family/caregiver. - Collaborate with rehabilitation services on mobility goals if consulted  - Perform Range of Motion 3 times a day. - Reposition patient every 2 hours.   - Dangle patient 3 times a day  - Stand patient 3 times a day  - Ambulate patient 3 times a day  - Out of bed to chair 3 times a day   - Out of bed for meals 3 times a day  - Out of bed for toileting  - Record patient progress and toleration of activity level   Outcome: Progressing     Problem: Prexisting or High Potential for Compromised Skin Integrity  Goal: Skin integrity is maintained or improved  Description: INTERVENTIONS:  - Identify patients at risk for skin breakdown  - Assess and monitor skin integrity  - Assess and monitor nutrition and hydration status  - Monitor labs   - Assess for incontinence   - Turn and reposition patient  - Assist with mobility/ambulation  - Relieve pressure over bony prominences  - Avoid friction and shearing  - Provide appropriate hygiene as needed including keeping skin clean and dry  - Evaluate need for skin moisturizer/barrier cream  - Collaborate with interdisciplinary team   - Patient/family teaching  - Consider wound care consult   Outcome: Progressing

## 2023-07-03 NOTE — PROGRESS NOTES
Samantha Aaron is a 66 y.o. male who is currently ordered Vancomycin IV with management by the Pharmacy Consult service. Relevant clinical data and objective / subjective history reviewed. Vancomycin Assessment:  Indication and Goal AUC/Trough: Bone/joint infection (goal -600, trough >10)  Clinical Status: stable  Micro:     Renal Function:  SCr: 1.15 mg/dL  CrCl: 60.4 mL/min  Renal replacement: Not on dialysis  Days of Therapy: 5  Current Dose: 1500 mg q24h  Vancomycin Plan:  New Dosing: No change  Estimated AUC: 535 mcg*hr/mL  Estimated Trough: 14.6 mcg/mL  Next Level: 1100 on 07/04/23  Renal Function Monitoring: Daily BMP and [place] will continue to follow closely for s/sx of nephrotoxicity, infusion reactions and appropriateness of therapy. BMP and CBC will be ordered per protocol. We will continue to follow the patient’s culture results and clinical progress daily.     Teresa Rivas, Pharmacist

## 2023-07-03 NOTE — PROGRESS NOTES
[facility]  Progress Note  Name: Brianna Denise  MRN: [id number]  Unit/Bed#: -01 I Date of Admission: 6/29/2023   Date of Service: 7/3/2023 I Hospital Day: 4    Assessment/Plan   Sepsis [facility])  Assessment & Plan  · Meets sepsis criteria with WBC greater than 12 K, heart rate 109 with a likely source infection diabetic foot wound  · Continue vancomycin and ceftriaxone as above  · Sepsis resolved    Hyponatremia  Assessment & Plan  · Sodium stable, will continue to monitor    Stage 2 chronic kidney disease  Assessment & Plan  Lab Results   Component Value Date    EGFR 68 07/03/2023    EGFR 65 07/02/2023    EGFR 68 07/01/2023    CREATININE 1.15 07/03/2023    CREATININE 1.19 07/02/2023    CREATININE 1.15 07/01/2023     · Creatinine is currently at baseline, can continue to monitor    Type 1 diabetes mellitus with hyperglycemia [facility])  Assessment & Plan  Lab Results   Component Value Date    HGBA1C 7.9 (H) 03/31/2023       Recent Labs     07/03/23  0418 07/03/23  0552 07/03/23  0710 07/03/23  1000   POCGLU 51* 102 102 124       Blood Sugar Average: Last 72 hrs:  (P) [id number]   Please see note attached to insulin pump in place    Benign essential hypertension  Assessment & Plan  · BP stable  · Continue metoprolol and lisinopril   · Continue to monitor BP    Insulin pump in place  Assessment & Plan  · Transitioned off gtt back to pump. 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 On 5/30 he had sesamoid bone removal due to diabetic foot infection. Since then he has been following with podiatry and wound care. He reported significant improvement in his wound appeared to be healing, however this Sunday his wound became red, had discharge, and became very tender.   He saw Dr. Jesusita Kussmaul in office yesterday who recommended he come to the hospital for an MRI and likely amputation of left midfoot. · Wound culture MRSA  · MRI foot with abscesses and osteomyelitis  · X-ray of foot showed osseous abnormalities and cannot rule out osteomyelitis  · Podiatry following, post TMA on   · Continue vancomycin and ceftriaxone pending infectious disease final recommendations loading length of therapy. Podiatry note recommending 3 weeks of abx. VTE  Prophylaxis:   Pharmacologic: in place  Mechanical VTE Prophylaxis in Place: Yes    Patient Centered Rounds: I have performed bedside rounds with nursing staff today. Discussions with Specialists or Other Care Team Provider: case management    Education and Discussions with Family / Patient: pt      Current Length of Stay: 4 day(s)    Current Patient Status: Inpatient        Code Status: Level 1 - Full Code    Discharge Plan: Pt will require continued inpatient hospitalization. Subjective:   Pt doing ok   Interested to go home   He was happy that Dr. Avelina Apley felt surgical site looked well. Patient is seen and examined at bedside. All other ROS are negative. Objective:     Vitals:   Temp (24hrs), Av.9 °F (36.6 °C), Min:97.5 °F (36.4 °C), Max:98.2 °F (36.8 °C)    Temp:  [97.5 °F (36.4 °C)-98.2 °F (36.8 °C)] 97.5 °F (36.4 °C)  HR:  [97-98] 97  Resp:  [17] 17  BP: (131-143)/(79-86) 143/86  SpO2:  [98 %-99 %] 98 %  Body mass index is 18.99 kg/m². Input and Output Summary (last 24 hours):        Intake/Output Summary (Last 24 hours) at 7/3/2023 1210  Last data filed at 7/3/2023 0901  Gross per 24 hour   Intake 1316 ml   Output 900 ml   Net 416 ml       Physical Exam:       GEN: No acute distress, comfortable  HEEENT: No JVD, PERRLA, no scleral icterus  RESP: Lungs clear to auscultation bilaterally  CV: RRR, +s1/s2   ABD: SOFT NON TENDER, POSITIVE BOWEL SOUNDS, NO DISTENTION  PSYCH: CALM  NEURO: [place] baseline, NO FOCAL DEFICITS  SKIN: NO RASH, TMA dressed  EXTREM: NO EDEMA    Additional Data:     Labs:    Results from last 7 days   Lab Units 07/03/23  0431   WBC Thousand/uL 7.45   HEMOGLOBIN g/dL 9.7*   HEMATOCRIT % 29.5*   PLATELETS Thousands/uL 339   NEUTROS PCT % 51   LYMPHS PCT % 24   MONOS PCT % 18*   EOS PCT % 6     Results from last 7 days   Lab Units 07/03/23  0431   SODIUM mmol/L 131*   POTASSIUM mmol/L 4.7   CHLORIDE mmol/L 96   CO2 mmol/L 30   BUN mg/dL 6   CREATININE mg/dL 1.15   ANION GAP mmol/L 5   CALCIUM mg/dL 8.8   ALBUMIN g/dL 3.1*   TOTAL BILIRUBIN mg/dL 0.29   ALK PHOS U/L 253*   ALT U/L 26   AST U/L 59*   GLUCOSE RANDOM mg/dL 40*     Results from last 7 days   Lab Units 06/29/23  0906   INR  0.93     Results from last 7 days   Lab Units 07/03/23  1000 07/03/23  0710 07/03/23  0552 07/03/23  0418 07/03/23  0206 07/03/23  0000 07/02/23  2201 07/02/23  2005 07/02/23  1736 07/02/23  1615 07/02/23  1428 07/02/23  1233   POC GLUCOSE mg/dl 124 102 102 51* 123 76 66 74 66 68 118 70         Results from last 7 days   Lab Units 06/29/23  1052 06/29/23  0906   LACTIC ACID mmol/L  --  1.8   PROCALCITONIN ng/ml 0.15  --        Lines/Drains:  Invasive Devices     Peripheral Intravenous Line  Duration           Peripheral IV 07/03/23 Dorsal (posterior); Left Forearm <1 day          Line  Duration           Pump Device Insulin pump Anterior; Left Abdomen 3 days                Telemetry:        * I Have Reviewed All Lab Data Listed Above. Imaging:     No results found for this or any previous visit. No results found for this or any previous visit. *I have reviewed all imaging reports listed above      Recent Cultures (last 7 days):     Results from last 7 days   Lab Units 06/30/23  1452 06/29/23  1052 06/29/23  0906 06/28/23  1403   BLOOD CULTURE   --  No Growth at 72 hrs.  No Growth at 72 hrs.  --    GRAM STAIN RESULT  Rare Polys*  Rare Gram positive rods*  No Polys or Bacteria seen  --   --  2+ Polys  No bacteria seen WOUND CULTURE  2 colonies Staphylococcus aureus*  1+ Growth of Corynebacterium striatum*  2 colonies Methicillin Resistant Staphylococcus aureus*  Growth in Broth culture only Staphylococcus coagulase negative*  --   --  1+ Growth of Methicillin Resistant Staphylococcus aureus*  2+ Growth of       Last 24 Hours Medication List:   Current Facility-Administered Medications   Medication Dose Route Frequency Provider Last Rate   • acetaminophen  650 mg Oral Q6H PRN Carlos Covarrubias DPM     • atorvastatin  40 mg Oral Daily Carlos Covarrubias DPM     • cefTRIAXone  2,000 mg Intravenous Q24H Fidel Curran MD 2,000 mg (07/03/23 0558)   • heparin (porcine)  5,000 Units Subcutaneous [facility] Delroy Chung DPM     • HYDROmorphone  1 mg Intravenous Q3H PRN Cally Mckee MD     • insulin lispro  25 Units Subcutaneous Insulin Pump Daily PRN Carlos Covarrubias DPM     • insulin lispro  300 Units Subcutaneous Insulin Pump Daily PRN Angie Parada DO     • lisinopril  10 mg Oral Daily Carlos Covarrubias DPM     • metoprolol succinate  100 mg Oral Daily Carlos Covarrubias DPM     • nicotine  21 mg Transdermal Daily Carlos Covarrubias DPM     • nicotine polacrilex  2 mg Oral Q2H PRN Carlos Covarrubias DPM     • ondansetron  4 mg Intravenous Q6H PRN Carlos Covarrubias DPM     • oxyCODONE  10 mg Oral Q4H PRN Cally Mckee MD     • oxyCODONE  15 mg Oral Q4H PRN Cally Mckee MD     • patient maintained insulin pump  1 each Subcutaneous Q8H Angie Parada DO     • vancomycin  1,500 mg Intravenous Q24H Cally Mckee MD 1,500 mg (07/01/23 1216)        Today, Patient Was Seen By: Krystina Vogt MD    ** Please Note: Dictation voice to text software may have been used in the creation of this document.  **

## 2023-07-03 NOTE — PLAN OF CARE
Problem: PAIN - ADULT  Goal: Verbalizes/displays adequate comfort level or baseline comfort level  Description: Interventions:  - Encourage patient to monitor pain and request assistance  - Assess pain using appropriate pain scale  - Administer analgesics based on type and severity of pain and evaluate response  - Implement non-pharmacological measures as appropriate and evaluate response  - Consider cultural and social influences on pain and pain management  - Notify physician/advanced practitioner if interventions unsuccessful or patient reports new pain  7/3/2023 0947 by Jess Valdez RN  Outcome: Progressing  7/3/2023 0947 by Jess Valdez RN  Outcome: Progressing     Problem: INFECTION - ADULT  Goal: Absence or prevention of progression during hospitalization  Description: INTERVENTIONS:  - Assess and monitor for signs and symptoms of infection  - Monitor lab/diagnostic results  - Monitor all insertion sites, i.e. indwelling lines, tubes, and drains  - Monitor endotracheal if appropriate and nasal secretions for changes in amount and color  - [place] appropriate cooling/warming therapies per order  - Administer medications as ordered  - Instruct and encourage patient and family to use good hand hygiene technique  - Identify and instruct in appropriate isolation precautions for identified infection/condition  7/3/2023 0947 by Jess Valdez RN  Outcome: Progressing  7/3/2023 0947 by Jess Valdez RN  Outcome: Progressing  Goal: Absence of fever/infection during neutropenic period  Description: INTERVENTIONS:  - Monitor WBC    7/3/2023 0947 by Jess Valdez RN  Outcome: Progressing  7/3/2023 0947 by Jess Valdez RN  Outcome: Progressing     Problem: SAFETY ADULT  Goal: Patient will remain free of falls  Description: INTERVENTIONS:  - Educate patient/family on patient safety including physical limitations  - Instruct patient to call for assistance with activity   - Consult OT/PT to assist with strengthening/mobility   - Keep Call bell within reach  - Keep bed low and locked with side rails adjusted as appropriate  - Keep care items and personal belongings within reach  - Initiate and maintain comfort rounds  - Make Fall Risk Sign visible to staff  - Offer Toileting every 2 Hours, in advance of need  - Initiate/Maintain bed/ chair alarm  - Obtain necessary fall risk management equipment: walker  - Apply yellow socks and bracelet for high fall risk patients  - Consider moving patient to room near nurses station  7/3/2023 0947 by Prentis Siemens, RN  Outcome: Progressing  7/3/2023 0947 by Prentis Siemens, RN  Outcome: Progressing  Goal: Maintain or return to baseline ADL function  Description: INTERVENTIONS:  -  Assess patient's ability to carry out ADLs; assess patient's baseline for ADL function and identify physical deficits which impact ability to perform ADLs (bathing, care of mouth/teeth, toileting, grooming, dressing, etc.)  - Assess/evaluate cause of self-care deficits   - Assess range of motion  - Assess patient's mobility; develop plan if impaired  - Assess patient's need for assistive devices and provide as appropriate  - Encourage maximum independence but intervene and supervise when necessary  - Involve family in performance of ADLs  - Assess for home care needs following discharge   - Consider OT consult to assist with ADL evaluation and planning for discharge  - Provide patient education as appropriate  7/3/2023 0947 by Prentis Siemens, MEGAN  Outcome: Progressing  7/3/2023 0947 by Prentis Siemens, RN  Outcome: Progressing  Goal: Maintains/Returns to pre admission functional level  Description: INTERVENTIONS:  - Perform BMAT or MOVE assessment daily.   - Set and communicate daily mobility goal to care team and patient/family/caregiver. - Collaborate with rehabilitation services on mobility goals if consulted  - Perform Range of Motion 3 times a day. - Reposition patient every 2 hours.   - Dangle patient 3 times a day  - Stand patient 3 times a day  - Ambulate patient 3 times a day  - Out of bed to chair 3 times a day   - Out of bed for meals 3 times a day  - Out of bed for toileting  - Record patient progress and toleration of activity level   7/3/2023 0947 by Elena Mcknight RN  Outcome: Progressing  7/3/2023 0947 by Elena Mcknight RN  Outcome: Progressing     Problem: DISCHARGE PLANNING  Goal: Discharge to home or other facility with appropriate resources  Description: INTERVENTIONS:  - Identify barriers to discharge w/patient and caregiver  - Arrange for needed discharge resources and transportation as appropriate  - Identify discharge learning needs (meds, wound care, etc.)  - Arrange for interpretive services to assist at discharge as needed  - Refer to Case Management Department for coordinating discharge planning if the patient needs post-hospital services based on physician/advanced practitioner order or complex needs related to functional status, cognitive ability, or social support system  7/3/2023 0947 by Elena Mcknight RN  Outcome: Progressing  7/3/2023 0947 by Elena Mcknight RN  Outcome: Progressing     Problem: Knowledge Deficit  Goal: Patient/family/caregiver demonstrates understanding of disease process, treatment plan, medications, and discharge instructions  Description: Complete learning assessment and assess knowledge base. Interventions:  - Provide teaching at level of understanding  - Provide teaching via preferred learning methods  7/3/2023 0947 by Elena Mcknight RN  Outcome: Progressing  7/3/2023 0947 by Elena Mcknight RN  Outcome: Progressing     Problem: Nutrition/Hydration-ADULT  Goal: Nutrient/Hydration intake appropriate for improving, restoring or maintaining nutritional needs  Description: Monitor and assess patient's nutrition/hydration status for malnutrition. Collaborate with interdisciplinary team and initiate plan and interventions as ordered.   Monitor patient's weight and dietary intake as ordered or per policy. Utilize nutrition screening tool and intervene as necessary. Determine patient's food preferences and provide high-protein, high-caloric foods as appropriate.      INTERVENTIONS:  - Monitor oral intake, urinary output, labs, and treatment plans  - Assess nutrition and hydration status and recommend course of action  - Evaluate amount of meals eaten  - Assist patient with eating if necessary   - Allow adequate time for meals  - Recommend/ encourage appropriate diets, oral nutritional supplements, and vitamin/mineral supplements  - Order, calculate, and assess calorie counts as needed  - Recommend, monitor, and adjust tube feedings and TPN/PPN based on assessed needs  - Assess need for intravenous fluids  - Provide specific nutrition/hydration education as appropriate  - Include patient/family/caregiver in decisions related to nutrition  7/3/2023 0947 by Rosie Maldonado RN  Outcome: Progressing  7/3/2023 0947 by Rosie Maldonado RN  Outcome: Progressing     Problem: MOBILITY - ADULT  Goal: Maintain or return to baseline ADL function  Description: INTERVENTIONS:  -  Assess patient's ability to carry out ADLs; assess patient's baseline for ADL function and identify physical deficits which impact ability to perform ADLs (bathing, care of mouth/teeth, toileting, grooming, dressing, etc.)  - Assess/evaluate cause of self-care deficits   - Assess range of motion  - Assess patient's mobility; develop plan if impaired  - Assess patient's need for assistive devices and provide as appropriate  - Encourage maximum independence but intervene and supervise when necessary  - Involve family in performance of ADLs  - Assess for home care needs following discharge   - Consider OT consult to assist with ADL evaluation and planning for discharge  - Provide patient education as appropriate  7/3/2023 0947 by Rosie Maldonado RN  Outcome: Progressing  7/3/2023 0947 by Rosie Maldonado RN  Outcome: Progressing  Goal: Maintains/Returns to pre admission functional level  Description: INTERVENTIONS:  - Perform BMAT or MOVE assessment daily.   - Set and communicate daily mobility goal to care team and patient/family/caregiver. - Collaborate with rehabilitation services on mobility goals if consulted  - Perform Range of Motion 3 times a day. - Reposition patient every 2 hours.   - Dangle patient 3 times a day  - Stand patient 3 times a day  - Ambulate patient 3 times a day  - Out of bed to chair 3 times a day   - Out of bed for meals 3 times a day  - Out of bed for toileting  - Record patient progress and toleration of activity level   7/3/2023 0947 by Carlos Torres RN  Outcome: Progressing  7/3/2023 0947 by Carlos Torres RN  Outcome: Progressing     Problem: Prexisting or High Potential for Compromised Skin Integrity  Goal: Skin integrity is maintained or improved  Description: INTERVENTIONS:  - Identify patients at risk for skin breakdown  - Assess and monitor skin integrity  - Assess and monitor nutrition and hydration status  - Monitor labs   - Assess for incontinence   - Turn and reposition patient  - Assist with mobility/ambulation  - Relieve pressure over bony prominences  - Avoid friction and shearing  - Provide appropriate hygiene as needed including keeping skin clean and dry  - Evaluate need for skin moisturizer/barrier cream  - Collaborate with interdisciplinary team   - Patient/family teaching  - Consider wound care consult   7/3/2023 0947 by Carlos Torres RN  Outcome: Progressing  7/3/2023 0947 by Carlos Torres RN  Outcome: Progressing

## 2023-07-03 NOTE — ASSESSMENT & PLAN NOTE
Lab Results   Component Value Date    EGFR 68 07/03/2023    EGFR 65 07/02/2023    EGFR 68 07/01/2023    CREATININE 1.15 07/03/2023    CREATININE 1.19 07/02/2023    CREATININE 1.15 07/01/2023     · Creatinine is currently at baseline, can continue to monitor

## 2023-07-03 NOTE — PROGRESS NOTES
Progress Note - Leslie Valentin 66 y.o. male MRN: [id number]    Unit/Bed#: -01 Encounter: [id number]      CC: diabetes f/u    Subjective:   Leslie Valentin is a 66y.o. year old male with type 1 diabetes. Feels well. No complaints. Patient on Medtronic 770 G insulin pump but not on sensor due to problems with bleeding in the sensor. Does not have other sensors and it is too expensive to change it as it is less than 7 days from the last sensor. Having hypoglycemia overnight with blood sugars into the 40s. Reports he has hypoglycemic symptoms when blood sugars are less than 100. He was frustrated and took his insulin pump off prior to me seeing him. He will be restarting his insulin pump with change settings. Objective:     Vitals: Blood pressure 153/90, pulse 101, temperature 98.4 °F (36.9 °C), temperature source Temporal, resp. rate 16, height 6' (1.829 m), weight 63.5 kg (140 lb), SpO2 94 %. ,Body mass index is 18.99 kg/m². Intake/Output Summary (Last 24 hours) at 7/3/2023 1816  Last data filed at 7/3/2023 1301  Gross per 24 hour   Intake 1196 ml   Output 700 ml   Net 496 ml       Physical Exam:  General Appearance: awake, appears stated age and cooperative  Head: Normocephalic, without obvious abnormality, atraumatic  Extremities: moves all extremities  Skin: Skin color and temperature normal.   Pulm: no labored breathing    Lab, Imaging and other studies: I have personally reviewed pertinent reports. POC Glucose (mg/dl)   Date Value   07/03/2023 74   07/03/2023 98   07/03/2023 233 (H)   07/03/2023 160 (H)   07/03/2023 124   07/03/2023 102   07/03/2023 102   07/03/2023 51 (L)   07/03/2023 123   07/03/2023 76       Assessment:  diabetes without hyperglycemia    1. Type 1 diabetes. Currently on an insulin pump, Medtronic 770 G. Sensor unable to be used at present due to removal and cost of putting anyone on.    2.  Hypoglycemia.   Having hypoglycemia throughout the day and particularly overnight with blood sugar down into the 40s. Plan:  1. Adjust insulin pump rates which were done in the room today to try to prevent hypoglycemia. New basal rate: 12 AM to 3:30 AM 0.5 units/h, 3:30 AM to 8 AM 0.65 units/h, 8 AM to 12 PM 0.85 units/h, 12 PM to 8:30 PM 0.8 units/h, and 8:30 PM to 12 AM 0.6 units/h. New bolus rates 12 AM to 11 AM 1 unit per 12 g carbohydrate, 11 AM to 12 AM 1 unit per 11 g carbohydrate. Insulin sensitivity factor changed from 1 unit per 32 blood sugar points to 1 unit for 40 blood sugar points with a target blood glucose of 110-120.    2.  Continue to test blood sugar every 2 hours while he is not on the sensor. 3.  We will adjust insulin as needed based on blood sugar testing. 4.  He was taught how to suspend his pump temporarily if there is a low blood sugar. He could suspend his pump for 30 to 60 minutes if needed. Portions of the record may have been created with voice recognition software.

## 2023-07-04 VITALS
SYSTOLIC BLOOD PRESSURE: 124 MMHG | HEIGHT: 72 IN | HEART RATE: 117 BPM | DIASTOLIC BLOOD PRESSURE: 72 MMHG | RESPIRATION RATE: 15 BRPM | OXYGEN SATURATION: 93 % | WEIGHT: 140 LBS | TEMPERATURE: 98.6 F | BODY MASS INDEX: 18.96 KG/M2

## 2023-07-04 LAB
ALBUMIN SERPL BCP-MCNC: 3.2 G/DL (ref 3.5–5)
ALP SERPL-CCNC: 288 U/L (ref 34–104)
ALT SERPL W P-5'-P-CCNC: 26 U/L (ref 7–52)
ANION GAP SERPL CALCULATED.3IONS-SCNC: 8 MMOL/L
AST SERPL W P-5'-P-CCNC: 32 U/L (ref 13–39)
BACTERIA BLD CULT: NORMAL
BACTERIA BLD CULT: NORMAL
BASOPHILS # BLD AUTO: 0.09 THOUSANDS/ÂΜL (ref 0–0.1)
BASOPHILS NFR BLD AUTO: 1 % (ref 0–1)
BILIRUB SERPL-MCNC: 0.31 MG/DL (ref 0.2–1)
BUN SERPL-MCNC: 6 MG/DL (ref 5–25)
CALCIUM ALBUM COR SERPL-MCNC: 9.7 MG/DL (ref 8.3–10.1)
CALCIUM SERPL-MCNC: 9.1 MG/DL (ref 8.4–10.2)
CHLORIDE SERPL-SCNC: 96 MMOL/L (ref 96–108)
CO2 SERPL-SCNC: 29 MMOL/L (ref 21–32)
CREAT SERPL-MCNC: 1.22 MG/DL (ref 0.6–1.3)
EOSINOPHIL # BLD AUTO: 0.56 THOUSAND/ÂΜL (ref 0–0.61)
EOSINOPHIL NFR BLD AUTO: 7 % (ref 0–6)
ERYTHROCYTE [DISTWIDTH] IN BLOOD BY AUTOMATED COUNT: 12.1 % (ref 11.6–15.1)
GFR SERPL CREATININE-BSD FRML MDRD: 63 ML/MIN/1.73SQ M
GLUCOSE SERPL-MCNC: 108 MG/DL (ref 65–140)
GLUCOSE SERPL-MCNC: 158 MG/DL (ref 65–140)
GLUCOSE SERPL-MCNC: 171 MG/DL (ref 65–140)
GLUCOSE SERPL-MCNC: 195 MG/DL (ref 65–140)
GLUCOSE SERPL-MCNC: 207 MG/DL (ref 65–140)
GLUCOSE SERPL-MCNC: 61 MG/DL (ref 65–140)
GLUCOSE SERPL-MCNC: 72 MG/DL (ref 65–140)
GLUCOSE SERPL-MCNC: 76 MG/DL (ref 65–140)
HCT VFR BLD AUTO: 28.9 % (ref 36.5–49.3)
HGB BLD-MCNC: 9.7 G/DL (ref 12–17)
IMM GRANULOCYTES # BLD AUTO: 0.04 THOUSAND/UL (ref 0–0.2)
IMM GRANULOCYTES NFR BLD AUTO: 1 % (ref 0–2)
LYMPHOCYTES # BLD AUTO: 1.33 THOUSANDS/ÂΜL (ref 0.6–4.47)
LYMPHOCYTES NFR BLD AUTO: 18 % (ref 14–44)
MCH RBC QN AUTO: 33.6 PG (ref 26.8–34.3)
MCHC RBC AUTO-ENTMCNC: 33.6 G/DL (ref 31.4–37.4)
MCV RBC AUTO: 100 FL (ref 82–98)
MONOCYTES # BLD AUTO: 1.28 THOUSAND/ÂΜL (ref 0.17–1.22)
MONOCYTES NFR BLD AUTO: 17 % (ref 4–12)
NEUTROPHILS # BLD AUTO: 4.27 THOUSANDS/ÂΜL (ref 1.85–7.62)
NEUTS SEG NFR BLD AUTO: 56 % (ref 43–75)
NRBC BLD AUTO-RTO: 0 /100 WBCS
PLATELET # BLD AUTO: 392 THOUSANDS/UL (ref 149–390)
PMV BLD AUTO: 8.7 FL (ref 8.9–12.7)
POTASSIUM SERPL-SCNC: 4.6 MMOL/L (ref 3.5–5.3)
PROT SERPL-MCNC: 6.8 G/DL (ref 6.4–8.4)
RBC # BLD AUTO: 2.89 MILLION/UL (ref 3.88–5.62)
SODIUM SERPL-SCNC: 133 MMOL/L (ref 135–147)
VANCOMYCIN TROUGH SERPL-MCNC: 15.6 UG/ML (ref 10–20)
WBC # BLD AUTO: 7.57 THOUSAND/UL (ref 4.31–10.16)

## 2023-07-04 PROCEDURE — 80053 COMPREHEN METABOLIC PANEL: CPT | Performed by: HOSPITALIST

## 2023-07-04 PROCEDURE — 82948 REAGENT STRIP/BLOOD GLUCOSE: CPT

## 2023-07-04 PROCEDURE — 80202 ASSAY OF VANCOMYCIN: CPT | Performed by: INTERNAL MEDICINE

## 2023-07-04 PROCEDURE — 99024 POSTOP FOLLOW-UP VISIT: CPT | Performed by: PODIATRIST

## 2023-07-04 PROCEDURE — 85025 COMPLETE CBC W/AUTO DIFF WBC: CPT | Performed by: HOSPITALIST

## 2023-07-04 PROCEDURE — 97167 OT EVAL HIGH COMPLEX 60 MIN: CPT

## 2023-07-04 PROCEDURE — 99239 HOSP IP/OBS DSCHRG MGMT >30: CPT | Performed by: HOSPITALIST

## 2023-07-04 RX ORDER — LINEZOLID 600 MG/1
600 TABLET, FILM COATED ORAL EVERY 12 HOURS SCHEDULED
Qty: 34 TABLET | Refills: 0 | Status: SHIPPED | OUTPATIENT
Start: 2023-07-04 | End: 2023-07-21

## 2023-07-04 RX ORDER — NICOTINE 21 MG/24HR
1 PATCH, TRANSDERMAL 24 HOURS TRANSDERMAL DAILY
Qty: 28 PATCH | Refills: 0 | Status: SHIPPED | OUTPATIENT
Start: 2023-07-05

## 2023-07-04 RX ORDER — OXYCODONE HYDROCHLORIDE 15 MG/1
15 TABLET ORAL EVERY 4 HOURS PRN
Qty: 18 TABLET | Refills: 0 | Status: SHIPPED | OUTPATIENT
Start: 2023-07-04 | End: 2023-07-07

## 2023-07-04 RX ORDER — HYDROMORPHONE HCL/PF 1 MG/ML
0.5 SYRINGE (ML) INJECTION
Status: DISCONTINUED | OUTPATIENT
Start: 2023-07-04 | End: 2023-07-04 | Stop reason: HOSPADM

## 2023-07-04 RX ADMIN — VANCOMYCIN HYDROCHLORIDE 1500 MG: 5 INJECTION, POWDER, LYOPHILIZED, FOR SOLUTION INTRAVENOUS at 11:20

## 2023-07-04 RX ADMIN — OXYCODONE HYDROCHLORIDE 15 MG: 5 TABLET ORAL at 10:20

## 2023-07-04 RX ADMIN — HYDROMORPHONE HYDROCHLORIDE 1 MG: 1 INJECTION, SOLUTION INTRAMUSCULAR; INTRAVENOUS; SUBCUTANEOUS at 08:45

## 2023-07-04 RX ADMIN — OXYCODONE HYDROCHLORIDE 15 MG: 5 TABLET ORAL at 14:46

## 2023-07-04 RX ADMIN — OXYCODONE HYDROCHLORIDE 15 MG: 5 TABLET ORAL at 02:25

## 2023-07-04 RX ADMIN — LISINOPRIL 10 MG: 10 TABLET ORAL at 08:45

## 2023-07-04 RX ADMIN — METOPROLOL SUCCINATE 100 MG: 50 TABLET, EXTENDED RELEASE ORAL at 08:45

## 2023-07-04 RX ADMIN — NICOTINE 21 MG: 21 PATCH, EXTENDED RELEASE TRANSDERMAL at 08:45

## 2023-07-04 RX ADMIN — HYDROMORPHONE HYDROCHLORIDE 1 MG: 1 INJECTION, SOLUTION INTRAMUSCULAR; INTRAVENOUS; SUBCUTANEOUS at 05:06

## 2023-07-04 RX ADMIN — OXYCODONE HYDROCHLORIDE 15 MG: 5 TABLET ORAL at 06:18

## 2023-07-04 RX ADMIN — HYDROMORPHONE HYDROCHLORIDE 0.5 MG: 1 INJECTION, SOLUTION INTRAMUSCULAR; INTRAVENOUS; SUBCUTANEOUS at 12:36

## 2023-07-04 RX ADMIN — HYDROMORPHONE HYDROCHLORIDE 1 MG: 1 INJECTION, SOLUTION INTRAMUSCULAR; INTRAVENOUS; SUBCUTANEOUS at 00:18

## 2023-07-04 NOTE — PROGRESS NOTES
Zula Burkitt  66 y.o.  male  1960  mrn [id number]    Assessment/Plan:  1. 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 He did not have fever but WBC count was elevated.      I reviewed X-ray of left foot which showed defect at head of 1st met. He was placed on Vanco and Rocephin, ESR was 69 and CRP was 50.4. MRI of left foot showed plantar ulcer at 1st MTP joint with cellulitis and osteo with abscess.     He went to the OR on 6/30 for TMA. OR cx is pending. Wound cx done PTA on 6/28 grew MRSA. WBC count has decreased to 8.9K. Bld cx's are neg    7/3: No fever and WBC count is 7.4K. He went to the OR on 6/30 for TMA. OR cx grew MRSA and Corynebacterium striatum. Wound cx done PTA on 6/28 grew MRSA. Bld cx's are neg. Podiatry reports that the wound is healing well. He is on Vanco and Rocephin     - Cont Vanco until Pt is ready for d/c - Pt would probably benefit from a few more days of IV abx tx before switching to po abx  - Will stop Rocephin since no evidence of gm neg infxn  - Podiatry will change dressing tomorrow to determine when Pt will be ready for d/c  - Will check with Micro whether his MRSA isolate is susceptible to Zyvox which will also cover C striatum  - Cont local wound care as per Podiatry  - Maybe able to transition to po Zyvox when Pt is ready for d/c and cont for three weeks although will not be able to use this abx for more than 3 weeks because there is risk of developing neutropenia or thrombocytopenia  - Need to ask Case management to check whether his insurance will cover Zyvox  - Will cont to monitor for the development of toxicity to current abx tx    Discussed case with Dr. Virgil Ramirez       Subjective: Pt denies significant left foot pain. Wound is reported to be healing well.  No fever and WBC count is nml. No probs with current abx tx    Objective:  VSS, Tmax: 98.8  HEENT:  No scleral icterus  NECK: Supple  CARDIAC:  RRR, nml S1, S2  LUNGS:  Clear  ABDOMEN:  +BS, soft, nontender  MUSCULOSKELETAL:  No calf tenderness  EXTREMITIES:  +Intact dressing on left foot  SKIN:  No rash      Labs:  CBC w/diff  Recent Labs     07/03/23 0431   WBC 7.45   HGB 9.7*   HCT 29.5*      NEUTOPHILPCT 51   LYMPHOPCT 24   MONOPCT 18*   EOSPCT 6     BMP  Recent Labs     07/03/23 0431   K 4.7   CL 96   CO2 30   BUN 6   CREATININE 1.15   CALCIUM 8.8     CMP  Recent Labs     07/03/23 0431   K 4.7   CL 96   CO2 30   BUN 6   CREATININE 1.15   CALCIUM 8.8   ALKPHOS 253*   ALT 26   AST 59*       . labrc    Cultures:  Lab Results   Component Value Date    BLOODCX No Growth After 4 Days. 06/29/2023    BLOODCX No Growth After 4 Days. 06/29/2023    BLOODCX No Growth After 5 Days. 05/29/2023    BLOODCX No Growth After 5 Days.  05/29/2023     Lab Results   Component Value Date    WOUNDCULT (A) 06/30/2023     2 colonies Methicillin Resistant Staphylococcus aureus    WOUNDCULT (A) 06/30/2023     Growth in Broth culture only Staphylococcus coagulase negative    WOUNDCULT 2 colonies Staphylococcus aureus (A) 06/30/2023    WOUNDCULT 1+ Growth of Corynebacterium striatum (A) 06/30/2023    WOUNDCULT (A) 06/28/2023     1+ Growth of Methicillin Resistant Staphylococcus aureus    WOUNDCULT 2+ Growth of 06/28/2023    WOUNDCULT (A) 04/03/2023     3+ Growth of Methicillin Resistant Staphylococcus aureus    WOUNDCULT 3+ Growth of 04/03/2023     No results found for: "URINECX"  No results found for: "SPUTUMCULTUR"    MED:  Vanco: #5  Rocephin: #5         Current Facility-Administered Medications:   •  acetaminophen (TYLENOL) tablet 650 mg, 650 mg, Oral, Q6H PRN, Aristides Bouquet, DPM  •  atorvastatin (LIPITOR) tablet 40 mg, 40 mg, Oral, Daily, Aristides Bouquet, DPM, 40 mg at 07/03/23 1352  •  heparin (porcine) subcutaneous injection 5,000 Units, 5,000 Units, Subcutaneous, Q8H [address] [facility], DP, 5,000 Units at 07/03/23 2028  •  HYDROmorphone (DILAUDID) injection 1 mg, 1 mg, Intravenous, Q3H PRN, Aleida Coelho MD, 1 mg at 07/03/23 1925  •  insulin lispro (HumaLOG) FOR PUMP REFILLS 25 Units, 25 Units, Subcutaneous Insulin Pump, Daily PRN, Yoan Devine DPM  •  insulin lispro (HumaLOG) FOR PUMP REFILLS 300 Units, 300 Units, Subcutaneous Insulin Pump, Daily PRN, Ursula Bradford DO  •  lisinopril (ZESTRIL) tablet 10 mg, 10 mg, Oral, Daily, Yoan Devine DPM, 10 mg at 07/03/23 0830  •  metoprolol succinate (TOPROL-XL) 24 hr tablet 100 mg, 100 mg, Oral, Daily, Yoan Devine DPM, 100 mg at 07/03/23 0830  •  nicotine (NICODERM CQ) 21 mg/24 hr TD 24 hr patch 21 mg, 21 mg, Transdermal, Daily, Yoan Devine DPM, 21 mg at 07/02/23 7714  •  nicotine polacrilex (NICORETTE) gum 2 mg, 2 mg, Oral, Q2H PRN, Yoan Devine DPM  •  [facility]ron [facility]) injection 4 mg, 4 mg, Intravenous, Q6H PRN, Yoan Devine DPM  •  oxyCODONE (ROXICODONE) IR tablet 10 mg, 10 mg, Oral, Q4H PRN, Aleida Coelho MD  •  oxyCODONE (ROXICODONE) IR tablet 15 mg, 15 mg, Oral, Q4H PRN, Aleida Coelho MD, 15 mg at 07/03/23 2204  •  PATIENT MAINTAINED INSULIN PUMP 1 each, 1 each, Subcutaneous, Q8H, Mariam Danielle MD, 1 each at 07/03/23 0439  •  vancomycin (VANCOCIN) 1500 mg in sodium chloride 0.9% 250 mL IVPB, 1,500 mg, Intravenous, Q24H, Aleida Coelho MD, Last Rate: 167.5 mL/hr at 07/01/23 1216, 1,500 mg at 07/03/23 1141    Principal Problem:    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, with fat layer exposed ([address])  Active Problems:    Mixed hyperlipidemia    Insulin pump in place    Benign essential hypertension    Type 1 diabetes mellitus with hyperglycemia ([address])    Stage 2 chronic kidney disease    Hyponatremia    Sepsis ([address])      Lukas Cantu MD

## 2023-07-04 NOTE — PLAN OF CARE
Problem: OCCUPATIONAL THERAPY ADULT  Goal: Performs self-care activities at highest level of function for planned discharge setting. See evaluation for individualized goals. Description: Treatment Interventions: ADL retraining, Functional transfer training, Endurance training, Patient/family training, Compensatory technique education, Continued evaluation, Energy conservation          See flowsheet documentation for full assessment, interventions and recommendations. Note: Limitation: Decreased ADL status, Decreased endurance, Decreased self-care trans, Decreased high-level ADLs  Prognosis: Good  Assessment: Pt is a 66 y.o. YO  male admitted to \[facility]"" on 2023 w/ diabetic ulcer of midfoot s/p L TMA. Pt  has a past medical history of Chronic foot ulcer ([address]), Diabetes mellitus ([address]), Gout, High cholesterol, Hypertension, and Visual impairment. Pt with active OT orders and [facility] LLE orders. . Pt resides in a house with spouse. Pt was I w/  ADLS and IADLS, (+) drove, & required no use of DME PTA. Currently pt is min a for adls and supervision for transfers. Pt is limited at this time 2*: pain, endurance, activity tolerance, functional mobility, decreased I w/ ADLS/IADLS and decreased safety awareness. The following Occupational Performance Areas to address include: grooming, bathing/shower, toilet hygiene, dressing and functional mobility. Based on the aforementioned OT evaluation, functional performance deficits, and assessments, pt has been identified as a high complexity evaluation. From OT standpoint, anticipate d/c home with family support. Pt to continue to benefit from acute immediate OT services to address the following goals 2-3x/week to  w/in 10-14 days:. The patient's raw score on the AM-PAC Daily Activity Inpatient Short Form is 21. A raw score of greater than or equal to 19 suggests the patient may benefit from discharge to home.  Please refer to the recommendation of the Occupational Therapist for safe discharge planning.

## 2023-07-04 NOTE — PROGRESS NOTES
Lul Blanton is a 66 y.o. male who is currently ordered Vancomycin IV with management by the Pharmacy Consult service. Relevant clinical data and objective / subjective history reviewed. Vancomycin Assessment:  Indication and Goal AUC/Trough: Bone/joint infection (goal -600, trough >10)  Clinical Status: stable  Micro:     Renal Function:  SCr: 1.22 mg/dL  CrCl: 56.9 mL/min  Renal replacement: Not on dialysis  Days of Therapy: 6  Current Dose: 1500 mg Q24H (vanco trough today 15.6 = Therapeutic)  Vancomycin Plan:  New Dosing: no change  Estimated AUC: 571 mcg*hr/mL  Estimated Trough: 16 mcg/mL  Next Level: Random level with AM labs on 7/9/23  Renal Function Monitoring: Daily BMP and [place] will continue to follow closely for s/sx of nephrotoxicity, infusion reactions and appropriateness of therapy. BMP and CBC will be ordered per protocol. We will continue to follow the patient’s culture results and clinical progress daily.     Marty Owen, Pharmacist

## 2023-07-04 NOTE — OCCUPATIONAL THERAPY NOTE
Occupational Therapy Evaluation     Patient Name: Manish Dallas  [id number] Date: 7/4/2023  Problem List  Principal Problem:    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, with fat layer exposed ([address])  Active Problems:    Mixed hyperlipidemia    Insulin pump in place    Benign essential hypertension    Type 1 diabetes mellitus with hyperglycemia ([address])    Stage 2 chronic kidney disease    Hyponatremia    Sepsis ([address])    Past Medical History  Past Medical History:   Diagnosis Date    Chronic foot ulcer ([address]) 09/07/2018    Diabetes mellitus ([address])     Gout     High cholesterol     Hypertension     Visual impairment 11/1/2022    Cateract     Past Surgical History  Past Surgical History:   Procedure Laterality Date    CATARACT EXTRACTION Right 01/2023    COMPLEX WOUND CLOSURE TO EXTREMITY Left 07/18/2019    Procedure: COMPLEX CLOSURE LEFT CHEEK;  Surgeon: Leland Chopra MD;  Location: [facility];  Service: Plastics    FACIAL/NECK BIOPSY Left 07/18/2019    Procedure: EXCISION LEFT CHEEK CYST;  Surgeon: Leland Chopra MD;  Location: [facility];  Service: Plastics    HERNIA REPAIR Left     several times    KNEE ARTHROSCOPY Left     torn meniscus    IN AMPUTATION FOOT TRANSMETARSAL Left 6/30/2023    Procedure: AMPUTATION TRANSMETATARSAL (TMA);  Surgeon: Gina Allen DPM;  Location: [facility];  Service: Podiatry    WOUND DEBRIDEMENT Left 5/30/2023    Procedure: DEBRIDEMENT WOUND LEFT FOOT WOUND WITH EXCISION OF INFECTED BONE AND WOUND VAC APPLICATION;  Surgeon: Mariola Talavera DPM;  Location: [facility];  Service: Podiatry         07/04/23 0835   OT Last Visit   OT Visit Date 07/04/23   Note Type   Note type Evaluation   Pain Assessment   Pain Assessment Tool 0-10   Pain Score 8   Pain Location/Orientation Orientation: Left; Location: Foot   Hospital Pain Intervention(s) Repositioned; Ambulation/increased activity; Emotional support   Restrictions/Precautions   Weight Bearing Precautions Per Order Yes   LLE Weight Bearing Per Order (S)  NWB   Other Precautions Contact/isolation;Multiple lines;Telemetry; Fall Risk   Home Living   Type of [facility]  Two level  (plans to stay on one level)   Bathroom Shower/Tub Tub/shower unit   [facility]   Prior Function   Level of [place] Independent with ADLs   Lives With Spouse   IADLs Independent with driving   Falls in the last 6 months 0   Lifestyle   Autonomy pta pt reports I in ADLs/IADLs/functional mobility   Reciprocal Relationships supportive spouse   Intrinsic Gratification enjoys gardening   Subjective   Subjective "I would like to talk to someone who got this done"   ADL   Where Assessed Edge of bed   Eating Assistance 5  Supervision/Setup   Grooming Assistance 5  Supervision/Setup   [address] [facility] [address] [facility]   Additional items Verbal cues   Stand to Sit 5  Supervision   Additional items Verbal cues   Functional Mobility   Functional Mobility 5  Supervision   Additional items Rolling walker   Balance   Static Sitting Normal   Dynamic Sitting Good   Static Standing Fair +   Dynamic Standing Fair   Ambulatory Fair   Activity Tolerance   Activity Tolerance Patient limited by fatigue   Nurse Made Aware okay to see per RN   RUE Assessment   RUE Assessment WFL   LUE Assessment   LUE Assessment WFL   Hand Function   Gross Motor Coordination Functional   Fine Motor Coordination Functional   Cognition   Overall Cognitive Status WFL   Arousal/Participation Cooperative   Attention Within functional limits   Orientation Level Oriented X4   Memory Within functional limits   Following Commands Follows all commands and directions without difficulty   Comments G carryover of precautions   Assessment   Limitation Decreased ADL status; Decreased endurance;Decreased self-care trans;Decreased high-level ADLs   Prognosis Good   Assessment Pt is a 66 y.o. YO  male admitted to [facility] on 2023 w/ diabetic ulcer of midfoot s/p L TMA. Pt  has a past medical history of Chronic foot ulcer ([address]), Diabetes mellitus ([address]), Gout, High cholesterol, Hypertension, and Visual impairment. Pt with active OT orders and NWB LLE orders. . Pt resides in a house with spouse. Pt was I w/  ADLS and IADLS, (+) drove, & required no use of DME PTA. Currently pt is min a for adls and supervision for transfers. Pt is limited at this time 2*: pain, endurance, activity tolerance, functional mobility, decreased I w/ ADLS/IADLS and decreased safety awareness. The following Occupational Performance Areas to address include: grooming, bathing/shower, toilet hygiene, dressing and functional mobility. Based on the aforementioned OT evaluation, functional performance deficits, and assessments, pt has been identified as a high complexity evaluation. From OT standpoint, anticipate d/c home with family support. Pt to continue to benefit from acute immediate OT services to address the following goals 2-3x/week to  w/in 10-14 days:. The patient's raw score on the AM-PAC Daily Activity Inpatient Short Form is 21. A raw score of greater than or equal to 19 suggests the patient may benefit from discharge to home. Please refer to the recommendation of the Occupational Therapist for safe discharge planning. Goals   Patient Goals go home   LTG Time Frame 7-10   Plan   Treatment Interventions ADL retraining;Functional transfer training; Endurance training;Patient/family training; Compensatory technique education;Continued evaluation; Energy conservation   Goal Expiration Date 23   OT Frequency 2-3x/wk   Recommendation   [facility] Rehab Discharge Recommendation (PT/OT) Level 3   AM-PAC Daily Activity Inpatient   Lower Body Dressing 3   Bathing 3   Toileting 3   Upper Body Dressing 4   Grooming 4   Eating 4   Daily Activity Raw Score 21   Daily Activity Standardized Score (Calc for Raw Score >=11) 44.27       GOALS    1) Pt will increase activity tolerance to G for 30 min txment sessions    2) Pt will complete UB/LB dressing/self care w/ mod I using adaptive device and DME as needed    3) Pt will complete bathing w/ Mod I w/ use of AE and DME as needed    4) Pt will complete toileting w/ mod I w/ G hygiene/thoroughness using DME as needed    5) Pt will improve functional transfers to Mod I on/off all surfaces using DME as needed w/ G balance/safety     6) Pt will improve functional mobility during ADL/IADL/leisure tasks to Mod I using DME as needed w/ G balance/safety     7) Pt will participate in simulated IADL management task to increase independence to  w/ G safety and endurance    8) Pt will demonstrate G carryover of pt/caregiver education and training as appropriate. 9) Pt will demonstrate 100% carryover of energy conservation techniques t/o functional I/ADL/leisure tasks w/o cues s/p skilled education    10) Pt will independently identify and utilize 2-3 coping strategies to increase positive affect and promote overall well-being.     11) Pt will engage in ongoing cognitive assessment w/ G participation to assist w/ safe d/c planning/recommendations    Jarocho Galdamez MS, OTR/L

## 2023-07-04 NOTE — PROGRESS NOTES
Progress Note - Podiatry  Zula Burkitt 66 y.o. male MRN: [id number]  Unit/Bed#: -01 Encounter: [id number]    Assessment/Plan:    Sepsis/cellulitis left foot/septic arthritis/osteomyelitis  • Continue with antibiotics per infectious disease  • Infectious disease note appreciated, discharged on Zyvox  • OR wound culture grew MRSA  • MRI showed osteomyelitis of the first metatarsal, second metatarsal and third metatarsal with extensive infection and abscess formation within the first interspace. • WBC 7.57    Diabetic foot ulceration with necrosis to bone (osteomyelitis left first metatarsal) -Veloz 3  • POD #4 S/P TMA - LFT  • Complete resection of the entire first metatarsal LFTwas performed with the procedure. • DSD with Adaptic gauze ABD Kerlix and Ace wrap applied. • Okay for discharge to home  • PT/OT has instructed patient on proper nonweightbearing with crutches/walker  • Reinforced with patient the importance that he remain completely NWB on his left foot. • VNA to do bandage changes twice a week  • Follow-up in 1 or 2 weeks with myself or Dr. Canseco Living at wound care     Pressure ulcer left foot unstageable  • Resolved with surgery     Hyponatremia, CKD, type 1 diabetes with hyperglycemia, type 1 diabetes with peripheral neuropathy, benign hypertension, mixed hyperlipidemia  • Managed per internal medicine    Subjective/Objective   Chief Complaint:   Chief Complaint   Patient presents with   • Wound Infection     Patient presents to the ED with c/o left foot infection, states he had surgery on foot in may and states that Monday visiting nurse noticed it was red and swollen, states went to wound care yesterday and wanted patient admitted for infection        Subjective: 66year-old type I diabetic POD #4 status post TMA left foot, reports diminishing pain but occasionally has episodes of sharp pain. Denies any shortness of breath or increasing pain/discomfort.     Blood pressure 124/72, pulse (!) 117, temperature 98.6 °F (37 °C), temperature source Temporal, resp. rate 15, height 6' (1.829 m), weight 63.5 kg (140 lb), SpO2 93 %. ,Body mass index is 18.99 kg/m². Invasive Devices     Peripheral Intravenous Line  Duration           Peripheral IV 07/03/23 Dorsal (posterior); Left Forearm 1 day          Line  Duration           Pump Device Insulin pump Anterior; Left Abdomen 4 days                Physical Exam:   General appearance: alert, cooperative and no distress  Neuro/Vascular: Grossly intact at baseline with no deficiencies, unchanged from preoperative state. Extremity: Surgical dressing intact with minimal bloody strikethrough drainage spot the size of a nickel that did not go through the ABD, no active drainage noted upon removal of current dressing. No maceration or signs of active necrosis, good capillary filling of each respective flap. Diminished edema erythema and calor remains unchanged. No signs of further infection/cellulitis                      Labs and other studies:   CBC w/diff  Results from last 7 days   Lab Units 07/04/23  0511   WBC Thousand/uL 7.57   HEMOGLOBIN g/dL 9.7*   HEMATOCRIT % 28.9*   PLATELETS Thousands/uL 392*   NEUTROS PCT % 56   LYMPHS PCT % 18   MONOS PCT % 17*   EOS PCT % 7*     BMP  Results from last 7 days   Lab Units 07/04/23  0511   POTASSIUM mmol/L 4.6   CHLORIDE mmol/L 96   CO2 mmol/L 29   BUN mg/dL 6   CREATININE mg/dL 1.22   CALCIUM mg/dL 9.1     CMP  Results from last 7 days   Lab Units 07/04/23  0511   POTASSIUM mmol/L 4.6   CHLORIDE mmol/L 96   CO2 mmol/L 29   BUN mg/dL 6   CREATININE mg/dL 1.22   CALCIUM mg/dL 9.1   ALK PHOS U/L 288*   ALT U/L 26   AST U/L 32       @Culture@  Lab Results   Component Value Date    BLOODCX No Growth After 4 Days. 06/29/2023    BLOODCX No Growth After 4 Days. 06/29/2023    BLOODCX No Growth After 5 Days. 05/29/2023    BLOODCX No Growth After 5 Days.  05/29/2023     Lab Results   Component Value Date    WOUNDCULT (A) 06/30/2023     2 colonies Methicillin Resistant Staphylococcus aureus    WOUNDCULT (A) 06/30/2023     Growth in Broth culture only Staphylococcus coagulase negative    WOUNDCULT (A) 06/30/2023     2 colonies Methicillin Resistant Staphylococcus aureus    WOUNDCULT 1+ Growth of Corynebacterium striatum (A) 06/30/2023         Current Facility-Administered Medications:   •  acetaminophen (TYLENOL) tablet 650 mg, 650 mg, Oral, Q6H PRN, Anjelica Cormier DPM  •  atorvastatin (LIPITOR) tablet 40 mg, 40 mg, Oral, Daily, Anjelicadiogo Cormier DPM, 40 mg at 07/03/23 1352  •  heparin (porcine) subcutaneous injection 5,000 Units, 5,000 Units, Subcutaneous, Q8H [address], Solange [place], DPM, 5,000 Units at 07/03/23 2028  •  HYDROmorphone (DILAUDID) injection 0.5 mg, 0.5 mg, Intravenous, Q3H PRN, Brandan Oreilly MD, 0.5 mg at 07/04/23 1236  •  insulin lispro (HumaLOG) FOR PUMP REFILLS 25 Units, 25 Units, Subcutaneous Insulin Pump, Daily PRN, Anjelica Cormier DPKEV  •  insulin lispro (HumaLOG) FOR PUMP REFILLS 300 Units, 300 Units, Subcutaneous Insulin Pump, Daily PRN, Santos Chan DO  •  lisinopril (ZESTRIL) tablet 10 mg, 10 mg, Oral, Daily, Anjelica Cormier DPM, 10 mg at 07/04/23 0845  •  metoprolol succinate (TOPROL-XL) 24 hr tablet 100 mg, 100 mg, Oral, Daily, Anjelica Cormier DPM, 100 mg at 07/04/23 0845  •  nicotine (NICODERM CQ) 21 mg/24 hr TD 24 hr patch 21 mg, 21 mg, Transdermal, Daily, Anjelica Cormier DPM, 21 mg at 07/04/23 0845  •  nicotine polacrilex (NICORETTE) gum 2 mg, 2 mg, Oral, Q2H PRN, Anjelica Cormier DPM  •  ondansetron [facility]) injection 4 mg, 4 mg, Intravenous, Q6H PRN, Anjelica Cormier DPM  •  oxyCODONE (ROXICODONE) IR tablet 10 mg, 10 mg, Oral, Q4H PRN, Nghia Florian MD  •  oxyCODONE (ROXICODONE) IR tablet 15 mg, 15 mg, Oral, Q4H PRN, Nghia Florian MD, 15 mg at 07/04/23 1020  •  PATIENT MAINTAINED INSULIN PUMP 1 each, 1 each, Subcutaneous, Q8H, Jacqui Chao MD, 1 each at 07/03/23 0439  •  vancomycin (VANCOCIN) 1500 mg in sodium chloride 0.9% 250 mL IVPB, 1,500 mg, Intravenous, Q24H, Alena Romero MD, Last Rate: 167.5 mL/hr at 07/01/23 1216, 1,500 mg at 07/04/23 1120    Imaging: I have personally reviewed pertinent films in PACS  EKG, Pathology, and Other Studies: I have personally reviewed pertinent reports.   VTE Pharmacologic Prophylaxis: Heparin  VTE Mechanical Prophylaxis: sequential compression device    Linette Luciano DPM

## 2023-07-04 NOTE — DISCHARGE INSTR - AVS FIRST PAGE
Wound care:  VNA to change dressing at home every 3 or 4 days. Follow-up at wound care in 1 or 2 weeks. Dressing: Betadine, Adaptic, alginate, 4 x 4, Kerlix, and Ace wrap  Must remain completely nonweightbearing on left foot. Call immediately if any signs of increasing infection are noted.  (Dr So Dus to [phone])

## 2023-07-04 NOTE — PLAN OF CARE
Problem: PAIN - ADULT  Goal: Verbalizes/displays adequate comfort level or baseline comfort level  Description: Interventions:  - Encourage patient to monitor pain and request assistance  - Assess pain using appropriate pain scale  - Administer analgesics based on type and severity of pain and evaluate response  - Implement non-pharmacological measures as appropriate and evaluate response  - Consider cultural and social influences on pain and pain management  - Notify physician/advanced practitioner if interventions unsuccessful or patient reports new pain  7/4/2023 1507 by Alida Oneill RN  Outcome: Adequate for Discharge  7/4/2023 0931 by Alida Oneill RN  Outcome: Progressing     Problem: INFECTION - ADULT  Goal: Absence or prevention of progression during hospitalization  Description: INTERVENTIONS:  - Assess and monitor for signs and symptoms of infection  - Monitor lab/diagnostic results  - Monitor all insertion sites, i.e. indwelling lines, tubes, and drains  - Monitor endotracheal if appropriate and nasal secretions for changes in amount and color  - [place] appropriate cooling/warming therapies per order  - Administer medications as ordered  - Instruct and encourage patient and family to use good hand hygiene technique  - Identify and instruct in appropriate isolation precautions for identified infection/condition  7/4/2023 1507 by Alida Oneill RN  Outcome: Adequate for Discharge  7/4/2023 0931 by Alida Oneill RN  Outcome: Progressing  Goal: Absence of fever/infection during neutropenic period  Description: INTERVENTIONS:  - Monitor WBC    7/4/2023 1507 by Alida Oneill RN  Outcome: Adequate for Discharge  7/4/2023 0931 by Alida Oneill RN  Outcome: Progressing     Problem: SAFETY ADULT  Goal: Patient will remain free of falls  Description: INTERVENTIONS:  - Educate patient/family on patient safety including physical limitations  - Instruct patient to call for assistance with activity   - Consult OT/PT to assist with strengthening/mobility   - Keep Call bell within reach  - Keep bed low and locked with side rails adjusted as appropriate  - Keep care items and personal belongings within reach  - Initiate and maintain comfort rounds  - Make Fall Risk Sign visible to staff  - Offer Toileting every 2 Hours, in advance of need  - Initiate/Maintain bed/ chair alarm  - Obtain necessary fall risk management equipment: walker  - Apply yellow socks and bracelet for high fall risk patients  - Consider moving patient to room near nurses station  7/4/2023 1507 by Melly Buckner RN  Outcome: Adequate for Discharge  7/4/2023 0931 by Melly Buckner RN  Outcome: Progressing  Goal: Maintain or return to baseline ADL function  Description: INTERVENTIONS:  -  Assess patient's ability to carry out ADLs; assess patient's baseline for ADL function and identify physical deficits which impact ability to perform ADLs (bathing, care of mouth/teeth, toileting, grooming, dressing, etc.)  - Assess/evaluate cause of self-care deficits   - Assess range of motion  - Assess patient's mobility; develop plan if impaired  - Assess patient's need for assistive devices and provide as appropriate  - Encourage maximum independence but intervene and supervise when necessary  - Involve family in performance of ADLs  - Assess for home care needs following discharge   - Consider OT consult to assist with ADL evaluation and planning for discharge  - Provide patient education as appropriate  7/4/2023 1507 by Melly Buckner RN  Outcome: Adequate for Discharge  7/4/2023 0931 by eMlly Buckner RN  Outcome: Progressing  Goal: Maintains/Returns to pre admission functional level  Description: INTERVENTIONS:  - Perform BMAT or MOVE assessment daily.   - Set and communicate daily mobility goal to care team and patient/family/caregiver. - Collaborate with rehabilitation services on mobility goals if consulted  - Perform Range of Motion 3 times a day.   - Reposition patient every 2 hours. - Dangle patient 3 times a day  - Stand patient 3 times a day  - Ambulate patient 3 times a day  - Out of bed to chair 3 times a day   - Out of bed for meals 3 times a day  - Out of bed for toileting  - Record patient progress and toleration of activity level   7/4/2023 1507 by Rob Olivares RN  Outcome: Adequate for Discharge  7/4/2023 0931 by Rob Olivares RN  Outcome: Progressing     Problem: DISCHARGE PLANNING  Goal: Discharge to home or other facility with appropriate resources  Description: INTERVENTIONS:  - Identify barriers to discharge w/patient and caregiver  - Arrange for needed discharge resources and transportation as appropriate  - Identify discharge learning needs (meds, wound care, etc.)  - Arrange for interpretive services to assist at discharge as needed  - Refer to Case Management Department for coordinating discharge planning if the patient needs post-hospital services based on physician/advanced practitioner order or complex needs related to functional status, cognitive ability, or social support system  7/4/2023 1507 by Rob Olivares RN  Outcome: Adequate for Discharge  7/4/2023 0931 by Rob Olivares RN  Outcome: Progressing     Problem: Knowledge Deficit  Goal: Patient/family/caregiver demonstrates understanding of disease process, treatment plan, medications, and discharge instructions  Description: Complete learning assessment and assess knowledge base. Interventions:  - Provide teaching at level of understanding  - Provide teaching via preferred learning methods  7/4/2023 1507 by Rob Olivares RN  Outcome: Adequate for Discharge  7/4/2023 0931 by Rob Olivares RN  Outcome: Progressing     Problem: Nutrition/Hydration-ADULT  Goal: Nutrient/Hydration intake appropriate for improving, restoring or maintaining nutritional needs  Description: Monitor and assess patient's nutrition/hydration status for malnutrition.  Collaborate with interdisciplinary team and initiate plan and interventions as ordered. Monitor patient's weight and dietary intake as ordered or per policy. Utilize nutrition screening tool and intervene as necessary. Determine patient's food preferences and provide high-protein, high-caloric foods as appropriate.      INTERVENTIONS:  - Monitor oral intake, urinary output, labs, and treatment plans  - Assess nutrition and hydration status and recommend course of action  - Evaluate amount of meals eaten  - Assist patient with eating if necessary   - Allow adequate time for meals  - Recommend/ encourage appropriate diets, oral nutritional supplements, and vitamin/mineral supplements  - Order, calculate, and assess calorie counts as needed  - Recommend, monitor, and adjust tube feedings and TPN/PPN based on assessed needs  - Assess need for intravenous fluids  - Provide specific nutrition/hydration education as appropriate  - Include patient/family/caregiver in decisions related to nutrition  7/4/2023 1507 by Brooke Mayen RN  Outcome: Adequate for Discharge  7/4/2023 0931 by Brooke Mayen RN  Outcome: Progressing     Problem: MOBILITY - ADULT  Goal: Maintain or return to baseline ADL function  Description: INTERVENTIONS:  -  Assess patient's ability to carry out ADLs; assess patient's baseline for ADL function and identify physical deficits which impact ability to perform ADLs (bathing, care of mouth/teeth, toileting, grooming, dressing, etc.)  - Assess/evaluate cause of self-care deficits   - Assess range of motion  - Assess patient's mobility; develop plan if impaired  - Assess patient's need for assistive devices and provide as appropriate  - Encourage maximum independence but intervene and supervise when necessary  - Involve family in performance of ADLs  - Assess for home care needs following discharge   - Consider OT consult to assist with ADL evaluation and planning for discharge  - Provide patient education as appropriate  7/4/2023 1507 by Gege Mccord Jeremias Streeter RN  Outcome: Adequate for Discharge  7/4/2023 0931 by Heron Koch RN  Outcome: Progressing  Goal: Maintains/Returns to pre admission functional level  Description: INTERVENTIONS:  - Perform BMAT or MOVE assessment daily.   - Set and communicate daily mobility goal to care team and patient/family/caregiver. - Collaborate with rehabilitation services on mobility goals if consulted  - Perform Range of Motion 3 times a day. - Reposition patient every 2 hours.   - Dangle patient 3 times a day  - Stand patient 3 times a day  - Ambulate patient 3 times a day  - Out of bed to chair 3 times a day   - Out of bed for meals 3 times a day  - Out of bed for toileting  - Record patient progress and toleration of activity level   7/4/2023 1507 by Heron Koch RN  Outcome: Adequate for Discharge  7/4/2023 0931 by Heron Koch RN  Outcome: Progressing     Problem: Prexisting or High Potential for Compromised Skin Integrity  Goal: Skin integrity is maintained or improved  Description: INTERVENTIONS:  - Identify patients at risk for skin breakdown  - Assess and monitor skin integrity  - Assess and monitor nutrition and hydration status  - Monitor labs   - Assess for incontinence   - Turn and reposition patient  - Assist with mobility/ambulation  - Relieve pressure over bony prominences  - Avoid friction and shearing  - Provide appropriate hygiene as needed including keeping skin clean and dry  - Evaluate need for skin moisturizer/barrier cream  - Collaborate with interdisciplinary team   - Patient/family teaching  - Consider wound care consult   7/4/2023 1507 by Heron Koch RN  Outcome: Adequate for Discharge  7/4/2023 0931 by Heron Koch RN  Outcome: Progressing

## 2023-07-04 NOTE — ASSESSMENT & PLAN NOTE
· Met sepsis criteria with WBC greater than 12 K, heart rate 109 with a likely source infection diabetic foot wound  · Sepsis resolved

## 2023-07-04 NOTE — CASE MANAGEMENT
Case Management Progress Note    Patient name Lion Nuñez  Location [address] [facility] 213/-01 MRN [id number]  : 1960 Date 2023       LOS (days): 5  Geometric Mean LOS (GMLOS) (days):   Days to GMLOS:        OBJECTIVE:     Current admission status: Inpatient  Preferred Pharmacy:   [id number] [address] [facility] [address]  Phone: [phone] Fax: [phone]    Primary Care Provider: Rudi Weeks MD    Primary Insurance: [employer]  Secondary Insurance:     PROGRESS NOTE:    Cm called [facility] to see if pt's linezolid (ZYVOX) 600 mg tablet was covered. Cm was informed by pharmacy staff that it is covered with no copay.

## 2023-07-04 NOTE — ASSESSMENT & PLAN NOTE
Patient has diabetic foot infection. On 5/30 he had sesamoid bone removal due to diabetic foot infection. Since then he has been following with podiatry and wound care. He reported significant improvement in his wound appeared to be healing, however this Sunday his wound became red, had discharge, and became very tender. He saw Dr. Bib Balderas in office yesterday who recommended he come to the hospital for an MRI and likely amputation of left midfoot. · Wound culture MRSA  · MRI foot with abscesses and osteomyelitis  · X-ray of foot showed osseous abnormalities and cannot rule out osteomyelitis  · Podiatry following, post TMA on June 30  · Transitioned to zyvox per ID recs. Dc on course of ZYVOX. Reviewed w/ Dr. Bib Balderas ok for dc.

## 2023-07-04 NOTE — ASSESSMENT & PLAN NOTE
Lab Results   Component Value Date    EGFR 63 07/04/2023    EGFR 68 07/03/2023    EGFR 65 07/02/2023    CREATININE 1.22 07/04/2023    CREATININE 1.15 07/03/2023    CREATININE 1.19 07/02/2023     · Creatinine is currently at baseline, can continue to monitor

## 2023-07-04 NOTE — QUICK NOTE
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      - Cont Vanco until Pt is ready for d/c  - Need to find out if Pt's med insurance will cover Zyvox 600 mg po BID and cont through 7/21 which will complete 3 week course of abx tx post-op  - Cont local care of wound as per Podiatry who will change dressing today and determine when Pt can be d/c'd  - My office will arrange for Pt to have CBC with 10 days after he starts Zyvox to make sure that he has not developed neutropenia or thrombocytopenia  - Pt will f/u with me as an out-Pt

## 2023-07-04 NOTE — ASSESSMENT & PLAN NOTE
Lab Results   Component Value Date    HGBA1C 7.9 (H) 03/31/2023       Recent Labs     07/04/23  0557 07/04/23  0708 07/04/23  0920 07/04/23  1105   POCGLU 171* 195* 158* 61*       Blood Sugar Average: Last 72 hrs:  (P) [id number]   Please see note attached to insulin pump in place

## 2023-07-04 NOTE — PLAN OF CARE
Problem: PAIN - ADULT  Goal: Verbalizes/displays adequate comfort level or baseline comfort level  Description: Interventions:  - Encourage patient to monitor pain and request assistance  - Assess pain using appropriate pain scale  - Administer analgesics based on type and severity of pain and evaluate response  - Implement non-pharmacological measures as appropriate and evaluate response  - Consider cultural and social influences on pain and pain management  - Notify physician/advanced practitioner if interventions unsuccessful or patient reports new pain  Outcome: Progressing     Problem: INFECTION - ADULT  Goal: Absence or prevention of progression during hospitalization  Description: INTERVENTIONS:  - Assess and monitor for signs and symptoms of infection  - Monitor lab/diagnostic results  - Monitor all insertion sites, i.e. indwelling lines, tubes, and drains  - Monitor endotracheal if appropriate and nasal secretions for changes in amount and color  - [place] appropriate cooling/warming therapies per order  - Administer medications as ordered  - Instruct and encourage patient and family to use good hand hygiene technique  - Identify and instruct in appropriate isolation precautions for identified infection/condition  Outcome: Progressing  Goal: Absence of fever/infection during neutropenic period  Description: INTERVENTIONS:  - Monitor WBC    Outcome: Progressing     Problem: SAFETY ADULT  Goal: Patient will remain free of falls  Description: INTERVENTIONS:  - Educate patient/family on patient safety including physical limitations  - Instruct patient to call for assistance with activity   - Consult OT/PT to assist with strengthening/mobility   - Keep Call bell within reach  - Keep bed low and locked with side rails adjusted as appropriate  - Keep care items and personal belongings within reach  - Initiate and maintain comfort rounds  - Make Fall Risk Sign visible to staff  - Offer Toileting every 2 Hours, in advance of need  - Initiate/Maintain bed/ chair alarm  - Obtain necessary fall risk management equipment: walker  - Apply yellow socks and bracelet for high fall risk patients  - Consider moving patient to room near nurses station  Outcome: Progressing  Goal: Maintain or return to baseline ADL function  Description: INTERVENTIONS:  -  Assess patient's ability to carry out ADLs; assess patient's baseline for ADL function and identify physical deficits which impact ability to perform ADLs (bathing, care of mouth/teeth, toileting, grooming, dressing, etc.)  - Assess/evaluate cause of self-care deficits   - Assess range of motion  - Assess patient's mobility; develop plan if impaired  - Assess patient's need for assistive devices and provide as appropriate  - Encourage maximum independence but intervene and supervise when necessary  - Involve family in performance of ADLs  - Assess for home care needs following discharge   - Consider OT consult to assist with ADL evaluation and planning for discharge  - Provide patient education as appropriate  Outcome: Progressing  Goal: Maintains/Returns to pre admission functional level  Description: INTERVENTIONS:  - Perform BMAT or MOVE assessment daily.   - Set and communicate daily mobility goal to care team and patient/family/caregiver. - Collaborate with rehabilitation services on mobility goals if consulted  - Perform Range of Motion 3 times a day. - Reposition patient every 2 hours.   - Dangle patient 3 times a day  - Stand patient 3 times a day  - Ambulate patient 3 times a day  - Out of bed to chair 3 times a day   - Out of bed for meals 3 times a day  - Out of bed for toileting  - Record patient progress and toleration of activity level   Outcome: Progressing     Problem: DISCHARGE PLANNING  Goal: Discharge to home or other facility with appropriate resources  Description: INTERVENTIONS:  - Identify barriers to discharge w/patient and caregiver  - Arrange for needed discharge resources and transportation as appropriate  - Identify discharge learning needs (meds, wound care, etc.)  - Arrange for interpretive services to assist at discharge as needed  - Refer to Case Management Department for coordinating discharge planning if the patient needs post-hospital services based on physician/advanced practitioner order or complex needs related to functional status, cognitive ability, or social support system  Outcome: Progressing     Problem: Knowledge Deficit  Goal: Patient/family/caregiver demonstrates understanding of disease process, treatment plan, medications, and discharge instructions  Description: Complete learning assessment and assess knowledge base. Interventions:  - Provide teaching at level of understanding  - Provide teaching via preferred learning methods  Outcome: Progressing     Problem: Nutrition/Hydration-ADULT  Goal: Nutrient/Hydration intake appropriate for improving, restoring or maintaining nutritional needs  Description: Monitor and assess patient's nutrition/hydration status for malnutrition. Collaborate with interdisciplinary team and initiate plan and interventions as ordered. Monitor patient's weight and dietary intake as ordered or per policy. Utilize nutrition screening tool and intervene as necessary. Determine patient's food preferences and provide high-protein, high-caloric foods as appropriate.      INTERVENTIONS:  - Monitor oral intake, urinary output, labs, and treatment plans  - Assess nutrition and hydration status and recommend course of action  - Evaluate amount of meals eaten  - Assist patient with eating if necessary   - Allow adequate time for meals  - Recommend/ encourage appropriate diets, oral nutritional supplements, and vitamin/mineral supplements  - Order, calculate, and assess calorie counts as needed  - Recommend, monitor, and adjust tube feedings and TPN/PPN based on assessed needs  - Assess need for intravenous fluids  - Provide specific nutrition/hydration education as appropriate  - Include patient/family/caregiver in decisions related to nutrition  Outcome: Progressing     Problem: MOBILITY - ADULT  Goal: Maintain or return to baseline ADL function  Description: INTERVENTIONS:  -  Assess patient's ability to carry out ADLs; assess patient's baseline for ADL function and identify physical deficits which impact ability to perform ADLs (bathing, care of mouth/teeth, toileting, grooming, dressing, etc.)  - Assess/evaluate cause of self-care deficits   - Assess range of motion  - Assess patient's mobility; develop plan if impaired  - Assess patient's need for assistive devices and provide as appropriate  - Encourage maximum independence but intervene and supervise when necessary  - Involve family in performance of ADLs  - Assess for home care needs following discharge   - Consider OT consult to assist with ADL evaluation and planning for discharge  - Provide patient education as appropriate  Outcome: Progressing  Goal: Maintains/Returns to pre admission functional level  Description: INTERVENTIONS:  - Perform BMAT or MOVE assessment daily.   - Set and communicate daily mobility goal to care team and patient/family/caregiver. - Collaborate with rehabilitation services on mobility goals if consulted  - Perform Range of Motion 3 times a day. - Reposition patient every 2 hours.   - Dangle patient 3 times a day  - Stand patient 3 times a day  - Ambulate patient 3 times a day  - Out of bed to chair 3 times a day   - Out of bed for meals 3 times a day  - Out of bed for toileting  - Record patient progress and toleration of activity level   Outcome: Progressing     Problem: Prexisting or High Potential for Compromised Skin Integrity  Goal: Skin integrity is maintained or improved  Description: INTERVENTIONS:  - Identify patients at risk for skin breakdown  - Assess and monitor skin integrity  - Assess and monitor nutrition and hydration status  - Monitor labs   - Assess for incontinence   - Turn and reposition patient  - Assist with mobility/ambulation  - Relieve pressure over bony prominences  - Avoid friction and shearing  - Provide appropriate hygiene as needed including keeping skin clean and dry  - Evaluate need for skin moisturizer/barrier cream  - Collaborate with interdisciplinary team   - Patient/family teaching  - Consider wound care consult   Outcome: Progressing

## 2023-07-05 ENCOUNTER — TRANSITIONAL CARE MANAGEMENT (OUTPATIENT)
Dept: FAMILY MEDICINE CLINIC | Facility: HOSPITAL | Age: 63
End: 2023-07-05

## 2023-07-06 ENCOUNTER — HOME CARE VISIT (OUTPATIENT)
Dept: HOME HEALTH SERVICES | Facility: HOME HEALTHCARE | Age: 63
End: 2023-07-06
Payer: COMMERCIAL

## 2023-07-06 VITALS
DIASTOLIC BLOOD PRESSURE: 78 MMHG | HEART RATE: 104 BPM | RESPIRATION RATE: 20 BRPM | OXYGEN SATURATION: 97 % | TEMPERATURE: 97.3 F | SYSTOLIC BLOOD PRESSURE: 160 MMHG

## 2023-07-06 PROCEDURE — G0299 HHS/HOSPICE OF RN EA 15 MIN: HCPCS

## 2023-07-06 PROCEDURE — 88311 DECALCIFY TISSUE: CPT | Performed by: PATHOLOGY

## 2023-07-06 PROCEDURE — 88307 TISSUE EXAM BY PATHOLOGIST: CPT | Performed by: PATHOLOGY

## 2023-07-06 NOTE — UTILIZATION REVIEW
NOTIFICATION OF ADMISSION DISCHARGE   This is a Notification of Discharge from [address] [place] [address]. Please be advised that this patient has been discharge from our facility. Below you will find the admission and discharge date and time including the patient’s disposition. UTILIZATION REVIEW CONTACT:  Zaira Chowdary  Utilization   Network Utilization Review Department  Phone: [phone] x carefully listen to the prompts. All voicemails are confidential.  Email: [email or web address]     ADMISSION INFORMATION  PRESENTATION DATE: 6/29/2023 10:29 AM  OBERVATION ADMISSION DATE:   INPATIENT ADMISSION DATE: 6/29/23 11:44 AM   DISCHARGE DATE: 7/4/2023  3:46 PM   DISPOSITION:Home with Home Health Care    IMPORTANT INFORMATION:  Send all requests for admission clinical reviews, approved or denied determinations and any other requests to dedicated fax number below belonging to the campus where the patient is receiving treatment.  List of dedicated fax numbers:  [place] DENIALS (Administrative/Medical Necessity) [phone] [address] [facility] (Maternity/NICU/Pediatrics) [phone]   [facility] [phone]   [facility] [phone] [address] [phone]   [address] [place] [address] [phone]   [facility] [phone]   [address] [place] [address] [address] [place] [address] [phone]   [address] [place] [address] [phone] [address] [facility] [phone] [address] [facility] [address] [phone]

## 2023-07-10 ENCOUNTER — HOME CARE VISIT (OUTPATIENT)
Dept: HOME HEALTH SERVICES | Facility: HOME HEALTHCARE | Age: 63
End: 2023-07-10
Payer: COMMERCIAL

## 2023-07-10 VITALS
OXYGEN SATURATION: 100 % | TEMPERATURE: 97.6 F | SYSTOLIC BLOOD PRESSURE: 154 MMHG | HEART RATE: 86 BPM | DIASTOLIC BLOOD PRESSURE: 80 MMHG | RESPIRATION RATE: 16 BRPM

## 2023-07-10 VITALS — HEART RATE: 81 BPM | DIASTOLIC BLOOD PRESSURE: 81 MMHG | SYSTOLIC BLOOD PRESSURE: 140 MMHG | OXYGEN SATURATION: 99 %

## 2023-07-10 PROCEDURE — G0152 HHCP-SERV OF OT,EA 15 MIN: HCPCS

## 2023-07-10 PROCEDURE — G0299 HHS/HOSPICE OF RN EA 15 MIN: HCPCS

## 2023-07-12 ENCOUNTER — APPOINTMENT (OUTPATIENT)
Dept: LAB | Facility: HOSPITAL | Age: 63
End: 2023-07-12
Payer: COMMERCIAL

## 2023-07-12 ENCOUNTER — OFFICE VISIT (OUTPATIENT)
Dept: WOUND CARE | Facility: HOSPITAL | Age: 63
End: 2023-07-12
Payer: COMMERCIAL

## 2023-07-12 ENCOUNTER — TELEPHONE (OUTPATIENT)
Dept: HOME HEALTH SERVICES | Facility: HOME HEALTHCARE | Age: 63
End: 2023-07-12

## 2023-07-12 ENCOUNTER — HOME CARE VISIT (OUTPATIENT)
Dept: HOME HEALTH SERVICES | Facility: HOME HEALTHCARE | Age: 63
End: 2023-07-12
Payer: COMMERCIAL

## 2023-07-12 VITALS
TEMPERATURE: 97.2 F | SYSTOLIC BLOOD PRESSURE: 132 MMHG | DIASTOLIC BLOOD PRESSURE: 60 MMHG | HEART RATE: 96 BPM | RESPIRATION RATE: 16 BRPM

## 2023-07-12 DIAGNOSIS — E10.40 TYPE 1 DIABETES MELLITUS WITH DIABETIC NEUROPATHY (HCC): ICD-10-CM

## 2023-07-12 DIAGNOSIS — E10.65 TYPE 1 DIABETES MELLITUS WITH HYPERGLYCEMIA (HCC): ICD-10-CM

## 2023-07-12 DIAGNOSIS — Z89.432 S/P TRANSMETATARSAL AMPUTATION OF FOOT, LEFT (HCC): ICD-10-CM

## 2023-07-12 DIAGNOSIS — R80.9 MICROALBUMINURIA DUE TO TYPE 1 DIABETES MELLITUS (HCC): ICD-10-CM

## 2023-07-12 DIAGNOSIS — E10.649 HYPOGLYCEMIA UNAWARENESS ASSOCIATED WITH TYPE 1 DIABETES MELLITUS (HCC): ICD-10-CM

## 2023-07-12 DIAGNOSIS — E78.2 MIXED HYPERLIPIDEMIA: ICD-10-CM

## 2023-07-12 DIAGNOSIS — M86.9 OSTEOMYELITIS OF ANKLE AND FOOT (HCC): ICD-10-CM

## 2023-07-12 DIAGNOSIS — M79.672 PAIN IN LEFT FOOT: ICD-10-CM

## 2023-07-12 DIAGNOSIS — E10.29 MICROALBUMINURIA DUE TO TYPE 1 DIABETES MELLITUS (HCC): ICD-10-CM

## 2023-07-12 DIAGNOSIS — L97.522 DIABETIC ULCER OF OTHER PART OF LEFT FOOT ASSOCIATED WITH TYPE 1 DIABETES MELLITUS, WITH FAT LAYER EXPOSED (HCC): Primary | ICD-10-CM

## 2023-07-12 DIAGNOSIS — E10.621 DIABETIC ULCER OF OTHER PART OF LEFT FOOT ASSOCIATED WITH TYPE 1 DIABETES MELLITUS, WITH FAT LAYER EXPOSED (HCC): Primary | ICD-10-CM

## 2023-07-12 DIAGNOSIS — E10.42 DIABETIC POLYNEUROPATHY ASSOCIATED WITH TYPE 1 DIABETES MELLITUS (HCC): ICD-10-CM

## 2023-07-12 DIAGNOSIS — R23.3 EASY BRUISING: ICD-10-CM

## 2023-07-12 DIAGNOSIS — I10 BENIGN ESSENTIAL HYPERTENSION: ICD-10-CM

## 2023-07-12 LAB
APTT PPP: 30 SECONDS (ref 23–37)
BASOPHILS # BLD AUTO: 0.13 THOUSANDS/ÂΜL (ref 0–0.1)
BASOPHILS NFR BLD AUTO: 1 % (ref 0–1)
EOSINOPHIL # BLD AUTO: 0.01 THOUSAND/ÂΜL (ref 0–0.61)
EOSINOPHIL NFR BLD AUTO: 0 % (ref 0–6)
ERYTHROCYTE [DISTWIDTH] IN BLOOD BY AUTOMATED COUNT: 12.7 % (ref 11.6–15.1)
ERYTHROCYTE [SEDIMENTATION RATE] IN BLOOD: 34 MM/HOUR (ref 0–19)
HCT VFR BLD AUTO: 32.9 % (ref 36.5–49.3)
HGB BLD-MCNC: 11.3 G/DL (ref 12–17)
IMM GRANULOCYTES # BLD AUTO: 0.07 THOUSAND/UL (ref 0–0.2)
IMM GRANULOCYTES NFR BLD AUTO: 1 % (ref 0–2)
INR PPP: 1.01 (ref 0.84–1.19)
LYMPHOCYTES # BLD AUTO: 1.29 THOUSANDS/ÂΜL (ref 0.6–4.47)
LYMPHOCYTES NFR BLD AUTO: 11 % (ref 14–44)
MCH RBC QN AUTO: 34.1 PG (ref 26.8–34.3)
MCHC RBC AUTO-ENTMCNC: 34.3 G/DL (ref 31.4–37.4)
MCV RBC AUTO: 99 FL (ref 82–98)
MONOCYTES # BLD AUTO: 0.79 THOUSAND/ÂΜL (ref 0.17–1.22)
MONOCYTES NFR BLD AUTO: 7 % (ref 4–12)
NEUTROPHILS # BLD AUTO: 9.01 THOUSANDS/ÂΜL (ref 1.85–7.62)
NEUTS SEG NFR BLD AUTO: 80 % (ref 43–75)
NRBC BLD AUTO-RTO: 0 /100 WBCS
PLATELET # BLD AUTO: 550 THOUSANDS/UL (ref 149–390)
PMV BLD AUTO: 8.6 FL (ref 8.9–12.7)
PROTHROMBIN TIME: 13.5 SECONDS (ref 11.6–14.5)
RBC # BLD AUTO: 3.31 MILLION/UL (ref 3.88–5.62)
WBC # BLD AUTO: 11.3 THOUSAND/UL (ref 4.31–10.16)

## 2023-07-12 PROCEDURE — 36415 COLL VENOUS BLD VENIPUNCTURE: CPT

## 2023-07-12 PROCEDURE — 85025 COMPLETE CBC W/AUTO DIFF WBC: CPT

## 2023-07-12 PROCEDURE — 85730 THROMBOPLASTIN TIME PARTIAL: CPT

## 2023-07-12 PROCEDURE — 99213 OFFICE O/P EST LOW 20 MIN: CPT | Performed by: PODIATRIST

## 2023-07-12 PROCEDURE — 99024 POSTOP FOLLOW-UP VISIT: CPT | Performed by: PODIATRIST

## 2023-07-12 PROCEDURE — 85652 RBC SED RATE AUTOMATED: CPT

## 2023-07-12 PROCEDURE — 85610 PROTHROMBIN TIME: CPT

## 2023-07-12 RX ORDER — GABAPENTIN 100 MG/1
100 CAPSULE ORAL 3 TIMES DAILY
Qty: 90 CAPSULE | Refills: 2 | Status: SHIPPED | OUTPATIENT
Start: 2023-07-12 | End: 2023-10-10

## 2023-07-12 NOTE — PATIENT INSTRUCTIONS
Orders Placed This Encounter   Procedures    Wound compression and edema control     Wash your hands with soap and water. Remove old dressing, discard into plastic bag and place in trash. Cleanse the wound with Mild Soap & Water prior to applying a clean dressing. Do not use tissue or cotton balls. Do not scrub the wound. Pat dry using gauze. Shower no     Apply Betadine paint to wound & skin surrounding wound  Apply adaptic to the incision line. Cover with gauze & abd  Secure with Neal & tape. Wrap lightly with ace wrap  Change dressing 2 x weekly (every 3 -4 days)    Please try to consume 3-4 servings of protein (30g) each day. Follow up in [address] [place] [address] in 2 weeks    Today's Treatment:  Cleansed with normal saline & redressed as ordered above.      Standing Status:   Future     Standing Expiration Date:   7/12/2024

## 2023-07-12 NOTE — PROGRESS NOTES
Patient ID: Maria Luisa Santo is a 66 y.o. male Date of Birth 1960       Chief Complaint   Patient presents with   • Follow Up Wound Care Visit     Follow up visit for wound to left foot. Pt is SP surgery for same. Allergies:  Cefuroxime and Amoxicillin-pot clavulanate    Diagnosis:  1. Diabetic ulcer of other part of left foot associated with type 1 diabetes mellitus, with fat layer exposed ([address])  -     Wound compression and edema control; Future    2. Osteomyelitis of ankle and foot (HCC)  -     CBC and differential; Future  -     Sedimentation rate, automated; Future    3. S/P transmetatarsal amputation of foot, left (formerly [facility])  -     CBC and differential; Future  -     Sedimentation rate, automated; Future    4. Type 1 diabetes mellitus with diabetic neuropathy (formerly [facility])  -     gabapentin (Neurontin) 100 mg capsule; Take 1 capsule (100 mg total) by mouth 3 (three) times a day    5. Pain in left foot  -     gabapentin (Neurontin) 100 mg capsule; Take 1 capsule (100 mg total) by mouth 3 (three) times a day       Diagnosis ICD-10-CM Associated Orders   1. Diabetic ulcer of other part of left foot associated with type 1 diabetes mellitus, with fat layer exposed ([address])  W03.305 Wound compression and edema control    L97.522       2. Osteomyelitis of ankle and foot (formerly [facility])  M86.9 CBC and differential     Sedimentation rate, automated      3. S/P transmetatarsal amputation of foot, left (formerly [facility])  Z89.432 CBC and differential     Sedimentation rate, automated      4. Type 1 diabetes mellitus with diabetic neuropathy (formerly [facility])  E10.40 gabapentin (Neurontin) 100 mg capsule      5. Pain in left foot  M79.672 gabapentin (Neurontin) 100 mg capsule           Assessment & Plan:  Continue with Betadine/Adaptic dressing to be changed every 2 or 3 days with VNA. Continue with nonweightbearing left foot. Finish antibiotics as prescribed per infectious disease.   Rx gabapentin to help address chronic pain which very well may have neurologic component but also could be postoperative. Subjective:   7/12/2023: POV S/P TMA LFT(6/30/2023), reports pain has diminished but has episodes of pain on the top lateral aspect of his midfoot. Reports no increasing redness or swelling or signs of infection. 6/28/2023: 66year-old type I diabetic presents today for evaluation of chronic ulcer plantar aspect of left first MPJ. Patient underwent debridement of sesamoid bones on 5/30/2023 with VAC dressing application. Was progressing well with VNA visits and then this past Monday it was noted become more red and swollen. Was empirically started on doxycycline. Patient reports his foot now looks worse than it did on Monday.       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)   • Insulin pump in place   • ED (erectile dysfunction) of organic origin   • Chronic pain   • Benign essential hypertension   • Cigarette nicotine dependence without complication   • Gout of ankle   • Type 1 diabetes mellitus with hyperglycemia (HCC)   • Diabetic polyneuropathy associated with type 1 diabetes mellitus ([address])   • Microalbuminuria due to type 1 diabetes mellitus ([address])   • Hypoglycemia unawareness associated with type 1 diabetes mellitus ([address])   • 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, with fat layer exposed ([address])   • Stage 2 chronic kidney disease   • Hyponatremia   • Alcohol abuse   • Nicotine abuse   • Sepsis [facility])     Past Medical History:   Diagnosis Date   • Chronic foot ulcer ([address]) 09/07/2018   • Diabetes mellitus ([address])    • Diabetic foot ulcer ([address]) 5/8/2023   • Gout    • High cholesterol    • Hypertension    • Visual impairment 11/1/2022 [facility]     Past Surgical History:   Procedure Laterality Date   • CATARACT EXTRACTION Right 2023   • COMPLEX WOUND CLOSURE TO EXTREMITY Left 2019    Procedure: COMPLEX CLOSURE LEFT CHEEK;  Surgeon: Shraddha Lomax MD;  Location: [facility];  Service: Plastics   • FACIAL/NECK BIOPSY Left 2019    Procedure: EXCISION LEFT CHEEK CYST;  Surgeon: Shraddha Lomax MD;  Location: [facility];  Service: Plastics   • HERNIA REPAIR Left     several times   • KNEE ARTHROSCOPY Left     torn meniscus   • UT AMPUTATION FOOT TRANSMETARSAL Left 2023    Procedure: AMPUTATION TRANSMETATARSAL (TMA);  Surgeon: Kiran Og DPM;  Location: [facility];  Service: Podiatry   • WOUND DEBRIDEMENT Left 2023    Procedure: DEBRIDEMENT WOUND LEFT FOOT WOUND WITH EXCISION OF INFECTED BONE AND WOUND VAC APPLICATION;  Surgeon: Francoise Soliz DPM;  Location: [facility];  Service: Podiatry     Social History     Socioeconomic History   • Marital status: /Civil Union     Spouse name: Not on file   • Number of children: 2   • Years of education: 12   • Highest education level: High school graduate   Occupational History   • Occupation: cafeteria/kitchen at [employer]     Comment: [employer]   Tobacco Use   • Smoking status: Every Day     Packs/day: 1.00     Years: 20.00     Total pack years: 20.00     Types: Cigarettes     Last attempt to quit: 2022     Years since quittin.5   • Smokeless tobacco: Never   • Tobacco comments:     encouraged smoking cessation   Vaping Use   • Vaping Use: Never used   Substance and Sexual Activity   • Alcohol use: Yes     Comment: 3/4 beers a day   • Drug use: Never   • Sexual activity: Not Currently   Other Topics Concern   • Not on file   Social History Narrative   • Not on file     Social Determinants of Health     Financial Resource Strain: Not on file   Food Insecurity: No Food Insecurity (2023)    Hunger Vital Sign    • Worried About Running Out of Food in the Last Year: Never true    • Ran Out of Food in the Last Year: Never true   Transportation Needs: No Transportation Needs (6/30/2023)    PRAPARE - Transportation    • Lack of Transportation (Medical): No    • Lack of Transportation (Non-Medical):  No   Physical Activity: Not on file   Stress: Not on file   Social Connections: Not on file   Intimate Partner Violence: Not on file   Housing Stability: Low Risk  (6/30/2023)    Housing Stability Vital Sign    • Unable to Pay for Housing in the Last Year: No    • Number of Places Lived in the Last Year: 1    • Unstable Housing in the Last Year: No        Current Outpatient Medications:   •  gabapentin (Neurontin) 100 mg capsule, Take 1 capsule (100 mg total) by mouth 3 (three) times a day, Disp: 90 capsule, Rfl: 2  •  atorvastatin (LIPITOR) 40 mg tablet, TAKE 1 TABLET BY MOUTH EVERY DAY, Disp: 90 tablet, Rfl: 1  •  Glucagon, rDNA, (Glucagon Emergency) 1 MG KIT, as directed, Disp: , Rfl:   •  HumaLOG 100 UNIT/ML injection, inject up to 80 units in INSULIN PUMP daily as directed by prescriber (Patient not taking: Reported on 7/14/2023), Disp: , Rfl:   •  insulin lispro (HumaLOG) 100 units/mL injection, Use via the insulin pump, use up to 80 units daily, Disp: 80 mL, Rfl: 1  •  linezolid (ZYVOX) 600 mg tablet, Take 1 tablet (600 mg total) by mouth every 12 (twelve) hours for 17 days, Disp: 34 tablet, Rfl: 0  •  lisinopril (ZESTRIL) 10 mg tablet, Take 1 tablet (10 mg total) by mouth daily, Disp: 90 tablet, Rfl: 1  •  metoprolol succinate (TOPROL-XL) 100 mg 24 hr tablet, Take 1 tablet (100 mg total) by mouth daily, Disp: 90 tablet, Rfl: 1  •  nicotine (NICODERM CQ) 21 mg/24 hr TD 24 hr patch, Place 1 patch on the skin over 24 hours daily Do not start before July 5, 2023., Disp: 28 patch, Rfl: 0  Family History   Problem Relation Age of Onset   • Heart disease Father    • Diabetes type I Father    • Diabetes type II Mother    • No Known Problems Sister    • Alcohol abuse Brother    • Diabetes type I Brother    • No Known Problems Brother    • No Known Problems Son    • No Known Problems Daughter       Review of Systems      Objective:  /60   Pulse 96   Temp (!) 97.2 °F (36.2 °C) (Temporal)   Resp 16     Physical Exam  Constitutional:       Appearance: Normal appearance. HENT:      Head: Normocephalic. Right Ear: Tympanic membrane normal.      Left Ear: Tympanic membrane normal.      Nose: No congestion. Mouth/Throat:      Pharynx: No oropharyngeal exudate or posterior oropharyngeal erythema. Eyes:      Conjunctiva/sclera: Conjunctivae normal.      Pupils: Pupils are equal, round, and reactive to light. Cardiovascular:      Rate and Rhythm: Normal rate and regular rhythm. Pulses: Normal pulses. Pulmonary:      Effort: Pulmonary effort is normal.   Musculoskeletal:         General: Tenderness present. Feet:      Right foot:      Protective Sensation: 10 sites tested. Left foot:      Protective Sensation: 10 sites tested. Skin:     General: Skin is warm and dry. Capillary Refill: Capillary refill takes 2 to 3 seconds. Coloration: Skin is not pale. Findings: No bruising, erythema, lesion or rash. Comments: Left foot surgical incision coapting satisfactorily, sutures intact, no active drainage. No signs of infection. Mild distal edema noted as expected. No cellulitis or signs of infection. Neurological:      Mental Status: He is alert. Cranial Nerves: No cranial nerve deficit. Sensory: No sensory deficit. Motor: No weakness.       Gait: Gait normal.      Deep Tendon Reflexes: Reflexes normal.   Psychiatric:         Mood and Affect: Mood normal.         Behavior: Behavior normal.         Judgment: Judgment normal.         Wound 05/30/23 Foot Left;Plantar (Active)   Wound Image   06/28/23 1218   Wound Description Granulation tissue;Slough 06/28/23 1218   Dee Dee-wound Assessment Erythema;Edema 06/28/23 1218 Wound Length (cm) 2.3 cm 06/28/23 1218   Wound Width (cm) 3 cm 06/28/23 1218   Wound Depth (cm) 1.5 cm 06/28/23 1218   Wound Surface Area (cm^2) 6.9 cm^2 06/28/23 1218   Wound Volume (cm^3) 10.35 cm^3 06/28/23 1218   Calculated Wound Volume (cm^3) 10.35 cm^3 06/28/23 1218   Change in Wound Size % -228.57 06/28/23 1218   Drainage Amount Moderate 06/28/23 1218   Drainage Description Tan 06/28/23 1218   Non-staged Wound Description Full thickness 06/28/23 1218   Treatments Cleansed 06/14/23 1516   Wound packed? No 06/14/23 1516   Dressing Changed Changed 06/14/23 1516   Patient Tolerance Tolerated well 06/14/23 1516   Dressing Status Intact 06/28/23 1218       Wound 06/21/23 Pressure Injury Foot Left;Lateral (Active)   Wound Image     07/12/23 1108   Wound Description Epithelialization;Eschar 06/28/23 1220   Pressure Injury Stage DTPI 06/21/23 1415   Dee Dee-wound Assessment Erythema 06/28/23 1220   Wound Length (cm) 2 cm 06/28/23 1220   Wound Width (cm) 3 cm 06/28/23 1220   Wound Depth (cm) 0.1 cm 06/28/23 1220   Wound Surface Area (cm^2) 6 cm^2 06/28/23 1220   Wound Volume (cm^3) 0.6 cm^3 06/28/23 1220   Calculated Wound Volume (cm^3) 0.6 cm^3 06/28/23 1220   Drainage Amount None 06/28/23 1220   Non-staged Wound Description Not applicable 05/51/57 2498   Dressing Status Intact 06/28/23 1220       Wound 06/30/23 Foot Left (Active)       Procedures     Results from last 6 Months   Lab Units 06/30/23  1452   WOUND CULTURE  2 colonies Methicillin Resistant Staphylococcus aureus*  1+ Growth of Corynebacterium striatum*  2 colonies Methicillin Resistant Staphylococcus aureus*  Growth in Broth culture only Staphylococcus coagulase negative*           Wound Instructions:  Orders Placed This Encounter   Procedures   • Wound compression and edema control     Wash your hands with soap and water. Remove old dressing, discard into plastic bag and place in trash.     Cleanse the wound with Mild Soap & Water prior to applying a clean dressing. Do not use tissue or cotton balls. Do not scrub the wound. Pat dry using gauze. Shower no     Apply Betadine paint to wound & skin surrounding wound  Apply adaptic to the incision line. Cover with gauze & abd  Secure with Neal & tape. Wrap lightly with ace wrap  Change dressing 2 x weekly (every 3 -4 days)    Please try to consume 3-4 servings of protein (30g) each day. Follow up in [address] [place] [address] in 2 weeks    Today's Treatment:  Cleansed with normal saline & redressed as ordered above. Standing Status:   Future     Standing Expiration Date:   7/12/2024   • CBC and differential     This is a patient instruction: This test is non-fasting. Please drink two glasses of water morning of bloodwork. Standing Status:   Future     Number of Occurrences:   1     Standing Expiration Date:   7/12/2024     Order Specific Question: This patient has an active home care or hospice episode. Should this order be COMPLETED by [facility]? Answer:   No   • Sedimentation rate, automated     Standing Status:   Future     Number of Occurrences:   1     Standing Expiration Date:   7/12/2024     Order Specific Question: This patient has an active home care or hospice episode. Should this order be COMPLETED by [facility]? Answer:   No         Wisam Seay DPM      Portions of the record may have been created with voice recognition software. Occasional wrong word or "sound a like" substitutions may have occurred due to the inherent limitations of voice recognition software. Read the chart carefully and recognize, using context, where substitutions have occurred.

## 2023-07-14 ENCOUNTER — HOME CARE VISIT (OUTPATIENT)
Dept: HOME HEALTH SERVICES | Facility: HOME HEALTHCARE | Age: 63
End: 2023-07-14
Payer: COMMERCIAL

## 2023-07-14 ENCOUNTER — OFFICE VISIT (OUTPATIENT)
Dept: FAMILY MEDICINE CLINIC | Facility: HOSPITAL | Age: 63
End: 2023-07-14
Payer: COMMERCIAL

## 2023-07-14 VITALS
TEMPERATURE: 97.4 F | SYSTOLIC BLOOD PRESSURE: 116 MMHG | OXYGEN SATURATION: 94 % | HEART RATE: 101 BPM | DIASTOLIC BLOOD PRESSURE: 68 MMHG

## 2023-07-14 VITALS
HEIGHT: 72 IN | TEMPERATURE: 98 F | DIASTOLIC BLOOD PRESSURE: 70 MMHG | SYSTOLIC BLOOD PRESSURE: 118 MMHG | HEART RATE: 75 BPM | BODY MASS INDEX: 19.67 KG/M2 | WEIGHT: 145.2 LBS

## 2023-07-14 DIAGNOSIS — F17.210 CIGARETTE NICOTINE DEPENDENCE WITHOUT COMPLICATION: ICD-10-CM

## 2023-07-14 DIAGNOSIS — I10 BENIGN ESSENTIAL HYPERTENSION: ICD-10-CM

## 2023-07-14 DIAGNOSIS — M86.9 OSTEOMYELITIS OF LEFT FOOT, UNSPECIFIED TYPE (HCC): Primary | ICD-10-CM

## 2023-07-14 DIAGNOSIS — E10.65 TYPE 1 DIABETES MELLITUS WITH HYPERGLYCEMIA (HCC): ICD-10-CM

## 2023-07-14 PROBLEM — E11.621 DIABETIC FOOT ULCER (HCC): Status: RESOLVED | Noted: 2023-05-08 | Resolved: 2023-07-14

## 2023-07-14 PROBLEM — L97.509 DIABETIC FOOT ULCER (HCC): Status: RESOLVED | Noted: 2023-05-08 | Resolved: 2023-07-14

## 2023-07-14 PROCEDURE — G0151 HHCP-SERV OF PT,EA 15 MIN: HCPCS

## 2023-07-14 PROCEDURE — 99496 TRANSJ CARE MGMT HIGH F2F 7D: CPT | Performed by: FAMILY MEDICINE

## 2023-07-14 NOTE — PROGRESS NOTES
Assessment & Plan     1. Osteomyelitis of left foot, unspecified type ([address])  -     CBC; Future  -     Basic metabolic panel; Future  -     Sedimentation rate, automated; Future    2. Benign essential hypertension    3. Type 1 diabetes mellitus with hyperglycemia (HCC)    4. Cigarette nicotine dependence without complication       Osteomyelitis. S/p  Amputation of the left midfoot. Following up with wound care /Podiatry. currenlty on Zyvox. Cbc showing slight elevation of WBC but normal sed rate. Will sedn results to ID. Advised fu with ID as recommended on his discharge. Ongoing VNA. Obtain repeat cbc and esr next week. DM. Ongoing care through [facility]. Stable. No hypoglycemia. Advised regarding smoking cessation. Subjective     Transitional Care Management Review:   Naina Thompson is a 66 y.o. male here for TCM follow up. During the TCM phone call patient stated:  TCM Call     Date and time call was made  7/5/2023 10:48 AM    Hospital care reviewed  Records reviewed    Patient was hospitialized at  [address] [place] [address]    Date of Admission  06/29/23    Date of discharge  07/04/23    Diagnosis  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, with fat layer exposed    Disposition  Home    Were the patients medications reviewed and updated  No    Current Symptoms  None      TCM Call     Post hospital issues  None    Should patient be enrolled in anticoag monitoring? No    Scheduled for follow up? Yes    Patients specialists  Other (comment)    Other specialists names  Wound Care, Podiatry    Did you obtain your prescribed medications  Yes    Do you need help managing your prescriptions or medications  No    Is transportation to your appointment needed  No    I have advised the patient to call PCP with any new or worsening symptoms  Fidel Solis MA        Patient is seen for transitional care management. Last visit patient had diabetic foot ulcer. He has been following with wound care and podiatry. Unfortunately this progressed into osteomyelitis and sepsis. This necessitated amputation of the left midfoot. He is doing well postoperatively. He is currently on Zyvox for the next 3 weeks. He is following regularly with podiatry at this time. He has no fever no chills. Minimal pain. Eating and drinking. Reports his blood sugars have been stable. No episodes of hypoglycemia. This morning blood sugars was at 122. He has no new concerns today. He did have a CBC and ESR 2 days ago. He is currently seeing visiting nurses at this time. Review of Systems   Constitutional: Negative. Negative for activity change, appetite change, chills and diaphoresis. HENT: Negative for congestion and dental problem. Respiratory: Negative. Negative for apnea, chest tightness, shortness of breath and wheezing. Cardiovascular: Negative. Negative for chest pain, palpitations and leg swelling. Gastrointestinal: Negative. Negative for abdominal distention, abdominal pain, constipation, diarrhea and nausea. Genitourinary: Negative. Negative for difficulty urinating, dysuria and frequency. Objective     /70   Pulse 75   Temp 98 °F (36.7 °C)   Ht 6' (1.829 m)   Wt 65.9 kg (145 lb 3.2 oz)   BMI 19.69 kg/m²      Physical Exam  Vitals and nursing note reviewed. Constitutional:       General: He is not in acute distress. Appearance: He is well-developed. He is not ill-appearing. HENT:      Head: Normocephalic and atraumatic. Right Ear: External ear normal.      Left Ear: External ear normal.      Mouth/Throat:      Mouth: Mucous membranes are moist.      Pharynx: Oropharynx is clear. Eyes:      Extraocular Movements: Extraocular movements intact. Conjunctiva/sclera: Conjunctivae normal.      Pupils: Pupils are equal, round, and reactive to light. Cardiovascular:      Rate and Rhythm: Normal rate and regular rhythm.       Heart sounds: Normal heart sounds. No murmur heard. Pulmonary:      Effort: Pulmonary effort is normal.      Breath sounds: Normal breath sounds. Abdominal:      General: Bowel sounds are normal. There is no distension. Palpations: Abdomen is soft. There is no mass. Tenderness: There is no abdominal tenderness. There is no guarding. Hernia: No hernia is present. Genitourinary:     Penis: Normal.    Musculoskeletal:         General: Normal range of motion. Cervical back: Normal range of motion and neck supple. Feet:      Comments: Status post left transmet amputation. Skin:     General: Skin is warm and dry. Capillary Refill: Capillary refill takes less than 2 seconds. Neurological:      General: No focal deficit present. Mental Status: He is alert and oriented to person, place, and time.    Psychiatric:         Mood and Affect: Mood normal.         Behavior: Behavior normal.       Medications have been reviewed by provider in current encounter    Dae Mcmillan MD

## 2023-07-14 NOTE — CASE COMMUNICATION
PT evaluation for pt was performed on Friday 07/14/23. Pt able to ambulate household distances with use of RW on various surfaces mod (I) level of A without LOB noted and/or observed. Pt reports minimal pain L foot at wound incision site when dangling L foot for a length of time. Education given to pt for elevation of LE with pillows to A with pain control and edema. Pt agreed. pt able to perform sit<->stand transfers to and from isac ious surfaces needing mod (I) level of A. Pt able to perform ST up and down split level style home with use of single HR needing mod (I) level of A. At times, pt places LLE on ground for support during static stand. continued to educate and instruct pt for NWB LLE per surgeon orders. pt agreed. Pt does own kneeler walker, but prefers to use RW due to control and ability to use RW "with better ease". Pt has 4+/5 BLE MMT, L ankle DF and  PF not tested. No further skilled home PT services needed at this time, DC pt from skilled home PT. Recommend outpt PT services once cleared by surgeon.         Thank you,  Willem Weems DPT

## 2023-07-15 ENCOUNTER — HOME CARE VISIT (OUTPATIENT)
Dept: HOME HEALTH SERVICES | Facility: HOME HEALTHCARE | Age: 63
End: 2023-07-15
Payer: COMMERCIAL

## 2023-07-15 PROCEDURE — G0299 HHS/HOSPICE OF RN EA 15 MIN: HCPCS

## 2023-07-16 VITALS
OXYGEN SATURATION: 97 % | RESPIRATION RATE: 18 BRPM | HEART RATE: 72 BPM | DIASTOLIC BLOOD PRESSURE: 76 MMHG | SYSTOLIC BLOOD PRESSURE: 120 MMHG | TEMPERATURE: 97.1 F

## 2023-07-18 ENCOUNTER — APPOINTMENT (OUTPATIENT)
Dept: LAB | Facility: HOSPITAL | Age: 63
End: 2023-07-18
Payer: COMMERCIAL

## 2023-07-18 ENCOUNTER — HOME CARE VISIT (OUTPATIENT)
Dept: HOME HEALTH SERVICES | Facility: HOME HEALTHCARE | Age: 63
End: 2023-07-18
Payer: COMMERCIAL

## 2023-07-18 VITALS
OXYGEN SATURATION: 99 % | HEART RATE: 72 BPM | RESPIRATION RATE: 18 BRPM | DIASTOLIC BLOOD PRESSURE: 70 MMHG | TEMPERATURE: 97.5 F | SYSTOLIC BLOOD PRESSURE: 130 MMHG

## 2023-07-18 DIAGNOSIS — M86.9: ICD-10-CM

## 2023-07-18 LAB
ALBUMIN SERPL BCP-MCNC: 3.4 G/DL (ref 3.5–5)
ALP SERPL-CCNC: 161 U/L (ref 46–116)
ALT SERPL W P-5'-P-CCNC: 20 U/L (ref 12–78)
ANION GAP SERPL CALCULATED.3IONS-SCNC: 6 MMOL/L
AST SERPL W P-5'-P-CCNC: 14 U/L (ref 5–45)
BASOPHILS # BLD AUTO: 0.16 THOUSANDS/ÂΜL (ref 0–0.1)
BASOPHILS NFR BLD AUTO: 2 % (ref 0–1)
BILIRUB SERPL-MCNC: 0.65 MG/DL (ref 0.2–1)
BUN SERPL-MCNC: 19 MG/DL (ref 5–25)
CALCIUM ALBUM COR SERPL-MCNC: 9.4 MG/DL (ref 8.3–10.1)
CALCIUM SERPL-MCNC: 8.9 MG/DL (ref 8.3–10.1)
CHLORIDE SERPL-SCNC: 95 MMOL/L (ref 96–108)
CO2 SERPL-SCNC: 25 MMOL/L (ref 21–32)
CREAT SERPL-MCNC: 1.55 MG/DL (ref 0.6–1.3)
EOSINOPHIL # BLD AUTO: 0.19 THOUSAND/ÂΜL (ref 0–0.61)
EOSINOPHIL NFR BLD AUTO: 2 % (ref 0–6)
ERYTHROCYTE [DISTWIDTH] IN BLOOD BY AUTOMATED COUNT: 12.8 % (ref 11.6–15.1)
ERYTHROCYTE [SEDIMENTATION RATE] IN BLOOD: 39 MM/HOUR (ref 0–19)
GFR SERPL CREATININE-BSD FRML MDRD: 47 ML/MIN/1.73SQ M
GLUCOSE SERPL-MCNC: 252 MG/DL (ref 65–140)
HCT VFR BLD AUTO: 35.9 % (ref 36.5–49.3)
HGB BLD-MCNC: 11.8 G/DL (ref 12–17)
IMM GRANULOCYTES # BLD AUTO: 0.03 THOUSAND/UL (ref 0–0.2)
IMM GRANULOCYTES NFR BLD AUTO: 0 % (ref 0–2)
LYMPHOCYTES # BLD AUTO: 1.45 THOUSANDS/ÂΜL (ref 0.6–4.47)
LYMPHOCYTES NFR BLD AUTO: 16 % (ref 14–44)
MCH RBC QN AUTO: 32.4 PG (ref 26.8–34.3)
MCHC RBC AUTO-ENTMCNC: 32.9 G/DL (ref 31.4–37.4)
MCV RBC AUTO: 99 FL (ref 82–98)
MONOCYTES # BLD AUTO: 0.63 THOUSAND/ÂΜL (ref 0.17–1.22)
MONOCYTES NFR BLD AUTO: 7 % (ref 4–12)
NEUTROPHILS # BLD AUTO: 6.59 THOUSANDS/ÂΜL (ref 1.85–7.62)
NEUTS SEG NFR BLD AUTO: 73 % (ref 43–75)
NRBC BLD AUTO-RTO: 0 /100 WBCS
PLATELET # BLD AUTO: 396 THOUSANDS/UL (ref 149–390)
PMV BLD AUTO: 9 FL (ref 8.9–12.7)
POTASSIUM SERPL-SCNC: 5.5 MMOL/L (ref 3.5–5.3)
PROT SERPL-MCNC: 7.5 G/DL (ref 6.4–8.4)
RBC # BLD AUTO: 3.64 MILLION/UL (ref 3.88–5.62)
SODIUM SERPL-SCNC: 126 MMOL/L (ref 135–147)
WBC # BLD AUTO: 9.05 THOUSAND/UL (ref 4.31–10.16)

## 2023-07-18 PROCEDURE — 85025 COMPLETE CBC W/AUTO DIFF WBC: CPT

## 2023-07-18 PROCEDURE — G0299 HHS/HOSPICE OF RN EA 15 MIN: HCPCS

## 2023-07-18 PROCEDURE — 80053 COMPREHEN METABOLIC PANEL: CPT

## 2023-07-18 PROCEDURE — 85652 RBC SED RATE AUTOMATED: CPT

## 2023-07-18 PROCEDURE — 36415 COLL VENOUS BLD VENIPUNCTURE: CPT

## 2023-07-19 DIAGNOSIS — E87.1 HYPONATREMIA: Primary | ICD-10-CM

## 2023-07-20 ENCOUNTER — HOME CARE VISIT (OUTPATIENT)
Dept: HOME HEALTH SERVICES | Facility: HOME HEALTHCARE | Age: 63
End: 2023-07-20
Payer: COMMERCIAL

## 2023-07-20 ENCOUNTER — APPOINTMENT (OUTPATIENT)
Dept: LAB | Facility: HOSPITAL | Age: 63
End: 2023-07-20
Payer: COMMERCIAL

## 2023-07-20 DIAGNOSIS — M86.9 OSTEOMYELITIS OF LEFT FOOT, UNSPECIFIED TYPE (HCC): ICD-10-CM

## 2023-07-20 DIAGNOSIS — E87.1 HYPONATREMIA: ICD-10-CM

## 2023-07-20 LAB
ALBUMIN SERPL BCP-MCNC: 4.2 G/DL (ref 3.5–5)
ALP SERPL-CCNC: 121 U/L (ref 34–104)
ALT SERPL W P-5'-P-CCNC: 14 U/L (ref 7–52)
ANION GAP SERPL CALCULATED.3IONS-SCNC: 10 MMOL/L
AST SERPL W P-5'-P-CCNC: 16 U/L (ref 13–39)
BASOPHILS # BLD AUTO: 0.1 THOUSANDS/ÂΜL (ref 0–0.1)
BASOPHILS NFR BLD AUTO: 1 % (ref 0–1)
BILIRUB SERPL-MCNC: 0.88 MG/DL (ref 0.2–1)
BUN SERPL-MCNC: 18 MG/DL (ref 5–25)
CALCIUM SERPL-MCNC: 9.3 MG/DL (ref 8.4–10.2)
CHLORIDE SERPL-SCNC: 90 MMOL/L (ref 96–108)
CHOLEST SERPL-MCNC: 164 MG/DL
CO2 SERPL-SCNC: 23 MMOL/L (ref 21–32)
CREAT SERPL-MCNC: 1.43 MG/DL (ref 0.6–1.3)
CREAT UR-MCNC: 97.8 MG/DL
EOSINOPHIL # BLD AUTO: 0.14 THOUSAND/ÂΜL (ref 0–0.61)
EOSINOPHIL NFR BLD AUTO: 2 % (ref 0–6)
ERYTHROCYTE [DISTWIDTH] IN BLOOD BY AUTOMATED COUNT: 12.3 % (ref 11.6–15.1)
ERYTHROCYTE [SEDIMENTATION RATE] IN BLOOD: 39 MM/HOUR (ref 0–19)
EST. AVERAGE GLUCOSE BLD GHB EST-MCNC: 180 MG/DL
GFR SERPL CREATININE-BSD FRML MDRD: 52 ML/MIN/1.73SQ M
GLUCOSE P FAST SERPL-MCNC: 104 MG/DL (ref 65–99)
HBA1C MFR BLD: 7.9 %
HCT VFR BLD AUTO: 35 % (ref 36.5–49.3)
HDLC SERPL-MCNC: 77 MG/DL
HGB BLD-MCNC: 11.9 G/DL (ref 12–17)
IMM GRANULOCYTES # BLD AUTO: 0.03 THOUSAND/UL (ref 0–0.2)
IMM GRANULOCYTES NFR BLD AUTO: 0 % (ref 0–2)
LDLC SERPL CALC-MCNC: 70 MG/DL (ref 0–100)
LYMPHOCYTES # BLD AUTO: 1.21 THOUSANDS/ÂΜL (ref 0.6–4.47)
LYMPHOCYTES NFR BLD AUTO: 13 % (ref 14–44)
MCH RBC QN AUTO: 32.4 PG (ref 26.8–34.3)
MCHC RBC AUTO-ENTMCNC: 34 G/DL (ref 31.4–37.4)
MCV RBC AUTO: 95 FL (ref 82–98)
MICROALBUMIN UR-MCNC: 46.5 MG/L (ref 0–20)
MICROALBUMIN/CREAT 24H UR: 48 MG/G CREATININE (ref 0–30)
MONOCYTES # BLD AUTO: 0.71 THOUSAND/ÂΜL (ref 0.17–1.22)
MONOCYTES NFR BLD AUTO: 7 % (ref 4–12)
NEUTROPHILS # BLD AUTO: 7.37 THOUSANDS/ÂΜL (ref 1.85–7.62)
NEUTS SEG NFR BLD AUTO: 77 % (ref 43–75)
NONHDLC SERPL-MCNC: 87 MG/DL
NRBC BLD AUTO-RTO: 0 /100 WBCS
PLATELET # BLD AUTO: 356 THOUSANDS/UL (ref 149–390)
PMV BLD AUTO: 8.9 FL (ref 8.9–12.7)
POTASSIUM SERPL-SCNC: 5 MMOL/L (ref 3.5–5.3)
PROT SERPL-MCNC: 7.7 G/DL (ref 6.4–8.4)
RBC # BLD AUTO: 3.67 MILLION/UL (ref 3.88–5.62)
SODIUM SERPL-SCNC: 123 MMOL/L (ref 135–147)
TRIGL SERPL-MCNC: 84 MG/DL
TSH SERPL DL<=0.05 MIU/L-ACNC: 1.36 UIU/ML (ref 0.45–4.5)
WBC # BLD AUTO: 9.56 THOUSAND/UL (ref 4.31–10.16)

## 2023-07-20 PROCEDURE — 82570 ASSAY OF URINE CREATININE: CPT

## 2023-07-20 PROCEDURE — 83036 HEMOGLOBIN GLYCOSYLATED A1C: CPT

## 2023-07-20 PROCEDURE — 36415 COLL VENOUS BLD VENIPUNCTURE: CPT

## 2023-07-20 PROCEDURE — 82043 UR ALBUMIN QUANTITATIVE: CPT

## 2023-07-20 PROCEDURE — 85025 COMPLETE CBC W/AUTO DIFF WBC: CPT

## 2023-07-20 PROCEDURE — 80053 COMPREHEN METABOLIC PANEL: CPT

## 2023-07-20 PROCEDURE — 80061 LIPID PANEL: CPT

## 2023-07-20 PROCEDURE — 85652 RBC SED RATE AUTOMATED: CPT

## 2023-07-20 PROCEDURE — 84443 ASSAY THYROID STIM HORMONE: CPT

## 2023-07-21 ENCOUNTER — HOME CARE VISIT (OUTPATIENT)
Dept: HOME HEALTH SERVICES | Facility: HOME HEALTHCARE | Age: 63
End: 2023-07-21
Payer: COMMERCIAL

## 2023-07-21 VITALS
TEMPERATURE: 97.7 F | DIASTOLIC BLOOD PRESSURE: 60 MMHG | RESPIRATION RATE: 18 BRPM | OXYGEN SATURATION: 99 % | SYSTOLIC BLOOD PRESSURE: 106 MMHG | HEART RATE: 100 BPM

## 2023-07-21 PROCEDURE — G0299 HHS/HOSPICE OF RN EA 15 MIN: HCPCS

## 2023-07-24 ENCOUNTER — HOME CARE VISIT (OUTPATIENT)
Dept: HOME HEALTH SERVICES | Facility: HOME HEALTHCARE | Age: 63
End: 2023-07-24
Payer: COMMERCIAL

## 2023-07-24 ENCOUNTER — OFFICE VISIT (OUTPATIENT)
Dept: WOUND CARE | Facility: HOSPITAL | Age: 63
End: 2023-07-24
Payer: COMMERCIAL

## 2023-07-24 VITALS
DIASTOLIC BLOOD PRESSURE: 64 MMHG | RESPIRATION RATE: 16 BRPM | TEMPERATURE: 97.7 F | SYSTOLIC BLOOD PRESSURE: 130 MMHG | HEART RATE: 80 BPM

## 2023-07-24 DIAGNOSIS — Z89.432 S/P TRANSMETATARSAL AMPUTATION OF FOOT, LEFT (HCC): ICD-10-CM

## 2023-07-24 DIAGNOSIS — E10.40 TYPE 1 DIABETES MELLITUS WITH DIABETIC NEUROPATHY (HCC): ICD-10-CM

## 2023-07-24 DIAGNOSIS — T81.89XA NON-HEALING SURGICAL WOUND, INITIAL ENCOUNTER: Primary | ICD-10-CM

## 2023-07-24 PROCEDURE — 99212 OFFICE O/P EST SF 10 MIN: CPT | Performed by: PODIATRIST

## 2023-07-24 PROCEDURE — 99024 POSTOP FOLLOW-UP VISIT: CPT | Performed by: PODIATRIST

## 2023-07-24 NOTE — PROGRESS NOTES
Patient ID: Marty Bolivar is a 66 y.o. male Date of Birth 1960       Chief Complaint   Patient presents with   • Follow Up Wound Care Visit     Dressing intact on arrival, vna changing every other day. Allergies:  Cefuroxime and Amoxicillin-pot clavulanate    Diagnosis:  1. Diabetic ulcer of other part of left foot associated with type 1 diabetes mellitus, with fat layer exposed ([address])  -     Wound cleansing and dressings; Future    2. Non-healing surgical wound, initial encounter       Diagnosis ICD-10-CM Associated Orders   1. Diabetic ulcer of other part of left foot associated with type 1 diabetes mellitus, with fat layer exposed ([address])  D78.971 Wound cleansing and dressings    L97.522       2.  Non-healing surgical wound, initial encounter  T81.89XA            Assessment & Plan:  ***    Subjective:   HPI    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)   • Insulin pump in place   • ED (erectile dysfunction) of organic origin   • Chronic pain   • Benign essential hypertension   • Cigarette nicotine dependence without complication   • Gout of ankle   • Type 1 diabetes mellitus with hyperglycemia (HCC)   • Diabetic polyneuropathy associated with type 1 diabetes mellitus (HCC)   • Microalbuminuria due to type 1 diabetes mellitus ([address])   • Hypoglycemia unawareness associated with type 1 diabetes mellitus ([address])   • 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, with fat layer exposed ([address])   • Stage 2 chronic kidney disease   • Hyponatremia   • Alcohol abuse   • Nicotine abuse   • Sepsis [facility]     Past Medical History:   Diagnosis Date   • Chronic foot ulcer ([address]) 09/07/2018   • Diabetes mellitus ([address])    • Diabetic foot ulcer ([address]) 2023   • Gout    • High cholesterol    • Hypertension    • Visual impairment 2022    Cateract     Past Surgical History:   Procedure Laterality Date   • CATARACT EXTRACTION Right 2023   • COMPLEX WOUND CLOSURE TO EXTREMITY Left 2019    Procedure: COMPLEX CLOSURE LEFT CHEEK;  Surgeon: Mar Ogden MD;  [facility];  Service: Plastics   • FACIAL/NECK BIOPSY Left 2019    Procedure: EXCISION LEFT CHEEK CYST;  Surgeon: Mar Ogden MD;  [facility];  Service: Plastics   • HERNIA REPAIR Left     several times   • KNEE ARTHROSCOPY Left     torn meniscus   • WI AMPUTATION FOOT TRANSMETARSAL Left 2023    Procedure: AMPUTATION TRANSMETATARSAL (TMA);  Surgeon: Tanisha Pitts DPM;  Location:  MAIN OR;  Service: Podiatry   • WOUND DEBRIDEMENT Left 2023    Procedure: DEBRIDEMENT WOUND LEFT FOOT WOUND WITH EXCISION OF INFECTED BONE AND WOUND VAC APPLICATION;  Surgeon: Lady Maradiaga DPM;  Location:  MAIN OR;  Service: Podiatry     Social History     Socioeconomic History   • Marital status: /Civil Union     Spouse name: Not on file   • Number of children: 2   • Years of education: 12   • Highest education level: High school graduate   Occupational History   • Occupation: cafeteria/kitchen at [employer]     Comment: [employer]   Tobacco Use   • Smoking status: Every Day     Packs/day: 1.00     Years: 20.00     Total pack years: 20.00     Types: Cigarettes     Last attempt to quit: 2022     Years since quittin.5   • Smokeless tobacco: Never   • Tobacco comments:     encouraged smoking cessation   Vaping Use   • Vaping Use: Never used   Substance and Sexual Activity   • Alcohol use: Yes     Comment: 3/4 beers a day   • Drug use: Never   • Sexual activity: Not Currently   Other Topics Concern   • Not on file   Social History Narrative   • Not on file     Social Determinants of Health     Financial Resource Strain: Not on file   Food Insecurity: No Food Insecurity (6/30/2023)    Hunger Vital Sign    • Worried About Running Out of Food in the Last Year: Never true    • Ran Out of Food in the Last Year: Never true   Transportation Needs: No Transportation Needs (6/30/2023)    PRAPARE - Transportation    • Lack of Transportation (Medical): No    • Lack of Transportation (Non-Medical):  No   Physical Activity: Not on file   Stress: Not on file   Social Connections: Not on file   Intimate Partner Violence: Not on file   Housing Stability: Low Risk  (6/30/2023)    Housing Stability Vital Sign    • Unable to Pay for Housing in the Last Year: No    • Number of Places Lived in the Last Year: 1    • Unstable Housing in the Last Year: No        Current Outpatient Medications:   •  atorvastatin (LIPITOR) 40 mg tablet, TAKE 1 TABLET BY MOUTH EVERY DAY, Disp: 90 tablet, Rfl: 1  •  gabapentin (Neurontin) 100 mg capsule, Take 1 capsule (100 mg total) by mouth 3 (three) times a day, Disp: 90 capsule, Rfl: 2  •  Glucagon, rDNA, (Glucagon Emergency) 1 MG KIT, as directed, Disp: , Rfl:   •  HumaLOG 100 UNIT/ML injection, inject up to 80 units in INSULIN PUMP daily as directed by prescriber (Patient not taking: Reported on 7/14/2023), Disp: , Rfl:   •  insulin lispro (HumaLOG) 100 units/mL injection, Use via the insulin pump, use up to 80 units daily, Disp: 80 mL, Rfl: 1  •  lisinopril (ZESTRIL) 10 mg tablet, Take 1 tablet (10 mg total) by mouth daily, Disp: 90 tablet, Rfl: 1  •  metoprolol succinate (TOPROL-XL) 100 mg 24 hr tablet, Take 1 tablet (100 mg total) by mouth daily, Disp: 90 tablet, Rfl: 1  •  nicotine (NICODERM CQ) 21 mg/24 hr TD 24 hr patch, Place 1 patch on the skin over 24 hours daily Do not start before July 5, 2023., Disp: 28 patch, Rfl: 0  Family History   Problem Relation Age of Onset   • Heart disease Father    • Diabetes type I Father    • Diabetes type II Mother    • No Known Problems Sister    • Alcohol abuse Brother    • Diabetes type I Brother    • No Known Problems Brother    • No Known Problems Son    • No Known Problems Daughter       Review of Systems      Objective:  /64   Pulse 80   Temp 97.7 °F (36.5 °C)   Resp 16     Physical Exam    Wound 06/30/23 Foot Left (Active)   Wound Image   07/24/23 1624   Wound Description Epithelialization;Eschar 07/24/23 1624   Wound Length (cm) 0.2 cm 07/24/23 1624   Wound Width (cm) 13 cm 07/24/23 1624   Wound Depth (cm) 0 cm 07/24/23 1624   Wound Surface Area (cm^2) 2.6 cm^2 07/24/23 1624   Wound Volume (cm^3) 0 cm^3 07/24/23 1624   Calculated Wound Volume (cm^3) 0 cm^3 07/24/23 1624   Drainage Amount Small 07/24/23 1624   Drainage Description Yellow 07/24/23 1624   Non-staged Wound Description Not applicable 40/44/64 7925   Dressing Status Intact 07/24/23 1624       Procedures     Results from last 6 Months   Lab Units 06/30/23  1452   WOUND CULTURE  2 colonies Methicillin Resistant Staphylococcus aureus*  1+ Growth of Corynebacterium striatum*  2 colonies Methicillin Resistant Staphylococcus aureus*  Growth in Broth culture only Staphylococcus coagulase negative*           Wound Instructions:  Orders Placed This Encounter   Procedures   • Wound cleansing and dressings     Left foot surgical incision  Wash your hands with soap and water. Remove old dressing, discard into plastic bag and place in trash. Cleanse the wound with saline prior to applying a clean dressing. Do not use tissue or cotton balls. Do not scrub the wound. Pat dry using gauze. Shower yes with protection  Apply badatine to skin surrounding wound  Apply adaptic and alginate to the surgical site wound. Cover with abd  Secure with kerlix and tape  Change dressing every other day  Done today     Standing Status:   Future     Standing Expiration Date:   7/24/2024         Ji Magallanes DPM      Portions of the record may have been created with voice recognition software.  Occasional wrong word or "sound a like" substitutions may have occurred due to the inherent limitations of voice recognition software. Read the chart carefully and recognize, using context, where substitutions have occurred. 1+ Growth of Corynebacterium striatum*  2 colonies Methicillin Resistant Staphylococcus aureus*  Growth in Broth culture only Staphylococcus coagulase negative*           Wound Instructions:  Orders Placed This Encounter   Procedures   • Diabetic Shoe     Dispense 1 pair of diabetic shoes, and 3 pair of custom molded multidensity orthotics. Will require toe filler on left with a rigid shank to address TMA  Diabetic shoes are medically necessary as patient has type 1 diabetes with neuropathy and high risk of future breakdown. Order Specific Question:   Type     Answer:   Custom Molded     Order Specific Question: This patient has an active home care or hospice episode. Should this order be COMPLETED by [facility]? Answer:   No         Boneta Alamin, DPM      Portions of the record may have been created with voice recognition software. Occasional wrong word or "sound a like" substitutions may have occurred due to the inherent limitations of voice recognition software. Read the chart carefully and recognize, using context, where substitutions have occurred.

## 2023-07-24 NOTE — PATIENT INSTRUCTIONS
Orders Placed This Encounter   Procedures    Wound cleansing and dressings     Left foot surgical incision  Wash your hands with soap and water. Remove old dressing, discard into plastic bag and place in trash. Cleanse the wound with saline prior to applying a clean dressing. Do not use tissue or cotton balls. Do not scrub the wound. Pat dry using gauze. Shower yes with protection  Apply badatine to skin surrounding wound  Apply adaptic and alginate to the surgical site wound.   Cover with abd  Secure with kerlix and tape  Change dressing every other day  Done today     Standing Status:   Future     Standing Expiration Date:   7/24/2024

## 2023-07-25 ENCOUNTER — HOME CARE VISIT (OUTPATIENT)
Dept: HOME HEALTH SERVICES | Facility: HOME HEALTHCARE | Age: 63
End: 2023-07-25
Payer: COMMERCIAL

## 2023-07-25 NOTE — CASE COMMUNICATION
Ship to [facility]   Insurance  [employer]     Wound 1 TMA           Partial  XX  All items are ordered by the each unless otherwise noted.   PLEASE DO NOT ORDER BY THE BOX  Request specific quantity needed  For Private Insurances order should be for a 2 week period  For [facility] patients order should be for a 1 week period      Dry Dressings   (Nonsterile gauze not covered by private insurance)  (Sterile gauze may be request ed for insurance rather than nonsterile gauze)         Gauze Kerlix (fluff roll) 4inch sterile [id number]        8  ABD 5x9 [id number]       8    Calcium Alginates  (In place of Aquacel or Maxsorb)  Alginate 4x4 [id number]        1    Moist Wound Products  Gauze oil emulsion [id number]       3

## 2023-07-26 ENCOUNTER — HOME CARE VISIT (OUTPATIENT)
Dept: HOME HEALTH SERVICES | Facility: HOME HEALTHCARE | Age: 63
End: 2023-07-26
Payer: COMMERCIAL

## 2023-07-26 VITALS
OXYGEN SATURATION: 97 % | DIASTOLIC BLOOD PRESSURE: 60 MMHG | SYSTOLIC BLOOD PRESSURE: 110 MMHG | TEMPERATURE: 97.5 F | RESPIRATION RATE: 20 BRPM | HEART RATE: 106 BPM

## 2023-07-26 PROCEDURE — G0299 HHS/HOSPICE OF RN EA 15 MIN: HCPCS

## 2023-07-28 ENCOUNTER — HOME CARE VISIT (OUTPATIENT)
Dept: HOME HEALTH SERVICES | Facility: HOME HEALTHCARE | Age: 63
End: 2023-07-28
Payer: COMMERCIAL

## 2023-07-28 PROCEDURE — G0299 HHS/HOSPICE OF RN EA 15 MIN: HCPCS

## 2023-07-29 VITALS
HEART RATE: 90 BPM | SYSTOLIC BLOOD PRESSURE: 120 MMHG | RESPIRATION RATE: 18 BRPM | OXYGEN SATURATION: 99 % | TEMPERATURE: 97.6 F | DIASTOLIC BLOOD PRESSURE: 60 MMHG

## 2023-07-30 ENCOUNTER — HOME CARE VISIT (OUTPATIENT)
Dept: HOME HEALTH SERVICES | Facility: HOME HEALTHCARE | Age: 63
End: 2023-07-30
Payer: COMMERCIAL

## 2023-07-30 VITALS
TEMPERATURE: 97 F | RESPIRATION RATE: 18 BRPM | DIASTOLIC BLOOD PRESSURE: 88 MMHG | OXYGEN SATURATION: 98 % | SYSTOLIC BLOOD PRESSURE: 140 MMHG | HEART RATE: 98 BPM

## 2023-07-30 PROCEDURE — G0299 HHS/HOSPICE OF RN EA 15 MIN: HCPCS

## 2023-07-31 ENCOUNTER — OFFICE VISIT (OUTPATIENT)
Dept: WOUND CARE | Facility: HOSPITAL | Age: 63
End: 2023-07-31
Payer: COMMERCIAL

## 2023-07-31 VITALS — TEMPERATURE: 98 F | RESPIRATION RATE: 16 BRPM

## 2023-07-31 DIAGNOSIS — E10.40 TYPE 1 DIABETES MELLITUS WITH DIABETIC NEUROPATHY (HCC): ICD-10-CM

## 2023-07-31 DIAGNOSIS — Z89.432 S/P TRANSMETATARSAL AMPUTATION OF FOOT, LEFT (HCC): ICD-10-CM

## 2023-07-31 DIAGNOSIS — E10.621 DIABETIC ULCER OF OTHER PART OF LEFT FOOT ASSOCIATED WITH TYPE 1 DIABETES MELLITUS, WITH FAT LAYER EXPOSED (HCC): Primary | ICD-10-CM

## 2023-07-31 DIAGNOSIS — M79.672 PAIN IN LEFT FOOT: ICD-10-CM

## 2023-07-31 DIAGNOSIS — T81.89XA NON-HEALING SURGICAL WOUND, INITIAL ENCOUNTER: ICD-10-CM

## 2023-07-31 DIAGNOSIS — L97.522 DIABETIC ULCER OF OTHER PART OF LEFT FOOT ASSOCIATED WITH TYPE 1 DIABETES MELLITUS, WITH FAT LAYER EXPOSED (HCC): Primary | ICD-10-CM

## 2023-07-31 PROCEDURE — 99024 POSTOP FOLLOW-UP VISIT: CPT | Performed by: PODIATRIST

## 2023-07-31 PROCEDURE — 99212 OFFICE O/P EST SF 10 MIN: CPT | Performed by: PODIATRIST

## 2023-07-31 NOTE — PROGRESS NOTES
Patient ID: Loran Merlin is a 66 y.o. male Date of Birth 1960       Chief Complaint   Patient presents with   • Follow Up Wound Care Visit     Follow up visit for wound to left foot       Allergies:  Cefuroxime and Amoxicillin-pot clavulanate    Diagnosis:  1. Diabetic ulcer of other part of left foot associated with type 1 diabetes mellitus, with fat layer exposed ([address])  -     Wound cleansing and dressings; Future    2. Non-healing surgical wound, initial encounter  -     Wound cleansing and dressings; Future    3. S/P transmetatarsal amputation of foot, left ([facility])    4. Pain in left foot  -     gabapentin (Neurontin) 300 mg capsule; Take 1 capsule (300 mg total) by mouth 2 (two) times a day    5. Type 1 diabetes mellitus with diabetic neuropathy ([facility])  -     gabapentin (Neurontin) 300 mg capsule; Take 1 capsule (300 mg total) by mouth 2 (two) times a day       Diagnosis ICD-10-CM Associated Orders   1. Diabetic ulcer of other part of left foot associated with type 1 diabetes mellitus, with fat layer exposed ([address])  Q09.785 Wound cleansing and dressings    L97.522       2. Non-healing surgical wound, initial encounter  T81.89XA Wound cleansing and dressings      3. S/P transmetatarsal amputation of foot, left ([address])  Z89.432       4. Pain in left foot  M79.672 gabapentin (Neurontin) 300 mg capsule      5. Type 1 diabetes mellitus with diabetic neuropathy ([facility])  E10.40 gabapentin (Neurontin) 300 mg capsule           Assessment & Plan:  POV#3 S/P LFT TMA  Sutures removed, Steri-Strips applied with dry dressing. Rx gabapentin 300 twice daily  Patient may get wet in 5 days and start partial weightbearing as tolerated. Should any areas open or drainage start he is instructed to call immediately. Follow-up in 3 weeks. Subjective:   7/31/2023: Postop visit #3 status post TMA left foot, reports continued pain which she feels is neurologic in nature.   Reports his bandage has been dry with the past few changes VNA.    7/24/2023: POV #2 status post discharge from hospital having undergone TMA on 6/30/2023. Relates having still mild/moderate pain in distal stump and foot. Feels some of it is more neurologic in nature. 7/12/2023: POV S/P TMA LFT(6/30/2023), reports pain has diminished but has episodes of pain on the top lateral aspect of his midfoot. Reports no increasing redness or swelling or signs of infection. 6/28/2023: 66year-old type I diabetic presents today for evaluation of chronic ulcer plantar aspect of left first MPJ. Patient underwent debridement of sesamoid bones on 5/30/2023 with VAC dressing application. Was progressing well with VNA visits and then this past Monday it was noted become more red and swollen. Was empirically started on doxycycline. Patient reports his foot now looks worse than it did on Monday.         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)   • Insulin pump in place   • ED (erectile dysfunction) of organic origin   • Chronic pain   • Benign essential hypertension   • Cigarette nicotine dependence without complication   • Gout of ankle   • Type 1 diabetes mellitus with hyperglycemia (HCC)   • Diabetic polyneuropathy associated with type 1 diabetes mellitus (HCC)   • Microalbuminuria due to type 1 diabetes mellitus ([address])   • Hypoglycemia unawareness associated with type 1 diabetes mellitus ([address])   • 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, with fat layer exposed ([address])   • Stage 2 chronic kidney disease   • Hyponatremia   • Alcohol abuse   • Nicotine abuse   • Sepsis ([address])   • Type 1 diabetes mellitus with diabetic neuropathy (HCC)   • S/P transmetatarsal amputation of foot, left [facility])     Past Medical History:   Diagnosis Date   • Chronic foot ulcer ([address]) 2018   • Diabetes mellitus ([address])    • Diabetic foot ulcer ([address]) 2023   • Gout    • High cholesterol    • Hypertension    • Visual impairment 2022    Cateract     Past Surgical History:   Procedure Laterality Date   • CATARACT EXTRACTION Right 2023   • COMPLEX WOUND CLOSURE TO EXTREMITY Left 2019    Procedure: COMPLEX CLOSURE LEFT CHEEK;  Surgeon: Roland Avilez MD;  Location:  MAIN OR;  Service: Plastics   • FACIAL/NECK BIOPSY Left 2019    Procedure: EXCISION LEFT CHEEK CYST;  Surgeon: Roland Avilez MD;  [facility];  Service: Plastics   • HERNIA REPAIR Left     several times   • KNEE ARTHROSCOPY Left     torn meniscus   • [place] AMPUTATION FOOT TRANSMETARSAL Left 2023    Procedure: AMPUTATION TRANSMETATARSAL (TMA);  Surgeon: Jasmyn Puckett DPM;  Location: [facility];  Service: Podiatry   • WOUND DEBRIDEMENT Left 2023    Procedure: DEBRIDEMENT WOUND LEFT FOOT WOUND WITH EXCISION OF INFECTED BONE AND WOUND VAC APPLICATION;  Surgeon: Blake Cazares DPM;  Location: [facility];  Service: Podiatry     Social History     Socioeconomic History   • Marital status: /Civil Union     Spouse name: Not on file   • Number of children: 2   • Years of education: 12   • Highest education level: High school graduate   Occupational History   • 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   Tobacco Use   • Smoking status: Every Day     Packs/day: 1.00     Years: 20.00     Total pack years: 20.00     Types: Cigarettes     Last attempt to quit: 2022     Years since quittin.5   • Smokeless tobacco: Never   • Tobacco comments:     encouraged smoking cessation   Vaping Use   • Vaping Use: Never used   Substance and Sexual Activity   • Alcohol use: Yes     Comment: 3/4 beers a day   • Drug use: Never   • Sexual activity: Not Currently   Other Topics Concern   • Not on file   Social History Narrative   • Not on file     Social Determinants of Health     Financial Resource Strain: Not on file   Food Insecurity: No Food Insecurity (6/30/2023)    Hunger Vital Sign    • Worried About Running Out of Food in the Last Year: Never true    • Ran Out of Food in the Last Year: Never true   Transportation Needs: No Transportation Needs (6/30/2023)    PRAPARE - Transportation    • Lack of Transportation (Medical): No    • Lack of Transportation (Non-Medical):  No   Physical Activity: Not on file   Stress: Not on file   Social Connections: Not on file   Intimate Partner Violence: Not on file   Housing Stability: Low Risk  (6/30/2023)    Housing Stability Vital Sign    • Unable to Pay for Housing in the Last Year: No    • Number of Places Lived in the Last Year: 1    • Unstable Housing in the Last Year: No        Current Outpatient Medications:   •  gabapentin (Neurontin) 300 mg capsule, Take 1 capsule (300 mg total) by mouth 2 (two) times a day, Disp: 180 capsule, Rfl: 0  •  atorvastatin (LIPITOR) 40 mg tablet, TAKE 1 TABLET BY MOUTH EVERY DAY, Disp: 90 tablet, Rfl: 1  •  Glucagon, rDNA, (Glucagon Emergency) 1 MG KIT, as directed, Disp: , Rfl:   •  HumaLOG 100 UNIT/ML injection, inject up to 80 units in INSULIN PUMP daily as directed by prescriber (Patient not taking: Reported on 7/14/2023), Disp: , Rfl:   •  insulin lispro (HumaLOG) 100 units/mL injection, Use via the insulin pump, use up to 80 units daily, Disp: 80 mL, Rfl: 1  •  lisinopril (ZESTRIL) 10 mg tablet, Take 1 tablet (10 mg total) by mouth daily, Disp: 90 tablet, Rfl: 1  •  metoprolol succinate (TOPROL-XL) 100 mg 24 hr tablet, Take 1 tablet (100 mg total) by mouth daily, Disp: 90 tablet, Rfl: 1  •  nicotine (NICODERM CQ) 21 mg/24 hr TD 24 hr patch, Place 1 patch on the skin over 24 hours daily Do not start before July 5, 2023., Disp: 28 patch, Rfl: 0  Family History   Problem Relation Age of Onset   • Heart disease Father    • Diabetes type I Father    • Diabetes type II Mother    • No Known Problems Sister    • Alcohol abuse Brother    • Diabetes type I Brother    • No Known Problems Brother    • No Known Problems Son    • No Known Problems Daughter       Review of Systems      Objective:  Temp 98 °F (36.7 °C)   Resp 16     Physical Exam  Constitutional:       Appearance: Normal appearance. HENT:      Head: Normocephalic. Right Ear: Tympanic membrane normal.      Left Ear: Tympanic membrane normal.      Nose: No congestion. Mouth/Throat:      Pharynx: No oropharyngeal exudate or posterior oropharyngeal erythema. Eyes:      Conjunctiva/sclera: Conjunctivae normal.      Pupils: Pupils are equal, round, and reactive to light. Cardiovascular:      Rate and Rhythm: Normal rate and regular rhythm. Pulses:           Dorsalis pedis pulses are 0 on the right side and 0 on the left side. Posterior tibial pulses are 0 on the right side and 0 on the left side. Pulmonary:      Effort: Pulmonary effort is normal.   Musculoskeletal:         General: Tenderness present. Left Lower Extremity: Left leg is amputated below ankle. Feet:      Right foot:      Protective Sensation: 10 sites tested. 3 sites sensed. Skin integrity: Dry skin present. Left foot:      Protective Sensation: 10 sites tested. 3 sites sensed. Skin integrity: Dry skin present. Skin:     General: Skin is warm and dry. Capillary Refill: Capillary refill takes 2 to 3 seconds. Coloration: Skin is not pale. Findings: No bruising, erythema, lesion or rash. Comments: Left foot surgical incision coapting satisfactorily, sutures  intact, no drainage. Wound appears 100% healed. No signs of infection. Mild distal edema noted as expected. No cellulitis or signs of infection. Overall good progress. Neurological:      Mental Status: He is alert.       Cranial Nerves: No cranial nerve deficit. Sensory: Sensory deficit (Burning tingling paresthesias.) present. Motor: No weakness. Gait: Gait normal.      Deep Tendon Reflexes: Reflexes normal.   Psychiatric:         Mood and Affect: Mood normal.         Behavior: Behavior normal.         Judgment: Judgment normal.         Wound 06/30/23 Foot Left (Active)   Wound Image     07/31/23 1530   Wound Description Epithelialization;Eschar;Other (Comment) 07/31/23 1538   Dee Dee-wound Assessment Intact 07/31/23 1538   Wound Length (cm) 0.2 cm 07/31/23 1538   Wound Width (cm) 12.5 cm 07/31/23 1538   Wound Depth (cm) 0 cm 07/31/23 1538   Wound Surface Area (cm^2) 2.5 cm^2 07/31/23 1538   Wound Volume (cm^3) 0 cm^3 07/31/23 1538   Calculated Wound Volume (cm^3) 0 cm^3 07/31/23 1538   Drainage Amount Small 07/31/23 1538   Drainage Description Yellow 07/31/23 1538   Non-staged Wound Description Not applicable 69/82/49 5041   Treatments Irrigation with NSS 07/31/23 1538   Patient Tolerance Tolerated well 07/31/23 1538   Dressing Status Removed 07/31/23 1538       Procedures     Results from last 6 Months   Lab Units 06/30/23  1452   WOUND CULTURE  2 colonies Methicillin Resistant Staphylococcus aureus*  1+ Growth of Corynebacterium striatum*  2 colonies Methicillin Resistant Staphylococcus aureus*  Growth in Broth culture only Staphylococcus coagulase negative*           Wound Instructions:  Orders Placed This Encounter   Procedures   • Wound cleansing and dressings     Left foot surgical incision   Wash your hands with soap and water.  Remove old dressing, discard into plastic bag and place in trash.    Cleanse the wound with saline prior to applying a clean dressing. Do not use tissue or cotton balls. Do not scrub the wound. Pat dry using gauze. Shower yes, please place a new dressing on your wound following shower.     Apply badatine to skin surrounding wound   Apply adaptic and alginate to the surgical site wound.  Cover with abd Secure with kerlix and tape   Change dressing 2x weekly. Today's Treatment:  Dr. Martina Mena removed the sutures today. Please leave this dressing in place for 3 days. You can get your wound wet after that time. Do not pull the steri-strips off. Allow them to fall off in time. Follow up with Dr. Martina Mena in 3 weeks. Standing Status:   Future     Standing Expiration Date:   7/31/2024         Ally Castro DPM      Portions of the record may have been created with voice recognition software. Occasional wrong word or "sound a like" substitutions may have occurred due to the inherent limitations of voice recognition software. Read the chart carefully and recognize, using context, where substitutions have occurred.

## 2023-07-31 NOTE — PATIENT INSTRUCTIONS
Orders Placed This Encounter   Procedures    Wound cleansing and dressings     Left foot surgical incision   Wash your hands with soap and water. Remove old dressing, discard into plastic bag and place in trash. Cleanse the wound with saline prior to applying a clean dressing. Do not use tissue or cotton balls. Do not scrub the wound. Pat dry using gauze. Shower yes, please place a new dressing on your wound following shower. Apply badatine to skin surrounding wound   Apply adaptic and alginate to the surgical site wound. Cover with abd   Secure with kerlix and tape   Change dressing 2x weekly. Today's Treatment:  Dr. Svetlana Walsh removed the sutures today. Please leave this dressing in place for 3 days. You can get your wound wet after that time. Do not pull the steri-strips off. Allow them to fall off in time. Follow up with Dr. Svetlana Walsh in 3 weeks.      Standing Status:   Future     Standing Expiration Date:   7/31/2024

## 2023-08-01 ENCOUNTER — HOME CARE VISIT (OUTPATIENT)
Dept: HOME HEALTH SERVICES | Facility: HOME HEALTHCARE | Age: 63
End: 2023-08-01
Payer: COMMERCIAL

## 2023-08-02 PROBLEM — Z89.432 S/P TRANSMETATARSAL AMPUTATION OF FOOT, LEFT (HCC): Status: ACTIVE | Noted: 2023-08-02

## 2023-08-02 PROBLEM — E10.40 TYPE 1 DIABETES MELLITUS WITH DIABETIC NEUROPATHY (HCC): Status: ACTIVE | Noted: 2023-08-02

## 2023-08-03 ENCOUNTER — HOME CARE VISIT (OUTPATIENT)
Dept: HOME HEALTH SERVICES | Facility: HOME HEALTHCARE | Age: 63
End: 2023-08-03
Payer: COMMERCIAL

## 2023-08-03 VITALS
RESPIRATION RATE: 20 BRPM | DIASTOLIC BLOOD PRESSURE: 80 MMHG | HEART RATE: 80 BPM | TEMPERATURE: 97.9 F | SYSTOLIC BLOOD PRESSURE: 136 MMHG | OXYGEN SATURATION: 98 %

## 2023-08-03 PROCEDURE — 400014 VN F/U

## 2023-08-03 PROCEDURE — G0299 HHS/HOSPICE OF RN EA 15 MIN: HCPCS

## 2023-08-03 PROCEDURE — 400013 VN SOC

## 2023-08-04 ENCOUNTER — HOME CARE VISIT (OUTPATIENT)
Dept: HOME HEALTH SERVICES | Facility: HOME HEALTHCARE | Age: 63
End: 2023-08-04
Payer: COMMERCIAL

## 2023-08-04 RX ORDER — GABAPENTIN 300 MG/1
300 CAPSULE ORAL 2 TIMES DAILY
Qty: 180 CAPSULE | Refills: 0 | Status: SHIPPED | OUTPATIENT
Start: 2023-08-04 | End: 2023-11-02

## 2023-08-04 RX ORDER — GABAPENTIN 300 MG/1
300 CAPSULE ORAL 2 TIMES DAILY
Qty: 180 CAPSULE | Refills: 0 | Status: SHIPPED | OUTPATIENT
Start: 2023-08-04 | End: 2023-08-04 | Stop reason: SDUPTHER

## 2023-08-06 VITALS
TEMPERATURE: 97.9 F | DIASTOLIC BLOOD PRESSURE: 80 MMHG | HEART RATE: 80 BPM | SYSTOLIC BLOOD PRESSURE: 136 MMHG | OXYGEN SATURATION: 98 % | RESPIRATION RATE: 20 BRPM

## 2023-08-07 ENCOUNTER — HOME CARE VISIT (OUTPATIENT)
Dept: HOME HEALTH SERVICES | Facility: HOME HEALTHCARE | Age: 63
End: 2023-08-07
Payer: COMMERCIAL

## 2023-08-07 VITALS
SYSTOLIC BLOOD PRESSURE: 134 MMHG | DIASTOLIC BLOOD PRESSURE: 68 MMHG | TEMPERATURE: 97.3 F | OXYGEN SATURATION: 95 % | HEART RATE: 84 BPM

## 2023-08-07 PROCEDURE — G0299 HHS/HOSPICE OF RN EA 15 MIN: HCPCS

## 2023-08-09 ENCOUNTER — HOME CARE VISIT (OUTPATIENT)
Dept: HOME HEALTH SERVICES | Facility: HOME HEALTHCARE | Age: 63
End: 2023-08-09
Payer: COMMERCIAL

## 2023-08-09 VITALS
SYSTOLIC BLOOD PRESSURE: 136 MMHG | TEMPERATURE: 97.8 F | HEART RATE: 98 BPM | RESPIRATION RATE: 16 BRPM | OXYGEN SATURATION: 97 % | DIASTOLIC BLOOD PRESSURE: 76 MMHG

## 2023-08-09 PROCEDURE — G0299 HHS/HOSPICE OF RN EA 15 MIN: HCPCS

## 2023-08-10 LAB
LEFT EYE DIABETIC RETINOPATHY: POSITIVE
RIGHT EYE DIABETIC RETINOPATHY: POSITIVE

## 2023-08-10 PROCEDURE — 10330064

## 2023-08-11 ENCOUNTER — HOME CARE VISIT (OUTPATIENT)
Dept: HOME HEALTH SERVICES | Facility: HOME HEALTHCARE | Age: 63
End: 2023-08-11
Payer: COMMERCIAL

## 2023-08-11 PROCEDURE — G0299 HHS/HOSPICE OF RN EA 15 MIN: HCPCS

## 2023-08-12 VITALS
SYSTOLIC BLOOD PRESSURE: 120 MMHG | RESPIRATION RATE: 18 BRPM | HEART RATE: 98 BPM | OXYGEN SATURATION: 98 % | TEMPERATURE: 97.6 F | DIASTOLIC BLOOD PRESSURE: 68 MMHG

## 2023-08-14 ENCOUNTER — HOME CARE VISIT (OUTPATIENT)
Dept: HOME HEALTH SERVICES | Facility: HOME HEALTHCARE | Age: 63
End: 2023-08-14
Payer: COMMERCIAL

## 2023-08-14 VITALS
OXYGEN SATURATION: 96 % | TEMPERATURE: 97.6 F | DIASTOLIC BLOOD PRESSURE: 70 MMHG | RESPIRATION RATE: 16 BRPM | SYSTOLIC BLOOD PRESSURE: 142 MMHG | HEART RATE: 110 BPM

## 2023-08-14 DIAGNOSIS — T81.89XA NON-HEALING SURGICAL WOUND, INITIAL ENCOUNTER: Primary | ICD-10-CM

## 2023-08-14 DIAGNOSIS — M79.672 PAIN IN LEFT FOOT: ICD-10-CM

## 2023-08-14 DIAGNOSIS — Z89.432 S/P TRANSMETATARSAL AMPUTATION OF FOOT, LEFT (HCC): ICD-10-CM

## 2023-08-14 PROCEDURE — G0299 HHS/HOSPICE OF RN EA 15 MIN: HCPCS

## 2023-08-14 RX ORDER — OXYCODONE HYDROCHLORIDE AND ACETAMINOPHEN 5; 325 MG/1; MG/1
1 TABLET ORAL EVERY 6 HOURS PRN
Qty: 56 TABLET | Refills: 0 | Status: SHIPPED | OUTPATIENT
Start: 2023-08-14 | End: 2023-08-27

## 2023-08-14 NOTE — PROGRESS NOTES
Patient called and reports significant pain from his left foot over the past 24 hours has reached a level of 10 out of 10. [facility] evaluated his surgical incision this morning in the lateral edge of the TMA incision has dehisced and is nonhealing. · Scheduled for follow-up in 2 days on Wednesday, will hold on antibiotics until wound culture obtained.   · Rx  Percocet called into pharmacy for patient, in addition patient will increase his gabapentin to 300 mg TID  · Rx X-ray left foot to rule out residual osteomyelitis, which patient will complete tomorrow prior to Wednesday visit

## 2023-08-15 ENCOUNTER — HOSPITAL ENCOUNTER (OUTPATIENT)
Dept: RADIOLOGY | Facility: HOSPITAL | Age: 63
Discharge: HOME | End: 2023-08-15
Payer: COMMERCIAL

## 2023-08-15 DIAGNOSIS — Z89.432 S/P TRANSMETATARSAL AMPUTATION OF FOOT, LEFT (HCC): ICD-10-CM

## 2023-08-15 DIAGNOSIS — T81.89XA NON-HEALING SURGICAL WOUND, INITIAL ENCOUNTER: ICD-10-CM

## 2023-08-15 PROCEDURE — 73630 X-RAY EXAM OF FOOT: CPT

## 2023-08-16 ENCOUNTER — HOME CARE VISIT (OUTPATIENT)
Dept: HOME HEALTH SERVICES | Facility: HOME HEALTHCARE | Age: 63
End: 2023-08-16
Payer: COMMERCIAL

## 2023-08-16 ENCOUNTER — OFFICE VISIT (OUTPATIENT)
Dept: WOUND CARE | Facility: HOSPITAL | Age: 63
End: 2023-08-16
Payer: COMMERCIAL

## 2023-08-16 VITALS
RESPIRATION RATE: 16 BRPM | TEMPERATURE: 98.3 F | HEART RATE: 68 BPM | SYSTOLIC BLOOD PRESSURE: 124 MMHG | DIASTOLIC BLOOD PRESSURE: 68 MMHG

## 2023-08-16 DIAGNOSIS — T81.89XA NON-HEALING SURGICAL WOUND, INITIAL ENCOUNTER: Primary | ICD-10-CM

## 2023-08-16 PROCEDURE — 87186 SC STD MICRODIL/AGAR DIL: CPT | Performed by: PODIATRIST

## 2023-08-16 PROCEDURE — 87077 CULTURE AEROBIC IDENTIFY: CPT | Performed by: PODIATRIST

## 2023-08-16 PROCEDURE — 87205 SMEAR GRAM STAIN: CPT | Performed by: PODIATRIST

## 2023-08-16 PROCEDURE — 99024 POSTOP FOLLOW-UP VISIT: CPT | Performed by: PODIATRIST

## 2023-08-16 PROCEDURE — 87070 CULTURE OTHR SPECIMN AEROBIC: CPT | Performed by: PODIATRIST

## 2023-08-16 NOTE — PROGRESS NOTES
Patient ID: Lisa Hernández is a 66 y.o. male Date of Birth 1960       Chief Complaint   Patient presents with   • Follow Up Wound Care Visit     Wound care       Allergies:  Cefuroxime and Amoxicillin-pot clavulanate    Diagnosis:  1. Non-healing surgical wound, initial encounter  -     Wound cleansing and dressings; Future  -     Wound culture and Gram stain       Diagnosis ICD-10-CM Associated Orders   1.  Non-healing surgical wound, initial encounter  T81.89XA Wound cleansing and dressings     Wound culture and Gram stain           Assessment & Plan:  • ***    Subjective:   HPI    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 ([facility])   • Insulin pump in place   • ED (erectile dysfunction) of organic origin   • Chronic pain   • Benign essential hypertension   • Cigarette nicotine dependence without complication   • Gout of ankle   • Type 1 diabetes mellitus with hyperglycemia ([facility])   • Diabetic polyneuropathy associated with type 1 diabetes mellitus (HCC)   • Microalbuminuria due to type 1 diabetes mellitus (HCC)   • Hypoglycemia unawareness associated with type 1 diabetes mellitus (HCC)   • 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, with fat layer exposed ([address])   • Stage 2 chronic kidney disease   • Hyponatremia   • Alcohol abuse   • Nicotine abuse   • Sepsis ([address])   • Type 1 diabetes mellitus with diabetic neuropathy (HCC)   • S/P transmetatarsal amputation of foot, left ([facility])     Past Medical History:   Diagnosis Date   • Chronic foot ulcer ([address]) 09/07/2018   • Diabetes mellitus ([address])    • Diabetic foot ulcer ([address]) 5/8/2023   • Gout    • High cholesterol    • Hypertension    • Visual impairment 2022    Cateract     Past Surgical History:   Procedure Laterality Date   • CATARACT EXTRACTION Right 2023   • COMPLEX WOUND CLOSURE TO EXTREMITY Left 2019    Procedure: COMPLEX CLOSURE LEFT CHEEK;  Surgeon: Eris Li MD;  Location: [facility];  Service: Plastics   • FACIAL/NECK BIOPSY Left 2019    Procedure: EXCISION LEFT CHEEK CYST;  Surgeon: Eris Li MD;  Location: [facility];  Service: Plastics   • HERNIA REPAIR Left     several times   • KNEE ARTHROSCOPY Left     torn meniscus   • ME AMPUTATION FOOT TRANSMETARSAL Left 2023    Procedure: AMPUTATION TRANSMETATARSAL (TMA);  Surgeon: Kelli Pettit DPM;  Location: [facility];  Service: Podiatry   • WOUND DEBRIDEMENT Left 2023    Procedure: DEBRIDEMENT WOUND LEFT FOOT WOUND WITH EXCISION OF INFECTED BONE AND WOUND VAC APPLICATION;  Surgeon: Adelfo Jules DPM;  Location: [facility];  Service: Podiatry     Social History     Socioeconomic History   • Marital status: /Civil Union     Spouse name: Not on file   • Number of children: 2   • Years of education: 12   • Highest education level: High school graduate   Occupational History   • Occupation: cafeteria/kitchen at [employer]     Comment: [employer]   Tobacco Use   • Smoking status: Every Day     Packs/day: 1.00     Years: 20.00     Total pack years: 20.00     Types: Cigarettes     Last attempt to quit: 2022     Years since quittin.6   • Smokeless tobacco: Never   • Tobacco comments:     encouraged smoking cessation   Vaping Use   • Vaping Use: Never used   Substance and Sexual Activity   • Alcohol use: Yes     Comment: 3/4 beers a day   • Drug use: Never   • Sexual activity: Not Currently   Other Topics Concern   • Not on file   Social History Narrative   • Not on file     Social Determinants of Health     Financial Resource Strain: Not on file   Food Insecurity: No Food Insecurity (2023)    Hunger Vital Sign    • Worried About Running Out of Food in the Last Year: Never true    • Ran Out of Food in the Last Year: Never true   Transportation Needs: No Transportation Needs (6/30/2023)    PRAPARE - Transportation    • Lack of Transportation (Medical): No    • Lack of Transportation (Non-Medical):  No   Physical Activity: Not on file   Stress: Not on file   Social Connections: Not on file   Intimate Partner Violence: Not on file   Housing Stability: Low Risk  (6/30/2023)    Housing Stability Vital Sign    • Unable to Pay for Housing in the Last Year: No    • Number of Places Lived in the Last Year: 1    • Unstable Housing in the Last Year: No        Current Outpatient Medications:   •  atorvastatin (LIPITOR) 40 mg tablet, TAKE 1 TABLET BY MOUTH EVERY DAY, Disp: 90 tablet, Rfl: 1  •  gabapentin (Neurontin) 300 mg capsule, Take 1 capsule (300 mg total) by mouth 2 (two) times a day, Disp: 180 capsule, Rfl: 0  •  Glucagon, rDNA, (Glucagon Emergency) 1 MG KIT, as directed, Disp: , Rfl:   •  HumaLOG 100 UNIT/ML injection, inject up to 80 units in INSULIN PUMP daily as directed by prescriber (Patient not taking: Reported on 7/14/2023), Disp: , Rfl:   •  insulin lispro (HumaLOG) 100 units/mL injection, Use via the insulin pump, use up to 80 units daily, Disp: 80 mL, Rfl: 1  •  lisinopril (ZESTRIL) 10 mg tablet, Take 1 tablet (10 mg total) by mouth daily, Disp: 90 tablet, Rfl: 1  •  metoprolol succinate (TOPROL-XL) 100 mg 24 hr tablet, Take 1 tablet (100 mg total) by mouth daily, Disp: 90 tablet, Rfl: 1  •  nicotine (NICODERM CQ) 21 mg/24 hr TD 24 hr patch, Place 1 patch on the skin over 24 hours daily Do not start before July 5, 2023., Disp: 28 patch, Rfl: 0  •  oxyCODONE-acetaminophen (Percocet) 5-325 mg per tablet, Take 1 tablet by mouth every 6 (six) hours as needed for moderate pain for up to 14 days Max Daily Amount: 4 tablets, Disp: 56 tablet, Rfl: 0  Family History   Problem Relation Age of Onset   • Heart disease Father    • Diabetes type I Father    • Diabetes type II Mother    • No Known Problems Sister    • Alcohol abuse Brother    • Diabetes type I Brother    • No Known Problems Brother    • No Known Problems Son    • No Known Problems Daughter       Review of Systems      Objective:  /68   Pulse 68   Temp 98.3 °F (36.8 °C)   Resp 16     Physical Exam    Wound 06/30/23 Foot Left (Active)   Wound Image    08/16/23 1135   Wound Description Epithelialization;Eschar;Other (Comment) 07/31/23 1538   Dee Dee-wound Assessment Erythema; Maceration 08/16/23 1116   Wound Length (cm) 0.2 cm 07/31/23 1538   Wound Width (cm) 12.5 cm 07/31/23 1538   Wound Depth (cm) 0 cm 07/31/23 1538   Wound Surface Area (cm^2) 2.5 cm^2 07/31/23 1538   Wound Volume (cm^3) 0 cm^3 07/31/23 1538   Calculated Wound Volume (cm^3) 0 cm^3 07/31/23 1538   Drainage Amount Moderate 08/16/23 1116   Drainage Description Serosanguineous; Yoshi Bird 08/16/23 1116   Non-staged Wound Description Full thickness 08/16/23 1116   Treatments Cleansed 08/16/23 1116   Wound packed? No 08/16/23 1116   Dressing Changed Changed 08/16/23 1116   Patient Tolerance Tolerated well 08/16/23 1116   Dressing Status Intact 08/16/23 1116       Procedures     Results from last 6 Months   Lab Units 06/30/23  1452   WOUND CULTURE  2 colonies Methicillin Resistant Staphylococcus aureus*  1+ Growth of Corynebacterium striatum*  2 colonies Methicillin Resistant Staphylococcus aureus*  Growth in Broth culture only Staphylococcus coagulase negative*         Wound Instructions:  Orders Placed This Encounter   Procedures   • Wound cleansing and dressings     Wound cleansing and dressings       Left foot surgical incision   Wash your hands with soap and water.  Remove old dressing, discard into plastic bag and place in trash.    Cleanse the wound with saline prior to applying a clean dressing. Do not use tissue or cotton balls. Do not scrub the wound.  Pat dry using gauze.      Shower yes, please place a new dressing on your wound following shower.     Apply bedatine to skin surrounding wound   Apply adaptic and alginate to the surgical site wound.  Cover with abd   Secure with kerlix and tape   Change dressing 2x weekly.         Standing Status:   Future     Standing Expiration Date:   8/16/2024   • Wound culture and Gram stain     Order Specific Question:   Release to patient through Mychart     Answer:   Mavis Lopez DPM      Portions of the record may have been created with voice recognition software. Occasional wrong word or "sound a like" substitutions may have occurred due to the inherent limitations of voice recognition software. Read the chart carefully and recognize, using context, where substitutions have occurred. sites tested. 3 sites sensed. Skin integrity: Dry skin present. Skin:     General: Skin is warm and dry. Capillary Refill: Capillary refill takes 2 to 3 seconds. Coloration: Skin is not pale. Findings: No bruising, erythema, lesion or rash. Comments: Left foot surgical incision has dehisced laterally with increased necrosis, maceration, and subtle malodor. Calor erythema and edema of the left foot present. Neurological:      Mental Status: He is alert. Cranial Nerves: No cranial nerve deficit. Sensory: Sensory deficit (Burning tingling paresthesias.) present. Motor: No weakness. Gait: Gait normal.      Deep Tendon Reflexes: Reflexes normal.   Psychiatric:         Mood and Affect: Mood normal.         Behavior: Behavior normal.         Judgment: Judgment normal.         Wound 06/30/23 Foot Left (Active)   Wound Image    08/16/23 1135   Wound Description Epithelialization;Eschar;Other (Comment) 07/31/23 1538   Dee Dee-wound Assessment Erythema; Maceration 08/16/23 1116   Wound Length (cm) 0.2 cm 07/31/23 1538   Wound Width (cm) 12.5 cm 07/31/23 1538   Wound Depth (cm) 0 cm 07/31/23 1538   Wound Surface Area (cm^2) 2.5 cm^2 07/31/23 1538   Wound Volume (cm^3) 0 cm^3 07/31/23 1538   Calculated Wound Volume (cm^3) 0 cm^3 07/31/23 1538   Drainage Amount Moderate 08/16/23 1116   Drainage Description Serosanguineous; Elbridge Serene 08/16/23 1116   Non-staged Wound Description Full thickness 08/16/23 1116   Treatments Cleansed 08/16/23 1116   Wound packed?  No 08/16/23 1116   Dressing Changed Changed 08/16/23 1116   Patient Tolerance Tolerated well 08/16/23 1116   Dressing Status Intact 08/16/23 1116       Procedures     Results from last 6 Months   Lab Units 08/16/23  1301   WOUND CULTURE  4+ Growth of Proteus mirabilis*  4+ Growth of Methicillin Resistant Staphylococcus aureus*           Wound Instructions:  Orders Placed This Encounter   Procedures   • Wound cleansing and dressings Wound cleansing and dressings       Left foot surgical incision   Wash your hands with soap and water. Julieta Gutierrez old dressing, discard into plastic bag and place in trash.    Cleanse the wound with saline prior to applying a clean dressing. Do not use tissue or cotton balls. Do not scrub the wound. Pat dry using gauze. Shower NO  Apply bedatine to surgical incision and skin surrounding wound   Apply silver alginate to the surgical site wound.  Cover with abd   Secure with kerlix and tape   Ace wrap    Patient instructed to go to ER tomorrow morning for admission.            Standing Status:   Future     Standing Expiration Date:   8/16/2024   • Wound culture and Gram stain     Order Specific Question:   Release to patient through [employer]     Answer:   Immediate         Carolee Loya DPM      Portions of the record may have been created with voice recognition software. Occasional wrong word or "sound a like" substitutions may have occurred due to the inherent limitations of voice recognition software. Read the chart carefully and recognize, using context, where substitutions have occurred.

## 2023-08-16 NOTE — PATIENT INSTRUCTIONS
Orders Placed This Encounter   Procedures    Wound cleansing and dressings     Wound cleansing and dressings       Left foot surgical incision   Wash your hands with soap and water. Remove old dressing, discard into plastic bag and place in trash. Cleanse the wound with saline prior to applying a clean dressing. Do not use tissue or cotton balls. Do not scrub the wound. Pat dry using gauze. Shower NO  Apply bedatine to surgical incision and skin surrounding wound   Apply silver alginate to the surgical site wound. Cover with abd   Secure with kerlix and tape   Ace wrap    Patient instructed to go to ER tomorrow morning for admission.             Standing Status:   Future     Standing Expiration Date:   8/16/2024    Wound culture and Gram stain     Order Specific Question:   Release to patient through Mychart     Answer:   Immediate

## 2023-08-17 ENCOUNTER — APPOINTMENT (INPATIENT)
Dept: MRI IMAGING | Facility: HOSPITAL | Age: 63
End: 2023-08-17
Payer: COMMERCIAL

## 2023-08-17 ENCOUNTER — HOSPITAL ENCOUNTER (INPATIENT)
Facility: HOSPITAL | Age: 63
LOS: 1 days | Discharge: LEFT BEFORE DISCHARGE | End: 2023-08-18
Attending: EMERGENCY MEDICINE | Admitting: HOSPITALIST
Payer: COMMERCIAL

## 2023-08-17 DIAGNOSIS — L97.422 DIABETIC ULCER OF LEFT MIDFOOT ASSOCIATED WITH TYPE 1 DIABETES MELLITUS, WITH FAT LAYER EXPOSED (HCC): ICD-10-CM

## 2023-08-17 DIAGNOSIS — E10.621 DIABETIC ULCER OF LEFT MIDFOOT ASSOCIATED WITH TYPE 1 DIABETES MELLITUS, WITH FAT LAYER EXPOSED (HCC): ICD-10-CM

## 2023-08-17 DIAGNOSIS — E87.1 CHRONIC HYPONATREMIA: ICD-10-CM

## 2023-08-17 DIAGNOSIS — E10.40 TYPE 1 DIABETES MELLITUS WITH DIABETIC NEUROPATHY (HCC): ICD-10-CM

## 2023-08-17 DIAGNOSIS — L03.116 CELLULITIS OF LEFT FOOT: ICD-10-CM

## 2023-08-17 DIAGNOSIS — M86.9 OSTEOMYELITIS OF LEFT FOOT, UNSPECIFIED TYPE (HCC): Primary | ICD-10-CM

## 2023-08-17 LAB
ANION GAP SERPL CALCULATED.3IONS-SCNC: 12 MMOL/L
ANION GAP SERPL CALCULATED.3IONS-SCNC: 8 MMOL/L
BASOPHILS # BLD AUTO: 0.11 THOUSANDS/ÂΜL (ref 0–0.1)
BASOPHILS NFR BLD AUTO: 1 % (ref 0–1)
BUN SERPL-MCNC: 16 MG/DL (ref 5–25)
BUN SERPL-MCNC: 16 MG/DL (ref 5–25)
CALCIUM SERPL-MCNC: 8.9 MG/DL (ref 8.4–10.2)
CALCIUM SERPL-MCNC: 9.3 MG/DL (ref 8.4–10.2)
CHLORIDE SERPL-SCNC: 92 MMOL/L (ref 96–108)
CHLORIDE SERPL-SCNC: 95 MMOL/L (ref 96–108)
CO2 SERPL-SCNC: 22 MMOL/L (ref 21–32)
CO2 SERPL-SCNC: 25 MMOL/L (ref 21–32)
CREAT SERPL-MCNC: 1.16 MG/DL (ref 0.6–1.3)
CREAT SERPL-MCNC: 1.21 MG/DL (ref 0.6–1.3)
CRP SERPL QL: 73.5 MG/L
EOSINOPHIL # BLD AUTO: 0.23 THOUSAND/ÂΜL (ref 0–0.61)
EOSINOPHIL NFR BLD AUTO: 2 % (ref 0–6)
ERYTHROCYTE [DISTWIDTH] IN BLOOD BY AUTOMATED COUNT: 14.1 % (ref 11.6–15.1)
ERYTHROCYTE [SEDIMENTATION RATE] IN BLOOD: 89 MM/HOUR (ref 0–19)
GFR SERPL CREATININE-BSD FRML MDRD: 64 ML/MIN/1.73SQ M
GFR SERPL CREATININE-BSD FRML MDRD: 67 ML/MIN/1.73SQ M
GLUCOSE SERPL-MCNC: 116 MG/DL (ref 65–140)
GLUCOSE SERPL-MCNC: 143 MG/DL (ref 65–140)
GLUCOSE SERPL-MCNC: 143 MG/DL (ref 65–140)
GLUCOSE SERPL-MCNC: 186 MG/DL (ref 65–140)
GLUCOSE SERPL-MCNC: 202 MG/DL (ref 65–140)
GLUCOSE SERPL-MCNC: 74 MG/DL (ref 65–140)
HCT VFR BLD AUTO: 32.6 % (ref 36.5–49.3)
HGB BLD-MCNC: 10.8 G/DL (ref 12–17)
IMM GRANULOCYTES # BLD AUTO: 0.07 THOUSAND/UL (ref 0–0.2)
IMM GRANULOCYTES NFR BLD AUTO: 1 % (ref 0–2)
INR PPP: 0.95 (ref 0.84–1.19)
LACTATE SERPL-SCNC: 1.5 MMOL/L (ref 0.5–2)
LYMPHOCYTES # BLD AUTO: 1.11 THOUSANDS/ÂΜL (ref 0.6–4.47)
LYMPHOCYTES NFR BLD AUTO: 8 % (ref 14–44)
MCH RBC QN AUTO: 31.9 PG (ref 26.8–34.3)
MCHC RBC AUTO-ENTMCNC: 33.1 G/DL (ref 31.4–37.4)
MCV RBC AUTO: 96 FL (ref 82–98)
MONOCYTES # BLD AUTO: 1.57 THOUSAND/ÂΜL (ref 0.17–1.22)
MONOCYTES NFR BLD AUTO: 11 % (ref 4–12)
NEUTROPHILS # BLD AUTO: 11.04 THOUSANDS/ÂΜL (ref 1.85–7.62)
NEUTS SEG NFR BLD AUTO: 77 % (ref 43–75)
NRBC BLD AUTO-RTO: 0 /100 WBCS
PLATELET # BLD AUTO: 396 THOUSANDS/UL (ref 149–390)
PMV BLD AUTO: 8.7 FL (ref 8.9–12.7)
POTASSIUM SERPL-SCNC: 4.2 MMOL/L (ref 3.5–5.3)
POTASSIUM SERPL-SCNC: 4.7 MMOL/L (ref 3.5–5.3)
PROTHROMBIN TIME: 13.3 SECONDS (ref 11.6–14.5)
RBC # BLD AUTO: 3.39 MILLION/UL (ref 3.88–5.62)
SODIUM SERPL-SCNC: 126 MMOL/L (ref 135–147)
SODIUM SERPL-SCNC: 128 MMOL/L (ref 135–147)
WBC # BLD AUTO: 14.13 THOUSAND/UL (ref 4.31–10.16)

## 2023-08-17 PROCEDURE — 36415 COLL VENOUS BLD VENIPUNCTURE: CPT | Performed by: PHYSICIAN ASSISTANT

## 2023-08-17 PROCEDURE — 96375 TX/PRO/DX INJ NEW DRUG ADDON: CPT

## 2023-08-17 PROCEDURE — 99024 POSTOP FOLLOW-UP VISIT: CPT | Performed by: PODIATRIST

## 2023-08-17 PROCEDURE — 80048 BASIC METABOLIC PNL TOTAL CA: CPT | Performed by: PHYSICIAN ASSISTANT

## 2023-08-17 PROCEDURE — 96374 THER/PROPH/DIAG INJ IV PUSH: CPT

## 2023-08-17 PROCEDURE — 99285 EMERGENCY DEPT VISIT HI MDM: CPT

## 2023-08-17 PROCEDURE — 87081 CULTURE SCREEN ONLY: CPT | Performed by: HOSPITALIST

## 2023-08-17 PROCEDURE — 82948 REAGENT STRIP/BLOOD GLUCOSE: CPT

## 2023-08-17 PROCEDURE — 86140 C-REACTIVE PROTEIN: CPT | Performed by: PHYSICIAN ASSISTANT

## 2023-08-17 PROCEDURE — 99285 EMERGENCY DEPT VISIT HI MDM: CPT | Performed by: PHYSICIAN ASSISTANT

## 2023-08-17 PROCEDURE — 99223 1ST HOSP IP/OBS HIGH 75: CPT | Performed by: HOSPITALIST

## 2023-08-17 PROCEDURE — 85025 COMPLETE CBC W/AUTO DIFF WBC: CPT | Performed by: PHYSICIAN ASSISTANT

## 2023-08-17 PROCEDURE — 85652 RBC SED RATE AUTOMATED: CPT | Performed by: PHYSICIAN ASSISTANT

## 2023-08-17 PROCEDURE — 85610 PROTHROMBIN TIME: CPT | Performed by: PHYSICIAN ASSISTANT

## 2023-08-17 PROCEDURE — 83605 ASSAY OF LACTIC ACID: CPT | Performed by: PHYSICIAN ASSISTANT

## 2023-08-17 PROCEDURE — 73718 MRI LOWER EXTREMITY W/O DYE: CPT

## 2023-08-17 PROCEDURE — 87040 BLOOD CULTURE FOR BACTERIA: CPT | Performed by: PHYSICIAN ASSISTANT

## 2023-08-17 PROCEDURE — 93005 ELECTROCARDIOGRAM TRACING: CPT

## 2023-08-17 RX ORDER — OXYCODONE HYDROCHLORIDE AND ACETAMINOPHEN 5; 325 MG/1; MG/1
1 TABLET ORAL EVERY 6 HOURS PRN
Status: DISCONTINUED | OUTPATIENT
Start: 2023-08-17 | End: 2023-08-18 | Stop reason: HOSPADM

## 2023-08-17 RX ORDER — MORPHINE SULFATE 4 MG/ML
4 INJECTION, SOLUTION INTRAMUSCULAR; INTRAVENOUS ONCE
Status: COMPLETED | OUTPATIENT
Start: 2023-08-17 | End: 2023-08-17

## 2023-08-17 RX ORDER — CALCIUM CARBONATE 500 MG/1
1000 TABLET, CHEWABLE ORAL DAILY PRN
Status: DISCONTINUED | OUTPATIENT
Start: 2023-08-17 | End: 2023-08-18 | Stop reason: HOSPADM

## 2023-08-17 RX ORDER — LISINOPRIL 10 MG/1
10 TABLET ORAL DAILY
Status: DISCONTINUED | OUTPATIENT
Start: 2023-08-17 | End: 2023-08-18 | Stop reason: HOSPADM

## 2023-08-17 RX ORDER — NICOTINE 21 MG/24HR
1 PATCH, TRANSDERMAL 24 HOURS TRANSDERMAL DAILY
Status: DISCONTINUED | OUTPATIENT
Start: 2023-08-17 | End: 2023-08-18 | Stop reason: HOSPADM

## 2023-08-17 RX ORDER — ATORVASTATIN CALCIUM 40 MG/1
40 TABLET, FILM COATED ORAL
Status: DISCONTINUED | OUTPATIENT
Start: 2023-08-17 | End: 2023-08-18 | Stop reason: HOSPADM

## 2023-08-17 RX ORDER — INSULIN LISPRO 100 [IU]/ML
1-5 INJECTION, SOLUTION INTRAVENOUS; SUBCUTANEOUS
Status: DISCONTINUED | OUTPATIENT
Start: 2023-08-17 | End: 2023-08-18 | Stop reason: HOSPADM

## 2023-08-17 RX ORDER — GABAPENTIN 300 MG/1
300 CAPSULE ORAL 2 TIMES DAILY
Status: DISCONTINUED | OUTPATIENT
Start: 2023-08-17 | End: 2023-08-18 | Stop reason: HOSPADM

## 2023-08-17 RX ORDER — INSULIN LISPRO 100 [IU]/ML
1-5 INJECTION, SOLUTION INTRAVENOUS; SUBCUTANEOUS
Status: CANCELLED | OUTPATIENT
Start: 2023-08-17

## 2023-08-17 RX ORDER — ONDANSETRON 2 MG/ML
4 INJECTION INTRAMUSCULAR; INTRAVENOUS EVERY 6 HOURS PRN
Status: DISCONTINUED | OUTPATIENT
Start: 2023-08-17 | End: 2023-08-18 | Stop reason: HOSPADM

## 2023-08-17 RX ORDER — CEFEPIME HYDROCHLORIDE 2 G/50ML
2000 INJECTION, SOLUTION INTRAVENOUS EVERY 12 HOURS
Status: DISCONTINUED | OUTPATIENT
Start: 2023-08-18 | End: 2023-08-18

## 2023-08-17 RX ORDER — ENOXAPARIN SODIUM 100 MG/ML
40 INJECTION SUBCUTANEOUS DAILY
Status: DISCONTINUED | OUTPATIENT
Start: 2023-08-18 | End: 2023-08-18 | Stop reason: HOSPADM

## 2023-08-17 RX ORDER — METOPROLOL SUCCINATE 50 MG/1
100 TABLET, EXTENDED RELEASE ORAL DAILY
Status: DISCONTINUED | OUTPATIENT
Start: 2023-08-17 | End: 2023-08-18 | Stop reason: HOSPADM

## 2023-08-17 RX ORDER — CEFAZOLIN SODIUM 1 G/50ML
1000 SOLUTION INTRAVENOUS EVERY 8 HOURS
Status: DISCONTINUED | OUTPATIENT
Start: 2023-08-17 | End: 2023-08-17

## 2023-08-17 RX ORDER — CEFEPIME HYDROCHLORIDE 2 G/50ML
2000 INJECTION, SOLUTION INTRAVENOUS ONCE
Status: COMPLETED | OUTPATIENT
Start: 2023-08-17 | End: 2023-08-18

## 2023-08-17 RX ORDER — ACETAMINOPHEN 325 MG/1
650 TABLET ORAL EVERY 6 HOURS PRN
Status: DISCONTINUED | OUTPATIENT
Start: 2023-08-17 | End: 2023-08-18 | Stop reason: HOSPADM

## 2023-08-17 RX ORDER — OXYCODONE HYDROCHLORIDE 10 MG/1
10 TABLET ORAL EVERY 6 HOURS PRN
Status: DISCONTINUED | OUTPATIENT
Start: 2023-08-17 | End: 2023-08-18 | Stop reason: HOSPADM

## 2023-08-17 RX ADMIN — ATORVASTATIN CALCIUM 40 MG: 40 TABLET, FILM COATED ORAL at 15:18

## 2023-08-17 RX ADMIN — MORPHINE SULFATE 4 MG: 4 INJECTION INTRAVENOUS at 09:39

## 2023-08-17 RX ADMIN — NICOTINE 1 PATCH: 21 PATCH, EXTENDED RELEASE TRANSDERMAL at 15:18

## 2023-08-17 RX ADMIN — OXYCODONE HYDROCHLORIDE 10 MG: 10 TABLET ORAL at 18:35

## 2023-08-17 RX ADMIN — MORPHINE SULFATE 2 MG: 2 INJECTION, SOLUTION INTRAMUSCULAR; INTRAVENOUS at 15:19

## 2023-08-17 RX ADMIN — MORPHINE SULFATE 2 MG: 2 INJECTION, SOLUTION INTRAMUSCULAR; INTRAVENOUS at 20:33

## 2023-08-17 RX ADMIN — METOPROLOL SUCCINATE 100 MG: 50 TABLET, EXTENDED RELEASE ORAL at 15:18

## 2023-08-17 RX ADMIN — GABAPENTIN 300 MG: 300 CAPSULE ORAL at 20:33

## 2023-08-17 RX ADMIN — CEFEPIME HYDROCHLORIDE 2000 MG: 2 INJECTION, SOLUTION INTRAVENOUS at 22:55

## 2023-08-17 RX ADMIN — OXYCODONE HYDROCHLORIDE AND ACETAMINOPHEN 1 TABLET: 5; 325 TABLET ORAL at 22:53

## 2023-08-17 RX ADMIN — VANCOMYCIN HYDROCHLORIDE 1750 MG: 1 INJECTION, POWDER, LYOPHILIZED, FOR SOLUTION INTRAVENOUS at 10:56

## 2023-08-17 RX ADMIN — CEFEPIME HYDROCHLORIDE 2000 MG: 2 INJECTION, SOLUTION INTRAVENOUS at 13:24

## 2023-08-17 RX ADMIN — OXYCODONE HYDROCHLORIDE AND ACETAMINOPHEN 1 TABLET: 5; 325 TABLET ORAL at 12:47

## 2023-08-17 RX ADMIN — LISINOPRIL 10 MG: 10 TABLET ORAL at 15:18

## 2023-08-17 NOTE — ASSESSMENT & PLAN NOTE
Lab Results   Component Value Date    HGBA1C 7.9 (H) 07/20/2023       Recent Labs     08/17/23  1219   POCGLU 116       Blood Sugar Average: Last 72 hrs:  · Continue insulin pump on discharge.   Patient had refused insulin gtt while admitted as he wished to sign out AMA  · Continue gabapentin 300 mg twice daily for neuropathy

## 2023-08-17 NOTE — ASSESSMENT & PLAN NOTE
· Seen by his outpatient podiatrist day prior to admission recommending admission for MRI left foot and surgical intervention  · MRI left foot with evidence of osteomyelitis. Prior discussion between patient and podiatry, consideration for possible BKA. General surgery was consulted  · CRP 73.5  · Lactic acid 1.5 on arrival  · Leukocytosis resolved. Afebrile  · Outpatient wound culture noted on 8/16 growing Proteus and MRSA  · Discussed at length with patient as well as patient's wife. Patient wishes to sign out AMA at this time despite extensive education regarding reason for IV antibiotic, surgical evaluation. Awake, alert and oriented x3. Patient verbalizes understanding regarding risks including worsening infection, multiorgan failure and ultimately death. · Discussed with ID -will provide prescription for doxycycline and Duricef based on outpatient culture results.

## 2023-08-17 NOTE — CASE MANAGEMENT
Case Management Assessment & Discharge Planning Note    Patient name Lauren Sheets  Location RONEN POOL/RONEN MRN [id number]  : 1960 Date 2023       Current Admission Date: 2023  Current Admission Diagnosis: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, with fat layer exposed [facility]   Patient Active Problem List    Diagnosis Date Noted   • Type 1 diabetes mellitus with diabetic neuropathy ([address]) 2023   • S/P transmetatarsal amputation of foot, left ([address]) 2023   • Sepsis ([address]) 2023   • Stage 2 chronic kidney disease 2023   • Hyponatremia 2023   • Alcohol abuse 2023   • Nicotine abuse 2023   • 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, with fat layer exposed ([address]) 2023   • Type 1 diabetes mellitus with hyperglycemia ([address]) 2022   • Diabetic polyneuropathy associated with type 1 diabetes mellitus ([address]) 2022   • Microalbuminuria due to type 1 diabetes mellitus ([address]) 2022   • Hypoglycemia unawareness associated with type 1 diabetes mellitus ([address]) 2022   • Cigarette nicotine dependence without complication    • Gout of ankle 2022   • Chronic fatigue syndrome 2022   • Lower urinary tract symptoms due to benign prostatic hyperplasia 2022   • 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 ([address]) 2022   • Chronic pain 2022   • Insulin pump in place 2018   • Benign essential hypertension 2011   • Mixed hyperlipidemia 10/25/2010   • ED (erectile dysfunction) of organic origin 10/25/2010      LOS (days): 0  Geometric Mean LOS (GMLOS) (days):   Days to GMLOS:     OBJECTIVE:    Risk of Unplanned Readmission Score: 11.96         Current admission status: Inpatient       Preferred Pharmacy:   [address] [place] [address] #[id number] ANTON Perez - [phone]  [address] [place] [address] [place] [address]  Phone: [phone] Fax: [phone]    Primary Care Provider: Luma Morillo MD    Primary Insurance: Silvia Valdez  Secondary Insurance:     ASSESSMENT:  Rosemary Proxies     Saul Lopez [address] Representative - Spouse   Primary Phone: [phone] (Mobile)  Home Phone: [phone]               Advance Directives  Does patient have a [address]?: No  Was patient offered paperwork?: Yes (declined)  Does patient currently have a Health Care decision maker?: Yes, please see Health Care Proxy section  Does patient have Advance Directives?: No  Was patient offered paperwork?: Yes (declined)    Readmission Root Cause  30 Day Readmission: Yes  Who directed you to return to the hospital?: Specialist  Did you understand whom to contact if you had questions or problems?: Yes  Did you get your prescriptions before you left the hospital?: No  Reason[de-identified] Delivery service not offered  Were you able to get your prescriptions filled when you left the hospital?: Yes  Did you take your medications as prescribed?: Yes  Were you able to get to your follow-up appointments?: Yes  During previous admission, was a post-acute recommendation made?: Yes  What post-acute resources were offered?: [facility]  Patient was readmitted due to: presents as advised by his outpatient podiatry for left foot wound s/p left TMA 6/30. Patient Information  Admitted from[de-identified] Home  Mental Status: Alert  During Assessment patient was accompanied by: Not accompanied during assessment  Assessment information provided by[de-identified] Patient  Primary Caregiver: Self  Support Systems: Self, Spouse/significant other, Family members  [facility]: [address] [place] [address] do you live in?: [address] [facility] entry access options.  Select all that apply.: Stairs  Number of steps to enter home.: 2  Do the steps have railings?: No  Type of Current Residence: [address] [place] [address] home  Upon entering residence, is there a bedroom on the main floor (no further steps)?: Yes  Upon entering residence, is there a bathroom on the main floor (no further steps)?: Yes  In the last 12 months, was there a time when you were not able to pay the mortgage or rent on time?: No  In the last 12 months, how many places have you lived?: 1  In the last 12 months, was there a time when you did not have a steady place to sleep or slept in a shelter (including now)?: No  Homeless/housing insecurity resource given?: N/A  Living Arrangements: Lives w/ Spouse/significant other  Is patient a ?: Yes  Is patient active with [facility])?: No    Activities of Daily Living Prior to Admission  Functional Status: Independent  Completes ADLs independently?: Yes  Ambulates independently?: Yes  Does patient use assisted devices?: No  Does patient currently own DME?: No  Does patient have a history of Outpatient Therapy (PT/OT)?: No  Does the patient have a history of Short-Term Rehab?: No  Does patient have a history of HHC?: Yes (open to [facility])  Does patient currently have [address]?: Yes    Current Home Health Care  Type of Current Home Care Services: Home PT, Home OT, Nurse visit  [facility][de-identified] [address] [place] [address] Provider[de-identified] PCP    Patient Information Continued  Does patient have prescription coverage?: Yes  Within the past 12 months, you worried that your food would run out before you got the money to buy more.: Never true  Within the past 12 months, the food you bought just didn't last and you didn't have money to get more.: Never true  Food insecurity resource given?: N/A  Does patient receive dialysis treatments?: No  Does patient have a history of substance abuse?: No  Does patient have a history of Mental Health Diagnosis?: No    Means of Transportation  Means of Transport to Appts[de-identified] Drives Self  In the past 12 months, has lack of transportation kept you from medical appointments or from getting medications?: No  In the past 12 months, has lack of transportation kept you from meetings, work, or from getting things needed for daily living?: No  Was application for public transport provided?: N/A    DISCHARGE DETAILS:    Discharge planning discussed with[de-identified] patient  Freedom of Choice: Yes  Comments - Freedom of Choice: CM discussed -[facility]; pt is requesting to resume services at dc. CM contacted family/caregiver?: No- see comments (reports being in contact with family)  Were Treatment Team discharge recommendations reviewed with patient/caregiver?: Yes  Did patient/caregiver verbalize understanding of patient care needs?: Yes  Were patient/caregiver advised of the risks associated with not following Treatment Team discharge recommendations?: Yes    [address]         Is the patient interested in [address] at discharge?: Yes  [facility] requested[de-identified] Nursing, Physical Therapy, Occupational [address] [facility] Name[de-identified] Jeane Provider[de-identified] PCP  Home Health Services Needed[de-identified] Evaluate Functional Status and Safety, Gait/ADL Training, Strengthening/Theraputic Exercises to Improve Function, Diabetes Management  Homebound Criteria Met[de-identified] Requires the Assistance of Another Person for Safe Ambulation or to Leave the Home  Supporting Clincal Findings[de-identified] Limited Endurance    DME Referral Provided  Referral made for DME?: No    Other Referral/Resources/Interventions Provided:  Interventions: [facility]  Referral Comments: referral to [facility]    Treatment Team Recommendation: Home with [facility]  Discharge Destination Plan[de-identified] Home with [facility] at Discharge : Family     Additional Comments: Met with pt to discuss the role of CM and to discuss any help pt may need prior to dc. Pt lives with his wife in a 2 story home with 2 KASSY; pt has a 1st floor setup. PT performed ADL's indptly pta, no use of DME. No hx of rehab. No hx of mental health or D&A treatment. Pt drives.  Pt is currently open to [facility]; pt requesting to resume services at dc. AIDIN referral sent to [facility]--accepted. CM added [facility] to pt's dc instructions. Pt's wife will transport home at [place]. Pt's preferred pharmacy is [facility].

## 2023-08-17 NOTE — ASSESSMENT & PLAN NOTE
· Seen by his outpatient podiatrist yesterday recommending admission for MRI left foot and surgical intervention  · Given IV cefepime and vancomycin in the emergency department, will continue with IV Ancef and Vanco for now  · Follow blood cultures  · CRP 73.5  · Lactic acid 1.5 on arrival  · Monitor fever curve and hemodynamics  · podiatry has been consulted and MRI left foot ordered  · Endocrinology also consulted given he is type 1 DM, continue insulin pump for now and likely will need insulin gtt when surgery is planned

## 2023-08-17 NOTE — PLAN OF CARE
Problem: Potential for Falls  Goal: Patient will remain free of falls  Description: INTERVENTIONS:  - Educate patient/family on patient safety including physical limitations  - Instruct patient to call for assistance with activity   - Consult OT/PT to assist with strengthening/mobility   - Keep Call bell within reach  - Keep bed low and locked with side rails adjusted as appropriate  - Keep care items and personal belongings within reach  - Initiate and maintain comfort rounds  - Make Fall Risk Sign visible to staff  - Offer Toileting every 2 Hours, in advance of need  - Initiate/Maintain bed/ chair alarm  - Obtain necessary fall risk management equipment: cane  - Apply yellow socks and bracelet for high fall risk patients  - Consider moving patient to room near nurses station  Outcome: Progressing     Problem: MOBILITY - ADULT  Goal: Maintain or return to baseline ADL function  Description: INTERVENTIONS:  -  Assess patient's ability to carry out ADLs; assess patient's baseline for ADL function and identify physical deficits which impact ability to perform ADLs (bathing, care of mouth/teeth, toileting, grooming, dressing, etc.)  - Assess/evaluate cause of self-care deficits   - Assess range of motion  - Assess patient's mobility; develop plan if impaired  - Assess patient's need for assistive devices and provide as appropriate  - Encourage maximum independence but intervene and supervise when necessary  - Involve family in performance of ADLs  - Assess for home care needs following discharge   - Consider OT consult to assist with ADL evaluation and planning for discharge  - Provide patient education as appropriate  Outcome: Progressing  Goal: Maintains/Returns to pre admission functional level  Description: INTERVENTIONS:  - Perform BMAT or MOVE assessment daily.   - Set and communicate daily mobility goal to care team and patient/family/caregiver.    - Collaborate with rehabilitation services on mobility goals if consulted  - Perform Range of Motion 3 times a day. - Reposition patient every 2 hours.   - Dangle patient 3 times a day  - Stand patient 3 times a day  - Ambulate patient 3 times a day  - Out of bed to chair 3 times a day   - Out of bed for meals 3 times a day  - Out of bed for toileting  - Record patient progress and toleration of activity level   Outcome: Progressing     Problem: PAIN - ADULT  Goal: Verbalizes/displays adequate comfort level or baseline comfort level  Description: Interventions:  - Encourage patient to monitor pain and request assistance  - Assess pain using appropriate pain scale  - Administer analgesics based on type and severity of pain and evaluate response  - Implement non-pharmacological measures as appropriate and evaluate response  - Consider cultural and social influences on pain and pain management  - Notify physician/advanced practitioner if interventions unsuccessful or patient reports new pain  Outcome: Progressing     Problem: INFECTION - ADULT  Goal: Absence or prevention of progression during hospitalization  Description: INTERVENTIONS:  - Assess and monitor for signs and symptoms of infection  - Monitor lab/diagnostic results  - Monitor all insertion sites, i.e. indwelling lines, tubes, and drains  - Monitor endotracheal if appropriate and nasal secretions for changes in amount and color  - [place] appropriate cooling/warming therapies per order  - Administer medications as ordered  - Instruct and encourage patient and family to use good hand hygiene technique  - Identify and instruct in appropriate isolation precautions for identified infection/condition  Outcome: Progressing  Goal: Absence of fever/infection during neutropenic period  Description: INTERVENTIONS:  - Monitor WBC    Outcome: Progressing     Problem: SAFETY ADULT  Goal: Patient will remain free of falls  Description: INTERVENTIONS:  - Educate patient/family on patient safety including physical limitations  - Instruct patient to call for assistance with activity   - Consult OT/PT to assist with strengthening/mobility   - Keep Call bell within reach  - Keep bed low and locked with side rails adjusted as appropriate  - Keep care items and personal belongings within reach  - Initiate and maintain comfort rounds  - Make Fall Risk Sign visible to staff  - Offer Toileting every 2 Hours, in advance of need  - Initiate/Maintain bed/ chair alarm  - Obtain necessary fall risk management equipment: cane  - Apply yellow socks and bracelet for high fall risk patients  - Consider moving patient to room near nurses station  Outcome: Progressing  Goal: Maintain or return to baseline ADL function  Description: INTERVENTIONS:  -  Assess patient's ability to carry out ADLs; assess patient's baseline for ADL function and identify physical deficits which impact ability to perform ADLs (bathing, care of mouth/teeth, toileting, grooming, dressing, etc.)  - Assess/evaluate cause of self-care deficits   - Assess range of motion  - Assess patient's mobility; develop plan if impaired  - Assess patient's need for assistive devices and provide as appropriate  - Encourage maximum independence but intervene and supervise when necessary  - Involve family in performance of ADLs  - Assess for home care needs following discharge   - Consider OT consult to assist with ADL evaluation and planning for discharge  - Provide patient education as appropriate  Outcome: Progressing  Goal: Maintains/Returns to pre admission functional level  Description: INTERVENTIONS:  - Perform BMAT or MOVE assessment daily.   - Set and communicate daily mobility goal to care team and patient/family/caregiver. - Collaborate with rehabilitation services on mobility goals if consulted  - Perform Range of Motion 3 times a day. - Reposition patient every 2 hours.   - Dangle patient 3 times a day  - Stand patient 3 times a day  - Ambulate patient 3 times a day  - Out of bed to chair 3 times a day   - Out of bed for meals 3 times a day  - Out of bed for toileting  - Record patient progress and toleration of activity level   Outcome: Progressing     Problem: DISCHARGE PLANNING  Goal: Discharge to home or other facility with appropriate resources  Description: INTERVENTIONS:  - Identify barriers to discharge w/patient and caregiver  - Arrange for needed discharge resources and transportation as appropriate  - Identify discharge learning needs (meds, wound care, etc.)  - Arrange for interpretive services to assist at discharge as needed  - Refer to Case Management Department for coordinating discharge planning if the patient needs post-hospital services based on physician/advanced practitioner order or complex needs related to functional status, cognitive ability, or social support system  Outcome: Progressing     Problem: Knowledge Deficit  Goal: Patient/family/caregiver demonstrates understanding of disease process, treatment plan, medications, and discharge instructions  Description: Complete learning assessment and assess knowledge base.   Interventions:  - Provide teaching at level of understanding  - Provide teaching via preferred learning methods  Outcome: Progressing     Problem: SKIN/TISSUE INTEGRITY - ADULT  Goal: Incision(s), wounds(s) or drain site(s) healing without S/S of infection  Description: INTERVENTIONS  - Assess and document dressing, incision, wound bed, drain sites and surrounding tissue  - Provide patient and family education  - Perform skin care/dressing changes every shift  Outcome: Progressing     Problem: MUSCULOSKELETAL - ADULT  Goal: Maintain or return mobility to safest level of function  Description: INTERVENTIONS:  - Assess patient's ability to carry out ADLs; assess patient's baseline for ADL function and identify physical deficits which impact ability to perform ADLs (bathing, care of mouth/teeth, toileting, grooming, dressing, etc.)  - Assess/evaluate cause of self-care deficits   - Assess range of motion  - Assess patient's mobility  - Assess patient's need for assistive devices and provide as appropriate  - Encourage maximum independence but intervene and supervise when necessary  - Involve family in performance of ADLs  - Assess for home care needs following discharge   - Consider OT consult to assist with ADL evaluation and planning for discharge  - Provide patient education as appropriate  Outcome: Progressing  Goal: Maintain proper alignment of affected body part  Description: INTERVENTIONS:  - Support, maintain and protect limb and body alignment  - Provide patient/ family with appropriate education  Outcome: Progressing

## 2023-08-17 NOTE — ED PROVIDER NOTES
History  Chief Complaint   Patient presents with   • Foot Pain     Patient presents to the ED with c/o possible infection in left foot, states he had half of his left foot removed in June and now believes he needs the rest removed. HPI     Patient presents with left lower extremity infection post left side TMA in June. Patient was sent in by his podiatrist Dr. 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 Patient with ongoing warmth and erythema. Denies fevers, chills, rigors. Has persistent nerve pain in the left lower extremity. Prior to Admission Medications   Prescriptions Last Dose Informant Patient Reported? Taking? Glucagon, rDNA, (Glucagon Emergency) 1 MG KIT  Self Yes No   Sig: as directed   HumaLOG 100 UNIT/ML injection   Yes No   Sig: inject up to 80 units in INSULIN PUMP daily as directed by prescriber   Patient not taking: Reported on 7/14/2023   atorvastatin (LIPITOR) 40 mg tablet  Self No No   Sig: TAKE 1 TABLET BY MOUTH EVERY DAY   gabapentin (Neurontin) 300 mg capsule   No No   Sig: Take 1 capsule (300 mg total) by mouth 2 (two) times a day   insulin lispro (HumaLOG) 100 units/mL injection  Self No No   Sig: Use via the insulin pump, use up to 80 units daily   lisinopril (ZESTRIL) 10 mg tablet  Self No No   Sig: Take 1 tablet (10 mg total) by mouth daily   metoprolol succinate (TOPROL-XL) 100 mg 24 hr tablet  Self No No   Sig: Take 1 tablet (100 mg total) by mouth daily   nicotine (NICODERM CQ) 21 mg/24 hr TD 24 hr patch   No No   Sig: Place 1 patch on the skin over 24 hours daily Do not start before July 5, 2023.    oxyCODONE-acetaminophen (Percocet) 5-325 mg per tablet   No No   Sig: Take 1 tablet by mouth every 6 (six) hours as needed for moderate pain for up to 14 days Max Daily Amount: 4 tablets      Facility-Administered Medications: None       Past Medical History:   Diagnosis Date   • Chronic foot ulcer ([address]) 09/07/2018   • Diabetes mellitus [facility])    • Diabetic foot ulcer ([address] [facility]) 2023   • Gout    • High cholesterol    • Hypertension    • Visual impairment 2022    Cateract       Past Surgical History:   Procedure Laterality Date   • CATARACT EXTRACTION Right 2023   • COMPLEX WOUND CLOSURE TO EXTREMITY Left 2019    Procedure: COMPLEX CLOSURE LEFT CHEEK;  Surgeon: Dex Jones MD;  Location:  MAIN OR;  Service: Plastics   • FACIAL/NECK BIOPSY Left 2019    Procedure: EXCISION LEFT CHEEK CYST;  Surgeon: Dex Jones MD;  Location:  MAIN OR;  Service: Plastics   • HERNIA REPAIR Left     several times   • KNEE ARTHROSCOPY Left     torn meniscus   • ID AMPUTATION FOOT TRANSMETARSAL Left 2023    Procedure: AMPUTATION TRANSMETATARSAL (TMA);  Surgeon: Kristine Oropeza DPM;  Location:  MAIN OR;  Service: Podiatry   • WOUND DEBRIDEMENT Left 2023    Procedure: DEBRIDEMENT WOUND LEFT FOOT WOUND WITH EXCISION OF INFECTED BONE AND WOUND VAC APPLICATION;  Surgeon: Ed Quinteros DPM;  Location:  MAIN OR;  Service: Podiatry       Family History   Problem Relation Age of Onset   • Heart disease Father    • Diabetes type I Father    • Diabetes type II Mother    • No Known Problems Sister    • Alcohol abuse Brother    • Diabetes type I Brother    • No Known Problems Brother    • No Known Problems Son    • No Known Problems Daughter      I have reviewed and agree with the history as documented.     E-Cigarette/Vaping   • E-Cigarette Use Never User      E-Cigarette/Vaping Substances   • Nicotine No    • THC No    • CBD No    • Flavoring No    • Other No    • Unknown No      Social History     Tobacco Use   • Smoking status: Every Day     Packs/day: 1.00     Years: 20.00     Total pack years: 20.00     Types: Cigarettes     Last attempt to quit: 2022     Years since quittin.6   • Smokeless tobacco: Never   • Tobacco comments:     encouraged smoking cessation   Vaping Use   • Vaping Use: Never used   Substance Use Topics   • Alcohol use: Yes     Comment: 3/4 beers a day   • Drug use: Never       Review of Systems   Constitutional: Negative for chills and fever. HENT: Negative for ear pain and sore throat. Eyes: Negative for pain and visual disturbance. Respiratory: Negative for cough and shortness of breath. Cardiovascular: Negative for chest pain and palpitations. Gastrointestinal: Negative for abdominal pain and vomiting. Genitourinary: Negative for dysuria and hematuria. Musculoskeletal: Negative for arthralgias and back pain. Left lower extremity pain and swelling   Skin: Negative for color change and rash. Neurological: Negative for seizures and syncope. All other systems reviewed and are negative. Physical Exam  Physical Exam  Vitals and nursing note reviewed. Constitutional:       General: He is not in acute distress. Appearance: He is well-developed. HENT:      Head: Normocephalic and atraumatic. Eyes:      Conjunctiva/sclera: Conjunctivae normal.   Cardiovascular:      Rate and Rhythm: Normal rate and regular rhythm. Heart sounds: No murmur heard. Pulmonary:      Effort: Pulmonary effort is normal. No respiratory distress. Breath sounds: Normal breath sounds. Abdominal:      Palpations: Abdomen is soft. Tenderness: There is no abdominal tenderness. Musculoskeletal:         General: No swelling. Cervical back: Neck supple. Comments: Left lower extremity TMA with tenderness, erythema, blanchability      Left Lower Extremity: Left leg is amputated below ankle. Skin:     General: Skin is warm and dry. Capillary Refill: Capillary refill takes less than 2 seconds. Neurological:      Mental Status: He is alert.    Psychiatric:         Mood and Affect: Mood normal.         Vital Signs  ED Triage Vitals   Temperature Pulse Respirations Blood Pressure SpO2   08/17/23 0908 08/17/23 0908 08/17/23 0906 08/17/23 0908 08/17/23 0908   97.8 °F (36.6 °C) 104 18 163/72 100 %      Temp Source Heart Rate Source Patient Position - Orthostatic VS BP Location FiO2 (%)   08/17/23 0908 08/17/23 0906 08/17/23 1000 08/17/23 1000 --   Temporal Monitor Lying Right arm       Pain Score       08/17/23 0906       7           Vitals:    08/17/23 1000 08/17/23 1030 08/17/23 1100 08/17/23 1130   BP: 154/69 128/62 111/88 146/73   Pulse: 101 93 101 89   Patient Position - Orthostatic VS: Lying Lying           Visual Acuity      ED Medications  Medications   vancomycin (VANCOCIN) 1,750 mg in sodium chloride 0.9 % 500 mL IVPB (1,750 mg Intravenous New Bag 8/17/23 1056)   cefepime (MAXIPIME) IVPB (premix in dextrose) 2,000 mg 50 mL (has no administration in time range)   morphine injection 4 mg (4 mg Intravenous Given 8/17/23 0939)       Diagnostic Studies  Results Reviewed     Procedure Component Value Units Date/Time    Basic metabolic panel [[id number]]  (Abnormal) Collected: 08/17/23 0937    Lab Status: Final result Specimen: Blood from Arm, Left Updated: 08/17/23 0958     Sodium 126 mmol/L      Potassium 4.7 mmol/L      Chloride 92 mmol/L      CO2 22 mmol/L      ANION GAP 12 mmol/L      BUN 16 mg/dL      Creatinine 1.21 mg/dL      Glucose 186 mg/dL      Calcium 9.3 mg/dL      eGFR 64 ml/min/1.73sq m     Narrative:      [facility] (CKD):   •  Stage 1 with normal or high GFR (GFR > 90 mL/min/1.73 square meters)  •  Stage 2 Mild CKD (GFR = 60-89 mL/min/1.73 square meters)  •  Stage 3A Moderate CKD (GFR = 45-59 mL/min/1.73 square meters)  •  Stage 3B Moderate CKD (GFR = 30-44 mL/min/1.73 square meters)  •  Stage 4 Severe CKD (GFR = 15-29 mL/min/1.73 square meters)  •  Stage 5 End Stage CKD (GFR <15 mL/min/1.73 square meters)  Note: GFR calculation is accurate only with a steady state creatinine    C-reactive protein [[id number]]  (Abnormal) Collected: 08/17/23 0937    Lab Status: Final result Specimen: Blood from Arm, Left Updated: 08/17/23 0958     CRP 73.5 mg/L     Lactic acid, plasma (w/reflex if result > 2.0) [[id number]]  (Normal) Collected: 08/17/23 0937    Lab Status: Final result Specimen: Blood from Arm, Left Updated: 08/17/23 0957     LACTIC ACID 1.5 mmol/L     Narrative:      Result may be elevated if tourniquet was used during collection. Protime-INR [[id number]]  (Normal) Collected: 08/17/23 0937    Lab Status: Final result Specimen: Blood from Arm, Left Updated: 08/17/23 0956     Protime 13.3 seconds      INR 0.95    Blood culture #1 [[id number]] Collected: 08/17/23 0944    Lab Status: In process Specimen: Blood from Hand, Right Updated: 08/17/23 0948    CBC and differential [[id number]]  (Abnormal) Collected: 08/17/23 0937    Lab Status: Final result Specimen: Blood from Arm, Left Updated: 08/17/23 0944     WBC 14.13 Thousand/uL      RBC 3.39 Million/uL      Hemoglobin 10.8 g/dL      Hematocrit 32.6 %      MCV 96 fL      MCH 31.9 pg      MCHC 33.1 g/dL      RDW 14.1 %      MPV 8.7 fL      Platelets 410 Thousands/uL      nRBC 0 /100 WBCs      Neutrophils Relative 77 %      Immat GRANS % 1 %      Lymphocytes Relative 8 %      Monocytes Relative 11 %      Eosinophils Relative 2 %      Basophils Relative 1 %      Neutrophils Absolute 11.04 Thousands/µL      Immature Grans Absolute 0.07 Thousand/uL      Lymphocytes Absolute 1.11 Thousands/µL      Monocytes Absolute 1.57 Thousand/µL      Eosinophils Absolute 0.23 Thousand/µL      Basophils Absolute 0.11 Thousands/µL     Blood culture #2 [[id number]] Collected: 08/17/23 0937    Lab Status: In process Specimen: Blood from Arm, Left Updated: 08/17/23 0942    Sedimentation rate, automated [[id number]] Collected: 08/17/23 0937    Lab Status:  In process Specimen: Blood from Arm, Left Updated: 08/17/23 0941                 MRI inpatient order    (Results Pending)              Procedures  Procedures         ED Course  ED Course as of 08/17/23 1149   Thu Aug 17, 2023   1104 Pt admitted post discussion w/ podiatry. For IV antibiotics with vancomycin and cefepime ordered. Cultures obtained. Wound cultures performed yesterday in the outpatient setting. Plan for MRI tomorrow. Podiatry consult per internal medicine team.                               SBIRT 20yo+    Flowsheet Row Most Recent Value   Initial Alcohol Screen: US AUDIT-C     1. How often do you have a drink containing alcohol? 0 Filed at: 08/17/2023 0907   2. How many drinks containing alcohol do you have on a typical day you are drinking? 0 Filed at: 08/17/2023 0907   3a. Male UNDER 65: How often do you have five or more drinks on one occasion? 0 Filed at: 08/17/2023 0907   3b. FEMALE Any Age, or MALE 65+: How often do you have 4 or more drinks on one occassion? 0 Filed at: 08/17/2023 2968   Audit-C Score 0 Filed at: 08/17/2023 3608   PRINCESS: How many times in the past year have you. .. Used an illegal drug or used a prescription medication for non-medical reasons? Never Filed at: 08/17/2023 0907                    MDM  Differential of cellulitis versus osteomyelitis.   Disposition  Final diagnoses:   Cellulitis of left foot   Osteomyelitis of left foot, unspecified type ([address])   Chronic hyponatremia     Time reflects when diagnosis was documented in both MDM as applicable and the Disposition within this note     Time User Action Codes Description Comment    8/17/2023 11:03 AM Almeta Severe Add [F18.140] Cellulitis of left foot     8/17/2023 11:03 AM Almeta Severe Add [M86.9] Osteomyelitis of left foot, unspecified type ([address])     8/17/2023 11:03 AM Almeta Severe Modify [L03.116] Cellulitis of left foot     8/17/2023 11:03 AM Almeta Severe Modify [M86.9] Osteomyelitis of left foot, unspecified type ([address])     8/17/2023 11:43 AM Almeta Severe Add [E87.1] Chronic hyponatremia       ED Disposition     ED Disposition   Admit    Condition   Stable    Date/Time   Thu Aug 17, 2023  9:38 AM    Comment              Follow-up Information    None         Patient's Medications   Discharge Prescriptions    No medications on file       No discharge procedures on file.     PDMP Review       Value Time User    PDMP Reviewed  Yes 8/17/2023 11:32 AM Ap Jamison PA-C          ED Provider  Electronically Signed by           Shade Bauer PA-C  08/17/23 1145

## 2023-08-17 NOTE — ASSESSMENT & PLAN NOTE
Lab Results   Component Value Date    EGFR 64 08/17/2023    EGFR 52 07/20/2023    EGFR 47 07/18/2023    CREATININE 1.21 08/17/2023    CREATININE 1.43 (H) 07/20/2023    CREATININE 1.55 (H) 07/18/2023   Creatinine stable at 1.2 on arrival  Monitor BMP

## 2023-08-17 NOTE — ASSESSMENT & PLAN NOTE
Lab Results   Component Value Date    HGBA1C 7.9 (H) 07/20/2023       No results for input(s): "POCGLU" in the last 72 hours.     Blood Sugar Average: Last 72 hrs:  · maintained on insulin pump at home   · Will hold pump and place on insulin gtt with planned surgical intervention this admission   · Endocrinology consulted   · Monitor Accu-Cheks  · Continue gabapentin 300 mg twice daily for neuropathy

## 2023-08-17 NOTE — PROGRESS NOTES
Lisa Hernández is a 66 y.o. male who is currently ordered Vancomycin IV with management by the Pharmacy Consult service. Relevant clinical data and objective / subjective history reviewed. Vancomycin Assessment:  Indication and Goal AUC/Trough: Bone/joint infection (goal -600, trough >10)  Clinical Status:  New Start  Micro:     Renal Function:  SCr: 1.21 mg/dL  CrCl: 59.7 mL/min  Renal replacement: Not on dialysis  Days of Therapy: 1  Current Dose: 1750mg once loading dose  Vancomycin Plan:  New Dosinmg every 24 hours  Estimated AUC: 483 mcg*hr/mL  Estimated Trough: 13.8 mcg/mL  Next Level: 23 at 0600  Renal Function Monitoring: Daily BMP and [place] will continue to follow closely for s/sx of nephrotoxicity, infusion reactions and appropriateness of therapy. BMP and CBC will be ordered per protocol. If anaerobic coverage is needed, consider adding flagyl. We will continue to follow the patient’s culture results and clinical progress daily. Also, cefepime will be adjusted renally if necessary.     Leslie Brandt, Pharmacist, PharmD, BCPS

## 2023-08-17 NOTE — H&P
[facility]  H&P  Name: Guillermo Fothergill 66 y.o. male I MRN: [id number]  Unit/Bed#: ED 04 I Date of Admission: 8/17/2023   Date of Service: 8/17/2023 I Hospital Day: 0      Assessment/Plan   * Diabetic ulcer of left foot associated with type 1 diabetes mellitus   Assessment & Plan  · Seen by his outpatient podiatrist yesterday recommending admission for MRI left foot and surgical intervention for nonhealing left foot s/p left TMA 6/30 in a type 1 DM   · Xray left foot 8/15: Stable postsurgical changes of transmetatarsal amputation. Soft tissue defect with some air in the lateral overlying soft tissues consistent with open wound versus infection. No evidence for underlying acute osteomyelitis at this time. · Given IV cefepime and vancomycin in the emergency department, will continue with IV Ancef and Vanco for now  · Wound culture outpt growing gram positive cocci in clusters and 1+ gram negative rods   · Follow blood cultures obtained in ED   · CRP 73.5  · Lactic acid 1.5 on arrival  · fortunately without evidence for sepsis   · Monitor fever curve and hemodynamics  · podiatry has been consulted and MRI left foot ordered      Insulin pump in place  Assessment & Plan  · Planning for surgical intervention this admission. Insulin pump in place on insulin drip for planned surgical intervention this admission  · Cardiology consulted    S/P transmetatarsal amputation of foot, left ([address])  Assessment & Plan  · History of left TMA    Type 1 diabetes mellitus with diabetic neuropathy [facility])  Assessment & Plan  Lab Results   Component Value Date    HGBA1C 7.9 (H) 07/20/2023       No results for input(s): "POCGLU" in the last 72 hours.     Blood Sugar Average: Last 72 hrs:  · maintained on insulin pump at home   · Will hold pump and place on insulin gtt with planned surgical intervention this admission   · Endocrinology consulted   · Monitor Accu-Cheks  · Continue gabapentin 300 mg twice daily for neuropathy     Hyponatremia  Assessment & Plan  · Acute on chronic issue   · Check urine and serum studies  · TSH 1.3  · Sodium corrected at 128  · Check BMP this evening    Stage 2 chronic kidney disease  Assessment & Plan  Lab Results   Component Value Date    EGFR 64 08/17/2023    EGFR 52 07/20/2023    EGFR 47 07/18/2023    CREATININE 1.21 08/17/2023    CREATININE 1.43 (H) 07/20/2023    CREATININE 1.55 (H) 07/18/2023   Creatinine stable at 1.2 on arrival  Monitor BMP    Cigarette nicotine dependence without complication  Assessment & Plan  Nicotine patch  Smoking cessation education    Benign essential hypertension  Assessment & Plan  Continue lisinopril and Toprol-XL per home regimen  Monitor vital signs and renal function    Mixed hyperlipidemia  Assessment & Plan  · Continue statin         VTE Pharmacologic Prophylaxis: VTE Score: 3 Moderate Risk (Score 3-4) - Pharmacological DVT Prophylaxis Ordered: enoxaparin (Lovenox). Code Status: Prior level 1  Discussion with family: Patient declined call to . Anticipated Length of Stay: Patient will be admitted on an inpatient basis with an anticipated length of stay of greater than 2 midnights secondary to diabetic infetion left foot . Total Time Spent on Date of Encounter in care of patient: 55 minutes This time was spent on one or more of the following: performing physical exam; counseling and coordination of care; obtaining or reviewing history; documenting in the medical record; reviewing/ordering tests, medications or procedures; communicating with other healthcare professionals and discussing with patient's family/caregivers. Chief Complaint: left foot infection     History of Present Illness:  Sara Su is a 66 y.o. male with a PMH of type 1 DM on insulin pump, diabetic neuropathy, s/p left TMA, smoker,  hyperlipidemia, HTN  who presents as advised by his outpatient podiatry for left foot wound s/p left TMA 6/30.  They recommended admission with MRI left foot and IV antibiotics. He will likely be undergoing further surgical intervention this admission pending results of MRI, timing TBD. He denies systemic complaints including CP or SOB, fevers, chillls. He does have pain in left foot. Podiatry was consulted in the ED I TT them on admission. MRI left foot has been ordered. Given unknown timing of surgery will place on insulin gtt and stop pump and Endocrinology has been consulted. He will be admitted for IV antibiotics. Review of Systems:  Review of Systems   Constitutional: Negative for chills and fever. Respiratory: Negative for cough and shortness of breath. Cardiovascular: Negative for chest pain. Gastrointestinal: Negative for abdominal pain, nausea and vomiting. Musculoskeletal: Positive for arthralgias. Skin: Positive for color change and wound. Neurological: Negative for dizziness and light-headedness. All other systems reviewed and are negative.       Past Medical and Surgical History:   Past Medical History:   Diagnosis Date   • Chronic foot ulcer ([address]) 09/07/2018   • Diabetes mellitus ([address])    • Diabetic foot ulcer ([address]) 5/8/2023   • Gout    • High cholesterol    • Hypertension    • Visual impairment 11/1/2022    Cateract       Past Surgical History:   Procedure Laterality Date   • CATARACT EXTRACTION Right 01/2023   • COMPLEX WOUND CLOSURE TO EXTREMITY Left 07/18/2019    Procedure: COMPLEX CLOSURE LEFT CHEEK;  Surgeon: Tiburcio Mcfarland MD;  Location:  MAIN OR;  Service: Plastics   • FACIAL/NECK BIOPSY Left 07/18/2019    Procedure: EXCISION LEFT CHEEK CYST;  Surgeon: Tiburcio Mcfarland MD;  Location: [facility];  Service: Plastics   • HERNIA REPAIR Left     several times   • KNEE ARTHROSCOPY Left     torn meniscus   • HI AMPUTATION FOOT TRANSMETARSAL Left 6/30/2023    Procedure: AMPUTATION TRANSMETATARSAL (TMA);  Surgeon: Nila Thakkar DPM;  Location:  MAIN OR;  Service: Podiatry   • WOUND DEBRIDEMENT Left 5/30/2023    Procedure: DEBRIDEMENT WOUND LEFT FOOT WOUND WITH EXCISION OF INFECTED BONE AND WOUND VAC APPLICATION;  Surgeon: Ida Hung DPM;  Location:  MAIN OR;  Service: Podiatry       Meds/Allergies:  Prior to Admission medications    Medication Sig Start Date End Date Taking? Authorizing Provider   atorvastatin (LIPITOR) 40 mg tablet TAKE 1 TABLET BY MOUTH EVERY DAY 11/26/22   Jackson Salgado MD   gabapentin (Neurontin) 300 mg capsule Take 1 capsule (300 mg total) by mouth 2 (two) times a day 8/4/23 11/2/23  Tavo Jo DPM   Glucagon, rDNA, (Glucagon Emergency) 1 MG KIT as directed    Historical Provider, MD   HumaLOG 100 UNIT/ML injection inject up to 80 units in INSULIN PUMP daily as directed by prescriber  Patient not taking: Reported on 7/14/2023 3/31/23   Historical Provider, MD   insulin lispro (HumaLOG) 100 units/mL injection Use via the insulin pump, use up to 80 units daily 1/18/23   Olivia Phillips MD   lisinopril (ZESTRIL) 10 mg tablet Take 1 tablet (10 mg total) by mouth daily 3/15/23   Jackson Salgado MD   metoprolol succinate (TOPROL-XL) 100 mg 24 hr tablet Take 1 tablet (100 mg total) by mouth daily 3/15/23   Jackson Salgado MD   nicotine (NICODERM CQ) 21 mg/24 hr TD 24 hr patch Place 1 patch on the skin over 24 hours daily Do not start before July 5, 2023. 7/5/23   Mago Schmidt MD   oxyCODONE-acetaminophen (Percocet) 5-325 mg per tablet Take 1 tablet by mouth every 6 (six) hours as needed for moderate pain for up to 14 days Max Daily Amount: 4 tablets 8/14/23 8/28/23  Tavo Jo DPM     I have reviewed home medications with patient personally. Allergies:    Allergies   Allergen Reactions   • Cefuroxime Other (See Comments) and GI Intolerance   • Amoxicillin-Pot Clavulanate Nausea Only and GI Intolerance       Social History:  Marital Status: /Civil Union   Occupation:   Patient Pre-hospital Living Situation: Home  Patient Pre-hospital Level of Mobility: walks  Patient Pre-hospital Diet Restrictions:   Substance Use History:   Social History     Substance and Sexual Activity   Alcohol Use Yes    Comment: 3/4 beers a day     Social History     Tobacco Use   Smoking Status Every Day   • Packs/day: 1.00   • Years: 20.00   • Total pack years: 20.00   • Types: Cigarettes   • Last attempt to quit: 2022   • Years since quittin.6   Smokeless Tobacco Never   Tobacco Comments    encouraged smoking cessation     Social History     Substance and Sexual Activity   Drug Use Never       Family History:  Family History   Problem Relation Age of Onset   • Heart disease Father    • Diabetes type I Father    • Diabetes type II Mother    • No Known Problems Sister    • Alcohol abuse Brother    • Diabetes type I Brother    • No Known Problems Brother    • No Known Problems Son    • No Known Problems Daughter        Physical Exam:     Vitals:   Blood Pressure: 146/73 (23 1130)  Pulse: 89 (23 1130)  Temperature: 97.8 °F (36.6 °C) (23 0908)  Temp Source: Temporal (23 0908)  Respirations: 12 (23 1130)  Height: 6' (182.9 cm) (23 0906)  Weight - Scale: 65.8 kg (145 lb) (23 0906)  SpO2: 97 % (23 1130)    Physical Exam  Vitals and nursing note reviewed. Chaperone present: ED RN at bedside    Constitutional:       General: He is not in acute distress. Appearance: He is not ill-appearing, toxic-appearing or diaphoretic. Cardiovascular:      Rate and Rhythm: Normal rate and regular rhythm. Pulmonary:      Effort: Pulmonary effort is normal. No respiratory distress. Breath sounds: Normal breath sounds. Abdominal:      General: Bowel sounds are normal.      Palpations: Abdomen is soft. Musculoskeletal:         General: Tenderness and deformity present. Right lower leg: No edema. Left lower leg: No edema. Comments: Left foot TMA with wound present    Neurological:      Mental Status: He is alert.  Mental status is at baseline. Psychiatric:         Mood and Affect: Mood normal.          Additional Data:     Lab Results:  Results from last 7 days   Lab Units 08/17/23  0937   WBC Thousand/uL 14.13*   HEMOGLOBIN g/dL 10.8*   HEMATOCRIT % 32.6*   PLATELETS Thousands/uL 396*   NEUTROS PCT % 77*   LYMPHS PCT % 8*   MONOS PCT % 11   EOS PCT % 2     Results from last 7 days   Lab Units 08/17/23  0937   SODIUM mmol/L 126*   POTASSIUM mmol/L 4.7   CHLORIDE mmol/L 92*   CO2 mmol/L 22   BUN mg/dL 16   CREATININE mg/dL 1.21   ANION GAP mmol/L 12   CALCIUM mg/dL 9.3   GLUCOSE RANDOM mg/dL 186*     Results from last 7 days   Lab Units 08/17/23  0937   INR  0.95             Results from last 7 days   Lab Units 08/17/23  0937   LACTIC ACID mmol/L 1.5       Lines/Drains:  Invasive Devices     Peripheral Intravenous Line  Duration           Peripheral IV 08/17/23 Right Antecubital <1 day          Line  Duration           Pump Device Insulin pump Anterior; Left Abdomen 48 days                    Imaging: reviewed prior MRI from June   MRI inpatient order    (Results Pending)       EKG and Other Studies Reviewed on Admission:   · EKG: NSR PACs . ** Please Note: This note has been constructed using a voice recognition system.  **

## 2023-08-17 NOTE — ASSESSMENT & PLAN NOTE
· Planning for surgical intervention this admission.   Insulin pump in place on insulin drip for planned surgical intervention this admission  · Cardiology consulted

## 2023-08-17 NOTE — ED NOTES
Tiger text sent to inpatient provider to clarify need for insulin drip, states patient is to d/c his pump and insulin drip will be started. Patient refusing, stating that he would like to speak to someone because his sugars are stable and he can manage his own pump, notified provider via tiger text, stating they would have her attending address the issue when he sees him. Insulin pump order placed on hold until further clarification is provided to patient.       Jules Meadows RN  08/17/23 3788
0 = independent

## 2023-08-17 NOTE — CONSULTS
Consult - Rebeca Lopez 66 y.o. male MRN: [id number]  Unit/Bed#: -01 Encounter: [id number]    Assessment/Plan      Cellulitis left foot  · Empirically started on vancomycin and cefepime. · Wound culture from 8/16/2023 pending though growing gram-positive cocci and gram-negative rods. · Labs reviewed: WBC 14.13, ESR 89, creatinine 1.16, CRP 73.5  · Infectious disease consult requested. Acute osteomyelitis left foot  Nonhealing surgical wound left foot(TMA)  · Plain film x-rays reviewed, suspicious changes for osteo noted. · MRI images and report personally reviewed, confirms presence of osteomyelitis of the 2,3,4 & 5th Met stumps. · Treatment options discussed with patient in detail. Based on MRI findings patient will require a revision of his TMA with probable VAC dressing placement, nonweightbearing, PICC line and extended period of IV antibiotics due to the recurrent nature of his bone infection. Suspect MRSA organisms involved again. Second alternative is to proceed with a more definitive proximal amputation (i.e. BKA) to eliminate the need for long-term IV antibiotics and would have the greatest chance of completely resolving the infection in the shortest period of time. Based on the depth of this infection currently in his midfoot I would estimate the local procedure having approximately a 60/40% chance of success based on the severity of this infection and his comorbidities. · Patient would like to discuss this with his wife and has requested discharge with his anticipated return for definitive amputation. It was explained to patient that due to his sepsis and current infection that this is not recommended and should he need to leave it would be AMA. · Case discussed in detail with internal medicine PA, recommend general surgery consultation to discuss BKA procedure.     Type 1 diabetes with insulin pump, peripheral neuropathy, hyponatremia, stage II CKD, hypertension, hyperlipidemia, nicotine abuse  · Managed per internal medicine      History of Present Illness     HPI:  Peewee Carreon is a 66 y.o. male who presents with infected nonhealing surgical incision status post TMA 6/30/2023. Podiatry consult requested to evaluate nonhealing surgical wound having been referred to the ED after wound care evaluation yesterday with noted infection. Patient's course has been progressing satisfactorily up until the past week where his symptoms started to degrade with increasing pain and then followed by complete wound dehiscence on the lateral margin. Patient's chart reviewed noting all pertinent clinical laboratory data. Inpatient consult to Podiatry  Consult performed by: Svetlana Hairston DPM  Consult ordered by: Enedina Rajan PA-C        Review of Systems   Constitutional: Negative. HENT: Negative. Eyes: Negative. Respiratory: Negative. Cardiovascular: Negative  Gastrointestinal: Negative.     Musculoskeletal: Status post TMA  Skin: Nonhealing incision left foot  Neurological: Numbness of feet      Historical Information   Past Medical History:   Diagnosis Date   • Chronic foot ulcer ([address]) 09/07/2018   • Diabetes mellitus ([address])    • Diabetic foot ulcer ([address]) 5/8/2023   • Gout    • High cholesterol    • Hypertension    • Visual impairment 11/1/2022    Cateract     Past Surgical History:   Procedure Laterality Date   • CATARACT EXTRACTION Right 01/2023   • COMPLEX WOUND CLOSURE TO EXTREMITY Left 07/18/2019    Procedure: COMPLEX CLOSURE LEFT CHEEK;  Surgeon: Sully Bach MD;  Location:  MAIN OR;  Service: Plastics   • FACIAL/NECK BIOPSY Left 07/18/2019    Procedure: EXCISION LEFT CHEEK CYST;  Surgeon: Sully Bach MD;  Location:  MAIN OR;  Service: Plastics   • HERNIA REPAIR Left     several times   • KNEE ARTHROSCOPY Left     torn meniscus   • [place] AMPUTATION FOOT TRANSMETARSAL Left 6/30/2023    Procedure: AMPUTATION TRANSMETATARSAL (TMA);  Surgeon: Yaneli Adkins DPM;  Location:  MAIN OR;  Service: Podiatry   • WOUND DEBRIDEMENT Left 2023    Procedure: DEBRIDEMENT WOUND LEFT FOOT WOUND WITH EXCISION OF INFECTED BONE AND WOUND VAC APPLICATION;  Surgeon: Anderson Tuttle DPM;  Location:  MAIN OR;  Service: Podiatry     Social History   Social History     Substance and Sexual Activity   Alcohol Use Yes    Comment: 3/4 beers a day     Social History     Substance and Sexual Activity   Drug Use Never     Social History     Tobacco Use   Smoking Status Every Day   • Packs/day: 1.00   • Years: 20.00   • Total pack years: 20.00   • Types: Cigarettes   • Last attempt to quit: 2022   • Years since quittin.6   Smokeless Tobacco Never   Tobacco Comments    encouraged smoking cessation     Family History:   Family History   Problem Relation Age of Onset   • Heart disease Father    • Diabetes type I Father    • Diabetes type II Mother    • No Known Problems Sister    • Alcohol abuse Brother    • Diabetes type I Brother    • No Known Problems Brother    • No Known Problems Son    • No Known Problems Daughter        Meds/Allergies   Medications Prior to Admission   Medication   • atorvastatin (LIPITOR) 40 mg tablet   • gabapentin (Neurontin) 300 mg capsule   • insulin lispro (HumaLOG) 100 units/mL injection   • lisinopril (ZESTRIL) 10 mg tablet   • metoprolol succinate (TOPROL-XL) 100 mg 24 hr tablet   • nicotine (NICODERM CQ) 21 mg/24 hr TD 24 hr patch   • oxyCODONE-acetaminophen (Percocet) 5-325 mg per tablet   • Glucagon, rDNA, (Glucagon Emergency) 1 MG KIT   • HumaLOG 100 UNIT/ML injection     Allergies   Allergen Reactions   • Cefuroxime Other (See Comments) and GI Intolerance   • Amoxicillin-Pot Clavulanate Nausea Only and GI Intolerance       Objective   First Vitals:   Blood Pressure: 163/72 (23)  Pulse: 104 (23)  Temperature: 97.8 °F (36.6 °C) (23)  Temp Source: Temporal (23)  Respirations: 18 (23 1591)  Height: 6' (182.9 cm) (08/17/23 0906)  Weight - Scale: 65.8 kg (145 lb) (08/17/23 0906)  SpO2: 100 % (08/17/23 0908)    Current Vitals:   Blood Pressure: 145/66 (08/17/23 1443)  Pulse: 96 (08/17/23 1443)  Temperature: 98.1 °F (36.7 °C) (08/17/23 1443)  Temp Source: Temporal (08/17/23 0908)  Respirations: 16 (08/17/23 1443)  Height: 6' (182.9 cm) (08/17/23 0906)  Weight - Scale: 65.8 kg (145 lb) (08/17/23 0906)  SpO2: 99 % (08/17/23 1443)        /66   Pulse 96   Temp 98.1 °F (36.7 °C)   Resp 16   Ht 6' (1.829 m)   Wt 65.8 kg (145 lb)   SpO2 99%   BMI 19.67 kg/m²     General Appearance:    Alert, cooperative, seated at bedside reporting sporadic episodes of pain in his foot. Patient notably upset by the news of MRI results. Head:    Normocephalic, without obvious abnormality, atraumatic   Eyes:    PERRL, conjunctiva/corneas clear, EOM's intact            Nose:   Moist mucous membranes, no drainage or sinus tenderness   Throat:   No tenderness, no exudates   Neck:   Supple, symmetrical, trachea midline, no JVD   Back:     Symmetric, no CVA tenderness   Lungs:     Respirations unlabored   Chest wall:    No tenderness or deformity   Heart:    Rate and rhythm noted on last vitals. Abdomen:     Soft, non-tender, bowel sounds active all four quadrants,     no masses, no organomegaly       Lower Ext/Ortho:  Status post TMA left foot with lateral wound dehiscence     Neuro/Vasc: CNII-XII intact. Normal strength  Sensation and reflexes: Diminished epicritic sensation consistent with diabetic neuropathy  Pulses: Right: DP 1/4, PT 0/4, Left: DP 1/4, PT 0/4  Capillary Filling: 3 sec, Edema: +1 left, none right     Skin: Texture, Tone and Turgor: Diminished, Digital Hair: None  Atrophic Changes: Mild  Lesions: None  Nail Pathology: Multiple dystrophic nails right foot x5  Ulcers/Wounds: Nonhealing necrotic surgical incision lateral one third of TMA incision, maceration with necrosis and malodor noted. Moderate serosanguineous drainage noted. Erythema calor and edema of distal lateral left foot noted. Left leg is notably warmer than the right. Lab Results:   CBC w/diff  Results from last 7 days   Lab Units 08/17/23  0937   WBC Thousand/uL 14.13*   HEMOGLOBIN g/dL 10.8*   HEMATOCRIT % 32.6*   PLATELETS Thousands/uL 396*   NEUTROS PCT % 77*   LYMPHS PCT % 8*   MONOS PCT % 11   EOS PCT % 2     BMP  Results from last 7 days   Lab Units 08/17/23  1830   POTASSIUM mmol/L 4.2   CHLORIDE mmol/L 95*   CO2 mmol/L 25   BUN mg/dL 16   CREATININE mg/dL 1.16   CALCIUM mg/dL 8.9     CMP  Results from last 7 days   Lab Units 08/17/23  1830   POTASSIUM mmol/L 4.2   CHLORIDE mmol/L 95*   CO2 mmol/L 25   BUN mg/dL 16   CREATININE mg/dL 1.16   CALCIUM mg/dL 8.9     @Culture@  Lab Results   Component Value Date    BLOODCX Received in Microbiology Lab. Culture in Progress. 08/17/2023    BLOODCX Received in Microbiology Lab. Culture in Progress. 08/17/2023    BLOODCX No Growth After 5 Days. 06/29/2023    BLOODCX No Growth After 5 Days. 06/29/2023    BLOODCX No Growth After 5 Days. 05/29/2023    BLOODCX No Growth After 5 Days. 05/29/2023     Lab Results   Component Value Date    WOUNDCULT (A) 08/16/2023     4+ Growth of Non lactose fermenting gram negative tamar    WOUNDCULT 4+ Growth of Staphylococcus aureus (A) 08/16/2023         Imaging: I have personally reviewed pertinent films in PACS      EKG, Pathology, and Other Studies: I have personally reviewed pertinent reports. Code Status: Level 1 - Full Code  Advance Directive and Living Will:      Power of :      Please Note: Voice to text dictation software was used in the creation of this document.        Ethan Ely DPM

## 2023-08-17 NOTE — ASSESSMENT & PLAN NOTE
Lab Results   Component Value Date    HGBA1C 7.9 (H) 07/20/2023       Recent Labs     08/17/23  1219   POCGLU 116       Blood Sugar Average: Last 72 hrs:  (P) 116

## 2023-08-17 NOTE — ASSESSMENT & PLAN NOTE
· Acute on chronic issue   · Check urine and serum studies  · TSH 1.3  · Sodium corrected at 128  · Check BMP this evening

## 2023-08-18 ENCOUNTER — HOME CARE VISIT (OUTPATIENT)
Dept: HOME HEALTH SERVICES | Facility: HOME HEALTHCARE | Age: 63
End: 2023-08-18
Payer: COMMERCIAL

## 2023-08-18 ENCOUNTER — TRANSITIONAL CARE MANAGEMENT (OUTPATIENT)
Dept: FAMILY MEDICINE CLINIC | Facility: HOSPITAL | Age: 63
End: 2023-08-18

## 2023-08-18 ENCOUNTER — TELEPHONE (OUTPATIENT)
Dept: OTHER | Facility: HOSPITAL | Age: 63
End: 2023-08-18

## 2023-08-18 VITALS
TEMPERATURE: 97.6 F | HEIGHT: 72 IN | HEART RATE: 91 BPM | OXYGEN SATURATION: 98 % | DIASTOLIC BLOOD PRESSURE: 69 MMHG | RESPIRATION RATE: 16 BRPM | BODY MASS INDEX: 19.64 KG/M2 | WEIGHT: 145 LBS | SYSTOLIC BLOOD PRESSURE: 132 MMHG

## 2023-08-18 LAB
ANION GAP SERPL CALCULATED.3IONS-SCNC: 10 MMOL/L
BACTERIA WND AEROBE CULT: ABNORMAL
BACTERIA WND AEROBE CULT: ABNORMAL
BASOPHILS # BLD AUTO: 0.14 THOUSANDS/ÂΜL (ref 0–0.1)
BASOPHILS NFR BLD AUTO: 2 % (ref 0–1)
BUN SERPL-MCNC: 15 MG/DL (ref 5–25)
CALCIUM SERPL-MCNC: 9.2 MG/DL (ref 8.4–10.2)
CHLORIDE SERPL-SCNC: 98 MMOL/L (ref 96–108)
CO2 SERPL-SCNC: 22 MMOL/L (ref 21–32)
CREAT SERPL-MCNC: 1.08 MG/DL (ref 0.6–1.3)
EOSINOPHIL # BLD AUTO: 0.64 THOUSAND/ÂΜL (ref 0–0.61)
EOSINOPHIL NFR BLD AUTO: 7 % (ref 0–6)
ERYTHROCYTE [DISTWIDTH] IN BLOOD BY AUTOMATED COUNT: 14.1 % (ref 11.6–15.1)
GFR SERPL CREATININE-BSD FRML MDRD: 73 ML/MIN/1.73SQ M
GLUCOSE SERPL-MCNC: 121 MG/DL (ref 65–140)
GLUCOSE SERPL-MCNC: 188 MG/DL (ref 65–140)
GRAM STN SPEC: ABNORMAL
HCT VFR BLD AUTO: 31.9 % (ref 36.5–49.3)
HGB BLD-MCNC: 10.4 G/DL (ref 12–17)
IMM GRANULOCYTES # BLD AUTO: 0.04 THOUSAND/UL (ref 0–0.2)
IMM GRANULOCYTES NFR BLD AUTO: 0 % (ref 0–2)
LYMPHOCYTES # BLD AUTO: 1.24 THOUSANDS/ÂΜL (ref 0.6–4.47)
LYMPHOCYTES NFR BLD AUTO: 14 % (ref 14–44)
MCH RBC QN AUTO: 31.5 PG (ref 26.8–34.3)
MCHC RBC AUTO-ENTMCNC: 32.6 G/DL (ref 31.4–37.4)
MCV RBC AUTO: 97 FL (ref 82–98)
MONOCYTES # BLD AUTO: 0.99 THOUSAND/ÂΜL (ref 0.17–1.22)
MONOCYTES NFR BLD AUTO: 11 % (ref 4–12)
NEUTROPHILS # BLD AUTO: 6.04 THOUSANDS/ÂΜL (ref 1.85–7.62)
NEUTS SEG NFR BLD AUTO: 66 % (ref 43–75)
NRBC BLD AUTO-RTO: 0 /100 WBCS
OSMOLALITY UR/SERPL-RTO: 281 MMOL/KG (ref 282–298)
OSMOLALITY UR: 144 MMOL/KG
PLATELET # BLD AUTO: 400 THOUSANDS/UL (ref 149–390)
PMV BLD AUTO: 8.9 FL (ref 8.9–12.7)
POTASSIUM SERPL-SCNC: 4.6 MMOL/L (ref 3.5–5.3)
RBC # BLD AUTO: 3.3 MILLION/UL (ref 3.88–5.62)
SODIUM 24H UR-SCNC: 36 MOL/L
SODIUM SERPL-SCNC: 130 MMOL/L (ref 135–147)
WBC # BLD AUTO: 9.09 THOUSAND/UL (ref 4.31–10.16)

## 2023-08-18 PROCEDURE — 83930 ASSAY OF BLOOD OSMOLALITY: CPT | Performed by: PHYSICIAN ASSISTANT

## 2023-08-18 PROCEDURE — 80048 BASIC METABOLIC PNL TOTAL CA: CPT | Performed by: PHYSICIAN ASSISTANT

## 2023-08-18 PROCEDURE — 84300 ASSAY OF URINE SODIUM: CPT | Performed by: PHYSICIAN ASSISTANT

## 2023-08-18 PROCEDURE — 99239 HOSP IP/OBS DSCHRG MGMT >30: CPT | Performed by: PHYSICIAN ASSISTANT

## 2023-08-18 PROCEDURE — 85025 COMPLETE CBC W/AUTO DIFF WBC: CPT | Performed by: PHYSICIAN ASSISTANT

## 2023-08-18 PROCEDURE — 82948 REAGENT STRIP/BLOOD GLUCOSE: CPT

## 2023-08-18 PROCEDURE — 83935 ASSAY OF URINE OSMOLALITY: CPT | Performed by: PHYSICIAN ASSISTANT

## 2023-08-18 RX ORDER — CEFEPIME HYDROCHLORIDE 2 G/50ML
2000 INJECTION, SOLUTION INTRAVENOUS EVERY 8 HOURS
Status: DISCONTINUED | OUTPATIENT
Start: 2023-08-18 | End: 2023-08-18 | Stop reason: HOSPADM

## 2023-08-18 RX ORDER — CEFADROXIL 1000 MG/1
1 TABLET ORAL 2 TIMES DAILY
Qty: 20 TABLET | Refills: 0 | Status: SHIPPED | OUTPATIENT
Start: 2023-08-18 | End: 2023-08-27

## 2023-08-18 RX ORDER — LORAZEPAM 0.5 MG/1
0.5 TABLET ORAL EVERY 8 HOURS PRN
Status: DISCONTINUED | OUTPATIENT
Start: 2023-08-18 | End: 2023-08-18 | Stop reason: HOSPADM

## 2023-08-18 RX ORDER — DOXYCYCLINE HYCLATE 100 MG/1
100 CAPSULE ORAL EVERY 12 HOURS SCHEDULED
Qty: 20 CAPSULE | Refills: 0 | Status: SHIPPED | OUTPATIENT
Start: 2023-08-18 | End: 2023-08-27

## 2023-08-18 RX ADMIN — CEFEPIME HYDROCHLORIDE 2000 MG: 2 INJECTION, SOLUTION INTRAVENOUS at 08:59

## 2023-08-18 RX ADMIN — NICOTINE 1 PATCH: 21 PATCH, EXTENDED RELEASE TRANSDERMAL at 09:03

## 2023-08-18 RX ADMIN — VANCOMYCIN HYDROCHLORIDE 1250 MG: 5 INJECTION, POWDER, LYOPHILIZED, FOR SOLUTION INTRAVENOUS at 06:53

## 2023-08-18 RX ADMIN — ENOXAPARIN SODIUM 40 MG: 100 INJECTION SUBCUTANEOUS at 09:03

## 2023-08-18 RX ADMIN — LISINOPRIL 10 MG: 10 TABLET ORAL at 09:03

## 2023-08-18 RX ADMIN — MORPHINE SULFATE 2 MG: 2 INJECTION, SOLUTION INTRAMUSCULAR; INTRAVENOUS at 07:52

## 2023-08-18 RX ADMIN — OXYCODONE HYDROCHLORIDE 10 MG: 10 TABLET ORAL at 04:16

## 2023-08-18 RX ADMIN — METOPROLOL SUCCINATE 100 MG: 50 TABLET, EXTENDED RELEASE ORAL at 09:03

## 2023-08-18 RX ADMIN — GABAPENTIN 300 MG: 300 CAPSULE ORAL at 09:03

## 2023-08-18 NOTE — ASSESSMENT & PLAN NOTE
· Acute on chronic issue   · Check urine and serum studies  · TSH 1.3  · Sodium 130 this morning  · Patient signed out [facility]

## 2023-08-18 NOTE — UTILIZATION REVIEW
Initial Clinical Review    Admission: Date/Time/Statement:   Admission Orders (From admission, onward)     Ordered        08/17/23 1104  [address]  Once                      Orders Placed This Encounter   Procedures   • INPATIENT ADMISSION     Standing Status:   Standing     Number of Occurrences:   1     Order Specific Question:   Level of Care     Answer:   Med Surg [16]     Order Specific Question:   Estimated length of stay     Answer:   More than 2 Midnights     Order Specific Question:   Certification     Answer:   I certify that inpatient services are medically necessary for this patient for a duration of greater than two midnights. See H&P and MD Progress Notes for additional information about the patient's course of treatment. ED Arrival Information     Expected   -    Arrival   8/17/2023 08:59    Acuity   Emergent            Means of arrival   Walk-In    Escorted by   Self    Service   Hospitalist    Admission type   Emergency            Arrival complaint   post operation problem? Chief Complaint   Patient presents with   • Foot Pain     Patient presents to the ED with c/o possible infection in left foot, states he had half of his left foot removed in June and now believes he needs the rest removed. Initial Presentation:  67 yo Male  who presented  to [address] [place] [address] ED. Inpatient admission for evaluation and treatment of Osteomyelitis of left foot, unspecified type, with Cellulitis of left foot, Chronic hyponatremia, Type 1 diabetes mellitus with diabetic neuropathy , and 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, with fat layer exposed. He has a past medical history of Chronic foot ulcer  (09/07/2018), Diabetes mellitus 1 on insulin pump, DM neuropathy s/p L TMA(6/30), smoker, HLD,, Diabetic foot ulcer  (5/8/2023), Gout, High cholesterol, Hypertension, and Visual impairment (11/1/2022).   Presented as advised by his OP podiatry for left foot wound s/p L TMA 6/30. Admit recommended for MRI L foot and IV antibiotics. He may require further surgical intervention pending results of MRI, timing TBD. Podiatry Consult - 8/17 -  67 yo m presents with infected nonhealing surgical incision  S/p TMA 6/30/23. Consulted to evaluate the wound. He OP course has been satisfactory up until the past week where his symptoms started to degrade with increasing pain then followed by complete wound dehiscence on the lateral margin. He is started on ABX's empirically, Vanco & Cefepime. Wound cx ( 8/16/23 pending though growing gram - positive cocci and gram - negative rods. WBC 14.13, ESR 89, Cr 1.16, CRP 73.5. Date: 8/18/23   Day 2:  No acute events overnight, He was maintained on his insulin pump. Plan for insulin gtt until surgery then resume pump. Endocrine consulted. Plan for surgery this admission. Continue Ancef and Vanco for now. Follow blood cultures. 8/18 10:56 am note - Pt signing out AMA. Continue ABX's per Medicine/ ID. To follow up as OP in office.      ED Triage Vitals   Temperature Pulse Respirations Blood Pressure SpO2   08/17/23 0908 08/17/23 0908 08/17/23 0906 08/17/23 0908 08/17/23 0908   97.8 °F (36.6 °C) 104 18 163/72 100 %      Temp Source Heart Rate Source Patient Position - Orthostatic VS BP Location FiO2 (%)   08/17/23 0908 08/17/23 0906 08/17/23 1000 08/17/23 1000 --   Temporal Monitor Lying Right arm       Pain Score       08/17/23 0906       7          Wt Readings from Last 1 Encounters:   08/17/23 65.8 kg (145 lb)     Additional Vital Signs:   Date/Time Temp Pulse Resp BP MAP (mmHg) SpO2 O2 Device Patient Position - Orthostatic VS   08/18/23 07:52:17 -- 91 -- 132/69 90 98 % -- --   08/17/23 22:53:05 -- 88 -- 148/70 96 97 % -- --   08/17/23 20:23:33 97.6 °F (36.4 °C) 85 -- 152/68 96 100 % -- --   08/17/23 14:43:40 98.1 °F (36.7 °C) 96 16 145/66 92 99 % -- --   08/17/23 1230 -- 85 15 142/69 99 100 % -- --   08/17/23 1200 -- 86 13 149/70 101 98 % -- --   08/17/23 1130 -- 89 12 146/73 102 97 % -- --   08/17/23 1100 -- 101 17 111/88 96 -- -- --   08/17/23 1030 -- 93 16 128/62 82 98 % None (Room air) Lying   08/17/23 1000 -- 101 13 154/69 99 98 % None (Room air) Lying   08/17/23 0930 -- 97 13 151/71 102 99 % -- --   08/17/23 0908 97.8 °F (36.6 °C) 104 -- 163/72 -- 100 % None (Room air) --       Pertinent Labs/Diagnostic Test Results:   MRI foot/forefoot toes left wo contrast   Final Result by Juan Durant MD (08/17 1427)   Status post transmetatarsal amputation with prominent skin ulceration at the distal aspect of the postoperative site including marrow changes involving the second through fifth residual proximal metatarsals indicating osteomyelitis. No evidence of    drainable abscess collection in this unenhanced examination.                  NO ECG       Results from last 7 days   Lab Units 08/18/23  0430 08/17/23  0937   WBC Thousand/uL 9.09 14.13*   HEMOGLOBIN g/dL 10.4* 10.8*   HEMATOCRIT % 31.9* 32.6*   PLATELETS Thousands/uL 400* 396*   NEUTROS ABS Thousands/µL 6.04 11.04*         Results from last 7 days   Lab Units 08/18/23  0430 08/17/23  1830 08/17/23  0937   SODIUM mmol/L 130* 128* 126*   POTASSIUM mmol/L 4.6 4.2 4.7   CHLORIDE mmol/L 98 95* 92*   CO2 mmol/L 22 25 22   ANION GAP mmol/L 10 8 12   BUN mg/dL 15 16 16   CREATININE mg/dL 1.08 1.16 1.21   EGFR ml/min/1.73sq m 73 67 64   CALCIUM mg/dL 9.2 8.9 9.3         Results from last 7 days   Lab Units 08/18/23  0731 08/17/23  2240 08/17/23  2021 08/17/23  1631 08/17/23  1219   POC GLUCOSE mg/dl 188* 143* 74 143* 116     Results from last 7 days   Lab Units 08/18/23  0430 08/17/23  1830 08/17/23  0937   GLUCOSE RANDOM mg/dL 121 202* 186*     Results from last 7 days   Lab Units 08/18/23  0423   OSMOLALITY, SERUM mmol/*       Results from last 7 days   Lab Units 08/17/23  0937   PROTIME seconds 13.3   INR  0.95       Results from last 7 days   Lab Units 08/17/23  0937   LACTIC ACID mmol/L 1.5       Results from last 7 days   Lab Units 08/17/23  0937   CRP mg/L 73.5*   SED RATE mm/hour 89*         Results from last 7 days   Lab Units 08/18/23  0650 08/18/23  0423   OSMOLALITY, SERUM mmol/KG  --  281*   OSMO UR mmol/*  --      Results from last 7 days   Lab Units 08/18/23  0650   SODIUM UR  36       Results from last 7 days   Lab Units 08/17/23  0944 08/17/23  0937 08/16/23  1301   BLOOD CULTURE  Received in Microbiology Lab. Culture in Progress. Received in Microbiology Lab. Culture in Progress.   --    GRAM STAIN RESULT   --   --  2+ Gram positive cocci in clusters*  1+ Gram negative rods*  No polys seen*   WOUND CULTURE   --   --  4+ Growth of Proteus mirabilis*  4+ Growth of Methicillin Resistant Staphylococcus aureus*       ED Treatment:   Medication Administration from 08/17/2023 0859 to 08/17/2023 1434       Date/Time Order Dose Route Action Comments     08/17/2023 0939 EDT morphine injection 4 mg 4 mg Intravenous Given --     08/17/2023 1323 EDT vancomycin (VANCOCIN) 1,750 mg in sodium chloride 0.9 % 500 mL IVPB 0 mg Intravenous Stopped --     08/17/2023 1056 EDT vancomycin (VANCOCIN) 1,750 mg in sodium chloride 0.9 % 500 mL IVPB 1,750 mg Intravenous New Bag --     08/17/2023 1324 EDT cefepime (MAXIPIME) IVPB (premix in dextrose) 2,000 mg 50 mL 2,000 mg Intravenous New Bag --     08/17/2023 1247 EDT oxyCODONE-acetaminophen (PERCOCET) 5-325 mg per tablet 1 tablet 1 tablet Oral Given --     08/17/2023 1235 EDT insulin regular (HumuLIN R,NovoLIN R) 1 Units/mL in sodium chloride 0.9 % 100 mL infusion 0 Units/hr Intravenous Hold provider aware - patient would like to keep his pump on at this time        Past Medical History:   Diagnosis Date   • Chronic foot ulcer ([address]) 09/07/2018   • Diabetes mellitus ([address])    • Diabetic foot ulcer ([address]) 5/8/2023   • Gout    • High cholesterol    • Hypertension    • Visual impairment 11/1/2022    [employer]     Present on Admission:  • Hyponatremia  • Cigarette nicotine dependence without complication  • Benign essential hypertension  • Stage 2 chronic kidney disease  • Type 1 diabetes mellitus with diabetic neuropathy (HCC)  • 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, with fat layer exposed ([address])  • Mixed hyperlipidemia  • 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 male       Admission Orders:  Scheduled Medications:  atorvastatin, 40 mg, Oral, After Dinner  cefepime, 2,000 mg, Intravenous, Q8H  enoxaparin, 40 mg, Subcutaneous, Daily  gabapentin, 300 mg, Oral, BID  insulin lispro, 1-5 Units, Subcutaneous, HS  lisinopril, 10 mg, Oral, Daily  metoprolol succinate, 100 mg, Oral, Daily  nicotine, 1 patch, Transdermal, Daily  vancomycin, 1,250 mg, Intravenous, Q24H      Continuous IV Infusions:     PRN Meds:  acetaminophen, 650 mg, Oral, Q6H PRN  calcium carbonate, 1,000 mg, Oral, Daily PRN  insulin lispro, 300 Units, Subcutaneous Insulin Pump, Daily PRN  LORazepam, 0.5 mg, Oral, Q8H PRN  morphine injection, 2 mg, Intravenous, Q4H PRN - 8/17 x 2 - 8/18 x 1   ondansetron, 4 mg, Intravenous, Q6H PRN  oxyCODONE, 10 mg, Oral, Q6H PRN - 8/17 x 1 - 8/18 x 1   oxyCODONE-acetaminophen, 1 tablet, Oral, Q6H PRN - 8/17 x 2           Network Utilization Review Department  ATTENTION: Please call with any questions or concerns to [phone] and carefully listen to the prompts so that you are directed to the right person.  All voicemails are confidential.  Theta Robert all requests for admission clinical reviews, approved or denied determinations and any other requests to dedicated fax number below belonging to the campus where the patient is receiving treatment.  List of dedicated fax numbers for the Facilities:  [place] DENIALS (Administrative/Medical Necessity) [phone] [address] (Maternity/NICU/Pediatrics) [phone]   [address] [place] [address] Drive [phone]   [facility] [address] [facility] [phone]   [address] [phone]   [address] [facility] [address] [phone]

## 2023-08-18 NOTE — DISCHARGE SUMMARY
[facility]  Discharge- [facility] 1960, 66 y.o. male MRN: [id number]  Unit/Bed#: MS [facility] Encounter: [id number]  Primary Care Provider: Cinda Barillas MD   Date and time admitted to hospital: 8/17/2023  9:01 AM    * 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, with fat layer exposed ([address])  Assessment & Plan  · Seen by his outpatient podiatrist day prior to admission recommending admission for MRI left foot and surgical intervention  · MRI left foot with evidence of osteomyelitis. Prior discussion between patient and podiatry, consideration for possible BKA. General surgery was consulted  · CRP 73.5  · Lactic acid 1.5 on arrival  · Leukocytosis resolved. Afebrile  · Outpatient wound culture noted on 8/16 growing Proteus and MRSA  · Discussed at length with patient as well as patient's wife. Patient wishes to sign out AMA at this time despite extensive education regarding reason for IV antibiotic, surgical evaluation. Awake, alert and oriented x3. Patient verbalizes understanding regarding risks including worsening infection, multiorgan failure and ultimately death. · Discussed with ID -will provide prescription for doxycycline and Duricef based on outpatient culture results. S/P transmetatarsal amputation of foot, left (HCC)  Assessment & Plan  · History of left TMA    Type 1 diabetes mellitus with diabetic neuropathy [facility])  Assessment & Plan  Lab Results   Component Value Date    HGBA1C 7.9 (H) 07/20/2023       Recent Labs     08/17/23  1219   POCGLU 116       Blood Sugar Average: Last 72 hrs:  · Continue insulin pump on discharge.   Patient had refused insulin gtt while admitted as he wished to sign out AMA  · Continue gabapentin 300 mg twice daily for neuropathy     Hyponatremia  Assessment & Plan  · Acute on chronic issue   · Check urine and serum studies  · TSH 1.3  · Sodium 130 this morning  · Patient signed out AMA    Stage 2 chronic kidney disease  Assessment & Plan  Lab Results   Component Value Date    EGFR 73 08/18/2023    EGFR 67 08/17/2023    EGFR 64 08/17/2023    CREATININE 1.08 08/18/2023    CREATININE 1.16 08/17/2023    CREATININE 1.21 08/17/2023   · Creatinine stable at 1.2 on arrival  · Within baseline    Cigarette nicotine dependence without complication  Assessment & Plan  · Nicotine patch  · Smoking cessation education    Benign essential hypertension  Assessment & Plan  · Continue lisinopril and Toprol-XL per home regimen    Insulin pump in place  Assessment & Plan  · Continue insulin pump on discharge    Mixed hyperlipidemia  Assessment & Plan  · Continue statin    Medical Problems     Resolved Problems  Date Reviewed: 8/18/2023   None       Discharging Physician / Practitioner: Lissette Dawson PA-C  PCP: Luma Morillo MD  Admission Date:   Admission Orders (From admission, onward)     Ordered        08/17/23 1104  [address]  Once                      Discharge Date: 08/18/23    Consultations During Hospital Stay:  · Case was discussed with infectious disease  · Podiatry  · General surgery    Procedures Performed:   · None    Significant Findings / Test Results:   · XR left foot: Stable postsurgical changes of transmetatarsal amputation. Soft tissue defect with some air in the lateral overlying soft tissues consistent with open wound versus infection. No evidence for underlying acute osteomyelitis at this time. Consider radiographic follow-up in 7 to 10 days. Alternatively, MR correlation can be considered if there is concern for acute osteomyelitis  · MRI left foot: Status post transmetatarsal amputation with prominent skin ulceration at the distal aspect of the postoperative site including marrow changes involving the second through fifth residual proximal metatarsals indicating osteomyelitis.  No evidence of drainable abscess collection in this unenhanced examination    Incidental Findings:   · As above  · I reviewed the above mentioned incidental findings with the patient and/or family and they expressed understanding. Test Results Pending at Discharge (will require follow up): · Blood cultures     Outpatient Tests Requested:  · None    Complications: Signed out AMA    Reason for Admission: Osteomyelitis    Hospital Course:   Cesar Crow is a 66 y.o. male patient who originally presented to the hospital on 8/17/2023 due to worsening left foot infection. The patient, initially admitted to the hospital as inpatient, was discharged earlier than expected given the following: Signed out AMA. Past medical history significant for type 1 diabetes on insulin pump, recent left TMA, tobacco use, hyperlipidemia, hypertension. Patient presented to the emergency department due to recommendation by outpatient podiatry regarding worsening left foot infection. Outpatient wound cultures were obtained on 8/16. Patient did have left foot MRI while admitted which showed concern for osteomyelitis of the second through fifth proximal metatarsals. Podiatry had discussed with patient regarding general surgery evaluation for possible BKA due to concern of healing ability of a revision. Patient was started on IV antibiotics. Ultimately patient elected to sign out AMA following extensive discussion and education. Discussed with ID on day of discharge - given outpatient cultures will provide script for doxycycline and duricef. Patient provided with return precautions. Patient awake, alert and oriented x3 and verbalized understanding risk of leaving AMA including but not limited to worsening infection/sepsis, multiorgan failure, death. Please see above list of diagnoses and related plan for additional information.      Condition at Discharge: stable    Discharge Day Visit / Exam:   Subjective: Patient feels well, however states he no longer wants to remain inpatient while he awaits likely BKA and prefers to return home at this time despite extensive education regarding risks of leaving AMA. Vitals: Blood Pressure: 132/69 (08/18/23 0752)  Pulse: 91 (08/18/23 0752)  Temperature: 97.6 °F (36.4 °C) (08/17/23 2023)  Temp Source: Temporal (08/17/23 0908)  Respirations: 16 (08/17/23 1443)  Height: 6' (182.9 cm) (08/17/23 0906)  Weight - Scale: 65.8 kg (145 lb) (08/17/23 0906)  SpO2: 98 % (08/18/23 0752)  Exam:   Physical Exam  Vitals and nursing note reviewed. Constitutional:       General: He is not in acute distress. Appearance: He is well-developed. Comments: No acute distress   HENT:      Head: Normocephalic and atraumatic. Eyes:      General: No scleral icterus. Extraocular Movements: Extraocular movements intact. Conjunctiva/sclera: Conjunctivae normal.   Cardiovascular:      Rate and Rhythm: Normal rate and regular rhythm. Heart sounds: No murmur heard. Pulmonary:      Effort: Pulmonary effort is normal. No respiratory distress. Breath sounds: Normal breath sounds. No wheezing, rhonchi or rales. Abdominal:      General: Bowel sounds are normal.      Palpations: Abdomen is soft. Tenderness: There is no abdominal tenderness. There is no guarding or rebound. Musculoskeletal:         General: No swelling. Cervical back: Normal range of motion. Comments: Left TMA - dressing clean/dry/intact. No edema   Skin:     General: Skin is warm and dry. Capillary Refill: Capillary refill takes less than 2 seconds. Neurological:      Mental Status: He is alert and oriented to person, place, and time. Psychiatric:         Mood and Affect: Mood is anxious. Discussion with Family: Updated  (wife) at bedside. Discharge instructions/Information to patient and family:   See after visit summary for information provided to patient and family.       Provisions for Follow-Up Care:  See after visit summary for information related to follow-up care and any pertinent home health orders. Disposition:   Home - signed out AMA    Planned Readmission: Pending return for BKA     Discharge Statement:  I spent 65 minutes discharging the patient. This time was spent on the day of discharge. I had direct contact with the patient on the day of discharge. Greater than 50% of the total time was spent examining patient, answering all patient questions, arranging and discussing plan of care with patient as well as directly providing post-discharge instructions. Additional time then spent on discharge activities. Discharge Medications:  See after visit summary for reconciled discharge medications provided to patient and/or family.       **Please Note: This note may have been constructed using a voice recognition system**

## 2023-08-18 NOTE — TELEPHONE ENCOUNTER
Notified per nursing patient had called and requested to speak with provider as he had some questions regarding the medications sent to his pharmacy. Called patient back and clarified antibiotic recommendations that he should be taking both the doxycycline and duricef. Verbalized understanding. Per patient after further discussion with his wife he is agreeable to return to the hospital to be re-admitted for IV antibiotics and general surgery re-evaluation for BKA. Patient reports he prefers to return to the ED tomorrow AM.  Discussed he will require repeat blood work and that bed placement will depend on census tomorrow. Patient and wife verbalized understanding.

## 2023-08-18 NOTE — PLAN OF CARE
Problem: Potential for Falls  Goal: Patient will remain free of falls  Description: INTERVENTIONS:  - Educate patient/family on patient safety including physical limitations  - Instruct patient to call for assistance with activity   - Consult OT/PT to assist with strengthening/mobility   - Keep Call bell within reach  - Keep bed low and locked with side rails adjusted as appropriate  - Keep care items and personal belongings within reach  - Initiate and maintain comfort rounds  - Make Fall Risk Sign visible to staff  - Offer Toileting every 2 Hours, in advance of need  - Initiate/Maintain bed/ chair alarm  - Obtain necessary fall risk management equipment: cane  - Apply yellow socks and bracelet for high fall risk patients  - Consider moving patient to room near nurses station  Outcome: Progressing     Problem: MOBILITY - ADULT  Goal: Maintain or return to baseline ADL function  Description: INTERVENTIONS:  -  Assess patient's ability to carry out ADLs; assess patient's baseline for ADL function and identify physical deficits which impact ability to perform ADLs (bathing, care of mouth/teeth, toileting, grooming, dressing, etc.)  - Assess/evaluate cause of self-care deficits   - Assess range of motion  - Assess patient's mobility; develop plan if impaired  - Assess patient's need for assistive devices and provide as appropriate  - Encourage maximum independence but intervene and supervise when necessary  - Involve family in performance of ADLs  - Assess for home care needs following discharge   - Consider OT consult to assist with ADL evaluation and planning for discharge  - Provide patient education as appropriate  Outcome: Progressing  Goal: Maintains/Returns to pre admission functional level  Description: INTERVENTIONS:  - Perform BMAT or MOVE assessment daily.   - Set and communicate daily mobility goal to care team and patient/family/caregiver.    - Collaborate with rehabilitation services on mobility goals if consulted  - Perform Range of Motion 3 times a day. - Reposition patient every 2 hours.   - Dangle patient 3 times a day  - Stand patient 3 times a day  - Ambulate patient 3 times a day  - Out of bed to chair 3 times a day   - Out of bed for meals 3 times a day  - Out of bed for toileting  - Record patient progress and toleration of activity level   Outcome: Progressing     Problem: PAIN - ADULT  Goal: Verbalizes/displays adequate comfort level or baseline comfort level  Description: Interventions:  - Encourage patient to monitor pain and request assistance  - Assess pain using appropriate pain scale  - Administer analgesics based on type and severity of pain and evaluate response  - Implement non-pharmacological measures as appropriate and evaluate response  - Consider cultural and social influences on pain and pain management  - Notify physician/advanced practitioner if interventions unsuccessful or patient reports new pain  Outcome: Progressing     Problem: INFECTION - ADULT  Goal: Absence or prevention of progression during hospitalization  Description: INTERVENTIONS:  - Assess and monitor for signs and symptoms of infection  - Monitor lab/diagnostic results  - Monitor all insertion sites, i.e. indwelling lines, tubes, and drains  - Monitor endotracheal if appropriate and nasal secretions for changes in amount and color  - [place] appropriate cooling/warming therapies per order  - Administer medications as ordered  - Instruct and encourage patient and family to use good hand hygiene technique  - Identify and instruct in appropriate isolation precautions for identified infection/condition  Outcome: Progressing  Goal: Absence of fever/infection during neutropenic period  Description: INTERVENTIONS:  - Monitor WBC    Outcome: Progressing     Problem: SAFETY ADULT  Goal: Patient will remain free of falls  Description: INTERVENTIONS:  - Educate patient/family on patient safety including physical limitations  - Instruct patient to call for assistance with activity   - Consult OT/PT to assist with strengthening/mobility   - Keep Call bell within reach  - Keep bed low and locked with side rails adjusted as appropriate  - Keep care items and personal belongings within reach  - Initiate and maintain comfort rounds  - Make Fall Risk Sign visible to staff  - Offer Toileting every 2 Hours, in advance of need  - Initiate/Maintain bed/ chair alarm  - Obtain necessary fall risk management equipment: cane  - Apply yellow socks and bracelet for high fall risk patients  - Consider moving patient to room near nurses station  Outcome: Progressing  Goal: Maintain or return to baseline ADL function  Description: INTERVENTIONS:  -  Assess patient's ability to carry out ADLs; assess patient's baseline for ADL function and identify physical deficits which impact ability to perform ADLs (bathing, care of mouth/teeth, toileting, grooming, dressing, etc.)  - Assess/evaluate cause of self-care deficits   - Assess range of motion  - Assess patient's mobility; develop plan if impaired  - Assess patient's need for assistive devices and provide as appropriate  - Encourage maximum independence but intervene and supervise when necessary  - Involve family in performance of ADLs  - Assess for home care needs following discharge   - Consider OT consult to assist with ADL evaluation and planning for discharge  - Provide patient education as appropriate  Outcome: Progressing  Goal: Maintains/Returns to pre admission functional level  Description: INTERVENTIONS:  - Perform BMAT or MOVE assessment daily.   - Set and communicate daily mobility goal to care team and patient/family/caregiver. - Collaborate with rehabilitation services on mobility goals if consulted  - Perform Range of Motion 3 times a day. - Reposition patient every 2 hours.   - Dangle patient 3 times a day  - Stand patient 3 times a day  - Ambulate patient 3 times a day  - Out of bed to chair 3 times a day   - Out of bed for meals 3 times a day  - Out of bed for toileting  - Record patient progress and toleration of activity level   Outcome: Progressing     Problem: DISCHARGE PLANNING  Goal: Discharge to home or other facility with appropriate resources  Description: INTERVENTIONS:  - Identify barriers to discharge w/patient and caregiver  - Arrange for needed discharge resources and transportation as appropriate  - Identify discharge learning needs (meds, wound care, etc.)  - Arrange for interpretive services to assist at discharge as needed  - Refer to Case Management Department for coordinating discharge planning if the patient needs post-hospital services based on physician/advanced practitioner order or complex needs related to functional status, cognitive ability, or social support system  Outcome: Progressing     Problem: Knowledge Deficit  Goal: Patient/family/caregiver demonstrates understanding of disease process, treatment plan, medications, and discharge instructions  Description: Complete learning assessment and assess knowledge base.   Interventions:  - Provide teaching at level of understanding  - Provide teaching via preferred learning methods  Outcome: Progressing     Problem: SKIN/TISSUE INTEGRITY - ADULT  Goal: Incision(s), wounds(s) or drain site(s) healing without S/S of infection  Description: INTERVENTIONS  - Assess and document dressing, incision, wound bed, drain sites and surrounding tissue  - Provide patient and family education  - Perform skin care/dressing changes every shift  Outcome: Progressing     Problem: MUSCULOSKELETAL - ADULT  Goal: Maintain or return mobility to safest level of function  Description: INTERVENTIONS:  - Assess patient's ability to carry out ADLs; assess patient's baseline for ADL function and identify physical deficits which impact ability to perform ADLs (bathing, care of mouth/teeth, toileting, grooming, dressing, etc.)  - Assess/evaluate cause of self-care deficits   - Assess range of motion  - Assess patient's mobility  - Assess patient's need for assistive devices and provide as appropriate  - Encourage maximum independence but intervene and supervise when necessary  - Involve family in performance of ADLs  - Assess for home care needs following discharge   - Consider OT consult to assist with ADL evaluation and planning for discharge  - Provide patient education as appropriate  Outcome: Progressing  Goal: Maintain proper alignment of affected body part  Description: INTERVENTIONS:  - Support, maintain and protect limb and body alignment  - Provide patient/ family with appropriate education  Outcome: Progressing

## 2023-08-18 NOTE — PROGRESS NOTES
Joaquim Colin is a 66 y.o. male who is currently ordered Vancomycin IV with management by the Pharmacy Consult service. Relevant clinical data and objective / subjective history reviewed. Vancomycin Assessment:  Indication and Goal AUC/Trough: Bone/joint infection (goal -600, trough >10)  Clinical Status: stable  Micro:     Renal Function:  SCr: 1.08 mg/dL  CrCl: 66.5 mL/min  Renal replacement: Not on dialysis  Days of Therapy: 2  Current Dose: 1250mg IV Q24H  Vancomycin Plan:  New Dosing: No change  Estimated AUC: 447 mcg*hr/mL  Estimated Trough: 12.3 mcg/mL  Next Level: With AM labs at 0600 on 8/19/23  Renal Function Monitoring: Daily BMP and [place] will continue to follow closely for s/sx of nephrotoxicity, infusion reactions and appropriateness of therapy. BMP and CBC will be ordered per protocol. We will continue to follow the patient’s culture results and clinical progress daily.     Christina Shin, Pharmacist

## 2023-08-18 NOTE — ASSESSMENT & PLAN NOTE
Lab Results   Component Value Date    EGFR 73 08/18/2023    EGFR 67 08/17/2023    EGFR 64 08/17/2023    CREATININE 1.08 08/18/2023    CREATININE 1.16 08/17/2023    CREATININE 1.21 08/17/2023   · Creatinine stable at 1.2 on arrival  · Within baseline

## 2023-08-18 NOTE — UTILIZATION REVIEW
NOTIFICATION OF INPATIENT ADMISSION   AUTHORIZATION REQUEST   SERVICING FACILITY:   [address] [place] [address]  [address] [facility] [address]  Tax ID: [id number]  NPI: [id number] ATTENDING PROVIDER:  Attending Name and NPI#: Shabnam Hinds Sa [[id number]]  Address: [address] [facility] [address]  Phone: [phone]   ADMISSION INFORMATION:  Place of Service: [address] [place] [address] Service Code: 21  Inpatient Admission Date/Time: 8/17/23 11:04 AM  Discharge Date/Time: 8/18/2023 11:14 AM  Admitting Diagnosis Code/Description:  Chronic hyponatremia [E87.1]  Foot pain [M79.673]  Cellulitis of left foot [D79.452]  Type 1 diabetes mellitus with diabetic neuropathy ([address]) [E10.40]  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, with fat layer exposed ([address]) [E10.621, L97.422]  Osteomyelitis of left foot, unspecified type ([address]) [M86.9]     UTILIZATION REVIEW CONTACT:  Leatha Whitlock Utilization   Network Utilization Review Department  Phone: [phone]  Fax [phone]  Email: [email or web address]  Contact for approvals/pending authorizations, clinical reviews, and discharge. PHYSICIAN ADVISORY SERVICES:  Medical Necessity Denial & Rved-op-Oczy Review  Phone: [phone]  Fax: [phone]  Email: [email or web address]

## 2023-08-18 NOTE — PHYSICAL THERAPY NOTE
PHYSICAL THERAPY CANCELLATION NOTE    Patient Name: Lauren NOEL Date: 8/18/2023 08/18/23 0902   Note Type   Note type Cancelled Session   Additional Comments Pt orders received, chart review performed. Per podiatry note, pt is KEVIN PAZ.  However pt pending acute care surgery consult for potential plan for BKA, will f/u when surgical plan established/post-op       Stacia Barbosa, PT, DPT

## 2023-08-18 NOTE — QUICK NOTE
Asked to see patient regarding potential BKA. However,  patient signing out AMA. Continue antibiotics per medicine/ID. Continue LWC to LLE. Patient should follow-up in office as outpatient, office information placed in chart or reconsult general surgery if patient re-admitted.     Herbert Haney

## 2023-08-18 NOTE — PLAN OF CARE
Problem: Potential for Falls  Goal: Patient will remain free of falls  Description: INTERVENTIONS:  - Educate patient/family on patient safety including physical limitations  - Instruct patient to call for assistance with activity   - Consult OT/PT to assist with strengthening/mobility   - Keep Call bell within reach  - Keep bed low and locked with side rails adjusted as appropriate  - Keep care items and personal belongings within reach  - Initiate and maintain comfort rounds  - Make Fall Risk Sign visible to staff  - Offer Toileting every 2 Hours, in advance of need  - Initiate/Maintain bed/ chair alarm  - Obtain necessary fall risk management equipment: cane  - Apply yellow socks and bracelet for high fall risk patients  - Consider moving patient to room near nurses station  Outcome: Progressing     Problem: MOBILITY - ADULT  Goal: Maintain or return to baseline ADL function  Description: INTERVENTIONS:  -  Assess patient's ability to carry out ADLs; assess patient's baseline for ADL function and identify physical deficits which impact ability to perform ADLs (bathing, care of mouth/teeth, toileting, grooming, dressing, etc.)  - Assess/evaluate cause of self-care deficits   - Assess range of motion  - Assess patient's mobility; develop plan if impaired  - Assess patient's need for assistive devices and provide as appropriate  - Encourage maximum independence but intervene and supervise when necessary  - Involve family in performance of ADLs  - Assess for home care needs following discharge   - Consider OT consult to assist with ADL evaluation and planning for discharge  - Provide patient education as appropriate  Outcome: Progressing  Goal: Maintains/Returns to pre admission functional level  Description: INTERVENTIONS:  - Perform BMAT or MOVE assessment daily.   - Set and communicate daily mobility goal to care team and patient/family/caregiver.    - Collaborate with rehabilitation services on mobility goals if consulted  - Perform Range of Motion 3 times a day. - Reposition patient every 2 hours.   - Dangle patient 3 times a day  - Stand patient 3 times a day  - Ambulate patient 3 times a day  - Out of bed to chair 3 times a day   - Out of bed for meals 3 times a day  - Out of bed for toileting  - Record patient progress and toleration of activity level   Outcome: Progressing     Problem: PAIN - ADULT  Goal: Verbalizes/displays adequate comfort level or baseline comfort level  Description: Interventions:  - Encourage patient to monitor pain and request assistance  - Assess pain using appropriate pain scale  - Administer analgesics based on type and severity of pain and evaluate response  - Implement non-pharmacological measures as appropriate and evaluate response  - Consider cultural and social influences on pain and pain management  - Notify physician/advanced practitioner if interventions unsuccessful or patient reports new pain  Outcome: Progressing     Problem: INFECTION - ADULT  Goal: Absence or prevention of progression during hospitalization  Description: INTERVENTIONS:  - Assess and monitor for signs and symptoms of infection  - Monitor lab/diagnostic results  - Monitor all insertion sites, i.e. indwelling lines, tubes, and drains  - Monitor endotracheal if appropriate and nasal secretions for changes in amount and color  - [place] appropriate cooling/warming therapies per order  - Administer medications as ordered  - Instruct and encourage patient and family to use good hand hygiene technique  - Identify and instruct in appropriate isolation precautions for identified infection/condition  Outcome: Progressing  Goal: Absence of fever/infection during neutropenic period  Description: INTERVENTIONS:  - Monitor WBC    Outcome: Progressing     Problem: SAFETY ADULT  Goal: Patient will remain free of falls  Description: INTERVENTIONS:  - Educate patient/family on patient safety including physical limitations  - Instruct patient to call for assistance with activity   - Consult OT/PT to assist with strengthening/mobility   - Keep Call bell within reach  - Keep bed low and locked with side rails adjusted as appropriate  - Keep care items and personal belongings within reach  - Initiate and maintain comfort rounds  - Make Fall Risk Sign visible to staff  - Offer Toileting every 2 Hours, in advance of need  - Initiate/Maintain bed/ chair alarm  - Obtain necessary fall risk management equipment: cane  - Apply yellow socks and bracelet for high fall risk patients  - Consider moving patient to room near nurses station  Outcome: Progressing  Goal: Maintain or return to baseline ADL function  Description: INTERVENTIONS:  -  Assess patient's ability to carry out ADLs; assess patient's baseline for ADL function and identify physical deficits which impact ability to perform ADLs (bathing, care of mouth/teeth, toileting, grooming, dressing, etc.)  - Assess/evaluate cause of self-care deficits   - Assess range of motion  - Assess patient's mobility; develop plan if impaired  - Assess patient's need for assistive devices and provide as appropriate  - Encourage maximum independence but intervene and supervise when necessary  - Involve family in performance of ADLs  - Assess for home care needs following discharge   - Consider OT consult to assist with ADL evaluation and planning for discharge  - Provide patient education as appropriate  Outcome: Progressing  Goal: Maintains/Returns to pre admission functional level  Description: INTERVENTIONS:  - Perform BMAT or MOVE assessment daily.   - Set and communicate daily mobility goal to care team and patient/family/caregiver. - Collaborate with rehabilitation services on mobility goals if consulted  - Perform Range of Motion 3 times a day. - Reposition patient every 2 hours.   - Dangle patient 3 times a day  - Stand patient 3 times a day  - Ambulate patient 3 times a day  - Out of bed to chair 3 times a day   - Out of bed for meals 3 times a day  - Out of bed for toileting  - Record patient progress and toleration of activity level   Outcome: Progressing     Problem: DISCHARGE PLANNING  Goal: Discharge to home or other facility with appropriate resources  Description: INTERVENTIONS:  - Identify barriers to discharge w/patient and caregiver  - Arrange for needed discharge resources and transportation as appropriate  - Identify discharge learning needs (meds, wound care, etc.)  - Arrange for interpretive services to assist at discharge as needed  - Refer to Case Management Department for coordinating discharge planning if the patient needs post-hospital services based on physician/advanced practitioner order or complex needs related to functional status, cognitive ability, or social support system  Outcome: Progressing     Problem: Knowledge Deficit  Goal: Patient/family/caregiver demonstrates understanding of disease process, treatment plan, medications, and discharge instructions  Description: Complete learning assessment and assess knowledge base.   Interventions:  - Provide teaching at level of understanding  - Provide teaching via preferred learning methods  Outcome: Progressing     Problem: SKIN/TISSUE INTEGRITY - ADULT  Goal: Incision(s), wounds(s) or drain site(s) healing without S/S of infection  Description: INTERVENTIONS  - Assess and document dressing, incision, wound bed, drain sites and surrounding tissue  - Provide patient and family education  - Perform skin care/dressing changes every shift  Outcome: Progressing     Problem: MUSCULOSKELETAL - ADULT  Goal: Maintain or return mobility to safest level of function  Description: INTERVENTIONS:  - Assess patient's ability to carry out ADLs; assess patient's baseline for ADL function and identify physical deficits which impact ability to perform ADLs (bathing, care of mouth/teeth, toileting, grooming, dressing, etc.)  - Assess/evaluate cause of self-care deficits   - Assess range of motion  - Assess patient's mobility  - Assess patient's need for assistive devices and provide as appropriate  - Encourage maximum independence but intervene and supervise when necessary  - Involve family in performance of ADLs  - Assess for home care needs following discharge   - Consider OT consult to assist with ADL evaluation and planning for discharge  - Provide patient education as appropriate  Outcome: Progressing  Goal: Maintain proper alignment of affected body part  Description: INTERVENTIONS:  - Support, maintain and protect limb and body alignment  - Provide patient/ family with appropriate education  Outcome: Progressing

## 2023-08-19 ENCOUNTER — HOSPITAL ENCOUNTER (INPATIENT)
Facility: HOSPITAL | Age: 63
LOS: 8 days | Discharge: HOME HEALTH | DRG: 475 | End: 2023-08-27
Attending: EMERGENCY MEDICINE | Admitting: HOSPITALIST
Payer: COMMERCIAL

## 2023-08-19 DIAGNOSIS — G89.11 ACUTE PAIN DUE TO INJURY: ICD-10-CM

## 2023-08-19 DIAGNOSIS — E10.3219 TYPE 1 DIABETES MELLITUS WITH MILD NONPROLIFERATIVE RETINOPATHY AND MACULAR EDEMA, UNSPECIFIED LATERALITY (HCC): ICD-10-CM

## 2023-08-19 DIAGNOSIS — M86.9 OSTEOMYELITIS OF LEFT FOOT, UNSPECIFIED TYPE (HCC): ICD-10-CM

## 2023-08-19 DIAGNOSIS — F41.9 ANXIETY: ICD-10-CM

## 2023-08-19 DIAGNOSIS — T14.8XXA WOUND INFECTION: Primary | ICD-10-CM

## 2023-08-19 DIAGNOSIS — A41.02 SEPSIS DUE TO METHICILLIN RESISTANT STAPHYLOCOCCUS AUREUS (MRSA) WITHOUT ACUTE ORGAN DYSFUNCTION (HCC): ICD-10-CM

## 2023-08-19 DIAGNOSIS — E10.40 TYPE 1 DIABETES MELLITUS WITH DIABETIC NEUROPATHY (HCC): ICD-10-CM

## 2023-08-19 DIAGNOSIS — L08.9 WOUND INFECTION: Primary | ICD-10-CM

## 2023-08-19 DIAGNOSIS — I10 BENIGN ESSENTIAL HYPERTENSION: ICD-10-CM

## 2023-08-19 DIAGNOSIS — Z89.432 S/P TRANSMETATARSAL AMPUTATION OF FOOT, LEFT (HCC): ICD-10-CM

## 2023-08-19 LAB
ALBUMIN SERPL BCP-MCNC: 4.1 G/DL (ref 3.5–5)
ALP SERPL-CCNC: 157 U/L (ref 34–104)
ALT SERPL W P-5'-P-CCNC: 11 U/L (ref 7–52)
ANION GAP SERPL CALCULATED.3IONS-SCNC: 8 MMOL/L
APTT PPP: 37 SECONDS (ref 23–37)
AST SERPL W P-5'-P-CCNC: 20 U/L (ref 13–39)
BASOPHILS # BLD AUTO: 0.07 THOUSANDS/ÂΜL (ref 0–0.1)
BASOPHILS NFR BLD AUTO: 1 % (ref 0–1)
BILIRUB SERPL-MCNC: 0.45 MG/DL (ref 0.2–1)
BUN SERPL-MCNC: 12 MG/DL (ref 5–25)
CALCIUM SERPL-MCNC: 10 MG/DL (ref 8.4–10.2)
CHLORIDE SERPL-SCNC: 94 MMOL/L (ref 96–108)
CO2 SERPL-SCNC: 28 MMOL/L (ref 21–32)
CREAT SERPL-MCNC: 1.24 MG/DL (ref 0.6–1.3)
CRP SERPL QL: 56.1 MG/L
EOSINOPHIL # BLD AUTO: 0.23 THOUSAND/ÂΜL (ref 0–0.61)
EOSINOPHIL NFR BLD AUTO: 2 % (ref 0–6)
ERYTHROCYTE [DISTWIDTH] IN BLOOD BY AUTOMATED COUNT: 14.1 % (ref 11.6–15.1)
ERYTHROCYTE [SEDIMENTATION RATE] IN BLOOD: 39 MM/HOUR (ref 0–19)
GFR SERPL CREATININE-BSD FRML MDRD: 62 ML/MIN/1.73SQ M
GLUCOSE SERPL-MCNC: 110 MG/DL (ref 65–140)
GLUCOSE SERPL-MCNC: 121 MG/DL (ref 65–140)
GLUCOSE SERPL-MCNC: 162 MG/DL (ref 65–140)
HCT VFR BLD AUTO: 35.3 % (ref 36.5–49.3)
HGB BLD-MCNC: 11.6 G/DL (ref 12–17)
IMM GRANULOCYTES # BLD AUTO: 0.05 THOUSAND/UL (ref 0–0.2)
IMM GRANULOCYTES NFR BLD AUTO: 0 % (ref 0–2)
INR PPP: 0.88 (ref 0.84–1.19)
LACTATE SERPL-SCNC: 1.4 MMOL/L (ref 0.5–2)
LYMPHOCYTES # BLD AUTO: 0.79 THOUSANDS/ÂΜL (ref 0.6–4.47)
LYMPHOCYTES NFR BLD AUTO: 7 % (ref 14–44)
MCH RBC QN AUTO: 31.6 PG (ref 26.8–34.3)
MCHC RBC AUTO-ENTMCNC: 32.9 G/DL (ref 31.4–37.4)
MCV RBC AUTO: 96 FL (ref 82–98)
MONOCYTES # BLD AUTO: 1.08 THOUSAND/ÂΜL (ref 0.17–1.22)
MONOCYTES NFR BLD AUTO: 9 % (ref 4–12)
MRSA NOSE QL CULT: NORMAL
NEUTROPHILS # BLD AUTO: 9.48 THOUSANDS/ÂΜL (ref 1.85–7.62)
NEUTS SEG NFR BLD AUTO: 81 % (ref 43–75)
NRBC BLD AUTO-RTO: 0 /100 WBCS
OSMOLALITY UR: 362 MMOL/KG
PLATELET # BLD AUTO: 429 THOUSANDS/UL (ref 149–390)
PMV BLD AUTO: 8.6 FL (ref 8.9–12.7)
POTASSIUM SERPL-SCNC: 4.9 MMOL/L (ref 3.5–5.3)
PROCALCITONIN SERPL-MCNC: 0.24 NG/ML
PROT SERPL-MCNC: 8.5 G/DL (ref 6.4–8.4)
PROTHROMBIN TIME: 12.6 SECONDS (ref 11.6–14.5)
RBC # BLD AUTO: 3.67 MILLION/UL (ref 3.88–5.62)
SODIUM 24H UR-SCNC: 52 MOL/L
SODIUM SERPL-SCNC: 130 MMOL/L (ref 135–147)
WBC # BLD AUTO: 11.7 THOUSAND/UL (ref 4.31–10.16)

## 2023-08-19 PROCEDURE — 83935 ASSAY OF URINE OSMOLALITY: CPT | Performed by: PHYSICIAN ASSISTANT

## 2023-08-19 PROCEDURE — 84145 PROCALCITONIN (PCT): CPT | Performed by: EMERGENCY MEDICINE

## 2023-08-19 PROCEDURE — 96365 THER/PROPH/DIAG IV INF INIT: CPT

## 2023-08-19 PROCEDURE — 36415 COLL VENOUS BLD VENIPUNCTURE: CPT | Performed by: EMERGENCY MEDICINE

## 2023-08-19 PROCEDURE — 84300 ASSAY OF URINE SODIUM: CPT | Performed by: PHYSICIAN ASSISTANT

## 2023-08-19 PROCEDURE — 83605 ASSAY OF LACTIC ACID: CPT | Performed by: EMERGENCY MEDICINE

## 2023-08-19 PROCEDURE — 85730 THROMBOPLASTIN TIME PARTIAL: CPT | Performed by: EMERGENCY MEDICINE

## 2023-08-19 PROCEDURE — 82948 REAGENT STRIP/BLOOD GLUCOSE: CPT

## 2023-08-19 PROCEDURE — 99255 IP/OBS CONSLTJ NEW/EST HI 80: CPT | Performed by: PHYSICIAN ASSISTANT

## 2023-08-19 PROCEDURE — 99285 EMERGENCY DEPT VISIT HI MDM: CPT | Performed by: EMERGENCY MEDICINE

## 2023-08-19 PROCEDURE — 85610 PROTHROMBIN TIME: CPT | Performed by: EMERGENCY MEDICINE

## 2023-08-19 PROCEDURE — 99223 1ST HOSP IP/OBS HIGH 75: CPT | Performed by: HOSPITALIST

## 2023-08-19 PROCEDURE — 86140 C-REACTIVE PROTEIN: CPT | Performed by: EMERGENCY MEDICINE

## 2023-08-19 PROCEDURE — 80053 COMPREHEN METABOLIC PANEL: CPT | Performed by: EMERGENCY MEDICINE

## 2023-08-19 PROCEDURE — 85025 COMPLETE CBC W/AUTO DIFF WBC: CPT | Performed by: EMERGENCY MEDICINE

## 2023-08-19 PROCEDURE — 85652 RBC SED RATE AUTOMATED: CPT | Performed by: EMERGENCY MEDICINE

## 2023-08-19 PROCEDURE — 87040 BLOOD CULTURE FOR BACTERIA: CPT | Performed by: EMERGENCY MEDICINE

## 2023-08-19 PROCEDURE — 99285 EMERGENCY DEPT VISIT HI MDM: CPT

## 2023-08-19 RX ORDER — NICOTINE 21 MG/24HR
1 PATCH, TRANSDERMAL 24 HOURS TRANSDERMAL DAILY
Status: DISCONTINUED | OUTPATIENT
Start: 2023-08-19 | End: 2023-08-27 | Stop reason: HOSPADM

## 2023-08-19 RX ORDER — ACETAMINOPHEN 325 MG/1
650 TABLET ORAL EVERY 6 HOURS PRN
Status: DISCONTINUED | OUTPATIENT
Start: 2023-08-19 | End: 2023-08-22

## 2023-08-19 RX ORDER — METOPROLOL SUCCINATE 50 MG/1
100 TABLET, EXTENDED RELEASE ORAL DAILY
Status: DISCONTINUED | OUTPATIENT
Start: 2023-08-20 | End: 2023-08-27 | Stop reason: HOSPADM

## 2023-08-19 RX ORDER — HYDROMORPHONE HCL/PF 1 MG/ML
0.5 SYRINGE (ML) INJECTION ONCE
Status: COMPLETED | OUTPATIENT
Start: 2023-08-19 | End: 2023-08-19

## 2023-08-19 RX ORDER — OXYCODONE HYDROCHLORIDE 10 MG/1
10 TABLET ORAL EVERY 6 HOURS PRN
Status: DISCONTINUED | OUTPATIENT
Start: 2023-08-19 | End: 2023-08-22

## 2023-08-19 RX ORDER — LISINOPRIL 10 MG/1
10 TABLET ORAL DAILY
Status: DISCONTINUED | OUTPATIENT
Start: 2023-08-20 | End: 2023-08-21

## 2023-08-19 RX ORDER — CEFEPIME HYDROCHLORIDE 2 G/50ML
2000 INJECTION, SOLUTION INTRAVENOUS ONCE
Status: COMPLETED | OUTPATIENT
Start: 2023-08-19 | End: 2023-08-19

## 2023-08-19 RX ORDER — ATORVASTATIN CALCIUM 40 MG/1
40 TABLET, FILM COATED ORAL
Status: DISCONTINUED | OUTPATIENT
Start: 2023-08-19 | End: 2023-08-27 | Stop reason: HOSPADM

## 2023-08-19 RX ORDER — LORAZEPAM 0.5 MG/1
0.5 TABLET ORAL EVERY 8 HOURS PRN
Status: DISCONTINUED | OUTPATIENT
Start: 2023-08-19 | End: 2023-08-27 | Stop reason: HOSPADM

## 2023-08-19 RX ORDER — CEFEPIME HYDROCHLORIDE 2 G/50ML
2000 INJECTION, SOLUTION INTRAVENOUS EVERY 12 HOURS
Status: DISCONTINUED | OUTPATIENT
Start: 2023-08-19 | End: 2023-08-23

## 2023-08-19 RX ORDER — GABAPENTIN 300 MG/1
300 CAPSULE ORAL 2 TIMES DAILY
Status: DISCONTINUED | OUTPATIENT
Start: 2023-08-19 | End: 2023-08-20

## 2023-08-19 RX ORDER — VANCOMYCIN HYDROCHLORIDE 1 G/200ML
15 INJECTION, SOLUTION INTRAVENOUS EVERY 12 HOURS
Status: DISCONTINUED | OUTPATIENT
Start: 2023-08-19 | End: 2023-08-19

## 2023-08-19 RX ORDER — HEPARIN SODIUM 5000 [USP'U]/ML
5000 INJECTION, SOLUTION INTRAVENOUS; SUBCUTANEOUS EVERY 8 HOURS SCHEDULED
Status: DISCONTINUED | OUTPATIENT
Start: 2023-08-19 | End: 2023-08-27 | Stop reason: HOSPADM

## 2023-08-19 RX ADMIN — GABAPENTIN 300 MG: 300 CAPSULE ORAL at 17:35

## 2023-08-19 RX ADMIN — LORAZEPAM 0.5 MG: 0.5 TABLET ORAL at 15:08

## 2023-08-19 RX ADMIN — CEFEPIME HYDROCHLORIDE 2000 MG: 2 INJECTION, SOLUTION INTRAVENOUS at 19:36

## 2023-08-19 RX ADMIN — HEPARIN SODIUM 5000 UNITS: 5000 INJECTION INTRAVENOUS; SUBCUTANEOUS at 14:53

## 2023-08-19 RX ADMIN — CEFEPIME HYDROCHLORIDE 2000 MG: 2 INJECTION, SOLUTION INTRAVENOUS at 08:52

## 2023-08-19 RX ADMIN — VANCOMYCIN HYDROCHLORIDE 1750 MG: 1 INJECTION, POWDER, LYOPHILIZED, FOR SOLUTION INTRAVENOUS at 09:24

## 2023-08-19 RX ADMIN — NICOTINE 1 PATCH: 21 PATCH, EXTENDED RELEASE TRANSDERMAL at 12:13

## 2023-08-19 RX ADMIN — HYDROMORPHONE HYDROCHLORIDE 0.5 MG: 1 INJECTION, SOLUTION INTRAMUSCULAR; INTRAVENOUS; SUBCUTANEOUS at 10:53

## 2023-08-19 RX ADMIN — OXYCODONE HYDROCHLORIDE 10 MG: 10 TABLET ORAL at 17:35

## 2023-08-19 RX ADMIN — OXYCODONE HYDROCHLORIDE 10 MG: 10 TABLET ORAL at 23:39

## 2023-08-19 RX ADMIN — ATORVASTATIN CALCIUM 40 MG: 40 TABLET, FILM COATED ORAL at 15:38

## 2023-08-19 RX ADMIN — HEPARIN SODIUM 5000 UNITS: 5000 INJECTION INTRAVENOUS; SUBCUTANEOUS at 20:37

## 2023-08-19 NOTE — ED PROVIDER NOTES
History  Chief Complaint   Patient presents with   • Wound Infection     Pt to er with reports of being told to come back to the er for iv abx for an infection in his left foot. Denies fevers at home. 61-year-old male with a partial left foot amputation presents for further management of likely wound infection and suspected osteomyelitis. Patient was admitted but left AGAINST MEDICAL ADVICE 2 days ago. Was advised to return for IV antibiotics and BKA. Patient reports worsening appearance of wound with dehiscence, purulent drainage. Otherwise denies any systemic signs of illness including fever, vomiting, chest pain, shortness of breath. Prior to Admission Medications   Prescriptions Last Dose Informant Patient Reported? Taking?    Glucagon, rDNA, (Glucagon Emergency) 1 MG KIT  Self Yes No   Sig: as directed   HumaLOG 100 UNIT/ML injection   Yes No   Sig: inject up to 80 units in INSULIN PUMP daily as directed by prescriber   Patient not taking: Reported on 7/14/2023   atorvastatin (LIPITOR) 40 mg tablet 8/18/2023 Self No Yes   Sig: TAKE 1 TABLET BY MOUTH EVERY DAY   cefadroxil (DURICEF) 1 g tablet 8/18/2023  No Yes   Sig: Take 1 tablet (1 g total) by mouth 2 (two) times a day for 10 days   doxycycline hyclate (VIBRAMYCIN) 100 mg capsule 8/18/2023  No Yes   Sig: Take 1 capsule (100 mg total) by mouth every 12 (twelve) hours for 10 days   gabapentin (Neurontin) 300 mg capsule 8/19/2023  No Yes   Sig: Take 1 capsule (300 mg total) by mouth 2 (two) times a day   insulin lispro (HumaLOG) 100 units/mL injection 8/19/2023 Self No Yes   Sig: Use via the insulin pump, use up to 80 units daily   lisinopril (ZESTRIL) 10 mg tablet 8/18/2023 Self No Yes   Sig: Take 1 tablet (10 mg total) by mouth daily   metoprolol succinate (TOPROL-XL) 100 mg 24 hr tablet 8/18/2023 Self No Yes   Sig: Take 1 tablet (100 mg total) by mouth daily   nicotine (NICODERM CQ) 21 mg/24 hr TD 24 hr patch 8/18/2023  No Yes   Sig: Place 1 patch on the skin over 24 hours daily Do not start before July 5, 2023. oxyCODONE-acetaminophen (Percocet) 5-325 mg per tablet 8/18/2023  No Yes   Sig: Take 1 tablet by mouth every 6 (six) hours as needed for moderate pain for up to 14 days Max Daily Amount: 4 tablets      Facility-Administered Medications: None       Past Medical History:   Diagnosis Date   • Chronic foot ulcer ([address]) 09/07/2018   • Diabetes mellitus ([address])    • Diabetic foot ulcer ([address]) 5/8/2023   • Gout    • High cholesterol    • Hypertension    • Visual impairment 11/1/2022    Cateract       Past Surgical History:   Procedure Laterality Date   • CATARACT EXTRACTION Right 01/2023   • COMPLEX WOUND CLOSURE TO EXTREMITY Left 07/18/2019    Procedure: COMPLEX CLOSURE LEFT CHEEK;  Surgeon: Lalito Hoff MD;  Location:  MAIN OR;  Service: Plastics   • FACIAL/NECK BIOPSY Left 07/18/2019    Procedure: EXCISION LEFT CHEEK CYST;  Surgeon: Lalito Hoff MD;  Location:  MAIN OR;  Service: Plastics   • HERNIA REPAIR Left     several times   • KNEE ARTHROSCOPY Left     torn meniscus   • FL AMPUTATION FOOT TRANSMETARSAL Left 6/30/2023    Procedure: AMPUTATION TRANSMETATARSAL (TMA);  Surgeon: Juan Ramon Quiñones DPM;  Location:  MAIN OR;  Service: Podiatry   • WOUND DEBRIDEMENT Left 5/30/2023    Procedure: DEBRIDEMENT WOUND LEFT FOOT WOUND WITH EXCISION OF INFECTED BONE AND WOUND VAC APPLICATION;  Surgeon: Nadia Bruno DPM;  Location:  MAIN OR;  Service: Podiatry       Family History   Problem Relation Age of Onset   • Heart disease Father    • Diabetes type I Father    • Diabetes type II Mother    • No Known Problems Sister    • Alcohol abuse Brother    • Diabetes type I Brother    • No Known Problems Brother    • No Known Problems Son    • No Known Problems Daughter      I have reviewed and agree with the history as documented.     E-Cigarette/Vaping   • E-Cigarette Use Never User      E-Cigarette/Vaping Substances   • Nicotine No    • THC No • CBD No    • Flavoring No    • Other No    • Unknown No      Social History     Tobacco Use   • Smoking status: Every Day     Packs/day: 1.00     Years: 20.00     Total pack years: 20.00     Types: Cigarettes     Last attempt to quit: 2022     Years since quittin.6   • Smokeless tobacco: Never   • Tobacco comments:     encouraged smoking cessation   Vaping Use   • Vaping Use: Never used   Substance Use Topics   • Alcohol use: Yes     Comment: 3/4 beers a day   • Drug use: Never       Review of Systems   Constitutional: Negative for fever. Skin: Positive for wound. Physical Exam  Physical Exam  Vitals and nursing note reviewed. Constitutional:       General: He is not in acute distress. Appearance: He is well-developed. HENT:      Head: Normocephalic and atraumatic. Right Ear: External ear normal.      Left Ear: External ear normal.      Nose: Nose normal.   Eyes:      General: No scleral icterus. Pulmonary:      Effort: Pulmonary effort is normal. No respiratory distress. Abdominal:      General: There is no distension. Palpations: Abdomen is soft. Musculoskeletal:         General: No deformity. Normal range of motion. Cervical back: Normal range of motion and neck supple. Comments: Dehiscence of left foot wound with surrounding erythema and purulent drainage   Skin:     General: Skin is warm. Findings: Erythema present. No rash. Neurological:      General: No focal deficit present. Mental Status: He is alert.       Gait: Gait normal.   Psychiatric:         Mood and Affect: Mood normal.                   Vital Signs  ED Triage Vitals   Temperature Pulse Respirations Blood Pressure SpO2   23 0823 23 0823 23 0823 23 0823 23 0823   97.8 °F (36.6 °C) 101 18 139/79 97 %      Temp Source Heart Rate Source Patient Position - Orthostatic VS BP Location FiO2 (%)   23 0823 23 0823 23 0900 23 0900 --   Oral Monitor Sitting Left arm       Pain Score       08/19/23 1053       8           Vitals:    08/19/23 0823 08/19/23 0900 08/19/23 1035 08/19/23 1214   BP: 139/79 140/71 144/85 144/85   Pulse: 101 91 92 92   Patient Position - Orthostatic VS:  Sitting           Visual Acuity      ED Medications  Medications   cefepime (MAXIPIME) IVPB (premix in dextrose) 2,000 mg 50 mL (has no administration in time range)   vancomycin (VANCOCIN) 1250 mg in sodium chloride 0.9% 250 mL IVPB (has no administration in time range)   acetaminophen (TYLENOL) tablet 650 mg (has no administration in time range)   oxyCODONE (ROXICODONE) immediate release tablet 10 mg (has no administration in time range)   gabapentin (NEURONTIN) capsule 300 mg (300 mg Oral Not Given 8/19/23 1234)   lisinopril (ZESTRIL) tablet 10 mg (has no administration in time range)   metoprolol succinate (TOPROL-XL) 24 hr tablet 100 mg (has no administration in time range)   nicotine (NICODERM CQ) 21 mg/24 hr TD 24 hr patch 1 patch (1 patch Transdermal Medication Applied 8/19/23 1213)   atorvastatin (LIPITOR) tablet 40 mg (has no administration in time range)   heparin (porcine) subcutaneous injection 5,000 Units (5,000 Units Subcutaneous Given 8/19/23 1453)   LORazepam (ATIVAN) tablet 0.5 mg (has no administration in time range)   insulin lispro (HumaLOG) FOR PUMP REFILLS 300 Units (has no administration in time range)   cefepime (MAXIPIME) IVPB (premix in dextrose) 2,000 mg 50 mL (0 mg Intravenous Stopped 8/19/23 0920)   vancomycin (VANCOCIN) 1,750 mg in sodium chloride 0.9 % 500 mL IVPB (1,750 mg Intravenous New Bag 8/19/23 0924)   HYDROmorphone (DILAUDID) injection 0.5 mg (0.5 mg Intravenous Given 8/19/23 1053)       Diagnostic Studies  Results Reviewed     Procedure Component Value Units Date/Time    Protime-INR [[id number]]  (Normal) Collected: 08/19/23 0841    Lab Status: Final result Specimen: Blood from Arm, Right Updated: 08/19/23 0934     Protime 12.6 seconds INR 0.88    APTT [[id number]]  (Normal) Collected: 08/19/23 0841    Lab Status: Final result Specimen: Blood from Arm, Right Updated: 08/19/23 0934     PTT 37 seconds     Comprehensive metabolic panel [[id number]]  (Abnormal) Collected: 08/19/23 0841    Lab Status: Final result Specimen: Blood from Arm, Right Updated: 08/19/23 0921     Sodium 130 mmol/L      Potassium 4.9 mmol/L      Chloride 94 mmol/L      CO2 28 mmol/L      ANION GAP 8 mmol/L      BUN 12 mg/dL      Creatinine 1.24 mg/dL      Glucose 110 mg/dL      Calcium 10.0 mg/dL      AST 20 U/L      ALT 11 U/L      Alkaline Phosphatase 157 U/L      Total Protein 8.5 g/dL      Albumin 4.1 g/dL      Total Bilirubin 0.45 mg/dL      eGFR 62 ml/min/1.73sq m     Narrative:      [facility] guidelines for Chronic Kidney Disease (CKD):   •  Stage 1 with normal or high GFR (GFR > 90 mL/min/1.73 square meters)  •  Stage 2 Mild CKD (GFR = 60-89 mL/min/1.73 square meters)  •  Stage 3A Moderate CKD (GFR = 45-59 mL/min/1.73 square meters)  •  Stage 3B Moderate CKD (GFR = 30-44 mL/min/1.73 square meters)  •  Stage 4 Severe CKD (GFR = 15-29 mL/min/1.73 square meters)  •  Stage 5 End Stage CKD (GFR <15 mL/min/1.73 square meters)  Note: GFR calculation is accurate only with a steady state creatinine    Lactic acid [[id number]]  (Normal) Collected: 08/19/23 0841    Lab Status: Final result Specimen: Blood from Arm, Right Updated: 08/19/23 0921     LACTIC ACID 1.4 mmol/L     Narrative:      Result may be elevated if tourniquet was used during collection.     C-reactive protein [[id number]]  (Abnormal) Collected: 08/19/23 0841    Lab Status: Final result Specimen: Blood from Arm, Right Updated: 08/19/23 0921     CRP 56.1 mg/L     Procalcitonin [[id number]]  (Normal) Collected: 08/19/23 0841    Lab Status: Final result Specimen: Blood from Arm, Right Updated: 08/19/23 0919     Procalcitonin 0.24 ng/ml     Sedimentation rate, automated [[id number]]  (Abnormal) Collected: 08/19/23 0841    Lab Status: Final result Specimen: Blood from Arm, Right Updated: 08/19/23 0904     Sed Rate 39 mm/hour     CBC and differential [[id number]]  (Abnormal) Collected: 08/19/23 0841    Lab Status: Final result Specimen: Blood from Arm, Right Updated: 08/19/23 0853     WBC 11.70 Thousand/uL      RBC 3.67 Million/uL      Hemoglobin 11.6 g/dL      Hematocrit 35.3 %      MCV 96 fL      MCH 31.6 pg      MCHC 32.9 g/dL      RDW 14.1 %      MPV 8.6 fL      Platelets 247 Thousands/uL      nRBC 0 /100 WBCs      Neutrophils Relative 81 %      Immat GRANS % 0 %      Lymphocytes Relative 7 %      Monocytes Relative 9 %      Eosinophils Relative 2 %      Basophils Relative 1 %      Neutrophils Absolute 9.48 Thousands/µL      Immature Grans Absolute 0.05 Thousand/uL      Lymphocytes Absolute 0.79 Thousands/µL      Monocytes Absolute 1.08 Thousand/µL      Eosinophils Absolute 0.23 Thousand/µL      Basophils Absolute 0.07 Thousands/µL     Blood culture #1 [[id number]] Collected: 08/19/23 0841    Lab Status: In process Specimen: Blood from Arm, Right Updated: 08/19/23 0851    Blood culture #2 [[id number]] Collected: 08/19/23 0841    Lab Status: In process Specimen: Blood from Arm, Left Updated: 08/19/23 0851                 No orders to display              Procedures  Procedures         ED Course                               SBIRT 20yo+    Flowsheet Row Most Recent Value   Initial Alcohol Screen: US AUDIT-C     1. How often do you have a drink containing alcohol? 0 Filed at: 08/19/2023 0825   2. How many drinks containing alcohol do you have on a typical day you are drinking? 0 Filed at: 08/19/2023 0825   3a. Male UNDER 65: How often do you have five or more drinks on one occasion? 0 Filed at: 08/19/2023 0825   3b. FEMALE Any Age, or MALE 65+: How often do you have 4 or more drinks on one occassion?  0 Filed at: 08/19/2023 0825   Audit-C Score 0 Filed at: 08/19/2023 0825   PRINCESS: How many times in the past year have you. .. Used an illegal drug or used a prescription medication for non-medical reasons? Never Filed at: 08/19/2023 0825                    Medical Decision Making  21-year-old male presenting with left foot infection suspicious for osteomyelitis. Obtain sepsis evaluation with labs, blood cultures, inflammatory markers. Administer broad-spectrum antibiotics and admit to [facility]. Wound infection: acute illness or injury  Amount and/or Complexity of Data Reviewed  Labs: ordered. Risk  Prescription drug management. Decision regarding hospitalization. Disposition  Final diagnoses:   Wound infection     Time reflects when diagnosis was documented in both MDM as applicable and the Disposition within this note     Time User Action Codes Description Comment    8/19/2023  9:14 AM Rasheed Alvarez Add Ashly Valdes. 8XXA,  L08.9] Wound infection     8/19/2023  9:41 AM Marty Buys Add [Y55.394] S/P transmetatarsal amputation of foot, left ([address])     8/19/2023  9:43 AM Marty Buys Add [F41.9] Anxiety       ED Disposition     ED Disposition   Admit    Condition   Stable    Date/Time   Sat Aug 19, 2023  9:14 AM    Comment   Case was discussed with ROBI and the patient's admission status was agreed to be Admission Status: inpatient status to the service of Dr. Jerardo Martinez .            Follow-up Information    None         Current Discharge Medication List      CONTINUE these medications which have NOT CHANGED    Details   atorvastatin (LIPITOR) 40 mg tablet TAKE 1 TABLET BY MOUTH EVERY DAY  Qty: 90 tablet, Refills: 1    Associated Diagnoses: Hyperlipidemia, unspecified hyperlipidemia type      cefadroxil (DURICEF) 1 g tablet Take 1 tablet (1 g total) by mouth 2 (two) times a day for 10 days  Qty: 20 tablet, Refills: 0    Associated Diagnoses: Osteomyelitis of left foot, unspecified type (HCC)      doxycycline hyclate (VIBRAMYCIN) 100 mg capsule Take 1 capsule (100 mg total) by mouth every 12 (twelve) hours for 10 days  Qty: 20 capsule, Refills: 0    Associated Diagnoses: Osteomyelitis of left foot, unspecified type (HCC)      gabapentin (Neurontin) 300 mg capsule Take 1 capsule (300 mg total) by mouth 2 (two) times a day  Qty: 180 capsule, Refills: 0    Associated Diagnoses: Pain in left foot; Type 1 diabetes mellitus with diabetic neuropathy (HCC)      insulin lispro (HumaLOG) 100 units/mL injection Use via the insulin pump, use up to 80 units daily  Qty: 80 mL, Refills: 1    Associated Diagnoses: Type 1 diabetes mellitus with hyperglycemia (HCC)      lisinopril (ZESTRIL) 10 mg tablet Take 1 tablet (10 mg total) by mouth daily  Qty: 90 tablet, Refills: 1    Associated Diagnoses: Benign essential hypertension      metoprolol succinate (TOPROL-XL) 100 mg 24 hr tablet Take 1 tablet (100 mg total) by mouth daily  Qty: 90 tablet, Refills: 1    Associated Diagnoses: Benign essential hypertension      nicotine (NICODERM CQ) 21 mg/24 hr TD 24 hr patch Place 1 patch on the skin over 24 hours daily Do not start before July 5, 2023. Qty: 28 patch, Refills: 0    Associated Diagnoses: Nicotine abuse      oxyCODONE-acetaminophen (Percocet) 5-325 mg per tablet Take 1 tablet by mouth every 6 (six) hours as needed for moderate pain for up to 14 days Max Daily Amount: 4 tablets  Qty: 56 tablet, Refills: 0    Associated Diagnoses: Non-healing surgical wound, initial encounter; S/P transmetatarsal amputation of foot, left ([address]); Pain in left foot      Glucagon, rDNA, (Glucagon Emergency) 1 MG KIT as directed      HumaLOG 100 UNIT/ML injection inject up to 80 units in INSULIN PUMP daily as directed by prescriber             No discharge procedures on file.     PDMP Review       Value Time User    PDMP Reviewed  Yes 8/19/2023 11:03 AM Tyron Keating PA-C          ED Provider  Electronically Signed by           Tootie Elena DO  08/19/23 0220

## 2023-08-19 NOTE — ASSESSMENT & PLAN NOTE
· Blood pressure stable at time of admission  · Continue metoprolol, lisinopril  · Pending surgical timing would consider holding lisinopril perioperatively

## 2023-08-19 NOTE — ASSESSMENT & PLAN NOTE
Lab Results   Component Value Date    HGBA1C 7.9 (H) 07/20/2023       Recent Labs     08/17/23 2021 08/17/23  2240 08/18/23  0731 08/19/23  1033   POCGLU 74 143* 188* 162*       Blood Sugar Average: Last 72 hrs:  (P) 162   · Maintained on insulin pump as outpatient  · Per further discussion with patient, pending surgical timing adamantly declines to be placed on insulin gtt.   Will need to discuss with endocrinology once BKA is scheduled  · Continue insulin pump for now  · Diabetic diet

## 2023-08-19 NOTE — PLAN OF CARE
Problem: Potential for Falls  Goal: Patient will remain free of falls  Description: INTERVENTIONS:  - Educate patient/family on patient safety including physical limitations  - Instruct patient to call for assistance with activity   - Consult OT/PT to assist with strengthening/mobility   - Keep Call bell within reach  - Keep bed low and locked with side rails adjusted as appropriate  - Keep care items and personal belongings within reach  - Initiate and maintain comfort rounds  - Make Fall Risk Sign visible to staff  - Offer Toileting every 2 Hours, in advance of need  - Initiate/Maintain bed/ chair alarm  - Obtain necessary fall risk management equipment: cane  - Apply yellow socks and bracelet for high fall risk patients  - Consider moving patient to room near nurses station  Outcome: Progressing     Problem: PAIN - ADULT  Goal: Verbalizes/displays adequate comfort level or baseline comfort level  Description: Interventions:  - Encourage patient to monitor pain and request assistance  - Assess pain using appropriate pain scale  - Administer analgesics based on type and severity of pain and evaluate response  - Implement non-pharmacological measures as appropriate and evaluate response  - Consider cultural and social influences on pain and pain management  - Notify physician/advanced practitioner if interventions unsuccessful or patient reports new pain  Outcome: Progressing     Problem: INFECTION - ADULT  Goal: Absence or prevention of progression during hospitalization  Description: INTERVENTIONS:  - Assess and monitor for signs and symptoms of infection  - Monitor lab/diagnostic results  - Monitor all insertion sites, i.e. indwelling lines, tubes, and drains  - Monitor endotracheal if appropriate and nasal secretions for changes in amount and color  - [place] appropriate cooling/warming therapies per order  - Administer medications as ordered  - Instruct and encourage patient and family to use good hand hygiene technique  - Identify and instruct in appropriate isolation precautions for identified infection/condition  Outcome: Progressing  Goal: Absence of fever/infection during neutropenic period  Description: INTERVENTIONS:  - Monitor WBC    Outcome: Progressing     Problem: SAFETY ADULT  Goal: Patient will remain free of falls  Description: INTERVENTIONS:  - Educate patient/family on patient safety including physical limitations  - Instruct patient to call for assistance with activity   - Consult OT/PT to assist with strengthening/mobility   - Keep Call bell within reach  - Keep bed low and locked with side rails adjusted as appropriate  - Keep care items and personal belongings within reach  - Initiate and maintain comfort rounds  - Make Fall Risk Sign visible to staff  - Offer Toileting every 2 Hours, in advance of need  - Initiate/Maintain bed/ chair alarm  - Obtain necessary fall risk management equipment: cane  - Apply yellow socks and bracelet for high fall risk patients  - Consider moving patient to room near nurses station  Outcome: Progressing  Goal: Maintain or return to baseline ADL function  Description: INTERVENTIONS:  -  Assess patient's ability to carry out ADLs; assess patient's baseline for ADL function and identify physical deficits which impact ability to perform ADLs (bathing, care of mouth/teeth, toileting, grooming, dressing, etc.)  - Assess/evaluate cause of self-care deficits   - Assess range of motion  - Assess patient's mobility; develop plan if impaired  - Assess patient's need for assistive devices and provide as appropriate  - Encourage maximum independence but intervene and supervise when necessary  - Involve family in performance of ADLs  - Assess for home care needs following discharge   - Consider OT consult to assist with ADL evaluation and planning for discharge  - Provide patient education as appropriate  Outcome: Progressing  Goal: Maintains/Returns to pre admission functional level  Description: INTERVENTIONS:  - Perform BMAT or MOVE assessment daily.   - Set and communicate daily mobility goal to care team and patient/family/caregiver. - Collaborate with rehabilitation services on mobility goals if consulted  - Perform Range of Motion 3 times a day. - Reposition patient every 2 hours. - Dangle patient 3 times a day  - Stand patient 3 times a day  - Ambulate patient 3 times a day  - Out of bed to chair 3 times a day   - Out of bed for meals 3 times a day  - Out of bed for toileting  - Record patient progress and toleration of activity level   Outcome: Progressing     Problem: DISCHARGE PLANNING  Goal: Discharge to home or other facility with appropriate resources  Description: INTERVENTIONS:  - Identify barriers to discharge w/patient and caregiver  - Arrange for needed discharge resources and transportation as appropriate  - Identify discharge learning needs (meds, wound care, etc.)  - Arrange for interpretive services to assist at discharge as needed  - Refer to Case Management Department for coordinating discharge planning if the patient needs post-hospital services based on physician/advanced practitioner order or complex needs related to functional status, cognitive ability, or social support system  Outcome: Progressing     Problem: Knowledge Deficit  Goal: Patient/family/caregiver demonstrates understanding of disease process, treatment plan, medications, and discharge instructions  Description: Complete learning assessment and assess knowledge base.   Interventions:  - Provide teaching at level of understanding  - Provide teaching via preferred learning methods  Outcome: Progressing     Problem: SKIN/TISSUE INTEGRITY - ADULT  Goal: Incision(s), wounds(s) or drain site(s) healing without S/S of infection  Description: INTERVENTIONS  - Assess and document dressing, incision, wound bed, drain sites and surrounding tissue  - Provide patient and family education  - Perform skin care/dressing changes every shift  Outcome: Progressing     Problem: MUSCULOSKELETAL - ADULT  Goal: Maintain or return mobility to safest level of function  Description: INTERVENTIONS:  - Assess patient's ability to carry out ADLs; assess patient's baseline for ADL function and identify physical deficits which impact ability to perform ADLs (bathing, care of mouth/teeth, toileting, grooming, dressing, etc.)  - Assess/evaluate cause of self-care deficits   - Assess range of motion  - Assess patient's mobility  - Assess patient's need for assistive devices and provide as appropriate  - Encourage maximum independence but intervene and supervise when necessary  - Involve family in performance of ADLs  - Assess for home care needs following discharge   - Consider OT consult to assist with ADL evaluation and planning for discharge  - Provide patient education as appropriate  Outcome: Progressing  Goal: Maintain proper alignment of affected body part  Description: INTERVENTIONS:  - Support, maintain and protect limb and body alignment  - Provide patient/ family with appropriate education  Outcome: Progressing

## 2023-08-19 NOTE — ASSESSMENT & PLAN NOTE
· Appears to be a chronic issue  · Sodium 130  · Follow-up osmolality, urine osmolality, urine sodium  · Appears euvolemic  · Trend BMP

## 2023-08-19 NOTE — PROGRESS NOTES
Mitchel Rojas is a 66 y.o. male who is currently ordered Vancomycin IV with management by the Pharmacy Consult service. Relevant clinical data and objective / subjective history reviewed. Vancomycin Assessment:  Indication and Goal AUC/Trough: Bone/joint infection (goal -600, trough >10)  Clinical Status:  re-start/re-admit  Micro:     Renal Function:  SCr: 1.24 mg/dL  CrCl: 57.9 mL/min  Renal replacement: Not on dialysis  Days of Therapy: 3   Current Dose: 1750 mg once  Vancomycin Plan:  New Dosin mg q24h  Estimated AUC: 499 mcg*hr/mL  Estimated Trough: 14.3 mcg/mL  Next Level: 0600 on 23  Renal Function Monitoring: Daily BMP and [place] will continue to follow closely for s/sx of nephrotoxicity, infusion reactions and appropriateness of therapy. BMP and CBC will be ordered per protocol. We will continue to follow the patient’s culture results and clinical progress daily.     David Hidalgo, Pharmacist

## 2023-08-19 NOTE — H&P
[facility]  H&P  Name: Adriana Ulrich 66 y.o. male I MRN: [id number]  Unit/Bed#: -[place] I Date of Admission: 8/19/2023   Date of Service: 8/19/2023 I Hospital Day: 0      Assessment/Plan   * Osteomyelitis [facility])  Assessment & Plan  · Patient initially had been referred to the emergency department due to worsening left foot erythema and swelling  · MRI left foot completed 8/17: Status post transmetatarsal amputation with prominent skin ulceration at the distal aspect of the postoperative site including marrow changes involving the second through fifth residual proximal metatarsals indicating osteomyelitis. No evidence of drainable abscess collection in this unenhanced examination. · Previous discussion held between patient and podiatry -patient wishes to move forward with BKA rather than TMA revision given concern over healing ability. Consult general surgery  · Continue IV cefepime/vancomycin  · Repeat blood cultures obtained in the ED  · Trend WBC and fever curve    Type 1 diabetes mellitus with diabetic neuropathy [facility])  Assessment & Plan  Lab Results   Component Value Date    HGBA1C 7.9 (H) 07/20/2023       Recent Labs     08/17/23  2021 08/17/23  2240 08/18/23  0731 08/19/23  1033   POCGLU 74 143* 188* 162*       Blood Sugar Average: Last 72 hrs:  (P) 162   · Maintained on insulin pump as outpatient  · Per further discussion with patient, pending surgical timing adamantly declines to be placed on insulin gtt.   Will need to discuss with endocrinology once BKA is scheduled  · Continue insulin pump for now  · Diabetic diet    Hyponatremia  Assessment & Plan  · Appears to be a chronic issue  · Sodium 130  · Follow-up osmolality, urine osmolality, urine sodium  · Appears euvolemic  · Trend BMP    Stage 2 chronic kidney disease  Assessment & Plan  Lab Results   Component Value Date    EGFR 62 08/19/2023    EGFR 73 08/18/2023    EGFR 67 08/17/2023    CREATININE 1.24 08/19/2023 CREATININE 1.08 08/18/2023    CREATININE 1.16 08/17/2023   · Baseline creatinine 1.0-1.2  · Creatinine within baseline on admission  · Trend BMP    Cigarette nicotine dependence without complication  Assessment & Plan  · Nicotine patch ordered  ·  cessation    Benign essential hypertension  Assessment & Plan  · Blood pressure stable at time of admission  · Continue metoprolol, lisinopril  · Pending surgical timing would consider holding lisinopril perioperatively      VTE Pharmacologic Prophylaxis: VTE Score: 3 Moderate Risk (Score 3-4) - Pharmacological DVT Prophylaxis Ordered: heparin. Code Status: Level 1 - Full Code   Discussion with family: Patient declined call to . Anticipated Length of Stay: Patient will be admitted on an inpatient basis with an anticipated length of stay of greater than 2 midnights secondary to Osteomyelitis. Total Time Spent on Date of Encounter in care of patient: 75 minutes This time was spent on one or more of the following: performing physical exam; counseling and coordination of care; obtaining or reviewing history; documenting in the medical record; reviewing/ordering tests, medications or procedures; communicating with other healthcare professionals and discussing with patient's family/caregivers. Chief Complaint: Readmit for osteomyelitis    History of Present Illness:  Augustin Call is a 66 y.o. male with a PMH of type 1 diabetes, chronic kidney disease, tobacco use, hyponatremia, hypertension, prior left foot TMA who presents with ongoing left foot osteomyelitis. Patient presents back to the emergency department after signing out AMA yesterday. Patient agreeable for readmission and ultimately left BKA given recurrent osteomyelitis. He overall feels well and denies chest pain/palpitations, shortness of breath, nausea/vomiting, abdominal pain or fever/chills. Left TMA still with swelling and erythema.   Also admits to racing thoughts and anxiety over upcoming BKA, agreeable to discuss with psychiatry. Review of Systems:  Review of Systems   Constitutional: Negative for chills, fatigue, fever and unexpected weight change. HENT: Negative for congestion, sore throat and trouble swallowing. Eyes: Negative for photophobia, pain and visual disturbance. Respiratory: Negative for cough, shortness of breath and wheezing. Cardiovascular: Negative for chest pain, palpitations and leg swelling. Gastrointestinal: Negative for abdominal pain, constipation, diarrhea, nausea and vomiting. Endocrine: Negative for polyuria. Genitourinary: Negative for difficulty urinating, dysuria, flank pain, hematuria and urgency. Musculoskeletal: Negative for back pain, myalgias, neck pain and neck stiffness. Skin: Positive for color change and wound. Negative for pallor and rash. Neurological: Negative for dizziness, tremors, syncope, speech difficulty, weakness, light-headedness and headaches. Hematological: Does not bruise/bleed easily. Psychiatric/Behavioral: Negative for agitation and confusion. The patient is nervous/anxious.         Past Medical and Surgical History:   Past Medical History:   Diagnosis Date   • Chronic foot ulcer ([address]) 09/07/2018   • Diabetes mellitus ([address])    • Diabetic foot ulcer ([address]) 5/8/2023   • Gout    • High cholesterol    • Hypertension    • Visual impairment 11/1/2022    Cateract       Past Surgical History:   Procedure Laterality Date   • CATARACT EXTRACTION Right 01/2023   • COMPLEX WOUND CLOSURE TO EXTREMITY Left 07/18/2019    Procedure: COMPLEX CLOSURE LEFT CHEEK;  Surgeon: Jewell Esparza MD;  Location:  MAIN OR;  Service: Plastics   • FACIAL/NECK BIOPSY Left 07/18/2019    Procedure: EXCISION LEFT CHEEK CYST;  Surgeon: Jewell Esparza MD;  Location: [facility];  Service: Plastics   • HERNIA REPAIR Left     several times   • KNEE ARTHROSCOPY Left     torn meniscus   • SC AMPUTATION FOOT TRANSMETARSAL Left 6/30/2023    Procedure: AMPUTATION TRANSMETATARSAL (TMA);  Surgeon: Adelina Peralta DPM;  Location: [facility];  Service: Podiatry   • WOUND DEBRIDEMENT Left 5/30/2023    Procedure: DEBRIDEMENT WOUND LEFT FOOT WOUND WITH EXCISION OF INFECTED BONE AND WOUND VAC APPLICATION;  Surgeon: Marcello Morales DPM;  Location:  MAIN OR;  Service: Podiatry       Meds/Allergies:  Prior to Admission medications    Medication Sig Start Date End Date Taking?  Authorizing Provider   atorvastatin (LIPITOR) 40 mg tablet TAKE 1 TABLET BY MOUTH EVERY DAY 11/26/22  Yes Jackson Salgado MD   cefadroxil (DURICEF) 1 g tablet Take 1 tablet (1 g total) by mouth 2 (two) times a day for 10 days 8/18/23 8/28/23 Yes Mitchel Barraza PA-C   doxycycline hyclate (VIBRAMYCIN) 100 mg capsule Take 1 capsule (100 mg total) by mouth every 12 (twelve) hours for 10 days 8/18/23 8/28/23 Yes Mitchel Barraza PA-C   gabapentin (Neurontin) 300 mg capsule Take 1 capsule (300 mg total) by mouth 2 (two) times a day 8/4/23 11/2/23 Yes Adelina Peralta DPM   insulin lispro (HumaLOG) 100 units/mL injection Use via the insulin pump, use up to 80 units daily 1/18/23  Yes Michael Lees MD   lisinopril (ZESTRIL) 10 mg tablet Take 1 tablet (10 mg total) by mouth daily 3/15/23  Yes Jackson Salgado MD   metoprolol succinate (TOPROL-XL) 100 mg 24 hr tablet Take 1 tablet (100 mg total) by mouth daily 3/15/23  Yes Jackson Salgado MD   nicotine (NICODERM CQ) 21 mg/24 hr TD 24 hr patch Place 1 patch on the skin over 24 hours daily Do not start before July 5, 2023. 7/5/23  Yes Brandon Felix MD   oxyCODONE-acetaminophen (Percocet) 5-325 mg per tablet Take 1 tablet by mouth every 6 (six) hours as needed for moderate pain for up to 14 days Max Daily Amount: 4 tablets 8/14/23 8/28/23 Yes Odessia Friends, DPM   Glucagon, rDNA, (Glucagon Emergency) 1 MG KIT as directed    Historical Provider, MD   HumaLOG 100 UNIT/ML injection inject up to 80 units in INSULIN PUMP daily as directed by prescriber  Patient not taking: Reported on 2023 3/31/23   Historical Provider, MD     I have reviewed home medications with patient personally. Allergies: Allergies   Allergen Reactions   • Cefuroxime Other (See Comments) and GI Intolerance   • Amoxicillin-Pot Clavulanate Nausea Only and GI Intolerance       Social History:  Marital Status: /Civil Union   Occupation:   Patient Pre-hospital Living Situation: Home  Patient Pre-hospital Level of Mobility: walks  Patient Pre-hospital Diet Restrictions:   Substance Use History:   Social History     Substance and Sexual Activity   Alcohol Use Yes    Comment: 3/4 beers a day     Social History     Tobacco Use   Smoking Status Every Day   • Packs/day: 1.00   • Years: 20.00   • Total pack years: 20.00   • Types: Cigarettes   • Last attempt to quit: 2022   • Years since quittin.6   Smokeless Tobacco Never   Tobacco Comments    encouraged smoking cessation     Social History     Substance and Sexual Activity   Drug Use Never       Family History:  Family History   Problem Relation Age of Onset   • Heart disease Father    • Diabetes type I Father    • Diabetes type II Mother    • No Known Problems Sister    • Alcohol abuse Brother    • Diabetes type I Brother    • No Known Problems Brother    • No Known Problems Son    • No Known Problems Daughter        Physical Exam:     Vitals:   Blood Pressure: 144/85 (23 1214)  Pulse: 92 (23 1214)  Temperature: 97.7 °F (36.5 °C) (23 1214)  Temp Source: Tympanic (23 1214)  Respirations: 18 (23 1214)  Height: 6' (182.9 cm) (23 1214)  Weight - Scale: 70.5 kg (155 lb 6.8 oz) (23 1214)  SpO2: 100 % (23 1035)    Physical Exam  Vitals and nursing note reviewed. Constitutional:       General: He is not in acute distress. Appearance: He is well-developed. Comments: No acute distress   HENT:      Head: Normocephalic and atraumatic.    Eyes: General: No scleral icterus. Extraocular Movements: Extraocular movements intact. Conjunctiva/sclera: Conjunctivae normal.   Cardiovascular:      Rate and Rhythm: Normal rate and regular rhythm. Heart sounds: No murmur heard. Pulmonary:      Effort: Pulmonary effort is normal. No respiratory distress. Breath sounds: No wheezing, rhonchi or rales. Abdominal:      General: Bowel sounds are normal.      Palpations: Abdomen is soft. Tenderness: There is no abdominal tenderness. There is no guarding or rebound. Musculoskeletal:         General: No swelling. Cervical back: Normal range of motion. Comments: Able to move upper/lower extremities bilaterally. Left foot TMA with swelling and erythema   Skin:     General: Skin is warm and dry. Capillary Refill: Capillary refill takes less than 2 seconds. Findings: Erythema and wound present. Neurological:      Mental Status: He is alert and oriented to person, place, and time. Psychiatric:         Mood and Affect: Mood is anxious.          Speech: Speech normal.         Behavior: Behavior normal.          Additional Data:     Lab Results:  Results from last 7 days   Lab Units 08/19/23  0841   WBC Thousand/uL 11.70*   HEMOGLOBIN g/dL 11.6*   HEMATOCRIT % 35.3*   PLATELETS Thousands/uL 429*   NEUTROS PCT % 81*   LYMPHS PCT % 7*   MONOS PCT % 9   EOS PCT % 2     Results from last 7 days   Lab Units 08/19/23  0841   SODIUM mmol/L 130*   POTASSIUM mmol/L 4.9   CHLORIDE mmol/L 94*   CO2 mmol/L 28   BUN mg/dL 12   CREATININE mg/dL 1.24   ANION GAP mmol/L 8   CALCIUM mg/dL 10.0   ALBUMIN g/dL 4.1   TOTAL BILIRUBIN mg/dL 0.45   ALK PHOS U/L 157*   ALT U/L 11   AST U/L 20   GLUCOSE RANDOM mg/dL 110     Results from last 7 days   Lab Units 08/19/23  0841   INR  0.88     Results from last 7 days   Lab Units 08/19/23  1033 08/18/23  0731 08/17/23  2240 08/17/23  2021 08/17/23  1631 08/17/23  1219   POC GLUCOSE mg/dl 162* 188* 143* 74 143* 116         Results from last 7 days   Lab Units 08/19/23  0841 08/17/23  0937   LACTIC ACID mmol/L 1.4 1.5   PROCALCITONIN ng/ml 0.24  --        Lines/Drains:  Invasive Devices     Peripheral Intravenous Line  Duration           Peripheral IV 08/19/23 Right Antecubital <1 day          Line  Duration           Pump Device Insulin pump Anterior; Left Abdomen 50 days                    Imaging: No pertinent imaging reviewed. No orders to display       EKG and Other Studies Reviewed on Admission:   · EKG: No EKG obtained. ** Please Note: This note has been constructed using a voice recognition system.  **

## 2023-08-19 NOTE — ASSESSMENT & PLAN NOTE
· Patient initially had been referred to the emergency department due to worsening left foot erythema and swelling  · MRI left foot completed 8/17: Status post transmetatarsal amputation with prominent skin ulceration at the distal aspect of the postoperative site including marrow changes involving the second through fifth residual proximal metatarsals indicating osteomyelitis. No evidence of drainable abscess collection in this unenhanced examination. · Previous discussion held between patient and podiatry -patient wishes to move forward with BKA rather than TMA revision given concern over healing ability.   Consult general surgery  · Continue IV cefepime/vancomycin  · Repeat blood cultures obtained in the ED  · Trend WBC and fever curve

## 2023-08-19 NOTE — ASSESSMENT & PLAN NOTE
Lab Results   Component Value Date    EGFR 62 08/19/2023    EGFR 73 08/18/2023    EGFR 67 08/17/2023    CREATININE 1.24 08/19/2023    CREATININE 1.08 08/18/2023    CREATININE 1.16 08/17/2023   · Baseline creatinine 1.0-1.2  · Creatinine within baseline on admission  · Trend BMP

## 2023-08-19 NOTE — CONSULTS
Consultation -General Surgery  Rena Salmon 66 y.o. male MRN: [id number]  Unit/Bed#: -01 Encounter: [id number]            ASSESSMENT:  Patient is a 66year old male with PMH of T1DM, HLD, Tobacco dependence, HTN s/p left TMA (June 2023) with wound infection, osteomyelitis. Podiatry deemed limb unsalvageable and recommends left BKA. WBC - 11.70    MRI left foot 8/17/2023: Status post transmetatarsal amputation with prominent skin ulceration at the distal aspect of the postoperative site including marrow changes involving the second through fifth residual proximal metatarsals indicating osteomyelitis. No evidence of drainable abscess collection in this unenhanced examination. Plan:  - surgical planning for left BKA  - procedure and counseling discussed at length  - timing of procedure TBD, anticipate Monday or Tuesday  - continue IV antibiotics  - if patient begins to develop signs of sepsis, will require more urgent guillotine amputation  - rest of care per primary team      HPI: Patient is a 66year old male with PMH of T1DM, HLD, Tobacco dependence, HTN s/p left TMA (June 2023). Patient states that he developed a wound on his left great toe several months ago and did show evidence of healing initially. Patient states that the wound then became worse and then required left foot TMA in June. Patient states that he was having wound care post-operatively, and the wound was not healing. Repeat MRI demonstrated evidence of osteomyelitis in the 2nd-5th proximal metatarsals. Podiatry discussed that revision of the TMA would have high risk of failure, and recommendation to proceed with left bka was made. Patient denies fever, chills. Patient states that he feels well. He is an ambulator at home and is motivated to recover. Review of Systems   Constitutional: Negative for fever. Respiratory: Negative for shortness of breath. Cardiovascular: Negative for chest pain.    Gastrointestinal: Negative for abdominal pain. Skin: Positive for wound. Neurological: Negative for dizziness. All other systems reviewed and are negative.       Historical Information   Past Medical History:   Diagnosis Date   • Chronic foot ulcer ([address]) 2018   • Diabetes mellitus ([address])    • Diabetic foot ulcer ([address]) 2023   • Gout    • High cholesterol    • Hypertension    • Visual impairment 2022    Cateract     Past Surgical History:   Procedure Laterality Date   • CATARACT EXTRACTION Right 2023   • COMPLEX WOUND CLOSURE TO EXTREMITY Left 2019    Procedure: COMPLEX CLOSURE LEFT CHEEK;  Surgeon: Chava Bejarano MD;  Location: [facility];  Service: Plastics   • FACIAL/NECK BIOPSY Left 2019    Procedure: EXCISION LEFT CHEEK CYST;  Surgeon: Chava Bejarano MD;  Location[facility];  Service: Plastics   • HERNIA REPAIR Left     several times   • KNEE ARTHROSCOPY Left     torn meniscus   • [place] AMPUTATION FOOT TRANSMETARSAL Left 2023    Procedure: AMPUTATION TRANSMETATARSAL (TMA);  Surgeon: Dudley Byrd DPM;  Location: [facility];  Service: Podiatry   • WOUND DEBRIDEMENT Left 2023    Procedure: DEBRIDEMENT WOUND LEFT FOOT WOUND WITH EXCISION OF INFECTED BONE AND WOUND VAC APPLICATION;  Surgeon: Karsten Miles DPM;  Location: [facility];  Service: Podiatry     Social History   Social History     Substance and Sexual Activity   Alcohol Use Yes    Comment: 3/4 beers a day     Social History     Substance and Sexual Activity   Drug Use Never     Social History     Tobacco Use   Smoking Status Every Day   • Packs/day: 1.00   • Years: 20.00   • Total pack years: 20.00   • Types: Cigarettes   • Last attempt to quit: 2022   • Years since quittin.6   Smokeless Tobacco Never   Tobacco Comments    encouraged smoking cessation     Family History   Problem Relation Age of Onset   • Heart disease Father    • Diabetes type I Father    • Diabetes type II Mother    • No Known Problems Sister    • Alcohol abuse Brother    • Diabetes type I Brother    • No Known Problems Brother    • No Known Problems Son    • No Known Problems Daughter        Meds/Allergies     Medications Prior to Admission   Medication   • atorvastatin (LIPITOR) 40 mg tablet   • cefadroxil (DURICEF) 1 g tablet   • doxycycline hyclate (VIBRAMYCIN) 100 mg capsule   • gabapentin (Neurontin) 300 mg capsule   • insulin lispro (HumaLOG) 100 units/mL injection   • lisinopril (ZESTRIL) 10 mg tablet   • metoprolol succinate (TOPROL-XL) 100 mg 24 hr tablet   • nicotine (NICODERM CQ) 21 mg/24 hr TD 24 hr patch   • oxyCODONE-acetaminophen (Percocet) 5-325 mg per tablet   • Glucagon, rDNA, (Glucagon Emergency) 1 MG KIT   • HumaLOG 100 UNIT/ML injection     Current Facility-Administered Medications   Medication Dose Route Frequency   • acetaminophen (TYLENOL) tablet 650 mg  650 mg Oral Q6H PRN   • atorvastatin (LIPITOR) tablet 40 mg  40 mg Oral Daily With Dinner   • cefepime (MAXIPIME) IVPB (premix in dextrose) 2,000 mg 50 mL  2,000 mg Intravenous Q12H   • gabapentin (NEURONTIN) capsule 300 mg  300 mg Oral BID   • heparin (porcine) subcutaneous injection 5,000 Units  5,000 Units Subcutaneous Q8H [address]   • insulin lispro (HumaLOG) FOR PUMP REFILLS 300 Units  300 Units Subcutaneous Insulin Pump Daily PRN   • [START ON 8/20/2023] lisinopril (ZESTRIL) tablet 10 mg  10 mg Oral Daily   • LORazepam (ATIVAN) tablet 0.5 mg  0.5 mg Oral Q8H PRN   • [START ON 8/20/2023] metoprolol succinate (TOPROL-XL) 24 hr tablet 100 mg  100 mg Oral Daily   • nicotine (NICODERM CQ) 21 mg/24 hr TD 24 hr patch 1 patch  1 patch Transdermal Daily   • oxyCODONE (ROXICODONE) immediate release tablet 10 mg  10 mg Oral Q6H PRN   • [START ON 8/20/2023] vancomycin (VANCOCIN) 1250 mg in sodium chloride 0.9% 250 mL IVPB  1,250 mg Intravenous Q24H       Allergies   Allergen Reactions   • Cefuroxime Other (See Comments) and GI Intolerance   • Amoxicillin-Pot Clavulanate Nausea Only and GI Intolerance       Objective     Blood pressure 138/64, pulse 90, temperature (!) 97.3 °F (36.3 °C), resp. rate 18, height 6' (1.829 m), weight 70.5 kg (155 lb 6.8 oz), SpO2 96 %. Intake/Output Summary (Last 24 hours) at 8/19/2023 1545  Last data filed at 8/19/2023 0920  Gross per 24 hour   Intake 50 ml   Output --   Net 50 ml       PHYSICAL EXAM  Physical Exam  Vitals reviewed. Constitutional:       Appearance: Normal appearance. HENT:      Head: Normocephalic and atraumatic. Right Ear: External ear normal.      Left Ear: External ear normal.      Nose: Nose normal.      Mouth/Throat:      Mouth: Mucous membranes are moist.   Eyes:      Extraocular Movements: Extraocular movements intact. Conjunctiva/sclera: Conjunctivae normal.      Pupils: Pupils are equal, round, and reactive to light. Cardiovascular:      Rate and Rhythm: Normal rate and regular rhythm. Pulses: Normal pulses. Heart sounds: Normal heart sounds. Pulmonary:      Effort: Pulmonary effort is normal.      Breath sounds: Normal breath sounds. Abdominal:      General: Bowel sounds are normal.      Palpations: Abdomen is soft. Musculoskeletal:        Legs:       Comments: 2+ popliteal pulse. No abrasions or signs of infection around the anticipated surgical BKA site. Skin:     General: Skin is warm. Capillary Refill: Capillary refill takes less than 2 seconds. Neurological:      General: No focal deficit present. Mental Status: He is alert and oriented to person, place, and time. Psychiatric:         Mood and Affect: Mood normal.         Behavior: Behavior normal.         Thought Content: Thought content normal.         Judgment: Judgment normal.                   Lab Results:   I have personally reviewed pertinent lab results.   , CBC:   Lab Results   Component Value Date    WBC 11.70 (H) 08/19/2023    HGB 11.6 (L) 08/19/2023    HCT 35.3 (L) 08/19/2023    MCV 96 08/19/2023     (H) 08/19/2023 RBC 3.67 (L) 08/19/2023    MCH 31.6 08/19/2023    MCHC 32.9 08/19/2023    RDW 14.1 08/19/2023    MPV 8.6 (L) 08/19/2023    NRBC 0 08/19/2023   , CMP:   Lab Results   Component Value Date    SODIUM 130 (L) 08/19/2023    K 4.9 08/19/2023    CL 94 (L) 08/19/2023    CO2 28 08/19/2023    BUN 12 08/19/2023    CREATININE 1.24 08/19/2023    CALCIUM 10.0 08/19/2023    AST 20 08/19/2023    ALT 11 08/19/2023    ALKPHOS 157 (H) 08/19/2023    EGFR 62 08/19/2023     Imaging: I have personally reviewed pertinent reports. Counseling / Coordination of Care  Total time spent today  15 minutes. Greater than 50% of total time was spent with the patient and / or family counseling and / or coordination of care.          Sarmad Quevedo PA-C  8/19/2023 3:45 PM

## 2023-08-20 LAB
ANION GAP SERPL CALCULATED.3IONS-SCNC: 5 MMOL/L
BACTERIA BLD CULT: NORMAL
BACTERIA BLD CULT: NORMAL
BASOPHILS # BLD AUTO: 0.07 THOUSANDS/ÂΜL (ref 0–0.1)
BASOPHILS NFR BLD AUTO: 1 % (ref 0–1)
BUN SERPL-MCNC: 14 MG/DL (ref 5–25)
CALCIUM SERPL-MCNC: 8.6 MG/DL (ref 8.4–10.2)
CHLORIDE SERPL-SCNC: 100 MMOL/L (ref 96–108)
CO2 SERPL-SCNC: 27 MMOL/L (ref 21–32)
CREAT SERPL-MCNC: 1.17 MG/DL (ref 0.6–1.3)
EOSINOPHIL # BLD AUTO: 0.75 THOUSAND/ÂΜL (ref 0–0.61)
EOSINOPHIL NFR BLD AUTO: 10 % (ref 0–6)
ERYTHROCYTE [DISTWIDTH] IN BLOOD BY AUTOMATED COUNT: 14 % (ref 11.6–15.1)
GFR SERPL CREATININE-BSD FRML MDRD: 66 ML/MIN/1.73SQ M
GLUCOSE SERPL-MCNC: 104 MG/DL (ref 65–140)
GLUCOSE SERPL-MCNC: 106 MG/DL (ref 65–140)
GLUCOSE SERPL-MCNC: 140 MG/DL (ref 65–140)
GLUCOSE SERPL-MCNC: 153 MG/DL (ref 65–140)
GLUCOSE SERPL-MCNC: 158 MG/DL (ref 65–140)
HCT VFR BLD AUTO: 30.7 % (ref 36.5–49.3)
HGB BLD-MCNC: 9.9 G/DL (ref 12–17)
IMM GRANULOCYTES # BLD AUTO: 0.05 THOUSAND/UL (ref 0–0.2)
IMM GRANULOCYTES NFR BLD AUTO: 1 % (ref 0–2)
LYMPHOCYTES # BLD AUTO: 1.23 THOUSANDS/ÂΜL (ref 0.6–4.47)
LYMPHOCYTES NFR BLD AUTO: 16 % (ref 14–44)
MCH RBC QN AUTO: 31.1 PG (ref 26.8–34.3)
MCHC RBC AUTO-ENTMCNC: 32.2 G/DL (ref 31.4–37.4)
MCV RBC AUTO: 97 FL (ref 82–98)
MONOCYTES # BLD AUTO: 1 THOUSAND/ÂΜL (ref 0.17–1.22)
MONOCYTES NFR BLD AUTO: 13 % (ref 4–12)
NEUTROPHILS # BLD AUTO: 4.5 THOUSANDS/ÂΜL (ref 1.85–7.62)
NEUTS SEG NFR BLD AUTO: 59 % (ref 43–75)
NRBC BLD AUTO-RTO: 0 /100 WBCS
OSMOLALITY UR/SERPL-RTO: 284 MMOL/KG (ref 282–298)
PLATELET # BLD AUTO: 365 THOUSANDS/UL (ref 149–390)
PMV BLD AUTO: 8.8 FL (ref 8.9–12.7)
POTASSIUM SERPL-SCNC: 4.5 MMOL/L (ref 3.5–5.3)
RBC # BLD AUTO: 3.18 MILLION/UL (ref 3.88–5.62)
SODIUM SERPL-SCNC: 132 MMOL/L (ref 135–147)
WBC # BLD AUTO: 7.6 THOUSAND/UL (ref 4.31–10.16)

## 2023-08-20 PROCEDURE — 80048 BASIC METABOLIC PNL TOTAL CA: CPT | Performed by: PHYSICIAN ASSISTANT

## 2023-08-20 PROCEDURE — 85025 COMPLETE CBC W/AUTO DIFF WBC: CPT | Performed by: PHYSICIAN ASSISTANT

## 2023-08-20 PROCEDURE — 99232 SBSQ HOSP IP/OBS MODERATE 35: CPT | Performed by: PHYSICIAN ASSISTANT

## 2023-08-20 PROCEDURE — 82948 REAGENT STRIP/BLOOD GLUCOSE: CPT

## 2023-08-20 PROCEDURE — 83930 ASSAY OF BLOOD OSMOLALITY: CPT | Performed by: PHYSICIAN ASSISTANT

## 2023-08-20 PROCEDURE — 87081 CULTURE SCREEN ONLY: CPT | Performed by: HOSPITALIST

## 2023-08-20 RX ORDER — DEXTROSE AND SODIUM CHLORIDE 5; .9 G/100ML; G/100ML
100 INJECTION, SOLUTION INTRAVENOUS CONTINUOUS
Status: DISCONTINUED | OUTPATIENT
Start: 2023-08-21 | End: 2023-08-21

## 2023-08-20 RX ORDER — HYDROMORPHONE HCL/PF 1 MG/ML
0.5 SYRINGE (ML) INJECTION EVERY 4 HOURS PRN
Status: DISCONTINUED | OUTPATIENT
Start: 2023-08-20 | End: 2023-08-22

## 2023-08-20 RX ORDER — HYDROMORPHONE HCL/PF 1 MG/ML
0.5 SYRINGE (ML) INJECTION ONCE
Status: COMPLETED | OUTPATIENT
Start: 2023-08-20 | End: 2023-08-20

## 2023-08-20 RX ORDER — GABAPENTIN 300 MG/1
300 CAPSULE ORAL 3 TIMES DAILY
Status: DISCONTINUED | OUTPATIENT
Start: 2023-08-20 | End: 2023-08-26

## 2023-08-20 RX ADMIN — HYDROMORPHONE HYDROCHLORIDE 0.5 MG: 1 INJECTION, SOLUTION INTRAMUSCULAR; INTRAVENOUS; SUBCUTANEOUS at 07:03

## 2023-08-20 RX ADMIN — HEPARIN SODIUM 5000 UNITS: 5000 INJECTION INTRAVENOUS; SUBCUTANEOUS at 20:56

## 2023-08-20 RX ADMIN — HYDROMORPHONE HYDROCHLORIDE 0.5 MG: 1 INJECTION, SOLUTION INTRAMUSCULAR; INTRAVENOUS; SUBCUTANEOUS at 19:28

## 2023-08-20 RX ADMIN — VANCOMYCIN HYDROCHLORIDE 1250 MG: 5 INJECTION, POWDER, LYOPHILIZED, FOR SOLUTION INTRAVENOUS at 11:29

## 2023-08-20 RX ADMIN — HYDROMORPHONE HYDROCHLORIDE 0.5 MG: 1 INJECTION, SOLUTION INTRAMUSCULAR; INTRAVENOUS; SUBCUTANEOUS at 00:40

## 2023-08-20 RX ADMIN — OXYCODONE HYDROCHLORIDE 10 MG: 10 TABLET ORAL at 11:25

## 2023-08-20 RX ADMIN — GABAPENTIN 300 MG: 300 CAPSULE ORAL at 17:00

## 2023-08-20 RX ADMIN — HYDROMORPHONE HYDROCHLORIDE 0.5 MG: 1 INJECTION, SOLUTION INTRAMUSCULAR; INTRAVENOUS; SUBCUTANEOUS at 13:41

## 2023-08-20 RX ADMIN — HEPARIN SODIUM 5000 UNITS: 5000 INJECTION INTRAVENOUS; SUBCUTANEOUS at 13:41

## 2023-08-20 RX ADMIN — METOPROLOL SUCCINATE 100 MG: 50 TABLET, EXTENDED RELEASE ORAL at 10:00

## 2023-08-20 RX ADMIN — CEFEPIME HYDROCHLORIDE 2000 MG: 2 INJECTION, SOLUTION INTRAVENOUS at 09:59

## 2023-08-20 RX ADMIN — OXYCODONE HYDROCHLORIDE 10 MG: 10 TABLET ORAL at 05:43

## 2023-08-20 RX ADMIN — OXYCODONE HYDROCHLORIDE 10 MG: 10 TABLET ORAL at 17:41

## 2023-08-20 RX ADMIN — NICOTINE 1 PATCH: 21 PATCH, EXTENDED RELEASE TRANSDERMAL at 10:00

## 2023-08-20 RX ADMIN — LISINOPRIL 10 MG: 10 TABLET ORAL at 10:00

## 2023-08-20 RX ADMIN — HEPARIN SODIUM 5000 UNITS: 5000 INJECTION INTRAVENOUS; SUBCUTANEOUS at 05:44

## 2023-08-20 RX ADMIN — CEFEPIME HYDROCHLORIDE 2000 MG: 2 INJECTION, SOLUTION INTRAVENOUS at 19:29

## 2023-08-20 RX ADMIN — HYDROMORPHONE HYDROCHLORIDE 0.5 MG: 1 INJECTION, SOLUTION INTRAMUSCULAR; INTRAVENOUS; SUBCUTANEOUS at 12:05

## 2023-08-20 RX ADMIN — ATORVASTATIN CALCIUM 40 MG: 40 TABLET, FILM COATED ORAL at 17:00

## 2023-08-20 RX ADMIN — GABAPENTIN 300 MG: 300 CAPSULE ORAL at 10:00

## 2023-08-20 RX ADMIN — LORAZEPAM 0.5 MG: 0.5 TABLET ORAL at 12:14

## 2023-08-20 RX ADMIN — GABAPENTIN 300 MG: 300 CAPSULE ORAL at 20:55

## 2023-08-20 RX ADMIN — LORAZEPAM 0.5 MG: 0.5 TABLET ORAL at 20:56

## 2023-08-20 NOTE — ASSESSMENT & PLAN NOTE
Lab Results   Component Value Date    EGFR 66 08/20/2023    EGFR 62 08/19/2023    EGFR 73 08/18/2023    CREATININE 1.17 08/20/2023    CREATININE 1.24 08/19/2023    CREATININE 1.08 08/18/2023   · Baseline creatinine 1.0-1.2  · Creatinine within baseline on admission  · Trend BMP

## 2023-08-20 NOTE — PROGRESS NOTES
Progress Note - General Surgery   Odilon Waldrop 66 y.o. male MRN: [id number]  Unit/Bed#: -01 Encounter: [id number]    Assessment:  67 yo male with nonhealing left TMA with wound infection and osteomyelitis. Podiatry deemed limb unsalvaglable and recommends left BKA. Afebrile, VSS  Leukocytosis resolved  Hgb: 9.9 from 11.6    Plan:  -Surgical planning for left BKA  -Continue IV antibiotics  -Local wound care  -Trend WBC  -Rest of care pre primary team    Subjective/Objective     Subjective: Complains of intermittent nerve pain, had some increased pain last night that improved with pain meds and he was able to sleep then. No other complaints. Margert Shirlene and motivated to get better and recover. Objective:     Blood pressure 146/79, pulse 99, temperature 97.7 °F (36.5 °C), resp. rate 12, height 6' (1.829 m), weight 70.5 kg (155 lb 6.8 oz), SpO2 98 %. ,Body mass index is 21.08 kg/m². Intake/Output Summary (Last 24 hours) at 8/20/2023 0809  Last data filed at 8/19/2023 2101  Gross per 24 hour   Intake 650 ml   Output 225 ml   Net 425 ml       Invasive Devices     Peripheral Intravenous Line  Duration           Peripheral IV 08/19/23 Right Antecubital <1 day          Line  Duration           Pump Device Insulin pump Anterior; Left Abdomen 51 days                Physical Exam  Vitals reviewed. Constitutional:       General: He is not in acute distress. Appearance: He is normal weight. He is not ill-appearing or toxic-appearing. Cardiovascular:      Rate and Rhythm: Normal rate. Pulmonary:      Effort: Pulmonary effort is normal. No respiratory distress. Musculoskeletal:      Right lower leg: No edema. Left lower leg: No edema. Feet:      Comments: Left TMA, dressing taken down, no soilage or drainage. Lateral aspect 2-3 cm open wound with minimal fibrinous slough and mild surrounding erythema. Wound cleansed with saline, dried. Betadine paint and wound covered with Aquacel Ag.  Covered TMA with ABD and wrapped with kerlex. Skin:     General: Skin is warm and dry. Neurological:      General: No focal deficit present. Mental Status: He is alert.    Psychiatric:         Mood and Affect: Mood normal.         Behavior: Behavior normal.            Scheduled Meds:  Current Facility-Administered Medications   Medication Dose Route Frequency Provider Last Rate   • acetaminophen  650 mg Oral Q6H PRN Zulay Dial PA-C     • atorvastatin  40 mg Oral Daily With [address] [facility] Wander Carrizales PA-C     • cefepime  2,000 mg Intravenous Q12H Gabriel Thompson PA-C 2,000 mg (08/19/23 1936)   • gabapentin  300 mg Oral BID Zulay NATALIO Dial     • heparin (porcine)  5,000 Units Subcutaneous [facility] Gabriel Vidal PA-C     • HYDROmorphone  0.5 mg Intravenous Q4H PRN Jean Carloslucho Hall PA-C     • insulin lispro  300 Units Subcutaneous Insulin Pump Daily PRN Zulay Dial PA-C     • lisinopril  10 mg Oral Daily Gabriel Vidal PA-C     • LORazepam  0.5 mg Oral Q8H PRN Zulay Dial PA-C     • metoprolol succinate  100 mg Oral Daily Gabriel Vidal PA-C     • nicotine  1 patch Transdermal Daily [place], [place]     • oxyCODONE  10 mg Oral Q6H PRN Zulay Dial PA-C     • vancomycin  1,250 mg Intravenous Q24H Gabriel Thompson PA-C       Continuous Infusions:   PRN Meds:.•  acetaminophen  •  HYDROmorphone  •  insulin lispro  •  LORazepam  •  oxyCODONE      Lab, Imaging and other studies:  CBC:   Lab Results   Component Value Date    WBC 7.60 08/20/2023    HGB 9.9 (L) 08/20/2023    HCT 30.7 (L) 08/20/2023    MCV 97 08/20/2023     08/20/2023    RBC 3.18 (L) 08/20/2023    MCH 31.1 08/20/2023    MCHC 32.2 08/20/2023    RDW 14.0 08/20/2023    MPV 8.8 (L) 08/20/2023    NRBC 0 08/20/2023   , CMP:   Lab Results   Component Value Date    SODIUM 132 (L) 08/20/2023    K 4.5 08/20/2023     08/20/2023    CO2 27 08/20/2023    BUN 14 08/20/2023    CREATININE 1.17 08/20/2023    CALCIUM 8.6 08/20/2023    AST 20 08/19/2023    ALT 11 08/19/2023    ALKPHOS 157 (H) 08/19/2023    EGFR 66 08/20/2023   , Coagulation:   Lab Results   Component Value Date    INR 0.88 08/19/2023   , Urinalysis: No results found for: "COLORU", "CLARITYU", "Alexis Heritage", "PHUR", "LEUKOCYTESUR", "NITRITE", "PROTEINUA", "GLUCOSEU", "Milon Dellen", "[place]remy [place]", "BLOODU", Amylase: No results found for: "AMYLASE", Lipase: No results found for: "LIPASE"  VTE Pharmacologic Prophylaxis: Heparin  VTE Mechanical Prophylaxis: sequential compression device      NATALIO Lugo  8/20/2023 8:09 AM

## 2023-08-20 NOTE — ASSESSMENT & PLAN NOTE
· Patient initially had been referred to the emergency department due to worsening left foot erythema and swelling  · MRI left foot completed 8/17: Status post transmetatarsal amputation with prominent skin ulceration at the distal aspect of the postoperative site including marrow changes involving the second through fifth residual proximal metatarsals indicating osteomyelitis. No evidence of drainable abscess collection in this unenhanced examination. · Previous discussion held between patient and podiatry -patient wishes to move forward with BKA rather than TMA revision given concern over healing ability.   Consult general surgery  · Plan for BKA this week -likely earliest would be Tuesday 8/22  · Continue IV cefepime/vancomycin  · Repeat blood cultures obtained in the ED  · Trend WBC and fever curve

## 2023-08-20 NOTE — PLAN OF CARE
Problem: Potential for Falls  Goal: Patient will remain free of falls  Description: INTERVENTIONS:  - Educate patient/family on patient safety including physical limitations  - Instruct patient to call for assistance with activity   - Consult OT/PT to assist with strengthening/mobility   - Keep Call bell within reach  - Keep bed low and locked with side rails adjusted as appropriate  - Keep care items and personal belongings within reach  - Initiate and maintain comfort rounds  - Make Fall Risk Sign visible to staff  - Offer Toileting every 2 Hours, in advance of need  - Initiate/Maintain bed/ chair alarm  - Obtain necessary fall risk management equipment: cane  - Apply yellow socks and bracelet for high fall risk patients  - Consider moving patient to room near nurses station  Outcome: Progressing     Problem: PAIN - ADULT  Goal: Verbalizes/displays adequate comfort level or baseline comfort level  Description: Interventions:  - Encourage patient to monitor pain and request assistance  - Assess pain using appropriate pain scale  - Administer analgesics based on type and severity of pain and evaluate response  - Implement non-pharmacological measures as appropriate and evaluate response  - Consider cultural and social influences on pain and pain management  - Notify physician/advanced practitioner if interventions unsuccessful or patient reports new pain  Outcome: Progressing     Problem: INFECTION - ADULT  Goal: Absence or prevention of progression during hospitalization  Description: INTERVENTIONS:  - Assess and monitor for signs and symptoms of infection  - Monitor lab/diagnostic results  - Monitor all insertion sites, i.e. indwelling lines, tubes, and drains  - Monitor endotracheal if appropriate and nasal secretions for changes in amount and color  - [place] appropriate cooling/warming therapies per order  - Administer medications as ordered  - Instruct and encourage patient and family to use good hand hygiene technique  - Identify and instruct in appropriate isolation precautions for identified infection/condition  Outcome: Progressing  Goal: Absence of fever/infection during neutropenic period  Description: INTERVENTIONS:  - Monitor WBC    Outcome: Progressing     Problem: SAFETY ADULT  Goal: Patient will remain free of falls  Description: INTERVENTIONS:  - Educate patient/family on patient safety including physical limitations  - Instruct patient to call for assistance with activity   - Consult OT/PT to assist with strengthening/mobility   - Keep Call bell within reach  - Keep bed low and locked with side rails adjusted as appropriate  - Keep care items and personal belongings within reach  - Initiate and maintain comfort rounds  - Make Fall Risk Sign visible to staff  - Offer Toileting every 2 Hours, in advance of need  - Initiate/Maintain bed/ chair alarm  - Obtain necessary fall risk management equipment: cane  - Apply yellow socks and bracelet for high fall risk patients  - Consider moving patient to room near nurses station  Outcome: Progressing  Goal: Maintain or return to baseline ADL function  Description: INTERVENTIONS:  -  Assess patient's ability to carry out ADLs; assess patient's baseline for ADL function and identify physical deficits which impact ability to perform ADLs (bathing, care of mouth/teeth, toileting, grooming, dressing, etc.)  - Assess/evaluate cause of self-care deficits   - Assess range of motion  - Assess patient's mobility; develop plan if impaired  - Assess patient's need for assistive devices and provide as appropriate  - Encourage maximum independence but intervene and supervise when necessary  - Involve family in performance of ADLs  - Assess for home care needs following discharge   - Consider OT consult to assist with ADL evaluation and planning for discharge  - Provide patient education as appropriate  Outcome: Progressing  Goal: Maintains/Returns to pre admission functional level  Description: INTERVENTIONS:  - Perform BMAT or MOVE assessment daily.   - Set and communicate daily mobility goal to care team and patient/family/caregiver. - Collaborate with rehabilitation services on mobility goals if consulted  - Perform Range of Motion 3 times a day. - Reposition patient every 2 hours.   - Dangle patient 3 times a day  - Stand patient 3 times a day  - Ambulate patient 3 times a day  - Out of bed to chair 3 times a day   - Out of bed for meals 3 times a day  - Out of bed for toileting  - Record patient progress and toleration of activity level   Outcome: Progressing

## 2023-08-20 NOTE — PLAN OF CARE
Problem: PAIN - ADULT  Goal: Verbalizes/displays adequate comfort level or baseline comfort level  Description: Interventions:  - Encourage patient to monitor pain and request assistance  - Assess pain using appropriate pain scale  - Administer analgesics based on type and severity of pain and evaluate response  - Implement non-pharmacological measures as appropriate and evaluate response  - Consider cultural and social influences on pain and pain management  - Notify physician/advanced practitioner if interventions unsuccessful or patient reports new pain  Outcome: Progressing     Problem: INFECTION - ADULT  Goal: Absence or prevention of progression during hospitalization  Description: INTERVENTIONS:  - Assess and monitor for signs and symptoms of infection  - Monitor lab/diagnostic results  - Monitor all insertion sites, i.e. indwelling lines, tubes, and drains  - Monitor endotracheal if appropriate and nasal secretions for changes in amount and color  - [place] appropriate cooling/warming therapies per order  - Administer medications as ordered  - Instruct and encourage patient and family to use good hand hygiene technique  - Identify and instruct in appropriate isolation precautions for identified infection/condition  Outcome: Progressing     Problem: DISCHARGE PLANNING  Goal: Discharge to home or other facility with appropriate resources  Description: INTERVENTIONS:  - Identify barriers to discharge w/patient and caregiver  - Arrange for needed discharge resources and transportation as appropriate  - Identify discharge learning needs (meds, wound care, etc.)  - Arrange for interpretive services to assist at discharge as needed  - Refer to Case Management Department for coordinating discharge planning if the patient needs post-hospital services based on physician/advanced practitioner order or complex needs related to functional status, cognitive ability, or social support system  Outcome: Progressing Problem: Knowledge Deficit  Goal: Patient/family/caregiver demonstrates understanding of disease process, treatment plan, medications, and discharge instructions  Description: Complete learning assessment and assess knowledge base.   Interventions:  - Provide teaching at level of understanding  - Provide teaching via preferred learning methods  Outcome: Progressing

## 2023-08-20 NOTE — PROGRESS NOTES
Geo Ryan is a 66 y.o. male who is currently ordered Vancomycin IV with management by the Pharmacy Consult service. Relevant clinical data and objective / subjective history reviewed. Vancomycin Assessment:  Indication and Goal AUC/Trough: Bone/joint infection (goal -600, trough >10)  Clinical Status: worsening  Micro:     Renal Function:  SCr: 1.17 mg/dL  CrCl: 65.8 mL/min  Renal replacement: Not on dialysis  Days of Therapy: 4  Current Dose: 1250 mg q24h  Vancomycin Plan:  Estimated AUC: 453 mcg*hr/mL  Estimated Trough: 12.7 mcg/mL  Next Level: 0600 on 08/21/23  Renal Function Monitoring: Daily BMP and [place] will continue to follow closely for s/sx of nephrotoxicity, infusion reactions and appropriateness of therapy. BMP and CBC will be ordered per protocol. We will continue to follow the patient’s culture results and clinical progress daily.     Suzie Campbell, Pharmacist

## 2023-08-20 NOTE — ASSESSMENT & PLAN NOTE
· Appears to be a chronic issue  · Sodium 132  · Follow-up osmolality, urine osmolality, urine sodium  · Appears euvolemic  · Trend BMP

## 2023-08-20 NOTE — PLAN OF CARE
Problem: Potential for Falls  Goal: Patient will remain free of falls  Description: INTERVENTIONS:  - Educate patient/family on patient safety including physical limitations  - Instruct patient to call for assistance with activity   - Consult OT/PT to assist with strengthening/mobility   - Keep Call bell within reach  - Keep bed low and locked with side rails adjusted as appropriate  - Keep care items and personal belongings within reach  - Initiate and maintain comfort rounds  - Make Fall Risk Sign visible to staff  - Offer Toileting every 2 Hours, in advance of need  - Initiate/Maintain bed/ chair alarm  - Obtain necessary fall risk management equipment: cane  - Apply yellow socks and bracelet for high fall risk patients  - Consider moving patient to room near nurses station  Outcome: Progressing     Problem: PAIN - ADULT  Goal: Verbalizes/displays adequate comfort level or baseline comfort level  Description: Interventions:  - Encourage patient to monitor pain and request assistance  - Assess pain using appropriate pain scale  - Administer analgesics based on type and severity of pain and evaluate response  - Implement non-pharmacological measures as appropriate and evaluate response  - Consider cultural and social influences on pain and pain management  - Notify physician/advanced practitioner if interventions unsuccessful or patient reports new pain  Outcome: Progressing     Problem: SAFETY ADULT  Goal: Patient will remain free of falls  Description: INTERVENTIONS:  - Educate patient/family on patient safety including physical limitations  - Instruct patient to call for assistance with activity   - Consult OT/PT to assist with strengthening/mobility   - Keep Call bell within reach  - Keep bed low and locked with side rails adjusted as appropriate  - Keep care items and personal belongings within reach  - Initiate and maintain comfort rounds  - Make Fall Risk Sign visible to staff  - Offer Toileting every 2 Hours, in advance of need  - Initiate/Maintain bed/ chair alarm  - Obtain necessary fall risk management equipment: cane  - Apply yellow socks and bracelet for high fall risk patients  - Consider moving patient to room near nurses station  Outcome: Progressing

## 2023-08-20 NOTE — ASSESSMENT & PLAN NOTE
Lab Results   Component Value Date    HGBA1C 7.9 (H) 07/20/2023       Recent Labs     08/18/23  0731 08/19/23  1033 08/19/23  1730 08/20/23  0734   POCGLU 188* 162* 121 104       Blood Sugar Average: Last 72 hrs:  (P) 129   · Maintained on insulin pump as outpatient  · Per further discussion with patient, pending surgical timing patient initially declined to be placed on insulin gtt when n.p.o.   After further education patient states he would likely be agreeable  · Continue insulin pump for now  · Diabetic diet

## 2023-08-20 NOTE — PROGRESS NOTES
[address] [facility]  Progress Note  Name: Neno Boyer  MRN: [id number]  Unit/Bed#: -01 I Date of Admission: 8/19/2023   Date of Service: 8/20/2023 I Hospital Day: 1    Assessment/Plan   * Osteomyelitis [facility])  Assessment & Plan  · Patient initially had been referred to the emergency department due to worsening left foot erythema and swelling  · MRI left foot completed 8/17: Status post transmetatarsal amputation with prominent skin ulceration at the distal aspect of the postoperative site including marrow changes involving the second through fifth residual proximal metatarsals indicating osteomyelitis. No evidence of drainable abscess collection in this unenhanced examination. · Previous discussion held between patient and podiatry -patient wishes to move forward with BKA rather than TMA revision given concern over healing ability. Consult general surgery  · Plan for BKA this week -likely earliest would be Tuesday 8/22  · Continue IV cefepime/vancomycin  · Repeat blood cultures obtained in the ED  · Trend WBC and fever curve    Type 1 diabetes mellitus with diabetic neuropathy [facility])  Assessment & Plan  Lab Results   Component Value Date    HGBA1C 7.9 (H) 07/20/2023       Recent Labs     08/18/23  0731 08/19/23  1033 08/19/23  1730 08/20/23  0734   POCGLU 188* 162* 121 104       Blood Sugar Average: Last 72 hrs:  (P) 129   · Maintained on insulin pump as outpatient  · Per further discussion with patient, pending surgical timing patient initially declined to be placed on insulin gtt when n.p.o.   After further education patient states he would likely be agreeable  · Continue insulin pump for now  · Diabetic diet    Hyponatremia  Assessment & Plan  · Appears to be a chronic issue  · Sodium 132  · Follow-up osmolality, urine osmolality, urine sodium  · Appears euvolemic  · Trend BMP    Stage 2 chronic kidney disease  Assessment & Plan  Lab Results   Component Value Date    EGFR 66 08/20/2023    EGFR 62 08/19/2023    EGFR 73 08/18/2023    CREATININE 1.17 08/20/2023    CREATININE 1.24 08/19/2023    CREATININE 1.08 08/18/2023   · Baseline creatinine 1.0-1.2  · Creatinine within baseline on admission  · Trend BMP    Cigarette nicotine dependence without complication  Assessment & Plan  · Nicotine patch ordered  ·  cessation    Benign essential hypertension  Assessment & Plan  · Blood pressure stable at time of admission  · Continue metoprolol, lisinopril  · Pending surgical timing would consider holding lisinopril perioperatively        VTE Pharmacologic Prophylaxis: VTE Score: 3 Moderate Risk (Score 3-4) - Pharmacological DVT Prophylaxis Ordered: enoxaparin (Lovenox). Patient Centered Rounds: I performed bedside rounds with nursing staff today. Discussions with Specialists or Other Care Team Provider: Appreciate general surgery input    Education and Discussions with Family / Patient: Patient declined call to . Total Time Spent on Date of Encounter in care of patient: 35 minutes This time was spent on one or more of the following: performing physical exam; counseling and coordination of care; obtaining or reviewing history; documenting in the medical record; reviewing/ordering tests, medications or procedures; communicating with other healthcare professionals and discussing with patient's family/caregivers. Current Length of Stay: 1 day(s)  Current Patient Status: Inpatient   Certification Statement: The patient will continue to require additional inpatient hospital stay due to Surgical planning for BKA  Discharge Plan: Anticipate discharge in >72 hrs to discharge location to be determined pending rehab evaluations. Code Status: Level 1 - Full Code    Subjective:   Patient feels well this morning. States that he would likely be agreeable to insulin drip when he is made n.p.o. for surgery.   Denies chest pain/palpitations, shortness of breath, nausea/vomiting, abdominal pain, fever/chills. Objective:     Vitals:   Temp (24hrs), Av.3 °F (36.3 °C), Min:96.3 °F (35.7 °C), Max:97.7 °F (36.5 °C)    Temp:  [96.3 °F (35.7 °C)-97.7 °F (36.5 °C)] 97.7 °F (36.5 °C)  HR:  [87-99] 99  Resp:  [12-18] 12  BP: (138-146)/(64-85) 146/79  SpO2:  [96 %-98 %] 98 %  Body mass index is 21.08 kg/m². Input and Output Summary (last 24 hours): Intake/Output Summary (Last 24 hours) at 2023 1107  Last data filed at 2023 2101  Gross per 24 hour   Intake 600 ml   Output 225 ml   Net 375 ml       Physical Exam:   Physical Exam  Vitals and nursing note reviewed. Constitutional:       General: He is not in acute distress. Appearance: He is well-developed. Comments: No acute distress   HENT:      Head: Normocephalic and atraumatic. Eyes:      General: No scleral icterus. Extraocular Movements: Extraocular movements intact. Conjunctiva/sclera: Conjunctivae normal.   Cardiovascular:      Rate and Rhythm: Normal rate and regular rhythm. Heart sounds: No murmur heard. Pulmonary:      Effort: Pulmonary effort is normal. No respiratory distress. Breath sounds: Normal breath sounds. No wheezing, rhonchi or rales. Abdominal:      General: Bowel sounds are normal.      Palpations: Abdomen is soft. Tenderness: There is no abdominal tenderness. There is no guarding or rebound. Musculoskeletal:         General: No swelling. Cervical back: Normal range of motion. Comments: Able to move upper/lower extremities bilaterally. Left TMA   Skin:     General: Skin is warm and dry. Capillary Refill: Capillary refill takes less than 2 seconds. Findings: Erythema present. Neurological:      Mental Status: He is alert and oriented to person, place, and time.    Psychiatric:         Mood and Affect: Mood normal.         Speech: Speech normal.         Behavior: Behavior normal.          Additional Data:     Labs:  Results from last 7 days   Lab Units 08/20/23  0443   WBC Thousand/uL 7.60   HEMOGLOBIN g/dL 9.9*   HEMATOCRIT % 30.7*   PLATELETS Thousands/uL 365   NEUTROS PCT % 59   LYMPHS PCT % 16   MONOS PCT % 13*   EOS PCT % 10*     Results from last 7 days   Lab Units 08/20/23  0443 08/19/23  0841   SODIUM mmol/L 132* 130*   POTASSIUM mmol/L 4.5 4.9   CHLORIDE mmol/L 100 94*   CO2 mmol/L 27 28   BUN mg/dL 14 12   CREATININE mg/dL 1.17 1.24   ANION GAP mmol/L 5 8   CALCIUM mg/dL 8.6 10.0   ALBUMIN g/dL  --  4.1   TOTAL BILIRUBIN mg/dL  --  0.45   ALK PHOS U/L  --  157*   ALT U/L  --  11   AST U/L  --  20   GLUCOSE RANDOM mg/dL 106 110     Results from last 7 days   Lab Units 08/19/23  0841   INR  0.88     Results from last 7 days   Lab Units 08/20/23  0734 08/19/23  1730 08/19/23  1033 08/18/23  0731 08/17/23  2240 08/17/23  2021 08/17/23  1631 08/17/23  1219   POC GLUCOSE mg/dl 104 121 162* 188* 143* 74 143* 116         Results from last 7 days   Lab Units 08/19/23  0841 08/17/23  0937   LACTIC ACID mmol/L 1.4 1.5   PROCALCITONIN ng/ml 0.24  --        Lines/Drains:  Invasive Devices     Peripheral Intravenous Line  Duration           Peripheral IV 08/19/23 Right Antecubital 1 day          Line  Duration           Pump Device Insulin pump Anterior; Left Abdomen 51 days                      Imaging: No pertinent imaging reviewed. Recent Cultures (last 7 days):   Results from last 7 days   Lab Units 08/19/23  0841 08/17/23  0944 08/17/23  0937 08/16/23  1301   BLOOD CULTURE  Received in Microbiology Lab. Culture in Progress. Received in Microbiology Lab. Culture in Progress. No Growth at 48 hrs.  No Growth at 48 hrs.  --    GRAM STAIN RESULT   --   --   --  2+ Gram positive cocci in clusters*  1+ Gram negative rods*  No polys seen*   WOUND CULTURE   --   --   --  4+ Growth of Proteus mirabilis*  4+ Growth of Methicillin Resistant Staphylococcus aureus*       Last 24 Hours Medication List:   Current Facility-Administered Medications   Medication Dose Route Frequency Provider Last Rate   • acetaminophen  650 mg Oral Q6H PRN Jacky Clayton PA-C     • atorvastatin  40 mg Oral Daily With [address] Wander Carrizales PA-C     • cefepime  2,000 mg Intravenous Q12H Ta Vidal PA-C 2,000 mg (08/20/23 0959)   • gabapentin  300 mg Oral BID Jacky Clayton PA-C     • heparin (porcine)  5,000 Units Subcutaneous [facility] Ta Vidal PA-C     • HYDROmorphone  0.5 mg Intravenous Q4H PRN Marilu Lo PA-C     • insulin lispro  300 Units Subcutaneous Insulin Pump Daily PRN Jacky Clayton PA-C     • lisinopril  10 mg Oral Daily Ta Vidal PA-C     • LORazepam  0.5 mg Oral Q8H PRN Jacky Clayton PA-C     • metoprolol succinate  100 mg Oral Daily Ta [employer] V[employer] PA-C     • nicotine  1 patch Transdermal Daily [place], [place]     • oxyCODONE  10 mg Oral Q6H PRN Jacky Clayton PA-C     • vancomycin  1,250 mg Intravenous Q24H Jacky Clayton PA-C          Today, Patient Was Seen By: Jacky Clayton PA-C    **Please Note: This note may have been constructed using a voice recognition system. **

## 2023-08-21 ENCOUNTER — ANESTHESIA (INPATIENT)
Dept: PERIOP | Facility: HOSPITAL | Age: 63
DRG: 475 | End: 2023-08-21
Payer: COMMERCIAL

## 2023-08-21 ENCOUNTER — ANESTHESIA EVENT (INPATIENT)
Dept: PERIOP | Facility: HOSPITAL | Age: 63
DRG: 475 | End: 2023-08-21
Payer: COMMERCIAL

## 2023-08-21 ENCOUNTER — HOME CARE VISIT (OUTPATIENT)
Dept: HOME HEALTH SERVICES | Facility: HOME HEALTHCARE | Age: 63
End: 2023-08-21
Payer: COMMERCIAL

## 2023-08-21 LAB
ANION GAP SERPL CALCULATED.3IONS-SCNC: 6 MMOL/L
BASOPHILS # BLD AUTO: 0.08 THOUSANDS/ÂΜL (ref 0–0.1)
BASOPHILS NFR BLD AUTO: 1 % (ref 0–1)
BUN SERPL-MCNC: 12 MG/DL (ref 5–25)
CALCIUM SERPL-MCNC: 8.7 MG/DL (ref 8.4–10.2)
CHLORIDE SERPL-SCNC: 99 MMOL/L (ref 96–108)
CO2 SERPL-SCNC: 27 MMOL/L (ref 21–32)
CREAT SERPL-MCNC: 1.13 MG/DL (ref 0.6–1.3)
EOSINOPHIL # BLD AUTO: 1.02 THOUSAND/ÂΜL (ref 0–0.61)
EOSINOPHIL NFR BLD AUTO: 13 % (ref 0–6)
ERYTHROCYTE [DISTWIDTH] IN BLOOD BY AUTOMATED COUNT: 13.9 % (ref 11.6–15.1)
GFR SERPL CREATININE-BSD FRML MDRD: 69 ML/MIN/1.73SQ M
GLUCOSE SERPL-MCNC: 110 MG/DL (ref 65–140)
GLUCOSE SERPL-MCNC: 115 MG/DL (ref 65–140)
GLUCOSE SERPL-MCNC: 128 MG/DL (ref 65–140)
GLUCOSE SERPL-MCNC: 132 MG/DL (ref 65–140)
GLUCOSE SERPL-MCNC: 165 MG/DL (ref 65–140)
GLUCOSE SERPL-MCNC: 168 MG/DL (ref 65–140)
GLUCOSE SERPL-MCNC: 201 MG/DL (ref 65–140)
GLUCOSE SERPL-MCNC: 217 MG/DL (ref 65–140)
GLUCOSE SERPL-MCNC: 64 MG/DL (ref 65–140)
GLUCOSE SERPL-MCNC: 73 MG/DL (ref 65–140)
GLUCOSE SERPL-MCNC: 89 MG/DL (ref 65–140)
HCT VFR BLD AUTO: 33.5 % (ref 36.5–49.3)
HGB BLD-MCNC: 10.7 G/DL (ref 12–17)
IMM GRANULOCYTES # BLD AUTO: 0.07 THOUSAND/UL (ref 0–0.2)
IMM GRANULOCYTES NFR BLD AUTO: 1 % (ref 0–2)
LYMPHOCYTES # BLD AUTO: 1.03 THOUSANDS/ÂΜL (ref 0.6–4.47)
LYMPHOCYTES NFR BLD AUTO: 13 % (ref 14–44)
MCH RBC QN AUTO: 31.6 PG (ref 26.8–34.3)
MCHC RBC AUTO-ENTMCNC: 31.9 G/DL (ref 31.4–37.4)
MCV RBC AUTO: 99 FL (ref 82–98)
MONOCYTES # BLD AUTO: 0.73 THOUSAND/ÂΜL (ref 0.17–1.22)
MONOCYTES NFR BLD AUTO: 9 % (ref 4–12)
MRSA NOSE QL CULT: NORMAL
NEUTROPHILS # BLD AUTO: 4.89 THOUSANDS/ÂΜL (ref 1.85–7.62)
NEUTS SEG NFR BLD AUTO: 63 % (ref 43–75)
NRBC BLD AUTO-RTO: 0 /100 WBCS
PLATELET # BLD AUTO: 403 THOUSANDS/UL (ref 149–390)
PMV BLD AUTO: 8.7 FL (ref 8.9–12.7)
POTASSIUM SERPL-SCNC: 4.7 MMOL/L (ref 3.5–5.3)
RBC # BLD AUTO: 3.39 MILLION/UL (ref 3.88–5.62)
SODIUM SERPL-SCNC: 132 MMOL/L (ref 135–147)
VANCOMYCIN SERPL-MCNC: 17.3 UG/ML (ref 10–20)
WBC # BLD AUTO: 7.82 THOUSAND/UL (ref 4.31–10.16)

## 2023-08-21 PROCEDURE — 80202 ASSAY OF VANCOMYCIN: CPT | Performed by: HOSPITALIST

## 2023-08-21 PROCEDURE — 99232 SBSQ HOSP IP/OBS MODERATE 35: CPT | Performed by: PHYSICIAN ASSISTANT

## 2023-08-21 PROCEDURE — 93005 ELECTROCARDIOGRAM TRACING: CPT

## 2023-08-21 PROCEDURE — 82948 REAGENT STRIP/BLOOD GLUCOSE: CPT

## 2023-08-21 PROCEDURE — 85025 COMPLETE CBC W/AUTO DIFF WBC: CPT | Performed by: HOSPITALIST

## 2023-08-21 PROCEDURE — 80048 BASIC METABOLIC PNL TOTAL CA: CPT | Performed by: HOSPITALIST

## 2023-08-21 PROCEDURE — 99233 SBSQ HOSP IP/OBS HIGH 50: CPT | Performed by: SURGERY

## 2023-08-21 RX ORDER — DEXTROSE MONOHYDRATE 25 G/50ML
25 INJECTION, SOLUTION INTRAVENOUS ONCE
Status: DISCONTINUED | OUTPATIENT
Start: 2023-08-21 | End: 2023-08-22

## 2023-08-21 RX ORDER — DEXTROSE AND SODIUM CHLORIDE 5; .9 G/100ML; G/100ML
50 INJECTION, SOLUTION INTRAVENOUS CONTINUOUS
Status: DISCONTINUED | OUTPATIENT
Start: 2023-08-22 | End: 2023-08-22

## 2023-08-21 RX ADMIN — HEPARIN SODIUM 5000 UNITS: 5000 INJECTION INTRAVENOUS; SUBCUTANEOUS at 06:47

## 2023-08-21 RX ADMIN — HYDROMORPHONE HYDROCHLORIDE 0.5 MG: 1 INJECTION, SOLUTION INTRAMUSCULAR; INTRAVENOUS; SUBCUTANEOUS at 13:26

## 2023-08-21 RX ADMIN — HYDROMORPHONE HYDROCHLORIDE 0.5 MG: 1 INJECTION, SOLUTION INTRAMUSCULAR; INTRAVENOUS; SUBCUTANEOUS at 04:39

## 2023-08-21 RX ADMIN — CEFEPIME HYDROCHLORIDE 2000 MG: 2 INJECTION, SOLUTION INTRAVENOUS at 09:16

## 2023-08-21 RX ADMIN — HEPARIN SODIUM 5000 UNITS: 5000 INJECTION INTRAVENOUS; SUBCUTANEOUS at 19:48

## 2023-08-21 RX ADMIN — CEFEPIME HYDROCHLORIDE 2000 MG: 2 INJECTION, SOLUTION INTRAVENOUS at 19:47

## 2023-08-21 RX ADMIN — GABAPENTIN 300 MG: 300 CAPSULE ORAL at 08:37

## 2023-08-21 RX ADMIN — LORAZEPAM 0.5 MG: 0.5 TABLET ORAL at 04:30

## 2023-08-21 RX ADMIN — OXYCODONE HYDROCHLORIDE 10 MG: 10 TABLET ORAL at 23:56

## 2023-08-21 RX ADMIN — GABAPENTIN 300 MG: 300 CAPSULE ORAL at 19:49

## 2023-08-21 RX ADMIN — ATORVASTATIN CALCIUM 40 MG: 40 TABLET, FILM COATED ORAL at 16:53

## 2023-08-21 RX ADMIN — VANCOMYCIN HYDROCHLORIDE 1250 MG: 5 INJECTION, POWDER, LYOPHILIZED, FOR SOLUTION INTRAVENOUS at 10:32

## 2023-08-21 RX ADMIN — HYDROMORPHONE HYDROCHLORIDE 0.5 MG: 1 INJECTION, SOLUTION INTRAMUSCULAR; INTRAVENOUS; SUBCUTANEOUS at 18:46

## 2023-08-21 RX ADMIN — NICOTINE 1 PATCH: 21 PATCH, EXTENDED RELEASE TRANSDERMAL at 08:37

## 2023-08-21 RX ADMIN — DEXTROSE AND SODIUM CHLORIDE 75 ML/HR: 5; .9 INJECTION, SOLUTION INTRAVENOUS at 00:43

## 2023-08-21 RX ADMIN — HYDROMORPHONE HYDROCHLORIDE 0.5 MG: 1 INJECTION, SOLUTION INTRAMUSCULAR; INTRAVENOUS; SUBCUTANEOUS at 09:16

## 2023-08-21 RX ADMIN — OXYCODONE HYDROCHLORIDE 10 MG: 10 TABLET ORAL at 00:52

## 2023-08-21 RX ADMIN — HYDROMORPHONE HYDROCHLORIDE 0.5 MG: 1 INJECTION, SOLUTION INTRAMUSCULAR; INTRAVENOUS; SUBCUTANEOUS at 00:21

## 2023-08-21 RX ADMIN — SODIUM CHLORIDE 0.3 UNITS/HR: 9 INJECTION, SOLUTION INTRAVENOUS at 00:37

## 2023-08-21 RX ADMIN — LORAZEPAM 0.5 MG: 0.5 TABLET ORAL at 12:38

## 2023-08-21 RX ADMIN — METOPROLOL SUCCINATE 100 MG: 50 TABLET, EXTENDED RELEASE ORAL at 08:37

## 2023-08-21 RX ADMIN — OXYCODONE HYDROCHLORIDE 10 MG: 10 TABLET ORAL at 16:55

## 2023-08-21 RX ADMIN — HEPARIN SODIUM 5000 UNITS: 5000 INJECTION INTRAVENOUS; SUBCUTANEOUS at 13:26

## 2023-08-21 RX ADMIN — LISINOPRIL 10 MG: 10 TABLET ORAL at 08:37

## 2023-08-21 RX ADMIN — OXYCODONE HYDROCHLORIDE 10 MG: 10 TABLET ORAL at 07:32

## 2023-08-21 RX ADMIN — GABAPENTIN 300 MG: 300 CAPSULE ORAL at 16:53

## 2023-08-21 NOTE — UTILIZATION REVIEW
Initial Clinical Review    Admission: Date/Time/Statement:   Admission Orders (From admission, onward)     Ordered        08/19/23 0914  INPATIENT ADMISSION  Once                      Orders Placed This Encounter   Procedures   • INPATIENT ADMISSION     Standing Status:   Standing     Number of Occurrences:   1     Order Specific Question:   Level of Care     Answer:   Med Surg [16]     Order Specific Question:   Estimated length of stay     Answer:   More than 2 Midnights     Order Specific Question:   Certification     Answer:   I certify that inpatient services are medically necessary for this patient for a duration of greater than two midnights. See H&P and MD Progress Notes for additional information about the patient's course of treatment. ED Arrival Information     Expected   -    Arrival   8/19/2023 08:19    Acuity   Urgent            Means of arrival   Walk-In    Escorted by   Spouse    Service   Hospitalist    Admission type   Emergency            Arrival complaint   antibiotic IV           Chief Complaint   Patient presents with   • Wound Infection     Pt to er with reports of being told to come back to the er for iv abx for an infection in his left foot. Denies fevers at home. Initial Presentation: 66 y.o. male   PMH of type 1 diabetes, chronic kidney disease, tobacco use, hyponatremia, hypertension, prior left foot TMA  (June 2023)   who presents with ongoing left foot osteomyelitis. Hospitalized  8/17 - 8/18/23  For same,  Amputation recommended followed by pt signing out AMA. 8/19    Returns to ER today,  Agrees to IVABs and  Left BKA   given recurrent osteomyelitis. Left TMA still with swelling and erythema,   wound with dehiscence, purulent drainage.   FUP blood cultures  (initiated in ER)   Na 132 (suspect chronic)  Examines euvolemic    8/19    Admit IP status,  MS Level of care   for management of osteomyelitis:  Resume IVABs, local wound care,  consult Podiatry/General surgery, cont Medical clearance for left BKA. Trend bmp - hyponatremic workup  (urine osmolality, urine sodium)      Date: 8/20        Day 2: Patient feels well this morning - frequent po prn meds given. Remains afebrile without leukocytosis. 8/21  SURGERY:    Plan for left BKA tomorrow - Blood cx No growth to date. Continue tight blood sugar control for optimal wound healing and control of infection. Osmolality 284. Urine osmolality 362. Urine sodium 52   Continues to require IV and PO prn meds for pain control.      Appears euvolemic      ----------------------------------------------------------------------------------------------------------    8/19/23 8/19/23            ED Triage Vitals   Temperature Pulse Respirations Blood Pressure SpO2   08/19/23 0823 08/19/23 0823 08/19/23 0823 08/19/23 0823 08/19/23 0823   97.8 °F (36.6 °C) 101 18 139/79 97 %      Temp Source Heart Rate Source Patient Position - Orthostatic VS BP Location FiO2 (%)   08/19/23 0823 08/19/23 0823 08/19/23 0900 08/19/23 0900 --   Oral Monitor Sitting Left arm       Pain Score       08/19/23 1053       8          Wt Readings from Last 1 Encounters:   08/20/23 68.9 kg (152 lb)     Additional Vital Signs:   08/21/23 06:36:34 97.9 °F (36.6 °C) 87 18 131/76 94 98 %   08/20/23 21:17:51 97.5 °F (36.4 °C) 82 -- 138/73 95 99 %   08/20/23 15:15:29 97.9 °F (36.6 °C) 83 16 128/71 90 100 %   08/20/23 07:46:45 97.7 °F (36.5 °C) 99 12 146/79 101 98 %   08/19/23 20:40:17 96.3 °F (35.7 °C) Abnormal  88 16 143/72 96 97 %   08/19/23 15:24:54 97.3 °F (36.3 °C) Abnormal  90 -- 138/64 89 96 %   08/19/23 15:24:47 97.3 °F (36.3 °C) Abnormal  87 -- 138/64 89 96 %   08/19/23 1214 97.7 °F (36.5 °C) 92 18 144/85 -- --   08/19/23 10:35:59 97.7 °F (36.5 °C) 92 -- 144/85 105 100 %     Pertinent Labs/Diagnostic Test Results:   ekg none     MRI left foot 8/17/2023: Status post transmetatarsal amputation with prominent skin ulceration at the distal aspect of the postoperative site including marrow changes involving the second through fifth residual proximal metatarsals indicating osteomyelitis. No evidence of drainable abscess collection in this unenhanced examination.         Results from last 7 days   Lab Units 08/21/23  0502 08/20/23  0443 08/19/23  0841 08/18/23  0430 08/17/23  0937   WBC Thousand/uL 7.82 7.60 11.70* 9.09 14.13*   HEMOGLOBIN g/dL 10.7* 9.9* 11.6* 10.4* 10.8*   HEMATOCRIT % 33.5* 30.7* 35.3* 31.9* 32.6*   PLATELETS Thousands/uL 403* 365 429* 400* 396*   NEUTROS ABS Thousands/µL 4.89 4.50 9.48* 6.04 11.04*         Results from last 7 days   Lab Units 08/21/23  0502 08/20/23  0443 08/19/23  0841 08/18/23  0430 08/17/23  1830   SODIUM mmol/L 132* 132* 130* 130* 128*   POTASSIUM mmol/L 4.7 4.5 4.9 4.6 4.2   CHLORIDE mmol/L 99 100 94* 98 95*   CO2 mmol/L 27 27 28 22 25   ANION GAP mmol/L 6 5 8 10 8   BUN mg/dL 12 14 12 15 16   CREATININE mg/dL 1.13 1.17 1.24 1.08 1.16   EGFR ml/min/1.73sq m 69 66 62 73 67   CALCIUM mg/dL 8.7 8.6 10.0 9.2 8.9     Results from last 7 days   Lab Units 08/19/23  0841   AST U/L 20   ALT U/L 11   ALK PHOS U/L 157*   TOTAL PROTEIN g/dL 8.5*   ALBUMIN g/dL 4.1   TOTAL BILIRUBIN mg/dL 0.45     Results from last 7 days   Lab Units 08/21/23  1212 08/21/23  0956 08/21/23  0812 08/21/23  0635 08/21/23  0428 08/21/23  0249 08/21/23  0214 08/21/23  0017 08/20/23  2116 08/20/23  1520 08/20/23  1052 08/20/23  0734   POC GLUCOSE mg/dl 217* 201* 165* 132 115 73 64* 89 158* 153* 140 104     Results from last 7 days   Lab Units 08/21/23  0502 08/20/23  0443 08/19/23  0841 08/18/23  0430 08/17/23  1830 08/17/23  0937   GLUCOSE RANDOM mg/dL 128 106 110 121 202* 186*     Results from last 7 days   Lab Units 08/20/23  0443 08/18/23  0423   OSMOLALITY, SERUM mmol/ 281*       Results from last 7 days   Lab Units 08/19/23  0841 08/17/23  0937   PROTIME seconds 12.6 13.3   INR  0.88 0.95   PTT seconds 37  --          Results from last 7 days Lab Units 08/19/23  0841   PROCALCITONIN ng/ml 0.24     Results from last 7 days   Lab Units 08/19/23  0841 08/17/23  0937   LACTIC ACID mmol/L 1.4 1.5       Results from last 7 days   Lab Units 08/19/23  0841 08/17/23  0937   CRP mg/L 56.1* 73.5*   SED RATE mm/hour 39* 89*         Results from last 7 days   Lab Units 08/20/23  0443 08/19/23  1545 08/18/23  0650 08/18/23  0423   OSMOLALITY, SERUM mmol/  --   --  281*   OSMO UR mmol/KG  --  362 144*  --      Results from last 7 days   Lab Units 08/19/23  1545 08/18/23  0650   SODIUM UR  52 36       Results from last 7 days   Lab Units 08/19/23  0841 08/17/23  0944 08/17/23  0937 08/16/23  1301   BLOOD CULTURE  No Growth at 24 hrs. No Growth at 24 hrs. No Growth at 72 hrs.  No Growth at 72 hrs.  --    GRAM STAIN RESULT   --   --   --  2+ Gram positive cocci in clusters*  1+ Gram negative rods*  No polys seen*   WOUND CULTURE   --   --   --  4+ Growth of Proteus mirabilis*  4+ Growth of Methicillin Resistant Staphylococcus aureus*       Results from last 7 days   Lab Units 08/21/23  0502   VANCOMYCIN RM ug/mL 17.3       ED Treatment:   Medication Administration from 08/19/2023 0818 to 08/19/2023 1032       Date/Time Order Dose Route Action     08/19/2023 0852 EDT cefepime (MAXIPIME) IVPB (premix in dextrose) 2,000 mg 50 mL 2,000 mg Intravenous New Bag     08/19/2023 0924 EDT vancomycin (VANCOCIN) 1,750 mg in sodium chloride 0.9 % 500 mL IVPB 1,750 mg Intravenous New Bag        Past Medical History:   Diagnosis Date   • Chronic foot ulcer ([address]) 09/07/2018   • Diabetes mellitus ([address])    • Diabetic foot ulcer ([address]) 5/8/2023   • Gout    • High cholesterol    • Hypertension    • Visual impairment 11/1/2022    Cateract     Present on Admission:  • Osteomyelitis ([address])  • Benign essential hypertension  • Cigarette nicotine dependence without complication  • Type 1 diabetes mellitus with diabetic neuropathy (HCC)  • Hyponatremia  • Stage 2 chronic kidney disease      Admitting Diagnosis: Anxiety [F41.9]  Wound infection [T14. 8XXA, L08.9]  Wound drainage [L24. A9]  S/P transmetatarsal amputation of foot, left (HCC) [Z89.432]  Age/Sex: 66 y.o. male     Admission Orders:   See above note     Scheduled Medications:  atorvastatin, 40 mg, Oral, Daily With Dinner  cefepime, 2,000 mg, Intravenous, Q12H  dextrose, 25 mL, Intravenous, Once  gabapentin, 300 mg, Oral, TID  heparin (porcine), 5,000 Units, Subcutaneous, Q8H [address]  metoprolol succinate, 100 mg, Oral, Daily  nicotine, 1 patch, Transdermal, Daily  vancomycin, 1,250 mg, Intravenous, Q24H      Continuous IV Infusions:     PRN Meds:  acetaminophen, 650 mg, Oral, Q6H PRN  HYDROmorphone, 0.5 mg, Intravenous, Q4H PRN      8/20 x6 and 8/21 x4   insulin lispro, 300 Units, Subcutaneous Insulin Pump, Daily PRN  LORazepam, 0.5 mg, Oral, Q8H PRN    8/20 x2 and 8/21 x2  oxyCODONE, 10 mg, Oral, Q6H PRN     8/20 x3 and 8/21 x2        IP CONSULT TO ACUTE CARE SURGERY  IP CONSULT TO PHARMACY    Network Utilization Review Department  ATTENTION: Please call with any questions or concerns to [phone] and carefully listen to the prompts so that you are directed to the right person. All voicemails are confidential.  Malu Hawley all requests for admission clinical reviews, approved or denied determinations and any other requests to dedicated fax number below belonging to the campus where the patient is receiving treatment.  List of dedicated fax numbers for the Facilities:  [place] DENIALS (Administrative/Medical Necessity) [phone] [facility] [phone]   [address] [place] [address] [phone]   [address] [phone] [address] [facility] [phone] [address] [facility]. [address] [facility] [address] [facility] [address]  [facility] [phone]

## 2023-08-21 NOTE — PROGRESS NOTES
Progress Note - General Surgery   Nakia Monroy 66 y.o. male MRN: [id number]  Unit/Bed#: -01 Encounter: [id number]    Assessment/Plan:  65 yo male with nonhealing left TMA with wound infection and osteomyelitis. Podiatry deemed limb unsalvaglable and recommending left BKA  -Seen briefly on last admission before patient signed himself out AMA, returned over weekend  -Remains afebrile without leukocytosis, VSS   -HGB 10.7  -Plan for left BKA later today versus tomorrow based on OR availability. Procedure discussed in detail with the patient as well as possible risks and complications and written consent has been obtained. All questions answered  -Continue IV ABx until source control achieved with BKA, blood cx NGTD  -keep NPO  -Follow cultures, blood cultures    DM1   -Maintained on insulin pump  -Continue tight blood sugar control for optimal wound healing and control of infection  -medical management    Tobacco abuse  -Cessation recommended  -Nicotine patch ordered  -Counseled that cigarette smoking can affect wound healing    CKD2, hyponatermia, HTN  -Per primary team      Subjective/Objective   Chief Complaint: Spasms in left foot    Subjective: Patient complains of intermittent "nerve pain" and spasming at left TMA site. This is improved with pain medication. Denies any fevers or chills. N.p.o. this morning for possible OR    Objective:     Blood pressure 131/76, pulse 87, temperature 97.9 °F (36.6 °C), resp. rate 18, height 6' (1.829 m), weight 68.9 kg (152 lb), SpO2 98 %. ,Body mass index is 20.61 kg/m². Intake/Output Summary (Last 24 hours) at 8/21/2023 7831  Last data filed at 8/20/2023 1729  Gross per 24 hour   Intake 1010 ml   Output 400 ml   Net 610 ml       Invasive Devices     Peripheral Intravenous Line  Duration           Peripheral IV 08/20/23 Left;Ventral (anterior) Forearm <1 day          Line  Duration           Pump Device Insulin pump Anterior; Left Abdomen 52 days Physical Exam:   General appearance: alert and oriented, in no acute distress  Head: Normocephalic, without obvious abnormality, atraumatic, sclerae anicteric, mucous membranes moist  Neck: no JVD and supple, symmetrical, trachea midline  Lungs: clear to auscultation, no wheezes or rales  Heart:   Regular rate and rhythm, S1-S2 normal, no murmur  Abdomen:   Flat, soft, nontender, active bowel sounds  Extremities:   No edema, redness or tenderness in the calves or thighs  Left TMA dressing in place with no soilage  No edema of left lower extremity  2+ palpable left popliteal pulse  Skin: Warm, dry  Nursing notes and vital signs reviewed      Lab, Imaging and other studies:  I have personally reviewed pertinent lab results.   , CBC:   Lab Results   Component Value Date    WBC 7.82 08/21/2023    HGB 10.7 (L) 08/21/2023    HCT 33.5 (L) 08/21/2023    MCV 99 (H) 08/21/2023     (H) 08/21/2023    RBC 3.39 (L) 08/21/2023    MCH 31.6 08/21/2023    MCHC 31.9 08/21/2023    RDW 13.9 08/21/2023    MPV 8.7 (L) 08/21/2023    NRBC 0 08/21/2023   , CMP:   Lab Results   Component Value Date    SODIUM 132 (L) 08/21/2023    K 4.7 08/21/2023    CL 99 08/21/2023    CO2 27 08/21/2023    BUN 12 08/21/2023    CREATININE 1.13 08/21/2023    CALCIUM 8.7 08/21/2023    EGFR 69 08/21/2023     VTE Pharmacologic Prophylaxis: Heparin  VTE Mechanical Prophylaxis: sequential compression device     [place] Fauzia Hartmann

## 2023-08-21 NOTE — ASSESSMENT & PLAN NOTE
Lab Results   Component Value Date    EGFR 69 08/21/2023    EGFR 66 08/20/2023    EGFR 62 08/19/2023    CREATININE 1.13 08/21/2023    CREATININE 1.17 08/20/2023    CREATININE 1.24 08/19/2023   · Baseline creatinine 1.0-1.2  · Creatinine within baseline on admission  · Trend BMP

## 2023-08-21 NOTE — ASSESSMENT & PLAN NOTE
· Patient initially had been referred to the emergency department due to worsening left foot erythema and swelling  · MRI left foot completed 8/17: Status post transmetatarsal amputation with prominent skin ulceration at the distal aspect of the postoperative site including marrow changes involving the second through fifth residual proximal metatarsals indicating osteomyelitis. No evidence of drainable abscess collection in this unenhanced examination. · Previous discussion held between patient and podiatry -patient wishes to move forward with BKA rather than TMA revision given concern over healing ability. Consult general surgery  · Plan for BKA tomorrow, 8/22.   N.p.o. after midnight  · Continue IV cefepime/vancomycin  · Blood cultures negative x2 after 24 hours  · Trend WBC and fever curve

## 2023-08-21 NOTE — PLAN OF CARE
Problem: PAIN - ADULT  Goal: Verbalizes/displays adequate comfort level or baseline comfort level  Description: Interventions:  - Encourage patient to monitor pain and request assistance  - Assess pain using appropriate pain scale  - Administer analgesics based on type and severity of pain and evaluate response  - Implement non-pharmacological measures as appropriate and evaluate response  - Consider cultural and social influences on pain and pain management  - Notify physician/advanced practitioner if interventions unsuccessful or patient reports new pain  Outcome: Progressing     Problem: INFECTION - ADULT  Goal: Absence or prevention of progression during hospitalization  Description: INTERVENTIONS:  - Assess and monitor for signs and symptoms of infection  - Monitor lab/diagnostic results  - Monitor all insertion sites, i.e. indwelling lines, tubes, and drains  - Monitor endotracheal if appropriate and nasal secretions for changes in amount and color  - [place] appropriate cooling/warming therapies per order  - Administer medications as ordered  - Instruct and encourage patient and family to use good hand hygiene technique  - Identify and instruct in appropriate isolation precautions for identified infection/condition  Outcome: Progressing     Problem: SAFETY ADULT  Goal: Patient will remain free of falls  Description: INTERVENTIONS:  - Educate patient/family on patient safety including physical limitations  - Instruct patient to call for assistance with activity   - Consult OT/PT to assist with strengthening/mobility   - Keep Call bell within reach  - Keep bed low and locked with side rails adjusted as appropriate  - Keep care items and personal belongings within reach  - Initiate and maintain comfort rounds  - Make Fall Risk Sign visible to staff  - Obtain necessary fall risk management equipment: non, slip socks, cane  - Apply yellow socks and bracelet for high fall risk patients  - Consider moving patient to room near nurses station  Outcome: Progressing  Goal: Maintain or return to baseline ADL function  Description: INTERVENTIONS:  -  Assess patient's ability to carry out ADLs; assess patient's baseline for ADL function and identify physical deficits which impact ability to perform ADLs (bathing, care of mouth/teeth, toileting, grooming, dressing, etc.)  - Assess/evaluate cause of self-care deficits   - Assess range of motion  - Assess patient's mobility; develop plan if impaired  - Assess patient's need for assistive devices and provide as appropriate  - Encourage maximum independence but intervene and supervise when necessary  - Involve family in performance of ADLs  - Assess for home care needs following discharge   - Consider OT consult to assist with ADL evaluation and planning for discharge  - Provide patient education as appropriate  Outcome: Progressing  Goal: Maintains/Returns to pre admission functional level  Description: INTERVENTIONS:  - Perform BMAT or MOVE assessment daily.   - Set and communicate daily mobility goal to care team and patient/family/caregiver. - Collaborate with rehabilitation services on mobility goals if consulted  - Perform Range of Motion 3 times a day. - Reposition patient every 2 hours.   - Dangle patient 3 times a day  - Stand patient 3 times a day  - Ambulate patient 3 times a day  - Out of bed to chair 3 times a day   - Out of bed for meals 3 times a day  - Out of bed for toileting  - Record patient progress and toleration of activity level   Outcome: Progressing     Problem: DISCHARGE PLANNING  Goal: Discharge to home or other facility with appropriate resources  Description: INTERVENTIONS:  - Identify barriers to discharge w/patient and caregiver  - Arrange for needed discharge resources and transportation as appropriate  - Identify discharge learning needs (meds, wound care, etc.)  - Arrange for interpretive services to assist at discharge as needed  - Refer to Case Management Department for coordinating discharge planning if the patient needs post-hospital services based on physician/advanced practitioner order or complex needs related to functional status, cognitive ability, or social support system  Outcome: Progressing     Problem: Knowledge Deficit  Goal: Patient/family/caregiver demonstrates understanding of disease process, treatment plan, medications, and discharge instructions  Description: Complete learning assessment and assess knowledge base.   Interventions:  - Provide teaching at level of understanding  - Provide teaching via preferred learning methods  Outcome: Progressing     Problem: SKIN/TISSUE INTEGRITY - ADULT  Goal: Incision(s), wounds(s) or drain site(s) healing without S/S of infection  Description: INTERVENTIONS  - Assess and document dressing, incision, wound bed, drain sites and surrounding tissue  - Provide patient and family education  - Perform skin care/dressing changes per wound care orders and as needed for soilage  Outcome: Progressing     Problem: MUSCULOSKELETAL - ADULT  Goal: Maintain or return mobility to safest level of function  Description: INTERVENTIONS:  - Assess patient's ability to carry out ADLs; assess patient's baseline for ADL function and identify physical deficits which impact ability to perform ADLs (bathing, care of mouth/teeth, toileting, grooming, dressing, etc.)  - Assess/evaluate cause of self-care deficits   - Assess range of motion  - Assess patient's mobility  - Assess patient's need for assistive devices and provide as appropriate  - Encourage maximum independence but intervene and supervise when necessary  - Involve family in performance of ADLs  - Assess for home care needs following discharge   - Consider OT consult to assist with ADL evaluation and planning for discharge  - Provide patient education as appropriate  Outcome: Progressing  Goal: Maintain proper alignment of affected body part  Description: INTERVENTIONS:  - Support, maintain and protect limb and body alignment  - Provide patient/ family with appropriate education  Outcome: Progressing     Problem: Prexisting or High Potential for Compromised Skin Integrity  Goal: Skin integrity is maintained or improved  Description: INTERVENTIONS:  - Identify patients at risk for skin breakdown  - Assess and monitor skin integrity  - Assess and monitor nutrition and hydration status  - Monitor labs   - Assess for incontinence   - Turn and reposition patient  - Assist with mobility/ambulation  - Relieve pressure over bony prominences  - Avoid friction and shearing  - Provide appropriate hygiene as needed including keeping skin clean and dry  - Evaluate need for skin moisturizer/barrier cream  - Collaborate with interdisciplinary team   - Patient/family teaching  - Consider wound care consult   Outcome: Progressing

## 2023-08-21 NOTE — PROGRESS NOTES
[facility] [facility]  Progress Note  Name: Americo Phan  MRN: [id number]  Unit/Bed#: -01 I Date of Admission: 8/19/2023   Date of Service: 8/21/2023 I Hospital Day: 2    Assessment/Plan   * Osteomyelitis [facility])  Assessment & Plan  · Patient initially had been referred to the emergency department due to worsening left foot erythema and swelling  · MRI left foot completed 8/17: Status post transmetatarsal amputation with prominent skin ulceration at the distal aspect of the postoperative site including marrow changes involving the second through fifth residual proximal metatarsals indicating osteomyelitis. No evidence of drainable abscess collection in this unenhanced examination. · Previous discussion held between patient and podiatry -patient wishes to move forward with BKA rather than TMA revision given concern over healing ability. Consult general surgery  · Plan for BKA tomorrow, 8/22. N.p.o. after midnight  · Continue IV cefepime/vancomycin  · Blood cultures negative x2 after 24 hours  · Trend WBC and fever curve    Type 1 diabetes mellitus with diabetic neuropathy [facility])  Assessment & Plan  Lab Results   Component Value Date    HGBA1C 7.9 (H) 07/20/2023       Recent Labs     08/21/23  0428 08/21/23  0635 08/21/23  0812 08/21/23  0956   POCGLU 115 132 165* 201*       Blood Sugar Average: Last 72 hrs:  (P) 129   · Maintained on insulin pump as outpatient  · Per further discussion with patient, pending surgical timing patient initially declined to be placed on insulin gtt when n.p.o.   After further education patient states he would likely be agreeable  · No surgery planned for today, transition back to insulin pump per pt request  · While NPO transition to insulin gtt with q 2hr accucheks  · Diabetic diet    Hyponatremia  Assessment & Plan  · Appears to be a chronic issue  · Sodium 132  · Osmolality 284  · Urine osmolality 362  · Urine sodium 52  · Appears euvolemic  · Trend BMP    Stage 2 chronic kidney disease  Assessment & Plan  Lab Results   Component Value Date    EGFR 69 08/21/2023    EGFR 66 08/20/2023    EGFR 62 08/19/2023    CREATININE 1.13 08/21/2023    CREATININE 1.17 08/20/2023    CREATININE 1.24 08/19/2023   · Baseline creatinine 1.0-1.2  · Creatinine within baseline on admission  · Trend BMP    Cigarette nicotine dependence without complication  Assessment & Plan  · Nicotine patch ordered  ·  cessation    Benign essential hypertension  Assessment & Plan  · Blood pressure stable at time of admission  · Continue metoprolol, lisinopril  · Hold lisinopril perioperatively        VTE Pharmacologic Prophylaxis: VTE Score: 3 Moderate Risk (Score 3-4) - Pharmacological DVT Prophylaxis Ordered: heparin. Patient Centered Rounds: I performed bedside rounds with nursing staff today. Discussions with Specialists or Other Care Team Provider: CECE, general surgery    Education and Discussions with Family / Patient: Patient declined call to . Total Time Spent on Date of Encounter in care of patient: 35 minutes This time was spent on one or more of the following: performing physical exam; counseling and coordination of care; obtaining or reviewing history; documenting in the medical record; reviewing/ordering tests, medications or procedures; communicating with other healthcare professionals and discussing with patient's family/caregivers. Current Length of Stay: 2 day(s)  Current Patient Status: Inpatient   Certification Statement: The patient will continue to require additional inpatient hospital stay due to Left BKA  Discharge Plan: Anticipate discharge in >72 hrs to discharge location to be determined pending rehab evaluations. Code Status: Level 1 - Full Code    Subjective:   Patient has continued pain of left foot. Continues to be agreeable to left BKA.   Denies chest pain/palpitations, shortness of breath, nausea/vomiting, abdominal pain.    Objective:     Vitals:   Temp (24hrs), Av.8 °F (36.6 °C), Min:97.5 °F (36.4 °C), Max:97.9 °F (36.6 °C)    Temp:  [97.5 °F (36.4 °C)-97.9 °F (36.6 °C)] 97.9 °F (36.6 °C)  HR:  [82-87] 87  Resp:  [16-18] 18  BP: (128-138)/(71-76) 131/76  SpO2:  [98 %-100 %] 98 %  Body mass index is 20.61 kg/m². Input and Output Summary (last 24 hours): Intake/Output Summary (Last 24 hours) at 2023 1128  Last data filed at 2023 0906  Gross per 24 hour   Intake 1395.83 ml   Output 400 ml   Net 995.83 ml       Physical Exam:   Physical Exam  Vitals and nursing note reviewed. Constitutional:       General: He is not in acute distress. Appearance: He is well-developed. Comments: No acute distress   HENT:      Head: Normocephalic and atraumatic. Eyes:      General: No scleral icterus. Extraocular Movements: Extraocular movements intact. Conjunctiva/sclera: Conjunctivae normal.   Cardiovascular:      Rate and Rhythm: Normal rate and regular rhythm. Heart sounds: No murmur heard. Pulmonary:      Effort: Pulmonary effort is normal. No respiratory distress. Breath sounds: Normal breath sounds. No wheezing, rhonchi or rales. Abdominal:      General: Bowel sounds are normal.      Palpations: Abdomen is soft. Tenderness: There is no abdominal tenderness. There is no guarding or rebound. Musculoskeletal:         General: No swelling. Cervical back: Normal range of motion. Comments: Able to move upper/lower extremities bilaterally, left TMA with dressing intact. Skin:     General: Skin is warm and dry. Capillary Refill: Capillary refill takes less than 2 seconds. Neurological:      Mental Status: He is alert and oriented to person, place, and time.    Psychiatric:         Mood and Affect: Mood normal.         Speech: Speech normal.         Behavior: Behavior normal.          Additional Data:     Labs:  Results from last 7 days   Lab Units 23  0502   WBC Thousand/uL 7.82   HEMOGLOBIN g/dL 10.7*   HEMATOCRIT % 33.5*   PLATELETS Thousands/uL 403*   NEUTROS PCT % 63   LYMPHS PCT % 13*   MONOS PCT % 9   EOS PCT % 13*     Results from last 7 days   Lab Units 08/21/23  0502 08/20/23  0443 08/19/23  0841   SODIUM mmol/L 132*   < > 130*   POTASSIUM mmol/L 4.7   < > 4.9   CHLORIDE mmol/L 99   < > 94*   CO2 mmol/L 27   < > 28   BUN mg/dL 12   < > 12   CREATININE mg/dL 1.13   < > 1.24   ANION GAP mmol/L 6   < > 8   CALCIUM mg/dL 8.7   < > 10.0   ALBUMIN g/dL  --   --  4.1   TOTAL BILIRUBIN mg/dL  --   --  0.45   ALK PHOS U/L  --   --  157*   ALT U/L  --   --  11   AST U/L  --   --  20   GLUCOSE RANDOM mg/dL 128   < > 110    < > = values in this interval not displayed. Results from last 7 days   Lab Units 08/19/23  0841   INR  0.88     Results from last 7 days   Lab Units 08/21/23  0956 08/21/23  0812 08/21/23  0635 08/21/23  0428 08/21/23  0249 08/21/23  0214 08/21/23  0017 08/20/23  2116 08/20/23  1520 08/20/23  1052 08/20/23  0734 08/19/23  1730   POC GLUCOSE mg/dl 201* 165* 132 115 73 64* 89 158* 153* 140 104 121         Results from last 7 days   Lab Units 08/19/23  0841 08/17/23  0937   LACTIC ACID mmol/L 1.4 1.5   PROCALCITONIN ng/ml 0.24  --        Lines/Drains:  Invasive Devices     Peripheral Intravenous Line  Duration           Peripheral IV 08/20/23 Left;Ventral (anterior) Forearm <1 day          Line  Duration           Pump Device Insulin pump Anterior; Left Abdomen 52 days                      Imaging: No pertinent imaging reviewed. Recent Cultures (last 7 days):   Results from last 7 days   Lab Units 08/19/23  0841 08/17/23  0944 08/17/23  0937 08/16/23  1301   BLOOD CULTURE  No Growth at 24 hrs. No Growth at 24 hrs. No Growth at 72 hrs.  No Growth at 72 hrs.  --    GRAM STAIN RESULT   --   --   --  2+ Gram positive cocci in clusters*  1+ Gram negative rods*  No polys seen*   WOUND CULTURE   --   --   --  4+ Growth of Proteus mirabilis*  4+ Growth of Methicillin Resistant Staphylococcus aureus*       Last 24 Hours Medication List:   Current Facility-Administered Medications   Medication Dose Route Frequency Provider Last Rate   • acetaminophen  650 mg Oral Q6H PRN Carl Eastman PA-C     • atorvastatin  40 mg Oral Daily With [facility] Carrizales PA-C     • cefepime  2,000 mg Intravenous Q12H [facility] 2,000 mg (08/21/23 0916)   • dextrose  25 mL Intravenous Once tAul Ward PA-C     • gabapentin  300 mg Oral TID Carl Eastman PA-C     • heparin (porcine)  5,000 Units Subcutaneous [facility]     • HYDROmorphone  0.5 mg Intravenous Q4H PRN Atul Ward PA-C     • insulin lispro  300 Units Subcutaneous Insulin Pump Daily PRN Carl Eastman PA-C     • LORazepam  0.5 mg Oral Q8H PRN Carl Eastman PA-C     • metoprolol succinate  100 mg Oral Daily [facility]     • nicotine  1 patch Transdermal Daily [facility] NATALIO Vidal     • oxyCODONE  10 mg Oral Q6H PRN Carl Eastman PA-C     • vancomycin  1,250 mg Intravenous Q24H Carl Eastman PA-C          Today, Patient Was Seen By: Carl Eastman PA-C    **Please Note: This note may have been constructed using a voice recognition system. **

## 2023-08-21 NOTE — ASSESSMENT & PLAN NOTE
· Appears to be a chronic issue  · Sodium 132  · Osmolality 284  · Urine osmolality 362  · Urine sodium 52  · Appears euvolemic  · Trend BMP

## 2023-08-21 NOTE — CASE MANAGEMENT
Case Management Assessment & Discharge Planning Note    Patient name Makeda Ceja  Location /-01 MRN [id number]  : 1960 Date 2023       Current Admission Date: 2023  Current Admission Diagnosis:Osteomyelitis [facility])   Patient Active Problem List    Diagnosis Date Noted   • Osteomyelitis ([address]) 2023   • Type 1 diabetes mellitus with diabetic neuropathy ([address]) 2023   • S/P transmetatarsal amputation of foot, left ([address]) 2023   • Sepsis ([address]) 2023   • Stage 2 chronic kidney disease 2023   • Hyponatremia 2023   • Alcohol abuse 2023   • Nicotine abuse 2023   • 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, with fat layer exposed ([address]) 2023   • Type 1 diabetes mellitus with hyperglycemia ([address]) 2022   • Diabetic polyneuropathy associated with type 1 diabetes mellitus ([address]) 2022   • Microalbuminuria due to type 1 diabetes mellitus ([address]) 2022   • Hypoglycemia unawareness associated with type 1 diabetes mellitus ([address]) 2022   • Cigarette nicotine dependence without complication    • Gout of ankle 2022   • Chronic fatigue syndrome 2022   • Lower urinary tract symptoms due to benign prostatic hyperplasia 2022   • 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 ([address]) 2022   • Chronic pain 2022   • Insulin pump in place 2018   • Benign essential hypertension 2011   • Mixed hyperlipidemia 10/25/2010   • ED (erectile dysfunction) of organic origin 10/25/2010      LOS (days): 2  Geometric Mean LOS (GMLOS) (days):   Days to GMLOS:     OBJECTIVE:  PATIENT READMITTED TO HOSPITAL  Risk of Unplanned Readmission Score: 18         Current admission status: Inpatient       Preferred Pharmacy:   [id number] [place] [id number] [address] [address]  Phone: [phone] Fax: [phone]    Primary Care Provider: Darrin Schlatter, MD    Primary Insurance: Carlos Chol  Secondary Insurance:     ASSESSMENT:  Rosemary [employer]     Saul Lopez [address] Representative - Spouse   Primary Phone: [phone] [facility]  Home Phone: [phone]               Advance Directives  Does patient have a [address]?: No  Was patient offered paperwork?: Yes (declined)  Does patient currently have a Health Care decision maker?: Yes, please see Health Care Proxy section  Does patient have Advance Directives?: No  Was patient offered paperwork?: Yes (declined)  Primary Contact: spouse, Reina Richard         Readmission Root Cause  30 Day Readmission: Yes  Who directed you to return to the hospital?: [facility]  Did you understand whom to contact if you had questions or problems?: Yes  Did you get your prescriptions before you left the hospital?: No  Reason[de-identified] Delivery service not offered  Were you able to get your prescriptions filled when you left the hospital?: Yes  Did you take your medications as prescribed?: Yes  Were you able to get to your follow-up appointments?: Yes  During previous admission, was a post-acute recommendation made?: Yes  What post-acute resources were offered?: [facility]  Patient was readmitted due to: osteomyelitis    Patient Information  Admitted from[de-identified] Home  Mental Status: Alert  During Assessment patient was accompanied by: Not accompanied during assessment  Assessment information provided by[de-identified] Patient  Primary Caregiver: Self  Support Systems: Spouse/significant other, [address] of Residence: [address] do you live in?: [address] [facility] entry access options.  Select all that apply.: Stairs  Number of steps to enter home.: 2  Type of Current Residence: 2 story home  Upon entering residence, is there a bedroom on the main floor (no further steps)?: Yes  Upon entering residence, is there a bathroom on the main floor (no further steps)?: Yes  In the last 12 months, was there a time when you were not able to pay the mortgage or rent on time?: No  In the last 12 months, how many places have you lived?: 1  In the last 12 months, was there a time when you did not have a steady place to sleep or slept in a shelter (including now)?: No  Homeless/housing insecurity resource given?: N/A  Living Arrangements: Lives w/ Spouse/significant other    Activities of Daily Living Prior to Admission  Functional Status: Independent  Completes ADLs independently?: Yes  Ambulates independently?: Yes  Does patient use assisted devices?: No  Does patient currently own DME?: Yes  What DME does the patient currently own?: Other (Comment) (insulin pump, glucometer)  Does patient have a history of Outpatient Therapy (PT/OT)?: Yes  Does the patient have a history of Short-Term Rehab?: No  Does patient have a history of HHC?: Yes ([facility])  Does patient currently have [address] [facility] [place]?: Yes    Current Home Health Care  Type of Current Home Care Services: Home PT, Home OT, Nurse visit  Current Home Health Agency[de-identified] [address] [place] [address] Provider[de-identified] PCP    Patient Information Continued  Income Source: Employed  Does patient have prescription coverage?: Yes  Within the past 12 months, you worried that your food would run out before you got the money to buy more.: Never true  Within the past 12 months, the food you bought just didn't last and you didn't have money to get more.: Never true  Food insecurity resource given?: N/A  Does patient receive dialysis treatments?: No  Does patient have a history of substance abuse?: No  Does patient have a history of Mental Health Diagnosis?: No         Means of Transportation  Means of Transport to [facility][de-identified] Family transport  In the past 12 months, has lack of transportation kept you from medical appointments or from getting medications?: No  In the past 12 months, has lack of transportation kept you from meetings, work, or from getting things needed for daily living?: No  Was application for public transport provided?: N/A        DISCHARGE DETAILS:    Discharge planning discussed with[de-identified] Patient  Freedom of Choice: Yes  Comments - Freedom of Choice: Pt is open to going to STR if recommended s/p surgery  CM contacted family/caregiver?: No- see comments (declined call to contact)  Were Treatment Team discharge recommendations reviewed with patient/caregiver?: Yes  Did patient/caregiver verbalize understanding of patient care needs?: Yes  Were patient/caregiver advised of the risks associated with not following Treatment Team discharge recommendations?: Yes         Requested [facility]         Is the patient interested in [address] at discharge?: No    DME Referral Provided  Referral made for DME?: No              Treatment Team Recommendation: Short Term Rehab  Discharge Destination Plan[de-identified] Short Term Rehab  Transport at Discharge : [facility]ulance                                      Additional Comments: Met with pt to discuss role of CM and any needs prior to discharge. Pt lives w/ spouse in [address] [place] [address],[address] w/ [address]. Indp PTA. Pt owns/uses glucometer and insulin pump. No hx STR/OP PT/DA/MH. Pt has hx [facility]. TrPt uses [employer]. ansport pending disposition.

## 2023-08-21 NOTE — UTILIZATION REVIEW
NOTIFICATION OF ADMISSION DISCHARGE   This is a Notification of Discharge from [facility]. Please be advised that this patient has been discharge from our facility. Below you will find the admission and discharge date and time including the patient’s disposition. UTILIZATION REVIEW CONTACT:  Karl Cruz  Utilization   Network Utilization Review Department  Phone: [phone] carefully listen to the prompts. All voicemails are confidential.  Email: [email or web address]     ADMISSION INFORMATION  PRESENTATION DATE: 8/17/2023  9:01 AM  OBERVATION ADMISSION DATE:   INPATIENT ADMISSION DATE: 8/17/23 11:04 AM   DISCHARGE DATE: 8/18/2023 11:14 AM   DISPOSITION:Left against medical advice or discontinued care    IMPORTANT INFORMATION:  Send all requests for admission clinical reviews, approved or denied determinations and any other requests to dedicated fax number below belonging to the campus where the patient is receiving treatment.  List of dedicated fax numbers:  [place] DENIALS (Administrative/Medical Necessity) [phone] 2303 [facility] (Maternity/NICU/Pediatrics) [phone]   [facility] [phone]   [facility] [phone] 1636 [facility] [phone]   [address] [place] [address] [phone]   [facility] [phone]   [address] [place] [address] [address] [place] [address] [phone]   [address] [place] [address] [phone] 3441 [facility] [phone] 2720 [facility] 3000 32[facility] [phone]

## 2023-08-21 NOTE — ASSESSMENT & PLAN NOTE
Lab Results   Component Value Date    HGBA1C 7.9 (H) 07/20/2023       Recent Labs     08/21/23  0428 08/21/23  0635 08/21/23  0812 08/21/23  0956   POCGLU 115 132 165* 201*       Blood Sugar Average: Last 72 hrs:  (P) 129   · Maintained on insulin pump as outpatient  · Per further discussion with patient, pending surgical timing patient initially declined to be placed on insulin gtt when n.p.o.   After further education patient states he would likely be agreeable  · No surgery planned for today, transition back to insulin pump per pt request  · While NPO transition to insulin gtt with q 2hr accucheks  · Diabetic diet

## 2023-08-21 NOTE — ASSESSMENT & PLAN NOTE
· Blood pressure stable at time of admission  · Continue metoprolol, lisinopril  · Hold lisinopril perioperatively

## 2023-08-21 NOTE — PLAN OF CARE
Problem: PAIN - ADULT  Goal: Verbalizes/displays adequate comfort level or baseline comfort level  Description: Interventions:  - Encourage patient to monitor pain and request assistance  - Assess pain using appropriate pain scale  - Administer analgesics based on type and severity of pain and evaluate response  - Implement non-pharmacological measures as appropriate and evaluate response  - Consider cultural and social influences on pain and pain management  - Notify physician/advanced practitioner if interventions unsuccessful or patient reports new pain  Outcome: Progressing     Problem: INFECTION - ADULT  Goal: Absence or prevention of progression during hospitalization  Description: INTERVENTIONS:  - Assess and monitor for signs and symptoms of infection  - Monitor lab/diagnostic results  - Monitor all insertion sites, i.e. indwelling lines, tubes, and drains  - Monitor endotracheal if appropriate and nasal secretions for changes in amount and color  - Ware appropriate cooling/warming therapies per order  - Administer medications as ordered  - Instruct and encourage patient and family to use good hand hygiene technique  - Identify and instruct in appropriate isolation precautions for identified infection/condition  Outcome: Progressing  Goal: Absence of fever/infection during neutropenic period  Description: INTERVENTIONS:  - Monitor WBC    Outcome: Progressing

## 2023-08-22 LAB
ABO GROUP BLD: NORMAL
ABO GROUP BLD: NORMAL
ANION GAP SERPL CALCULATED.3IONS-SCNC: 6 MMOL/L
ATRIAL RATE: 104 BPM
ATRIAL RATE: 94 BPM
BACTERIA BLD CULT: NORMAL
BACTERIA BLD CULT: NORMAL
BASOPHILS # BLD AUTO: 0.08 THOUSANDS/ÂΜL (ref 0–0.1)
BASOPHILS NFR BLD AUTO: 1 % (ref 0–1)
BLD GP AB SCN SERPL QL: NEGATIVE
BUN SERPL-MCNC: 12 MG/DL (ref 5–25)
CALCIUM SERPL-MCNC: 8.9 MG/DL (ref 8.4–10.2)
CHLORIDE SERPL-SCNC: 98 MMOL/L (ref 96–108)
CO2 SERPL-SCNC: 27 MMOL/L (ref 21–32)
CREAT SERPL-MCNC: 1.15 MG/DL (ref 0.6–1.3)
EOSINOPHIL # BLD AUTO: 0.99 THOUSAND/ÂΜL (ref 0–0.61)
EOSINOPHIL NFR BLD AUTO: 12 % (ref 0–6)
ERYTHROCYTE [DISTWIDTH] IN BLOOD BY AUTOMATED COUNT: 13.8 % (ref 11.6–15.1)
GFR SERPL CREATININE-BSD FRML MDRD: 68 ML/MIN/1.73SQ M
GLUCOSE SERPL-MCNC: 137 MG/DL (ref 65–140)
GLUCOSE SERPL-MCNC: 141 MG/DL (ref 65–140)
GLUCOSE SERPL-MCNC: 154 MG/DL (ref 65–140)
GLUCOSE SERPL-MCNC: 164 MG/DL (ref 65–140)
GLUCOSE SERPL-MCNC: 213 MG/DL (ref 65–140)
GLUCOSE SERPL-MCNC: 224 MG/DL (ref 65–140)
GLUCOSE SERPL-MCNC: 249 MG/DL (ref 65–140)
GLUCOSE SERPL-MCNC: 289 MG/DL (ref 65–140)
GLUCOSE SERPL-MCNC: 315 MG/DL (ref 65–140)
GLUCOSE SERPL-MCNC: 93 MG/DL (ref 65–140)
HCT VFR BLD AUTO: 33.8 % (ref 36.5–49.3)
HGB BLD-MCNC: 10.8 G/DL (ref 12–17)
IMM GRANULOCYTES # BLD AUTO: 0.07 THOUSAND/UL (ref 0–0.2)
IMM GRANULOCYTES NFR BLD AUTO: 1 % (ref 0–2)
LYMPHOCYTES # BLD AUTO: 1 THOUSANDS/ÂΜL (ref 0.6–4.47)
LYMPHOCYTES NFR BLD AUTO: 12 % (ref 14–44)
MCH RBC QN AUTO: 31.3 PG (ref 26.8–34.3)
MCHC RBC AUTO-ENTMCNC: 32 G/DL (ref 31.4–37.4)
MCV RBC AUTO: 98 FL (ref 82–98)
MONOCYTES # BLD AUTO: 1.06 THOUSAND/ÂΜL (ref 0.17–1.22)
MONOCYTES NFR BLD AUTO: 13 % (ref 4–12)
NEUTROPHILS # BLD AUTO: 5.29 THOUSANDS/ÂΜL (ref 1.85–7.62)
NEUTS SEG NFR BLD AUTO: 61 % (ref 43–75)
NRBC BLD AUTO-RTO: 0 /100 WBCS
P AXIS: 60 DEGREES
P AXIS: 69 DEGREES
PLATELET # BLD AUTO: 380 THOUSANDS/UL (ref 149–390)
PMV BLD AUTO: 8.6 FL (ref 8.9–12.7)
POTASSIUM SERPL-SCNC: 5 MMOL/L (ref 3.5–5.3)
PR INTERVAL: 162 MS
PR INTERVAL: 170 MS
QRS AXIS: 43 DEGREES
QRS AXIS: 69 DEGREES
QRSD INTERVAL: 78 MS
QRSD INTERVAL: 82 MS
QT INTERVAL: 334 MS
QT INTERVAL: 348 MS
QTC INTERVAL: 435 MS
QTC INTERVAL: 439 MS
RBC # BLD AUTO: 3.45 MILLION/UL (ref 3.88–5.62)
RH BLD: POSITIVE
RH BLD: POSITIVE
SODIUM SERPL-SCNC: 131 MMOL/L (ref 135–147)
SPECIMEN EXPIRATION DATE: NORMAL
T WAVE AXIS: 70 DEGREES
T WAVE AXIS: 73 DEGREES
VENTRICULAR RATE: 104 BPM
VENTRICULAR RATE: 94 BPM
WBC # BLD AUTO: 8.49 THOUSAND/UL (ref 4.31–10.16)

## 2023-08-22 PROCEDURE — 99232 SBSQ HOSP IP/OBS MODERATE 35: CPT | Performed by: PHYSICIAN ASSISTANT

## 2023-08-22 PROCEDURE — 82948 REAGENT STRIP/BLOOD GLUCOSE: CPT

## 2023-08-22 PROCEDURE — 86850 RBC ANTIBODY SCREEN: CPT | Performed by: PHYSICIAN ASSISTANT

## 2023-08-22 PROCEDURE — 0Y6J0Z2 DETACHMENT AT LEFT LOWER LEG, MID, OPEN APPROACH: ICD-10-PCS | Performed by: FAMILY MEDICINE

## 2023-08-22 PROCEDURE — 80048 BASIC METABOLIC PNL TOTAL CA: CPT | Performed by: PHYSICIAN ASSISTANT

## 2023-08-22 PROCEDURE — 88311 DECALCIFY TISSUE: CPT | Performed by: PATHOLOGY

## 2023-08-22 PROCEDURE — 86901 BLOOD TYPING SEROLOGIC RH(D): CPT | Performed by: PHYSICIAN ASSISTANT

## 2023-08-22 PROCEDURE — C9290 INJ, BUPIVACAINE LIPOSOME: HCPCS | Performed by: ANESTHESIOLOGY

## 2023-08-22 PROCEDURE — 93010 ELECTROCARDIOGRAM REPORT: CPT | Performed by: INTERNAL MEDICINE

## 2023-08-22 PROCEDURE — 27880 AMPUTATION OF LOWER LEG: CPT | Performed by: PHYSICIAN ASSISTANT

## 2023-08-22 PROCEDURE — 27880 AMPUTATION OF LOWER LEG: CPT | Performed by: SURGERY

## 2023-08-22 PROCEDURE — 85025 COMPLETE CBC W/AUTO DIFF WBC: CPT | Performed by: PHYSICIAN ASSISTANT

## 2023-08-22 PROCEDURE — 88307 TISSUE EXAM BY PATHOLOGIST: CPT | Performed by: PATHOLOGY

## 2023-08-22 PROCEDURE — 99232 SBSQ HOSP IP/OBS MODERATE 35: CPT | Performed by: SURGERY

## 2023-08-22 PROCEDURE — 86900 BLOOD TYPING SEROLOGIC ABO: CPT | Performed by: PHYSICIAN ASSISTANT

## 2023-08-22 RX ORDER — PROPOFOL 10 MG/ML
INJECTION, EMULSION INTRAVENOUS AS NEEDED
Status: DISCONTINUED | OUTPATIENT
Start: 2023-08-22 | End: 2023-08-22

## 2023-08-22 RX ORDER — SODIUM CHLORIDE 9 MG/ML
75 INJECTION, SOLUTION INTRAVENOUS CONTINUOUS
Status: DISCONTINUED | OUTPATIENT
Start: 2023-08-22 | End: 2023-08-22

## 2023-08-22 RX ORDER — MIDAZOLAM HYDROCHLORIDE 2 MG/2ML
INJECTION, SOLUTION INTRAMUSCULAR; INTRAVENOUS
Status: DISCONTINUED | OUTPATIENT
Start: 2023-08-22 | End: 2023-08-22

## 2023-08-22 RX ORDER — OXYCODONE HYDROCHLORIDE 5 MG/1
5 TABLET ORAL EVERY 4 HOURS PRN
Status: DISCONTINUED | OUTPATIENT
Start: 2023-08-22 | End: 2023-08-24

## 2023-08-22 RX ORDER — FENTANYL CITRATE/PF 50 MCG/ML
25 SYRINGE (ML) INJECTION
Status: DISCONTINUED | OUTPATIENT
Start: 2023-08-22 | End: 2023-08-22

## 2023-08-22 RX ORDER — SODIUM CHLORIDE 9 MG/ML
INJECTION, SOLUTION INTRAVENOUS CONTINUOUS PRN
Status: DISCONTINUED | OUTPATIENT
Start: 2023-08-22 | End: 2023-08-22

## 2023-08-22 RX ORDER — OXYCODONE HYDROCHLORIDE 10 MG/1
10 TABLET ORAL EVERY 4 HOURS PRN
Status: DISCONTINUED | OUTPATIENT
Start: 2023-08-22 | End: 2023-08-24

## 2023-08-22 RX ORDER — METHOCARBAMOL 500 MG/1
500 TABLET, FILM COATED ORAL EVERY 6 HOURS PRN
Status: DISCONTINUED | OUTPATIENT
Start: 2023-08-22 | End: 2023-08-27 | Stop reason: HOSPADM

## 2023-08-22 RX ORDER — HYDROMORPHONE HCL/PF 1 MG/ML
0.5 SYRINGE (ML) INJECTION
Status: DISCONTINUED | OUTPATIENT
Start: 2023-08-22 | End: 2023-08-22

## 2023-08-22 RX ORDER — SODIUM CHLORIDE, SODIUM LACTATE, POTASSIUM CHLORIDE, CALCIUM CHLORIDE 600; 310; 30; 20 MG/100ML; MG/100ML; MG/100ML; MG/100ML
INJECTION, SOLUTION INTRAVENOUS CONTINUOUS PRN
Status: DISCONTINUED | OUTPATIENT
Start: 2023-08-22 | End: 2023-08-22

## 2023-08-22 RX ORDER — HYDROMORPHONE HCL/PF 1 MG/ML
1 SYRINGE (ML) INJECTION
Status: DISCONTINUED | OUTPATIENT
Start: 2023-08-22 | End: 2023-08-26

## 2023-08-22 RX ORDER — FENTANYL CITRATE 50 UG/ML
INJECTION, SOLUTION INTRAMUSCULAR; INTRAVENOUS
Status: DISCONTINUED | OUTPATIENT
Start: 2023-08-22 | End: 2023-08-22

## 2023-08-22 RX ORDER — LIDOCAINE HYDROCHLORIDE 10 MG/ML
INJECTION, SOLUTION EPIDURAL; INFILTRATION; INTRACAUDAL; PERINEURAL AS NEEDED
Status: DISCONTINUED | OUTPATIENT
Start: 2023-08-22 | End: 2023-08-22

## 2023-08-22 RX ORDER — FENTANYL CITRATE 50 UG/ML
INJECTION, SOLUTION INTRAMUSCULAR; INTRAVENOUS AS NEEDED
Status: DISCONTINUED | OUTPATIENT
Start: 2023-08-22 | End: 2023-08-22

## 2023-08-22 RX ORDER — BUPIVACAINE HYDROCHLORIDE 5 MG/ML
INJECTION, SOLUTION EPIDURAL; INTRACAUDAL
Status: DISCONTINUED | OUTPATIENT
Start: 2023-08-22 | End: 2023-08-22

## 2023-08-22 RX ORDER — ROCURONIUM BROMIDE 10 MG/ML
INJECTION, SOLUTION INTRAVENOUS AS NEEDED
Status: DISCONTINUED | OUTPATIENT
Start: 2023-08-22 | End: 2023-08-22

## 2023-08-22 RX ORDER — ONDANSETRON 2 MG/ML
INJECTION INTRAMUSCULAR; INTRAVENOUS AS NEEDED
Status: DISCONTINUED | OUTPATIENT
Start: 2023-08-22 | End: 2023-08-22

## 2023-08-22 RX ORDER — ONDANSETRON 2 MG/ML
4 INJECTION INTRAMUSCULAR; INTRAVENOUS ONCE AS NEEDED
Status: DISCONTINUED | OUTPATIENT
Start: 2023-08-22 | End: 2023-08-22

## 2023-08-22 RX ORDER — ACETAMINOPHEN 325 MG/1
975 TABLET ORAL EVERY 6 HOURS SCHEDULED
Status: DISCONTINUED | OUTPATIENT
Start: 2023-08-22 | End: 2023-08-27 | Stop reason: HOSPADM

## 2023-08-22 RX ORDER — MIDAZOLAM HYDROCHLORIDE 2 MG/2ML
INJECTION, SOLUTION INTRAMUSCULAR; INTRAVENOUS AS NEEDED
Status: DISCONTINUED | OUTPATIENT
Start: 2023-08-22 | End: 2023-08-22

## 2023-08-22 RX ADMIN — LORAZEPAM 0.5 MG: 0.5 TABLET ORAL at 15:10

## 2023-08-22 RX ADMIN — VANCOMYCIN HYDROCHLORIDE 1250 MG: 5 INJECTION, POWDER, LYOPHILIZED, FOR SOLUTION INTRAVENOUS at 16:04

## 2023-08-22 RX ADMIN — FENTANYL CITRATE 50 MCG: 50 INJECTION, SOLUTION INTRAMUSCULAR; INTRAVENOUS at 11:43

## 2023-08-22 RX ADMIN — LIDOCAINE HYDROCHLORIDE 100 MG: 10 INJECTION, SOLUTION EPIDURAL; INFILTRATION; INTRACAUDAL; PERINEURAL at 12:15

## 2023-08-22 RX ADMIN — CEFEPIME HYDROCHLORIDE 2000 MG: 2 INJECTION, SOLUTION INTRAVENOUS at 20:36

## 2023-08-22 RX ADMIN — HYDROMORPHONE HYDROCHLORIDE 1 MG: 1 INJECTION, SOLUTION INTRAMUSCULAR; INTRAVENOUS; SUBCUTANEOUS at 18:11

## 2023-08-22 RX ADMIN — OXYCODONE HYDROCHLORIDE 10 MG: 10 TABLET ORAL at 06:14

## 2023-08-22 RX ADMIN — HEPARIN SODIUM 5000 UNITS: 5000 INJECTION INTRAVENOUS; SUBCUTANEOUS at 21:21

## 2023-08-22 RX ADMIN — ACETAMINOPHEN 325MG 975 MG: 325 TABLET ORAL at 18:04

## 2023-08-22 RX ADMIN — HYDROMORPHONE HYDROCHLORIDE 0.5 MG: 1 INJECTION, SOLUTION INTRAMUSCULAR; INTRAVENOUS; SUBCUTANEOUS at 01:01

## 2023-08-22 RX ADMIN — DEXTROSE AND SODIUM CHLORIDE 75 ML/HR: 5; .9 INJECTION, SOLUTION INTRAVENOUS at 05:00

## 2023-08-22 RX ADMIN — FENTANYL CITRATE 50 MCG: 50 INJECTION, SOLUTION INTRAMUSCULAR; INTRAVENOUS at 12:15

## 2023-08-22 RX ADMIN — NICOTINE 1 PATCH: 21 PATCH, EXTENDED RELEASE TRANSDERMAL at 15:05

## 2023-08-22 RX ADMIN — BUPIVACAINE HYDROCHLORIDE 10 ML: 5 INJECTION, SOLUTION EPIDURAL; INTRACAUDAL; PERINEURAL at 11:46

## 2023-08-22 RX ADMIN — MIDAZOLAM 2 MG: 1 INJECTION INTRAMUSCULAR; INTRAVENOUS at 11:43

## 2023-08-22 RX ADMIN — ROCURONIUM BROMIDE 50 MG: 10 INJECTION INTRAVENOUS at 12:15

## 2023-08-22 RX ADMIN — HYDROMORPHONE HYDROCHLORIDE 1 MG: 1 INJECTION, SOLUTION INTRAMUSCULAR; INTRAVENOUS; SUBCUTANEOUS at 14:59

## 2023-08-22 RX ADMIN — GABAPENTIN 300 MG: 300 CAPSULE ORAL at 20:37

## 2023-08-22 RX ADMIN — SODIUM CHLORIDE 75 ML/HR: 0.9 INJECTION, SOLUTION INTRAVENOUS at 15:15

## 2023-08-22 RX ADMIN — NICOTINE 1 PATCH: 21 PATCH, EXTENDED RELEASE TRANSDERMAL at 08:00

## 2023-08-22 RX ADMIN — GABAPENTIN 300 MG: 300 CAPSULE ORAL at 16:03

## 2023-08-22 RX ADMIN — SODIUM CHLORIDE: 0.9 INJECTION, SOLUTION INTRAVENOUS at 13:08

## 2023-08-22 RX ADMIN — ACETAMINOPHEN 325MG 975 MG: 325 TABLET ORAL at 14:57

## 2023-08-22 RX ADMIN — OXYCODONE HYDROCHLORIDE 10 MG: 10 TABLET ORAL at 20:36

## 2023-08-22 RX ADMIN — METOPROLOL SUCCINATE 100 MG: 50 TABLET, EXTENDED RELEASE ORAL at 08:00

## 2023-08-22 RX ADMIN — ONDANSETRON 4 MG: 2 INJECTION INTRAMUSCULAR; INTRAVENOUS at 12:15

## 2023-08-22 RX ADMIN — BUPIVACAINE HYDROCHLORIDE 10 ML: 5 INJECTION, SOLUTION EPIDURAL; INTRACAUDAL at 12:02

## 2023-08-22 RX ADMIN — PROPOFOL 150 MG: 10 INJECTION, EMULSION INTRAVENOUS at 12:15

## 2023-08-22 RX ADMIN — HYDROMORPHONE HYDROCHLORIDE 0.5 MG: 1 INJECTION, SOLUTION INTRAMUSCULAR; INTRAVENOUS; SUBCUTANEOUS at 07:58

## 2023-08-22 RX ADMIN — BUPIVACAINE 10 ML: 13.3 INJECTION, SUSPENSION, LIPOSOMAL INFILTRATION at 11:46

## 2023-08-22 RX ADMIN — METHOCARBAMOL 500 MG: 500 TABLET ORAL at 21:21

## 2023-08-22 RX ADMIN — CEFEPIME HYDROCHLORIDE 2000 MG: 2 INJECTION, SOLUTION INTRAVENOUS at 07:58

## 2023-08-22 RX ADMIN — SODIUM CHLORIDE 1 UNITS/HR: 9 INJECTION, SOLUTION INTRAVENOUS at 05:01

## 2023-08-22 RX ADMIN — SODIUM CHLORIDE: 0.9 INJECTION, SOLUTION INTRAVENOUS at 11:57

## 2023-08-22 RX ADMIN — OXYCODONE HYDROCHLORIDE 10 MG: 10 TABLET ORAL at 15:30

## 2023-08-22 RX ADMIN — ATORVASTATIN CALCIUM 40 MG: 40 TABLET, FILM COATED ORAL at 16:03

## 2023-08-22 RX ADMIN — HYDROMORPHONE HYDROCHLORIDE 1 MG: 1 INJECTION, SOLUTION INTRAMUSCULAR; INTRAVENOUS; SUBCUTANEOUS at 22:07

## 2023-08-22 NOTE — DISCHARGE INSTR - AVS FIRST PAGE
Vikki Ambrose Instructions  Dr. Radha Gonzales MD, [facility]  [phone]    1. General: You will feel pulling sensations around the wound or funny aches and pains around the incisions. This is normal. Even minor surgery is a change in your body and this is your body's way of reacting to it. 2. Wound care: Wash incision with soap and water daily. Continue daily compressive dressing until edema fully resolved. Do not use any salves, creams, lotions at incision site. Use ice for the first 5 days after surgery. Do not use for longer than 20 minutes at a time. Use ice 5 times per day. Elevate left stump for edema. 3. Water: You may shower over the wounds. Do not bathe or use a pool or hot tub until cleared by the physician. If you were discharged with a drain, make sure drain site is covered with plastic wrap before showering. 4. Activity: No weightbearing to left lower extremity. 5. Diet: You may resume a regular diabetic diet. 6. Medications: Resume all of your previous medications, unless told otherwise by the doctor. Avoid aspirin products for 2-3 days after the date of surgery. You may, at that time, begin to take them again. Use Tylenol and Ibuprofen for pain control. You may alternate these medications every 3 hours. For example: you may take Tylenol at noon, Ibuprofen at 3:00 p.m., and Tylenol again at 6:00 p.m., etc.  You should use ice to assist with pain control as above. You do not need to take narcotic pain medication unless you are having significant pain. If you were prescribed a narcotic pain medication containing Tylenol, such as Percocet or Norco, do not use supplemental Tylenol. 7. Driving: You will need someone to drive you home on the day of surgery or discharge. Do not drive or make any important decisions while on narcotic pain medication or 24 hours and after anesthesia or sedation for surgery.  Generally, you may drive when your off all narcotic pain medications and you are comfortable. 8. Upset Stomach: You may take Maalox, Tums, or similar items for an upset stomach. If your narcotic pain medication causes an upset stomach, do not take it on an empty stomach. Try taking it with at least some crackers or toast.     9. Constipation: Patients often experience constipation after surgery. You may take over-the-counter medication for this, such as Metamucil, Senokot, Dulcolax, milk of magnesia, etc. You may take a suppository unless you have had anorectal surgery such as a procedure on your hemorrhoids. If you experience significant nausea or vomiting after abdominal surgery, call the office before trying any of these medications. 10. Call the office: If you are experiencing any of the following: fevers above 101.5°, significant nausea or vomiting, if the wound develops drainage and/or there is excessive redness around the wound, or if you have significant diarrhea or other worsening symptoms. 11. Pain: You may be given a prescription for pain medication. This will be sent to your pharmacy prior to discharge. 12. Sexual Activity: You may resume sexual activity when you feel ready and comfortable and your incision is sealed and healed without apparent infection risk. 13. Urination: If you have not urinated in 6 hours, go directly to the ER for evaluation for urinary retention. 14. Follow-up in 2 weeks.

## 2023-08-22 NOTE — PROGRESS NOTES
[address]  Progress Note  Name: Neno Boyer  MRN: [id number]  Unit/Bed#: OR POOL I Date of Admission: 8/19/2023   Date of Service: 8/22/2023 I Hospital Day: 3    Assessment/Plan   * Osteomyelitis [facility])  Assessment & Plan  · Patient initially had been referred to the emergency department due to worsening left foot erythema and swelling  · MRI left foot completed 8/17: Status post transmetatarsal amputation with prominent skin ulceration at the distal aspect of the postoperative site including marrow changes involving the second through fifth residual proximal metatarsals indicating osteomyelitis. No evidence of drainable abscess collection in this unenhanced examination. · Previous discussion held between patient and podiatry -patient wishes to move forward with BKA rather than TMA revision given concern over healing ability.   Consult general surgery  · Plan for BKA today  · Continue IV cefepime/vancomycin  · Blood cultures negative x2 after 48 hours  · Trend WBC and fever curve    Type 1 diabetes mellitus with diabetic neuropathy [facility])  Assessment & Plan  Lab Results   Component Value Date    HGBA1C 7.9 (H) 07/20/2023       Recent Labs     08/22/23  0613 08/22/23  0809 08/22/23  1003 08/22/23  1120   POCGLU 164* 213* 315* 289*       Blood Sugar Average: Last 72 hrs:  (P) [id number]   · Maintained on insulin pump as outpatient  · While NPO/until tolerating diet transition to insulin gtt with q 2hr accucheks -previously had tendency towards hypoglycemia and was started on D5 NSS however now with hyperglycemia    Hyponatremia  Assessment & Plan  · Appears to be a chronic issue  · Sodium 131  · Osmolality 284  · Urine osmolality 362  · Urine sodium 52  · Appears euvolemic  · Trend BMP    Stage 2 chronic kidney disease  Assessment & Plan  Lab Results   Component Value Date    EGFR 68 08/22/2023    EGFR 69 08/21/2023    EGFR 66 08/20/2023    CREATININE 1.15 08/22/2023 CREATININE 1.13 2023    CREATININE 1.17 2023   · Baseline creatinine 1.0-1.2  · Creatinine within baseline on admission  · Trend BMP    Cigarette nicotine dependence without complication  Assessment & Plan  · Nicotine patch ordered  ·  cessation    Benign essential hypertension  Assessment & Plan  · Blood pressure stable at time of admission  · Continue metoprolol, lisinopril  · Hold lisinopril perioperatively        VTE Pharmacologic Prophylaxis: VTE Score: 3 Moderate Risk (Score 3-4) - Pharmacological DVT Prophylaxis Ordered: heparin. Patient Centered Rounds: I performed bedside rounds with nursing staff today. Discussions with Specialists or Other Care Team Provider: CECE    Education and Discussions with Family / Patient: Patient declined call to . Total Time Spent on Date of Encounter in care of patient: 35 minutes This time was spent on one or more of the following: performing physical exam; counseling and coordination of care; obtaining or reviewing history; documenting in the medical record; reviewing/ordering tests, medications or procedures; communicating with other healthcare professionals and discussing with patient's family/caregivers. Current Length of Stay: 3 day(s)  Current Patient Status: Inpatient   Certification Statement: The patient will continue to require additional inpatient hospital stay due to Left BKA  Discharge Plan: Anticipate discharge in 48-72 hrs to discharge location to be determined pending rehab evaluations. Code Status: Level 1 - Full Code    Subjective:   Patient awaiting surgery scheduled for later this morning. Denies chest pain/palpitations, shortness of breath, nausea/vomiting, abdominal pain. Reports left foot pain.     Objective:     Vitals:   Temp (24hrs), Av.3 °F (36.8 °C), Min:98.1 °F (36.7 °C), Max:98.5 °F (36.9 °C)    Temp:  [98.1 °F (36.7 °C)-98.5 °F (36.9 °C)] 98.5 °F (36.9 °C)  HR:  [85-95] 88  Resp:  [16-18] 16  BP: (131-150)/(66-81) 150/66  SpO2:  [95 %-100 %] 98 %  Body mass index is 20.34 kg/m². Input and Output Summary (last 24 hours): Intake/Output Summary (Last 24 hours) at 8/22/2023 1222  Last data filed at 8/21/2023 1501  Gross per 24 hour   Intake 700 ml   Output --   Net 700 ml       Physical Exam:   Physical Exam  Vitals and nursing note reviewed. Constitutional:       General: He is not in acute distress. Appearance: He is well-developed. Comments: No acute distress   HENT:      Head: Normocephalic and atraumatic. Eyes:      General: No scleral icterus. Extraocular Movements: Extraocular movements intact. Conjunctiva/sclera: Conjunctivae normal.   Cardiovascular:      Rate and Rhythm: Normal rate and regular rhythm. Heart sounds: No murmur heard. Pulmonary:      Effort: Pulmonary effort is normal. No respiratory distress. Breath sounds: Normal breath sounds. No wheezing, rhonchi or rales. Abdominal:      General: Bowel sounds are normal.      Palpations: Abdomen is soft. Tenderness: There is no abdominal tenderness. There is no guarding or rebound. Musculoskeletal:         General: No swelling. Cervical back: Normal range of motion. Comments: Able to move upper/lower extremities bilaterally. Left TMA   Skin:     General: Skin is warm and dry. Capillary Refill: Capillary refill takes less than 2 seconds. Neurological:      Mental Status: He is alert and oriented to person, place, and time.    Psychiatric:         Mood and Affect: Mood normal.         Speech: Speech normal.         Behavior: Behavior normal.          Additional Data:     Labs:  Results from last 7 days   Lab Units 08/22/23  0504   WBC Thousand/uL 8.49   HEMOGLOBIN g/dL 10.8*   HEMATOCRIT % 33.8*   PLATELETS Thousands/uL 380   NEUTROS PCT % 61   LYMPHS PCT % 12*   MONOS PCT % 13*   EOS PCT % 12*     Results from last 7 days   Lab Units 08/22/23  0504 08/20/23  0443 08/19/23  0816 SODIUM mmol/L 131*   < > 130*   POTASSIUM mmol/L 5.0   < > 4.9   CHLORIDE mmol/L 98   < > 94*   CO2 mmol/L 27   < > 28   BUN mg/dL 12   < > 12   CREATININE mg/dL 1.15   < > 1.24   ANION GAP mmol/L 6   < > 8   CALCIUM mg/dL 8.9   < > 10.0   ALBUMIN g/dL  --   --  4.1   TOTAL BILIRUBIN mg/dL  --   --  0.45   ALK PHOS U/L  --   --  157*   ALT U/L  --   --  11   AST U/L  --   --  20   GLUCOSE RANDOM mg/dL 154*   < > 110    < > = values in this interval not displayed. Results from last 7 days   Lab Units 08/19/23  0841   INR  0.88     Results from last 7 days   Lab Units 08/22/23  1120 08/22/23  1003 08/22/23  0809 08/22/23  7577 08/22/23  0457 08/21/23  2006 08/21/23  1641 08/21/23  1212 08/21/23  0956 08/21/23  0812 08/21/23  0635 08/21/23  0428   POC GLUCOSE mg/dl 289* 315* 213* 164* 141* 168* 110 217* 201* 165* 132 115         Results from last 7 days   Lab Units 08/19/23  0841 08/17/23  0937   LACTIC ACID mmol/L 1.4 1.5   PROCALCITONIN ng/ml 0.24  --        Lines/Drains:  Invasive Devices     Peripheral Intravenous Line  Duration           Peripheral IV 08/20/23 Left;Ventral (anterior) Forearm 2 days    Peripheral IV 08/21/23 Left Antecubital 1 day          Line  Duration           Pump Device Insulin pump Anterior; Left Abdomen 53 days                      Imaging: No pertinent imaging reviewed. Recent Cultures (last 7 days):   Results from last 7 days   Lab Units 08/19/23  0841 08/17/23  0944 08/17/23  0937 08/16/23  1301   BLOOD CULTURE  No Growth at 48 hrs. No Growth at 48 hrs. No Growth After 4 Days. No Growth After 4 Days.   --    GRAM STAIN RESULT   --   --   --  2+ Gram positive cocci in clusters*  1+ Gram negative rods*  No polys seen*   WOUND CULTURE   --   --   --  4+ Growth of Proteus mirabilis*  4+ Growth of Methicillin Resistant Staphylococcus aureus*       Last 24 Hours Medication List:   Current Facility-Administered Medications   Medication Dose Route Frequency Provider Last Rate   • [MAR Hold] acetaminophen  650 mg Oral Q6H PRN Dorsie Castleman, PA-C     • [facility] Hold] atorvastatin  40 mg Oral Daily With [address] [facility] Wander Carrizales PA-C     • bupivacaine liposomal  20 mL Infiltration Once Mony Palomares MD     • [facility] Hold] cefepime  2,000 mg Intravenous Q12H Dorsie Castleman, PA-C 2,000 mg (08/22/23 0758)   • dextrose 5 % and sodium chloride 0.9 %  50 mL/hr Intravenous Continuous Dorsie Castleman, PA-C 75 mL/hr (08/22/23 0500)   • [MAR Hold] dextrose  25 mL Intravenous Once Shereen Menendez PA-C     • [MAR Hold] gabapentin  300 mg Oral TID Dorsie Castleman, PA-C     • [facility] Hold] heparin (porcine)  5,000 Units Subcutaneous [facility] Jose Cruz Vidal PA-C     • [facility] Hold] HYDROmorphone  0.5 mg Intravenous Q4H PRN Shereen Menendez PA-C     • [facility] Hold] insulin lispro  300 Units Subcutaneous Insulin Pump Daily PRN Dorsie Castleman, PA-C     • insulin regular (HumuLIN R,NovoLIN R) 1 Units/mL in sodium chloride 0.9 % 100 mL infusion  0.3-21 Units/hr Intravenous Titrated Dorsie Castleman, PA-C 1 Units/hr (08/22/23 0615)   • [MAR Hold] LORazepam  0.5 mg Oral Q8H PRN Dorsie Castleman, PA-C     • [facility] Hold] metoprolol succinate  100 mg Oral Daily Jose Cruz Vidal PA-C     • [facility] Hold] nicotine  1 patch Transdermal Daily Dorsie Castleman, [place]     • [facility] Hold] oxyCODONE  10 mg Oral Q6H PRN Dorsie Castleman, PA-C     • [facility] Hold] vancomycin  1,250 mg Intravenous Q24H Dorsie Castleman, PA-C          Today, Patient Was Seen By: Dorsie Castleman, PA-C    **Please Note: This note may have been constructed using a voice recognition system. **

## 2023-08-22 NOTE — ASSESSMENT & PLAN NOTE
· Appears to be a chronic issue  · Sodium 131  · Osmolality 284  · Urine osmolality 362  · Urine sodium 52  · Appears euvolemic  · Trend BMP

## 2023-08-22 NOTE — OP NOTE
BKA Procedure Note    Name: Miller Nobles   : 1960  MRN: [id number]  Date: 2023  Indications: The patient presented with a history and symptoms of Non-healing wound of left TMA/Osteomyeletis. Pre-operative Diagnosis: Non-healing wound of left TMA/Osteomyeletis    Post-operative Diagnosis: Same    Procedure: Left Below the knee amputation    Surgeon: Tim Carrillo MD    Assistants: Korin Hartmann PA-C    Anesthesia: General endotracheal anesthesia     ASA Class: 3    Note: No qualified resident available. Physician Assistant medically necessary for surgical safety of case and for suturing, retraction, and hemostasis. Procedure Details: The patient was seen again in the Holding Room. The risks, benefits, complications, treatment options, and expected outcomes were discussed with the patient and/or family. The possibilities of reaction to medication, pulmonary aspiration, perforation of viscus, bleeding, recurrent infection, finding a normal appendix, the need for additional procedures, failure to diagnose a condition, and creating a complication requiring transfusion or operation were discussed. There was concurrence with the proposed plan and informed consent was obtained. The site of surgery was properly noted/marked. The patient was taken to Operating Room, identified as Miller Nobles and the procedure verified. A Time Out was held after the prep and draping,  and the above information confirmed. The patient was placed in the supine position. The appropriate lower extremity  was prepped and draped in a sterile fashion. Anterior and posterior skin incision were outlined with a marking pen. The anterior skin was made 12cm below the tibial tuberosity and extended medially and laterally toward the edges of the gastrocnemius muscle. The skin incision was extended distally on either side parallel to the tibia for 12cm. The skin and subcutaneous tissues were incised down to the fascia.  The greater and short saphenous veins on the medial and posterior aspect of the leg, respectively were ligated and divided. The fascia and muscles were divided with electrocautery at the same level of the skin incision. The anterior tibial vessels were ligated and divided. The tibial periosteum was incised circumferentially with cautery. A periosteal elevator was used to strip periosteum proximally for 2cm and the tibia was transected with a bone saw. The fibula was transected 2cm proximal to tibia with bone cutter. The amputation was completed with knife. Bleeding vessels were ligated with vicryl sutures and cautery. Sharp bony edges were filed. The wound was irrigated. The fascia as approxiamated using 0-Vicryl sutures in and interrupted fashion. Next the subcutaneous tissues were closed with interrupted 3-0 vicryl. Skin was closed with staples. The wound was dressed and first cast applied. Patient tolerated the procedure well and was taken to PACU in stable condition. Findings:  Nonhealing wound and osteomyelitis     Estimated Blood Loss:  Minimal           Drains: No               Specimens: Left lower extremity   Order Name Source Comment Collection Info Order Time   TISSUE EXAM Soft Tissue, Other  Collected By: Jairo Mays MD 8/22/2023  1:03 PM     Release to patient through [employer]   Immediate                   Complications:  None; patient tolerated the procedure well. Disposition: PACU            Condition: Stable    Attending Attestation: I was present for the entire procedure and qualified resident physician was not available.     Signature:   Jairo Mays MD  Date: 8/22/2023 Time: 1:19 PM

## 2023-08-22 NOTE — QUICK NOTE
Post op Check      S: Doing well. Pain well controlled. No acute complaints. O: /68   Pulse 84   Temp 98 °F (36.7 °C)   Resp 20   Ht 6' (1.829 m)   Wt 68 kg (150 lb)   SpO2 94%   BMI 20.34 kg/m²     Physical Exam:  General: AAOx3, no acute distress  Heart: regular rate  Lungs: normal work of breathing, no acute respiratory distress  Abdomen: soft. Appropriately tender. Incisions CDI  Skin: warm, well perfused  Extremities: Left lower extremity stump dressing/splint intact.       Assessment:  66 y.o. male POD#0 s/p Procedure(s) (LRB):  AMPUTATION BELOW KNEE (BKA) (Left)  AVSS        Plan:  - diet as tolerated  - prn pain control, antiemetic regimen  - dvt ppx  - PT/OT  - encourage deep breathing, IS        Jinny Gonzalez PA-C  8/22/2023 4:34 PM

## 2023-08-22 NOTE — ASSESSMENT & PLAN NOTE
Lab Results   Component Value Date    EGFR 68 08/22/2023    EGFR 69 08/21/2023    EGFR 66 08/20/2023    CREATININE 1.15 08/22/2023    CREATININE 1.13 08/21/2023    CREATININE 1.17 08/20/2023   · Baseline creatinine 1.0-1.2  · Creatinine within baseline on admission  · Trend BMP

## 2023-08-22 NOTE — ANESTHESIA PROCEDURE NOTES
Peripheral Block    Patient location during procedure: holding area  Start time: 8/22/2023 12:02 PM  Reason for block: primary anesthetic, at surgeon's request and post-op pain management  Staffing  Performed by: Elda Roche MD  Authorized by: Elda Roche MD    Preanesthetic Checklist  Completed: patient identified, IV checked, site marked, risks and benefits discussed, surgical consent, monitors and equipment checked, pre-op evaluation and timeout performed  Peripheral Block  Patient position: right lateral  Prep: ChloraPrep  Patient monitoring: continuous pulse oximetry, frequent blood pressure checks and heart rate  Block type: Popliteal  Laterality: left  Injection technique: single-shot  Procedures: ultrasound guided, Ultrasound guidance required for the procedure to increase accuracy and safety of medication placement and decrease risk of complications.   Ultrasound permanent image savedbupivacaine (PF) (MARCAINE) 0.5 % injection 20 mL - Perineural   10 mL - 8/22/2023 12:02:00 PM  bupivacaine liposomal (EXPAREL) 1.3 % injection 20 mL - Perineural   10 mL - 8/22/2023 12:02:00 PM  Needle  Needle type: Stimuplex   Needle gauge: 20 G  Needle length: 4 in  Needle localization: ultrasound guidance  Needle insertion depth: 2 cm  Assessment  Injection assessment: frequent aspiration, injected with ease, negative aspiration, no paresthesia on injection, no symptoms of intraneural/intravenous injection, negative for heart rate change, needle tip visualized at all times and incremental injection  Paresthesia pain: none  Post-procedure:  site cleaned  patient tolerated the procedure well with no immediate complications  Additional Notes  Left popliteal nerve block, ultrasound guidance  10 cc exparel, 10 cc 0.5% bupiv

## 2023-08-22 NOTE — ANESTHESIA PREPROCEDURE EVALUATION
Procedure:  AMPUTATION BELOW KNEE (BKA) (Left: Leg Lower)    Relevant Problems   CARDIO   (+) Benign essential hypertension   (+) Mixed hyperlipidemia      ENDO   (+) Type 1 diabetes mellitus with diabetic neuropathy (HCC)      /RENAL   (+) Stage 2 chronic kidney disease      MUSCULOSKELETAL   (+) Gout of ankle      NEURO/PSYCH   (+) Chronic pain      Nervous and Auditory   (+) Chronic fatigue syndrome      Other   (+) Alcohol abuse   (+) Cigarette nicotine dependence without complication   (+) Insulin pump in place   (+) Nicotine abuse   (+) Osteomyelitis (HCC)   (+) S/P transmetatarsal amputation of foot, left (HCC)      Hyponatremia    Physical Exam    Airway    Mallampati score: III  TM Distance: >3 FB  Neck ROM: full     Dental       Cardiovascular      Pulmonary      Other Findings        Anesthesia Plan  ASA Score- 3     Anesthesia Type- general with ASA Monitors. Additional Monitors:   Airway Plan: ETT. Comment: GA with ETT, IV, antiemetics  Left femoral/popliteal blocks for postop pain control as requested by surgeon. Plan Factors-    Chart reviewed. EKG reviewed. Existing labs reviewed. Patient summary reviewed. Patient is a current smoker. Patient did not smoke on day of surgery. Induction- intravenous. Postoperative Plan- . Planned trial extubation    Informed Consent- Anesthetic plan and risks discussed with patient. I personally reviewed this patient with the CRNA. Discussed and agreed on the Anesthesia Plan with the CRNA. Ellie Lubin

## 2023-08-22 NOTE — ASSESSMENT & PLAN NOTE
· Patient initially had been referred to the emergency department due to worsening left foot erythema and swelling  · MRI left foot completed 8/17: Status post transmetatarsal amputation with prominent skin ulceration at the distal aspect of the postoperative site including marrow changes involving the second through fifth residual proximal metatarsals indicating osteomyelitis. No evidence of drainable abscess collection in this unenhanced examination. · Previous discussion held between patient and podiatry -patient wishes to move forward with BKA rather than TMA revision given concern over healing ability.   Consult general surgery  · Plan for BKA today  · Continue IV cefepime/vancomycin  · Blood cultures negative x2 after 48 hours  · Trend WBC and fever curve

## 2023-08-22 NOTE — PLAN OF CARE
Problem: Potential for Falls  Goal: Patient will remain free of falls  Description: INTERVENTIONS:  - Educate patient/family on patient safety including physical limitations  - Instruct patient to call for assistance with activity   - Consult OT/PT to assist with strengthening/mobility   - Keep Call bell within reach  - Keep bed low and locked with side rails adjusted as appropriate  - Keep care items and personal belongings within reach  - Initiate and maintain comfort rounds  - Make Fall Risk Sign visible to staff  - Offer Toileting every 2 Hours, in advance of need  - Initiate/Maintain bed/ chair alarm  - Obtain necessary fall risk management equipment: cane  - Apply yellow socks and bracelet for high fall risk patients  - Consider moving patient to room near nurses station  Outcome: Progressing     Problem: PAIN - ADULT  Goal: Verbalizes/displays adequate comfort level or baseline comfort level  Description: Interventions:  - Encourage patient to monitor pain and request assistance  - Assess pain using appropriate pain scale  - Administer analgesics based on type and severity of pain and evaluate response  - Implement non-pharmacological measures as appropriate and evaluate response  - Consider cultural and social influences on pain and pain management  - Notify physician/advanced practitioner if interventions unsuccessful or patient reports new pain  Outcome: Progressing     Problem: INFECTION - ADULT  Goal: Absence or prevention of progression during hospitalization  Description: INTERVENTIONS:  - Assess and monitor for signs and symptoms of infection  - Monitor lab/diagnostic results  - Monitor all insertion sites, i.e. indwelling lines, tubes, and drains  - Monitor endotracheal if appropriate and nasal secretions for changes in amount and color  - [place] appropriate cooling/warming therapies per order  - Administer medications as ordered  - Instruct and encourage patient and family to use good hand hygiene technique  - Identify and instruct in appropriate isolation precautions for identified infection/condition  Outcome: Progressing     Problem: SAFETY ADULT  Goal: Patient will remain free of falls  Description: INTERVENTIONS:  - Educate patient/family on patient safety including physical limitations  - Instruct patient to call for assistance with activity   - Consult OT/PT to assist with strengthening/mobility   - Keep Call bell within reach  - Keep bed low and locked with side rails adjusted as appropriate  - Keep care items and personal belongings within reach  - Initiate and maintain comfort rounds  - Make Fall Risk Sign visible to staff  - Offer Toileting every 2 Hours, in advance of need  - Initiate/Maintain bed/ chair alarm  - Obtain necessary fall risk management equipment: cane  - Apply yellow socks and bracelet for high fall risk patients  - Consider moving patient to room near nurses station  Outcome: Progressing  Goal: Maintain or return to baseline ADL function  Description: INTERVENTIONS:  -  Assess patient's ability to carry out ADLs; assess patient's baseline for ADL function and identify physical deficits which impact ability to perform ADLs (bathing, care of mouth/teeth, toileting, grooming, dressing, etc.)  - Assess/evaluate cause of self-care deficits   - Assess range of motion  - Assess patient's mobility; develop plan if impaired  - Assess patient's need for assistive devices and provide as appropriate  - Encourage maximum independence but intervene and supervise when necessary  - Involve family in performance of ADLs  - Assess for home care needs following discharge   - Consider OT consult to assist with ADL evaluation and planning for discharge  - Provide patient education as appropriate  Outcome: Progressing  Goal: Maintains/Returns to pre admission functional level  Description: INTERVENTIONS:  - Perform BMAT or MOVE assessment daily.   - Set and communicate daily mobility goal to care team and patient/family/caregiver. - Collaborate with rehabilitation services on mobility goals if consulted  - Perform Range of Motion 3 times a day. - Reposition patient every 2 hours.   - Dangle patient 3 times a day  - Stand patient 3 times a day  - Ambulate patient 3 times a day  - Out of bed to chair 3 times a day   - Out of bed for meals 3 times a day  - Out of bed for toileting  - Record patient progress and toleration of activity level   Outcome: Progressing

## 2023-08-22 NOTE — PLAN OF CARE
Problem: Potential for Falls  Goal: Patient will remain free of falls  Description: INTERVENTIONS:  - Educate patient/family on patient safety including physical limitations  - Instruct patient to call for assistance with activity   - Consult OT/PT to assist with strengthening/mobility   - Keep Call bell within reach  - Keep bed low and locked with side rails adjusted as appropriate  - Keep care items and personal belongings within reach  - Initiate and maintain comfort rounds  - Make Fall Risk Sign visible to staff  - Offer Toileting every 2 Hours, in advance of need  - Initiate/Maintain bed/ chair alarm  - Obtain necessary fall risk management equipment: cane  - Apply yellow socks and bracelet for high fall risk patients  - Consider moving patient to room near nurses station  Outcome: Progressing     Problem: PAIN - ADULT  Goal: Verbalizes/displays adequate comfort level or baseline comfort level  Description: Interventions:  - Encourage patient to monitor pain and request assistance  - Assess pain using appropriate pain scale  - Administer analgesics based on type and severity of pain and evaluate response  - Implement non-pharmacological measures as appropriate and evaluate response  - Consider cultural and social influences on pain and pain management  - Notify physician/advanced practitioner if interventions unsuccessful or patient reports new pain  Outcome: Progressing     Problem: INFECTION - ADULT  Goal: Absence or prevention of progression during hospitalization  Description: INTERVENTIONS:  - Assess and monitor for signs and symptoms of infection  - Monitor lab/diagnostic results  - Monitor all insertion sites, i.e. indwelling lines, tubes, and drains  - Monitor endotracheal if appropriate and nasal secretions for changes in amount and color  - [place] appropriate cooling/warming therapies per order  - Administer medications as ordered  - Instruct and encourage patient and family to use good hand hygiene technique  - Identify and instruct in appropriate isolation precautions for identified infection/condition  Outcome: Progressing     Problem: SAFETY ADULT  Goal: Patient will remain free of falls  Description: INTERVENTIONS:  - Educate patient/family on patient safety including physical limitations  - Instruct patient to call for assistance with activity   - Consult OT/PT to assist with strengthening/mobility   - Keep Call bell within reach  - Keep bed low and locked with side rails adjusted as appropriate  - Keep care items and personal belongings within reach  - Initiate and maintain comfort rounds  - Make Fall Risk Sign visible to staff  - Offer Toileting every 2 Hours, in advance of need  - Initiate/Maintain bed/ chair alarm  - Obtain necessary fall risk management equipment: cane  - Apply yellow socks and bracelet for high fall risk patients  - Consider moving patient to room near nurses station  Outcome: Progressing  Goal: Maintain or return to baseline ADL function  Description: INTERVENTIONS:  -  Assess patient's ability to carry out ADLs; assess patient's baseline for ADL function and identify physical deficits which impact ability to perform ADLs (bathing, care of mouth/teeth, toileting, grooming, dressing, etc.)  - Assess/evaluate cause of self-care deficits   - Assess range of motion  - Assess patient's mobility; develop plan if impaired  - Assess patient's need for assistive devices and provide as appropriate  - Encourage maximum independence but intervene and supervise when necessary  - Involve family in performance of ADLs  - Assess for home care needs following discharge   - Consider OT consult to assist with ADL evaluation and planning for discharge  - Provide patient education as appropriate  Outcome: Progressing  Goal: Maintains/Returns to pre admission functional level  Description: INTERVENTIONS:  - Perform BMAT or MOVE assessment daily.   - Set and communicate daily mobility goal to care team and patient/family/caregiver. - Collaborate with rehabilitation services on mobility goals if consulted  - Perform Range of Motion 3 times a day. - Reposition patient every 2 hours. - Dangle patient 3 times a day  - Stand patient 3 times a day  - Ambulate patient 3 times a day  - Out of bed to chair 3 times a day   - Out of bed for meals 3 times a day  - Out of bed for toileting  - Record patient progress and toleration of activity level   Outcome: Progressing     Problem: DISCHARGE PLANNING  Goal: Discharge to home or other facility with appropriate resources  Description: INTERVENTIONS:  - Identify barriers to discharge w/patient and caregiver  - Arrange for needed discharge resources and transportation as appropriate  - Identify discharge learning needs (meds, wound care, etc.)  - Arrange for interpretive services to assist at discharge as needed  - Refer to Case Management Department for coordinating discharge planning if the patient needs post-hospital services based on physician/advanced practitioner order or complex needs related to functional status, cognitive ability, or social support system  Outcome: Progressing     Problem: Knowledge Deficit  Goal: Patient/family/caregiver demonstrates understanding of disease process, treatment plan, medications, and discharge instructions  Description: Complete learning assessment and assess knowledge base.   Interventions:  - Provide teaching at level of understanding  - Provide teaching via preferred learning methods  Outcome: Progressing     Problem: SKIN/TISSUE INTEGRITY - ADULT  Goal: Incision(s), wounds(s) or drain site(s) healing without S/S of infection  Description: INTERVENTIONS  - Assess and document dressing, incision, wound bed, drain sites and surrounding tissue  - Provide patient and family education  - Perform skin care/dressing changes every shift  Outcome: Progressing     Problem: MUSCULOSKELETAL - ADULT  Goal: Maintain or return mobility to safest level of function  Description: INTERVENTIONS:  - Assess patient's ability to carry out ADLs; assess patient's baseline for ADL function and identify physical deficits which impact ability to perform ADLs (bathing, care of mouth/teeth, toileting, grooming, dressing, etc.)  - Assess/evaluate cause of self-care deficits   - Assess range of motion  - Assess patient's mobility  - Assess patient's need for assistive devices and provide as appropriate  - Encourage maximum independence but intervene and supervise when necessary  - Involve family in performance of ADLs  - Assess for home care needs following discharge   - Consider OT consult to assist with ADL evaluation and planning for discharge  - Provide patient education as appropriate  Outcome: Progressing  Goal: Maintain proper alignment of affected body part  Description: INTERVENTIONS:  - Support, maintain and protect limb and body alignment  - Provide patient/ family with appropriate education  Outcome: Progressing     Problem: MOBILITY - ADULT  Goal: Maintain or return to baseline ADL function  Description: INTERVENTIONS:  -  Assess patient's ability to carry out ADLs; assess patient's baseline for ADL function and identify physical deficits which impact ability to perform ADLs (bathing, care of mouth/teeth, toileting, grooming, dressing, etc.)  - Assess/evaluate cause of self-care deficits   - Assess range of motion  - Assess patient's mobility; develop plan if impaired  - Assess patient's need for assistive devices and provide as appropriate  - Encourage maximum independence but intervene and supervise when necessary  - Involve family in performance of ADLs  - Assess for home care needs following discharge   - Consider OT consult to assist with ADL evaluation and planning for discharge  - Provide patient education as appropriate  Outcome: Progressing  Goal: Maintains/Returns to pre admission functional level  Description: INTERVENTIONS:  - Perform BMAT or MOVE assessment daily.   - Set and communicate daily mobility goal to care team and patient/family/caregiver. - Collaborate with rehabilitation services on mobility goals if consulted  - Perform Range of Motion 3 times a day. - Reposition patient every 2 hours.   - Dangle patient 3 times a day  - Stand patient 3 times a day  - Ambulate patient 3 times a day  - Out of bed to chair 3 times a day   - Out of bed for meals 3 times a day  - Out of bed for toileting  - Record patient progress and toleration of activity level   Outcome: Progressing     Problem: Prexisting or High Potential for Compromised Skin Integrity  Goal: Skin integrity is maintained or improved  Description: INTERVENTIONS:  - Identify patients at risk for skin breakdown  - Assess and monitor skin integrity  - Assess and monitor nutrition and hydration status  - Monitor labs   - Assess for incontinence   - Turn and reposition patient  - Assist with mobility/ambulation  - Relieve pressure over bony prominences  - Avoid friction and shearing  - Provide appropriate hygiene as needed including keeping skin clean and dry  - Evaluate need for skin moisturizer/barrier cream  - Collaborate with interdisciplinary team   - Patient/family teaching  - Consider wound care consult   Outcome: Progressing

## 2023-08-22 NOTE — PROGRESS NOTES
Raphael Muhammad is a 66 y.o. male who is currently ordered Vancomycin IV with management by the Pharmacy Consult service. Relevant clinical data and objective / subjective history reviewed. Vancomycin Assessment:  Indication and Goal AUC/Trough: Bone/joint infection (goal -600, trough >10)  Clinical Status: stable  Micro:     Renal Function:  SCr: 1.15 mg/dL  CrCl: 65.7 mL/min  Renal replacement: Not on dialysis  Days of Therapy: 6  Current Dose: 1250mg every 24 hours  Vancomycin Plan:  New Dosing: No change  Estimated AUC: 467 mcg*hr/mL  Estimated Trough: 13.3 mcg/mL  Next Level: 8/28/23 at 0600  Renal Function Monitoring: Daily BMP and [place] will continue to follow closely for s/sx of nephrotoxicity, infusion reactions and appropriateness of therapy. BMP and CBC will be ordered per protocol. We will continue to follow the patient’s culture results and clinical progress daily. Also, cefepime will be adjusted renally if necessary.     Shaunna Duarte, Pharmacist, PharmD, BCPS

## 2023-08-22 NOTE — ASSESSMENT & PLAN NOTE
Lab Results   Component Value Date    HGBA1C 7.9 (H) 07/20/2023       Recent Labs     08/22/23  0613 08/22/23  0809 08/22/23  1003 08/22/23  1120   POCGLU 164* 213* 315* 289*       Blood Sugar Average: Last 72 hrs:  (P) [id number]   · Maintained on insulin pump as outpatient  · While NPO/until tolerating diet transition to insulin gtt with q 2hr accucheks -previously had tendency towards hypoglycemia and was started on D5 NSS however now with hyperglycemia

## 2023-08-22 NOTE — PROGRESS NOTES
Progress Note - General Surgery   Rigo King 66 y.o. male MRN: [id number]  Unit/Bed#: OR POOL Encounter: [id number]    Assessment/Plan:  65 yo male with nonhealing left TMA with wound infection and osteomyelitis. Podiatry deemed limb unsalvaglable and recommending left BKA  -Remains afebrile without leukocytosis, VSS   -HGB 10.8  -Plan for left BKA later today ( [facility]) Procedure discussed in detail with the patient as well as possible risks and complications and written consent has been obtained. All questions answered  -Continue IV ABx until source control achieved with BKA, blood cx NGTD  -keep NPO  -Follow cultures, blood cultures  -Postoperative course discussed with patient     DM1   -Maintained on insulin pump  -Continue tight blood sugar control for optimal wound healing and control of infection  -medical management     Tobacco abuse  -Cessation recommended  -Nicotine patch ordered  -Counseled that cigarette smoking can affect wound healing     CKD2, hyponatermia, HTN  -Na+ 131  -management per primary team       Subjective/Objective   Chief Complaint: No complaints    Subjective: No fevers or chills. Tolerated diet yesterday. On insulin drip. Asking questions regarding procedure. Objective:     Blood pressure 150/66, pulse 88, temperature 98.5 °F (36.9 °C), temperature source Temporal, resp. rate 16, height 6' (1.829 m), weight 68 kg (150 lb), SpO2 98 %. ,Body mass index is 20.34 kg/m². Intake/Output Summary (Last 24 hours) at 8/22/2023 1016  Last data filed at 8/21/2023 1501  Gross per 24 hour   Intake 700 ml   Output --   Net 700 ml       Invasive Devices     Peripheral Intravenous Line  Duration           Peripheral IV 08/20/23 Left;Ventral (anterior) Forearm 1 day    Peripheral IV 08/21/23 Left Antecubital 1 day          Line  Duration           Pump Device Insulin pump Anterior; Left Abdomen 53 days                Physical Exam:   General appearance: alert and oriented, in no acute distress  Head: Normocephalic, without obvious abnormality, atraumatic, sclerae anicteric, mucous membranes moist  Neck: no JVD and supple, symmetrical, trachea midline  Lungs: clear to auscultation, no wheezes or rales  Heart:   Regular rate and rhythm, S1-S2 normal, no murmur  Abdomen:   Flat, soft, nontender, active bowel sounds  Extremities:   No edema, redness or tenderness in the calves or thighs  Left TMA dressing in place with no soilage  No edema of left lower extremity  2+ palpable left popliteal pulse  Skin: Warm  Nursing notes and vital signs reviewed      Lab, Imaging and other studies:  I have personally reviewed pertinent lab results.   , CBC:   Lab Results   Component Value Date    WBC 8.49 08/22/2023    HGB 10.8 (L) 08/22/2023    HCT 33.8 (L) 08/22/2023    MCV 98 08/22/2023     08/22/2023    RBC 3.45 (L) 08/22/2023    MCH 31.3 08/22/2023    MCHC 32.0 08/22/2023    RDW 13.8 08/22/2023    MPV 8.6 (L) 08/22/2023    NRBC 0 08/22/2023   , CMP:   Lab Results   Component Value Date    SODIUM 131 (L) 08/22/2023    K 5.0 08/22/2023    CL 98 08/22/2023    CO2 27 08/22/2023    BUN 12 08/22/2023    CREATININE 1.15 08/22/2023    CALCIUM 8.9 08/22/2023    EGFR 68 08/22/2023   , Coagulation: No results found for: "PT", "INR", "APTT"  VTE Pharmacologic Prophylaxis: Heparin  VTE Mechanical Prophylaxis: sequential compression device   Marla Hartmann

## 2023-08-22 NOTE — INTERIM OP NOTE
AMPUTATION BELOW KNEE (BKA)  Postoperative Note  PATIENT NAME: Jaimee Aguilar  : 1960  MRN: [id number]  [facility] OR ROOM 02    Surgery Date: 2023    Preop Diagnosis:  Wound infection [T14. 8XXA, L08.9]  S/P transmetatarsal amputation of foot, left (HCC) [Z89.432]  Type 1 diabetes mellitus with diabetic neuropathy ([address]) [E10.40]  Osteomyelitis of left foot, unspecified type ([address]) [M86.9]    Post-Op Diagnosis Codes:     * Wound infection [T14. 8XXA, L08.9]     * S/P transmetatarsal amputation of foot, left (HCC) [Z89.432]     * Type 1 diabetes mellitus with diabetic neuropathy ([address]) [E10.40]     * Osteomyelitis of left foot, unspecified type ([address]) [M86.9]    Procedure(s) (LRB):  AMPUTATION BELOW KNEE (BKA) (Left)    Surgeon(s) and Role:     * Nargis Joy MD - Primary     * Paulina Hartmann PA-C - Assisting    Specimens:  ID Type Source Tests Collected by Time Destination   1 : below knee amputation left Tissue Soft Tissue, Other TISSUE EXAM Nargis Joy MD 2023 1302        Estimated Blood Loss:   Minimal    Anesthesia Type:   General ETA, Femoral and Popliteal nerve block    Findings:   As above     Complications:   None    Plaster splint applied to stump to keep knee straight          SIGNATURE: Leslee Hartmann PA-C   DATE: 2023   TIME: 1:34 PM

## 2023-08-22 NOTE — ANESTHESIA POSTPROCEDURE EVALUATION
Post-Op Assessment Note    CV Status:  Stable  Pain Score: 0    Pain management: adequate     Mental Status:  Awake and sleepy   Hydration Status:  Euvolemic and stable   PONV Controlled:  None   Airway Patency:  Patent      Post Op Vitals Reviewed: Yes      Staff: CRNA         No notable events documented.     /65 (08/22/23 1336)    Temp      Pulse 89 (08/22/23 1336)   Resp 18 (08/22/23 1336)    SpO2 99 % (08/22/23 1336)

## 2023-08-22 NOTE — ANESTHESIA PROCEDURE NOTES
Peripheral Block    Patient location during procedure: holding area  Start time: 8/22/2023 11:46 AM  Reason for block: procedure for pain, at surgeon's request and post-op pain management  Staffing  Performed by: Marina York MD  Authorized by: Marina York MD    Preanesthetic Checklist  Completed: patient identified, IV checked, site marked, risks and benefits discussed, surgical consent, monitors and equipment checked, pre-op evaluation and timeout performed  Peripheral Block  Patient position: supine  Prep: ChloraPrep  Patient monitoring: continuous pulse oximetry, frequent blood pressure checks and heart rate  Block type: Femoral  Laterality: left  Injection technique: single-shot  Procedures: ultrasound guided, Ultrasound guidance required for the procedure to increase accuracy and safety of medication placement and decrease risk of complications.  and nerve stimulator  Ultrasound permanent image savedbupivacaine (PF) (MARCAINE) 0.5 % injection 20 mL - Perineural   10 mL - 8/22/2023 11:46:00 AM  bupivacaine liposomal (EXPAREL) 1.3 % injection 20 mL - Perineural   10 mL - 8/22/2023 11:46:00 AM  midazolam (VERSED) injection 0.5 mg - Intravenous   2 mg - 8/22/2023 11:43:00 AM  fentanyl citrate (PF) 100 MCG/2ML 50 mcg - Intravenous   50 mcg - 8/22/2023 11:43:00 AM  Needle  Needle type: Stimuplex   Needle gauge: 22 G  Needle length: 4 in  Needle localization: ultrasound guidance and nerve stimulator  Needle insertion depth: 2 cm  Assessment  Injection assessment: frequent aspiration, injected with ease, negative aspiration, no paresthesia on injection, no symptoms of intraneural/intravenous injection, negative for heart rate change, needle tip visualized at all times and incremental injection  Paresthesia pain: none  Post-procedure:  site cleaned  patient tolerated the procedure well with no immediate complications  Additional Notes  Left femoral nn block, 10 cc exparel, 10 cc 0.5% bupiv, ultrasound guidance, nerve stim for patellar twitch confirmation.  VSS

## 2023-08-22 NOTE — NURSING NOTE
08:00 BS taken 213, Drip not changed from 1 Unit/hr in error. Sent patient to APU at 09:30, followed patient to APU and gave report to [facility] to advise drip not changed per protocol as should be at 3 units/hr. Mutal agreemant to wait for 10:00 BS to adjust.  Made aware Q2 BS while on drip.

## 2023-08-22 NOTE — UTILIZATION REVIEW
NOTIFICATION OF INPATIENT ADMISSION   AUTHORIZATION REQUEST   SERVICING FACILITY:   [address] [place] [address]  [address] [facility],Third [address] [address]  Tax ID: [id number]  NPI: [id number] ATTENDING PROVIDER:  Attending Name and NPI#: Kathryn CovingtonVarunnima [[id number]]  Address: [address] [facility]Third [address] [address]  Phone: [phone]   ADMISSION INFORMATION:  Place of Service: [address] [place] [address]  Place of Service Code: 21  Inpatient Admission Date/Time: 8/19/23  9:14 AM  Discharge Date/Time: No discharge date for patient encounter. Admitting Diagnosis Code/Description:  Anxiety [F41.9]  Wound infection [T14. 8XXA, L08.9]  Wound drainage [L24. A9]  S/P transmetatarsal amputation of foot, left [facility]) [R68.656]     UTILIZATION REVIEW CONTACT:  Chetan Coleman, Utilization   Network Utilization Review Department  Phone: [phone]  Fax [phone]  Email: [email or web address]  Contact for approvals/pending authorizations, clinical reviews, and discharge. PHYSICIAN ADVISORY SERVICES:  Medical Necessity Denial & [facility] Review  Phone: [phone]  Fax: [phone]  Email: [email or web address]

## 2023-08-23 LAB
ANION GAP SERPL CALCULATED.3IONS-SCNC: 5 MMOL/L
BASOPHILS # BLD AUTO: 0.08 THOUSANDS/ÂΜL (ref 0–0.1)
BASOPHILS NFR BLD AUTO: 1 % (ref 0–1)
BUN SERPL-MCNC: 15 MG/DL (ref 5–25)
CALCIUM SERPL-MCNC: 8.4 MG/DL (ref 8.4–10.2)
CHLORIDE SERPL-SCNC: 100 MMOL/L (ref 96–108)
CO2 SERPL-SCNC: 27 MMOL/L (ref 21–32)
CREAT SERPL-MCNC: 1.19 MG/DL (ref 0.6–1.3)
EOSINOPHIL # BLD AUTO: 0.75 THOUSAND/ÂΜL (ref 0–0.61)
EOSINOPHIL NFR BLD AUTO: 7 % (ref 0–6)
ERYTHROCYTE [DISTWIDTH] IN BLOOD BY AUTOMATED COUNT: 13.9 % (ref 11.6–15.1)
GFR SERPL CREATININE-BSD FRML MDRD: 65 ML/MIN/1.73SQ M
GLUCOSE SERPL-MCNC: 107 MG/DL (ref 65–140)
GLUCOSE SERPL-MCNC: 143 MG/DL (ref 65–140)
GLUCOSE SERPL-MCNC: 176 MG/DL (ref 65–140)
GLUCOSE SERPL-MCNC: 181 MG/DL (ref 65–140)
GLUCOSE SERPL-MCNC: 82 MG/DL (ref 65–140)
GLUCOSE SERPL-MCNC: 94 MG/DL (ref 65–140)
HCT VFR BLD AUTO: 27.6 % (ref 36.5–49.3)
HGB BLD-MCNC: 8.8 G/DL (ref 12–17)
IMM GRANULOCYTES # BLD AUTO: 0.08 THOUSAND/UL (ref 0–0.2)
IMM GRANULOCYTES NFR BLD AUTO: 1 % (ref 0–2)
LYMPHOCYTES # BLD AUTO: 0.86 THOUSANDS/ÂΜL (ref 0.6–4.47)
LYMPHOCYTES NFR BLD AUTO: 8 % (ref 14–44)
MCH RBC QN AUTO: 31.1 PG (ref 26.8–34.3)
MCHC RBC AUTO-ENTMCNC: 31.9 G/DL (ref 31.4–37.4)
MCV RBC AUTO: 98 FL (ref 82–98)
MONOCYTES # BLD AUTO: 1.48 THOUSAND/ÂΜL (ref 0.17–1.22)
MONOCYTES NFR BLD AUTO: 13 % (ref 4–12)
NEUTROPHILS # BLD AUTO: 7.82 THOUSANDS/ÂΜL (ref 1.85–7.62)
NEUTS SEG NFR BLD AUTO: 70 % (ref 43–75)
NRBC BLD AUTO-RTO: 0 /100 WBCS
PLATELET # BLD AUTO: 307 THOUSANDS/UL (ref 149–390)
PMV BLD AUTO: 8.7 FL (ref 8.9–12.7)
POTASSIUM SERPL-SCNC: 4.8 MMOL/L (ref 3.5–5.3)
RBC # BLD AUTO: 2.83 MILLION/UL (ref 3.88–5.62)
SODIUM SERPL-SCNC: 132 MMOL/L (ref 135–147)
WBC # BLD AUTO: 11.07 THOUSAND/UL (ref 4.31–10.16)

## 2023-08-23 PROCEDURE — 97163 PT EVAL HIGH COMPLEX 45 MIN: CPT

## 2023-08-23 PROCEDURE — 97167 OT EVAL HIGH COMPLEX 60 MIN: CPT

## 2023-08-23 PROCEDURE — 99024 POSTOP FOLLOW-UP VISIT: CPT | Performed by: PHYSICIAN ASSISTANT

## 2023-08-23 PROCEDURE — 97530 THERAPEUTIC ACTIVITIES: CPT

## 2023-08-23 PROCEDURE — 99232 SBSQ HOSP IP/OBS MODERATE 35: CPT | Performed by: PHYSICIAN ASSISTANT

## 2023-08-23 PROCEDURE — 97116 GAIT TRAINING THERAPY: CPT

## 2023-08-23 PROCEDURE — NC001 PR NO CHARGE: Performed by: SURGERY

## 2023-08-23 PROCEDURE — 82948 REAGENT STRIP/BLOOD GLUCOSE: CPT

## 2023-08-23 PROCEDURE — 80048 BASIC METABOLIC PNL TOTAL CA: CPT | Performed by: PHYSICIAN ASSISTANT

## 2023-08-23 PROCEDURE — 85025 COMPLETE CBC W/AUTO DIFF WBC: CPT | Performed by: PHYSICIAN ASSISTANT

## 2023-08-23 RX ORDER — CEFEPIME HYDROCHLORIDE 2 G/50ML
2000 INJECTION, SOLUTION INTRAVENOUS EVERY 12 HOURS
Status: DISCONTINUED | OUTPATIENT
Start: 2023-08-23 | End: 2023-08-24

## 2023-08-23 RX ORDER — VANCOMYCIN HYDROCHLORIDE 1 G/200ML
15 INJECTION, SOLUTION INTRAVENOUS EVERY 12 HOURS
Status: DISCONTINUED | OUTPATIENT
Start: 2023-08-23 | End: 2023-08-23

## 2023-08-23 RX ADMIN — HYDROMORPHONE HYDROCHLORIDE 1 MG: 1 INJECTION, SOLUTION INTRAMUSCULAR; INTRAVENOUS; SUBCUTANEOUS at 22:16

## 2023-08-23 RX ADMIN — OXYCODONE HYDROCHLORIDE 10 MG: 10 TABLET ORAL at 11:59

## 2023-08-23 RX ADMIN — ATORVASTATIN CALCIUM 40 MG: 40 TABLET, FILM COATED ORAL at 16:03

## 2023-08-23 RX ADMIN — ACETAMINOPHEN 325MG 975 MG: 325 TABLET ORAL at 00:17

## 2023-08-23 RX ADMIN — OXYCODONE HYDROCHLORIDE 10 MG: 10 TABLET ORAL at 21:18

## 2023-08-23 RX ADMIN — OXYCODONE HYDROCHLORIDE 10 MG: 10 TABLET ORAL at 05:14

## 2023-08-23 RX ADMIN — OXYCODONE HYDROCHLORIDE 10 MG: 10 TABLET ORAL at 16:02

## 2023-08-23 RX ADMIN — HEPARIN SODIUM 5000 UNITS: 5000 INJECTION INTRAVENOUS; SUBCUTANEOUS at 13:35

## 2023-08-23 RX ADMIN — OXYCODONE HYDROCHLORIDE 10 MG: 10 TABLET ORAL at 00:39

## 2023-08-23 RX ADMIN — ACETAMINOPHEN 325MG 975 MG: 325 TABLET ORAL at 05:14

## 2023-08-23 RX ADMIN — HEPARIN SODIUM 5000 UNITS: 5000 INJECTION INTRAVENOUS; SUBCUTANEOUS at 05:14

## 2023-08-23 RX ADMIN — CEFEPIME HYDROCHLORIDE 2000 MG: 2 INJECTION, SOLUTION INTRAVENOUS at 23:15

## 2023-08-23 RX ADMIN — HEPARIN SODIUM 5000 UNITS: 5000 INJECTION INTRAVENOUS; SUBCUTANEOUS at 21:18

## 2023-08-23 RX ADMIN — LORAZEPAM 0.5 MG: 0.5 TABLET ORAL at 12:09

## 2023-08-23 RX ADMIN — NICOTINE 1 PATCH: 21 PATCH, EXTENDED RELEASE TRANSDERMAL at 08:20

## 2023-08-23 RX ADMIN — ACETAMINOPHEN 325MG 975 MG: 325 TABLET ORAL at 15:11

## 2023-08-23 RX ADMIN — CEFEPIME HYDROCHLORIDE 2000 MG: 2 INJECTION, SOLUTION INTRAVENOUS at 13:35

## 2023-08-23 RX ADMIN — HYDROMORPHONE HYDROCHLORIDE 1 MG: 1 INJECTION, SOLUTION INTRAMUSCULAR; INTRAVENOUS; SUBCUTANEOUS at 07:30

## 2023-08-23 RX ADMIN — GABAPENTIN 300 MG: 300 CAPSULE ORAL at 21:18

## 2023-08-23 RX ADMIN — METOPROLOL SUCCINATE 100 MG: 50 TABLET, EXTENDED RELEASE ORAL at 08:20

## 2023-08-23 RX ADMIN — GABAPENTIN 300 MG: 300 CAPSULE ORAL at 16:03

## 2023-08-23 RX ADMIN — GABAPENTIN 300 MG: 300 CAPSULE ORAL at 08:20

## 2023-08-23 RX ADMIN — VANCOMYCIN HYDROCHLORIDE 1250 MG: 5 INJECTION, POWDER, LYOPHILIZED, FOR SOLUTION INTRAVENOUS at 16:16

## 2023-08-23 RX ADMIN — HYDROMORPHONE HYDROCHLORIDE 1 MG: 1 INJECTION, SOLUTION INTRAMUSCULAR; INTRAVENOUS; SUBCUTANEOUS at 17:11

## 2023-08-23 RX ADMIN — HYDROMORPHONE HYDROCHLORIDE 1 MG: 1 INJECTION, SOLUTION INTRAMUSCULAR; INTRAVENOUS; SUBCUTANEOUS at 13:35

## 2023-08-23 RX ADMIN — ACETAMINOPHEN 325MG 975 MG: 325 TABLET ORAL at 21:18

## 2023-08-23 NOTE — UTILIZATION REVIEW
Continued Stay Review    Date: 8/22                          Current Patient Class: Inpatient  Current Level of Care: Med Surg    HPI:63 y.o. male initially admitted on 8/19    Assessment/Plan:   8/22 OR - S/p Left Below the knee amputation  Findings: Nonhealing wound and osteomyelitis     Progress notes; Plan for BKA today. Bld cultures pending. Continues on Iv antibiotics. Per General-Surgery; POD 0. Diet as tolerated. Pain/ antiemetic prn.      Vital Signs:  08/22/23 1334 98 °F (36.7 °C) 86 24   Abnormal  137/65 92 100 % 28 2 L/min Nasal cannula --   08/22/23 0942 98.5 °F (36.9 °C) 88 16 150/66 -- 98 % -- -- None (Room air) --   08/22/23 0800 -- -- -- -- -- 95 % -- -- None (Room air) --     Pertinent Labs/Diagnostic Results:       Results from last 7 days   Lab Units 08/23/23  0503 08/22/23  0504 08/21/23  0502 08/20/23 0443 08/19/23  0841   WBC Thousand/uL 11.07* 8.49 7.82 7.60 11.70*   HEMOGLOBIN g/dL 8.8* 10.8* 10.7* 9.9* 11.6*   HEMATOCRIT % 27.6* 33.8* 33.5* 30.7* 35.3*   PLATELETS Thousands/uL 307 380 403* 365 429*   NEUTROS ABS Thousands/µL 7.82* 5.29 4.89 4.50 9.48*         Results from last 7 days   Lab Units 08/23/23  0503 08/22/23  0504 08/21/23  0502 08/20/23 0443 08/19/23  0841   SODIUM mmol/L 132* 131* 132* 132* 130*   POTASSIUM mmol/L 4.8 5.0 4.7 4.5 4.9   CHLORIDE mmol/L 100 98 99 100 94*   CO2 mmol/L 27 27 27 27 28   ANION GAP mmol/L 5 6 6 5 8   BUN mg/dL 15 12 12 14 12   CREATININE mg/dL 1.19 1.15 1.13 1.17 1.24   EGFR ml/min/1.73sq m 65 68 69 66 62   CALCIUM mg/dL 8.4 8.9 8.7 8.6 10.0     Results from last 7 days   Lab Units 08/19/23  0841   AST U/L 20   ALT U/L 11   ALK PHOS U/L 157*   TOTAL PROTEIN g/dL 8.5*   ALBUMIN g/dL 4.1   TOTAL BILIRUBIN mg/dL 0.45     Results from last 7 days   Lab Units 08/23/23  1137 08/23/23  0711 08/22/23  2019 08/22/23  1646 08/22/23  1514 08/22/23  1316 08/22/23  1120 08/22/23  1003 08/22/23  0809 08/22/23  0613 08/22/23  0457 08/21/23 2006   POC GLUCOSE mg/dl 176* 94 249* 224* 93 137 289* 315* 213* 164* 141* 168*     Results from last 7 days   Lab Units 08/23/23  0503 08/22/23  0504 08/21/23  0502 08/20/23  0443 08/19/23  0841 08/18/23  0430 08/17/23  1830 08/17/23  0937   GLUCOSE RANDOM mg/dL 107 154* 128 106 110 121 202* 186*     Results from last 7 days   Lab Units 08/20/23  0443 08/18/23  0423   OSMOLALITY, SERUM mmol/ 281*       Results from last 7 days   Lab Units 08/19/23  0841 08/17/23  0937   PROTIME seconds 12.6 13.3   INR  0.88 0.95   PTT seconds 37  --          Results from last 7 days   Lab Units 08/19/23  0841   PROCALCITONIN ng/ml 0.24     Results from last 7 days   Lab Units 08/19/23  0841 08/17/23  0937   LACTIC ACID mmol/L 1.4 1.5       Results from last 7 days   Lab Units 08/19/23  0841 08/17/23  0937   CRP mg/L 56.1* 73.5*   SED RATE mm/hour 39* 89*         Results from last 7 days   Lab Units 08/20/23  0443 08/19/23  1545 08/18/23  0650 08/18/23  0423   OSMOLALITY, SERUM mmol/  --   --  281*   OSMO UR mmol/KG  --  362 144*  --      Results from last 7 days   Lab Units 08/19/23  1545 08/18/23  0650   SODIUM UR  52 36       Results from last 7 days   Lab Units 08/19/23  0841 08/17/23  0944 08/17/23  0937   BLOOD CULTURE  No Growth at 72 hrs. No Growth at 72 hrs. No Growth After 5 Days. No Growth After 5 Days.              Results from last 7 days   Lab Units 08/21/23  0502   VANCOMYCIN RM ug/mL 17.3       Medications:   Scheduled Medications:  acetaminophen, 975 mg, Oral, Q6H ANGELICA  atorvastatin, 40 mg, Oral, Daily With Dinner  cefepime, 2,000 mg, Intravenous, Q12H  gabapentin, 300 mg, Oral, TID  heparin (porcine), 5,000 Units, Subcutaneous, Q8H ANGELICA  metoprolol succinate, 100 mg, Oral, Daily  nicotine, 1 patch, Transdermal, Daily  vancomycin, 1,250 mg, Intravenous, Q24H      Continuous IV Infusions: None     PRN Meds:  HYDROmorphone, 1 mg, Intravenous, Q3H PRN  insulin lispro, 300 Units, Subcutaneous Insulin Pump, Daily PRN  LORazepam, 0.5 mg, Oral, Q8H PRN  methocarbamol, 500 mg, Oral, Q6H PRN  oxyCODONE, 10 mg, Oral, Q4H PRN  oxyCODONE, 5 mg, Oral, Q4H PRN        Discharge Plan: [facility]    Network Utilization Review Department  ATTENTION: Please call with any questions or concerns to [phone] and carefully listen to the prompts so that you are directed to the right person. All voicemails are confidential.  Keri Hardwick all requests for admission clinical reviews, approved or denied determinations and any other requests to dedicated fax number below belonging to the campus where the patient is receiving treatment.  List of dedicated fax numbers for the Facilities:  [place] DENIALS (Administrative/Medical Necessity) [phone] [address] [facility] (Maternity/NICU/Pediatrics) [phone]   [address] [place] [address] [phone]   [facility] [address] [facility] [phone]   [facility] [address] [phone]   [address] [place] [address]  [facility] [phone]

## 2023-08-23 NOTE — ASSESSMENT & PLAN NOTE
Lab Results   Component Value Date    HGBA1C 7.9 (H) 07/20/2023       Recent Labs     08/22/23  1646 08/22/23  2019 08/23/23  0711 08/23/23  1137   POCGLU 224* 249* 94 176*       Blood Sugar Average: Last 72 hrs:  (P) 159.36   · Maintained on insulin pump as outpatient -previously on insulin gtt intraoperatively  · Tolerating diet.   Transitioned back to insulin pump 8/22

## 2023-08-23 NOTE — PROGRESS NOTES
Called to evaluate patient status post fall. Patient apparently was up and trying to get around room on his own and fell onto his left BKA stump bending the splint. No other complaints. No signs of other injury. Patient did not hit his head. He did not lose consciousness. Patient anxious. Concerned about injuring surgical site. Denies any increased pain. No bleeding or drainage. Splint was removed. Knee with good range of motion without pain elicited. Dressing at surgical site fully intact without drainage. Will not remove for stability purposes. Splint was straightened and reapplied with Ace dressing. Patient reassured. Instructed elevate stump on pillow at base of dressing to assist with keeping knee and splint straight. Instructed to use call bell for assistance when needing to ambulate in room. Continue pain control as needed. All questions answered. Plan to reevaluate tomorrow with plan for dressing change tomorrow or Friday. Discussed with patient's nurse.     Ann Hudson

## 2023-08-23 NOTE — ASSESSMENT & PLAN NOTE
Lab Results   Component Value Date    EGFR 65 08/23/2023    EGFR 68 08/22/2023    EGFR 69 08/21/2023    CREATININE 1.19 08/23/2023    CREATININE 1.15 08/22/2023    CREATININE 1.13 08/21/2023   · Baseline creatinine 1.0-1.2  · Creatinine within baseline on admission  · Trend BMP

## 2023-08-23 NOTE — PROGRESS NOTES
Sean Wolff is a 66 y.o. male who is currently ordered Vancomycin IV with management by the Pharmacy Consult service. Relevant clinical data and objective / subjective history reviewed. Vancomycin Assessment:  Indication and Goal AUC/Trough: Bone/joint infection (goal -600, trough >10)  Clinical Status: stable    Renal Function:  SCr: 1.19 mg/dL  CrCl: 62.4 mL/min  Renal replacement: Not on dialysis  Days of Therapy: 7  Current Dose: 1250mg every 24 hours  Vancomycin Plan:  General Surgery note 8/23/23 to continue vancomycin IV therapy 2 additional days  New Dosing: No change  Estimated AUC: 479 mcg*hr/mL  Estimated Trough: 13.7 mcg/mL  Next Level: Random Level AM labs 8/28 if vancomycin therapy continued beyond currently planned treatment duration  Renal Function Monitoring: Daily BMP and [place] will continue to follow closely for s/sx of nephrotoxicity, infusion reactions and appropriateness of therapy. BMP and CBC will be ordered per protocol. We will continue to follow the patient’s culture results and clinical progress daily.     Lindsay Ferrari, Pharmacist

## 2023-08-23 NOTE — PLAN OF CARE
Problem: OCCUPATIONAL THERAPY ADULT  Goal: Performs self-care activities at highest level of function for planned discharge setting. See evaluation for individualized goals. Description: Treatment Interventions: ADL retraining, Functional transfer training, UE strengthening/ROM, Endurance training, Patient/family training, Equipment evaluation/education, Compensatory technique education, Continued evaluation          See flowsheet documentation for full assessment, interventions and recommendations. Note: Limitation: Decreased ADL status, Decreased endurance, Decreased self-care trans, Decreased high-level ADLs  Prognosis: Good  Assessment: Pt is a 66 y.o. male seen for OT evaluation at [facility], admitted 8/19/2023 w/ Osteomyelitis ([address] Pt is POD#1 L BKA. OT completed extensive review of pt's medical and social history. Comorbidities affecting pt's functional performance at time of assessment include: HTN, stage 2 CKD, DM 1, chronic fatigue syndrome, chronic pain, diabetic polyneuropathy, alcohol & nicotine abuse, sepsis. Personal factors affecting pt at time of IE include: steps to enter environment, difficulty performing ADLS, difficulty performing IADLS  and environment. Prior to admission, pt was living with spouse in a bi-level home with 1+1 KASSY & 4 steps to living area. Pt was I w/  ADLS and w/ IADLS, (+) drove, & required use of no DME/AD PTA. Upon evaluation: Pt requires Mod I for bed mobility, S for functional mobility/transfers, S for UB ADLs and Min A for LB ADLS 2* the following deficits impacting occupational performance: decreased balance, decreased tolerance, increased pain, orthopedic restrictions and decreased coping skills. Full objective findings from OT assessment regarding body systems outlined above.  Pt to benefit from continued skilled OT tx while in the hospital to address deficits as defined above and maximize level of functional independence w/ ADL's and functional mobility. Occupational Performance areas to address include: bathing/shower, toilet hygiene, dressing, functional mobility, community mobility and clothing management. Based on findings, pt is of high complexity. The patient's raw score on the AM-PAC Daily Activity inpatient short form is 21, standardized score is 44.27, greater than 39.4. Patients at this level are likely to benefit from DC to home. However, please refer to therapist recommendation for discharge planning given other factors that may influence destination. At this time, OT recommendations at time of discharge are DC with Level III resources.

## 2023-08-23 NOTE — OCCUPATIONAL THERAPY NOTE
Occupational Therapy Evaluation & Tx     Patient Name: Bre Markham  HKGRV'T Date: 8/23/2023  Problem List  Principal Problem:    Osteomyelitis [facility])  Active Problems:    Benign essential hypertension    Cigarette nicotine dependence without complication    Stage 2 chronic kidney disease    Hyponatremia    Type 1 diabetes mellitus with diabetic neuropathy [facility])    Past Medical History  Past Medical History:   Diagnosis Date    Chronic foot ulcer ([address]) 09/07/2018    Diabetes mellitus ([address])     Diabetic foot ulcer ([address]) 5/8/2023    Gout     High cholesterol     Hypertension     Visual impairment 11/1/2022    Cateract     Past Surgical History  Past Surgical History:   Procedure Laterality Date    CATARACT EXTRACTION Right 01/2023    COMPLEX WOUND CLOSURE TO EXTREMITY Left 07/18/2019    Procedure: COMPLEX CLOSURE LEFT CHEEK;  Surgeon: Lila Sherwood MD;  Location: [facility]IN OR;  Service: Plastics    FACIAL/NECK BIOPSY Left 07/18/2019    Procedure: EXCISION LEFT CHEEK CYST;  Surgeon: Lila Sherwood MD;  [facility];  Service: Plastics    HERNIA REPAIR Left     several times    KNEE ARTHROSCOPY Left     torn meniscus    LEG AMPUTATION THROUGH LOWER TIBIA AND FIBULA Left 8/22/2023    Procedure: AMPUTATION BELOW KNEE (BKA);   Surgeon: Alejandro Lange MD;  Location: [facility];  Service: General    NE AMPUTATION FOOT TRANSMETARSAL Left 6/30/2023    Procedure: AMPUTATION TRANSMETATARSAL (TMA);  Surgeon: Sam Michel DPM;  [facility];  Service: Podiatry    WOUND DEBRIDEMENT Left 5/30/2023    Procedure: DEBRIDEMENT WOUND LEFT FOOT WOUND WITH EXCISION OF INFECTED BONE AND WOUND VAC APPLICATION;  Surgeon: Caden Lemons DPM;  [facility];  Service: Podiatry             08/23/23 1113   OT Last Visit   OT Visit Date 08/23/23   Note Type   Note type Evaluation   Pain Assessment   Pain Assessment Tool Pierre-Baker FACES   Pierre-Baker FACES Pain Rating 2   Pain Location/Orientation Orientation: Left; Location: Leg   Patient's Stated Pain Goal No pain   Hospital Pain Intervention(s) Repositioned; Ambulation/increased activity   Restrictions/Precautions   Weight Bearing Precautions Per Order Yes   LLE Weight Bearing Per Order NWB   Other Precautions Chair Alarm;WBS;Pain; Fall Risk   Home Living   Type of [address] Two level;Bed/bath upstairs;Stairs to enter without rails  (Bi-level, 1+1 KASSY, 4 steps to main living area. Rails on one side when inside)   Bathroom Shower/Tub Tub/shower unit   Bathroom Equipment Grab bars in [address]   Prior Function   Level of [address] [place] [address] with ADLs; Independent with functional mobility; Independent with IADLS   Lives With Spouse   Receives Help From Family   IADLs Independent with driving; Independent with meal prep; Independent with medication management   General   Additional Pertinent History Pt is POD#1 L BKA   Family/Caregiver Present No   Subjective   Subjective "I won't have trouble walking, its the stairs"   ADL   Eating Assistance 7  Independent   Grooming Assistance 5  Supervision/Setup   UB Bathing Assistance 5  Supervision/Setup   LB Bathing Assistance 4  Minimal Assistance   UB Dressing Assistance 5  Supervision/Setup   LB Dressing Assistance 4  Minimal [address]  4  Minimal Assistance   Bed Mobility   Supine to Sit 6  Modified independent   Additional items HOB elevated   Transfers   Sit to Stand 5  Supervision   Additional items Armrests   Stand to Sit 5  Supervision   Additional items Armrests   Functional Mobility   Functional Mobility 5  Supervision   Additional Comments Functional household distance   Additional items Rolling walker   Balance   Static Sitting Normal   Dynamic Sitting Good   Static Standing Good   Dynamic Standing Fair +   Ambulatory Fair +   Activity Tolerance   Activity Tolerance Patient limited by fatigue   Medical Staff Made Aware PT Emi   Nurse Made Aware MEGAN Marta   RUE Assessment   RUE Assessment WFL   LUE Assessment   LUE Assessment WFL   Cognition   Overall Cognitive Status WFL   Arousal/Participation Alert   Attention Within functional limits   Orientation Level Oriented X4   Memory Within functional limits   Following Commands Follows one step commands without difficulty   Comments Pt slightly anxious throughout   Assessment   Limitation Decreased ADL status; Decreased endurance;Decreased self-care trans;Decreased high-level ADLs   Prognosis Good   Assessment Pt is a 66 y.o. male seen for OT evaluation at [address] [place] [address], admitted 8/19/2023 w/ Osteomyelitis ([address] Pt is POD#1 L BKA. OT completed extensive review of pt's medical and social history. Comorbidities affecting pt's functional performance at time of assessment include: HTN, stage 2 CKD, DM 1, chronic fatigue syndrome, chronic pain, diabetic polyneuropathy, alcohol & nicotine abuse, sepsis. Personal factors affecting pt at time of IE include: steps to enter environment, difficulty performing ADLS, difficulty performing IADLS  and environment. Prior to admission, pt was living with spouse in a bi-level home with 1+1 KASSY & 4 steps to living area. Pt was I w/  ADLS and w/ IADLS, (+) drove, & required use of no DME/AD PTA. Upon evaluation: Pt requires Mod I for bed mobility, S for functional mobility/transfers, S for UB ADLs and Min A for LB ADLS 2* the following deficits impacting occupational performance: decreased balance, decreased tolerance, increased pain, orthopedic restrictions and decreased coping skills. Full objective findings from OT assessment regarding body systems outlined above. Pt to benefit from continued skilled OT tx while in the hospital to address deficits as defined above and maximize level of functional independence w/ ADL's and functional mobility.  Occupational Performance areas to address include: bathing/shower, toilet hygiene, dressing, functional mobility, community mobility and clothing management. Based on findings, pt is of high complexity. The patient's raw score on the AM-PAC Daily Activity inpatient short form is 21, standardized score is 44.27, greater than 39.4. Patients at this level are likely to benefit from DC to home. However, please refer to therapist recommendation for discharge planning given other factors that may influence destination. At this time, OT recommendations at time of discharge are DC with Level III resources. Goals   Patient Goals Pt wants to be "as normal as possible"   Plan   Treatment Interventions ADL retraining;Functional transfer training;UE strengthening/ROM; Endurance training;Patient/family training;Equipment evaluation/education; Compensatory technique education;Continued evaluation   Goal Expiration Date 09/02/23   OT Treatment Day 0   OT Frequency 3-5x/wk   Recommendation   UB Rehab Discharge Recommendation (PT/OT) Level 3   AM-PAC Daily Activity Inpatient   Lower Body Dressing 3   Bathing 3   Toileting 3   Upper Body Dressing 4   Grooming 4   Eating 4   Daily Activity Raw Score 21   Daily Activity Standardized Score (Calc for Raw Score >=11) 44.27   AM-PAC Applied Cognition Inpatient   Following a Speech/Presentation 4   Understanding Ordinary Conversation 4   Taking Medications 4   Remembering Where Things Are Placed or Put Away 4   Remembering List of 4-5 Errands 4   Taking Care of Complicated Tasks 4   Applied Cognition Raw Score 24   Applied Cognition Standardized Score 62.21   Additional Treatment Session   Start Time 1100   End Time 1113   Treatment Assessment Pt performed additional sit > stand with distant supervision & use of armrests. Pt agreeable to perform stair trial with this OT & with PT to demo safe entry/exit in & out of home. Pt educated on multiple safe techniques. Pt able to stand for approx 3 min with S & RW while being educated. Pt became anxious & impulsively completed stand > sit on EOB with S.  Pt endorses fear of completing stair trial this date d/t UE fatigue. Pt completed sit <> stand & functional mobility with RW to recliner with S. Pt left seated OOB to recliner with chair alarm on, all needs in reach. End of Consult   Education Provided Yes   Patient Position at End of Consult Bedside chair;Bed/Chair alarm activated; All needs within reach   Nurse Communication Nurse aware of consult     Pt will achieve the following goals within 10 days. *Pt will complete LB bathing and dressing with S & DME PRN. *Pt will complete toileting w/ S w/ G hygiene/thoroughness using DME PRN    *Pt will perform functional transfers with on/off all surfaces with Mod I using DME as needed w/ G balance/safety. *Pt will increase standing tolerance to 5+ minutes with use of AD PRN for increased activity tolerance in order to maximize independence in ADL/IADL performance. *Pt will demonstrate increased activity tolerance >20 min in order to complete ADL routine. *Pt will demonstrate G carryover of pt/caregiver education and training as appropriate w/ Mod I w/o cues w/ good tolerance      *Pt will verbalize and demonstrate use of energy conservation/ deep breathing technique and work simplification skills during functional activity with no verbal cues.     *Assess DME needs    Justice Gutierrez OTR/L

## 2023-08-23 NOTE — PLAN OF CARE
Problem: Potential for Falls  Goal: Patient will remain free of falls  Description: INTERVENTIONS:  - Educate patient/family on patient safety including physical limitations  - Instruct patient to call for assistance with activity   - Consult OT/PT to assist with strengthening/mobility   - Keep Call bell within reach  - Keep bed low and locked with side rails adjusted as appropriate  - Keep care items and personal belongings within reach  - Initiate and maintain comfort rounds  - Make Fall Risk Sign visible to staff  - Offer Toileting every 2 Hours, in advance of need  - Initiate/Maintain bed/ chair alarm  - Obtain necessary fall risk management equipment: cane  - Apply yellow socks and bracelet for high fall risk patients  - Consider moving patient to room near nurses station  Outcome: Progressing     Problem: PAIN - ADULT  Goal: Verbalizes/displays adequate comfort level or baseline comfort level  Description: Interventions:  - Encourage patient to monitor pain and request assistance  - Assess pain using appropriate pain scale  - Administer analgesics based on type and severity of pain and evaluate response  - Implement non-pharmacological measures as appropriate and evaluate response  - Consider cultural and social influences on pain and pain management  - Notify physician/advanced practitioner if interventions unsuccessful or patient reports new pain  Outcome: Progressing     Problem: INFECTION - ADULT  Goal: Absence or prevention of progression during hospitalization  Description: INTERVENTIONS:  - Assess and monitor for signs and symptoms of infection  - Monitor lab/diagnostic results  - Monitor all insertion sites, i.e. indwelling lines, tubes, and drains  - Monitor endotracheal if appropriate and nasal secretions for changes in amount and color  - [place] appropriate cooling/warming therapies per order  - Administer medications as ordered  - Instruct and encourage patient and family to use good hand hygiene technique  - Identify and instruct in appropriate isolation precautions for identified infection/condition  Outcome: Progressing     Problem: SAFETY ADULT  Goal: Patient will remain free of falls  Description: INTERVENTIONS:  - Educate patient/family on patient safety including physical limitations  - Instruct patient to call for assistance with activity   - Consult OT/PT to assist with strengthening/mobility   - Keep Call bell within reach  - Keep bed low and locked with side rails adjusted as appropriate  - Keep care items and personal belongings within reach  - Initiate and maintain comfort rounds  - Make Fall Risk Sign visible to staff  - Offer Toileting every 2 Hours, in advance of need  - Initiate/Maintain bed/ chair alarm  - Obtain necessary fall risk management equipment: cane  - Apply yellow socks and bracelet for high fall risk patients  - Consider moving patient to room near nurses station  Outcome: Progressing  Goal: Maintain or return to baseline ADL function  Description: INTERVENTIONS:  -  Assess patient's ability to carry out ADLs; assess patient's baseline for ADL function and identify physical deficits which impact ability to perform ADLs (bathing, care of mouth/teeth, toileting, grooming, dressing, etc.)  - Assess/evaluate cause of self-care deficits   - Assess range of motion  - Assess patient's mobility; develop plan if impaired  - Assess patient's need for assistive devices and provide as appropriate  - Encourage maximum independence but intervene and supervise when necessary  - Involve family in performance of ADLs  - Assess for home care needs following discharge   - Consider OT consult to assist with ADL evaluation and planning for discharge  - Provide patient education as appropriate  Outcome: Progressing  Goal: Maintains/Returns to pre admission functional level  Description: INTERVENTIONS:  - Perform BMAT or MOVE assessment daily.   - Set and communicate daily mobility goal to care team and patient/family/caregiver. - Collaborate with rehabilitation services on mobility goals if consulted  - Perform Range of Motion 3 times a day. - Reposition patient every 2 hours. - Dangle patient 3 times a day  - Stand patient 3 times a day  - Ambulate patient 3 times a day  - Out of bed to chair 3 times a day   - Out of bed for meals 3 times a day  - Out of bed for toileting  - Record patient progress and toleration of activity level   Outcome: Progressing     Problem: DISCHARGE PLANNING  Goal: Discharge to home or other facility with appropriate resources  Description: INTERVENTIONS:  - Identify barriers to discharge w/patient and caregiver  - Arrange for needed discharge resources and transportation as appropriate  - Identify discharge learning needs (meds, wound care, etc.)  - Arrange for interpretive services to assist at discharge as needed  - Refer to Case Management Department for coordinating discharge planning if the patient needs post-hospital services based on physician/advanced practitioner order or complex needs related to functional status, cognitive ability, or social support system  Outcome: Progressing     Problem: Knowledge Deficit  Goal: Patient/family/caregiver demonstrates understanding of disease process, treatment plan, medications, and discharge instructions  Description: Complete learning assessment and assess knowledge base.   Interventions:  - Provide teaching at level of understanding  - Provide teaching via preferred learning methods  Outcome: Progressing     Problem: SKIN/TISSUE INTEGRITY - ADULT  Goal: Incision(s), wounds(s) or drain site(s) healing without S/S of infection  Description: INTERVENTIONS  - Assess and document dressing, incision, wound bed, drain sites and surrounding tissue  - Provide patient and family education  - Perform skin care/dressing changes every shift  Outcome: Progressing  Goal: Skin Integrity remains intact(Skin Breakdown Prevention)  Description: Assess:  -Perform Pineda assessment every x  -Clean and moisturize skin every x  -Inspect skin when repositioning, toileting, and assisting with ADLS  -Assess under medical devices such as x every x  -Assess extremities for adequate circulation and sensation     Bed Management:  -Have minimal linens on bed & keep smooth, unwrinkled  -Change linens as needed when moist or perspiring  -Avoid sitting or lying in one position for more than x hours while in bed  -Keep HOB at [facility]     Toileting:  -Offer bedside commode  -Assess for incontinence every x  -Use incontinent care products after each incontinent episode such as x    Activity:  -Mobilize patient x times a day  -Encourage activity and walks on unit  -Encourage or provide ROM exercises   -Turn and reposition patient every x Hours  -Use appropriate equipment to lift or move patient in bed  -Instruct/ Assist with weight shifting every x when out of bed in chair  -Consider limitation of chair time x hour intervals    Skin Care:  -Avoid use of baby powder, tape, friction and shearing, hot water or constrictive clothing  -Relieve pressure over bony prominences using x  -Do not massage red bony areas    Next Steps:  -Teach patient strategies to minimize risks such as x   -Consider consults to  interdisciplinary teams such as x  Outcome: Progressing     Problem: MUSCULOSKELETAL - ADULT  Goal: Maintain or return mobility to safest level of function  Description: INTERVENTIONS:  - Assess patient's ability to carry out ADLs; assess patient's baseline for ADL function and identify physical deficits which impact ability to perform ADLs (bathing, care of mouth/teeth, toileting, grooming, dressing, etc.)  - Assess/evaluate cause of self-care deficits   - Assess range of motion  - Assess patient's mobility  - Assess patient's need for assistive devices and provide as appropriate  - Encourage maximum independence but intervene and supervise when necessary  - Involve family in performance of ADLs  - Assess for home care needs following discharge   - Consider OT consult to assist with ADL evaluation and planning for discharge  - Provide patient education as appropriate  Outcome: Progressing  Goal: Maintain proper alignment of affected body part  Description: INTERVENTIONS:  - Support, maintain and protect limb and body alignment  - Provide patient/ family with appropriate education  Outcome: Progressing     Problem: MOBILITY - ADULT  Goal: Maintain or return to baseline ADL function  Description: INTERVENTIONS:  -  Assess patient's ability to carry out ADLs; assess patient's baseline for ADL function and identify physical deficits which impact ability to perform ADLs (bathing, care of mouth/teeth, toileting, grooming, dressing, etc.)  - Assess/evaluate cause of self-care deficits   - Assess range of motion  - Assess patient's mobility; develop plan if impaired  - Assess patient's need for assistive devices and provide as appropriate  - Encourage maximum independence but intervene and supervise when necessary  - Involve family in performance of ADLs  - Assess for home care needs following discharge   - Consider OT consult to assist with ADL evaluation and planning for discharge  - Provide patient education as appropriate  Outcome: Progressing  Goal: Maintains/Returns to pre admission functional level  Description: INTERVENTIONS:  - Perform BMAT or MOVE assessment daily.   - Set and communicate daily mobility goal to care team and patient/family/caregiver. - Collaborate with rehabilitation services on mobility goals if consulted  - Perform Range of Motion 3 times a day. - Reposition patient every 2 hours.   - Dangle patient 3 times a day  - Stand patient 3 times a day  - Ambulate patient 3 times a day  - Out of bed to chair 3 times a day   - Out of bed for meals 3 times a day  - Out of bed for toileting  - Record patient progress and toleration of activity level   Outcome: Progressing     Problem: Prexisting or High Potential for Compromised Skin Integrity  Goal: Skin integrity is maintained or improved  Description: INTERVENTIONS:  - Identify patients at risk for skin breakdown  - Assess and monitor skin integrity  - Assess and monitor nutrition and hydration status  - Monitor labs   - Assess for incontinence   - Turn and reposition patient  - Assist with mobility/ambulation  - Relieve pressure over bony prominences  - Avoid friction and shearing  - Provide appropriate hygiene as needed including keeping skin clean and dry  - Evaluate need for skin moisturizer/barrier cream  - Collaborate with interdisciplinary team   - Patient/family teaching  - Consider wound care consult   Outcome: Progressing     Problem: Nutrition/Hydration-ADULT  Goal: Nutrient/Hydration intake appropriate for improving, restoring or maintaining nutritional needs  Description: Monitor and assess patient's nutrition/hydration status for malnutrition. Collaborate with interdisciplinary team and initiate plan and interventions as ordered. Monitor patient's weight and dietary intake as ordered or per policy. Utilize nutrition screening tool and intervene as necessary. Determine patient's food preferences and provide high-protein, high-caloric foods as appropriate.      INTERVENTIONS:  - Monitor oral intake, urinary output, labs, and treatment plans  - Assess nutrition and hydration status and recommend course of action  - Evaluate amount of meals eaten  - Assist patient with eating if necessary   - Allow adequate time for meals  - Recommend/ encourage appropriate diets, oral nutritional supplements, and vitamin/mineral supplements  - Order, calculate, and assess calorie counts as needed  - Recommend, monitor, and adjust tube feedings and TPN/PPN based on assessed needs  - Assess need for intravenous fluids  - Provide specific nutrition/hydration education as appropriate  - Include patient/family/caregiver in decisions related to nutrition  Outcome: Progressing     Problem: METABOLIC, FLUID AND ELECTROLYTES - ADULT  Goal: Electrolytes maintained within normal limits  Description: INTERVENTIONS:  - Monitor labs and assess patient for signs and symptoms of electrolyte imbalances  - Administer electrolyte replacement as ordered  - Monitor response to electrolyte replacements, including repeat lab results as appropriate  - Instruct patient on fluid and nutrition as appropriate  Outcome: Progressing  Goal: Fluid balance maintained  Description: INTERVENTIONS:  - Monitor labs   - Monitor I/O and WT  - Instruct patient on fluid and nutrition as appropriate  - Assess for signs & symptoms of volume excess or deficit  Outcome: Progressing  Goal: Glucose maintained within target range  Description: INTERVENTIONS:  - Monitor Blood Glucose as ordered  - Assess for signs and symptoms of hyperglycemia and hypoglycemia  - Administer ordered medications to maintain glucose within target range  - Assess nutritional intake and initiate nutrition service referral as needed  Outcome: Progressing     Problem: HEMATOLOGIC - ADULT  Goal: Maintains hematologic stability  Description: INTERVENTIONS  - Assess for signs and symptoms of bleeding or hemorrhage  - Monitor labs  - Administer supportive blood products/factors as ordered and appropriate  Outcome: Progressing

## 2023-08-23 NOTE — PHYSICAL THERAPY NOTE
PHYSICAL THERAPY EVAL/TX  Physical Therapy Evaluation    Performed at least 2 patient identifiers during session:  Patient Active Problem List   Diagnosis    Chronic fatigue syndrome    Lower urinary tract symptoms due to benign prostatic hyperplasia    Mixed hyperlipidemia    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)    Insulin pump in place    ED (erectile dysfunction) of organic origin    Chronic pain    Benign essential hypertension    Cigarette nicotine dependence without complication    Gout of ankle    Type 1 diabetes mellitus with hyperglycemia ([address])    Diabetic polyneuropathy associated with type 1 diabetes mellitus ([address])    Microalbuminuria due to type 1 diabetes mellitus ([address])    Hypoglycemia unawareness associated with type 1 diabetes mellitus ([address])    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, with fat layer exposed ([address])    Stage 2 chronic kidney disease    Hyponatremia    Alcohol abuse    Nicotine abuse    Sepsis ([address])    Type 1 diabetes mellitus with diabetic neuropathy ([address])    S/P transmetatarsal amputation of foot, left ([address])    Osteomyelitis ([address])       Past Medical History:   Diagnosis Date    Chronic foot ulcer ([address]) 09/07/2018    Diabetes mellitus ([address])     Diabetic foot ulcer ([address]) 5/8/2023    Gout     High cholesterol     Hypertension     Visual impairment 11/1/2022    Cateract       Past Surgical History:   Procedure Laterality Date    CATARACT EXTRACTION Right 01/2023    COMPLEX WOUND CLOSURE TO EXTREMITY Left 07/18/2019    Procedure: COMPLEX CLOSURE LEFT CHEEK;  Surgeon: Lalito Hoff MD;  Location: [facility];  Service: Plastics    FACIAL/NECK BIOPSY Left 07/18/2019    Procedure: EXCISION LEFT CHEEK CYST;  Surgeon: Lalito Hoff MD;  Location: [facility];  Service: Plastics    HERNIA REPAIR Left     several times    KNEE ARTHROSCOPY Left     torn meniscus    LEG AMPUTATION THROUGH LOWER TIBIA AND FIBULA Left 8/22/2023    Procedure: AMPUTATION BELOW KNEE (BKA); Surgeon: Marko Clayton MD;  Location:  MAIN OR;  Service: General    CT AMPUTATION FOOT TRANSMETARSAL Left 6/30/2023    Procedure: AMPUTATION TRANSMETATARSAL (TMA);  Surgeon: Sonia Hilario DPM;  Location: [facility];  Service: Podiatry    WOUND DEBRIDEMENT Left 5/30/2023    Procedure: DEBRIDEMENT WOUND LEFT FOOT WOUND WITH EXCISION OF INFECTED BONE AND WOUND VAC APPLICATION;  Surgeon: Darren Ayoub DPM;  Location: [facility];  Service: Podiatry            08/23/23 1114   PT Last Visit   PT Visit Date 08/23/23   Note Type   Note type Evaluation   Pain Assessment   Pain Assessment Tool Pierre-Baker FACES   Pierre-Baker FACES Pain Rating 2   Pain Location/Orientation Orientation: Left; Location: Leg   Hospital Pain Intervention(s) Repositioned; Ambulation/increased activity   Restrictions/Precautions   Weight Bearing Precautions Per Order Yes   LLE Weight Bearing Per Order NWB   Other Precautions Pain; Fall Risk;Bed Alarm; Chair Alarm   Home Living   Type of [address] [place] [address] Two level;Bed/bath upstairs  (Bi-level. 1+1 KASSY. Can stay on first floor if needed.)   Bathroom Shower/Tub Tub/shower unit   [employer] bars in [address]   Prior Function   Level of [facility] with ADLs; Independent with functional mobility; Independent with IADLS   Lives With Spouse   Receives Help From Family   IADLs Independent with driving; Independent with meal prep; Independent with medication management   General   Family/Caregiver Present No   Cognition   Overall Cognitive Status WFL   Arousal/Participation Alert   Orientation Level Oriented X4   Memory Within functional limits   Following Commands Follows one step commands without difficulty   Comments Slightly ancious   Subjective   Subjective "I'm thinking about all the things I'll have to do at home."   RLE Assessment   RLE Assessment WFL   Bed Mobility   Supine to Sit 6  Modified independent   Additional items HOB elevated   Transfers   Sit to Stand 5  Supervision   Additional items Verbal cues; Trapeze bar   Stand to Sit 5  Supervision   Additional items Armrests; Verbal cues   Ambulation/Elevation   Gait pattern Antalgic; Forward Flexion  (Hop to pattern)   Gait Assistance 5  Supervision   Assistive Device Rolling walker   Distance 50ft   Ambulation/Elevation Additional Comments Refer to treatment session for additional mobility   Balance   Static Sitting Normal   Dynamic Sitting Good   Static Standing Good   Dynamic Standing Fair +   Ambulatory Fair +   Endurance Deficit   Endurance Deficit Yes   Endurance Deficit Description Limited by fatigue   Activity Tolerance   Activity Tolerance Patient limited by fatigue   Medical Staff Made Aware Tere WEBER   Nurse Made Aware RN, Fabricio   Assessment   Prognosis Good   Problem List Decreased strength;Decreased range of motion;Decreased endurance; Impaired balance;Decreased mobility;Pain;Orthopedic restrictions   Assessment 67 yo male with nonhealing left TMA with wound infection and osteomyelitis. Podiatry deemed limb unsalvageable. POD#1 s/p left BKA. Patient resides with spouse in a bi-level home. 1+1 KASSY. Patient is normally independent. Has a RW at home. Current medical status includes pain, NWB status, fall risk, anxiety, decreased ROM, strength, balance, endurance and mobility. Patient performed bed mobility, transfers and amb all at a supervision/mod I level. He does fatigue easily but did complete a household distance. Discussed multiple different ways to enter/exit home. Patient did not feel comfortable attempting today. Will attempt next session. Recommending level 3 resources-low intensity. The patient's AM-PAC Basic Mobility Inpatient Short Form Raw Score is 20. A Raw score of greater than 17 suggests the patient may benefit from discharge to home. Please also refer to the recommendation of the Physical Therapist for safe discharge planning. Barriers to Discharge Inaccessible home environment   Barriers to Discharge Comments Steps to enter   Goals   Patient Goals To be able to do steps   STG Expiration Date 08/30/23   Short Term Goal #1 1. Peform sit<>stand transfers mod 1 2. Ambulate 150ft with a RW mod i level 3. Ascend/descend 2 curb steps with RW mod I level   PT Treatment Day 1   Plan   Treatment/Interventions Functional transfer training;LE strengthening/ROM; Elevations; Therapeutic exercise; Endurance training;Patient/family training;Equipment eval/education; Bed mobility;Gait training;Spoke to nursing;OT   PT Frequency 3-5x/wk   Recommendation   [facility] Rehab Discharge Recommendation (PT/OT) Level 3   AM-PAC Basic Mobility Inpatient   Turning in Flat Bed Without Bedrails 4   Lying on Back to Sitting on Edge of Flat Bed Without Bedrails 4   Moving Bed to Chair 3   Standing Up From Chair Using Arms 3   Walk in Room 3   Climb 3-5 Stairs With Railing 3   Basic Mobility Inpatient Raw Score 20   Basic Mobility Standardized Score 43.99   Highest Level Of Mobility   JH-HLM Goal 6: Walk 10 steps or more   JH-HLM Achieved 7: Walk 25 feet or more   Additional Treatment Session   Start Time 1100   End Time 1114   Treatment Assessment Demonstration provided for proper curb step technique with RW. Patient performed additional sit<>stand transfer with supervision. Ambulated 3 ft to step but patient declined trial stating he was too tired and did not feel comfortable. Performed additional sit<>stand transfer and amb 2 ft from bed to chair with RW supervision level. Discussed multiple ways that stairs can be performed including chair technique (or w/c), bumping on bottom etc.   Equipment Use RW   End of Consult   Patient Position at End of Consult Bedside chair;Bed/Chair alarm activated; All needs within reach     Daina Medina, PT             Patient Name: Nii Devries John NIELSON Date: 8/23/2023

## 2023-08-23 NOTE — PROGRESS NOTES
[facility]  Progress Note  Name: Can Powers  MRN: [id number]  Unit/Bed#: -01 I Date of Admission: 8/19/2023   Date of Service: 8/23/2023 I Hospital Day: 4    Assessment/Plan   * Osteomyelitis [facility])  Assessment & Plan  · Patient initially had been referred to the emergency department due to worsening left foot erythema and swelling  · MRI left foot completed 8/17: Status post transmetatarsal amputation with prominent skin ulceration at the distal aspect of the postoperative site including marrow changes involving the second through fifth residual proximal metatarsals indicating osteomyelitis. No evidence of drainable abscess collection in this unenhanced examination. · Previous discussion held between patient and podiatry -patient wishes to move forward with BKA rather than TMA revision given concern over healing ability. Consult general surgery  · POD1 left BKA  · Per general surgery continue IV antibiotics x2 more days postop  · Trend WBC and fever curve    Type 1 diabetes mellitus with diabetic neuropathy [facility])  Assessment & Plan  Lab Results   Component Value Date    HGBA1C 7.9 (H) 07/20/2023       Recent Labs     08/22/23  1646 08/22/23  2019 08/23/23  0711 08/23/23  1137   POCGLU 224* 249* 94 176*       Blood Sugar Average: Last 72 hrs:  (P) 159.36   · Maintained on insulin pump as outpatient -previously on insulin gtt intraoperatively  · Tolerating diet.   Transitioned back to insulin pump 8/22    Hyponatremia  Assessment & Plan  · Appears to be a chronic issue  · Sodium 132  · Osmolality 284  · Urine osmolality 362  · Urine sodium 52  · Appears euvolemic  · Trend BMP    Stage 2 chronic kidney disease  Assessment & Plan  Lab Results   Component Value Date    EGFR 65 08/23/2023    EGFR 68 08/22/2023    EGFR 69 08/21/2023    CREATININE 1.19 08/23/2023    CREATININE 1.15 08/22/2023    CREATININE 1.13 08/21/2023   · Baseline creatinine 1.0-1.2  · Creatinine within baseline on admission  · Trend BMP    Cigarette nicotine dependence without complication  Assessment & Plan  · Nicotine patch ordered  ·  cessation    Benign essential hypertension  Assessment & Plan  · Blood pressure stable at time of admission  · Continue metoprolol, lisinopril  · Consider resuming lisinopril tomorrow        VTE Pharmacologic Prophylaxis: VTE Score: 3 Moderate Risk (Score 3-4) - Pharmacological DVT Prophylaxis Ordered: heparin. Patient Centered Rounds: I performed bedside rounds with nursing staff today. Discussions with Specialists or Other Care Team Provider: CECE, appreciate surgery input    Education and Discussions with Family / Patient: Patient declined call to . Total Time Spent on Date of Encounter in care of patient: 45 minutes This time was spent on one or more of the following: performing physical exam; counseling and coordination of care; obtaining or reviewing history; documenting in the medical record; reviewing/ordering tests, medications or procedures; communicating with other healthcare professionals and discussing with patient's family/caregivers. Current Length of Stay: 4 day(s)  Current Patient Status: Inpatient   Certification Statement: The patient will continue to require additional inpatient hospital stay due to IV antibiotics x2 more days, PT/OT eval, disposition planning  Discharge Plan: Anticipate discharge in 48 hrs to rehab facility. Code Status: Level 1 - Full Code    Subjective:   Patient overall feels well after surgery yesterday. Denies chest pain/palpitations, shortness of breath, nausea/vomiting, abdominal pain. Tolerating diet. Objective:     Vitals:   Temp (24hrs), Av °F (36.7 °C), Min:97.5 °F (36.4 °C), Max:98.5 °F (36.9 °C)    Temp:  [97.5 °F (36.4 °C)-98.5 °F (36.9 °C)] 97.5 °F (36.4 °C)  HR:  [70-94] 92  Resp:  [16-24] 16  BP: (129-147)/(65-74) 147/74  SpO2:  [93 %-100 %] 100 %  Body mass index is 20.34 kg/m². Input and Output Summary (last 24 hours): Intake/Output Summary (Last 24 hours) at 8/23/2023 1317  Last data filed at 8/23/2023 0524  Gross per 24 hour   Intake 240 ml   Output 625 ml   Net -385 ml       Physical Exam:   Physical Exam  Vitals and nursing note reviewed. Constitutional:       General: He is not in acute distress. Appearance: He is well-developed. Comments: No acute distress   HENT:      Head: Normocephalic and atraumatic. Eyes:      General: No scleral icterus. Extraocular Movements: Extraocular movements intact. Conjunctiva/sclera: Conjunctivae normal.   Cardiovascular:      Rate and Rhythm: Normal rate and regular rhythm. Heart sounds: No murmur heard. Pulmonary:      Effort: Pulmonary effort is normal. No respiratory distress. Breath sounds: Normal breath sounds. No wheezing, rhonchi or rales. Abdominal:      General: Bowel sounds are normal.      Palpations: Abdomen is soft. Tenderness: There is no abdominal tenderness. There is no guarding or rebound. Musculoskeletal:         General: No swelling. Cervical back: Normal range of motion. Comments: Left BKA   Skin:     General: Skin is warm and dry. Capillary Refill: Capillary refill takes less than 2 seconds. Neurological:      Mental Status: He is alert and oriented to person, place, and time.    Psychiatric:         Mood and Affect: Mood normal.         Speech: Speech normal.         Behavior: Behavior normal.          Additional Data:     Labs:  Results from last 7 days   Lab Units 08/23/23  0503   WBC Thousand/uL 11.07*   HEMOGLOBIN g/dL 8.8*   HEMATOCRIT % 27.6*   PLATELETS Thousands/uL 307   NEUTROS PCT % 70   LYMPHS PCT % 8*   MONOS PCT % 13*   EOS PCT % 7*     Results from last 7 days   Lab Units 08/23/23  0503 08/20/23  0443 08/19/23  0841   SODIUM mmol/L 132*   < > 130*   POTASSIUM mmol/L 4.8   < > 4.9   CHLORIDE mmol/L 100   < > 94*   CO2 mmol/L 27   < > 28   BUN mg/dL 15 < > 12   CREATININE mg/dL 1.19   < > 1.24   ANION GAP mmol/L 5   < > 8   CALCIUM mg/dL 8.4   < > 10.0   ALBUMIN g/dL  --   --  4.1   TOTAL BILIRUBIN mg/dL  --   --  0.45   ALK PHOS U/L  --   --  157*   ALT U/L  --   --  11   AST U/L  --   --  20   GLUCOSE RANDOM mg/dL 107   < > 110    < > = values in this interval not displayed. Results from last 7 days   Lab Units 08/19/23  0841   INR  0.88     Results from last 7 days   Lab Units 08/23/23  1137 08/23/23  0711 08/22/23  2019 08/22/23  1646 08/22/23  1514 08/22/23  1316 08/22/23  1120 08/22/23  1003 08/22/23  0809 08/22/23  0613 08/22/23  0457 08/21/23 2006   POC GLUCOSE mg/dl 176* 94 249* 224* 93 137 289* 315* 213* 164* 141* 168*         Results from last 7 days   Lab Units 08/19/23  0841 08/17/23  0937   LACTIC ACID mmol/L 1.4 1.5   PROCALCITONIN ng/ml 0.24  --        Lines/Drains:  Invasive Devices     Peripheral Intravenous Line  Duration           Peripheral IV 08/21/23 Left Antecubital 2 days    Peripheral IV 08/22/23 Right Wrist 1 day          Line  Duration           Pump Device Insulin pump Anterior; Left Abdomen 54 days                      Imaging: No pertinent imaging reviewed. Recent Cultures (last 7 days):   Results from last 7 days   Lab Units 08/19/23  0841 08/17/23  0944 08/17/23  0937   BLOOD CULTURE  No Growth at 72 hrs. No Growth at 72 hrs. No Growth After 5 Days. No Growth After 5 Days.        Last 24 Hours Medication List:   Current Facility-Administered Medications   Medication Dose Route Frequency Provider Last Rate   • acetaminophen  975 mg Oral Q6H [address] Paulina Hartmann PA-C     • atorvastatin  40 mg Oral Daily With [employer] NATALIO Hartmann     • cefepime  2,000 mg Intravenous Q12H Tyron Keating PA-C     • gabapentin  300 mg Oral TID Tenzin Stairs NATALIO Hartmann     • heparin (porcine)  5,000 Units Subcutaneous [facility] [address] Paulina NATALIO Hartmann     • HYDROmorphone  1 mg Intravenous Q3H PRN Tenzin Hartmann PA-C     • insulin lispro  300 Units Subcutaneous Insulin Pump Daily PRN Ban Fitting NATALIO Hartmann     • LORazepam  0.5 mg Oral Q8H PRN Ban Fitting NATALIO Hartmann     • methocarbamol  500 mg Oral Q6H PRN Ban Fitting NATALIO Hartmann     • metoprolol succinate  100 mg Oral Daily Paulina Hartmann PA-C     • nicotine  1 patch Transdermal Daily Paulina Hartmann PA-C     • oxyCODONE  10 mg Oral Q4H PRN Ban Fitting NATALIO Hartmann     • oxyCODONE  5 mg Oral Q4H PRN Ban Fitting NATALIO Hartmann     • vancomycin  15 mg/kg Intravenous Q12H Mony Laws PA-C          Today, Patient Was Seen By: Mony Laws PA-C    **Please Note: This note may have been constructed using a voice recognition system. **

## 2023-08-23 NOTE — PLAN OF CARE
Problem: PHYSICAL THERAPY ADULT  Goal: Performs mobility at highest level of function for planned discharge setting. See evaluation for individualized goals. Description: Treatment/Interventions: Functional transfer training, LE strengthening/ROM, Elevations, Therapeutic exercise, Endurance training, Patient/family training, Equipment eval/education, Bed mobility, Gait training, Spoke to nursing, OT          See flowsheet documentation for full assessment, interventions and recommendations. Note: Prognosis: Good  Problem List: Decreased strength, Decreased range of motion, Decreased endurance, Impaired balance, Decreased mobility, Pain, Orthopedic restrictions  Assessment: 65 yo male with nonhealing left TMA with wound infection and osteomyelitis. Podiatry deemed limb unsalvageable. POD#1 s/p left BKA. Patient resides with spouse in a bi-level home. 1+1 KASSY. Patient is normally independent. Has a RW at home. Current medical status includes pain, NWB status, fall risk, anxiety, decreased ROM, strength, balance, endurance and mobility. Patient performed bed mobility, transfers and amb all at a supervision/mod I level. He does fatigue easily but did complete a household distance. Discussed multiple different ways to enter/exit home. Patient did not feel comfortable attempting today. Will attempt next session. Recommending level 3 resources-low intensity. The patient's AM-PAC Basic Mobility Inpatient Short Form Raw Score is 20. A Raw score of greater than 17 suggests the patient may benefit from discharge to home. Please also refer to the recommendation of the Physical Therapist for safe discharge planning. Barriers to Discharge: Inaccessible home environment  Barriers to Discharge Comments: Steps to enter       See flowsheet documentation for full assessment.

## 2023-08-23 NOTE — PROGRESS NOTES
Progress Note - General Surgery   Noel Shone 66 y.o. male MRN: [id number]  Unit/Bed#: -01 Encounter: [id number]    Assessment/Plan:  65 yo male with nonhealing left TMA with wound infection and osteomyelitis. Podiatry deemed limb unsalvageable  -POD#1 s/p left BKA  -pain controlled, continue as needed, patient did receive femoral and popliteal nerve blocks with Exparel preprocedure  -mild leukocytosis (likely reactive), afebrile with VSS  -HGB 10.8 < 8.8, continue to trend  -dressing and splint in place without drainage  -elevate left stump  -continue PT/OT  -continue Abx x 2 more days, source removed  -will plan for dressing change Thur or Fri based on d/c planning  -discharge to rehab per primary service, CM on borad     DM1   -Maintained on insulin pump  -Continue tight blood sugar control for optimal wound healing and control of infection  -medical management     Tobacco abuse  -Cessation recommended  -Nicotine patch ordered  -Counseled that cigarette smoking can affect wound healing     CKD2, hyponatermia, HTN  -management per primary team       Subjective/Objective   Chief Complaint: pain    Subjective: Some discomfort but improved with pain medication. Using oxycodone and occasional Dilaudid. Tolerating diet. Urinating well. No fevers or chills. Concerned about blood sugars and nutritional supplementation. Objective:    Blood pressure 147/74, pulse 92, temperature 97.5 °F (36.4 °C), resp. rate 16, height 6' (1.829 m), weight 68 kg (150 lb), SpO2 100 %. ,Body mass index is 20.34 kg/m². Intake/Output Summary (Last 24 hours) at 8/23/2023 1219  Last data filed at 8/23/2023 0524  Gross per 24 hour   Intake 1240 ml   Output 625 ml   Net 615 ml       Invasive Devices     Peripheral Intravenous Line  Duration           Peripheral IV 08/21/23 Left Antecubital 2 days    Peripheral IV 08/22/23 Right Wrist <1 day          Line  Duration           Pump Device Insulin pump Anterior; Left Abdomen 54 days              Physical Exam:   General appearance: alert and oriented, in no acute distress  Head: Normocephalic, without obvious abnormality, atraumatic, sclerae anicteric, mucous membranes moist  Neck: no JVD and supple, symmetrical, trachea midline  Lungs: clear to auscultation, no wheezes or rales  Heart:   Regular rate and rhythm, S1-S2 normal, no murmur  Abdomen:   Flat, soft, nontender, active bowel sounds  Extremities:   Left stump with dressing and splint intact, no soilage or drainage. Neurovascular intact  Skin: Warm, dry  Nursing notes and vital signs reviewed        Lab, Imaging and other studies:  I have personally reviewed pertinent lab results.   , CBC:   Lab Results   Component Value Date    WBC 11.07 (H) 08/23/2023    HGB 8.8 (L) 08/23/2023    HCT 27.6 (L) 08/23/2023    MCV 98 08/23/2023     08/23/2023    RBC 2.83 (L) 08/23/2023    MCH 31.1 08/23/2023    MCHC 31.9 08/23/2023    RDW 13.9 08/23/2023    MPV 8.7 (L) 08/23/2023    NRBC 0 08/23/2023   , CMP:   Lab Results   Component Value Date    SODIUM 132 (L) 08/23/2023    K 4.8 08/23/2023     08/23/2023    CO2 27 08/23/2023    BUN 15 08/23/2023    CREATININE 1.19 08/23/2023    CALCIUM 8.4 08/23/2023    EGFR 65 08/23/2023     VTE Pharmacologic Prophylaxis: Heparin  VTE Mechanical Prophylaxis: sequential compression device     Bri Hartmann

## 2023-08-23 NOTE — ASSESSMENT & PLAN NOTE
· Blood pressure stable at time of admission  · Continue metoprolol, lisinopril  · Consider resuming lisinopril tomorrow

## 2023-08-23 NOTE — ASSESSMENT & PLAN NOTE
· Patient initially had been referred to the emergency department due to worsening left foot erythema and swelling  · MRI left foot completed 8/17: Status post transmetatarsal amputation with prominent skin ulceration at the distal aspect of the postoperative site including marrow changes involving the second through fifth residual proximal metatarsals indicating osteomyelitis. No evidence of drainable abscess collection in this unenhanced examination. · Previous discussion held between patient and podiatry -patient wishes to move forward with BKA rather than TMA revision given concern over healing ability.   Consult general surgery  · POD1 left BKA  · Per general surgery continue IV antibiotics x2 more days postop  · Trend WBC and fever curve

## 2023-08-23 NOTE — PLAN OF CARE
Problem: Potential for Falls  Goal: Patient will remain free of falls  Description: INTERVENTIONS:  - Educate patient/family on patient safety including physical limitations  - Instruct patient to call for assistance with activity   - Consult OT/PT to assist with strengthening/mobility   - Keep Call bell within reach  - Keep bed low and locked with side rails adjusted as appropriate  - Keep care items and personal belongings within reach  - Initiate and maintain comfort rounds  - Make Fall Risk Sign visible to staff  - Offer Toileting every 2 Hours, in advance of need  - Initiate/Maintain bed/ chair alarm  - Obtain necessary fall risk management equipment: cane  - Apply yellow socks and bracelet for high fall risk patients  - Consider moving patient to room near nurses station  Outcome: Progressing     Problem: PAIN - ADULT  Goal: Verbalizes/displays adequate comfort level or baseline comfort level  Description: Interventions:  - Encourage patient to monitor pain and request assistance  - Assess pain using appropriate pain scale  - Administer analgesics based on type and severity of pain and evaluate response  - Implement non-pharmacological measures as appropriate and evaluate response  - Consider cultural and social influences on pain and pain management  - Notify physician/advanced practitioner if interventions unsuccessful or patient reports new pain  Outcome: Progressing     Problem: INFECTION - ADULT  Goal: Absence or prevention of progression during hospitalization  Description: INTERVENTIONS:  - Assess and monitor for signs and symptoms of infection  - Monitor lab/diagnostic results  - Monitor all insertion sites, i.e. indwelling lines, tubes, and drains  - Monitor endotracheal if appropriate and nasal secretions for changes in amount and color  - [place] appropriate cooling/warming therapies per order  - Administer medications as ordered  - Instruct and encourage patient and family to use good hand hygiene technique  - Identify and instruct in appropriate isolation precautions for identified infection/condition  Outcome: Progressing     Problem: SAFETY ADULT  Goal: Patient will remain free of falls  Description: INTERVENTIONS:  - Educate patient/family on patient safety including physical limitations  - Instruct patient to call for assistance with activity   - Consult OT/PT to assist with strengthening/mobility   - Keep Call bell within reach  - Keep bed low and locked with side rails adjusted as appropriate  - Keep care items and personal belongings within reach  - Initiate and maintain comfort rounds  - Make Fall Risk Sign visible to staff  - Offer Toileting every 2 Hours, in advance of need  - Initiate/Maintain bed/ chair alarm  - Obtain necessary fall risk management equipment: cane  - Apply yellow socks and bracelet for high fall risk patients  - Consider moving patient to room near nurses station  Outcome: Progressing  Goal: Maintain or return to baseline ADL function  Description: INTERVENTIONS:  -  Assess patient's ability to carry out ADLs; assess patient's baseline for ADL function and identify physical deficits which impact ability to perform ADLs (bathing, care of mouth/teeth, toileting, grooming, dressing, etc.)  - Assess/evaluate cause of self-care deficits   - Assess range of motion  - Assess patient's mobility; develop plan if impaired  - Assess patient's need for assistive devices and provide as appropriate  - Encourage maximum independence but intervene and supervise when necessary  - Involve family in performance of ADLs  - Assess for home care needs following discharge   - Consider OT consult to assist with ADL evaluation and planning for discharge  - Provide patient education as appropriate  Outcome: Progressing  Goal: Maintains/Returns to pre admission functional level  Description: INTERVENTIONS:  - Perform BMAT or MOVE assessment daily.   - Set and communicate daily mobility goal to care team and patient/family/caregiver. - Collaborate with rehabilitation services on mobility goals if consulted  - Perform Range of Motion 3 times a day. - Reposition patient every 2 hours. - Dangle patient 3 times a day  - Stand patient 3 times a day  - Ambulate patient 3 times a day  - Out of bed to chair 3 times a day   - Out of bed for meals 3 times a day  - Out of bed for toileting  - Record patient progress and toleration of activity level   Outcome: Progressing     Problem: DISCHARGE PLANNING  Goal: Discharge to home or other facility with appropriate resources  Description: INTERVENTIONS:  - Identify barriers to discharge w/patient and caregiver  - Arrange for needed discharge resources and transportation as appropriate  - Identify discharge learning needs (meds, wound care, etc.)  - Arrange for interpretive services to assist at discharge as needed  - Refer to Case Management Department for coordinating discharge planning if the patient needs post-hospital services based on physician/advanced practitioner order or complex needs related to functional status, cognitive ability, or social support system  Outcome: Progressing     Problem: Knowledge Deficit  Goal: Patient/family/caregiver demonstrates understanding of disease process, treatment plan, medications, and discharge instructions  Description: Complete learning assessment and assess knowledge base.   Interventions:  - Provide teaching at level of understanding  - Provide teaching via preferred learning methods  Outcome: Progressing     Problem: SKIN/TISSUE INTEGRITY - ADULT  Goal: Incision(s), wounds(s) or drain site(s) healing without S/S of infection  Description: INTERVENTIONS  - Assess and document dressing, incision, wound bed, drain sites and surrounding tissue  - Provide patient and family education  - Perform skin care/dressing changes every shift  Outcome: Progressing     Problem: MUSCULOSKELETAL - ADULT  Goal: Maintain or return mobility to safest level of function  Description: INTERVENTIONS:  - Assess patient's ability to carry out ADLs; assess patient's baseline for ADL function and identify physical deficits which impact ability to perform ADLs (bathing, care of mouth/teeth, toileting, grooming, dressing, etc.)  - Assess/evaluate cause of self-care deficits   - Assess range of motion  - Assess patient's mobility  - Assess patient's need for assistive devices and provide as appropriate  - Encourage maximum independence but intervene and supervise when necessary  - Involve family in performance of ADLs  - Assess for home care needs following discharge   - Consider OT consult to assist with ADL evaluation and planning for discharge  - Provide patient education as appropriate  Outcome: Progressing  Goal: Maintain proper alignment of affected body part  Description: INTERVENTIONS:  - Support, maintain and protect limb and body alignment  - Provide patient/ family with appropriate education  Outcome: Progressing     Problem: MOBILITY - ADULT  Goal: Maintain or return to baseline ADL function  Description: INTERVENTIONS:  -  Assess patient's ability to carry out ADLs; assess patient's baseline for ADL function and identify physical deficits which impact ability to perform ADLs (bathing, care of mouth/teeth, toileting, grooming, dressing, etc.)  - Assess/evaluate cause of self-care deficits   - Assess range of motion  - Assess patient's mobility; develop plan if impaired  - Assess patient's need for assistive devices and provide as appropriate  - Encourage maximum independence but intervene and supervise when necessary  - Involve family in performance of ADLs  - Assess for home care needs following discharge   - Consider OT consult to assist with ADL evaluation and planning for discharge  - Provide patient education as appropriate  Outcome: Progressing  Goal: Maintains/Returns to pre admission functional level  Description: INTERVENTIONS:  - Perform BMAT or MOVE assessment daily.   - Set and communicate daily mobility goal to care team and patient/family/caregiver. - Collaborate with rehabilitation services on mobility goals if consulted  - Perform Range of Motion 3 times a day. - Reposition patient every 2 hours.   - Dangle patient 3 times a day  - Stand patient 3 times a day  - Ambulate patient 3 times a day  - Out of bed to chair 3 times a day   - Out of bed for meals 3 times a day  - Out of bed for toileting  - Record patient progress and toleration of activity level   Outcome: Progressing     Problem: Prexisting or High Potential for Compromised Skin Integrity  Goal: Skin integrity is maintained or improved  Description: INTERVENTIONS:  - Identify patients at risk for skin breakdown  - Assess and monitor skin integrity  - Assess and monitor nutrition and hydration status  - Monitor labs   - Assess for incontinence   - Turn and reposition patient  - Assist with mobility/ambulation  - Relieve pressure over bony prominences  - Avoid friction and shearing  - Provide appropriate hygiene as needed including keeping skin clean and dry  - Evaluate need for skin moisturizer/barrier cream  - Collaborate with interdisciplinary team   - Patient/family teaching  - Consider wound care consult   Outcome: Progressing

## 2023-08-23 NOTE — NURSING NOTE
Witness pt fall with this RN present in room. Not injuries identified. SLIM PA Liberty Crum and Surgical PA Ninoska notified via tiger text. Post fall surgical PA checked pt L BKA, noting no injuries to the site.

## 2023-08-24 LAB
ANION GAP SERPL CALCULATED.3IONS-SCNC: 4 MMOL/L
BACTERIA BLD CULT: NORMAL
BACTERIA BLD CULT: NORMAL
BASOPHILS # BLD AUTO: 0.08 THOUSANDS/ÂΜL (ref 0–0.1)
BASOPHILS NFR BLD AUTO: 1 % (ref 0–1)
BUN SERPL-MCNC: 16 MG/DL (ref 5–25)
CALCIUM SERPL-MCNC: 8.4 MG/DL (ref 8.4–10.2)
CHLORIDE SERPL-SCNC: 99 MMOL/L (ref 96–108)
CO2 SERPL-SCNC: 27 MMOL/L (ref 21–32)
CREAT SERPL-MCNC: 1.32 MG/DL (ref 0.6–1.3)
EOSINOPHIL # BLD AUTO: 0.48 THOUSAND/ÂΜL (ref 0–0.61)
EOSINOPHIL NFR BLD AUTO: 5 % (ref 0–6)
ERYTHROCYTE [DISTWIDTH] IN BLOOD BY AUTOMATED COUNT: 13.9 % (ref 11.6–15.1)
GFR SERPL CREATININE-BSD FRML MDRD: 57 ML/MIN/1.73SQ M
GLUCOSE SERPL-MCNC: 107 MG/DL (ref 65–140)
GLUCOSE SERPL-MCNC: 131 MG/DL (ref 65–140)
GLUCOSE SERPL-MCNC: 163 MG/DL (ref 65–140)
GLUCOSE SERPL-MCNC: 181 MG/DL (ref 65–140)
GLUCOSE SERPL-MCNC: 216 MG/DL (ref 65–140)
HCT VFR BLD AUTO: 27 % (ref 36.5–49.3)
HGB BLD-MCNC: 8.5 G/DL (ref 12–17)
IMM GRANULOCYTES # BLD AUTO: 0.09 THOUSAND/UL (ref 0–0.2)
IMM GRANULOCYTES NFR BLD AUTO: 1 % (ref 0–2)
LYMPHOCYTES # BLD AUTO: 1.15 THOUSANDS/ÂΜL (ref 0.6–4.47)
LYMPHOCYTES NFR BLD AUTO: 12 % (ref 14–44)
MCH RBC QN AUTO: 31.1 PG (ref 26.8–34.3)
MCHC RBC AUTO-ENTMCNC: 31.5 G/DL (ref 31.4–37.4)
MCV RBC AUTO: 99 FL (ref 82–98)
MONOCYTES # BLD AUTO: 1.19 THOUSAND/ÂΜL (ref 0.17–1.22)
MONOCYTES NFR BLD AUTO: 12 % (ref 4–12)
NEUTROPHILS # BLD AUTO: 6.75 THOUSANDS/ÂΜL (ref 1.85–7.62)
NEUTS SEG NFR BLD AUTO: 69 % (ref 43–75)
NRBC BLD AUTO-RTO: 0 /100 WBCS
PLATELET # BLD AUTO: 303 THOUSANDS/UL (ref 149–390)
PMV BLD AUTO: 8.9 FL (ref 8.9–12.7)
POTASSIUM SERPL-SCNC: 4.6 MMOL/L (ref 3.5–5.3)
RBC # BLD AUTO: 2.73 MILLION/UL (ref 3.88–5.62)
SODIUM SERPL-SCNC: 130 MMOL/L (ref 135–147)
WBC # BLD AUTO: 9.74 THOUSAND/UL (ref 4.31–10.16)

## 2023-08-24 PROCEDURE — 99024 POSTOP FOLLOW-UP VISIT: CPT | Performed by: PHYSICIAN ASSISTANT

## 2023-08-24 PROCEDURE — 99232 SBSQ HOSP IP/OBS MODERATE 35: CPT | Performed by: PHYSICIAN ASSISTANT

## 2023-08-24 PROCEDURE — 97116 GAIT TRAINING THERAPY: CPT

## 2023-08-24 PROCEDURE — 80048 BASIC METABOLIC PNL TOTAL CA: CPT | Performed by: INTERNAL MEDICINE

## 2023-08-24 PROCEDURE — 85025 COMPLETE CBC W/AUTO DIFF WBC: CPT | Performed by: INTERNAL MEDICINE

## 2023-08-24 PROCEDURE — 82948 REAGENT STRIP/BLOOD GLUCOSE: CPT

## 2023-08-24 PROCEDURE — 97535 SELF CARE MNGMENT TRAINING: CPT

## 2023-08-24 PROCEDURE — 97530 THERAPEUTIC ACTIVITIES: CPT

## 2023-08-24 RX ORDER — CEFEPIME HYDROCHLORIDE 1 G/50ML
1000 INJECTION, SOLUTION INTRAVENOUS EVERY 12 HOURS
Status: COMPLETED | OUTPATIENT
Start: 2023-08-24 | End: 2023-08-26

## 2023-08-24 RX ADMIN — HYDROMORPHONE HYDROCHLORIDE 1 MG: 1 INJECTION, SOLUTION INTRAMUSCULAR; INTRAVENOUS; SUBCUTANEOUS at 14:23

## 2023-08-24 RX ADMIN — OXYCODONE HYDROCHLORIDE 10 MG: 10 TABLET ORAL at 17:25

## 2023-08-24 RX ADMIN — HEPARIN SODIUM 5000 UNITS: 5000 INJECTION INTRAVENOUS; SUBCUTANEOUS at 22:10

## 2023-08-24 RX ADMIN — OXYCODONE HYDROCHLORIDE 15 MG: 10 TABLET ORAL at 22:14

## 2023-08-24 RX ADMIN — NICOTINE 1 PATCH: 21 PATCH, EXTENDED RELEASE TRANSDERMAL at 08:13

## 2023-08-24 RX ADMIN — CEFEPIME HYDROCHLORIDE 1000 MG: 1 INJECTION, SOLUTION INTRAVENOUS at 22:10

## 2023-08-24 RX ADMIN — HEPARIN SODIUM 5000 UNITS: 5000 INJECTION INTRAVENOUS; SUBCUTANEOUS at 14:23

## 2023-08-24 RX ADMIN — HYDROMORPHONE HYDROCHLORIDE 1 MG: 1 INJECTION, SOLUTION INTRAMUSCULAR; INTRAVENOUS; SUBCUTANEOUS at 03:05

## 2023-08-24 RX ADMIN — HYDROMORPHONE HYDROCHLORIDE 1 MG: 1 INJECTION, SOLUTION INTRAMUSCULAR; INTRAVENOUS; SUBCUTANEOUS at 18:32

## 2023-08-24 RX ADMIN — OXYCODONE HYDROCHLORIDE 10 MG: 10 TABLET ORAL at 08:12

## 2023-08-24 RX ADMIN — GABAPENTIN 300 MG: 300 CAPSULE ORAL at 22:10

## 2023-08-24 RX ADMIN — LORAZEPAM 0.5 MG: 0.5 TABLET ORAL at 22:24

## 2023-08-24 RX ADMIN — METHOCARBAMOL 500 MG: 500 TABLET ORAL at 16:45

## 2023-08-24 RX ADMIN — ATORVASTATIN CALCIUM 40 MG: 40 TABLET, FILM COATED ORAL at 16:45

## 2023-08-24 RX ADMIN — METOPROLOL SUCCINATE 100 MG: 50 TABLET, EXTENDED RELEASE ORAL at 08:12

## 2023-08-24 RX ADMIN — VANCOMYCIN HYDROCHLORIDE 1250 MG: 5 INJECTION, POWDER, LYOPHILIZED, FOR SOLUTION INTRAVENOUS at 17:25

## 2023-08-24 RX ADMIN — OXYCODONE HYDROCHLORIDE 10 MG: 10 TABLET ORAL at 03:45

## 2023-08-24 RX ADMIN — LORAZEPAM 0.5 MG: 0.5 TABLET ORAL at 14:23

## 2023-08-24 RX ADMIN — LORAZEPAM 0.5 MG: 0.5 TABLET ORAL at 03:45

## 2023-08-24 RX ADMIN — HEPARIN SODIUM 5000 UNITS: 5000 INJECTION INTRAVENOUS; SUBCUTANEOUS at 03:39

## 2023-08-24 RX ADMIN — GABAPENTIN 300 MG: 300 CAPSULE ORAL at 08:13

## 2023-08-24 RX ADMIN — OXYCODONE HYDROCHLORIDE 10 MG: 10 TABLET ORAL at 13:19

## 2023-08-24 RX ADMIN — ACETAMINOPHEN 325MG 975 MG: 325 TABLET ORAL at 17:25

## 2023-08-24 RX ADMIN — ACETAMINOPHEN 325MG 975 MG: 325 TABLET ORAL at 22:10

## 2023-08-24 RX ADMIN — ACETAMINOPHEN 325MG 975 MG: 325 TABLET ORAL at 11:36

## 2023-08-24 RX ADMIN — GABAPENTIN 300 MG: 300 CAPSULE ORAL at 16:44

## 2023-08-24 RX ADMIN — CEFEPIME HYDROCHLORIDE 1000 MG: 1 INJECTION, SOLUTION INTRAVENOUS at 10:32

## 2023-08-24 RX ADMIN — HYDROMORPHONE HYDROCHLORIDE 1 MG: 1 INJECTION, SOLUTION INTRAMUSCULAR; INTRAVENOUS; SUBCUTANEOUS at 10:33

## 2023-08-24 NOTE — ASSESSMENT & PLAN NOTE
· Appears to be a chronic issue with baseline 131  · Osmolality 284  · Urine osmolality 362  · Urine sodium 52

## 2023-08-24 NOTE — PLAN OF CARE
Problem: Potential for Falls  Goal: Patient will remain free of falls  Description: INTERVENTIONS:  - Educate patient/family on patient safety including physical limitations  - Instruct patient to call for assistance with activity   - Consult OT/PT to assist with strengthening/mobility   - Keep Call bell within reach  - Keep bed low and locked with side rails adjusted as appropriate  - Keep care items and personal belongings within reach  - Initiate and maintain comfort rounds  - Make Fall Risk Sign visible to staff  - Offer Toileting every 2 Hours, in advance of need  - Initiate/Maintain bed/ chair alarm  - Obtain necessary fall risk management equipment: cane  - Apply yellow socks and bracelet for high fall risk patients  - Consider moving patient to room near nurses station  Outcome: Progressing     Problem: PAIN - ADULT  Goal: Verbalizes/displays adequate comfort level or baseline comfort level  Description: Interventions:  - Encourage patient to monitor pain and request assistance  - Assess pain using appropriate pain scale  - Administer analgesics based on type and severity of pain and evaluate response  - Implement non-pharmacological measures as appropriate and evaluate response  - Consider cultural and social influences on pain and pain management  - Notify physician/advanced practitioner if interventions unsuccessful or patient reports new pain  Outcome: Progressing     Problem: INFECTION - ADULT  Goal: Absence or prevention of progression during hospitalization  Description: INTERVENTIONS:  - Assess and monitor for signs and symptoms of infection  - Monitor lab/diagnostic results  - Monitor all insertion sites, i.e. indwelling lines, tubes, and drains  - Monitor endotracheal if appropriate and nasal secretions for changes in amount and color  - [place] appropriate cooling/warming therapies per order  - Administer medications as ordered  - Instruct and encourage patient and family to use good hand hygiene technique  - Identify and instruct in appropriate isolation precautions for identified infection/condition  Outcome: Progressing     Problem: SAFETY ADULT  Goal: Patient will remain free of falls  Description: INTERVENTIONS:  - Educate patient/family on patient safety including physical limitations  - Instruct patient to call for assistance with activity   - Consult OT/PT to assist with strengthening/mobility   - Keep Call bell within reach  - Keep bed low and locked with side rails adjusted as appropriate  - Keep care items and personal belongings within reach  - Initiate and maintain comfort rounds  - Make Fall Risk Sign visible to staff  - Offer Toileting every 2 Hours, in advance of need  - Initiate/Maintain bed/ chair alarm  - Obtain necessary fall risk management equipment: cane  - Apply yellow socks and bracelet for high fall risk patients  - Consider moving patient to room near nurses station  Outcome: Progressing  Goal: Maintain or return to baseline ADL function  Description: INTERVENTIONS:  -  Assess patient's ability to carry out ADLs; assess patient's baseline for ADL function and identify physical deficits which impact ability to perform ADLs (bathing, care of mouth/teeth, toileting, grooming, dressing, etc.)  - Assess/evaluate cause of self-care deficits   - Assess range of motion  - Assess patient's mobility; develop plan if impaired  - Assess patient's need for assistive devices and provide as appropriate  - Encourage maximum independence but intervene and supervise when necessary  - Involve family in performance of ADLs  - Assess for home care needs following discharge   - Consider OT consult to assist with ADL evaluation and planning for discharge  - Provide patient education as appropriate  Outcome: Progressing  Goal: Maintains/Returns to pre admission functional level  Description: INTERVENTIONS:  - Perform BMAT or MOVE assessment daily.   - Set and communicate daily mobility goal to care team and patient/family/caregiver. - Collaborate with rehabilitation services on mobility goals if consulted  - Perform Range of Motion 3 times a day. - Reposition patient every 2 hours. - Dangle patient 3 times a day  - Stand patient 3 times a day  - Ambulate patient 3 times a day  - Out of bed to chair 3 times a day   - Out of bed for meals 3 times a day  - Out of bed for toileting  - Record patient progress and toleration of activity level   Outcome: Progressing     Problem: DISCHARGE PLANNING  Goal: Discharge to home or other facility with appropriate resources  Description: INTERVENTIONS:  - Identify barriers to discharge w/patient and caregiver  - Arrange for needed discharge resources and transportation as appropriate  - Identify discharge learning needs (meds, wound care, etc.)  - Arrange for interpretive services to assist at discharge as needed  - Refer to Case Management Department for coordinating discharge planning if the patient needs post-hospital services based on physician/advanced practitioner order or complex needs related to functional status, cognitive ability, or social support system  Outcome: Progressing     Problem: Knowledge Deficit  Goal: Patient/family/caregiver demonstrates understanding of disease process, treatment plan, medications, and discharge instructions  Description: Complete learning assessment and assess knowledge base.   Interventions:  - Provide teaching at level of understanding  - Provide teaching via preferred learning methods  Outcome: Progressing     Problem: SKIN/TISSUE INTEGRITY - ADULT  Goal: Incision(s), wounds(s) or drain site(s) healing without S/S of infection  Description: INTERVENTIONS  - Assess and document dressing, incision, wound bed, drain sites and surrounding tissue  - Provide patient and family education  - Perform skin care/dressing changes every shift  Outcome: Progressing  Goal: Skin Integrity remains intact(Skin Breakdown Prevention)  Description: Assess:  -Perform Pineda assessment   -Clean and moisturize skin   -Inspect skin when repositioning, toileting, and assisting with ADLS  -Assess under medical devices  -Assess extremities for adequate circulation and sensation     Bed Management:  -Have minimal linens on bed & keep smooth, unwrinkled  -Change linens as needed when moist or perspiring  -Avoid sitting or lying in one position for more than 2 hours while in bed  -Keep HOB at 45 degrees     Toileting:  -Offer bedside commode  -Assess for incontinence  -Use incontinent care products after each incontinent episode     Activity:  -Mobilize patient 3 times a day  -Encourage activity and walks on unit  -Encourage or provide ROM exercises   -Turn and reposition patient every 2 Hours  -Use appropriate equipment to lift or move patient in bed  -Instruct/ Assist with weight shifting when out of bed in chair  -Consider limitation of chair time 2 hour intervals    Skin Care:  -Avoid use of baby powder, tape, friction and shearing, hot water or constrictive clothing  -Relieve pressure over bony prominences  -Do not massage red bony areas    Next Steps:  -Teach patient strategies to minimize risks   -Consider consults to  interdisciplinary teams  Outcome: Progressing     Problem: MUSCULOSKELETAL - ADULT  Goal: Maintain or return mobility to safest level of function  Description: INTERVENTIONS:  - Assess patient's ability to carry out ADLs; assess patient's baseline for ADL function and identify physical deficits which impact ability to perform ADLs (bathing, care of mouth/teeth, toileting, grooming, dressing, etc.)  - Assess/evaluate cause of self-care deficits   - Assess range of motion  - Assess patient's mobility  - Assess patient's need for assistive devices and provide as appropriate  - Encourage maximum independence but intervene and supervise when necessary  - Involve family in performance of ADLs  - Assess for home care needs following discharge   - Consider OT consult to assist with ADL evaluation and planning for discharge  - Provide patient education as appropriate  Outcome: Progressing  Goal: Maintain proper alignment of affected body part  Description: INTERVENTIONS:  - Support, maintain and protect limb and body alignment  - Provide patient/ family with appropriate education  Outcome: Progressing     Problem: MOBILITY - ADULT  Goal: Maintain or return to baseline ADL function  Description: INTERVENTIONS:  -  Assess patient's ability to carry out ADLs; assess patient's baseline for ADL function and identify physical deficits which impact ability to perform ADLs (bathing, care of mouth/teeth, toileting, grooming, dressing, etc.)  - Assess/evaluate cause of self-care deficits   - Assess range of motion  - Assess patient's mobility; develop plan if impaired  - Assess patient's need for assistive devices and provide as appropriate  - Encourage maximum independence but intervene and supervise when necessary  - Involve family in performance of ADLs  - Assess for home care needs following discharge   - Consider OT consult to assist with ADL evaluation and planning for discharge  - Provide patient education as appropriate  Outcome: Progressing  Goal: Maintains/Returns to pre admission functional level  Description: INTERVENTIONS:  - Perform BMAT or MOVE assessment daily.   - Set and communicate daily mobility goal to care team and patient/family/caregiver. - Collaborate with rehabilitation services on mobility goals if consulted  - Perform Range of Motion 3 times a day. - Reposition patient every 2 hours.   - Dangle patient 3 times a day  - Stand patient 3 times a day  - Ambulate patient 3 times a day  - Out of bed to chair 3 times a day   - Out of bed for meals 3 times a day  - Out of bed for toileting  - Record patient progress and toleration of activity level   Outcome: Progressing     Problem: Prexisting or High Potential for Compromised Skin Integrity  Goal: Skin integrity is maintained or improved  Description: INTERVENTIONS:  - Identify patients at risk for skin breakdown  - Assess and monitor skin integrity  - Assess and monitor nutrition and hydration status  - Monitor labs   - Assess for incontinence   - Turn and reposition patient  - Assist with mobility/ambulation  - Relieve pressure over bony prominences  - Avoid friction and shearing  - Provide appropriate hygiene as needed including keeping skin clean and dry  - Evaluate need for skin moisturizer/barrier cream  - Collaborate with interdisciplinary team   - Patient/family teaching  - Consider wound care consult   Outcome: Progressing     Problem: Nutrition/Hydration-ADULT  Goal: Nutrient/Hydration intake appropriate for improving, restoring or maintaining nutritional needs  Description: Monitor and assess patient's nutrition/hydration status for malnutrition. Collaborate with interdisciplinary team and initiate plan and interventions as ordered. Monitor patient's weight and dietary intake as ordered or per policy. Utilize nutrition screening tool and intervene as necessary. Determine patient's food preferences and provide high-protein, high-caloric foods as appropriate.      INTERVENTIONS:  - Monitor oral intake, urinary output, labs, and treatment plans  - Assess nutrition and hydration status and recommend course of action  - Evaluate amount of meals eaten  - Assist patient with eating if necessary   - Allow adequate time for meals  - Recommend/ encourage appropriate diets, oral nutritional supplements, and vitamin/mineral supplements  - Order, calculate, and assess calorie counts as needed  - Recommend, monitor, and adjust tube feedings and TPN/PPN based on assessed needs  - Assess need for intravenous fluids  - Provide specific nutrition/hydration education as appropriate  - Include patient/family/caregiver in decisions related to nutrition  Outcome: Progressing Problem: METABOLIC, FLUID AND ELECTROLYTES - ADULT  Goal: Electrolytes maintained within normal limits  Description: INTERVENTIONS:  - Monitor labs and assess patient for signs and symptoms of electrolyte imbalances  - Administer electrolyte replacement as ordered  - Monitor response to electrolyte replacements, including repeat lab results as appropriate  - Instruct patient on fluid and nutrition as appropriate  Outcome: Progressing  Goal: Fluid balance maintained  Description: INTERVENTIONS:  - Monitor labs   - Monitor I/O and WT  - Instruct patient on fluid and nutrition as appropriate  - Assess for signs & symptoms of volume excess or deficit  Outcome: Progressing  Goal: Glucose maintained within target range  Description: INTERVENTIONS:  - Monitor Blood Glucose as ordered  - Assess for signs and symptoms of hyperglycemia and hypoglycemia  - Administer ordered medications to maintain glucose within target range  - Assess nutritional intake and initiate nutrition service referral as needed  Outcome: Progressing     Problem: HEMATOLOGIC - ADULT  Goal: Maintains hematologic stability  Description: INTERVENTIONS  - Assess for signs and symptoms of bleeding or hemorrhage  - Monitor labs  - Administer supportive blood products/factors as ordered and appropriate  Outcome: Progressing

## 2023-08-24 NOTE — PLAN OF CARE
Problem: PHYSICAL THERAPY ADULT  Goal: Performs mobility at highest level of function for planned discharge setting. See evaluation for individualized goals. Description: Treatment/Interventions: Functional transfer training, LE strengthening/ROM, Elevations, Therapeutic exercise, Endurance training, Patient/family training, Equipment eval/education, Bed mobility, Gait training, Spoke to nursing, OT          See flowsheet documentation for full assessment, interventions and recommendations. Outcome: Progressing  Note: Prognosis: Good  Problem List: Decreased strength, Decreased range of motion, Decreased endurance, Impaired balance, Decreased mobility, Impaired judgement, Decreased safety awareness, Orthopedic restrictions, Pain  Assessment: Patient was received supine in bed. Needed encouragement to participate. Patient with poor attention at times. Declines safety recommendations provided by therapists. Able to perform bed mobility, transfers, amb and stairs all at a Keke/supervision level. Patient was thoroughly educated on many different ways to perform stairs safely in home. Advised use of w/c to enter home-patient declined. Patient would also benefit from a first floor setup. Patient was able to complete 4 stairs. Recommending that he always has supervision when performing. He will benefit from level 3 resources. The patient's AM-[facility] Basic Mobility Inpatient Short Form Raw Score is 20. A Raw score of greater than 17 suggests the patient may benefit from discharge to home. Please also refer to the recommendation of the Physical Therapist for safe discharge planning. Barriers to Discharge: None  Barriers to Discharge Comments: Steps to enter       See flowsheet documentation for full assessment.

## 2023-08-24 NOTE — ASSESSMENT & PLAN NOTE
Lab Results   Component Value Date    HGBA1C 7.9 (H) 07/20/2023     Recent Labs     08/23/23  2231 08/24/23  0732 08/24/23  1135 08/24/23  1544   POCGLU 143* 131 216* 181*     Blood Sugar Average: Last 72 hrs:  (P) 161   · Maintained on insulin pump as outpatient -previously on insulin gtt intraoperatively  · Tolerating diet.   Transitioned back to insulin pump 8/22

## 2023-08-24 NOTE — PROGRESS NOTES
Rena Salmon is a 66 y.o. male who is currently ordered Vancomycin IV with management by the Pharmacy Consult service. Relevant clinical data and objective / subjective history reviewed. Vancomycin Assessment:  Indication and Goal AUC/Trough: Bone/joint infection (goal -600, trough >10)  Clinical Status: stable  Micro:   NGTD  Renal Function:  SCr: 1.32 mg/dL  CrCl: 56.5 mL/min  Renal replacement: Not on dialysis  Days of Therapy: 8  Current Dose: 1250mg every 24 hours  Vancomycin Plan: patient is expected to complete antibiotics today unless indicated otherwise. New Dosing: No change  Estimated AUC: 518 mcg*hr/mL  Estimated Trough: 15.3 mcg/mL  Next Level: 8/28/23 at 0600  Renal Function Monitoring: Daily BMP and [place] will continue to follow closely for s/sx of nephrotoxicity, infusion reactions and appropriateness of therapy. BMP and CBC will be ordered per protocol. We will continue to follow the patient’s culture results and clinical progress daily. Also, cefepime will be adjusted renally if necessary.       Jesús Hester, Pharmacist, PharmD, BCPS

## 2023-08-24 NOTE — PHYSICAL THERAPY NOTE
PHYSICAL THERAPY NOTE       08/24/23 1026   PT Last Visit   PT Visit Date 08/24/23   Note Type   Note Type Treatment   Pain Assessment   Pain Assessment Tool Pierre-Baker FACES   Pierre-Baker FACES Pain Rating 4   Pain Location/Orientation Orientation: Left; Location: Leg   Hospital Pain Intervention(s) Ambulation/increased activity   Restrictions/Precautions   Weight Bearing Precautions Per Order Yes   LLE Weight Bearing Per Order NWB   Other Precautions Pain; Fall Risk;WBS; Bed Alarm; Chair Alarm;Cognitive   General   Chart Reviewed Yes   Additional Pertinent History Per chart, patient had a fall on 8/23   Response to Previous Treatment Patient with no complaints from previous session. Family/Caregiver Present No   Cognition   Arousal/Participation Alert   Attention Attends with cues to redirect   Orientation Level Oriented X4   Memory Within functional limits   Following Commands Follows one step commands with increased time or repetition   Comments Needs redirection. Not receptive to education at times. Subjective   Subjective "I cannot go home today."   Bed Mobility   Supine to Sit 6  Modified independent   Additional items HOB elevated   Sit to Supine 6  Modified independent   Additional items HOB elevated   Transfers   Sit to Stand 5  Supervision   Additional items Armrests   Stand to Sit 5  Supervision   Additional items Armrests   Additional Comments Multiple transfers performed during session   Ambulation/Elevation   Gait pattern Antalgic; Forward Flexion  (Hop to pattern)   Gait Assistance 5  Supervision   Assistive Device Rolling walker   Distance 50ft   Stair Management Assistance 5  Supervision  (Close supervision)   Additional items Assist x 1;Verbal cues; Tactile cues   Stair Management Technique With walker   Number of Stairs   (4 curb steps.  twice on the 4 inch step and twice on the 7 inch step)   Balance   Static Sitting Normal   Dynamic Sitting Normal   Static Standing Good   Dynamic Standing Fair +   Ambulatory Fair +   Endurance Deficit   Endurance Deficit Yes   Endurance Deficit Description Limited by fatigue   Activity Tolerance   Activity Tolerance Patient limited by fatigue   Medical Staff Made Aware Victorina WEBER   Nurse Made Aware RNGeo   Assessment   Prognosis Good   Problem List Decreased strength;Decreased range of motion;Decreased endurance; Impaired balance;Decreased mobility; Impaired judgement;Decreased safety awareness;Orthopedic restrictions;Pain   Assessment Patient was received supine in bed. Needed encouragement to participate. Patient with poor attention at times. Declines safety recommendations provided by therapists. Able to perform bed mobility, transfers, amb and stairs all at a Keke/supervision level. Patient was thoroughly educated on many different ways to perform stairs safely in home. Advised use of w/c to enter home-patient declined. Patient would also benefit from a first floor setup. Patient was able to complete 4 stairs. Recommending that he always has supervision when performing. He will benefit from level 3 resources. The patient's AM-[facility] Basic Mobility Inpatient Short Form Raw Score is 20. A Raw score of greater than 17 suggests the patient may benefit from discharge to home. Please also refer to the recommendation of the Physical Therapist for safe discharge planning. Barriers to Discharge None   Goals   Patient Goals To get stronger   STG Expiration Date 08/30/23   PT Treatment Day 2   Plan   Treatment/Interventions Functional transfer training;LE strengthening/ROM; Elevations; Therapeutic exercise; Endurance training;Patient/family training;Equipment eval/education; Bed mobility;Gait training;Spoke to nursing;Spoke to case management;OT   Progress Progressing toward goals   PT Frequency 3-5x/wk   Recommendation   UB Rehab Discharge Recommendation (PT/OT) Level 3   Additional Comments Recommended a w/c. Patient declined   [facility] Mobility Inpatient   Turning in Flat Bed Without Bedrails 4   Lying on Back to Sitting on Edge of Flat Bed Without Bedrails 4   Moving Bed to Chair 3   Standing Up From Chair Using Arms 3   Walk in Room 3   Climb 3-5 Stairs With Railing 3   Basic Mobility Inpatient Raw Score 20   Basic Mobility Standardized Score 43.99   Highest Level Of Mobility   JH-HLM Goal 6: Walk 10 steps or more   JH-HLM Achieved 7: Walk 25 feet or more   Education   Education Provided Mobility training;Assistive device   Patient Reinforcement needed   End of Consult   Patient Position at End of Consult Supine;Bed/Chair alarm activated; All needs within reach   Daina Medina            Patient Name: Melany Sheets  CAROLINJ'P Date: 8/24/2023

## 2023-08-24 NOTE — PROGRESS NOTES
Progress Note - Augustin Call 66 y.o. male MRN: [id number]    Unit/Bed#: -01 Encounter: [id number]      Assessment/Plan:  POD #2 S/p left BKA  -Remains afebrile without leukocytosis, VSS   -HGB 8.5  -Continue IV ABx, blood cx NGTD  -keep NPO  -Follow cultures, blood cultures, neg  -plan to change dressing tomorrow.      DM1   -Maintained on insulin pump  -Continue tight blood sugar control for optimal wound healing and control of infection  -medical management     Tobacco abuse  -Cessation recommended  -Nicotine patch ordered  -Counseled that cigarette smoking can affect wound healing     CKD2, hyponatermia, HTN  -Na+ 131  -management per primary team    Subjective:   I am concerned that the splint isn't straight. Objective:     Vitals: Blood pressure 133/56, pulse (!) 111, temperature 97.7 °F (36.5 °C), temperature source Temporal, resp. rate 19, height 6' (1.829 m), weight 68 kg (150 lb), SpO2 91 %. ,Body mass index is 20.34 kg/m². Intake/Output Summary (Last 24 hours) at 8/24/2023 0827  Last data filed at 8/23/2023 1711  Gross per 24 hour   Intake 220 ml   Output --   Net 220 ml       Physical Exam: General appearance: alert and oriented, in no acute distress  Left splint appears in good position. Dressing is clean and dry. Added abd padding to upper splint due to rubbing. Invasive Devices     Peripheral Intravenous Line  Duration           Peripheral IV 08/21/23 Left Antecubital 2 days    Peripheral IV 08/22/23 Right Wrist 1 day          Line  Duration           Pump Device Insulin pump Anterior; Left Abdomen 55 days                Lab, Imaging and other studies: I have personally reviewed pertinent reports.     VTE Pharmacologic Prophylaxis: Heparin  VTE Mechanical Prophylaxis: sequential compression device

## 2023-08-24 NOTE — QUICK NOTE
Per SLIM note, continue antibiotics until Friday. Thus, order for antibiotics extended until tomorrow.     Mamie Rincon, PharmD, BCPS

## 2023-08-24 NOTE — PLAN OF CARE
Problem: OCCUPATIONAL THERAPY ADULT  Goal: Performs self-care activities at highest level of function for planned discharge setting. See evaluation for individualized goals. Description: Treatment Interventions: ADL retraining, Functional transfer training, UE strengthening/ROM, Endurance training, Patient/family training, Equipment evaluation/education, Compensatory technique education, Continued evaluation          See flowsheet documentation for full assessment, interventions and recommendations. Outcome: Progressing  Note: Limitation: Decreased ADL status, Decreased endurance, Decreased self-care trans, Decreased high-level ADLs  Prognosis: Good  Assessment: Pt seen for OT tx session with focus on functional balance, functional mobility, ADL status, and transfer safety. Patient agreeable to OT treatment session. Pt received supine in bed. Pt performed bed mobility at Mod I level. Pt completed LB dressing sitting EOB with set-up. Pt completed all functional txfers with S & armrests. Pt able to tolerate functional mobility for a functional household distance with S & RW. Pt independently took a 10 second standing rest break utilizing the wall for support. Pt able to ascend/descend 4 steps with S for safe entry/exit into home. Patient continues to be functioning below baseline level, occupational performance remains limited secondary to factors listed above, and pt at increased risk for falls and injury. The patient's raw score on the AM-PAC Daily Activity inpatient short form is 21, standardized score is 44.27, greater than 39.4. Patients at this level are likely to benefit from DC to home. Please refer to the recommendation of the Occupational Therapist for safe DC planning. From OT standpoint, recommendation at time of D/C would be DC with Level III resources.  Patient to benefit from continued Occupational Therapy treatment while in the hospital to address deficits as defined above and maximize level of functional independence with ADLs and functional mobility. Pt left supine in bed with call bell in reach, tray table in reach, needs met, RN informed.

## 2023-08-24 NOTE — CASE MANAGEMENT
Case Management Discharge Planning Note    Patient name Augustin Call  Location [address] [facility] [place] 214/-01 MRN [id number]  : 1960 Date 2023       Current Admission Date: 2023  Current Admission Diagnosis:Osteomyelitis [facility])   Patient Active Problem List    Diagnosis Date Noted   • Osteomyelitis ([address]) 2023   • Type 1 diabetes mellitus with diabetic neuropathy ([address]) 2023   • S/P transmetatarsal amputation of foot, left ([address]) 2023   • Sepsis ([address]) 2023   • Stage 2 chronic kidney disease 2023   • Hyponatremia 2023   • Alcohol abuse 2023   • Nicotine abuse 2023   • 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, with fat layer exposed ([address]) 2023   • Type 1 diabetes mellitus with hyperglycemia ([address]) 2022   • Diabetic polyneuropathy associated with type 1 diabetes mellitus ([address]) 2022   • Microalbuminuria due to type 1 diabetes mellitus ([address]) 2022   • Hypoglycemia unawareness associated with type 1 diabetes mellitus ([address]) 2022   • Cigarette nicotine dependence without complication    • Gout of ankle 2022   • Chronic fatigue syndrome 2022   • Lower urinary tract symptoms due to benign prostatic hyperplasia 2022   • 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 ([address]) 2022   • Chronic pain 2022   • Insulin pump in place 2018   • Benign essential hypertension 2011   • Mixed hyperlipidemia 10/25/2010   • ED (erectile dysfunction) of organic origin 10/25/2010      LOS (days): 5  Geometric Mean LOS (GMLOS) (days):   Days to GMLOS:     OBJECTIVE:  Risk of Unplanned Readmission Score: 19.75         Current admission status: Inpatient   Preferred Pharmacy:   [facility] [place] [id number] [address] [address]  Phone: [phone] Fax: [phone]    Primary Care Provider: Rebecca Lee MD    Primary Insurance: [address]  Secondary Insurance:     DISCHARGE DETAILS:        Spoke with pt and spouse about therapy's recommendation for home health. Pt is set on going to [facility] then receive home health. Pt states he does not feel comfortable going home as he cannot pull his pants up after toileting . He and his wife mentioned barriers like 5 sheri home, dogs, and no grab bars to assist with transferring. Pt prefers to go [address] [place] [address] for str. CM explained that he will need insurance auth to go to str and probably would not be approved based on his current ability. Pt agreed to [facility] at some point and will only accept [facility] as he had them in the past. CM made referral in [place].                          Requested [address] [facility]         Is the patient interested in [facility] at discharge?: Yes  [facility] requested[de-identified] Nursing, Occupational Therapy, Physical [address] [facility] Name[de-identified] Jeane Provider[de-identified] PCP  Home Health Services Needed[de-identified] Wound/Ostomy Care, Strengthening/Theraputic Exercises to Improve Function, Gait/ADL Training, Evaluate Functional Status and Safety  Homebound Criteria Met[de-identified] Uses an Assist Device (i.e. cane, walker, etc)  Supporting Clincal Findings[de-identified] Limited Endurance, Fatigues [place] in [employer]

## 2023-08-24 NOTE — PLAN OF CARE
Problem: Potential for Falls  Goal: Patient will remain free of falls  Description: INTERVENTIONS:  - Educate patient/family on patient safety including physical limitations  - Instruct patient to call for assistance with activity   - Consult OT/PT to assist with strengthening/mobility   - Keep Call bell within reach  - Keep bed low and locked with side rails adjusted as appropriate  - Keep care items and personal belongings within reach  - Initiate and maintain comfort rounds  - Make Fall Risk Sign visible to staff  - Offer Toileting every 2 Hours, in advance of need  - Initiate/Maintain bed/ chair alarm  - Obtain necessary fall risk management equipment: cane  - Apply yellow socks and bracelet for high fall risk patients  - Consider moving patient to room near nurses station  Outcome: Progressing     Problem: PAIN - ADULT  Goal: Verbalizes/displays adequate comfort level or baseline comfort level  Description: Interventions:  - Encourage patient to monitor pain and request assistance  - Assess pain using appropriate pain scale  - Administer analgesics based on type and severity of pain and evaluate response  - Implement non-pharmacological measures as appropriate and evaluate response  - Consider cultural and social influences on pain and pain management  - Notify physician/advanced practitioner if interventions unsuccessful or patient reports new pain  Outcome: Progressing     Problem: INFECTION - ADULT  Goal: Absence or prevention of progression during hospitalization  Description: INTERVENTIONS:  - Assess and monitor for signs and symptoms of infection  - Monitor lab/diagnostic results  - Monitor all insertion sites, i.e. indwelling lines, tubes, and drains  - Monitor endotracheal if appropriate and nasal secretions for changes in amount and color  - [place] appropriate cooling/warming therapies per order  - Administer medications as ordered  - Instruct and encourage patient and family to use good hand hygiene technique  - Identify and instruct in appropriate isolation precautions for identified infection/condition  Outcome: Progressing     Problem: SAFETY ADULT  Goal: Patient will remain free of falls  Description: INTERVENTIONS:  - Educate patient/family on patient safety including physical limitations  - Instruct patient to call for assistance with activity   - Consult OT/PT to assist with strengthening/mobility   - Keep Call bell within reach  - Keep bed low and locked with side rails adjusted as appropriate  - Keep care items and personal belongings within reach  - Initiate and maintain comfort rounds  - Make Fall Risk Sign visible to staff  - Offer Toileting every 2 Hours, in advance of need  - Initiate/Maintain bed/ chair alarm  - Obtain necessary fall risk management equipment: cane  - Apply yellow socks and bracelet for high fall risk patients  - Consider moving patient to room near nurses station  Outcome: Progressing  Goal: Maintain or return to baseline ADL function  Description: INTERVENTIONS:  -  Assess patient's ability to carry out ADLs; assess patient's baseline for ADL function and identify physical deficits which impact ability to perform ADLs (bathing, care of mouth/teeth, toileting, grooming, dressing, etc.)  - Assess/evaluate cause of self-care deficits   - Assess range of motion  - Assess patient's mobility; develop plan if impaired  - Assess patient's need for assistive devices and provide as appropriate  - Encourage maximum independence but intervene and supervise when necessary  - Involve family in performance of ADLs  - Assess for home care needs following discharge   - Consider OT consult to assist with ADL evaluation and planning for discharge  - Provide patient education as appropriate  Outcome: Progressing  Goal: Maintains/Returns to pre admission functional level  Description: INTERVENTIONS:  - Perform BMAT or MOVE assessment daily.   - Set and communicate daily mobility goal to care team and patient/family/caregiver. - Collaborate with rehabilitation services on mobility goals if consulted  - Perform Range of Motion 3 times a day. - Reposition patient every 2 hours. - Dangle patient 3 times a day  - Stand patient 3 times a day  - Ambulate patient 3 times a day  - Out of bed to chair 3 times a day   - Out of bed for meals 3 times a day  - Out of bed for toileting  - Record patient progress and toleration of activity level   Outcome: Progressing     Problem: DISCHARGE PLANNING  Goal: Discharge to home or other facility with appropriate resources  Description: INTERVENTIONS:  - Identify barriers to discharge w/patient and caregiver  - Arrange for needed discharge resources and transportation as appropriate  - Identify discharge learning needs (meds, wound care, etc.)  - Arrange for interpretive services to assist at discharge as needed  - Refer to Case Management Department for coordinating discharge planning if the patient needs post-hospital services based on physician/advanced practitioner order or complex needs related to functional status, cognitive ability, or social support system  Outcome: Progressing     Problem: Knowledge Deficit  Goal: Patient/family/caregiver demonstrates understanding of disease process, treatment plan, medications, and discharge instructions  Description: Complete learning assessment and assess knowledge base.   Interventions:  - Provide teaching at level of understanding  - Provide teaching via preferred learning methods  Outcome: Progressing     Problem: SKIN/TISSUE INTEGRITY - ADULT  Goal: Incision(s), wounds(s) or drain site(s) healing without S/S of infection  Description: INTERVENTIONS  - Assess and document dressing, incision, wound bed, drain sites and surrounding tissue  - Provide patient and family education  - Perform skin care/dressing changes every shift  Outcome: Progressing  Goal: Skin Integrity remains intact(Skin Breakdown Prevention)  Description: Assess:  -Perform Pineda assessment every   -Clean and moisturize skin every   -Inspect skin when repositioning, toileting, and assisting with ADLS  -Assess under medical devices such as  every   -Assess extremities for adequate circulation and sensation     Bed Management:  -Have minimal linens on bed & keep smooth, unwrinkled  -Change linens as needed when moist or perspiring  -Avoid sitting or lying in one position for more than  hours while in bed  -Keep HOB at degrees     Toileting:  -Offer bedside commode  -Assess for incontinence every   -Use incontinent care products after each incontinent episode such as     Activity:  -Mobilize patient  times a day  -Encourage activity and walks on unit  -Encourage or provide ROM exercises   -Turn and reposition patient every  Hours  -Use appropriate equipment to lift or move patient in bed  -Instruct/ Assist with weight shifting every  when out of bed in chair  -Consider limitation of chair time  hour intervals    Skin Care:  -Avoid use of baby powder, tape, friction and shearing, hot water or constrictive clothing  -Relieve pressure over bony prominences using   -Do not massage red bony areas    Next Steps:  -Teach patient strategies to minimize risks such as    -Consider consults to  interdisciplinary teams such as  Outcome: Progressing     Problem: MUSCULOSKELETAL - ADULT  Goal: Maintain or return mobility to safest level of function  Description: INTERVENTIONS:  - Assess patient's ability to carry out ADLs; assess patient's baseline for ADL function and identify physical deficits which impact ability to perform ADLs (bathing, care of mouth/teeth, toileting, grooming, dressing, etc.)  - Assess/evaluate cause of self-care deficits   - Assess range of motion  - Assess patient's mobility  - Assess patient's need for assistive devices and provide as appropriate  - Encourage maximum independence but intervene and supervise when necessary  - Involve family in performance of ADLs  - Assess for home care needs following discharge   - Consider OT consult to assist with ADL evaluation and planning for discharge  - Provide patient education as appropriate  Outcome: Progressing  Goal: Maintain proper alignment of affected body part  Description: INTERVENTIONS:  - Support, maintain and protect limb and body alignment  - Provide patient/ family with appropriate education  Outcome: Progressing     Problem: MOBILITY - ADULT  Goal: Maintain or return to baseline ADL function  Description: INTERVENTIONS:  -  Assess patient's ability to carry out ADLs; assess patient's baseline for ADL function and identify physical deficits which impact ability to perform ADLs (bathing, care of mouth/teeth, toileting, grooming, dressing, etc.)  - Assess/evaluate cause of self-care deficits   - Assess range of motion  - Assess patient's mobility; develop plan if impaired  - Assess patient's need for assistive devices and provide as appropriate  - Encourage maximum independence but intervene and supervise when necessary  - Involve family in performance of ADLs  - Assess for home care needs following discharge   - Consider OT consult to assist with ADL evaluation and planning for discharge  - Provide patient education as appropriate  Outcome: Progressing  Goal: Maintains/Returns to pre admission functional level  Description: INTERVENTIONS:  - Perform BMAT or MOVE assessment daily.   - Set and communicate daily mobility goal to care team and patient/family/caregiver. - Collaborate with rehabilitation services on mobility goals if consulted  - Perform Range of Motion 3 times a day. - Reposition patient every 2 hours.   - Dangle patient 3 times a day  - Stand patient 3 times a day  - Ambulate patient 3 times a day  - Out of bed to chair 3 times a day   - Out of bed for meals 3 times a day  - Out of bed for toileting  - Record patient progress and toleration of activity level   Outcome: Progressing     Problem: Prexisting or High Potential for Compromised Skin Integrity  Goal: Skin integrity is maintained or improved  Description: INTERVENTIONS:  - Identify patients at risk for skin breakdown  - Assess and monitor skin integrity  - Assess and monitor nutrition and hydration status  - Monitor labs   - Assess for incontinence   - Turn and reposition patient  - Assist with mobility/ambulation  - Relieve pressure over bony prominences  - Avoid friction and shearing  - Provide appropriate hygiene as needed including keeping skin clean and dry  - Evaluate need for skin moisturizer/barrier cream  - Collaborate with interdisciplinary team   - Patient/family teaching  - Consider wound care consult   Outcome: Progressing     Problem: Nutrition/Hydration-ADULT  Goal: Nutrient/Hydration intake appropriate for improving, restoring or maintaining nutritional needs  Description: Monitor and assess patient's nutrition/hydration status for malnutrition. Collaborate with interdisciplinary team and initiate plan and interventions as ordered. Monitor patient's weight and dietary intake as ordered or per policy. Utilize nutrition screening tool and intervene as necessary. Determine patient's food preferences and provide high-protein, high-caloric foods as appropriate.      INTERVENTIONS:  - Monitor oral intake, urinary output, labs, and treatment plans  - Assess nutrition and hydration status and recommend course of action  - Evaluate amount of meals eaten  - Assist patient with eating if necessary   - Allow adequate time for meals  - Recommend/ encourage appropriate diets, oral nutritional supplements, and vitamin/mineral supplements  - Order, calculate, and assess calorie counts as needed  - Recommend, monitor, and adjust tube feedings and TPN/PPN based on assessed needs  - Assess need for intravenous fluids  - Provide specific nutrition/hydration education as appropriate  - Include patient/family/caregiver in decisions related to nutrition  Outcome: Progressing     Problem: METABOLIC, FLUID AND ELECTROLYTES - ADULT  Goal: Electrolytes maintained within normal limits  Description: INTERVENTIONS:  - Monitor labs and assess patient for signs and symptoms of electrolyte imbalances  - Administer electrolyte replacement as ordered  - Monitor response to electrolyte replacements, including repeat lab results as appropriate  - Instruct patient on fluid and nutrition as appropriate  Outcome: Progressing  Goal: Fluid balance maintained  Description: INTERVENTIONS:  - Monitor labs   - Monitor I/O and WT  - Instruct patient on fluid and nutrition as appropriate  - Assess for signs & symptoms of volume excess or deficit  Outcome: Progressing  Goal: Glucose maintained within target range  Description: INTERVENTIONS:  - Monitor Blood Glucose as ordered  - Assess for signs and symptoms of hyperglycemia and hypoglycemia  - Administer ordered medications to maintain glucose within target range  - Assess nutritional intake and initiate nutrition service referral as needed  Outcome: Progressing     Problem: HEMATOLOGIC - ADULT  Goal: Maintains hematologic stability  Description: INTERVENTIONS  - Assess for signs and symptoms of bleeding or hemorrhage  - Monitor labs  - Administer supportive blood products/factors as ordered and appropriate  Outcome: Progressing

## 2023-08-24 NOTE — OCCUPATIONAL THERAPY NOTE
Occupational Therapy Tx Note     Patient Name: Harpreet Carrera  FUBZP'[id number] Date: 8/24/2023  Problem List  Principal Problem:    Osteomyelitis [facility])  Active Problems:    Benign essential hypertension    Cigarette nicotine dependence without complication    Stage 2 chronic kidney disease    Hyponatremia    Type 1 diabetes mellitus with diabetic neuropathy ([address] )              08/24/23 1027   OT Last Visit   OT Visit Date 08/24/23   Note Type   Note Type Treatment   Pain Assessment   Pain Assessment Tool Pierre-Baker FACES   Pierre-Baker FACES Pain Rating 6   Pain Location/Orientation Orientation: Left; Location: Leg   Hospital Pain Intervention(s) Ambulation/increased activity;Repositioned   Restrictions/Precautions   Weight Bearing Precautions Per Order Yes   LLE Weight Bearing Per Order NWB   Other Precautions Pain; Fall Risk;WBS; Impulsive   ADL   LB Dressing Assistance 5  Supervision/Setup   LB Dressing Deficit Don/doff R shoe;Tie shoes; Fasteners   LB Dressing Comments sitting EOB   Bed Mobility   Supine to Sit 6  Modified independent   Additional items HOB elevated   Sit to Supine 6  Modified independent   Additional items HOB elevated   Transfers   Sit to Stand 5  Supervision   Additional items Armrests   Stand to Sit 5  Supervision   Additional items Armrests   Additional Comments Multiple sit <> stands performed during session   Functional Mobility   Functional Mobility 5  Supervision   Additional Comments Functional household distance   Additional items Rolling walker   Subjective   Subjective Pt cooperative but tangential during session. Pt appears anxious. Cognition   Overall Cognitive Status WFL   Arousal/Participation Alert   Attention Attends with cues to redirect   Orientation Level Oriented X4   Memory Within functional limits   Following Commands Follows one step commands with increased time or repetition   Comments Requires redirection at times. Pt not receptive to education at times.  Anxious   Additional Activities   Additional Activities Comments Pt additionally able to ascend/descend 4 steps (2 curb height, 2 standard) with BL hand rails & close supervision x2 to demo safe entry/exit into home   Activity Tolerance   Activity Tolerance Patient limited by fatigue   Medical Staff Made Aware PT MEGAN [place]   Assessment   Assessment Pt seen for OT tx session with focus on functional balance, functional mobility, ADL status, and transfer safety. Patient agreeable to OT treatment session. Pt received supine in bed. Pt performed bed mobility at Mod I level. Pt completed LB dressing sitting EOB with set-up. Pt completed all functional txfers with S & armrests. Pt able to tolerate functional mobility for a functional household distance with S & RW. Pt independently took a 10 second standing rest break utilizing the wall for support. Pt able to ascend/descend 4 steps with S for safe entry/exit into home. Patient continues to be functioning below baseline level, occupational performance remains limited secondary to factors listed above, and pt at increased risk for falls and injury. The patient's raw score on the AM-PAC Daily Activity inpatient short form is 21, standardized score is 44.27, greater than 39.4. Patients at this level are likely to benefit from DC to home. Please refer to the recommendation of the Occupational Therapist for safe DC planning. From OT standpoint, recommendation at time of D/C would be DC with Level III resources. Patient to benefit from continued Occupational Therapy treatment while in the hospital to address deficits as defined above and maximize level of functional independence with ADLs and functional mobility. Pt left supine in bed with call bell in reach, tray table in reach, needs met, RN informed. Plan   Treatment Interventions ADL retraining;Functional transfer training;UE strengthening/ROM; Endurance training;Patient/family training;Equipment evaluation/education; Compensatory technique education;Continued evaluation   Goal Expiration Date 09/02/23   OT Treatment Day 1   OT Frequency 3-5x/wk   Recommendation   UB Rehab Discharge Recommendation (PT/OT) Level 3   AM-PAC Daily Activity Inpatient   Lower Body Dressing 3   Bathing 3   Toileting 3   Upper Body Dressing 4   Grooming 4   Eating 4   Daily Activity Raw Score 21   Daily Activity Standardized Score (Calc for Raw Score >=11) 44.27   AM-PAC Applied Cognition Inpatient   Following a Speech/Presentation 3   Understanding Ordinary Conversation 4   Taking Medications 4   Remembering Where Things Are Placed or Put Away 4   Remembering List of 4-5 Errands 4   Taking Care of Complicated Tasks 4   Applied Cognition Raw Score 23   Applied Cognition Standardized Score 53.08   End of Consult   Education Provided Yes   Patient Position at End of Consult Supine; All needs within reach       Pollyann Mortimer, OTR/L

## 2023-08-24 NOTE — CASE MANAGEMENT
Case Management Discharge Planning Note    Patient name Adriana Ulrich  Location [address] [facility] [place] 214/-01 MRN [id number]  : 1960 Date 2023       Current Admission Date: 2023  Current Admission Diagnosis:Osteomyelitis [facility])   Patient Active Problem List    Diagnosis Date Noted   • Osteomyelitis ([address]) 2023   • Type 1 diabetes mellitus with diabetic neuropathy ([address]) 2023   • S/P transmetatarsal amputation of foot, left ([address]) 2023   • Sepsis ([address]) 2023   • Stage 2 chronic kidney disease 2023   • Hyponatremia 2023   • Alcohol abuse 2023   • Nicotine abuse 2023   • 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, with fat layer exposed ([address]) 2023   • Type 1 diabetes mellitus with hyperglycemia ([address]) 2022   • Diabetic polyneuropathy associated with type 1 diabetes mellitus ([address]) 2022   • Microalbuminuria due to type 1 diabetes mellitus ([address]) 2022   • Hypoglycemia unawareness associated with type 1 diabetes mellitus ([address]) 2022   • Cigarette nicotine dependence without complication    • Gout of ankle 2022   • Chronic fatigue syndrome 2022   • Lower urinary tract symptoms due to benign prostatic hyperplasia 2022   • 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 ([address]) 2022   • Chronic pain 2022   • Insulin pump in place 2018   • Benign essential hypertension 2011   • Mixed hyperlipidemia 10/25/2010   • ED (erectile dysfunction) of organic origin 10/25/2010      LOS (days): 5  Geometric Mean LOS (GMLOS) (days):   Days to GMLOS:     OBJECTIVE:  Risk of Unplanned Readmission Score: 19.8         Current admission status: Inpatient   Preferred [facility]acy:   [facility] [address] [place] [id number] [place] [id number]  Phone: [phone] Fax: [phone]    Primary Care Provider: Denver Pou, MD    Primary Insurance: AETNA  Secondary Insurance:     DISCHARGE DETAILS:     [facility] reserved in [facility]. Updated them on possible discharge to home tomorrow.

## 2023-08-24 NOTE — ASSESSMENT & PLAN NOTE
Lab Results   Component Value Date    EGFR 57 08/24/2023    EGFR 65 08/23/2023    EGFR 68 08/22/2023    CREATININE 1.32 (H) 08/24/2023    CREATININE 1.19 08/23/2023    CREATININE 1.15 08/22/2023     · Baseline creatinine 1.0-1.2  · Slight bump in creatinine today. Encourage PO intake. If creatinine continues to trend upward, adjust Gabapentin dosing.

## 2023-08-24 NOTE — ASSESSMENT & PLAN NOTE
· Patient initially had been referred to the emergency department due to worsening left foot erythema and swelling  · MRI left foot completed 8/17: Status post transmetatarsal amputation with prominent skin ulceration at the distal aspect of the postoperative site including marrow changes involving the second through fifth residual proximal metatarsals indicating osteomyelitis. No evidence of drainable abscess collection in this unenhanced examination. · Previous discussion held between patient and podiatry -patient wishes to move forward with BKA rather than TMA revision given concern over healing ability. · General surgery consulted. POD2 left BKA  · Per general surgery continue IV antibiotics through Friday. · Dressing change scheduled for Friday.

## 2023-08-24 NOTE — PROGRESS NOTES
-- Patient:  -- MRN: [id number]  -- Aidin Request ID: [id number]  -- Level of care reserved: Antonio Damon  -- Partner Reserved: JANESSA [facility], [facility],  [facility] [phone]  -- Clinical needs requested:  -- Geography searched: [id number]  -- Start of Service:  -- Request sent: 10:22am EDT on 8/24/2023 by Chapincito Rosenberg  -- Partner reserved: 2:18pm EDT on 8/24/2023 by Chapincito Rosenberg  -- Choice list shared: 2:17pm EDT on 8/24/2023 by Chapincito Rosenberg

## 2023-08-25 LAB
ANION GAP SERPL CALCULATED.3IONS-SCNC: 7 MMOL/L
BASOPHILS # BLD AUTO: 0.1 THOUSANDS/ÂΜL (ref 0–0.1)
BASOPHILS NFR BLD AUTO: 1 % (ref 0–1)
BUN SERPL-MCNC: 17 MG/DL (ref 5–25)
CALCIUM SERPL-MCNC: 8.3 MG/DL (ref 8.4–10.2)
CHLORIDE SERPL-SCNC: 99 MMOL/L (ref 96–108)
CO2 SERPL-SCNC: 25 MMOL/L (ref 21–32)
CREAT SERPL-MCNC: 1.31 MG/DL (ref 0.6–1.3)
EOSINOPHIL # BLD AUTO: 0.42 THOUSAND/ÂΜL (ref 0–0.61)
EOSINOPHIL NFR BLD AUTO: 4 % (ref 0–6)
ERYTHROCYTE [DISTWIDTH] IN BLOOD BY AUTOMATED COUNT: 14.1 % (ref 11.6–15.1)
GFR SERPL CREATININE-BSD FRML MDRD: 58 ML/MIN/1.73SQ M
GLUCOSE SERPL-MCNC: 113 MG/DL (ref 65–140)
GLUCOSE SERPL-MCNC: 117 MG/DL (ref 65–140)
GLUCOSE SERPL-MCNC: 181 MG/DL (ref 65–140)
GLUCOSE SERPL-MCNC: 199 MG/DL (ref 65–140)
GLUCOSE SERPL-MCNC: 234 MG/DL (ref 65–140)
GLUCOSE SERPL-MCNC: 91 MG/DL (ref 65–140)
HCT VFR BLD AUTO: 27.1 % (ref 36.5–49.3)
HGB BLD-MCNC: 8.5 G/DL (ref 12–17)
IMM GRANULOCYTES # BLD AUTO: 0.09 THOUSAND/UL (ref 0–0.2)
IMM GRANULOCYTES NFR BLD AUTO: 1 % (ref 0–2)
LYMPHOCYTES # BLD AUTO: 1.34 THOUSANDS/ÂΜL (ref 0.6–4.47)
LYMPHOCYTES NFR BLD AUTO: 13 % (ref 14–44)
MCH RBC QN AUTO: 31.1 PG (ref 26.8–34.3)
MCHC RBC AUTO-ENTMCNC: 31.4 G/DL (ref 31.4–37.4)
MCV RBC AUTO: 99 FL (ref 82–98)
MONOCYTES # BLD AUTO: 1.4 THOUSAND/ÂΜL (ref 0.17–1.22)
MONOCYTES NFR BLD AUTO: 13 % (ref 4–12)
NEUTROPHILS # BLD AUTO: 7.38 THOUSANDS/ÂΜL (ref 1.85–7.62)
NEUTS SEG NFR BLD AUTO: 68 % (ref 43–75)
NRBC BLD AUTO-RTO: 0 /100 WBCS
PLATELET # BLD AUTO: 327 THOUSANDS/UL (ref 149–390)
PMV BLD AUTO: 8.9 FL (ref 8.9–12.7)
POTASSIUM SERPL-SCNC: 4.5 MMOL/L (ref 3.5–5.3)
RBC # BLD AUTO: 2.73 MILLION/UL (ref 3.88–5.62)
SODIUM SERPL-SCNC: 131 MMOL/L (ref 135–147)
WBC # BLD AUTO: 10.73 THOUSAND/UL (ref 4.31–10.16)

## 2023-08-25 PROCEDURE — 82948 REAGENT STRIP/BLOOD GLUCOSE: CPT

## 2023-08-25 PROCEDURE — 99233 SBSQ HOSP IP/OBS HIGH 50: CPT | Performed by: PHYSICIAN ASSISTANT

## 2023-08-25 PROCEDURE — 80048 BASIC METABOLIC PNL TOTAL CA: CPT | Performed by: INTERNAL MEDICINE

## 2023-08-25 PROCEDURE — 85025 COMPLETE CBC W/AUTO DIFF WBC: CPT | Performed by: INTERNAL MEDICINE

## 2023-08-25 PROCEDURE — 99024 POSTOP FOLLOW-UP VISIT: CPT | Performed by: SURGERY

## 2023-08-25 RX ADMIN — METHOCARBAMOL 500 MG: 500 TABLET ORAL at 14:10

## 2023-08-25 RX ADMIN — OXYCODONE HYDROCHLORIDE 15 MG: 10 TABLET ORAL at 08:03

## 2023-08-25 RX ADMIN — VANCOMYCIN HYDROCHLORIDE 1250 MG: 5 INJECTION, POWDER, LYOPHILIZED, FOR SOLUTION INTRAVENOUS at 16:57

## 2023-08-25 RX ADMIN — HYDROMORPHONE HYDROCHLORIDE 1 MG: 1 INJECTION, SOLUTION INTRAMUSCULAR; INTRAVENOUS; SUBCUTANEOUS at 08:13

## 2023-08-25 RX ADMIN — GABAPENTIN 300 MG: 300 CAPSULE ORAL at 08:11

## 2023-08-25 RX ADMIN — METHOCARBAMOL 500 MG: 500 TABLET ORAL at 08:04

## 2023-08-25 RX ADMIN — HEPARIN SODIUM 5000 UNITS: 5000 INJECTION INTRAVENOUS; SUBCUTANEOUS at 14:10

## 2023-08-25 RX ADMIN — OXYCODONE HYDROCHLORIDE 15 MG: 10 TABLET ORAL at 16:52

## 2023-08-25 RX ADMIN — GABAPENTIN 300 MG: 300 CAPSULE ORAL at 16:52

## 2023-08-25 RX ADMIN — OXYCODONE HYDROCHLORIDE 15 MG: 10 TABLET ORAL at 03:29

## 2023-08-25 RX ADMIN — CEFEPIME HYDROCHLORIDE 1000 MG: 1 INJECTION, SOLUTION INTRAVENOUS at 21:22

## 2023-08-25 RX ADMIN — HEPARIN SODIUM 5000 UNITS: 5000 INJECTION INTRAVENOUS; SUBCUTANEOUS at 21:22

## 2023-08-25 RX ADMIN — OXYCODONE HYDROCHLORIDE 15 MG: 10 TABLET ORAL at 21:21

## 2023-08-25 RX ADMIN — HEPARIN SODIUM 5000 UNITS: 5000 INJECTION INTRAVENOUS; SUBCUTANEOUS at 03:29

## 2023-08-25 RX ADMIN — ACETAMINOPHEN 325MG 975 MG: 325 TABLET ORAL at 17:35

## 2023-08-25 RX ADMIN — METOPROLOL SUCCINATE 100 MG: 50 TABLET, EXTENDED RELEASE ORAL at 08:11

## 2023-08-25 RX ADMIN — HYDROMORPHONE HYDROCHLORIDE 1 MG: 1 INJECTION, SOLUTION INTRAMUSCULAR; INTRAVENOUS; SUBCUTANEOUS at 02:39

## 2023-08-25 RX ADMIN — HYDROMORPHONE HYDROCHLORIDE 1 MG: 1 INJECTION, SOLUTION INTRAMUSCULAR; INTRAVENOUS; SUBCUTANEOUS at 22:34

## 2023-08-25 RX ADMIN — NICOTINE 1 PATCH: 21 PATCH, EXTENDED RELEASE TRANSDERMAL at 08:11

## 2023-08-25 RX ADMIN — ATORVASTATIN CALCIUM 40 MG: 40 TABLET, FILM COATED ORAL at 16:52

## 2023-08-25 RX ADMIN — ACETAMINOPHEN 325MG 975 MG: 325 TABLET ORAL at 22:33

## 2023-08-25 RX ADMIN — ACETAMINOPHEN 325MG 975 MG: 325 TABLET ORAL at 13:02

## 2023-08-25 RX ADMIN — LORAZEPAM 0.5 MG: 0.5 TABLET ORAL at 21:22

## 2023-08-25 RX ADMIN — METHOCARBAMOL 500 MG: 500 TABLET ORAL at 21:21

## 2023-08-25 RX ADMIN — HYDROMORPHONE HYDROCHLORIDE 1 MG: 1 INJECTION, SOLUTION INTRAMUSCULAR; INTRAVENOUS; SUBCUTANEOUS at 14:10

## 2023-08-25 RX ADMIN — OXYCODONE HYDROCHLORIDE 15 MG: 10 TABLET ORAL at 13:02

## 2023-08-25 RX ADMIN — ACETAMINOPHEN 325MG 975 MG: 325 TABLET ORAL at 03:29

## 2023-08-25 RX ADMIN — HYDROMORPHONE HYDROCHLORIDE 1 MG: 1 INJECTION, SOLUTION INTRAMUSCULAR; INTRAVENOUS; SUBCUTANEOUS at 17:35

## 2023-08-25 RX ADMIN — CEFEPIME HYDROCHLORIDE 1000 MG: 1 INJECTION, SOLUTION INTRAVENOUS at 11:06

## 2023-08-25 RX ADMIN — GABAPENTIN 300 MG: 300 CAPSULE ORAL at 21:22

## 2023-08-25 RX ADMIN — HYDROMORPHONE HYDROCHLORIDE 1 MG: 1 INJECTION, SOLUTION INTRAMUSCULAR; INTRAVENOUS; SUBCUTANEOUS at 11:05

## 2023-08-25 NOTE — CASE MANAGEMENT
Case Management Discharge Planning Note    Patient name Corin Stephens  Location [address] [address] [place] 214/-01 MRN [id number]  : 1960 Date 2023       Current Admission Date: 2023  Current Admission Diagnosis:Osteomyelitis [facility])   Patient Active Problem List    Diagnosis Date Noted   • Osteomyelitis ([address]) 2023   • Type 1 diabetes mellitus with diabetic neuropathy ([address]) 2023   • S/P transmetatarsal amputation of foot, left ([address]) 2023   • Sepsis ([address]) 2023   • Stage 2 chronic kidney disease 2023   • Hyponatremia 2023   • Alcohol abuse 2023   • Nicotine abuse 2023   • 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, with fat layer exposed ([address]) 2023   • Type 1 diabetes mellitus with hyperglycemia ([address]) 2022   • Diabetic polyneuropathy associated with type 1 diabetes mellitus ([address]) 2022   • Microalbuminuria due to type 1 diabetes mellitus ([address]) 2022   • Hypoglycemia unawareness associated with type 1 diabetes mellitus ([address]) 2022   • Cigarette nicotine dependence without complication    • Gout of ankle 2022   • Chronic fatigue syndrome 2022   • Lower urinary tract symptoms due to benign prostatic hyperplasia 2022   • 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 ([address]) 2022   • Chronic pain 2022   • Insulin pump in place 2018   • Benign essential hypertension 2011   • Mixed hyperlipidemia 10/25/2010   • ED (erectile dysfunction) of organic origin 10/25/2010      LOS (days): 6  Geometric Mean LOS (GMLOS) (days):   Days to GMLOS:     OBJECTIVE:  Risk of Unplanned Readmission Score: 23.1         Current admission status: Inpatient   Preferred Pharmacy:   [facility] [address] [place] [id number] [address] [place] [address]  Phone: [phone] Fax: [phone]    Primary Care Provider: Ramona Lehman MD    Primary Insurance: AETNA  Secondary Insurance:     DISCHARGE DETAILS:           CM spoke with pt's wife and stated that d/c was possible for tomorrow but they wanted to r/o an infection at his stump site and wean him off IV dilaudid. Home OT will evaluate for equipment needs in the home.

## 2023-08-25 NOTE — PLAN OF CARE
Problem: Potential for Falls  Goal: Patient will remain free of falls  Description: INTERVENTIONS:  - Educate patient/family on patient safety including physical limitations  - Instruct patient to call for assistance with activity   - Consult OT/PT to assist with strengthening/mobility   - Keep Call bell within reach  - Keep bed low and locked with side rails adjusted as appropriate  - Keep care items and personal belongings within reach  - Initiate and maintain comfort rounds  - Make Fall Risk Sign visible to staff  - Offer Toileting every 2 Hours, in advance of need  - Initiate/Maintain bed/ chair alarm  - Obtain necessary fall risk management equipment: cane  - Apply yellow socks and bracelet for high fall risk patients  - Consider moving patient to room near nurses station  Outcome: Progressing     Problem: PAIN - ADULT  Goal: Verbalizes/displays adequate comfort level or baseline comfort level  Description: Interventions:  - Encourage patient to monitor pain and request assistance  - Assess pain using appropriate pain scale  - Administer analgesics based on type and severity of pain and evaluate response  - Implement non-pharmacological measures as appropriate and evaluate response  - Consider cultural and social influences on pain and pain management  - Notify physician/advanced practitioner if interventions unsuccessful or patient reports new pain  Outcome: Progressing     Problem: INFECTION - ADULT  Goal: Absence or prevention of progression during hospitalization  Description: INTERVENTIONS:  - Assess and monitor for signs and symptoms of infection  - Monitor lab/diagnostic results  - Monitor all insertion sites, i.e. indwelling lines, tubes, and drains  - Monitor endotracheal if appropriate and nasal secretions for changes in amount and color  - [place] appropriate cooling/warming therapies per order  - Administer medications as ordered  - Instruct and encourage patient and family to use good hand hygiene technique  - Identify and instruct in appropriate isolation precautions for identified infection/condition  Outcome: Progressing     Problem: SAFETY ADULT  Goal: Patient will remain free of falls  Description: INTERVENTIONS:  - Educate patient/family on patient safety including physical limitations  - Instruct patient to call for assistance with activity   - Consult OT/PT to assist with strengthening/mobility   - Keep Call bell within reach  - Keep bed low and locked with side rails adjusted as appropriate  - Keep care items and personal belongings within reach  - Initiate and maintain comfort rounds  - Make Fall Risk Sign visible to staff  - Offer Toileting every 2 Hours, in advance of need  - Initiate/Maintain bed/ chair alarm  - Obtain necessary fall risk management equipment: cane  - Apply yellow socks and bracelet for high fall risk patients  - Consider moving patient to room near nurses station  Outcome: Progressing  Goal: Maintain or return to baseline ADL function  Description: INTERVENTIONS:  -  Assess patient's ability to carry out ADLs; assess patient's baseline for ADL function and identify physical deficits which impact ability to perform ADLs (bathing, care of mouth/teeth, toileting, grooming, dressing, etc.)  - Assess/evaluate cause of self-care deficits   - Assess range of motion  - Assess patient's mobility; develop plan if impaired  - Assess patient's need for assistive devices and provide as appropriate  - Encourage maximum independence but intervene and supervise when necessary  - Involve family in performance of ADLs  - Assess for home care needs following discharge   - Consider OT consult to assist with ADL evaluation and planning for discharge  - Provide patient education as appropriate  Outcome: Progressing  Goal: Maintains/Returns to pre admission functional level  Description: INTERVENTIONS:  - Perform BMAT or MOVE assessment daily.   - Set and communicate daily mobility goal to care team and patient/family/caregiver. - Collaborate with rehabilitation services on mobility goals if consulted  - Perform Range of Motion 3 times a day. - Reposition patient every 2 hours. - Dangle patient 3 times a day  - Stand patient 3 times a day  - Ambulate patient 3 times a day  - Out of bed to chair 3 times a day   - Out of bed for meals 3 times a day  - Out of bed for toileting  - Record patient progress and toleration of activity level   Outcome: Progressing     Problem: DISCHARGE PLANNING  Goal: Discharge to home or other facility with appropriate resources  Description: INTERVENTIONS:  - Identify barriers to discharge w/patient and caregiver  - Arrange for needed discharge resources and transportation as appropriate  - Identify discharge learning needs (meds, wound care, etc.)  - Arrange for interpretive services to assist at discharge as needed  - Refer to Case Management Department for coordinating discharge planning if the patient needs post-hospital services based on physician/advanced practitioner order or complex needs related to functional status, cognitive ability, or social support system  Outcome: Progressing     Problem: Knowledge Deficit  Goal: Patient/family/caregiver demonstrates understanding of disease process, treatment plan, medications, and discharge instructions  Description: Complete learning assessment and assess knowledge base.   Interventions:  - Provide teaching at level of understanding  - Provide teaching via preferred learning methods  Outcome: Progressing     Problem: SKIN/TISSUE INTEGRITY - ADULT  Goal: Incision(s), wounds(s) or drain site(s) healing without S/S of infection  Description: INTERVENTIONS  - Assess and document dressing, incision, wound bed, drain sites and surrounding tissue  - Provide patient and family education  - Perform skin care/dressing changes every shift  Outcome: Progressing  Goal: Skin Integrity remains intact(Skin Breakdown Prevention)  Description: Assess:  -Perform Pineda assessment every   -Clean and moisturize skin every   -Inspect skin when repositioning, toileting, and assisting with ADLS  -Assess under medical devices such as  every   -Assess extremities for adequate circulation and sensation     Bed Management:  -Have minimal linens on bed & keep smooth, unwrinkled  -Change linens as needed when moist or perspiring  -Avoid sitting or lying in one position for more than  hours while in bed  -Keep HOB at degrees     Toileting:  -Offer bedside commode  -Assess for incontinence every   -Use incontinent care products after each incontinent episode such as     Activity:  -Mobilize patient  times a day  -Encourage activity and walks on unit  -Encourage or provide ROM exercises   -Turn and reposition patient every  Hours  -Use appropriate equipment to lift or move patient in bed  -Instruct/ Assist with weight shifting every  when out of bed in chair  -Consider limitation of chair time  hour intervals    Skin Care:  -Avoid use of baby powder, tape, friction and shearing, hot water or constrictive clothing  -Relieve pressure over bony prominences using   -Do not massage red bony areas    Next Steps:  -Teach patient strategies to minimize risks such as    -Consider consults to  interdisciplinary teams such   Outcome: Progressing     Problem: MUSCULOSKELETAL - ADULT  Goal: Maintain or return mobility to safest level of function  Description: INTERVENTIONS:  - Assess patient's ability to carry out ADLs; assess patient's baseline for ADL function and identify physical deficits which impact ability to perform ADLs (bathing, care of mouth/teeth, toileting, grooming, dressing, etc.)  - Assess/evaluate cause of self-care deficits   - Assess range of motion  - Assess patient's mobility  - Assess patient's need for assistive devices and provide as appropriate  - Encourage maximum independence but intervene and supervise when necessary  - Involve family in performance of ADLs  - Assess for home care needs following discharge   - Consider OT consult to assist with ADL evaluation and planning for discharge  - Provide patient education as appropriate  Outcome: Progressing  Goal: Maintain proper alignment of affected body part  Description: INTERVENTIONS:  - Support, maintain and protect limb and body alignment  - Provide patient/ family with appropriate education  Outcome: Progressing     Problem: MOBILITY - ADULT  Goal: Maintain or return to baseline ADL function  Description: INTERVENTIONS:  -  Assess patient's ability to carry out ADLs; assess patient's baseline for ADL function and identify physical deficits which impact ability to perform ADLs (bathing, care of mouth/teeth, toileting, grooming, dressing, etc.)  - Assess/evaluate cause of self-care deficits   - Assess range of motion  - Assess patient's mobility; develop plan if impaired  - Assess patient's need for assistive devices and provide as appropriate  - Encourage maximum independence but intervene and supervise when necessary  - Involve family in performance of ADLs  - Assess for home care needs following discharge   - Consider OT consult to assist with ADL evaluation and planning for discharge  - Provide patient education as appropriate  Outcome: Progressing  Goal: Maintains/Returns to pre admission functional level  Description: INTERVENTIONS:  - Perform BMAT or MOVE assessment daily.   - Set and communicate daily mobility goal to care team and patient/family/caregiver. - Collaborate with rehabilitation services on mobility goals if consulted  - Perform Range of Motion 3 times a day. - Reposition patient every 2 hours.   - Dangle patient 3 times a day  - Stand patient 3 times a day  - Ambulate patient 3 times a day  - Out of bed to chair 3 times a day   - Out of bed for meals 3 times a day  - Out of bed for toileting  - Record patient progress and toleration of activity level   Outcome: Progressing     Problem: Prexisting or High Potential for Compromised Skin Integrity  Goal: Skin integrity is maintained or improved  Description: INTERVENTIONS:  - Identify patients at risk for skin breakdown  - Assess and monitor skin integrity  - Assess and monitor nutrition and hydration status  - Monitor labs   - Assess for incontinence   - Turn and reposition patient  - Assist with mobility/ambulation  - Relieve pressure over bony prominences  - Avoid friction and shearing  - Provide appropriate hygiene as needed including keeping skin clean and dry  - Evaluate need for skin moisturizer/barrier cream  - Collaborate with interdisciplinary team   - Patient/family teaching  - Consider wound care consult   Outcome: Progressing     Problem: Nutrition/Hydration-ADULT  Goal: Nutrient/Hydration intake appropriate for improving, restoring or maintaining nutritional needs  Description: Monitor and assess patient's nutrition/hydration status for malnutrition. Collaborate with interdisciplinary team and initiate plan and interventions as ordered. Monitor patient's weight and dietary intake as ordered or per policy. Utilize nutrition screening tool and intervene as necessary. Determine patient's food preferences and provide high-protein, high-caloric foods as appropriate.      INTERVENTIONS:  - Monitor oral intake, urinary output, labs, and treatment plans  - Assess nutrition and hydration status and recommend course of action  - Evaluate amount of meals eaten  - Assist patient with eating if necessary   - Allow adequate time for meals  - Recommend/ encourage appropriate diets, oral nutritional supplements, and vitamin/mineral supplements  - Order, calculate, and assess calorie counts as needed  - Recommend, monitor, and adjust tube feedings and TPN/PPN based on assessed needs  - Assess need for intravenous fluids  - Provide specific nutrition/hydration education as appropriate  - Include patient/family/caregiver in decisions related to nutrition  Outcome: Progressing     Problem: METABOLIC, FLUID AND ELECTROLYTES - ADULT  Goal: Electrolytes maintained within normal limits  Description: INTERVENTIONS:  - Monitor labs and assess patient for signs and symptoms of electrolyte imbalances  - Administer electrolyte replacement as ordered  - Monitor response to electrolyte replacements, including repeat lab results as appropriate  - Instruct patient on fluid and nutrition as appropriate  Outcome: Progressing  Goal: Fluid balance maintained  Description: INTERVENTIONS:  - Monitor labs   - Monitor I/O and WT  - Instruct patient on fluid and nutrition as appropriate  - Assess for signs & symptoms of volume excess or deficit  Outcome: Progressing  Goal: Glucose maintained within target range  Description: INTERVENTIONS:  - Monitor Blood Glucose as ordered  - Assess for signs and symptoms of hyperglycemia and hypoglycemia  - Administer ordered medications to maintain glucose within target range  - Assess nutritional intake and initiate nutrition service referral as needed  Outcome: Progressing     Problem: HEMATOLOGIC - ADULT  Goal: Maintains hematologic stability  Description: INTERVENTIONS  - Assess for signs and symptoms of bleeding or hemorrhage  - Monitor labs  - Administer supportive blood products/factors as ordered and appropriate  Outcome: Progressing

## 2023-08-25 NOTE — ASSESSMENT & PLAN NOTE
· Patient initially had been referred to the emergency department due to worsening left foot erythema and swelling  · MRI left foot completed 8/17: Status post transmetatarsal amputation with prominent skin ulceration at the distal aspect of the postoperative site including marrow changes involving the second through fifth residual proximal metatarsals indicating osteomyelitis. No evidence of drainable abscess collection in this unenhanced examination. · Previous discussion held between patient and podiatry -patient wishes to move forward with BKA rather than TMA revision given concern over healing ability. · General surgery consulted.   POD3 left BKA  · L knee with some warmth and erythema today in setting of WBC bump  · Per general surgery continue IV antibiotics through Friday  · S/p dressing change   · If no improvement in L knee erythema/warmth with another 24 hours of IV abx, consider ID consult  · AM CBC  · Working on adequate pain control today as well, pt with minimal relief from oral opiates requiring frequent IV prns

## 2023-08-25 NOTE — PROGRESS NOTES
[facility]  Progress Note  Name: Venkata Carter  MRN: [id number]  Unit/Bed#: -01 I Date of Admission: 8/19/2023   Date of Service: 8/25/2023 I Hospital Day: 6    Assessment/Plan   Type 1 diabetes mellitus with diabetic neuropathy [facility])  Assessment & Plan  Lab Results   Component Value Date    HGBA1C 7.9 (H) 07/20/2023     Recent Labs     08/24/23  1544 08/24/23  2118 08/25/23  0244 08/25/23  0719   POCGLU 181* 163* 117 91     Blood Sugar Average: Last 72 hrs:  (P) 161   · Maintained on insulin pump as outpatient -previously on insulin gtt intraoperatively  · Tolerating diet. Transitioned back to insulin pump 8/22    Hyponatremia  Assessment & Plan  · Appears to be a chronic issue with baseline 131  · Osmolality 284  · Urine osmolality 362  · Urine sodium 52    Stage 2 chronic kidney disease  Assessment & Plan  Lab Results   Component Value Date    EGFR 58 08/25/2023    EGFR 57 08/24/2023    EGFR 65 08/23/2023    CREATININE 1.31 (H) 08/25/2023    CREATININE 1.32 (H) 08/24/2023    CREATININE 1.19 08/23/2023     · Baseline creatinine 1.0-1.2  · Slight bump in creatinine 8/24-8/25. Encourage PO intake. If creatinine continues to trend upward, adjust Gabapentin dosing. · Will keep ACEi on hold    Cigarette nicotine dependence without complication  Assessment & Plan  · Encourage cessation. · Continue nicotine replacement. Benign essential hypertension  Assessment & Plan  · Stable on metoprolol succinate 100 mg daily. · Lisinopril on hold. Consider resumption.     * Osteomyelitis [facility])  Assessment & Plan  · Patient initially had been referred to the emergency department due to worsening left foot erythema and swelling  · MRI left foot completed 8/17: Status post transmetatarsal amputation with prominent skin ulceration at the distal aspect of the postoperative site including marrow changes involving the second through fifth residual proximal metatarsals indicating osteomyelitis. No evidence of drainable abscess collection in this unenhanced examination. · Previous discussion held between patient and podiatry -patient wishes to move forward with BKA rather than TMA revision given concern over healing ability. · General surgery consulted. POD3 left BKA  · L knee with some warmth and erythema today in setting of WBC bump  · Per general surgery continue IV antibiotics through Friday  · S/p dressing change   · If no improvement in L knee erythema/warmth with another 24 hours of IV abx, consider ID consult  · AM CBC  · Working on adequate pain control today as well, pt with minimal relief from oral opiates requiring frequent IV prns      VTE Pharmacologic Prophylaxis: VTE Score: 3 Moderate Risk (Score 3-4) - Pharmacological DVT Prophylaxis Ordered: heparin. Patient Centered Rounds: I performed bedside rounds with nursing staff today. Discussions with Specialists or Other Care Team Provider: Surgery     Education and Discussions with Family / Patient: Patient declined call to . Total Time Spent on Date of Encounter in care of patient: 45 minutes This time was spent on one or more of the following: performing physical exam; counseling and coordination of care; obtaining or reviewing history; documenting in the medical record; reviewing/ordering tests, medications or procedures; communicating with other healthcare professionals and discussing with patient's family/caregivers. Current Length of Stay: 6 day(s)  Current Patient Status: Inpatient   Certification Statement: The patient will continue to require additional inpatient hospital stay due to pain and erythema   Discharge Plan: Anticipate discharge in 24-48 hrs to home with home services. Code Status: Level 1 - Full Code    Subjective:   Patient is having significant pain still at his operative site radiating up to his knee. He is also having some phantom pain of his left foot.   He says its intermittently sharp, stabbing and severe and feels that his 15 mg of Oxy does not take the edge off like the Dilaudid does. He is frustrated that his knee and leg are red today. He hopes this is not going to prolong his stay and overall appears melancholy about his current medical state. Denies fever or chills. Denies nausea, vomiting, abdominal pain. He has minimal appetite but is trying to eat as best he can. Objective:     Vitals:   Temp (24hrs), Av.8 °F (36.6 °C), Min:97.5 °F (36.4 °C), Max:98.1 °F (36.7 °C)    Temp:  [97.5 °F (36.4 °C)-98.1 °F (36.7 °C)] 97.7 °F (36.5 °C)  HR:  [79-94] 94  Resp:  [16-17] 17  BP: (120-129)/(55-74) 121/55  SpO2:  [90 %-97 %] 92 %  Body mass index is 20.34 kg/m². Input and Output Summary (last 24 hours):   No intake or output data in the 24 hours ending 23 1106    Physical Exam:   Physical Exam  Vitals and nursing note reviewed. Constitutional:       General: He is not in acute distress. Appearance: Normal appearance. He is normal weight. He is not ill-appearing or toxic-appearing. HENT:      Head: Normocephalic and atraumatic. Right Ear: External ear normal.      Left Ear: External ear normal.      Nose: Nose normal.      Mouth/Throat:      Mouth: Mucous membranes are moist.      Pharynx: Oropharynx is clear. Eyes:      Conjunctiva/sclera: Conjunctivae normal.   Cardiovascular:      Rate and Rhythm: Normal rate and regular rhythm. Pulses: Normal pulses. Heart sounds: Normal heart sounds. No murmur heard. No friction rub. No gallop. Pulmonary:      Effort: Pulmonary effort is normal. No respiratory distress. Breath sounds: Normal breath sounds. No stridor. No wheezing, rhonchi or rales. Abdominal:      General: Abdomen is flat. Bowel sounds are normal. There is no distension. Palpations: Abdomen is soft. There is no mass. Tenderness: There is no abdominal tenderness. There is no guarding or rebound. Hernia: No hernia is present. Musculoskeletal:      Cervical back: Normal range of motion. Right lower leg: No edema. Comments: L knee/leg erythematous and warm to the touch, no tenderness directly over patella. Erythema does not extend past superior border of patella. . L BKA with new dressings. Skin:     Findings: Erythema present. Neurological:      Mental Status: He is alert. Mental status is at baseline. Additional Data:     Labs:  Results from last 7 days   Lab Units 08/25/23  0324   WBC Thousand/uL 10.73*   HEMOGLOBIN g/dL 8.5*   HEMATOCRIT % 27.1*   PLATELETS Thousands/uL 327   NEUTROS PCT % 68   LYMPHS PCT % 13*   MONOS PCT % 13*   EOS PCT % 4     Results from last 7 days   Lab Units 08/25/23  0324 08/20/23  0443 08/19/23  0841   SODIUM mmol/L 131*   < > 130*   POTASSIUM mmol/L 4.5   < > 4.9   CHLORIDE mmol/L 99   < > 94*   CO2 mmol/L 25   < > 28   BUN mg/dL 17   < > 12   CREATININE mg/dL 1.31*   < > 1.24   ANION GAP mmol/L 7   < > 8   CALCIUM mg/dL 8.3*   < > 10.0   ALBUMIN g/dL  --   --  4.1   TOTAL BILIRUBIN mg/dL  --   --  0.45   ALK PHOS U/L  --   --  157*   ALT U/L  --   --  11   AST U/L  --   --  20   GLUCOSE RANDOM mg/dL 113   < > 110    < > = values in this interval not displayed. Results from last 7 days   Lab Units 08/19/23  0841   INR  0.88     Results from last 7 days   Lab Units 08/25/23  1104 08/25/23  0719 08/25/23  0244 08/24/23  2118 08/24/23  1544 08/24/23  1135 08/24/23  0732 08/23/23  2231 08/23/23  2054 08/23/23  1641 08/23/23  1137 08/23/23  0711   POC GLUCOSE mg/dl 234* 91 117 163* 181* 216* 131 143* 181* 82 176* 94         Results from last 7 days   Lab Units 08/19/23  0841   LACTIC ACID mmol/L 1.4   PROCALCITONIN ng/ml 0.24       Lines/Drains:  Invasive Devices     Peripheral Intravenous Line  Duration           Peripheral IV 08/21/23 Left Antecubital 4 days          Line  Duration           Pump Device Insulin pump Anterior; Left Abdomen 56 days Imaging: Reviewed radiology reports from this admission including: MRI foot/forefoot    Recent Cultures (last 7 days):   Results from last 7 days   Lab Units 08/19/23  0841   BLOOD CULTURE  No Growth After 5 Days. No Growth After 5 Days. Last 24 Hours Medication List:   Current Facility-Administered Medications   Medication Dose Route Frequency Provider Last Rate   • acetaminophen  975 mg Oral Q6H [address] Paulina Hartmann PA-C     • atorvastatin  40 mg Oral Daily With [employer] NATALIO Hartmann     • cefepime  1,000 mg Intravenous Q12H Leland James PA-C 1,000 mg (08/25/23 1106)   • gabapentin  300 mg Oral TID Sachin Hartmann PA-C     • heparin (porcine)  5,000 Units Subcutaneous Q8H [address] Paulina Hartmann PA-C     • HYDROmorphone  1 mg Intravenous Q3H PRN Sachin Hartmann PA-C     • insulin lispro  300 Units Subcutaneous Insulin Pump Daily PRN Paulina Hartmann PA-C     • LORazepam  0.5 mg Oral Q8H PRN Sachin Hartmann PA-C     • methocarbamol  500 mg Oral Q6H PRN Sachin Hartmann PA-C     • metoprolol succinate  100 mg Oral Daily Paulina Hartmann PA-C     • nicotine  1 patch Transdermal Daily Paulina Hartmann PA-C     • oxyCODONE  15 mg Oral Q4H PRN Keke Tuttle PA-C     • oxyCODONE  7.5 mg Oral Q4H PRN Keke Tuttle PA-C     • vancomycin  1,250 mg Intravenous Q24H Leland James PA-C          Today, Patient Was Seen By: Rupert Merlin, PA-C    **Please Note: This note may have been constructed using a voice recognition system. **

## 2023-08-25 NOTE — ASSESSMENT & PLAN NOTE
Lab Results   Component Value Date    HGBA1C 7.9 (H) 07/20/2023     Recent Labs     08/24/23  1544 08/24/23  2118 08/25/23  0244 08/25/23  0719   POCGLU 181* 163* 117 91     Blood Sugar Average: Last 72 hrs:  (P) 161   · Maintained on insulin pump as outpatient -previously on insulin gtt intraoperatively  · Tolerating diet.   Transitioned back to insulin pump 8/22

## 2023-08-25 NOTE — PROGRESS NOTES
Progress Note - General Surgery   Mitchel Rojas 66 y.o. male MRN: [id number]  Unit/Bed#: -01 Encounter: [id number]    Assessment/Plan:  POD #3 S/p left BKA  -Patient with significant pain this morning, muscle spasms  -Robaxin ordered however patient has not been using, asked nurse to give Robaxin this morning  -Splint removed and wound evaluated    -Leukocytosis elevated to 11 today, afebrile, vital signs remained stable  -On exam stump with edema and  erythema over patella extending to thigh, there is increased warmth over patella and upper thigh  -Recommend continuing antibiotics today  -Compressive dressing replaced, splint not replaced  -Patient instructed to elevate stump on pillows just at base and to keep knee straight while resting in bed or chair  -Recommend holding discharge today, to monitor leukocytosis and increased warmth at site  -Apparently patient does not qualify for rehab, continue physical therapy  -Discussed with medicine and case management    Anemia  -Hemoglobin 10.8 on admission, decreased to 8.5 after surgery  -Continue to monitor  -Hemoglobin stable today at 8.8     DM1   -Maintained on insulin pump  -Continue tight blood sugar control for optimal wound healing and control of infection  -medical management     Tobacco abuse  -Cessation recommended  -Nicotine patch ordered  -Counseled that cigarette smoking can affect wound healing     CKD2, hyponatermia, HTN  -Na+ 131  -management per primary team    Subjective/Objective   Chief Complaint: Pain    Subjective: Patient with significant pain in left stump today, muscle spasms this morning. Mildly increased white blood cell count with swelling and erythema of stump. Tolerating diet. No nausea or vomiting. Sugars well controlled with insulin pump. Objective:     Blood pressure 121/55, pulse 94, temperature 97.7 °F (36.5 °C), resp. rate 17, height 6' (1.829 m), weight 68 kg (150 lb), SpO2 92 %. ,Body mass index is 20.34 kg/m². Intake/Output Summary (Last 24 hours) at 8/25/2023 0803  Last data filed at 8/24/2023 1100  Gross per 24 hour   Intake 360 ml   Output 700 ml   Net -340 ml       Invasive Devices     Peripheral Intravenous Line  Duration           Peripheral IV 08/21/23 Left Antecubital 3 days          Line  Duration           Pump Device Insulin pump Anterior; Left Abdomen 56 days                Physical Exam:   General appearance: alert and oriented, in no acute distress  Head: Normocephalic, without obvious abnormality, atraumatic, sclerae anicteric, mucous membranes moist  Neck: no JVD and supple, symmetrical, trachea midline  Lungs: clear to auscultation, no wheezes or rales  Heart:   Regular rate and rhythm, S1-S2 normal, no murmur  Abdomen:   Flat, soft, nontender, active bowel sounds  Extremities: Left stump with edema, mild ecchymosis, erythema over patella and upper thigh  Reduced range of motion secondary to pain  Skin: Warm, dry; incision site fully intact without drainage, staples in place  Nursing notes and vital signs reviewed      Lab, Imaging and other studies:  I have personally reviewed pertinent lab results.   , CBC:   Lab Results   Component Value Date    WBC 10.73 (H) 08/25/2023    HGB 8.5 (L) 08/25/2023    HCT 27.1 (L) 08/25/2023    MCV 99 (H) 08/25/2023     08/25/2023    RBC 2.73 (L) 08/25/2023    MCH 31.1 08/25/2023    MCHC 31.4 08/25/2023    RDW 14.1 08/25/2023    MPV 8.9 08/25/2023    NRBC 0 08/25/2023   , CMP:   Lab Results   Component Value Date    SODIUM 131 (L) 08/25/2023    K 4.5 08/25/2023    CL 99 08/25/2023    CO2 25 08/25/2023    BUN 17 08/25/2023    CREATININE 1.31 (H) 08/25/2023    CALCIUM 8.3 (L) 08/25/2023    EGFR 58 08/25/2023     VTE Pharmacologic Prophylaxis: Heparin  VTE Mechanical Prophylaxis: sequential compression device     Deepa Hartmann

## 2023-08-25 NOTE — ASSESSMENT & PLAN NOTE
Lab Results   Component Value Date    EGFR 58 08/25/2023    EGFR 57 08/24/2023    EGFR 65 08/23/2023    CREATININE 1.31 (H) 08/25/2023    CREATININE 1.32 (H) 08/24/2023    CREATININE 1.19 08/23/2023     · Baseline creatinine 1.0-1.2  · Slight bump in creatinine 8/24-8/25. Encourage PO intake. If creatinine continues to trend upward, adjust Gabapentin dosing.   · Will keep ACEi on hold

## 2023-08-25 NOTE — UTILIZATION REVIEW
Continued Stay Review    Date: 8/25                          Current Patient Class:   INPT   Current Level of Care: MS     HPI:63 y.o. male initially admitted on   8/19  For treatment of nonhealing left TMA with wound infection and osteomyelitis   Requiring  Left Below the knee amputation on  8/22.      8/23  POD 1   dressing and splint in place without drainage. Cont elevating left stump when in bed. Cont PT/OT - abx for 2 more days. plan for dressing change Thur or Fri. Maintained on insulin pump. Pain controlled     8/24  POD 2     Remains afebrile without leukocytosis, VSS ,  HGB 8.5. Continue IV ABx, blood cx NGTD      8/25  POD 3   Stable  Postop course. Splint removed today.   We will monitor the erythema overlying the knee for another day, and repeat lab work tomorrow     Cont IVAB      Vital Signs:      08/25/23 07:14:10 97.7 °F (36.5 °C) 94 17 121/55       Pertinent Labs/Diagnostic Results:       Results from last 7 days   Lab Units 08/25/23  0324 08/24/23  0338 08/23/23  0503 08/22/23  0504 08/21/23  0502   WBC Thousand/uL 10.73* 9.74 11.07* 8.49 7.82   HEMOGLOBIN g/dL 8.5* 8.5* 8.8* 10.8* 10.7*   HEMATOCRIT % 27.1* 27.0* 27.6* 33.8* 33.5*   PLATELETS Thousands/uL 327 303 307 380 403*   NEUTROS ABS Thousands/µL 7.38 6.75 7.82* 5.29 4.89         Results from last 7 days   Lab Units 08/25/23  0324 08/24/23  0338 08/23/23  0503 08/22/23  0504 08/21/23  0502   SODIUM mmol/L 131* 130* 132* 131* 132*   POTASSIUM mmol/L 4.5 4.6 4.8 5.0 4.7   CHLORIDE mmol/L 99 99 100 98 99   CO2 mmol/L 25 27 27 27 27   ANION GAP mmol/L 7 4 5 6 6   BUN mg/dL 17 16 15 12 12   CREATININE mg/dL 1.31* 1.32* 1.19 1.15 1.13   EGFR ml/min/1.73sq m 58 57 65 68 69   CALCIUM mg/dL 8.3* 8.4 8.4 8.9 8.7     Results from last 7 days   Lab Units 08/25/23  1610 08/25/23  1104 08/25/23  0719 08/25/23  0244 08/24/23  2118 08/24/23  1544 08/24/23  1135 08/24/23  0732 08/23/23  2231 08/23/23  2054 08/23/23  1641 08/23/23  1137   POC GLUCOSE mg/dl 181* 234* 91 117 163* 181* 216* 131 143* 181* 82 176*     Results from last 7 days   Lab Units 08/25/23  0324 08/24/23  0338 08/23/23  0503 08/22/23  0504 08/21/23  0502 08/20/23  0443 08/19/23  0841   GLUCOSE RANDOM mg/dL 113 107 107 154* 128 106 110       Medications:   Scheduled Medications:  acetaminophen, 975 mg, Oral, Q6H ANGELICA  atorvastatin, 40 mg, Oral, Daily With Dinner  cefepime, 1,000 mg, Intravenous, Q12H  gabapentin, 300 mg, Oral, TID  heparin (porcine), 5,000 Units, Subcutaneous, Q8H ANGELICA  metoprolol succinate, 100 mg, Oral, Daily  nicotine, 1 patch, Transdermal, Daily  vancomycin, 1,250 mg, Intravenous, Q24H      Continuous IV Infusions:     PRN Meds:  HYDROmorphone, 1 mg, Intravenous, Q3H PRN. .... x4 on  8/24  And x4 on 8/25   insulin lispro, 300 Units, Subcutaneous Insulin Pump, Daily PRN  LORazepam, 0.5 mg, Oral, Q8H PRN . .. x3 on   8/24  methocarbamol, 500 mg, Oral, Q6H PRN  . .. x1 on 8/24  And  x2 on 8/25  oxyCODONE, 15 mg, Oral, Q4H PRN  . ..  x4 on  8/25  oxyCODONE, 10  mg, Oral, Q4H PRN  . Franciso Marinas .. x4 on  8/24         Discharge Plan: d    Network Utilization Review Department  ATTENTION: Please call with any questions or concerns to [phone] and carefully listen to the prompts so that you are directed to the right person. All voicemails are confidential.  Carlyle Kocher all requests for admission clinical reviews, approved or denied determinations and any other requests to dedicated fax number below belonging to the campus where the patient is receiving treatment.  List of dedicated fax numbers for the Facilities:  [place] DENIALS (Administrative/Medical Necessity) [phone]   [facility] [phone]   [address] [place] [address] [phone]   [facility] [phone]   [facility] [address] [facility] [phone]   [address] [address] [facility] [address] [place] [address] [facility] [phone]

## 2023-08-25 NOTE — CONSULTS
This patient's vancomycin therapy has been completed. Thank you for this consult; pharmacy will sign off now.

## 2023-08-25 NOTE — CASE MANAGEMENT
Case Management Discharge Planning Note    Patient name Augustin Call  Location [address] [facility] 214/-01 MRN [id number]  : 1960 Date 2023       Current Admission Date: 2023  Current Admission Diagnosis:Osteomyelitis [facility])   Patient Active Problem List    Diagnosis Date Noted   • Osteomyelitis ([address]) 2023   • Type 1 diabetes mellitus with diabetic neuropathy ([address]) 2023   • S/P transmetatarsal amputation of foot, left ([address]) 2023   • Sepsis ([address]) 2023   • Stage 2 chronic kidney disease 2023   • Hyponatremia 2023   • Alcohol abuse 2023   • Nicotine abuse 2023   • 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, with fat layer exposed ([address]) 2023   • Type 1 diabetes mellitus with hyperglycemia ([address]) 2022   • Diabetic polyneuropathy associated with type 1 diabetes mellitus ([address]) 2022   • Microalbuminuria due to type 1 diabetes mellitus ([address]) 2022   • Hypoglycemia unawareness associated with type 1 diabetes mellitus ([address]) 2022   • Cigarette nicotine dependence without complication    • Gout of ankle 2022   • Chronic fatigue syndrome 2022   • Lower urinary tract symptoms due to benign prostatic hyperplasia 2022   • 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 ([address]) 2022   • Chronic pain 2022   • Insulin pump in place 2018   • Benign essential hypertension 2011   • Mixed hyperlipidemia 10/25/2010   • ED (erectile dysfunction) of organic origin 10/25/2010      LOS (days): 6  Geometric Mean LOS (GMLOS) (days):   Days to GMLOS:     OBJECTIVE:  Risk of Unplanned Readmission Score: 23.05         Current admission status: Inpatient   Preferred Pharmacy:   [facility] [place] [id number] [address] [place] [address]  Phone: [phone] Fax: [phone]    Primary Care Provider: Denver Pou, MD    Primary Insurance: Lenore Edwards  Secondary Insurance:     DISCHARGE DETAILS:           CM was informed by surgery that discharge will be delayed until tomorrow as pt's stump is warm, swollen and red. Pt is aware of change in discharge and that he will still be going home with [facility]. Updated [facility] in [address] [facility].

## 2023-08-25 NOTE — PLAN OF CARE
Problem: Potential for Falls  Goal: Patient will remain free of falls  Description: INTERVENTIONS:  - Educate patient/family on patient safety including physical limitations  - Instruct patient to call for assistance with activity   - Consult OT/PT to assist with strengthening/mobility   - Keep Call bell within reach  - Keep bed low and locked with side rails adjusted as appropriate  - Keep care items and personal belongings within reach  - Initiate and maintain comfort rounds  - Make Fall Risk Sign visible to staff  - Offer Toileting every 2 Hours, in advance of need  - Initiate/Maintain bed/ chair alarm  - Obtain necessary fall risk management equipment: cane  - Apply yellow socks and bracelet for high fall risk patients  - Consider moving patient to room near nurses station  Outcome: Progressing     Problem: PAIN - ADULT  Goal: Verbalizes/displays adequate comfort level or baseline comfort level  Description: Interventions:  - Encourage patient to monitor pain and request assistance  - Assess pain using appropriate pain scale  - Administer analgesics based on type and severity of pain and evaluate response  - Implement non-pharmacological measures as appropriate and evaluate response  - Consider cultural and social influences on pain and pain management  - Notify physician/advanced practitioner if interventions unsuccessful or patient reports new pain  Outcome: Progressing     Problem: INFECTION - ADULT  Goal: Absence or prevention of progression during hospitalization  Description: INTERVENTIONS:  - Assess and monitor for signs and symptoms of infection  - Monitor lab/diagnostic results  - Monitor all insertion sites, i.e. indwelling lines, tubes, and drains  - Monitor endotracheal if appropriate and nasal secretions for changes in amount and color  - [place] appropriate cooling/warming therapies per order  - Administer medications as ordered  - Instruct and encourage patient and family to use good hand hygiene technique  - Identify and instruct in appropriate isolation precautions for identified infection/condition  Outcome: Progressing     Problem: SAFETY ADULT  Goal: Patient will remain free of falls  Description: INTERVENTIONS:  - Educate patient/family on patient safety including physical limitations  - Instruct patient to call for assistance with activity   - Consult OT/PT to assist with strengthening/mobility   - Keep Call bell within reach  - Keep bed low and locked with side rails adjusted as appropriate  - Keep care items and personal belongings within reach  - Initiate and maintain comfort rounds  - Make Fall Risk Sign visible to staff  - Offer Toileting every 2 Hours, in advance of need  - Initiate/Maintain bed/ chair alarm  - Obtain necessary fall risk management equipment: cane  - Apply yellow socks and bracelet for high fall risk patients  - Consider moving patient to room near nurses station  Outcome: Progressing  Goal: Maintain or return to baseline ADL function  Description: INTERVENTIONS:  -  Assess patient's ability to carry out ADLs; assess patient's baseline for ADL function and identify physical deficits which impact ability to perform ADLs (bathing, care of mouth/teeth, toileting, grooming, dressing, etc.)  - Assess/evaluate cause of self-care deficits   - Assess range of motion  - Assess patient's mobility; develop plan if impaired  - Assess patient's need for assistive devices and provide as appropriate  - Encourage maximum independence but intervene and supervise when necessary  - Involve family in performance of ADLs  - Assess for home care needs following discharge   - Consider OT consult to assist with ADL evaluation and planning for discharge  - Provide patient education as appropriate  Outcome: Progressing  Goal: Maintains/Returns to pre admission functional level  Description: INTERVENTIONS:  - Perform BMAT or MOVE assessment daily.   - Set and communicate daily mobility goal to care team and patient/family/caregiver. - Collaborate with rehabilitation services on mobility goals if consulted  - Perform Range of Motion 3 times a day. - Reposition patient every 2 hours. - Dangle patient 3 times a day  - Stand patient 3 times a day  - Ambulate patient 3 times a day  - Out of bed to chair 3 times a day   - Out of bed for meals 3 times a day  - Out of bed for toileting  - Record patient progress and toleration of activity level   Outcome: Progressing     Problem: DISCHARGE PLANNING  Goal: Discharge to home or other facility with appropriate resources  Description: INTERVENTIONS:  - Identify barriers to discharge w/patient and caregiver  - Arrange for needed discharge resources and transportation as appropriate  - Identify discharge learning needs (meds, wound care, etc.)  - Arrange for interpretive services to assist at discharge as needed  - Refer to Case Management Department for coordinating discharge planning if the patient needs post-hospital services based on physician/advanced practitioner order or complex needs related to functional status, cognitive ability, or social support system  Outcome: Progressing     Problem: Knowledge Deficit  Goal: Patient/family/caregiver demonstrates understanding of disease process, treatment plan, medications, and discharge instructions  Description: Complete learning assessment and assess knowledge base.   Interventions:  - Provide teaching at level of understanding  - Provide teaching via preferred learning methods  Outcome: Progressing     Problem: SKIN/TISSUE INTEGRITY - ADULT  Goal: Incision(s), wounds(s) or drain site(s) healing without S/S of infection  Description: INTERVENTIONS  - Assess and document dressing, incision, wound bed, drain sites and surrounding tissue  - Provide patient and family education  - Perform skin care/dressing changes every shift  Outcome: Progressing  Goal: Skin Integrity remains intact(Skin Breakdown Prevention)  Description: Assess:  -Perform [place] assessment   -Clean and moisturize skin   -Inspect skin when repositioning, toileting, and assisting with ADLS  -Assess under medical devices    -Assess extremities for adequate circulation and sensation     Bed Management:  -Have minimal linens on bed & keep smooth, unwrinkled  -Change linens as needed when moist or perspiring  -Avoid sitting or lying in one position for more than 2 hours while in bed  -Keep HOB at 45 degrees     Toileting:  -Offer bedside commode  -Assess for incontinence  -Use incontinent care products after each incontinent episode    Activity:  -Mobilize patient 3 times a day  -Encourage activity and walks on unit  -Encourage or provide ROM exercises   -Turn and reposition patient every 2 Hours  -Use appropriate equipment to lift or move patient in bed  -Instruct/ Assist with weight shifting when out of bed in chair  -Consider limitation of chair time 2 hour intervals    Skin Care:  -Avoid use of baby powder, tape, friction and shearing, hot water or constrictive clothing  -Relieve pressure over bony prominences  -Do not massage red bony areas    Next Steps:  -Teach patient strategies to minimize risks    -Consider consults to  interdisciplinary teams   Outcome: Progressing     Problem: MUSCULOSKELETAL - ADULT  Goal: Maintain or return mobility to safest level of function  Description: INTERVENTIONS:  - Assess patient's ability to carry out ADLs; assess patient's baseline for ADL function and identify physical deficits which impact ability to perform ADLs (bathing, care of mouth/teeth, toileting, grooming, dressing, etc.)  - Assess/evaluate cause of self-care deficits   - Assess range of motion  - Assess patient's mobility  - Assess patient's need for assistive devices and provide as appropriate  - Encourage maximum independence but intervene and supervise when necessary  - Involve family in performance of ADLs  - Assess for home care needs following discharge   - Consider OT consult to assist with ADL evaluation and planning for discharge  - Provide patient education as appropriate  Outcome: Progressing  Goal: Maintain proper alignment of affected body part  Description: INTERVENTIONS:  - Support, maintain and protect limb and body alignment  - Provide patient/ family with appropriate education  Outcome: Progressing     Problem: MOBILITY - ADULT  Goal: Maintain or return to baseline ADL function  Description: INTERVENTIONS:  -  Assess patient's ability to carry out ADLs; assess patient's baseline for ADL function and identify physical deficits which impact ability to perform ADLs (bathing, care of mouth/teeth, toileting, grooming, dressing, etc.)  - Assess/evaluate cause of self-care deficits   - Assess range of motion  - Assess patient's mobility; develop plan if impaired  - Assess patient's need for assistive devices and provide as appropriate  - Encourage maximum independence but intervene and supervise when necessary  - Involve family in performance of ADLs  - Assess for home care needs following discharge   - Consider OT consult to assist with ADL evaluation and planning for discharge  - Provide patient education as appropriate  Outcome: Progressing  Goal: Maintains/Returns to pre admission functional level  Description: INTERVENTIONS:  - Perform BMAT or MOVE assessment daily.   - Set and communicate daily mobility goal to care team and patient/family/caregiver. - Collaborate with rehabilitation services on mobility goals if consulted  - Perform Range of Motion 3 times a day. - Reposition patient every 2 hours.   - Dangle patient 3 times a day  - Stand patient 3 times a day  - Ambulate patient 3 times a day  - Out of bed to chair 3 times a day   - Out of bed for meals 3 times a day  - Out of bed for toileting  - Record patient progress and toleration of activity level   Outcome: Progressing     Problem: Prexisting or High Potential for Compromised Skin Integrity  Goal: Skin integrity is maintained or improved  Description: INTERVENTIONS:  - Identify patients at risk for skin breakdown  - Assess and monitor skin integrity  - Assess and monitor nutrition and hydration status  - Monitor labs   - Assess for incontinence   - Turn and reposition patient  - Assist with mobility/ambulation  - Relieve pressure over bony prominences  - Avoid friction and shearing  - Provide appropriate hygiene as needed including keeping skin clean and dry  - Evaluate need for skin moisturizer/barrier cream  - Collaborate with interdisciplinary team   - Patient/family teaching  - Consider wound care consult   Outcome: Progressing     Problem: Nutrition/Hydration-ADULT  Goal: Nutrient/Hydration intake appropriate for improving, restoring or maintaining nutritional needs  Description: Monitor and assess patient's nutrition/hydration status for malnutrition. Collaborate with interdisciplinary team and initiate plan and interventions as ordered. Monitor patient's weight and dietary intake as ordered or per policy. Utilize nutrition screening tool and intervene as necessary. Determine patient's food preferences and provide high-protein, high-caloric foods as appropriate.      INTERVENTIONS:  - Monitor oral intake, urinary output, labs, and treatment plans  - Assess nutrition and hydration status and recommend course of action  - Evaluate amount of meals eaten  - Assist patient with eating if necessary   - Allow adequate time for meals  - Recommend/ encourage appropriate diets, oral nutritional supplements, and vitamin/mineral supplements  - Order, calculate, and assess calorie counts as needed  - Recommend, monitor, and adjust tube feedings and TPN/PPN based on assessed needs  - Assess need for intravenous fluids  - Provide specific nutrition/hydration education as appropriate  - Include patient/family/caregiver in decisions related to nutrition  Outcome: Progressing Problem: METABOLIC, FLUID AND ELECTROLYTES - ADULT  Goal: Electrolytes maintained within normal limits  Description: INTERVENTIONS:  - Monitor labs and assess patient for signs and symptoms of electrolyte imbalances  - Administer electrolyte replacement as ordered  - Monitor response to electrolyte replacements, including repeat lab results as appropriate  - Instruct patient on fluid and nutrition as appropriate  Outcome: Progressing  Goal: Fluid balance maintained  Description: INTERVENTIONS:  - Monitor labs   - Monitor I/O and WT  - Instruct patient on fluid and nutrition as appropriate  - Assess for signs & symptoms of volume excess or deficit  Outcome: Progressing  Goal: Glucose maintained within target range  Description: INTERVENTIONS:  - Monitor Blood Glucose as ordered  - Assess for signs and symptoms of hyperglycemia and hypoglycemia  - Administer ordered medications to maintain glucose within target range  - Assess nutritional intake and initiate nutrition service referral as needed  Outcome: Progressing     Problem: HEMATOLOGIC - ADULT  Goal: Maintains hematologic stability  Description: INTERVENTIONS  - Assess for signs and symptoms of bleeding or hemorrhage  - Monitor labs  - Administer supportive blood products/factors as ordered and appropriate  Outcome: Progressing

## 2023-08-26 PROBLEM — G89.11 ACUTE PAIN DUE TO INJURY: Status: ACTIVE | Noted: 2023-08-26

## 2023-08-26 LAB
ANION GAP SERPL CALCULATED.3IONS-SCNC: 5 MMOL/L
BASOPHILS # BLD AUTO: 0.13 THOUSANDS/ÂΜL (ref 0–0.1)
BASOPHILS NFR BLD AUTO: 1 % (ref 0–1)
BUN SERPL-MCNC: 15 MG/DL (ref 5–25)
CALCIUM SERPL-MCNC: 8.5 MG/DL (ref 8.4–10.2)
CHLORIDE SERPL-SCNC: 98 MMOL/L (ref 96–108)
CO2 SERPL-SCNC: 28 MMOL/L (ref 21–32)
CREAT SERPL-MCNC: 1.36 MG/DL (ref 0.6–1.3)
EOSINOPHIL # BLD AUTO: 0.45 THOUSAND/ÂΜL (ref 0–0.61)
EOSINOPHIL NFR BLD AUTO: 4 % (ref 0–6)
ERYTHROCYTE [DISTWIDTH] IN BLOOD BY AUTOMATED COUNT: 14 % (ref 11.6–15.1)
GFR SERPL CREATININE-BSD FRML MDRD: 55 ML/MIN/1.73SQ M
GLUCOSE SERPL-MCNC: 142 MG/DL (ref 65–140)
GLUCOSE SERPL-MCNC: 149 MG/DL (ref 65–140)
GLUCOSE SERPL-MCNC: 206 MG/DL (ref 65–140)
GLUCOSE SERPL-MCNC: 224 MG/DL (ref 65–140)
GLUCOSE SERPL-MCNC: 232 MG/DL (ref 65–140)
HCT VFR BLD AUTO: 26.5 % (ref 36.5–49.3)
HGB BLD-MCNC: 8.3 G/DL (ref 12–17)
IMM GRANULOCYTES # BLD AUTO: 0.09 THOUSAND/UL (ref 0–0.2)
IMM GRANULOCYTES NFR BLD AUTO: 1 % (ref 0–2)
LYMPHOCYTES # BLD AUTO: 1.18 THOUSANDS/ÂΜL (ref 0.6–4.47)
LYMPHOCYTES NFR BLD AUTO: 10 % (ref 14–44)
MCH RBC QN AUTO: 30.5 PG (ref 26.8–34.3)
MCHC RBC AUTO-ENTMCNC: 31.3 G/DL (ref 31.4–37.4)
MCV RBC AUTO: 97 FL (ref 82–98)
MONOCYTES # BLD AUTO: 1.32 THOUSAND/ÂΜL (ref 0.17–1.22)
MONOCYTES NFR BLD AUTO: 11 % (ref 4–12)
NEUTROPHILS # BLD AUTO: 8.8 THOUSANDS/ÂΜL (ref 1.85–7.62)
NEUTS SEG NFR BLD AUTO: 73 % (ref 43–75)
NRBC BLD AUTO-RTO: 0 /100 WBCS
PLATELET # BLD AUTO: 378 THOUSANDS/UL (ref 149–390)
PMV BLD AUTO: 9.1 FL (ref 8.9–12.7)
POTASSIUM SERPL-SCNC: 4.6 MMOL/L (ref 3.5–5.3)
RBC # BLD AUTO: 2.72 MILLION/UL (ref 3.88–5.62)
SODIUM SERPL-SCNC: 131 MMOL/L (ref 135–147)
WBC # BLD AUTO: 11.97 THOUSAND/UL (ref 4.31–10.16)

## 2023-08-26 PROCEDURE — 82948 REAGENT STRIP/BLOOD GLUCOSE: CPT

## 2023-08-26 PROCEDURE — 80048 BASIC METABOLIC PNL TOTAL CA: CPT | Performed by: INTERNAL MEDICINE

## 2023-08-26 PROCEDURE — 99024 POSTOP FOLLOW-UP VISIT: CPT | Performed by: SURGERY

## 2023-08-26 PROCEDURE — 99232 SBSQ HOSP IP/OBS MODERATE 35: CPT | Performed by: FAMILY MEDICINE

## 2023-08-26 PROCEDURE — 85025 COMPLETE CBC W/AUTO DIFF WBC: CPT | Performed by: INTERNAL MEDICINE

## 2023-08-26 RX ORDER — HYDROMORPHONE HYDROCHLORIDE 2 MG/1
4 TABLET ORAL
Status: DISCONTINUED | OUTPATIENT
Start: 2023-08-26 | End: 2023-08-27 | Stop reason: HOSPADM

## 2023-08-26 RX ORDER — GABAPENTIN 300 MG/1
600 CAPSULE ORAL 2 TIMES DAILY
Status: DISCONTINUED | OUTPATIENT
Start: 2023-08-26 | End: 2023-08-27 | Stop reason: HOSPADM

## 2023-08-26 RX ORDER — CEFEPIME HYDROCHLORIDE 1 G/50ML
1000 INJECTION, SOLUTION INTRAVENOUS EVERY 12 HOURS
Status: DISCONTINUED | OUTPATIENT
Start: 2023-08-26 | End: 2023-08-26

## 2023-08-26 RX ORDER — HYDROMORPHONE HCL/PF 1 MG/ML
0.5 SYRINGE (ML) INJECTION
Status: DISCONTINUED | OUTPATIENT
Start: 2023-08-26 | End: 2023-08-27 | Stop reason: HOSPADM

## 2023-08-26 RX ORDER — CEFEPIME HYDROCHLORIDE 1 G/50ML
1000 INJECTION, SOLUTION INTRAVENOUS EVERY 12 HOURS
Status: DISCONTINUED | OUTPATIENT
Start: 2023-08-26 | End: 2023-08-27

## 2023-08-26 RX ORDER — HYDROMORPHONE HYDROCHLORIDE 2 MG/1
2 TABLET ORAL
Status: DISCONTINUED | OUTPATIENT
Start: 2023-08-26 | End: 2023-08-27 | Stop reason: HOSPADM

## 2023-08-26 RX ADMIN — HYDROMORPHONE HYDROCHLORIDE 1 MG: 1 INJECTION, SOLUTION INTRAMUSCULAR; INTRAVENOUS; SUBCUTANEOUS at 03:10

## 2023-08-26 RX ADMIN — HYDROMORPHONE HYDROCHLORIDE 0.5 MG: 1 INJECTION, SOLUTION INTRAMUSCULAR; INTRAVENOUS; SUBCUTANEOUS at 15:01

## 2023-08-26 RX ADMIN — OXYCODONE HYDROCHLORIDE 15 MG: 10 TABLET ORAL at 02:32

## 2023-08-26 RX ADMIN — HYDROMORPHONE HYDROCHLORIDE 1 MG: 1 INJECTION, SOLUTION INTRAMUSCULAR; INTRAVENOUS; SUBCUTANEOUS at 09:35

## 2023-08-26 RX ADMIN — GABAPENTIN 600 MG: 300 CAPSULE ORAL at 17:55

## 2023-08-26 RX ADMIN — OXYCODONE HYDROCHLORIDE 15 MG: 10 TABLET ORAL at 07:44

## 2023-08-26 RX ADMIN — LORAZEPAM 0.5 MG: 0.5 TABLET ORAL at 11:30

## 2023-08-26 RX ADMIN — HEPARIN SODIUM 5000 UNITS: 5000 INJECTION INTRAVENOUS; SUBCUTANEOUS at 13:13

## 2023-08-26 RX ADMIN — ACETAMINOPHEN 325MG 975 MG: 325 TABLET ORAL at 17:56

## 2023-08-26 RX ADMIN — HYDROMORPHONE HYDROCHLORIDE 4 MG: 2 TABLET ORAL at 17:55

## 2023-08-26 RX ADMIN — NICOTINE 1 PATCH: 21 PATCH, EXTENDED RELEASE TRANSDERMAL at 09:35

## 2023-08-26 RX ADMIN — VANCOMYCIN HYDROCHLORIDE 1250 MG: 5 INJECTION, POWDER, LYOPHILIZED, FOR SOLUTION INTRAVENOUS at 17:56

## 2023-08-26 RX ADMIN — ACETAMINOPHEN 325MG 975 MG: 325 TABLET ORAL at 05:27

## 2023-08-26 RX ADMIN — OXYCODONE HYDROCHLORIDE 15 MG: 10 TABLET ORAL at 13:13

## 2023-08-26 RX ADMIN — CEFEPIME HYDROCHLORIDE 1000 MG: 1 INJECTION, SOLUTION INTRAVENOUS at 11:15

## 2023-08-26 RX ADMIN — ATORVASTATIN CALCIUM 40 MG: 40 TABLET, FILM COATED ORAL at 17:25

## 2023-08-26 RX ADMIN — HEPARIN SODIUM 5000 UNITS: 5000 INJECTION INTRAVENOUS; SUBCUTANEOUS at 05:27

## 2023-08-26 RX ADMIN — HEPARIN SODIUM 5000 UNITS: 5000 INJECTION INTRAVENOUS; SUBCUTANEOUS at 22:06

## 2023-08-26 RX ADMIN — ACETAMINOPHEN 325MG 975 MG: 325 TABLET ORAL at 22:06

## 2023-08-26 RX ADMIN — HYDROMORPHONE HYDROCHLORIDE 4 MG: 2 TABLET ORAL at 22:18

## 2023-08-26 RX ADMIN — METOPROLOL SUCCINATE 100 MG: 50 TABLET, EXTENDED RELEASE ORAL at 09:35

## 2023-08-26 RX ADMIN — METHOCARBAMOL 500 MG: 500 TABLET ORAL at 15:01

## 2023-08-26 RX ADMIN — METHOCARBAMOL 500 MG: 500 TABLET ORAL at 02:33

## 2023-08-26 RX ADMIN — LORAZEPAM 0.5 MG: 0.5 TABLET ORAL at 22:18

## 2023-08-26 RX ADMIN — GABAPENTIN 300 MG: 300 CAPSULE ORAL at 09:35

## 2023-08-26 NOTE — ASSESSMENT & PLAN NOTE
· Patient initially had been referred to the emergency department due to worsening left foot erythema and swelling  · MRI left foot completed 8/17: Status post transmetatarsal amputation with prominent skin ulceration at the distal aspect of the postoperative site including marrow changes involving the second through fifth residual proximal metatarsals indicating osteomyelitis. No evidence of drainable abscess collection in this unenhanced examination. · Previous discussion held between patient and podiatry -patient wishes to move forward with BKA rather than TMA revision given concern over healing ability. · General surgery consulted.   POD3 left BKA  · L knee with resolved warmth and erythema today  · S/p dressing change   · Likely will discharge home tomorrow

## 2023-08-26 NOTE — NURSING NOTE
While rounding pt was in bathroom I asked if he was done and needed help getting back to bed (I thought the pca or the nurse that was with me got him to the bathroom) pt was not yelling or didn't seem to be in distress at all. Pt then stated that he had fallen. I asked him if he had hit his head in which he responded no he had not. I asked him if he had pain anywhere else and he said his leg where he had the BKA dressing was intact small amount of drainage which was there prior. I contacted provider on call to come assess pt she determined no further testing needed to be done. PT stated to the PA that he fell without his walker but when I went in the bathroom he had the walker with him. We (myself and the nurse shadowing me Nicki Caballero) got in report from Bryce Joy that pt stated he was upset that insurance denied him to go to rehab because he was cleared by pt/ot that he was just going to fall on propose so he didn't have to leave. His story just seems off like something is not adding up as to how he fell. PT was ringing appropriately all day and evening.  Pt also had bedside urinal and call bell within reach

## 2023-08-26 NOTE — QUICK NOTE
Called to bedside as patient reportedly had an unwitnessed fall in the bathroom. RN reports she went into check on the patient and he was in the bathroom and he stated that he had just fallen. He was already sitting on the toilet when RN spoke with him. Patient seen and examined at bedside. He reports that after going into the bathroom with his walker he left of the walker and then fell to the ground hitting his left stump and left shoulder. Denies head strike. Only reports pain in left shoulder and left stump. No pain elsewhere. No preceding symptoms. Patient has full range of motion of the left shoulder and is nontender to palpation. Stump with dressing intact. There is a small area of blood in the dressing, that RN at bedside states was present previously. Will treat pain. Have therapy reevaluate the patient.

## 2023-08-26 NOTE — NURSING NOTE
Pt upset that bed alarm has been activated saying that it is "not necessary" explained to pt that it is necessary due to him having a "fall" earlier in the evening and he lost the privilege of just being able to ring the call bell since he decided to get up without ringing. The bed alarm will remain activated.

## 2023-08-26 NOTE — QUICK NOTE
Recheck of LLE erythema seen earlier today. Patient c/o pain in LLE and requesting pain medication. He has been up to bathroom with assistance. He is uncertain if there has been any change in discoloration seen earlier today. Per Ninoska Hartmann, slight erythema seen over knee and up to thigh with associated warmth. Patient expressed concern about being discharged and not having IV pain medication if needed. Afebrile, VSS  Gen: NAD, resting in bed  Lower extremities: faint erythema only over anterior knee and medial knee. Slight increased warmth over area compared with contralateral extremity. No erythema proximal to knee under dressing. Discussed with Ninoska Hartmann. Continue to follow exam and labs in am. Vancomycin ordered at 4pm today. May need to continue antibiotics depending on labs/exam tomorrow.

## 2023-08-26 NOTE — PROGRESS NOTES
Progress Note - General Surgery   [facility] [place]ina 66 y.o. male MRN: [id number]  Unit/Bed#: -01 Encounter: [id number]    Assessment and Plan:   66year old male POD#4 s/p left BKA  -BKA completed in treatment of nonhealing TMA with wound infection and osteomyelitis   -Afebrile, heart rates in 90s (94,99), remainder of VSS  -WBC 11.97 (10.73 yesterday, 9.74 two days ago)  -HGB 8.3 (8.5 yesterday, 8.5 two days ago), stable  -Wound culture taken from left foot on 8/16 grew Proteus and MRSA  -Creatinine 1.36 (1.31 yesterday)  -Hyponatremia 131 (131 yesterday), appears chronic on all past lab records of patient dating back to 2021  -Fall this morning in the 2am hour; patient reports to being somewhat disoriented at 2 AM but waking up to urinate, was using walker but reports fell on left knee and left shoulder, was able to get himself back onto the toilet; denies head strike  -On exam, left knee with focal area of redness over patella -Per patient and wife this appears to be about the same redness since yesterday and does not appear to be increased given fall, 1 small scrape present over the knee likely in the setting of fall; staples intact with mild amount of serosanguineous drainage, incision line is nonerythematous, patient with a purplish discoloration on anterior shin above incision with most prominent tenderness present along that site and medial to that site. Left knee with no focal area of tenderness to palpation. From the knee down patient reports at least mild tenderness to palpation. Left lower extremity does have increased warmth when compared to right lower extremity up to mid thigh.  Erythema does not appear to be extending  -Continue with compressive dressing -replaced today with Xeroform, fluffs, Kerlix wrap, Ace wrap  -Recommend continued antibiotics  -Monitor extremity exams, vitals, and labs continuing to trend wbc  -Analgesia and antiemetics prn -discussed decreasing dose of Dilaudid breakthrough medication, patient agreeable and states he knows he cannot have IV pain medication at home  -Continue Robaxin as needed muscle spasms  -Incentive spirometer   -DVT ppx - heparin  -With home health care for therapy needs arranged for when patient is discharged  -Discussed with attending and primary team     DM1   -Maintained on insulin pump  -Continue tight blood sugar control for optimal wound healing and control of infection  -medical management     Tobacco abuse  -Cessation recommended  -Nicotine patch ordered  -Counseled that cigarette smoking can affect wound healing     CKD2, hyponatermia, HTN  -Na+ 131  -management per primary team    Subjective/Objective     Subjective: Patient reports fall overnight in the bathroom. Patient states it was around 2am  in the morning and he was somewhat disoriented given the early time of day. He got to the bathroom with a walker but then fell on his left knee and shoulder. Denies head strike. Was able to get himself back onto the toilet. States he continues to have significant pain in his left lower extremity, denies pain at incision site, states pain is mostly directly below the knee. Reports he does not believe the swelling or redness is worse today as compared to yesterday. Denies numbness and tingling in extremity. Denies fevers, chills, chest pain, shortness of breath, nausea, vomiting. Patient without difficulty. Having bowel function. Objective:     Blood pressure 151/70, pulse 94, temperature 98.1 °F (36.7 °C), resp. rate 17, height 6' (1.829 m), weight 68 kg (150 lb), SpO2 97 %. ,Body mass index is 20.34 kg/m².       Intake/Output Summary (Last 24 hours) at 8/26/2023 0799  Last data filed at 8/26/2023 0501  Gross per 24 hour   Intake 1570 ml   Output 500 ml   Net 1070 ml       Invasive Devices     Peripheral Intravenous Line  Duration           Peripheral IV 08/21/23 Left Antecubital 4 days          Line  Duration           Pump Device Insulin pump Anterior; Left Abdomen 57 days                Physical Exam:  General - no acute distress, at times gets a shooting pain and moves around to get comfortable  Heart - RRR   Lungs- clear to auscultation bilaterally; auscultated anteriorly and laterally  Abdomen - soft, nontedner  Extremities - Left BKA with staple line intact, mild amount of serosanguineous drainage present at staple line, no surrounding erythema at staple line. There is an area of purpleish ecchymosis on the anterior shin superior to incision line -most prominent tenderness to palpation over that area and medial to that area. Remainder of left lower extremity from the knee down with mild tenderness to palpation. Knee is over is erythematous over patella, small area of scrape/flaking skin. Patient able to flex and extend knee. Left lower extremity with increased warmth as compared to right lower extremity, warmth extends into mid thigh. Left lower extremity appears swollen compared to right lower extremity from the knee and moving distally. Lab, Imaging and other studies:I have personally reviewed pertinent lab results.     VTE Pharmacologic Prophylaxis: Heparin    Edwina Hair [place]  8/26/2023]

## 2023-08-26 NOTE — PROGRESS NOTES
Kalpesh Contreras is a 66 y.o. male who is currently ordered Vancomycin IV with management by the Pharmacy Consult service. Relevant clinical data and objective / subjective history reviewed. Vancomycin Assessment:  Indication and Goal AUC/Trough: Bone/joint infection (goal -600, trough >10)  Clinical Status: worsening  Micro:     Renal Function:  SCr: 1.36 mg/dL  CrCl: 55 mL/min  Renal replacement: Not on dialysis  Days of Therapy: 10*  Current Dose: 1250mg IV Q24H  Vancomycin Plan: As per 8/26/23 general surgery progress note recommendation, provider requested Vancomycin IV to be *restarted as well as pharmacy vancomycin consult service. Vancomycin 1250mg IV scheduled Q24H accordingly. New Dosing: No change  Estimated AUC: 518 mcg*hr/mL  Estimated Trough: 15.3 mcg/mL  Next Level: Random Level AM labs 8/28  Renal Function Monitoring: Daily BMP and UOP  Pharmacy will continue to follow closely for s/sx of nephrotoxicity, infusion reactions and appropriateness of therapy. BMP and CBC will be ordered per protocol. We will continue to follow the patient’s culture results and clinical progress daily.     Santi Yepez, Pharmacist

## 2023-08-26 NOTE — ASSESSMENT & PLAN NOTE
Lab Results   Component Value Date    EGFR 55 08/26/2023    EGFR 58 08/25/2023    EGFR 57 08/24/2023    CREATININE 1.36 (H) 08/26/2023    CREATININE 1.31 (H) 08/25/2023    CREATININE 1.32 (H) 08/24/2023     · Baseline creatinine 1.0-1.2  · Slight bump in creatinine 8/24-8/25, 8/26. Encourage PO intake. If creatinine continues to trend upward, adjust Gabapentin dosing.   · Will keep ACEi on hold

## 2023-08-26 NOTE — ASSESSMENT & PLAN NOTE
Acute pain due to left BKA  Per surgery, adjusted pain management with the addition of oral Dilaudid  Patient is planning to be discharged home tomorrow, so preparing to transition from IV pain medications to p.o. pain medications  If pain is still poorly controlled tomorrow, we will contact acute pain management service

## 2023-08-26 NOTE — ASSESSMENT & PLAN NOTE
· Stable on metoprolol succinate 100 mg daily.   · Noted elevated creatinine, so we will hold lisinopril

## 2023-08-26 NOTE — PROGRESS NOTES
[address] [facility]  Progress Note  Name: Tavia Haskins  MRN: [id number]  Unit/Bed#: -01 I Date of Admission: 8/19/2023   Date of Service: 8/26/2023 I Hospital Day: 7    Assessment/Plan   Acute pain due to injury  Assessment & Plan  Acute pain due to left BKA  Per surgery, adjusted pain management with the addition of oral Dilaudid  Patient is planning to be discharged home tomorrow, so preparing to transition from IV pain medications to p.o. pain medications  If pain is still poorly controlled tomorrow, we will contact acute pain management service    Type 1 diabetes mellitus with diabetic neuropathy [facility])  Assessment & Plan  Lab Results   Component Value Date    HGBA1C 7.9 (H) 07/20/2023     Recent Labs     08/25/23  2102 08/26/23  0719 08/26/23  1057 08/26/23  1632   POCGLU 199* 149* 224* 206*     Blood Sugar Average: Last 72 hrs:  (P) 161   · Maintained on insulin pump as outpatient -previously on insulin gtt intraoperatively  · Tolerating diet. Transitioned back to insulin pump 8/22    Hyponatremia  Assessment & Plan  · Appears to be a chronic issue with baseline 131  · Osmolality 284  · Urine osmolality 362  · Urine sodium 52    Stage 2 chronic kidney disease  Assessment & Plan  Lab Results   Component Value Date    EGFR 55 08/26/2023    EGFR 58 08/25/2023    EGFR 57 08/24/2023    CREATININE 1.36 (H) 08/26/2023    CREATININE 1.31 (H) 08/25/2023    CREATININE 1.32 (H) 08/24/2023     · Baseline creatinine 1.0-1.2  · Slight bump in creatinine 8/24-8/25, 8/26. Encourage PO intake. If creatinine continues to trend upward, adjust Gabapentin dosing. · Will keep ACEi on hold    Cigarette nicotine dependence without complication  Assessment & Plan  · Encourage cessation. · Continue nicotine replacement. Benign essential hypertension  Assessment & Plan  · Stable on metoprolol succinate 100 mg daily.   · Noted elevated creatinine, so we will hold lisinopril    * Osteomyelitis [facility])  Assessment & Plan  · Patient initially had been referred to the emergency department due to worsening left foot erythema and swelling  · MRI left foot completed : Status post transmetatarsal amputation with prominent skin ulceration at the distal aspect of the postoperative site including marrow changes involving the second through fifth residual proximal metatarsals indicating osteomyelitis. No evidence of drainable abscess collection in this unenhanced examination. · Previous discussion held between patient and podiatry -patient wishes to move forward with BKA rather than TMA revision given concern over healing ability. · General surgery consulted. POD3 left BKA  · L knee with resolved warmth and erythema today  · S/p dressing change   · Likely will discharge home tomorrow             VTE Pharmacologic Prophylaxis:   Pharmacologic: Heparin  Mechanical VTE Prophylaxis in Place: Yes    Patient Centered Rounds: I have performed bedside rounds with nursing staff today. Discussions with Specialists or Other Care Team Provider: Acute surgery    Education and Discussions with Family / Patient: patient    Time Spent for Care: 20 minutes. More than 50% of total time spent on counseling and coordination of care as described above. Current Length of Stay: 7 day(s)    Current Patient Status: Inpatient   Certification Statement: The patient will continue to require additional inpatient hospital stay due to IV antibiotics and pain management    Discharge Plan: 24 hrs    Code Status: Level 1 - Full Code      Subjective:   Patient was seen and examined this afternoon. He continues to complain of severe pain at the site of BKA. He rates pain as 10 out of 10. He states that IV Dilaudid does not help him, but he is willing to try oral Dilaudid and see if it will help overnight.     Objective:     Vitals:   Temp (24hrs), Av °F (36.7 °C), Min:97.7 °F (36.5 °C), Max:98.2 °F (36.8 °C)    Temp:  [97.7 °F (36.5 °C)-98.2 °F (36.8 °C)] 98.2 °F (36.8 °C)  HR:  [78-99] 88  Resp:  [16-19] 19  BP: (123-165)/(57-88) 135/67  SpO2:  [92 %-98 %] 98 %  Body mass index is 20.34 kg/m². Input and Output Summary (last 24 hours): Intake/Output Summary (Last 24 hours) at 8/26/2023 1821  Last data filed at 8/26/2023 1501  Gross per 24 hour   Intake 490 ml   Output 1150 ml   Net -660 ml       Physical Exam:     Physical Exam  Constitutional:       Appearance: He is well-developed. Cardiovascular:      Rate and Rhythm: Normal rate and regular rhythm. Heart sounds: Normal heart sounds. Pulmonary:      Effort: Pulmonary effort is normal. No respiratory distress. Breath sounds: Normal breath sounds. Abdominal:      Palpations: Abdomen is soft. Tenderness: There is no abdominal tenderness. Musculoskeletal:         General: Deformity (left BKA; dressing not removed, but no erythema noted above the dressing) present. Skin:     General: Skin is warm. Findings: No erythema or rash. Neurological:      Mental Status: He is alert. Additional Data:     Labs:    Results from last 7 days   Lab Units 08/26/23  0443   WBC Thousand/uL 11.97*   HEMOGLOBIN g/dL 8.3*   HEMATOCRIT % 26.5*   PLATELETS Thousands/uL 378   NEUTROS PCT % 73   LYMPHS PCT % 10*   MONOS PCT % 11   EOS PCT % 4     Results from last 7 days   Lab Units 08/26/23  0443   SODIUM mmol/L 131*   POTASSIUM mmol/L 4.6   CHLORIDE mmol/L 98   CO2 mmol/L 28   BUN mg/dL 15   CREATININE mg/dL 1.36*   ANION GAP mmol/L 5   CALCIUM mg/dL 8.5   GLUCOSE RANDOM mg/dL 142*         Results from last 7 days   Lab Units 08/26/23  1632 08/26/23  1057 08/26/23  0719 08/25/23  2102 08/25/23  1610 08/25/23  1104 08/25/23  0719 08/25/23  0244 08/24/23  2118 08/24/23  1544 08/24/23  1135 08/24/23  0732   POC GLUCOSE mg/dl 206* 224* 149* 199* 181* 234* 91 117 163* 181* 216* 131                   * I Have Reviewed All Lab Data Listed Above.   * Additional Pertinent Lab Tests Reviewed: All Labs Within Last 24 Hours Reviewed    Imaging:      Recent Cultures (last 7 days):           Last 24 Hours Medication List:   Current Facility-Administered Medications   Medication Dose Route Frequency Provider Last Rate   • acetaminophen  975 mg Oral Q6H [address] Paulina Hartmann PA-C     • atorvastatin  40 mg Oral Daily With [employer] NATALIO Hartmann     • cefepime  1,000 mg Intravenous Q12H Ellis Oliver MD 1,000 mg (08/26/23 1115)   • gabapentin  600 mg Oral BID Geovanna Osborne MD     • heparin (porcine)  5,000 Units Subcutaneous [facility] [address] Paulina Hartmann PA-C     • HYDROmorphone  0.5 mg Intravenous Q3H PRN Edwina Corrales PA-C     • HYDROmorphone  2 mg Oral Q3H PRN Geovanna Osborne MD     • HYDROmorphone  4 mg Oral Q3H PRN Geovanna Osborne MD     • insulin lispro  300 Units Subcutaneous Insulin Pump Daily PRN Juanjo Hartmann PA-C     • LORazepam  0.5 mg Oral Q8H PRN Juanjo Hartmann PA-C     • methocarbamol  500 mg Oral Q6H PRN Juanjo Hartmann PA-C     • metoprolol succinate  100 mg Oral Daily Paulina Hartmann PA-C     • nicotine  1 patch Transdermal Daily Paulina Hartmann PA-C     • vancomycin  1,250 mg Intravenous Q24H Ellis Oliver MD 1,250 mg (08/26/23 1177)        Today, Patient Was Seen By: Ellis Oliver MD    ** Please Note: Dictation voice to text software may have been used in the creation of this document.  **

## 2023-08-26 NOTE — PLAN OF CARE
Problem: Potential for Falls  Goal: Patient will remain free of falls  Description: INTERVENTIONS:  - Educate patient/family on patient safety including physical limitations  - Instruct patient to call for assistance with activity   - Consult OT/PT to assist with strengthening/mobility   - Keep Call bell within reach  - Keep bed low and locked with side rails adjusted as appropriate  - Keep care items and personal belongings within reach  - Initiate and maintain comfort rounds  - Make Fall Risk Sign visible to staff  - Offer Toileting every 2 Hours, in advance of need  - Initiate/Maintain bed/ chair alarm  - Obtain necessary fall risk management equipment: cane  - Apply yellow socks and bracelet for high fall risk patients  - Consider moving patient to room near nurses station  Outcome: Progressing     Problem: PAIN - ADULT  Goal: Verbalizes/displays adequate comfort level or baseline comfort level  Description: Interventions:  - Encourage patient to monitor pain and request assistance  - Assess pain using appropriate pain scale  - Administer analgesics based on type and severity of pain and evaluate response  - Implement non-pharmacological measures as appropriate and evaluate response  - Consider cultural and social influences on pain and pain management  - Notify physician/advanced practitioner if interventions unsuccessful or patient reports new pain  Outcome: Progressing     Problem: INFECTION - ADULT  Goal: Absence or prevention of progression during hospitalization  Description: INTERVENTIONS:  - Assess and monitor for signs and symptoms of infection  - Monitor lab/diagnostic results  - Monitor all insertion sites, i.e. indwelling lines, tubes, and drains  - Monitor endotracheal if appropriate and nasal secretions for changes in amount and color  - [place] appropriate cooling/warming therapies per order  - Administer medications as ordered  - Instruct and encourage patient and family to use good hand hygiene technique  - Identify and instruct in appropriate isolation precautions for identified infection/condition  Outcome: Progressing     Problem: SAFETY ADULT  Goal: Patient will remain free of falls  Description: INTERVENTIONS:  - Educate patient/family on patient safety including physical limitations  - Instruct patient to call for assistance with activity   - Consult OT/PT to assist with strengthening/mobility   - Keep Call bell within reach  - Keep bed low and locked with side rails adjusted as appropriate  - Keep care items and personal belongings within reach  - Initiate and maintain comfort rounds  - Make Fall Risk Sign visible to staff  - Offer Toileting every 2 Hours, in advance of need  - Initiate/Maintain bed/ chair alarm  - Obtain necessary fall risk management equipment: cane  - Apply yellow socks and bracelet for high fall risk patients  - Consider moving patient to room near nurses station  Outcome: Progressing  Goal: Maintain or return to baseline ADL function  Description: INTERVENTIONS:  -  Assess patient's ability to carry out ADLs; assess patient's baseline for ADL function and identify physical deficits which impact ability to perform ADLs (bathing, care of mouth/teeth, toileting, grooming, dressing, etc.)  - Assess/evaluate cause of self-care deficits   - Assess range of motion  - Assess patient's mobility; develop plan if impaired  - Assess patient's need for assistive devices and provide as appropriate  - Encourage maximum independence but intervene and supervise when necessary  - Involve family in performance of ADLs  - Assess for home care needs following discharge   - Consider OT consult to assist with ADL evaluation and planning for discharge  - Provide patient education as appropriate  Outcome: Progressing  Goal: Maintains/Returns to pre admission functional level  Description: INTERVENTIONS:  - Perform BMAT or MOVE assessment daily.   - Set and communicate daily mobility goal to care team and patient/family/caregiver. - Collaborate with rehabilitation services on mobility goals if consulted  - Perform Range of Motion 3 times a day. - Reposition patient every 2 hours. - Dangle patient 3 times a day  - Stand patient 3 times a day  - Ambulate patient 3 times a day  - Out of bed to chair 3 times a day   - Out of bed for meals 3 times a day  - Out of bed for toileting  - Record patient progress and toleration of activity level   Outcome: Progressing     Problem: DISCHARGE PLANNING  Goal: Discharge to home or other facility with appropriate resources  Description: INTERVENTIONS:  - Identify barriers to discharge w/patient and caregiver  - Arrange for needed discharge resources and transportation as appropriate  - Identify discharge learning needs (meds, wound care, etc.)  - Arrange for interpretive services to assist at discharge as needed  - Refer to Case Management Department for coordinating discharge planning if the patient needs post-hospital services based on physician/advanced practitioner order or complex needs related to functional status, cognitive ability, or social support system  Outcome: Progressing     Problem: Knowledge Deficit  Goal: Patient/family/caregiver demonstrates understanding of disease process, treatment plan, medications, and discharge instructions  Description: Complete learning assessment and assess knowledge base.   Interventions:  - Provide teaching at level of understanding  - Provide teaching via preferred learning methods  Outcome: Progressing     Problem: SKIN/TISSUE INTEGRITY - ADULT  Goal: Incision(s), wounds(s) or drain site(s) healing without S/S of infection  Description: INTERVENTIONS  - Assess and document dressing, incision, wound bed, drain sites and surrounding tissue  - Provide patient and family education  - Perform skin care/dressing changes every shift  Outcome: Progressing  Goal: Skin Integrity remains intact(Skin Breakdown Prevention)  Description: Assess:  -Perform [place] assessment every shift  -Clean and moisturize skin every shift  -Inspect skin when repositioning, toileting, and assisting with ADLS  -Assess under medical devices such as  every ***  -Assess extremities for adequate circulation and sensation     Bed Management:  -Have minimal linens on bed & keep smooth, unwrinkled  -Change linens as needed when moist or perspiring  -Avoid sitting or lying in one position for more than *** hours while in bed  -Keep HOB at ***degrees     Toileting:  -Offer bedside commode  -Assess for incontinence every ***  -Use incontinent care products after each incontinent episode such as ***    Activity:  -Mobilize patient *** times a day  -Encourage activity and walks on unit  -Encourage or provide ROM exercises   -Turn and reposition patient every *** Hours  -Use appropriate equipment to lift or move patient in bed  -Instruct/ Assist with weight shifting every *** when out of bed in chair  -Consider limitation of chair time *** hour intervals    Skin Care:  -Avoid use of baby powder, tape, friction and shearing, hot water or constrictive clothing  -Relieve pressure over bony prominences using ***  -Do not massage red bony areas    Next Steps:  -Teach patient strategies to minimize risks such as ***   -Consider consults to  interdisciplinary teams such as ***  Outcome: Progressing     Problem: MUSCULOSKELETAL - ADULT  Goal: Maintain or return mobility to safest level of function  Description: INTERVENTIONS:  - Assess patient's ability to carry out ADLs; assess patient's baseline for ADL function and identify physical deficits which impact ability to perform ADLs (bathing, care of mouth/teeth, toileting, grooming, dressing, etc.)  - Assess/evaluate cause of self-care deficits   - Assess range of motion  - Assess patient's mobility  - Assess patient's need for assistive devices and provide as appropriate  - Encourage maximum independence but intervene and supervise when necessary  - Involve family in performance of ADLs  - Assess for home care needs following discharge   - Consider OT consult to assist with ADL evaluation and planning for discharge  - Provide patient education as appropriate  Outcome: Progressing  Goal: Maintain proper alignment of affected body part  Description: INTERVENTIONS:  - Support, maintain and protect limb and body alignment  - Provide patient/ family with appropriate education  Outcome: Progressing     Problem: METABOLIC, FLUID AND ELECTROLYTES - ADULT  Goal: Electrolytes maintained within normal limits  Description: INTERVENTIONS:  - Monitor labs and assess patient for signs and symptoms of electrolyte imbalances  - Administer electrolyte replacement as ordered  - Monitor response to electrolyte replacements, including repeat lab results as appropriate  - Instruct patient on fluid and nutrition as appropriate  Outcome: Progressing  Goal: Fluid balance maintained  Description: INTERVENTIONS:  - Monitor labs   - Monitor I/O and WT  - Instruct patient on fluid and nutrition as appropriate  - Assess for signs & symptoms of volume excess or deficit  Outcome: Progressing  Goal: Glucose maintained within target range  Description: INTERVENTIONS:  - Monitor Blood Glucose as ordered  - Assess for signs and symptoms of hyperglycemia and hypoglycemia  - Administer ordered medications to maintain glucose within target range  - Assess nutritional intake and initiate nutrition service referral as needed  Outcome: Progressing     Problem: HEMATOLOGIC - ADULT  Goal: Maintains hematologic stability  Description: INTERVENTIONS  - Assess for signs and symptoms of bleeding or hemorrhage  - Monitor labs  - Administer supportive blood products/factors as ordered and appropriate  Outcome: Progressing     Problem: MOBILITY - ADULT  Goal: Maintain or return to baseline ADL function  Description: INTERVENTIONS:  -  Assess patient's ability to carry out ADLs; assess patient's baseline for ADL function and identify physical deficits which impact ability to perform ADLs (bathing, care of mouth/teeth, toileting, grooming, dressing, etc.)  - Assess/evaluate cause of self-care deficits   - Assess range of motion  - Assess patient's mobility; develop plan if impaired  - Assess patient's need for assistive devices and provide as appropriate  - Encourage maximum independence but intervene and supervise when necessary  - Involve family in performance of ADLs  - Assess for home care needs following discharge   - Consider OT consult to assist with ADL evaluation and planning for discharge  - Provide patient education as appropriate  Outcome: Progressing  Goal: Maintains/Returns to pre admission functional level  Description: INTERVENTIONS:  - Perform BMAT or MOVE assessment daily.   - Set and communicate daily mobility goal to care team and patient/family/caregiver. - Collaborate with rehabilitation services on mobility goals if consulted  - Perform Range of Motion 3 times a day. - Reposition patient every 2 hours.   - Dangle patient 3 times a day  - Stand patient 3 times a day  - Ambulate patient 3 times a day  - Out of bed to chair 3 times a day   - Out of bed for meals 3 times a day  - Out of bed for toileting  - Record patient progress and toleration of activity level   Outcome: Progressing     Problem: Prexisting or High Potential for Compromised Skin Integrity  Goal: Skin integrity is maintained or improved  Description: INTERVENTIONS:  - Identify patients at risk for skin breakdown  - Assess and monitor skin integrity  - Assess and monitor nutrition and hydration status  - Monitor labs   - Assess for incontinence   - Turn and reposition patient  - Assist with mobility/ambulation  - Relieve pressure over bony prominences  - Avoid friction and shearing  - Provide appropriate hygiene as needed including keeping skin clean and dry  - Evaluate need for skin moisturizer/barrier cream  - Collaborate with interdisciplinary team   - Patient/family teaching  - Consider wound care consult   Outcome: Progressing     Problem: Nutrition/Hydration-ADULT  Goal: Nutrient/Hydration intake appropriate for improving, restoring or maintaining nutritional needs  Description: Monitor and assess patient's nutrition/hydration status for malnutrition. Collaborate with interdisciplinary team and initiate plan and interventions as ordered. Monitor patient's weight and dietary intake as ordered or per policy. Utilize nutrition screening tool and intervene as necessary. Determine patient's food preferences and provide high-protein, high-caloric foods as appropriate.      INTERVENTIONS:  - Monitor oral intake, urinary output, labs, and treatment plans  - Assess nutrition and hydration status and recommend course of action  - Evaluate amount of meals eaten  - Assist patient with eating if necessary   - Allow adequate time for meals  - Recommend/ encourage appropriate diets, oral nutritional supplements, and vitamin/mineral supplements  - Order, calculate, and assess calorie counts as needed  - Recommend, monitor, and adjust tube feedings and TPN/PPN based on assessed needs  - Assess need for intravenous fluids  - Provide specific nutrition/hydration education as appropriate  - Include patient/family/caregiver in decisions related to nutrition  Outcome: Progressing

## 2023-08-26 NOTE — ASSESSMENT & PLAN NOTE
Lab Results   Component Value Date    HGBA1C 7.9 (H) 07/20/2023     Recent Labs     08/25/23  2102 08/26/23  0719 08/26/23  1057 08/26/23  1632   POCGLU 199* 149* 224* 206*     Blood Sugar Average: Last 72 hrs:  (P) 161   · Maintained on insulin pump as outpatient -previously on insulin gtt intraoperatively  · Tolerating diet.   Transitioned back to insulin pump 8/22

## 2023-08-27 ENCOUNTER — HOME CARE VISIT (OUTPATIENT)
Dept: HOME HEALTH SERVICES | Facility: HOME HEALTHCARE | Age: 63
End: 2023-08-27
Payer: COMMERCIAL

## 2023-08-27 VITALS
WEIGHT: 150 LBS | RESPIRATION RATE: 18 BRPM | BODY MASS INDEX: 20.32 KG/M2 | TEMPERATURE: 97.9 F | HEART RATE: 86 BPM | SYSTOLIC BLOOD PRESSURE: 135 MMHG | DIASTOLIC BLOOD PRESSURE: 69 MMHG | HEIGHT: 72 IN | OXYGEN SATURATION: 92 %

## 2023-08-27 LAB
ANION GAP SERPL CALCULATED.3IONS-SCNC: 9 MMOL/L
BASOPHILS # BLD AUTO: 0.14 THOUSANDS/ÂΜL (ref 0–0.1)
BASOPHILS NFR BLD AUTO: 1 % (ref 0–1)
BUN SERPL-MCNC: 14 MG/DL (ref 5–25)
CALCIUM SERPL-MCNC: 9.3 MG/DL (ref 8.4–10.2)
CHLORIDE SERPL-SCNC: 98 MMOL/L (ref 96–108)
CO2 SERPL-SCNC: 27 MMOL/L (ref 21–32)
CREAT SERPL-MCNC: 1.32 MG/DL (ref 0.6–1.3)
EOSINOPHIL # BLD AUTO: 0.3 THOUSAND/ÂΜL (ref 0–0.61)
EOSINOPHIL NFR BLD AUTO: 3 % (ref 0–6)
ERYTHROCYTE [DISTWIDTH] IN BLOOD BY AUTOMATED COUNT: 13.8 % (ref 11.6–15.1)
GFR SERPL CREATININE-BSD FRML MDRD: 57 ML/MIN/1.73SQ M
GLUCOSE SERPL-MCNC: 136 MG/DL (ref 65–140)
GLUCOSE SERPL-MCNC: 136 MG/DL (ref 65–140)
GLUCOSE SERPL-MCNC: 265 MG/DL (ref 65–140)
HCT VFR BLD AUTO: 32 % (ref 36.5–49.3)
HGB BLD-MCNC: 10.3 G/DL (ref 12–17)
IMM GRANULOCYTES # BLD AUTO: 0.06 THOUSAND/UL (ref 0–0.2)
IMM GRANULOCYTES NFR BLD AUTO: 1 % (ref 0–2)
LYMPHOCYTES # BLD AUTO: 1.49 THOUSANDS/ÂΜL (ref 0.6–4.47)
LYMPHOCYTES NFR BLD AUTO: 14 % (ref 14–44)
MCH RBC QN AUTO: 31 PG (ref 26.8–34.3)
MCHC RBC AUTO-ENTMCNC: 32.2 G/DL (ref 31.4–37.4)
MCV RBC AUTO: 96 FL (ref 82–98)
MONOCYTES # BLD AUTO: 1.25 THOUSAND/ÂΜL (ref 0.17–1.22)
MONOCYTES NFR BLD AUTO: 12 % (ref 4–12)
NEUTROPHILS # BLD AUTO: 7.5 THOUSANDS/ÂΜL (ref 1.85–7.62)
NEUTS SEG NFR BLD AUTO: 69 % (ref 43–75)
NRBC BLD AUTO-RTO: 0 /100 WBCS
PLATELET # BLD AUTO: 487 THOUSANDS/UL (ref 149–390)
PMV BLD AUTO: 8.9 FL (ref 8.9–12.7)
POTASSIUM SERPL-SCNC: 4.2 MMOL/L (ref 3.5–5.3)
RBC # BLD AUTO: 3.32 MILLION/UL (ref 3.88–5.62)
SODIUM SERPL-SCNC: 134 MMOL/L (ref 135–147)
VANCOMYCIN SERPL-MCNC: 19.6 UG/ML (ref 10–20)
WBC # BLD AUTO: 10.74 THOUSAND/UL (ref 4.31–10.16)

## 2023-08-27 PROCEDURE — 80202 ASSAY OF VANCOMYCIN: CPT | Performed by: FAMILY MEDICINE

## 2023-08-27 PROCEDURE — 99024 POSTOP FOLLOW-UP VISIT: CPT

## 2023-08-27 PROCEDURE — 80048 BASIC METABOLIC PNL TOTAL CA: CPT | Performed by: FAMILY MEDICINE

## 2023-08-27 PROCEDURE — 85025 COMPLETE CBC W/AUTO DIFF WBC: CPT | Performed by: FAMILY MEDICINE

## 2023-08-27 PROCEDURE — 82948 REAGENT STRIP/BLOOD GLUCOSE: CPT

## 2023-08-27 PROCEDURE — 99239 HOSP IP/OBS DSCHRG MGMT >30: CPT | Performed by: FAMILY MEDICINE

## 2023-08-27 RX ORDER — HYDROMORPHONE HYDROCHLORIDE 4 MG/1
4 TABLET ORAL
Qty: 10 TABLET | Refills: 0 | Status: SHIPPED | OUTPATIENT
Start: 2023-08-27 | End: 2023-08-27 | Stop reason: SDUPTHER

## 2023-08-27 RX ORDER — GABAPENTIN 300 MG/1
600 CAPSULE ORAL 2 TIMES DAILY
Qty: 60 CAPSULE | Refills: 0 | Status: SHIPPED | OUTPATIENT
Start: 2023-08-27 | End: 2023-08-27

## 2023-08-27 RX ORDER — HYDROMORPHONE HYDROCHLORIDE 2 MG/1
2 TABLET ORAL
Qty: 20 TABLET | Refills: 0 | Status: SHIPPED | OUTPATIENT
Start: 2023-08-27 | End: 2023-08-30 | Stop reason: SDUPTHER

## 2023-08-27 RX ORDER — METHOCARBAMOL 500 MG/1
500 TABLET, FILM COATED ORAL EVERY 6 HOURS PRN
Qty: 120 TABLET | Refills: 0 | Status: SHIPPED | OUTPATIENT
Start: 2023-08-27

## 2023-08-27 RX ORDER — ACETAMINOPHEN 325 MG/1
975 TABLET ORAL EVERY 6 HOURS SCHEDULED
Qty: 120 TABLET | Refills: 0 | Status: SHIPPED | OUTPATIENT
Start: 2023-08-27

## 2023-08-27 RX ORDER — HYDROMORPHONE HYDROCHLORIDE 4 MG/1
4 TABLET ORAL
Qty: 20 TABLET | Refills: 0 | Status: SHIPPED | OUTPATIENT
Start: 2023-08-27 | End: 2023-08-30

## 2023-08-27 RX ADMIN — ACETAMINOPHEN 325MG 975 MG: 325 TABLET ORAL at 11:35

## 2023-08-27 RX ADMIN — METOPROLOL SUCCINATE 100 MG: 50 TABLET, EXTENDED RELEASE ORAL at 08:58

## 2023-08-27 RX ADMIN — HYDROMORPHONE HYDROCHLORIDE 4 MG: 2 TABLET ORAL at 03:05

## 2023-08-27 RX ADMIN — METHOCARBAMOL 500 MG: 500 TABLET ORAL at 11:35

## 2023-08-27 RX ADMIN — GABAPENTIN 600 MG: 300 CAPSULE ORAL at 08:58

## 2023-08-27 RX ADMIN — HYDROMORPHONE HYDROCHLORIDE 4 MG: 2 TABLET ORAL at 05:51

## 2023-08-27 RX ADMIN — HEPARIN SODIUM 5000 UNITS: 5000 INJECTION INTRAVENOUS; SUBCUTANEOUS at 05:51

## 2023-08-27 RX ADMIN — NICOTINE 1 PATCH: 21 PATCH, EXTENDED RELEASE TRANSDERMAL at 08:58

## 2023-08-27 RX ADMIN — CEFEPIME HYDROCHLORIDE 1000 MG: 1 INJECTION, SOLUTION INTRAVENOUS at 00:00

## 2023-08-27 RX ADMIN — ACETAMINOPHEN 325MG 975 MG: 325 TABLET ORAL at 05:51

## 2023-08-27 RX ADMIN — HYDROMORPHONE HYDROCHLORIDE 4 MG: 2 TABLET ORAL at 08:58

## 2023-08-27 NOTE — PLAN OF CARE
Problem: PAIN - ADULT  Goal: Verbalizes/displays adequate comfort level or baseline comfort level  Description: Interventions:  - Encourage patient to monitor pain and request assistance  - Assess pain using appropriate pain scale  - Administer analgesics based on type and severity of pain and evaluate response  - Implement non-pharmacological measures as appropriate and evaluate response  - Consider cultural and social influences on pain and pain management  - Notify physician/advanced practitioner if interventions unsuccessful or patient reports new pain  Outcome: Progressing     Problem: INFECTION - ADULT  Goal: Absence or prevention of progression during hospitalization  Description: INTERVENTIONS:  - Assess and monitor for signs and symptoms of infection  - Monitor lab/diagnostic results  - Monitor all insertion sites, i.e. indwelling lines, tubes, and drains  - Monitor endotracheal if appropriate and nasal secretions for changes in amount and color  - [place] appropriate cooling/warming therapies per order  - Administer medications as ordered  - Instruct and encourage patient and family to use good hand hygiene technique  - Identify and instruct in appropriate isolation precautions for identified infection/condition  Outcome: Progressing

## 2023-08-27 NOTE — CONSULTS
The patient's Vancomycin therapy has been discontinued. Thank you for this consult; Pharmacy will sign-off now.

## 2023-08-27 NOTE — PROGRESS NOTES
Progress Note - General Surgery   Makeda Ceja 66 y.o. male MRN: [id number]  Unit/Bed#: -01 Encounter: [id number]    Assessment/Plan:    Non-healing left TMA:  - POD #4 s/p left BKA  - leukocytosis has improved compared to yesterday  - elevate stump, dressing intact. Dressing changed today, incision is c/d/i with staples in place.   - hg stable at 10.3  - prn pain control  - continue daily dressing changes at hometo monitor incision and edema. - cleared for discharge from surgical perspective, follow up information placed in patient chart. DM1   -Maintained on insulin pump  -Continue tight blood sugar control for optimal wound healing and control of infection  -medical management     Tobacco abuse  -Cessation recommended  -Nicotine patch ordered  -Counseled that cigarette smoking can affect wound healing     CKD2, hyponatermia, HTN  -management per primary team         Subjective/Objective   Chief Complaint: LLE pain    Subjective: patient states he gets shooting pain down his left leg and has a lot of phantom pain. He otherwise denies additional complaints. Eating and drinking without difficulties, urinating. Having bowel movements. Objective:     Blood pressure 135/69, pulse 86, temperature 97.9 °F (36.6 °C), temperature source Temporal, resp. rate 18, height 6' (1.829 m), weight 68 kg (150 lb), SpO2 92 %. ,Body mass index is 20.34 kg/m². Intake/Output Summary (Last 24 hours) at 8/27/2023 0726  Last data filed at 8/26/2023 1924  Gross per 24 hour   Intake 480 ml   Output 650 ml   Net -170 ml       Invasive Devices     Peripheral Intravenous Line  Duration           Peripheral IV 08/26/23 Dorsal (posterior); Proximal;Right Forearm <1 day          Line  Duration           Pump Device Insulin pump Anterior; Left Abdomen 58 days                Physical Exam:   General: A&O. No acute distress. Skin: warm and dry. Head: Normocephalic, atraumatic. EOMI. Sclera anicteric.  Mucous membranes moist.  Neck: Supple, without tenderness. CV: RRR. Pulm: clear to auscultation b/l. No respiratory distress. Abd: Soft. Active bowel sounds. No tenderness to palpation. Peripheral Vasc: pulses 2+ and equal in upper extremity. Cap refill <2 seconds. No peripheral edema. Left stump site with dressing intact, no strikethrough. Incision is c/d/i with staples in place. Very minimal blood noted on kerlix when changing dressing, otherwise no erythema. Minimal edema. Nursing notes and vital signs reviewed. Lab, Imaging and other studies:  I have personally reviewed pertinent lab results.   , CBC:   Lab Results   Component Value Date    WBC 10.74 (H) 08/27/2023    HGB 10.3 (L) 08/27/2023    HCT 32.0 (L) 08/27/2023    MCV 96 08/27/2023     (H) 08/27/2023    RBC 3.32 (L) 08/27/2023    MCH 31.0 08/27/2023    MCHC 32.2 08/27/2023    RDW 13.8 08/27/2023    MPV 8.9 08/27/2023    NRBC 0 08/27/2023   , CMP:   Lab Results   Component Value Date    SODIUM 134 (L) 08/27/2023    K 4.2 08/27/2023    CL 98 08/27/2023    CO2 27 08/27/2023    BUN 14 08/27/2023    CREATININE 1.32 (H) 08/27/2023    CALCIUM 9.3 08/27/2023    EGFR 57 08/27/2023   , Coagulation: No results found for: "PT", "INR", "APTT"  VTE Pharmacologic Prophylaxis: Heparin  VTE Mechanical Prophylaxis: sequential compression device

## 2023-08-27 NOTE — CASE COMMUNICATION
1750. Emmie Gillis Emmie Gillis Patient called into office as he was just DC home today from hospital. He reports he had a below the knee amputation done. States he is weak, having difficulty getting around his home and  wants to go into a rehab facility as he does not feel safe being at home. He reports living at home with a supportive wife who can assist but he feels he really needs intensive therapy. I encouraged patient return to hospital for eval due to no t feeling safe with decreased mobility. Patient refused. Reports he will call his surgeon and see if he can assist with setting him up with a rehab facility.

## 2023-08-27 NOTE — PLAN OF CARE
Problem: Potential for Falls  Goal: Patient will remain free of falls  Description: INTERVENTIONS:  - Educate patient/family on patient safety including physical limitations  - Instruct patient to call for assistance with activity   - Consult OT/PT to assist with strengthening/mobility   - Keep Call bell within reach  - Keep bed low and locked with side rails adjusted as appropriate  - Keep care items and personal belongings within reach  - Initiate and maintain comfort rounds  - Make Fall Risk Sign visible to staff  - Offer Toileting every 2 Hours, in advance of need  - Initiate/Maintain bed/ chair alarm  - Obtain necessary fall risk management equipment: cane  - Apply yellow socks and bracelet for high fall risk patients  - Consider moving patient to room near nurses station  Outcome: Progressing     Problem: PAIN - ADULT  Goal: Verbalizes/displays adequate comfort level or baseline comfort level  Description: Interventions:  - Encourage patient to monitor pain and request assistance  - Assess pain using appropriate pain scale  - Administer analgesics based on type and severity of pain and evaluate response  - Implement non-pharmacological measures as appropriate and evaluate response  - Consider cultural and social influences on pain and pain management  - Notify physician/advanced practitioner if interventions unsuccessful or patient reports new pain  Outcome: Progressing     Problem: INFECTION - ADULT  Goal: Absence or prevention of progression during hospitalization  Description: INTERVENTIONS:  - Assess and monitor for signs and symptoms of infection  - Monitor lab/diagnostic results  - Monitor all insertion sites, i.e. indwelling lines, tubes, and drains  - Monitor endotracheal if appropriate and nasal secretions for changes in amount and color  - [place] appropriate cooling/warming therapies per order  - Administer medications as ordered  - Instruct and encourage patient and family to use good hand hygiene technique  - Identify and instruct in appropriate isolation precautions for identified infection/condition  Outcome: Progressing     Problem: SAFETY ADULT  Goal: Patient will remain free of falls  Description: INTERVENTIONS:  - Educate patient/family on patient safety including physical limitations  - Instruct patient to call for assistance with activity   - Consult OT/PT to assist with strengthening/mobility   - Keep Call bell within reach  - Keep bed low and locked with side rails adjusted as appropriate  - Keep care items and personal belongings within reach  - Initiate and maintain comfort rounds  - Make Fall Risk Sign visible to staff  - Offer Toileting every 2 Hours, in advance of need  - Initiate/Maintain bed/ chair alarm  - Obtain necessary fall risk management equipment: cane  - Apply yellow socks and bracelet for high fall risk patients  - Consider moving patient to room near nurses station  Outcome: Progressing  Goal: Maintain or return to baseline ADL function  Description: INTERVENTIONS:  -  Assess patient's ability to carry out ADLs; assess patient's baseline for ADL function and identify physical deficits which impact ability to perform ADLs (bathing, care of mouth/teeth, toileting, grooming, dressing, etc.)  - Assess/evaluate cause of self-care deficits   - Assess range of motion  - Assess patient's mobility; develop plan if impaired  - Assess patient's need for assistive devices and provide as appropriate  - Encourage maximum independence but intervene and supervise when necessary  - Involve family in performance of ADLs  - Assess for home care needs following discharge   - Consider OT consult to assist with ADL evaluation and planning for discharge  - Provide patient education as appropriate  Outcome: Progressing  Goal: Maintains/Returns to pre admission functional level  Description: INTERVENTIONS:  - Perform BMAT or MOVE assessment daily.   - Set and communicate daily mobility goal to care team and patient/family/caregiver. - Collaborate with rehabilitation services on mobility goals if consulted  - Perform Range of Motion 3 times a day. - Reposition patient every 2 hours. - Dangle patient 3 times a day  - Stand patient 3 times a day  - Ambulate patient 3 times a day  - Out of bed to chair 3 times a day   - Out of bed for meals 3 times a day  - Out of bed for toileting  - Record patient progress and toleration of activity level   Outcome: Progressing     Problem: DISCHARGE PLANNING  Goal: Discharge to home or other facility with appropriate resources  Description: INTERVENTIONS:  - Identify barriers to discharge w/patient and caregiver  - Arrange for needed discharge resources and transportation as appropriate  - Identify discharge learning needs (meds, wound care, etc.)  - Arrange for interpretive services to assist at discharge as needed  - Refer to Case Management Department for coordinating discharge planning if the patient needs post-hospital services based on physician/advanced practitioner order or complex needs related to functional status, cognitive ability, or social support system  Outcome: Progressing     Problem: Knowledge Deficit  Goal: Patient/family/caregiver demonstrates understanding of disease process, treatment plan, medications, and discharge instructions  Description: Complete learning assessment and assess knowledge base.   Interventions:  - Provide teaching at level of understanding  - Provide teaching via preferred learning methods  Outcome: Progressing     Problem: SKIN/TISSUE INTEGRITY - ADULT  Goal: Incision(s), wounds(s) or drain site(s) healing without S/S of infection  Description: INTERVENTIONS  - Assess and document dressing, incision, wound bed, drain sites and surrounding tissue  - Provide patient and family education  - Perform skin care/dressing changes every shift  Outcome: Progressing  Goal: Skin Integrity remains intact(Skin Breakdown Prevention)  Description: Assess:  -Perform Pineda assessment every shift  -Clean and moisturize skin every shift  -Inspect skin when repositioning, toileting, and assisting with ADLS  -Assess under medical devices such as scd every shift  -Assess extremities for adequate circulation and sensation     Bed Management:  -Have minimal linens on bed & keep smooth, unwrinkled  -Change linens as needed when moist or perspiring  -Avoid sitting or lying in one position for more than 2 hours while in bed  -Keep HOB at 30 degrees     Toileting:  -Offer bedside commode  -Assess for incontinence every shift  -Use incontinent care products after each incontinent episode such as moisture barrier    Activity:  -Mobilize patient 3 times a day  -Encourage activity and walks on unit  -Encourage or provide ROM exercises   -Turn and reposition patient every 2 Hours  -Use appropriate equipment to lift or move patient in bed  -Instruct/ Assist with weight shifting every 15 minutes when out of bed in chair  -Consider limitation of chair time 4 hour intervals    Skin Care:  -Avoid use of baby powder, tape, friction and shearing, hot water or constrictive clothing  -Relieve pressure over bony prominences using wedge  -Do not massage red bony areas    Next Steps:  -Teach patient strategies to minimize risks such as infection  -Consider consults to  interdisciplinary teams such as wound care  Outcome: Progressing     Problem: MUSCULOSKELETAL - ADULT  Goal: Maintain or return mobility to safest level of function  Description: INTERVENTIONS:  - Assess patient's ability to carry out ADLs; assess patient's baseline for ADL function and identify physical deficits which impact ability to perform ADLs (bathing, care of mouth/teeth, toileting, grooming, dressing, etc.)  - Assess/evaluate cause of self-care deficits   - Assess range of motion  - Assess patient's mobility  - Assess patient's need for assistive devices and provide as appropriate  - Encourage maximum independence but intervene and supervise when necessary  - Involve family in performance of ADLs  - Assess for home care needs following discharge   - Consider OT consult to assist with ADL evaluation and planning for discharge  - Provide patient education as appropriate  Outcome: Progressing  Goal: Maintain proper alignment of affected body part  Description: INTERVENTIONS:  - Support, maintain and protect limb and body alignment  - Provide patient/ family with appropriate education  Outcome: Progressing     Problem: METABOLIC, FLUID AND ELECTROLYTES - ADULT  Goal: Electrolytes maintained within normal limits  Description: INTERVENTIONS:  - Monitor labs and assess patient for signs and symptoms of electrolyte imbalances  - Administer electrolyte replacement as ordered  - Monitor response to electrolyte replacements, including repeat lab results as appropriate  - Instruct patient on fluid and nutrition as appropriate  Outcome: Progressing  Goal: Fluid balance maintained  Description: INTERVENTIONS:  - Monitor labs   - Monitor I/O and WT  - Instruct patient on fluid and nutrition as appropriate  - Assess for signs & symptoms of volume excess or deficit  Outcome: Progressing  Goal: Glucose maintained within target range  Description: INTERVENTIONS:  - Monitor Blood Glucose as ordered  - Assess for signs and symptoms of hyperglycemia and hypoglycemia  - Administer ordered medications to maintain glucose within target range  - Assess nutritional intake and initiate nutrition service referral as needed  Outcome: Progressing     Problem: HEMATOLOGIC - ADULT  Goal: Maintains hematologic stability  Description: INTERVENTIONS  - Assess for signs and symptoms of bleeding or hemorrhage  - Monitor labs  - Administer supportive blood products/factors as ordered and appropriate  Outcome: Progressing     Problem: MOBILITY - ADULT  Goal: Maintain or return to baseline ADL function  Description: INTERVENTIONS:  - Assess patient's ability to carry out ADLs; assess patient's baseline for ADL function and identify physical deficits which impact ability to perform ADLs (bathing, care of mouth/teeth, toileting, grooming, dressing, etc.)  - Assess/evaluate cause of self-care deficits   - Assess range of motion  - Assess patient's mobility; develop plan if impaired  - Assess patient's need for assistive devices and provide as appropriate  - Encourage maximum independence but intervene and supervise when necessary  - Involve family in performance of ADLs  - Assess for home care needs following discharge   - Consider OT consult to assist with ADL evaluation and planning for discharge  - Provide patient education as appropriate  Outcome: Progressing  Goal: Maintains/Returns to pre admission functional level  Description: INTERVENTIONS:  - Perform BMAT or MOVE assessment daily.   - Set and communicate daily mobility goal to care team and patient/family/caregiver. - Collaborate with rehabilitation services on mobility goals if consulted  - Perform Range of Motion 3 times a day. - Reposition patient every 2 hours.   - Dangle patient 3 times a day  - Stand patient 3 times a day  - Ambulate patient 3 times a day  - Out of bed to chair 3 times a day   - Out of bed for meals 3 times a day  - Out of bed for toileting  - Record patient progress and toleration of activity level   Outcome: Progressing     Problem: Prexisting or High Potential for Compromised Skin Integrity  Goal: Skin integrity is maintained or improved  Description: INTERVENTIONS:  - Identify patients at risk for skin breakdown  - Assess and monitor skin integrity  - Assess and monitor nutrition and hydration status  - Monitor labs   - Assess for incontinence   - Turn and reposition patient  - Assist with mobility/ambulation  - Relieve pressure over bony prominences  - Avoid friction and shearing  - Provide appropriate hygiene as needed including keeping skin clean and dry  - Evaluate need for skin moisturizer/barrier cream  - Collaborate with interdisciplinary team   - Patient/family teaching  - Consider wound care consult   Outcome: Progressing     Problem: Nutrition/Hydration-ADULT  Goal: Nutrient/Hydration intake appropriate for improving, restoring or maintaining nutritional needs  Description: Monitor and assess patient's nutrition/hydration status for malnutrition. Collaborate with interdisciplinary team and initiate plan and interventions as ordered. Monitor patient's weight and dietary intake as ordered or per policy. Utilize nutrition screening tool and intervene as necessary. Determine patient's food preferences and provide high-protein, high-caloric foods as appropriate.      INTERVENTIONS:  - Monitor oral intake, urinary output, labs, and treatment plans  - Assess nutrition and hydration status and recommend course of action  - Evaluate amount of meals eaten  - Assist patient with eating if necessary   - Allow adequate time for meals  - Recommend/ encourage appropriate diets, oral nutritional supplements, and vitamin/mineral supplements  - Order, calculate, and assess calorie counts as needed  - Recommend, monitor, and adjust tube feedings and TPN/PPN based on assessed needs  - Assess need for intravenous fluids  - Provide specific nutrition/hydration education as appropriate  - Include patient/family/caregiver in decisions related to nutrition  Outcome: Progressing

## 2023-08-28 ENCOUNTER — TRANSITIONAL CARE MANAGEMENT (OUTPATIENT)
Dept: FAMILY MEDICINE CLINIC | Facility: HOSPITAL | Age: 63
End: 2023-08-28

## 2023-08-28 ENCOUNTER — HOSPITAL ENCOUNTER (EMERGENCY)
Facility: HOSPITAL | Age: 63
Discharge: HOME | End: 2023-08-28
Attending: EMERGENCY MEDICINE
Payer: COMMERCIAL

## 2023-08-28 ENCOUNTER — HOME CARE VISIT (OUTPATIENT)
Dept: HOME HEALTH SERVICES | Facility: HOME HEALTHCARE | Age: 63
End: 2023-08-28
Payer: COMMERCIAL

## 2023-08-28 ENCOUNTER — TELEPHONE (OUTPATIENT)
Dept: FAMILY MEDICINE CLINIC | Facility: HOSPITAL | Age: 63
End: 2023-08-28

## 2023-08-28 VITALS
BODY MASS INDEX: 20.34 KG/M2 | SYSTOLIC BLOOD PRESSURE: 144 MMHG | OXYGEN SATURATION: 100 % | DIASTOLIC BLOOD PRESSURE: 69 MMHG | TEMPERATURE: 98 F | RESPIRATION RATE: 17 BRPM | WEIGHT: 150 LBS | HEART RATE: 92 BPM

## 2023-08-28 DIAGNOSIS — Z89.512 HX OF BKA, LEFT (HCC): ICD-10-CM
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
## 2023-08-28 DIAGNOSIS — R26.81 GAIT INSTABILITY: ICD-10-CM

## 2023-08-28 DIAGNOSIS — G54.6 PHANTOM PAIN AFTER AMPUTATION OF LOWER EXTREMITY (HCC): ICD-10-CM
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
## 2023-08-28 LAB
DME PARACHUTE DELIVERY DATE REQUESTED: NORMAL
DME PARACHUTE ITEM DESCRIPTION: NORMAL
DME PARACHUTE ITEM DESCRIPTION: NORMAL
DME PARACHUTE ORDER STATUS: NORMAL
DME PARACHUTE SUPPLIER NAME: NORMAL
DME PARACHUTE SUPPLIER PHONE: NORMAL

## 2023-08-28 PROCEDURE — 97530 THERAPEUTIC ACTIVITIES: CPT

## 2023-08-28 PROCEDURE — 99284 EMERGENCY DEPT VISIT MOD MDM: CPT | Performed by: PHYSICIAN ASSISTANT

## 2023-08-28 PROCEDURE — 99284 EMERGENCY DEPT VISIT MOD MDM: CPT

## 2023-08-28 PROCEDURE — 97163 PT EVAL HIGH COMPLEX 45 MIN: CPT

## 2023-08-28 PROCEDURE — 97167 OT EVAL HIGH COMPLEX 60 MIN: CPT

## 2023-08-28 RX ORDER — HYDROMORPHONE HYDROCHLORIDE 2 MG/1
2 TABLET ORAL ONCE
Status: COMPLETED | OUTPATIENT
Start: 2023-08-28 | End: 2023-08-28

## 2023-08-28 RX ADMIN — HYDROMORPHONE HYDROCHLORIDE 2 MG: 2 TABLET ORAL at 15:11

## 2023-08-28 NOTE — DISCHARGE INSTRUCTIONS
Patient is recertified for home physical therapy.  ordered wheelchair as well as walking rollator with lock brakes and seat. Patient is yet to initiate first home health PT visit. Plan for ongoing evaluation thereafter. Recommendations for transferring, ambulating, negotiation of stairs per PT recommendations. Resume home pain medications.

## 2023-08-28 NOTE — ED PROVIDER NOTES
History  Chief Complaint   Patient presents with   • Medical Problem     Pt to ED from home. Pt has BTK amputation on Tuesday and was discharged from hospital yesterday to home. Pt states he cannot move around at home and need to go to rehab      HPI     Pt cannot ambulate s/p LLE BKA. Patient recently admitted from 8/19- 8/27 with past medical history of type 2 diabetes with diabetic neuropathy, stage III chronic kidney disease, history of left lower extremity osteomyelitis  left-sided TMA and hospitalized for osteomyelitis and underwent left-sided BKA. He was discharged home from the hospital yesterday but states he relayed that he did not feel comfortable going home due to gait instability and concern for fall risk. He states he fell earlier today but son  helped him to the ground. He states he fell while walking across the basement floor even while using his regular roller walker. Did not injure his head neck or other extremity. Sheri Croissant on his left side with scrapes to his left-sided fingers. He returned to the emergency department for concerns for safety at home and would like to be placed in acute rehabilitation. Referred to notes from home skilled nursing yesterday who advised patient return emergency department for evaluation. Prior to Admission Medications   Prescriptions Last Dose Informant Patient Reported? Taking?    Glucagon, rDNA, (Glucagon Emergency) 1 MG KIT  Self Yes No   Sig: as directed   HYDROmorphone (DILAUDID) 2 mg tablet   No No   Sig: Take 1 tablet (2 mg total) by mouth every 3 (three) hours as needed for moderate pain for up to 10 days Max Daily Amount: 16 mg   HYDROmorphone (DILAUDID) 4 mg tablet   No No   Sig: Take 1 tablet (4 mg total) by mouth every 3 (three) hours as needed for severe pain for up to 10 days Max Daily Amount: 32 mg   HumaLOG 100 UNIT/ML injection   Yes No   Sig: inject up to 80 units in INSULIN PUMP daily as directed by prescriber   acetaminophen (TYLENOL) 325 mg tablet   No No   Sig: Take 3 tablets (975 mg total) by mouth every 6 (six) hours   atorvastatin (LIPITOR) 40 mg tablet  Self No No   Sig: TAKE 1 TABLET BY MOUTH EVERY DAY   gabapentin (Neurontin) 300 mg capsule   No No   Sig: Take 1 capsule (300 mg total) by mouth 2 (two) times a day   methocarbamol (ROBAXIN) 500 mg tablet   No No   Sig: Take 1 tablet (500 mg total) by mouth every 6 (six) hours as needed for muscle spasms   metoprolol succinate (TOPROL-XL) 100 mg 24 hr tablet  Self No No   Sig: Take 1 tablet (100 mg total) by mouth daily   nicotine (NICODERM CQ) 21 mg/24 hr TD 24 hr patch   No No   Sig: Place 1 patch on the skin over 24 hours daily Do not start before July 5, 2023. Facility-Administered Medications: None       Past Medical History:   Diagnosis Date   • Chronic foot ulcer ([address]) 09/07/2018   • Diabetes mellitus ([address])    • Diabetic foot ulcer ([address]) 5/8/2023   • Gout    • High cholesterol    • Hypertension    • Visual impairment 11/1/2022    Cateract       Past Surgical History:   Procedure Laterality Date   • CATARACT EXTRACTION Right 01/2023   • COMPLEX WOUND CLOSURE TO EXTREMITY Left 07/18/2019    Procedure: COMPLEX CLOSURE LEFT CHEEK;  Surgeon: Reuben Jimenez MD;  Location:  MAIN OR;  Service: Plastics   • FACIAL/NECK BIOPSY Left 07/18/2019    Procedure: EXCISION LEFT CHEEK CYST;  Surgeon: Reuben Jimenez MD;  Location:  MAIN OR;  Service: Plastics   • HERNIA REPAIR Left     several times   • KNEE ARTHROSCOPY Left     torn meniscus   • LEG AMPUTATION THROUGH LOWER TIBIA AND FIBULA Left 8/22/2023    Procedure: AMPUTATION BELOW KNEE (BKA);   Surgeon: Glenda Mota MD;  Location:  MAIN OR;  Service: General   • MA AMPUTATION FOOT TRANSMETARSAL Left 6/30/2023    Procedure: AMPUTATION TRANSMETATARSAL (TMA);  Surgeon: Livia Pereira DPM;  Location:  MAIN OR;  Service: Podiatry   • WOUND DEBRIDEMENT Left 5/30/2023    Procedure: DEBRIDEMENT WOUND LEFT FOOT WOUND WITH EXCISION OF INFECTED BONE AND WOUND VAC APPLICATION;  Surgeon: Herlinda Sylvester DPM;  Location:  MAIN OR;  Service: Podiatry       Family History   Problem Relation Age of Onset   • Heart disease Father    • Diabetes type I Father    • Diabetes type II Mother    • No Known Problems Sister    • Alcohol abuse Brother    • Diabetes type I Brother    • No Known Problems Brother    • No Known Problems Son    • No Known Problems Daughter      I have reviewed and agree with the history as documented. E-Cigarette/Vaping   • E-Cigarette Use Never User      E-Cigarette/Vaping Substances   • Nicotine No    • THC No    • CBD No    • Flavoring No    • Other No    • Unknown No      Social History     Tobacco Use   • Smoking status: Every Day     Packs/day: 1.00     Years: 20.00     Total pack years: 20.00     Types: Cigarettes     Last attempt to quit: 2022     Years since quittin.6   • Smokeless tobacco: Never   • Tobacco comments:     encouraged smoking cessation   Vaping Use   • Vaping Use: Never used   Substance Use Topics   • Alcohol use: Yes     Comment: 3/4 beers a day   • Drug use: Never       Review of Systems   Constitutional: Negative for chills and fever. HENT: Negative for ear pain and sore throat. Eyes: Negative for pain and visual disturbance. Respiratory: Negative for cough and shortness of breath. Cardiovascular: Negative for chest pain and palpitations. Gastrointestinal: Negative for abdominal pain and vomiting. Genitourinary: Negative for dysuria and hematuria. Musculoskeletal: Negative for arthralgias and back pain. Left lower extremity pain   Skin: Negative for color change and rash. Neurological: Negative for seizures and syncope. All other systems reviewed and are negative. Physical Exam  Physical Exam  Constitutional:       Appearance: He is well-developed. HENT:      Head: Normocephalic and atraumatic.    Eyes:      Conjunctiva/sclera: Conjunctivae normal.      Pupils: Pupils are equal, round, and reactive to light. Cardiovascular:      Rate and Rhythm: Normal rate and regular rhythm. Heart sounds: Normal heart sounds. Pulmonary:      Effort: Pulmonary effort is normal.      Breath sounds: Normal breath sounds. Abdominal:      General: Bowel sounds are normal.      Palpations: Abdomen is soft. Musculoskeletal:         General: Normal range of motion. Cervical back: Normal range of motion and neck supple. Legs:       Comments: Left lower extremity below-knee amputation. Stable without signs or symptoms of infection   Skin:     General: Skin is warm and dry. Neurological:      Mental Status: He is alert and oriented to person, place, and time. Vital Signs  ED Triage Vitals   Temperature Pulse Respirations Blood Pressure SpO2   08/28/23 1233 08/28/23 1233 08/28/23 1233 08/28/23 1233 08/28/23 1233   98 °F (36.7 °C) 97 18 166/77 98 %      Temp src Heart Rate Source Patient Position - Orthostatic VS BP Location FiO2 (%)   -- 08/28/23 1300 08/28/23 1233 08/28/23 1233 --    Monitor Sitting Left arm       Pain Score       08/28/23 1233       7           Vitals:    08/28/23 1300 08/28/23 1332 08/28/23 1402 08/28/23 1432   BP: 162/76 131/65 144/68 144/69   Pulse: 92      Patient Position - Orthostatic VS: Sitting            Visual Acuity      ED Medications  Medications   HYDROmorphone (DILAUDID) tablet 2 mg (2 mg Oral Given 8/28/23 1511)       Diagnostic Studies  Results Reviewed     None                 No orders to display              Procedures  Procedures         ED Course  ED Course as of 08/28/23 1621   Mon Aug 28, 2023   1241 Ordered CM and PT cons   1353 Pending OT / PT consults for placement   1444 Patient with apparent and intermittent phantom shocking pains. PT and OT here to see the patient for evaluation for rehab. . Currently in pain 2 mg PO dilaudid ordered for patient.    1513 PT and OT evaluated and feel patient to be stable to home for postop care w/o obvious indications for inpatient rehab. Patient has not yet had initial home PT visit. Referred back to [facility] and recertified through case management for home physical therapy. I also did approve patient for locking rollator with platform follows wheelchair device patient to have at home for additional assistance. Patient has planned surgery follow-up for wound evaluation for patient follow-up and later referral to physiatry and prostheses fitting. Patient was upset but understanding that he needs to participate with home physical therapy for improvement. He is planning to and already has implemented means to get up and down his stairs with sitting on his buttocks and lifting with arms up the stairs while bracing legs. Rollator for home floors and wheelchair for transport and in the outpatient setting for longer distances in event of possible lower extremity fatigue. Advise return emergency department for any worsening signs symptoms. SBIRT 22yo+    Flowsheet Row Most Recent Value   Initial Alcohol Screen: US AUDIT-C     1. How often do you have a drink containing alcohol? 0 Filed at: 08/28/2023 1234   2. How many drinks containing alcohol do you have on a typical day you are drinking? 0 Filed at: 08/28/2023 1234   3a. Male UNDER 65: How often do you have five or more drinks on one occasion? 0 Filed at: 08/28/2023 1234   3b. FEMALE Any Age, or MALE 65+: How often do you have 4 or more drinks on one occassion? 0 Filed at: 08/28/2023 1234   Audit-C Score 0 Filed at: 08/28/2023 1234   PRINCESS: How many times in the past year have you. .. Used an illegal drug or used a prescription medication for non-medical reasons?  Never Filed at: 08/28/2023 1234                    Medical Decision Making  Fall in home, initial encounter: acute illness or injury  Gait instability: self-limited or minor problem     Details: PT / OT evaluated and found patient to be stable for home PT/OT  Hx of BKA, left [facility]): acute illness or injury  Phantom pain after amputation of lower extremity [facility]): acute illness or injury  Risk  Prescription drug management. Disposition  Final diagnoses:   Fall in home, initial encounter   Gait instability - subjective   Phantom pain after amputation of lower extremity ([address])   Hx of BKA, left ([address])     Time reflects when diagnosis was documented in both MDM as applicable and the Disposition within this note     Time User Action Codes Description Comment    8/28/2023  2:45 PM Axel Daughters Add [N30. [place]t [place],  I27.869] Fall in home, initial encounter     8/28/2023  2:45 PM Axel Daughters Add [R26.81] Gait instability     8/28/2023  2:45 PM Axel Daughters Modify [R26.81] Gait instability subjective    8/28/2023  2:46 PM Axel Daughters Add [G54.6] Phantom pain after amputation of lower extremity ([address])     8/28/2023  2:46 PM Axel Daughters Add [L20.361] Hx of BKA, left [facility])       ED Disposition     None      Follow-up Information    None         Current Discharge Medication List      CONTINUE these medications which have NOT CHANGED    Details   acetaminophen (TYLENOL) 325 mg tablet Take 3 tablets (975 mg total) by mouth every 6 (six) hours  Qty: 120 tablet, Refills: 0    Associated Diagnoses: Acute pain due to injury      atorvastatin (LIPITOR) 40 mg tablet TAKE 1 TABLET BY MOUTH EVERY DAY  Qty: 90 tablet, Refills: 1    Associated Diagnoses: Hyperlipidemia, unspecified hyperlipidemia type      gabapentin (Neurontin) 300 mg capsule Take 1 capsule (300 mg total) by mouth 2 (two) times a day  Qty: 180 capsule, Refills: 0    Associated Diagnoses: Pain in left foot; Type 1 diabetes mellitus with diabetic neuropathy (HCC)      Glucagon, rDNA, (Glucagon Emergency) 1 MG KIT as directed      HumaLOG 100 UNIT/ML injection inject up to 80 units in INSULIN PUMP daily as directed by prescriber      !! HYDROmorphone (DILAUDID) 2 mg tablet Take 1 tablet (2 mg total) by mouth every 3 (three) hours as needed for moderate pain for up to 10 days Max Daily Amount: 16 mg  Qty: 20 tablet, Refills: 0    Associated Diagnoses: Acute pain due to injury      !! HYDROmorphone (DILAUDID) 4 mg tablet Take 1 tablet (4 mg total) by mouth every 3 (three) hours as needed for severe pain for up to 10 days Max Daily Amount: 32 mg  Qty: 20 tablet, Refills: 0    Associated Diagnoses: Acute pain due to injury      methocarbamol (ROBAXIN) 500 mg tablet Take 1 tablet (500 mg total) by mouth every 6 (six) hours as needed for muscle spasms  Qty: 120 tablet, Refills: 0    Associated Diagnoses: Acute pain due to injury      metoprolol succinate (TOPROL-XL) 100 mg 24 hr tablet Take 1 tablet (100 mg total) by mouth daily  Qty: 90 tablet, Refills: 1    Associated Diagnoses: Benign essential hypertension      nicotine (NICODERM CQ) 21 mg/24 hr TD 24 hr patch Place 1 patch on the skin over 24 hours daily Do not start before July 5, 2023. Qty: 28 patch, Refills: 0    Associated Diagnoses: Nicotine abuse       !! - Potential duplicate medications found. Please discuss with provider. No discharge procedures on file.     PDMP Review       Value Time User    PDMP Reviewed  Yes 8/19/2023 11:03 AM Milo Powers PA-C          ED Provider  Electronically Signed by           Anne Everett PA-C  08/28/23 2431

## 2023-08-28 NOTE — TELEPHONE ENCOUNTER
Patient advised by ED to discuss w/ pcp  starting lexapro    No available upcoming appts. Can this be done w/ out an appt? Patient will also discuss w/ home care 8/29.     pcb

## 2023-08-28 NOTE — ASSESSMENT & PLAN NOTE
· Patient initially had been referred to the emergency department due to worsening left foot erythema and swelling  · MRI left foot completed 8/17: Status post transmetatarsal amputation with prominent skin ulceration at the distal aspect of the postoperative site including marrow changes involving the second through fifth residual proximal metatarsals indicating osteomyelitis. No evidence of drainable abscess collection in this unenhanced examination. · Previous discussion held between patient and podiatry -patient wishes to move forward with BKA rather than TMA revision given concern over healing ability. · General surgery consulted. POD4 left BKA  · S/p dressing change   · Per general surgery, the surgical site appears well with healing without signs of infection,  · Stopped oral antibiotics at this time  · It is recommended that patient gets dressing changes by [facility] services every day. · Patient was discharged home on oral Dilaudid for pain management  · He deferred a wheelchair and states that he is very comfortable moving upstairs(he has 5 steps at home) with a walker.   · He reports that he practiced this before and he feels comfortable

## 2023-08-28 NOTE — UTILIZATION REVIEW
NOTIFICATION OF ADMISSION DISCHARGE   This is a Notification of Discharge from [facility]. Please be advised that this patient has been discharge from our facility. Below you will find the admission and discharge date and time including the patient’s disposition. UTILIZATION REVIEW CONTACT:  Mendez Cordova  Utilization   Network Utilization Review Department  Phone: [phone] carefully listen to the prompts. All voicemails are confidential.  Email: [email or web address]     ADMISSION INFORMATION  PRESENTATION DATE: 8/19/2023  8:25 AM  OBERVATION ADMISSION DATE:   INPATIENT ADMISSION DATE: 8/19/23  9:14 AM   DISCHARGE DATE: 8/27/2023 11:57 AM   DISPOSITION:Home with Home Health Care    IMPORTANT INFORMATION:  Send all requests for admission clinical reviews, approved or denied determinations and any other requests to dedicated fax number below belonging to the campus where the patient is receiving treatment.  List of dedicated fax numbers:  [place] DENIALS (Administrative/Medical Necessity) [phone] [address] [facility] (Maternity/NICU/Pediatrics) [phone]   [facility] [phone]   [facility] [phone] 16[address] [facility] [phone]   [address] [place] [address] [phone]   [facility] [phone]   [address] [facility] 608 [facility] [phone]   [address] [place] [address] [phone] 3[address] [facility] [phone] 27[address] [facility] [address] [facility] [phone]

## 2023-08-28 NOTE — PROGRESS NOTES
-- Patient:  -- MRN: [id number]  -- Aidin Request ID: [id number]  -- Level of care reserved: Antonio Damon  -- Partner Reserved: JANESSA MCKNIGHTFormerly [facility] [place],  [address] [phone]  -- Clinical needs requested:  -- Geography searched: [phone]  -- Start of Service:  -- Request sent: 3:24pm EDT on 8/28/2023 by [employer]  -- Partner reserved: 3:43pm EDT on 8/28/2023 by [employer]  -- Choice list shared: 3:42pm EDT on 8/28/2023 by [employer]

## 2023-08-28 NOTE — ASSESSMENT & PLAN NOTE
Lab Results   Component Value Date    HGBA1C 7.9 (H) 07/20/2023     Recent Labs     08/26/23  1632 08/26/23  2119 08/27/23  0713 08/27/23  1056   POCGLU 206* 232* 136 265*     Blood Sugar Average: Last 72 hrs:  (P) 161   · Maintained on insulin pump as outpatient -previously on insulin gtt intraoperatively  · Tolerating diet.   Transitioned back to insulin pump 8/22  · Patient reports feeling very comfortable managing his own insulin pump and he regularly follows up with endocrinologist

## 2023-08-28 NOTE — PHYSICAL THERAPY NOTE
Baltazar Cruz is a 66 y.o. Male who presents to [address] [place] [address] on 8/28/2023 from home for 8 hours w/ c/o weakness and wanting rehab and diagnosis of known medical problem. Orders for PT eval and treat received, w/ activity orders of NWB L LE and fall precautions. Pt presents w/ comorbidities of ***. At baseline, pt mobilizes *** w/ ***, and reports *** falls in the last 6 months. Upon evaluation, pt presents w/ the following deficits: {abwphysicalexam:11691}. Upon eval, pt requires *** for bed mobility, *** for transfers, and *** for gait. Based on this PT evaluation today, patient's discharge recommendation is for {[facility]:11527}. During this admission, pt would benefit from continued skilled inpatient PT in the acute care setting in order to address the abovementioned deficits to maximize function and mobility before DC from acute care. normal...

## 2023-08-28 NOTE — OCCUPATIONAL THERAPY NOTE
Occupational Therapy Evaluation/Treatment     Patient Name: Sweta Donaldson  JKTEDSON Date: 8/28/2023  Problem List  Active Problems: There are no active Hospital Problems. Past Medical History  Past Medical History:   Diagnosis Date   • Chronic foot ulcer ([address]) 09/07/2018   • Diabetes mellitus ([address])    • Diabetic foot ulcer ([address]) 5/8/2023   • Gout    • High cholesterol    • Hypertension    • Visual impairment 11/1/2022    Cateract     Past Surgical History  Past Surgical History:   Procedure Laterality Date   • CATARACT EXTRACTION Right 01/2023   • COMPLEX WOUND CLOSURE TO EXTREMITY Left 07/18/2019    Procedure: COMPLEX CLOSURE LEFT CHEEK;  Surgeon: Kimberlyn Thao MD;  Location: [facility];  Service: Plastics   • FACIAL/NECK BIOPSY Left 07/18/2019    Procedure: EXCISION LEFT CHEEK CYST;  Surgeon: Kimberlyn Thao MD;  Location: [facility];  Service: Plastics   • HERNIA REPAIR Left     several times   • KNEE ARTHROSCOPY Left     torn meniscus   • LEG AMPUTATION THROUGH LOWER TIBIA AND FIBULA Left 8/22/2023    Procedure: AMPUTATION BELOW KNEE (BKA);   Surgeon: Ursula Aguilera MD;  Location:  MAIN OR;  Service: General   • MN AMPUTATION FOOT TRANSMETARSAL Left 6/30/2023    Procedure: AMPUTATION TRANSMETATARSAL (TMA);  Surgeon: Brigid Best DPM;  Location: [facility];  Service: Podiatry   • WOUND DEBRIDEMENT Left 5/30/2023    Procedure: DEBRIDEMENT WOUND LEFT FOOT WOUND WITH EXCISION OF INFECTED BONE AND WOUND VAC APPLICATION;  Surgeon: Sara Robledo DPM;  Location:  MAIN OR;  Service: Podiatry           08/28/23 1437   OT Last Visit   OT Visit Date 08/28/23   Note Type   Note type Evaluation   Pain Assessment   Pain Assessment Tool 0-10   Pain Score 8   Pain Location/Orientation   (Phantom L limb pain)   Pain Onset/Description Frequency: Intermittent  (phantom limb pain LLE, observed during eval)   Patient's Stated Pain Goal No pain   Hospital Pain Intervention(s) Repositioned; Ambulation/increased activity   Restrictions/Precautions   Weight Bearing Precautions Per Order Yes   LLE Weight Bearing Per Order NWB   Other Precautions Cognitive; Fall Risk;WBS  (L BKA)   Home Living   Type of [address] [place] [address] Dr Two level;Stairs to enter with rails  (1+1 KASSY & 4 KASSY with rails on one side)   Bathroom Shower/Tub Tub/shower unit   Home Equipment Walker   Prior Function   Level of [place] Independent with ADLs; Independent with IADLS; Independent with functional mobility   Lives With Spouse   Receives Help From Family   IADLs Family/Friend/Other provides transportation; Family/Friend/Other provides meals; Family/Friend/Other provides medication management   Falls in the last 6 months 1 to 4  (2)   Vocational Retired   General   Additional Pertinent History L BKA 8/22/23   Family/Caregiver Present No   Additional General Comments (S)  Pt discharged from [address] [place] [address] on 8/27/2023. Seen by OT previously on 8/24, was modified independent for bed mobility, supervision for functional txfers, and able to mobilize w/ supervision with rolling walker for household distance. Pt was able to perform 4 steps w/ supervision and hopping maneuver. Informed us today that he bumped up/down steps upon DC home (which was advised that this may be more difficult to get back to standing). Pt was home for <6 hours, states he couldn't get "in and out of the house". Per chart, pt called [facility] @ [phone] and spoke to MEGAN Wesleychfabiana- per note "States he is weak, having difficulty getting around his home and  wants to go into a rehab facility as he does not feel safe being at home." Pt was previously recommended for wheelchair but declined.    Subjective   Subjective Pt c/o phantom limb pain   ADL   Eating Assistance 7  Independent   Grooming Assistance 7  Independent   UB Bathing Assistance 7  Independent   LB Bathing Assistance 5  Supervision/Setup   UB Dressing Assistance 7  Independent   LB Dressing Assistance 4 Minimal Assistance   Toileting Assistance  4  Minimal Assistance   Bed Mobility   Supine to Sit 6  Modified independent   Transfers   Sit to Stand 6  Modified independent   Additional items Armrests   Stand to Sit 6  Modified independent   Additional items Armrests   Functional Mobility   Functional Mobility 6  Modified independent   Additional Comments Functional household distance into unit hallways   Additional items Rolling walker   Balance   Static Sitting Good   Dynamic Sitting Good   Static Standing Fair +   Dynamic Standing Fair   Ambulatory Fair +   Activity Tolerance   Activity Tolerance Patient limited by pain   Medical Staff Made Aware PT [facility] [place], [place] Nuzhat Quinteros   Nurse Made Aware RN Karron Bosworth   RUE Assessment   RUE Assessment WFL   LUE Assessment   LUE Assessment WFL   Vision-Basic Assessment   Current Vision Wears glasses all the time   Cognition   Overall Cognitive Status [facility]   Arousal/Participation Alert   Attention Attends with cues to redirect   Orientation Level Oriented X4   Memory Within functional limits   Following Commands Follows multistep commands with increased time or repetition   Comments Pt with questionable health literacy d/t comments such as "I'm getting frustrated so my blood sugar is going to go up." Pt with multiple complaints re: PT/OT last admission, stating "the little 23something year old therapists said I could go home." Provided pt with therapists level of education to ensure his comfortablity in his care with this writer & PT being his providers. Pt states if he was uncomfortable with us, he would inform us of such. Additionally educated pt on acute care therapy evaluation processes, how documentation is objective of his performance, & how DC recommendations are made based on his objective performance with therapy. Provided pt with empathetic listening strategies as his amputation created a major life change.  Despite education & listening strategies, pt with no change in apparent understanding. Pt appeared to be dismissive of education noted by pt interjecting education & changing subject to the news on the TV. Assessment   Limitation Decreased ADL status; Decreased Safe judgement during ADL;Decreased high-level ADLs; Decreased cognition   Prognosis Good   Assessment Pt is a 66 y.o. male seen for OT evaluation at [address] [place] [address], admitted 8/28/2023 requesting rehab placement. Pt had L BKA on 8/22/23. Pt DCed from hospital 8/27/23. OT completed extensive review of pt's medical and social history. Comorbidities affecting pt's functional performance at time of assessment include: chronic fatigue syndrome, DM1, chronic pain, diabetic polyneuropathy, alcohol abuse, nicotine abuse, acute pain due to injury. Personal factors affecting pt at time of IE include: steps to enter environment, limited home support, behavioral pattern, difficulty performing ADLS, difficulty performing IADLS , limited insight into deficits, compliance, health management  and environment. Prior to admission, pt was living at home in a [facility] with 1+1 KASSY & 4 KASSY. Pt was I w/  ADLS and w/ IADLS, (+) drove, & required use of no DME/AD PTA. Upon evaluation: Pt requires Mod I for bed mobility, Mod I for functional mobility/transfers, Independent for UB ADLs and Min A for LB ADLS 2* the following deficits impacting occupational performance: decreased balance, decreased tolerance, decreased safety awareness, increased pain, orthopedic restrictions, impaired interpersonal skills and decreased coping skills. Full objective findings from OT assessment regarding body systems outlined above. Pt to benefit from continued skilled OT tx while in the hospital to address deficits as defined above and maximize level of functional independence w/ ADL's and functional mobility. Occupational Performance areas to address include: bathing/shower, toilet hygiene, dressing, functional mobility and clothing management.  Based on findings, pt is of high complexity. The patient's raw score on the AM-PAC Daily Activity inpatient short form is 21, standardized score is 44.27, greater than 39.4. Patients at this level are likely to benefit from DC to home. However, please refer to therapist recommendation for discharge planning given other factors that may influence destination. At this time, OT recommendations at time of discharge are DC with Level III resources. Goals   Patient Goals Pt wants to go to rehab   Plan   Treatment Interventions ADL retraining;Functional transfer training;Patient/family training;Equipment evaluation/education; Compensatory technique education;Continued evaluation;UE strengthening/ROM   Goal Expiration Date 09/07/23   OT Treatment Day 0   OT Frequency 2-3x/wk   Recommendation   UB Rehab Discharge Recommendation (PT/OT) Level 3   AM-PAC Daily Activity Inpatient   Lower Body Dressing 3   Bathing 3   Toileting 3   Upper Body Dressing 4   Grooming 4   Eating 4   Daily Activity Raw Score 21   Daily Activity Standardized Score (Calc for Raw Score >=11) 44.27   AM-PAC Applied Cognition Inpatient   Following a Speech/Presentation 3   Understanding Ordinary Conversation 4   Taking Medications 3   Remembering Where Things Are Placed or Put Away 3   Remembering List of 4-5 Errands 3   Taking Care of Complicated Tasks 3   Applied Cognition Raw Score 19   Applied Cognition Standardized Score 39.77   Additional Treatment Session   Start Time 1459   End Time 1508   Treatment Assessment Pt sitting in recliner completing LLE knee extenstion ROM exercises. Informed pt of chair alarm policy. Requested pt performed sit > stand to place chair alarm. Pt requesting to return to bed instead. Pt completed sit > stand at Mod I level. Pt completed functional mobility with RW & stand > sit EOB at Mod I level. Pt performed sit > supine at Mod I level. Pt left supine in bed, all needs met, call bell in reach. MEGAN Cruz informed.    End of Consult Education Provided Yes   Patient Position at End of Consult Supine; All needs within reach   Nurse Communication Nurse aware of consult     Pt will achieve the following goals within 10 days. *Pt will complete LB bathing and dressing with S & DME PRN. *Pt will complete toileting w/ S w/ G hygiene/thoroughness using DME PRN      *Pt will demonstrate G carryover of pt/caregiver education and training as appropriate w/ Mod I w/o cues w/ good tolerance       *Pt will demonstrate 100% carryover of precautions s/p review w/o cues w/ Mod I w/ G tolerance/participation t/o functional ADL/IADL/leisure tasks. *Pt will increase standing tolerance time to 5 minutes with unilateral UE support to complete sink level ADLs @ S level.     *Assess DME needs    Joyce Reilly OTR/L

## 2023-08-28 NOTE — DISCHARGE SUMMARY
[facility]  Discharge- Osie Haver 1960, 66 y.o. male MRN: [id number]  Unit/Bed#: -[facility] Encounter: [id number]  Primary Care Provider: Vicki Phelps MD   Date and time admitted to hospital: 8/19/2023  8:25 AM    Acute pain due to injury  Assessment & Plan  Acute pain due to left BKA postoperative day 4  Per surgery, adjusted pain management with the addition of oral Dilaudid  She was discharged home with Dilaudid and felt comfortable with his pain  He will have VNA services visiting him every day for dressing changes  He will be using a walker to ambulate      Type 1 diabetes mellitus with diabetic neuropathy [facility])  Assessment & Plan  Lab Results   Component Value Date    HGBA1C 7.9 (H) 07/20/2023     Recent Labs     08/26/23  1632 08/26/23  2119 08/27/23  0713 08/27/23  1056   POCGLU 206* 232* 136 265*     Blood Sugar Average: Last 72 hrs:  (P) 161   · Maintained on insulin pump as outpatient -previously on insulin gtt intraoperatively  · Tolerating diet. Transitioned back to insulin pump 8/22  · Patient reports feeling very comfortable managing his own insulin pump and he regularly follows up with endocrinologist    Hyponatremia  Assessment & Plan  · Appears to be a chronic issue with baseline 131      Stage 2 chronic kidney disease  Assessment & Plan  Lab Results   Component Value Date    EGFR 57 08/27/2023    EGFR 55 08/26/2023    EGFR 58 08/25/2023    CREATININE 1.32 (H) 08/27/2023    CREATININE 1.36 (H) 08/26/2023    CREATININE 1.31 (H) 08/25/2023     · Baseline creatinine 1.0-1.2  · Slight bump in creatinine now. Encourage PO intake. Will keep ACEi on hold until blood work recheck with PCP within a week of discharge from the hospital    Cigarette nicotine dependence without complication  Assessment & Plan  · Encourage cessation. · Continue nicotine replacement.     Benign essential hypertension  Assessment & Plan  · Stable on metoprolol succinate 100 mg daily.  · Noted elevated creatinine, so we will hold lisinopril   · I have discussed with the patient the plan to resume lisinopril after he sees his PCP and repeats blood work    * Osteomyelitis [facility])  Assessment & Plan  · Patient initially had been referred to the emergency department due to worsening left foot erythema and swelling  · MRI left foot completed 8/17: Status post transmetatarsal amputation with prominent skin ulceration at the distal aspect of the postoperative site including marrow changes involving the second through fifth residual proximal metatarsals indicating osteomyelitis. No evidence of drainable abscess collection in this unenhanced examination. · Previous discussion held between patient and podiatry -patient wishes to move forward with BKA rather than TMA revision given concern over healing ability. · General surgery consulted. POD4 left BKA  · S/p dressing change   · Per general surgery, the surgical site appears well with healing without signs of infection,  · Stopped oral antibiotics at this time  · It is recommended that patient gets dressing changes by VNA services every day. · Patient was discharged home on oral Dilaudid for pain management  · He deferred a wheelchair and states that he is very comfortable moving upstairs(he has 5 steps at home) with a walker.   · He reports that he practiced this before and he feels comfortable            Discharging Physician / Practitioner: Ellis Oliver MD  PCP: Vicki Phelps MD  Admission Date:   Admission Orders (From admission, onward)     Ordered        08/19/23 0914  INPATIENT ADMISSION  Once                      Discharge Date: 08/27/23    Medical Problems     Resolved Problems  Date Reviewed: 8/28/2023   None         Consultations During Hospital Stay:  · General surgery    Procedures Performed:   · Left BKA    Significant Findings / Test Results:   · None    Incidental Findings:   · Normal    Test Results Pending at Discharge (will require follow up): · None     Outpatient Tests Requested:  · BMP in 1 week    Complications:  none    Reason for Admission: Left BKA    Hospital Course:     Jasen Avelar is a 66 y.o. male  with a PMH of type 1 diabetes, chronic kidney disease, tobacco use, hyponatremia, hypertension, prior left foot TMA who presents with ongoing left foot osteomyelitis.     Patient presents back to the emergency department after signing out AMA the day before. Patient agreeable for readmission and ultimately left BKA given recurrent osteomyelitis. He overall feels well and denies chest pain/palpitations, shortness of breath, nausea/vomiting, abdominal pain or fever/chills. Left TMA still with swelling and erythema. Also admits to racing thoughts and anxiety over upcoming BKA, agreeable to discuss with psychiatry.     Patient had a below-knee amputation 4 days ago. Per general surgery, the site of the surgical incision appears well-healing. Patient is afebrile and his vital signs are stable. His pain is better controlled with oral Dilaudid. He was discharged on oral Dilaudid 2 mg and 4 mg milligrams for moderate and severe pain respectively. Provided adequate supplies to last until he sees his PCP for follow-up within the next week. Patient worked with physical and occupational therapy prior to discharge. He reports that he feels comfortable ambulating with a walker. He tells me that he practiced walking upstairs (he has 5 stairs in his house), prior to the surgery. He deferred a walker. Patient was advised to have dressing changes every day. VNA services have been arranged at home. Patient also has history of diabetes and that has been controlled by his own insulin pump which he manages independently. Patient had a slight bump in creatinine at 1.3 for the past 3 days.   I discussed with the patient that I will stop his lisinopril for now and it can be resumed after his PCP checks BMP in about a week.    Please see above list of diagnoses and related plan for additional information. Condition at Discharge: stable     Discharge Day Visit / Exam:     Subjective: Patient was seen and examined. He reports better in terms of the pain control. He states that he would like to go home. He is asking how many times the dressing of the wound needs to be changed by the VNA. He reports that he is comfortable ambulating upstairs in his household. He reports that he will be using a walker. Vitals: Blood Pressure: 135/69 (08/27/23 0715)  Pulse: 86 (08/27/23 0715)  Temperature: 97.9 °F (36.6 °C) (08/27/23 0715)  Temp Source: Temporal (08/27/23 0715)  Respirations: 18 (08/27/23 0715)  Height: 6' (182.9 cm) (08/22/23 1757)  Weight - Scale: 68 kg (150 lb) (08/22/23 0942)  SpO2: 92 % (08/27/23 0715)  Exam:   Physical Exam  Constitutional:       Appearance: He is well-developed. HENT:      Head: Normocephalic and atraumatic. Cardiovascular:      Rate and Rhythm: Normal rate and regular rhythm. Heart sounds: Normal heart sounds. Pulmonary:      Effort: Pulmonary effort is normal. No respiratory distress. Breath sounds: Normal breath sounds. Abdominal:      Palpations: Abdomen is soft. Tenderness: There is no abdominal tenderness. Musculoskeletal:         General: Deformity (left BKA) present. Skin:     General: Skin is warm. Findings: No erythema or rash. Neurological:      Mental Status: He is alert. Discussion with Family: no    Discharge instructions/Information to patient and family:   See after visit summary for information provided to patient and family. Provisions for Follow-Up Care:  See after visit summary for information related to follow-up care and any pertinent home health orders.       Disposition:     Home with VNA Services (Reminder: Complete face to face encounter)    For Discharges to [facility] SNF:   · Not Applicable to this Patient - Not Applicable to this Patient    Planned Readmission: no     Discharge Statement:  I spent 45 minutes discharging the patient. This time was spent on the day of discharge. I had direct contact with the patient on the day of discharge. Greater than 50% of the total time was spent examining patient, answering all patient questions, arranging and discussing plan of care with patient as well as directly providing post-discharge instructions. Additional time then spent on discharge activities. Discharge Medications:  See after visit summary for reconciled discharge medications provided to patient and family.       ** Please Note: This note has been constructed using a voice recognition system **

## 2023-08-28 NOTE — PHYSICAL THERAPY NOTE
PHYSICAL THERAPY EVALUATION NOTE    Patient Name: Sweta Donaldson  TKIIV'G Date: 2023    AGE:   66 y.o. Mrn:   [id number]  ADMIT DX:  Known medical problems [Z78.9]    Past Medical History:   Diagnosis Date    Chronic foot ulcer ([address]) 2018    Diabetes mellitus ([address])     Diabetic foot ulcer ([address]) 2023    Gout     High cholesterol     Hypertension     Visual impairment 2022    C[employer]act     Length Of Stay: 0  PHYSICAL THERAPY EVALUATION :   Patient's identity confirmed via 2 patient identifiers (full name and ) at start of session       23 1450   PT Last Visit   PT Visit Date 23   Note Type   Note type Evaluation   Pain Assessment   Pain Assessment Tool 0-10   Pain Score 8   Pain Location/Orientation Orientation: Left; Location: Leg   Pain Onset/Description Frequency: Intermittent  (phantom limb pain)   Patient's Stated Pain Goal No pain   Restrictions/Precautions   Weight Bearing Precautions Per Order Yes   LLE Weight Bearing Per Order NWB   Other Precautions WBS;Pain; Fall Risk;Cognitive   Home Living   Type of Home House  (Bilevel)   Home Layout Two level  (1+1 KASSY, 4 KASSY)   Bathroom Shower/Tub Tub/shower unit   [place]  (pt previously declined WC when recommended last admission)   Prior Function   Lives With [facility] in the last 6 months 1 to 4   General   Additional Pertinent History (S)  Pt discharged from [address] [place] [address] on 2023. Seen by PT previously on , was modified independent for bed mobility, supervision for STS, and able to hop w/ supervision with rolling walker for 50 ft. Pt was able to perform 4 steps w/ supervision and hopping maneuver, told us he bumped up/down steps upon DC home (which was advised that this may be more difficult to get back to standing). Pt was home for <6 hours, states he couldn't get "in and out of the house".  Pt was previously recommended for wheelchair but declined. Family/Caregiver Present No   Cognition   Overall Cognitive Status WFL  (questionable insight to health/medical conditions)   Arousal/Participation Alert   Orientation Level Oriented X4   Memory Within functional limits   Following Commands Follows multistep commands with increased time or repetition   Comments Pt w/ multiple c/o re: "little 22 y/o therapists" that saw him last time. Spent significant time providing education on therapist's levels of education, acute care evaluation processes. Pt does not appear to understand despite multiple conversations. Insists his insurance will cover STR. Subjective   Subjective "If I keep getting angry, I'm going to get my blood sugar up"   RLE Assessment   RLE Assessment WFL   Strength RLE   RLE Overall Strength 4+/5   LLE Assessment   LLE Assessment WFL   LLE Overall AROM   L Knee Extension able to achieve TKE   Strength LLE   LLE Overall Strength 3+/5   Vision-Basic Assessment   Current Vision Wears glasses only for reading   Bed Mobility   Supine to Sit 6  Modified independent   Transfers   Sit to Stand 6  Modified independent   Additional items Assist x 1   Stand to Sit 6  Modified independent   Additional items Assist x 1   Ambulation/Elevation   Gait pattern   (LLE hop, 3 point gait pattern)   Gait Assistance 6  Modified independent   Additional items Assist x 1   Assistive Device Rolling walker   Distance 100 ft  (multiple turns, able to navigate around obstacles in ED hallway)   Balance   Static Sitting Good   Static Standing Fair +   Ambulatory Fair +   Activity Tolerance   Activity Tolerance Patient limited by pain  (actively experiencing L phantom limb pain)   Medical Staff Made Aware GUS Carranza, ED AP Cholo, CECE Layton   Nurse Made Aware Spoke to ED MEGAN Lopez   Assessment   Problem List Impaired balance;Decreased mobility; Decreased cognition;Decreased safety awareness;Orthopedic restrictions;Pain   Assessment Adriana Ulrich is a 66 y.o. Male who presents to [address] [place] [address] on 8/28/2023 from home for 8 hours w/ c/o weakness and wanting rehab and diagnosis of known medical problem. Orders for PT eval and treat received, w/ activity orders of NWB L LE and fall precautions. Pt presents w/ comorbidities of chronic fatigue syndrome, HLD, DMI w/ insulin pump, recent L BKA. At baseline, pt mobilizes supervision w/ RW, and reports + falls in the last 6 months. Upon evaluation, pt presents w/ the following deficits: weakness, impaired balance and pain limiting functional mobility. Upon eval, pt requires modified independent for bed mobility, modified independent for transfers, and modified independent for gait. Based on this PT evaluation today, patient's discharge recommendation is for Level III. During this admission, pt would benefit from continued skilled inpatient PT in the acute care setting in order to address the abovementioned deficits to maximize function and mobility before DC from acute care. Barriers to Discharge   (None - pt cleared for home, mobilizing better today than 4 days ago w/ PT)   Goals   Patient Goals to go ho rehab   STG Expiration Date 09/07/23   Short Term Goal #1 Patient will: Perform all bed mobility tasks independently to improve pt's independence w/ repositioning for decrease risk of skin breakdown, Perform all transfers independently consistently from various height surfaces in order to improve I w/ engagement w/ real-world environments/situations, Ambulate at least 100 ft. with roller walker modified independent w/o LOB to facilitate return and engagement w/ previous living environment, Navigate 10 stairs w/ supervision with unilateral handrail to either improve independence w/ entering home and/or so patient can fully access living areas in home and Complete exercise program independently   Plan   Treatment/Interventions Functional transfer training;LE strengthening/ROM; Elevations; Therapeutic exercise; Endurance training;Cognitive reorientation;Patient/family training;Equipment eval/education; Bed mobility;Gait training   PT Frequency 2-3x/wk   Recommendation   UB Rehab Discharge Recommendation (PT/OT) Level 3   Equipment Recommended Wheelchair   Wheelchair Package Recommended Lightweight   Additional Comments Pt c/o of his arms getting tired, would benefit from [facility] for mobility   AM-PAC Basic Mobility Inpatient   Turning in Flat Bed Without Bedrails 4   Lying on Back to Sitting on Edge of Flat Bed Without Bedrails 4   Moving Bed to Chair 4   Standing Up From Chair Using Arms 4   Walk in Room 4   Climb 3-5 Stairs With Railing 3   Basic Mobility Inpatient Raw Score 23   Basic Mobility Standardized Score 50.88   Highest Level Of Mobility   JH-HLM Goal 7: Walk 25 feet or more   JH-HLM Achieved 7: Walk 25 feet or more   Additional Treatment Session   Start Time 1457   End Time 1505   Treatment Assessment Pt seated OOB in recliner chair. Pt is able to demonstrate ability to perform TKE. Provided praise for continuing to work on [employer]. Pt also educated on desensitization to assist w/ phantom limb pain by providing tactile stimuli to residual limb. Pt easily distracted by TV or changing subjects, demonstrates ? carryover. Pt requesting to return to [facility], is modified independent to stand, and modified independent for SPT w/ RW and to lay back down in bed   Equipment Use RW   Additional Treatment Day 1   End of Consult   Patient Position at End of Consult Supine; All needs within reach         The patient's AM-PAC Basic Mobility Inpatient Short Form Raw Score is 23, Standardized Score is 50. 88. A standardized score greater than 38.32 (raw score of 16) suggests the patient may benefit from discharge to home which may not coincide with above PT recommendations. However please refer to therapist recommendation for discharge planning given other factors that may influence destination.     Pt would benefit from skilled inpatient PT during this admission in order to facilitate progress towards goals to maximize functional independence    Chong Peña, PT, DPT

## 2023-08-28 NOTE — PLAN OF CARE
Problem: OCCUPATIONAL THERAPY ADULT  Goal: Performs self-care activities at highest level of function for planned discharge setting. See evaluation for individualized goals. Description: Treatment Interventions: ADL retraining, Functional transfer training, Patient/family training, Equipment evaluation/education, Compensatory technique education, Continued evaluation, UE strengthening/ROM          See flowsheet documentation for full assessment, interventions and recommendations. Note: Limitation: Decreased ADL status, Decreased Safe judgement during ADL, Decreased high-level ADLs, Decreased cognition  Prognosis: Good  Assessment: Pt is a 66 y.o. male seen for OT evaluation at [facility], admitted 8/28/2023 requesting rehab placement. Pt had L BKA on 8/22/23. Pt DCed from hospital 8/27/23. OT completed extensive review of pt's medical and social history. Comorbidities affecting pt's functional performance at time of assessment include: chronic fatigue syndrome, DM1, chronic pain, diabetic polyneuropathy, alcohol abuse, nicotine abuse, acute pain due to injury. Personal factors affecting pt at time of IE include: steps to enter environment, limited home support, behavioral pattern, difficulty performing ADLS, difficulty performing IADLS , limited insight into deficits, compliance, health management  and environment. Prior to admission, pt was living at home in a [facility] with 1+1 KASSY & 4 KASSY. Pt was I w/  ADLS and w/ IADLS, (+) drove, & required use of no DME/AD PTA. Upon evaluation: Pt requires Mod I for bed mobility, Mod I for functional mobility/transfers, Independent for UB ADLs and Min A for LB ADLS 2* the following deficits impacting occupational performance: decreased balance, decreased tolerance, decreased safety awareness, increased pain, orthopedic restrictions, impaired interpersonal skills and decreased coping skills.  Full objective findings from OT assessment regarding body systems outlined above. Pt to benefit from continued skilled OT tx while in the hospital to address deficits as defined above and maximize level of functional independence w/ ADL's and functional mobility. Occupational Performance areas to address include: bathing/shower, toilet hygiene, dressing, functional mobility and clothing management. Based on findings, pt is of high complexity. The patient's raw score on the AM-PAC Daily Activity inpatient short form is 21, standardized score is 44.27, greater than 39.4. Patients at this level are likely to benefit from DC to home. However, please refer to therapist recommendation for discharge planning given other factors that may influence destination. At this time, OT recommendations at time of discharge are DC with Level III resources.

## 2023-08-28 NOTE — ASSESSMENT & PLAN NOTE
Lab Results   Component Value Date    EGFR 57 08/27/2023    EGFR 55 08/26/2023    EGFR 58 08/25/2023    CREATININE 1.32 (H) 08/27/2023    CREATININE 1.36 (H) 08/26/2023    CREATININE 1.31 (H) 08/25/2023     · Baseline creatinine 1.0-1.2  · Slight bump in creatinine now. Encourage PO intake.  Will keep ACEi on hold until blood work recheck with PCP within a week of discharge from the hospital

## 2023-08-28 NOTE — ASSESSMENT & PLAN NOTE
Acute pain due to left BKA postoperative day 4  Per surgery, adjusted pain management with the addition of oral Dilaudid  She was discharged home with Dilaudid and felt comfortable with his pain  He will have VNA services visiting him every day for dressing changes  He will be using a walker to ambulate

## 2023-08-28 NOTE — ASSESSMENT & PLAN NOTE
· Stable on metoprolol succinate 100 mg daily.   · Noted elevated creatinine, so we will hold lisinopril   · I have discussed with the patient the plan to resume lisinopril after he sees his PCP and repeats blood work

## 2023-08-28 NOTE — CASE MANAGEMENT
Case Management Progress Note    Patient name Bre Markham  Location ED 04/ED 04 MRN [id number]  : 1960 Date 2023       LOS (days): 0  Geometric Mean LOS (GMLOS) (days):   Days to GMLOS:        OBJECTIVE:        Current admission status: Emergency  Preferred Pharmacy:   [address] [place] [address] #[id number] [address] [address]  Phone: [phone] Fax: [phone]    Primary Care Provider: Sheron Smith MD    Primary Insurance: Juan Ramon Escamilla  Secondary Insurance:     PROGRESS NOTE: Cm consulted for possible rehab need. Pt seen by PT/OT and he is not recommended for rehab. Pt was recently dc'd over the weekend with [facility]. He has no been seen at home by [facility] prior to returning to the ED today. Cm sent referral to [facility]. Pt is also recommended a WC again. He was recommended a WC durign his admission last week and refused.  Cm following for HHC and DME needs

## 2023-08-28 NOTE — UTILIZATION REVIEW
Continued Stay Review    Date: 8/25                          Current Patient Class:   INPT   Current Level of Care: MS     HPI:63 y.o. male initially admitted on   8/19  For treatment of nonhealing left TMA with wound infection and osteomyelitis   Requiring  Left Below the knee amputation on  8/22.      8/23  POD 1   dressing and splint in place without drainage. Cont elevating left stump when in bed. Cont PT/OT - abx for 2 more days. plan for dressing change Thur or Fri. Maintained on insulin pump. Pain controlled     8/24  POD 2     Remains afebrile without leukocytosis, VSS ,  HGB 8.5. Continue IV ABx, blood cx NGTD      8/25  POD 3   Stable  Postop course. Splint removed today.   We will monitor the erythema overlying the knee for another day, and repeat lab work tomorrow     Cont IVAB      Vital Signs:      08/25/23 07:14:10 97.7 °F (36.5 °C) 94 17 121/55       Pertinent Labs/Diagnostic Results:       Results from last 7 days   Lab Units 08/27/23  0631 08/26/23 0443 08/25/23 0324 08/24/23  0338 08/23/23  0503   WBC Thousand/uL 10.74* 11.97* 10.73* 9.74 11.07*   HEMOGLOBIN g/dL 10.3* 8.3* 8.5* 8.5* 8.8*   HEMATOCRIT % 32.0* 26.5* 27.1* 27.0* 27.6*   PLATELETS Thousands/uL 487* 378 327 303 307   NEUTROS ABS Thousands/µL 7.50 8.80* 7.38 6.75 7.82*         Results from last 7 days   Lab Units 08/27/23  0631 08/26/23 0443 08/25/23  0324 08/24/23  0338 08/23/23  0503   SODIUM mmol/L 134* 131* 131* 130* 132*   POTASSIUM mmol/L 4.2 4.6 4.5 4.6 4.8   CHLORIDE mmol/L 98 98 99 99 100   CO2 mmol/L 27 28 25 27 27   ANION GAP mmol/L 9 5 7 4 5   BUN mg/dL 14 15 17 16 15   CREATININE mg/dL 1.32* 1.36* 1.31* 1.32* 1.19   EGFR ml/min/1.73sq m 57 55 58 57 65   CALCIUM mg/dL 9.3 8.5 8.3* 8.4 8.4     Results from last 7 days   Lab Units 08/27/23  1056 08/27/23  0713 08/26/23  2119 08/26/23  1632 08/26/23  1057 08/26/23  0719 08/25/23  2102 08/25/23  1610 08/25/23  1104 08/25/23  0719 08/25/23  0244 08/24/23  2118   POC GLUCOSE mg/dl 265* 136 232* 206* 224* 149* 199* 181* 234* 91 117 163*     Results from last 7 days   Lab Units 08/27/23  0631 08/26/23  0443 08/25/23  0324 08/24/23  0338 08/23/23  0503 08/22/23  0504   GLUCOSE RANDOM mg/dL 136 142* 113 107 107 154*       Medications:   Scheduled Medications:  No current facility-administered medications for this encounter. Continuous IV Infusions:  No current facility-administered medications for this encounter. PRN Meds:  HYDROmorphone, 1 mg, Intravenous, Q3H PRN. .... x4 on  8/24  And x4 on 8/25   insulin lispro, 300 Units, Subcutaneous Insulin Pump, Daily PRN  LORazepam, 0.5 mg, Oral, Q8H PRN . .. x3 on   8/24  methocarbamol, 500 mg, Oral, Q6H PRN  . .. x1 on 8/24  And  x2 on 8/25  oxyCODONE, 15 mg, Oral, Q4H PRN  . ..  x4 on  8/25  oxyCODONE, 10  mg, Oral, Q4H PRN  . Bianka Kenny .. x4 on  8/24         Discharge Plan: tbd    Network Utilization Review Department  ATTENTION: Please call with any questions or concerns to [phone] and carefully listen to the prompts so that you are directed to the right person. All voicemails are confidential.  Lyndon Dixon all requests for admission clinical reviews, approved or denied determinations and any other requests to dedicated fax number below belonging to the campus where the patient is receiving treatment.  List of dedicated fax numbers for the Facilities:  [place] DENIALS (Administrative/Medical Necessity) [phone]   [address] [facility] (Maternity/NICU/Pediatrics) [phone]   [address] [place] [address] Drive [phone]   [facility] [phone]   [address] [place], [place] [address]e N [phone] [facility] [address] [facility] [address] [place] [address] [phone]   [facility] [address] [facility] [address] [facility]  [address]  [phone]

## 2023-08-28 NOTE — CASE MANAGEMENT
Case Management Progress Note    Patient name Sean Wolff  Location ED 04/ED 04 MRN [id number]  : 1960 Date 2023       LOS (days): 0  Geometric Mean LOS (GMLOS) (days):   Days to GMLOS:        OBJECTIVE:        Current admission status: Emergency  Preferred Pharmacy:   [address] [place] [address] #[id number] [address] [address]  Phone: [phone] Fax: [phone]    Primary Care Provider: Veda Ayala MD    Primary Insurance: [employer]  Secondary Insurance:     PROGRESS NOTE: Cm spoke with PA. Pt is willing to accept DME for home. Orders sent for rollator and wc as requested.

## 2023-08-29 ENCOUNTER — TELEPHONE (OUTPATIENT)
Dept: FAMILY MEDICINE CLINIC | Facility: HOSPITAL | Age: 63
End: 2023-08-29

## 2023-08-29 ENCOUNTER — TELEPHONE (OUTPATIENT)
Dept: HOME HEALTH SERVICES | Facility: HOME HEALTHCARE | Age: 63
End: 2023-08-29

## 2023-08-29 ENCOUNTER — TELEPHONE (OUTPATIENT)
Dept: SURGERY | Facility: HOSPITAL | Age: 63
End: 2023-08-29

## 2023-08-29 ENCOUNTER — HOME CARE VISIT (OUTPATIENT)
Dept: HOME HEALTH SERVICES | Facility: HOME HEALTHCARE | Age: 63
End: 2023-08-29
Payer: COMMERCIAL

## 2023-08-29 VITALS — HEART RATE: 78 BPM | OXYGEN SATURATION: 98 % | SYSTOLIC BLOOD PRESSURE: 140 MMHG | DIASTOLIC BLOOD PRESSURE: 78 MMHG

## 2023-08-29 PROCEDURE — G0151 HHCP-SERV OF PT,EA 15 MIN: HCPCS

## 2023-08-29 PROCEDURE — G0299 HHS/HOSPICE OF RN EA 15 MIN: HCPCS

## 2023-08-29 NOTE — CASE MANAGEMENT
Case Management Progress Note    Patient name Sandrita Campos  Location ED 04/ED 04 MRN [id number]  : 1960 Date 2023       LOS (days): 0  Geometric Mean LOS (GMLOS) (days):   Days to GMLOS:        OBJECTIVE:        Current admission status: Emergency  Preferred Pharmacy:   [facility] #[id number] [address] [address]  Phone: [phone] Fax: [phone]    Primary Care Provider: Melvin Medina MD    Primary Insurance: [employer]  Secondary Insurance:     PROGRESS NOTE: cm contacted by pts wife Bethel Allen. She expressed that pt is not interested in the . Cm messaged [employer] to cancel the [facility] order. Pts wife still concerned about his abilities at home. Cm discussed with pts wife that without the STR recommendation from Pt/Ot he will not get approved by insurance for rehab.  Cm encouraged pts wife to work with [facility] PT/OT coming out to the house to help him with working on strengthening [facility]

## 2023-08-29 NOTE — TELEPHONE ENCOUNTER
Pt scheduled for 9/11/23. Home care aware and they will make sure that a home care nurse will be present at the house for the virtual TCM appt.

## 2023-08-29 NOTE — TELEPHONE ENCOUNTER
Post op call. Spoke to patient. Patient is doing well. No questions or concerns. Set up post op appointment. Reminded patient to call if there are any questions or concerns prior to post op appointment.

## 2023-08-29 NOTE — TELEPHONE ENCOUNTER
[facility]--pt needs TCM, spoke with Dr. Hannah Pedraza this am through [facility], pt will need virtual TCM along with pain management and anxiety treatment. Please call pt to setup.

## 2023-08-30 ENCOUNTER — HOME CARE VISIT (OUTPATIENT)
Dept: HOME HEALTH SERVICES | Facility: HOME HEALTHCARE | Age: 63
End: 2023-08-30
Payer: COMMERCIAL

## 2023-08-30 ENCOUNTER — OFFICE VISIT (OUTPATIENT)
Dept: FAMILY MEDICINE CLINIC | Facility: HOSPITAL | Age: 63
End: 2023-08-30
Payer: COMMERCIAL

## 2023-08-30 VITALS
TEMPERATURE: 95.8 F | DIASTOLIC BLOOD PRESSURE: 76 MMHG | OXYGEN SATURATION: 95 % | SYSTOLIC BLOOD PRESSURE: 120 MMHG | HEART RATE: 60 BPM | RESPIRATION RATE: 18 BRPM | BODY MASS INDEX: 20.34 KG/M2 | WEIGHT: 150 LBS

## 2023-08-30 VITALS
OXYGEN SATURATION: 98 % | SYSTOLIC BLOOD PRESSURE: 140 MMHG | HEART RATE: 88 BPM | DIASTOLIC BLOOD PRESSURE: 80 MMHG | RESPIRATION RATE: 18 BRPM

## 2023-08-30 DIAGNOSIS — M79.672 PAIN IN LEFT FOOT: ICD-10-CM

## 2023-08-30 DIAGNOSIS — E10.40 TYPE 1 DIABETES MELLITUS WITH DIABETIC NEUROPATHY (HCC): ICD-10-CM

## 2023-08-30 DIAGNOSIS — R79.89 ELEVATED SERUM CREATININE: ICD-10-CM

## 2023-08-30 DIAGNOSIS — G89.11 ACUTE PAIN DUE TO INJURY: ICD-10-CM

## 2023-08-30 DIAGNOSIS — F11.20 CONTINUOUS OPIOID DEPENDENCE (HCC): Primary | ICD-10-CM

## 2023-08-30 PROBLEM — A41.9 SEPSIS (HCC): Status: RESOLVED | Noted: 2023-06-29 | Resolved: 2023-08-30

## 2023-08-30 PROCEDURE — G0151 HHCP-SERV OF PT,EA 15 MIN: HCPCS

## 2023-08-30 PROCEDURE — G0299 HHS/HOSPICE OF RN EA 15 MIN: HCPCS

## 2023-08-30 PROCEDURE — 99496 TRANSJ CARE MGMT HIGH F2F 7D: CPT | Performed by: FAMILY MEDICINE

## 2023-08-30 RX ORDER — GABAPENTIN 300 MG/1
600 CAPSULE ORAL 3 TIMES DAILY
Qty: 540 CAPSULE | Refills: 0
Start: 2023-08-30 | End: 2023-09-06

## 2023-08-30 RX ORDER — HYDROMORPHONE HYDROCHLORIDE 2 MG/1
TABLET ORAL
Qty: 60 TABLET | Refills: 0 | Status: SHIPPED | OUTPATIENT
Start: 2023-08-30 | End: 2023-09-06 | Stop reason: SDUPTHER

## 2023-08-30 NOTE — PROGRESS NOTES
Virtual TCM Visit:    Verification of patient location:    Patient is located at Home in the following state in which I hold an active license [place]    Assessment/Plan:        Problem List Items Addressed This Visit        Endocrine    Type 1 diabetes mellitus with diabetic neuropathy ([address])    Relevant Medications    gabapentin (Neurontin) 300 mg capsule       Other    Acute pain due to injury    Relevant Medications    HYDROmorphone (DILAUDID) 2 mg tablet    Continuous opioid dependence ([address]) - Primary   Other Visit Diagnoses     Pain in left foot        Relevant Medications    gabapentin (Neurontin) 300 mg capsule    Elevated serum creatinine        Relevant Orders    Basic metabolic panel             Diabetes stable. S/p bka. Discussed pain control. Currently on gabapentin. Will increase gabapentin to 600 mg TID ( from bid). Continue with dilaudid 2 mg every 3 hours for moderate pain and 4 mg for severe pain. Rx sent  To [facility]. Monitor. May need to increase gabapentin further. 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 For now an increase of gabapentin may adequately control anxiety/stressors. Will monitor on higher dose of gabapentin. Did disucss lexapro and still consider this. Will be following up in 1 week. Elevated creatinine. Has been off of ace inhibitor. Recheckbmp.  VNA to obtain  Reason for visit is tcm      Encounter provider Uyen Lopez MD     Provider located at [address] [place] [address]   [id number]  [facility] [address]    Recent Visits  Date Type Provider Dept   08/29/23 Telephone Uyen Lopez MD [facility] [place] [facility]   08/28/23 Telephone [address] [facility] [place] [facility]   Showing recent visits within past 7 days and meeting all other requirements  Today's Visits  Date Type Provider Dept   08/30/23 Office Visit Uyen Lopez MD [facility] [place] [facility] 203   Showing today's visits and meeting all other requirements  Future Appointments  No visits were found meeting these conditions. Showing future appointments within next 150 days and meeting all other requirements       After connecting through [employer], the patient was identified by name and date of birth. Sean Wolff was informed that this is a telemedicine visit and that the visit is being conducted through the [employer]. He agrees to proceed. .  My office door was closed. No one else was in the room. He acknowledged consent and understanding of privacy and security of the video platform. The patient has agreed to participate and understands they can discontinue the visit at any time. Patient is aware this is a billable service. Transitional Care Management Review:  Sean Wolff is a 66 y.o. male here for TCM follow up. During the TCM phone call patient stated:    TCM Call     Date and time call was made  8/28/2023  9:49 AM    Hospital care reviewed  Records reviewed    Patient was hospitialized at  [facility]    Date of Admission  08/19/23    Date of discharge  08/27/23    Diagnosis  Osteomyelitis    Disposition  Home    Were the patients medications reviewed and updated  No    Current Symptoms  None      TCM Call     Post hospital issues  None    Should patient be enrolled in anticoag monitoring? No    Scheduled for follow up? Yes    Patients specialists  Other (comment)    Other specialists names  Wound Care, Podiatry    Did you obtain your prescribed medications  Yes    Do you need help managing your prescriptions or medications  No    Is transportation to your appointment needed  No    I have advised the patient to call PCP with any new or worsening symptoms  Arpan Cosby MA        Subjective:     Patient ID: Sean Wolff is a 66 y.o. male. Pt seen for tcm. Patient with osteomyeltitis. S/p bka. Reports ongoing pain. Out of dilaudid but this has been controlling his pain. On gabapentin and asking for non opioid pain medications. Reviewed that gabapentin is one of those. Currently on 600 mg BID. Also reports increase anxiety/stressors/mild depression due to recent BKA and hospitalization. l   lexapro was suggested to him. Has ongoing VNA, PT/OT. Doing much better with obtaining a wheelchair which he got this afternoon. Review of Systems   Constitutional: Negative. Negative for activity change, appetite change, chills and diaphoresis. HENT: Negative for congestion and dental problem. Respiratory: Negative. Negative for apnea, chest tightness, shortness of breath and wheezing. Cardiovascular: Negative. Negative for chest pain, palpitations and leg swelling. Gastrointestinal: Negative. Negative for abdominal distention, abdominal pain, constipation, diarrhea and nausea. Genitourinary: Negative. Negative for difficulty urinating, dysuria and frequency. Objective: There were no vitals filed for this visit. Physical Exam  Vitals and nursing note reviewed. Constitutional:       Appearance: Normal appearance. Pulmonary:      Effort: Pulmonary effort is normal.   Neurological:      Mental Status: He is alert. Psychiatric:         Mood and Affect: Mood normal.         Behavior: Behavior normal.         Medications have been reviewed by provider in current encounter    I spent 20 minutes with the patient during this visit. Kelton Marina MD      VIRTUAL VISIT DISCLAIMER    Vanessa Long verbally agrees to participate in [employer]. Pt is aware that [employer] could be limited without vital signs or the ability to perform a full hands-on physical Avivahuan Hickselton understands he or the provider may request at any time to terminate the video visit and request the patient to seek care or treatment in person.

## 2023-08-31 ENCOUNTER — HOME CARE VISIT (OUTPATIENT)
Dept: HOME HEALTH SERVICES | Facility: HOME HEALTHCARE | Age: 63
End: 2023-08-31
Payer: COMMERCIAL

## 2023-08-31 VITALS — HEART RATE: 89 BPM | OXYGEN SATURATION: 96 % | SYSTOLIC BLOOD PRESSURE: 108 MMHG | DIASTOLIC BLOOD PRESSURE: 50 MMHG

## 2023-08-31 PROCEDURE — G0152 HHCP-SERV OF OT,EA 15 MIN: HCPCS

## 2023-09-01 ENCOUNTER — HOME CARE VISIT (OUTPATIENT)
Dept: HOME HEALTH SERVICES | Facility: HOME HEALTHCARE | Age: 63
End: 2023-09-01
Payer: COMMERCIAL

## 2023-09-01 ENCOUNTER — APPOINTMENT (OUTPATIENT)
Dept: LAB | Facility: HOSPITAL | Age: 63
End: 2023-09-01
Payer: COMMERCIAL

## 2023-09-01 VITALS
TEMPERATURE: 97.3 F | SYSTOLIC BLOOD PRESSURE: 150 MMHG | OXYGEN SATURATION: 98 % | HEART RATE: 88 BPM | RESPIRATION RATE: 18 BRPM | DIASTOLIC BLOOD PRESSURE: 80 MMHG

## 2023-09-01 DIAGNOSIS — R79.89 ELEVATED SERUM CREATININE: ICD-10-CM

## 2023-09-01 LAB
ANION GAP SERPL CALCULATED.3IONS-SCNC: 12 MMOL/L
BUN SERPL-MCNC: 12 MG/DL (ref 5–25)
CALCIUM SERPL-MCNC: 8.8 MG/DL (ref 8.4–10.2)
CHLORIDE SERPL-SCNC: 94 MMOL/L (ref 96–108)
CO2 SERPL-SCNC: 25 MMOL/L (ref 21–32)
CREAT SERPL-MCNC: 1.1 MG/DL (ref 0.6–1.3)
GFR SERPL CREATININE-BSD FRML MDRD: 72 ML/MIN/1.73SQ M
GLUCOSE P FAST SERPL-MCNC: 132 MG/DL (ref 65–99)
POTASSIUM SERPL-SCNC: 5.2 MMOL/L (ref 3.5–5.3)
SODIUM SERPL-SCNC: 131 MMOL/L (ref 135–147)

## 2023-09-01 PROCEDURE — 36415 COLL VENOUS BLD VENIPUNCTURE: CPT

## 2023-09-01 PROCEDURE — G0151 HHCP-SERV OF PT,EA 15 MIN: HCPCS

## 2023-09-01 PROCEDURE — G0299 HHS/HOSPICE OF RN EA 15 MIN: HCPCS

## 2023-09-01 PROCEDURE — 80048 BASIC METABOLIC PNL TOTAL CA: CPT

## 2023-09-04 ENCOUNTER — HOME CARE VISIT (OUTPATIENT)
Dept: HOME HEALTH SERVICES | Facility: HOME HEALTHCARE | Age: 63
End: 2023-09-04
Payer: COMMERCIAL

## 2023-09-04 VITALS
HEART RATE: 76 BPM | OXYGEN SATURATION: 99 % | TEMPERATURE: 97.6 F | RESPIRATION RATE: 18 BRPM | DIASTOLIC BLOOD PRESSURE: 80 MMHG | SYSTOLIC BLOOD PRESSURE: 140 MMHG

## 2023-09-04 PROCEDURE — G0299 HHS/HOSPICE OF RN EA 15 MIN: HCPCS

## 2023-09-05 ENCOUNTER — HOME CARE VISIT (OUTPATIENT)
Dept: HOME HEALTH SERVICES | Facility: HOME HEALTHCARE | Age: 63
End: 2023-09-05
Payer: COMMERCIAL

## 2023-09-05 ENCOUNTER — TELEMEDICINE (OUTPATIENT)
Dept: ENDOCRINOLOGY | Facility: HOSPITAL | Age: 63
End: 2023-09-05
Payer: COMMERCIAL

## 2023-09-05 ENCOUNTER — TELEPHONE (OUTPATIENT)
Dept: FAMILY MEDICINE CLINIC | Facility: HOSPITAL | Age: 63
End: 2023-09-05

## 2023-09-05 VITALS
OXYGEN SATURATION: 96 % | SYSTOLIC BLOOD PRESSURE: 131 MMHG | HEART RATE: 86 BPM | DIASTOLIC BLOOD PRESSURE: 78 MMHG | RESPIRATION RATE: 19 BRPM

## 2023-09-05 DIAGNOSIS — I10 BENIGN ESSENTIAL HYPERTENSION: ICD-10-CM

## 2023-09-05 DIAGNOSIS — E78.2 MIXED HYPERLIPIDEMIA: ICD-10-CM

## 2023-09-05 DIAGNOSIS — E10.65 TYPE 1 DIABETES MELLITUS WITH HYPERGLYCEMIA (HCC): Primary | ICD-10-CM

## 2023-09-05 PROCEDURE — G0157 HHC PT ASSISTANT EA 15: HCPCS

## 2023-09-05 PROCEDURE — 99213 OFFICE O/P EST LOW 20 MIN: CPT | Performed by: PHYSICIAN ASSISTANT

## 2023-09-05 NOTE — PROGRESS NOTES
Virtual Regular Visit    Verification of patient location:    Patient is located at Home in the following state in which I hold an active license [place]      Assessment/Plan:    Problem List Items Addressed This Visit        Endocrine    Type 1 diabetes mellitus with hyperglycemia ([address]) - Primary    Relevant Orders    Hemoglobin A1C    Comprehensive metabolic panel       Cardiovascular and Mediastinum    Benign essential hypertension       Other    Mixed hyperlipidemia     1. Type 1 diabetes: Most recent hemoglobin A1c completed July 20, 2023 was 7.9. Unable to review his pump and sensor as we are not connected and this was a virtual visit today. Review of his previous pump and sensor download I actually would have expected his A1c to improve, but the fact that its stable at 7.9 does not surprise me with his recent osteomyelitis and amputation. I am sure levels will improve given time. Ultimately at this point I cannot give any recommendations to pump changes. Once he is able I would like to see a download of his pump. He can contact the office at anytime with any concerns or questions. He is due for a repeat A1c in October, and would not mind coming in early since today was a virtual visit. We will schedule for follow-up in 2 months. 2.  Hyperlipidemia: Recent lipid panel was excellent. Continue with atorvastatin 40 mg daily. 3.  Hypertension: Unable to check blood pressure as this was a virtual visit. He will continue with metoprolol 100 mg daily.          Reason for visit is type 1 diabetes follow-up  Chief Complaint   Patient presents with   • Virtual Regular Visit        Encounter provider Aspen Stephen PA-C    Provider located at [address] [place] [id number]      Recent Visits  Date Type Provider Dept   08/30/23 Office Visit Vicki Phelps MD [facility] [place] [facility] 08/29/23 Telephone Rossy Ross MD [facility] [place] [facility]   Showing recent visits within past 7 days and meeting all other requirements  Today's Visits  Date Type Provider Dept   09/05/23 Telephone Rossy Ross MD [facility] [place] [facility]   09/05/23 Telemedicine Maria Luisa Mckeon PA-C [facility]   Showing today's visits and meeting all other requirements  Future Appointments  No visits were found meeting these conditions. Showing future appointments within next 150 days and meeting all other requirements       The patient was identified by name and date of birth. Odilon Waldrop was informed that this is a telemedicine visit and that the visit is being conducted through the [employer]. He agrees to proceed. .  My office door was closed. No one else was in the room. He acknowledged consent and understanding of privacy and security of the video platform. The patient has agreed to participate and understands they can discontinue the visit at any time. Patient is aware this is a billable service. Subjective  Odilon Waldrop is a 66 y.o. 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 He is on insulin at home and takes Humalog insulin via the insulin pump. He denies any polyuria, polydipsia, polyphagia, and blurry vision. He has occasional nocturia. He denies numbness or tingling of the feet. He denies chest pain or shortness of breath. He denies heart attack, stroke and claudication but does admit to neuropathy, nephropathy and retinopathy and hypoglycemic unawareness. His last office visit patient was admitted June 29, 1234 for complications of a diabetic foot ulcer on left foot. There was concerns of osteomyelitis and he underwent TMA on June 30.   He was following up with podiatry, and there was once again concern of osteomyelitis in the left foot and was admitted to the hospital once again August 17, 2023 and underwent a BKA. States at this time he is doing well. Has no concerns or questions.     He is utilizing insulin pump therapy with a Medtronic 770 G insulin pump with Medtronic sensor integrated with the pump. He received this pump over 2 years ago and has been on insulin pump therapy for 15 years. Unfortunately unable to get a pump download today due to this being a virtual visit. All pump settings and numbers are from last office visit. He is using auto mode and is in auto mode 99% of the time. Basal rates:  12 AM to 3:30 AM 0.55 units/h, 3:30 AM to 8 AM 0.75 units/h, 8 AM to 12 PM 0.9 units/h, 12 PM to 8:30 PM 0.9 units/h, 8:30 PM to 12 AM 0.6 units/h. Bolus rates:  1 unit per 10 g carbohydrate. Insulin sensitivity factor:  1 unit for 32 blood sugar points for target blood glucose of 1 10-1 20. Insulin action:  4 hours. Total daily insulin dosage 27.3 units ±4.9 units with 57% basal and 43% bolus.     Hypoglycemic episodes: Yes occasional.  H/o of hypoglycemia causing hospitalization or intervention such as glucagon injection  or ambulance call  Yes.     The patient's last eye exam was recently after cataract surgery. The patient's last foot exam was in April 2022 at endocrine office visit. Sees Dr. Tiajuana Baumgarten for podiatry. As noted above underwent a left BKA August 2023. Most recent hemoglobin A1c completed July 20, 2023 was 7.9.     Blood Sugar/Glucometer/Pump/CGM review: Unable to download pump due to virtual visit.     He has hyperlipidemia and takes atorvastatin 40 mg daily. He denies chest pain or shortness of breath.     He has hypertension and takes lisinopril 10 mg daily and metoprolol  mg daily. He denies headache or strokelike symptoms.         Past Medical History:   Diagnosis Date   • Chronic foot ulcer ([address]) 09/07/2018   • Diabetes mellitus ([address])    • Diabetic foot ulcer ([address]) 5/8/2023   • Gout    • High cholesterol    • Hypertension    • Visual impairment 11/1/2022    Cateract       Past Surgical History:   Procedure Laterality Date   • CATARACT EXTRACTION Right 01/2023   • COMPLEX WOUND CLOSURE TO EXTREMITY Left 07/18/2019    Procedure: COMPLEX CLOSURE LEFT CHEEK;  Surgeon: Nunu Saul MD;  [facility];  Service: Plastics   • FACIAL/NECK BIOPSY Left 07/18/2019    Procedure: EXCISION LEFT CHEEK CYST;  Surgeon: Nunu Saul MD;  [facility];  Service: Plastics   • HERNIA REPAIR Left     several times   • KNEE ARTHROSCOPY Left     torn meniscus   • LEG AMPUTATION THROUGH LOWER TIBIA AND FIBULA Left 8/22/2023    Procedure: AMPUTATION BELOW KNEE (BKA); Surgeon: Jaimee Keating MD;  [facility];  Service: General   • [place] AMPUTATION FOOT TRANSMETARSAL Left 6/30/2023    Procedure: AMPUTATION TRANSMETATARSAL (TMA);  Surgeon: Jose Martin Mcleod DPM;  [facility];  Service: Podiatry   • WOUND DEBRIDEMENT Left 5/30/2023    Procedure: DEBRIDEMENT WOUND LEFT FOOT WOUND WITH EXCISION OF INFECTED BONE AND WOUND VAC APPLICATION;  Surgeon: Magdaleno Vidal DPM;  [facility];  Service: Podiatry       Current Outpatient Medications   Medication Sig Dispense Refill   • acetaminophen (TYLENOL) 325 mg tablet Take 3 tablets (975 mg total) by mouth every 6 (six) hours 120 tablet 0   • atorvastatin (LIPITOR) 40 mg tablet TAKE 1 TABLET BY MOUTH EVERY DAY 90 tablet 1   • gabapentin (Neurontin) 300 mg capsule Take 2 capsules (600 mg total) by mouth 3 (three) times a day 540 capsule 0   • Glucagon, rDNA, (Glucagon Emergency) 1 MG KIT      • HumaLOG 100 UNIT/ML injection inject up to 80 units in INSULIN PUMP daily as directed by prescriber     • HYDROmorphone (DILAUDID) 2 mg tablet Take 1 tab by mouth every 3 hours for moderate pain and 2 tabs by mouth every 3 hours for severe pain.  60 tablet 0   • methocarbamol (ROBAXIN) 500 mg tablet Take 1 tablet (500 mg total) by mouth every 6 (six) hours as needed for muscle spasms 120 tablet 0   • metoprolol succinate (TOPROL-XL) 100 mg 24 hr tablet Take 1 tablet (100 mg total) by mouth daily 90 tablet 1   • nicotine (NICODERM CQ) 21 mg/24 hr TD 24 hr patch Place 1 patch on the skin over 24 hours daily Do not start before July 5, 2023. 28 patch 0   • insulin lispro (HumaLOG) 100 units/mL 80 Units by Subcutaneous Insulin Pump route in the morning. 80 units via insulin pump  Indications: Type 2 Diabetes (Patient not taking: Reported on 9/5/2023)       No current facility-administered medications for this visit. Allergies   Allergen Reactions   • Cefuroxime Other (See Comments) and GI Intolerance   • Amoxicillin-Pot Clavulanate Nausea Only and GI Intolerance       Review of Systems   Constitutional: Negative for activity change, appetite change, fatigue and unexpected weight change. HENT: Negative for trouble swallowing. Eyes: Negative for visual disturbance. Respiratory: Negative for chest tightness and shortness of breath. Cardiovascular: Negative for chest pain, palpitations and leg swelling. Gastrointestinal: Negative for abdominal pain, diarrhea, nausea and vomiting. Endocrine: Negative for cold intolerance, heat intolerance, polydipsia, polyphagia and polyuria. Genitourinary: Negative for frequency. Musculoskeletal: Positive for gait problem (Currently utilizing a wheelchair). Left BKA   Skin: Negative for rash and wound. Neurological: Negative for dizziness, weakness, light-headedness, numbness and headaches. Psychiatric/Behavioral: Negative for dysphoric mood and sleep disturbance. The patient is not nervous/anxious. Video Exam  It was my intent to perform this visit via video technology but the patient was not able to do a video connection so the visit was completed via audio only through epic. There were no vitals filed for this visit.       Visit Time  Total Visit Duration: 10

## 2023-09-05 NOTE — TELEPHONE ENCOUNTER
Spoke to pt, reports he is out of what was prescribed, asking for at least a days' worth until his virtual appt with PCP tomorrow. States his pain is still high.

## 2023-09-05 NOTE — PATIENT INSTRUCTIONS
No adjustments to insulin pump today. If at all possible prior to next office visit would like to see a pump download to see if we need to make any adjustments. Earliest A1c can be repeated as October 20, 2023. Contact the office with any concerns or questions. Follow-up in 2 months in office.

## 2023-09-06 ENCOUNTER — TELEPHONE (OUTPATIENT)
Dept: FAMILY MEDICINE CLINIC | Facility: HOSPITAL | Age: 63
End: 2023-09-06

## 2023-09-06 ENCOUNTER — HOME CARE VISIT (OUTPATIENT)
Dept: HOME HEALTH SERVICES | Facility: HOME HEALTHCARE | Age: 63
End: 2023-09-06
Payer: COMMERCIAL

## 2023-09-06 ENCOUNTER — TELEMEDICINE (OUTPATIENT)
Dept: FAMILY MEDICINE CLINIC | Facility: HOSPITAL | Age: 63
End: 2023-09-06
Payer: COMMERCIAL

## 2023-09-06 VITALS — SYSTOLIC BLOOD PRESSURE: 140 MMHG | DIASTOLIC BLOOD PRESSURE: 98 MMHG | OXYGEN SATURATION: 99 % | HEART RATE: 94 BPM

## 2023-09-06 VITALS
BODY MASS INDEX: 20.32 KG/M2 | DIASTOLIC BLOOD PRESSURE: 60 MMHG | HEIGHT: 72 IN | WEIGHT: 150 LBS | SYSTOLIC BLOOD PRESSURE: 113 MMHG

## 2023-09-06 VITALS
SYSTOLIC BLOOD PRESSURE: 120 MMHG | DIASTOLIC BLOOD PRESSURE: 70 MMHG | HEART RATE: 86 BPM | TEMPERATURE: 98.4 F | OXYGEN SATURATION: 100 % | RESPIRATION RATE: 16 BRPM

## 2023-09-06 DIAGNOSIS — F41.9 ANXIETY: Primary | ICD-10-CM

## 2023-09-06 DIAGNOSIS — G89.11 ACUTE PAIN DUE TO INJURY: ICD-10-CM

## 2023-09-06 DIAGNOSIS — G54.6 PHANTOM LIMB PAIN (HCC): ICD-10-CM

## 2023-09-06 DIAGNOSIS — M79.672 PAIN IN LEFT FOOT: ICD-10-CM

## 2023-09-06 DIAGNOSIS — Z89.512 STATUS POST BELOW-KNEE AMPUTATION OF LEFT LOWER EXTREMITY (HCC): ICD-10-CM

## 2023-09-06 DIAGNOSIS — E10.40 TYPE 1 DIABETES MELLITUS WITH DIABETIC NEUROPATHY (HCC): ICD-10-CM

## 2023-09-06 PROCEDURE — 10330064 DRESSING, XEROFORM STR 5"X9" (50/BX)

## 2023-09-06 PROCEDURE — 99214 OFFICE O/P EST MOD 30 MIN: CPT | Performed by: FAMILY MEDICINE

## 2023-09-06 PROCEDURE — 10330064 PAD, ABD 5X9" STR LF (1/PK 20PK/BX) MGM1

## 2023-09-06 PROCEDURE — G0152 HHCP-SERV OF OT,EA 15 MIN: HCPCS

## 2023-09-06 PROCEDURE — G0300 HHS/HOSPICE OF LPN EA 15 MIN: HCPCS

## 2023-09-06 RX ORDER — GABAPENTIN 300 MG/1
CAPSULE ORAL
Qty: 630 CAPSULE | Refills: 0
Start: 2023-09-06 | End: 2023-09-11 | Stop reason: SDUPTHER

## 2023-09-06 RX ORDER — DULOXETIN HYDROCHLORIDE 30 MG/1
30 CAPSULE, DELAYED RELEASE ORAL DAILY
Qty: 30 CAPSULE | Refills: 0 | Status: SHIPPED | OUTPATIENT
Start: 2023-09-06

## 2023-09-06 RX ORDER — HYDROMORPHONE HYDROCHLORIDE 2 MG/1
TABLET ORAL
Qty: 60 TABLET | Refills: 0 | Status: SHIPPED | OUTPATIENT
Start: 2023-09-06 | End: 2023-09-11 | Stop reason: SDUPTHER

## 2023-09-06 NOTE — TELEPHONE ENCOUNTER
Jose De Jesus Estevez called and requested a verbal order for a   to set up an In [facility].      Jose De Jesus Estevez can be reached at [phone]

## 2023-09-06 NOTE — TELEPHONE ENCOUNTER
----- Message from Juan Alberto Leal MD sent at 9/6/2023  2:53 PM EDT -----   Please call. Discussed use of lexapro at the visit. While thinking about this more I think duloxetine would be a better agent to control stress/anxiety as it may also help with pain control. 1 tab a day. Rx will be sent.

## 2023-09-06 NOTE — PROGRESS NOTES
Virtual Regular Visit    Verification of patient location:    Patient is located at Home in the following state in which I hold an active license [place]      Assessment/Plan:    Problem List Items Addressed This Visit        Endocrine    Type 1 diabetes mellitus with diabetic neuropathy ([address])       Other    Acute pain due to injury    Status post below-knee amputation of left lower extremity (HCC)    Relevant Medications    HYDROmorphone (DILAUDID) 2 mg tablet   Other Visit Diagnoses     Anxiety    -  Primary    Relevant Medications    DULoxetine (CYMBALTA) 30 mg delayed release capsule    Pain in left foot        Relevant Medications    gabapentin (Neurontin) 300 mg capsule    Phantom limb pain (HCC)        Relevant Medications    HYDROmorphone (DILAUDID) 2 mg tablet    DULoxetine (CYMBALTA) 30 mg delayed release capsule    gabapentin (Neurontin) 300 mg capsule        Will continue with dilaudid for pain control. Dosing not adjusted . Medications should last at least a week. He will call me next week for refill, if needed. Agreed on increase gabapentin to 600-600-900 mg daily. Discussed cymbalta at 30 mg daily ( initially discussed use of lexapro but he will be contacted regarding the change. I think this will help his current anxiety but also may help with pain. Pain due to s/p bka but also concern for phantom limb pain. Will follow up in 2 weeks virtually.             Reason for visit is   Chief Complaint   Patient presents with   • Follow-up     1 week   • Virtual Regular Visit        Encounter provider Roshan Liu MD    Provider located at [address] [facility] [address]   [id number]  [place] [address]      Recent Visits  Date Type Provider Dept   09/05/23 Telephone Roshan Liu MD [facility] [place] [facility]   08/30/23 Office Visit Roshan Liu MD [facility] [place] [facility]   Showing recent visits within past 7 days and meeting all other requirements  Today's Visits  Date Type Provider Dept   09/06/23 Telephone [facility] [facility] [place] [facility]   09/06/23 Telemedicine Melvin Medina MD [facility] [place] [facility]   Showing today's visits and meeting all other requirements  Future Appointments  No visits were found meeting these conditions. Showing future appointments within next 150 days and meeting all other requirements       The patient was identified by name and date of birth. Sandrita Campos was informed that this is a telemedicine visit and that the visit is being conducted through the [employer]. He agrees to proceed. .  My office door was closed. No one else was in the room. He acknowledged consent and understanding of privacy and security of the video platform. The patient has agreed to participate and understands they can discontinue the visit at any time. Patient is aware this is a billable service. Subjective  Sandrita Campos is a 66 y.o. male  . Patient seen virtually for followup. Last visit gabapentin was increased. Noting some improvement. Has conitnued to be on dialudid. May usually is well controlled. Taking 4 mg for severe pain and 2 mg for moderate pain. Has used 60 tablets in about a week. Willing to increase gabapentin further. Has a lot left. Has had continued anxiety/stress and wanting something to help. Discussed lexapro last visit as suggested by another provider. Was given ativan in the ER x 1 dose and helped calm things down.         Past Medical History:   Diagnosis Date   • Chronic foot ulcer ([address]) 09/07/2018   • Diabetes mellitus ([address])    • Diabetic foot ulcer ([address]) 5/8/2023   • Gout    • High cholesterol    • Hypertension    • Visual impairment 11/1/2022    Cateract       Past Surgical History:   Procedure Laterality Date   • CATARACT EXTRACTION Right 01/2023   • COMPLEX WOUND CLOSURE TO EXTREMITY Left 07/18/2019    Procedure: COMPLEX CLOSURE LEFT CHEEK;  Surgeon: Leonie Hartmann MD;  [facility];  Service: Plastics   • FACIAL/NECK BIOPSY Left 07/18/2019    Procedure: EXCISION LEFT CHEEK CYST;  Surgeon: Leonie Hartmann MD;  [facility];  Service: Plastics   • HERNIA REPAIR Left     several times   • KNEE ARTHROSCOPY Left     torn meniscus   • LEG AMPUTATION THROUGH LOWER TIBIA AND FIBULA Left 8/22/2023    Procedure: AMPUTATION BELOW KNEE (BKA); Surgeon: Ania Redman MD;  [facility];  Service: General   • [place] AMPUTATION FOOT TRANSMETARSAL Left 6/30/2023    Procedure: AMPUTATION TRANSMETATARSAL (TMA);  Surgeon: Sandy Langston DPM;  [facility];  Service: Podiatry   • WOUND DEBRIDEMENT Left 5/30/2023    Procedure: DEBRIDEMENT WOUND LEFT FOOT WOUND WITH EXCISION OF INFECTED BONE AND WOUND VAC APPLICATION;  Surgeon: Amalia Mccoy DPM;  [facility];  Service: Podiatry       Current Outpatient Medications   Medication Sig Dispense Refill   • acetaminophen (TYLENOL) 325 mg tablet Take 3 tablets (975 mg total) by mouth every 6 (six) hours 120 tablet 0   • atorvastatin (LIPITOR) 40 mg tablet TAKE 1 TABLET BY MOUTH EVERY DAY 90 tablet 1   • DULoxetine (CYMBALTA) 30 mg delayed release capsule Take 1 capsule (30 mg total) by mouth daily 30 capsule 0   • gabapentin (Neurontin) 300 mg capsule Take 2 capsules (600 mg total) by mouth 2 (two) times a day AND 3 capsules (900 mg total) daily at bedtime. 630 capsule 0   • Glucagon, rDNA, (Glucagon Emergency) 1 MG KIT      • HumaLOG 100 UNIT/ML injection inject up to 80 units in INSULIN PUMP daily as directed by prescriber     • HYDROmorphone (DILAUDID) 2 mg tablet Take 1 tab by mouth every 3 hours for moderate pain and 2 tabs by mouth every 3 hours for severe pain.  60 tablet 0   • methocarbamol (ROBAXIN) 500 mg tablet Take 1 tablet (500 mg total) by mouth every 6 (six) hours as needed for muscle spasms 120 tablet 0   • metoprolol succinate (TOPROL-XL) 100 mg 24 hr tablet Take 1 tablet (100 mg total) by mouth daily 90 tablet 1   • nicotine (NICODERM CQ) 21 mg/24 hr TD 24 hr patch Place 1 patch on the skin over 24 hours daily Do not start before July 5, 2023. 28 patch 0   • insulin lispro (HumaLOG) 100 units/mL 80 Units by Subcutaneous Insulin Pump route in the morning. 80 units via insulin pump  Indications: Type 2 Diabetes (Patient not taking: Reported on 9/5/2023)       No current facility-administered medications for this visit. Allergies   Allergen Reactions   • Cefuroxime Other (See Comments) and GI Intolerance   • Amoxicillin-Pot Clavulanate Nausea Only and GI Intolerance       Review of Systems   Constitutional: Negative for activity change, appetite change, fatigue and fever. Video Exam    Vitals:    09/06/23 1429   BP: 113/60   Weight: 68 kg (150 lb)   Height: 6' (1.829 m)       Physical Exam  Vitals and nursing note reviewed. Constitutional:       Appearance: Normal appearance. HENT:      Head: Normocephalic. Pulmonary:      Effort: Pulmonary effort is normal.   Neurological:      Mental Status: He is alert and oriented to person, place, and time. Psychiatric:         Mood and Affect: Mood normal.         Behavior: Behavior normal.          Visit Time  Total Visit Duration:    I have spent a total time of 25 minutes on 09/06/23 in caring for this patient including Risks and benefits of tx options, Instructions for management, Patient and family education, Importance of tx compliance, Documenting in the medical record, Reviewing / ordering tests, medicine, procedures   and Obtaining or reviewing history  .

## 2023-09-07 ENCOUNTER — HOME CARE VISIT (OUTPATIENT)
Dept: HOME HEALTH SERVICES | Facility: HOME HEALTHCARE | Age: 63
End: 2023-09-07
Payer: COMMERCIAL

## 2023-09-07 PROCEDURE — G0157 HHC PT ASSISTANT EA 15: HCPCS

## 2023-09-08 ENCOUNTER — HOME CARE VISIT (OUTPATIENT)
Dept: HOME HEALTH SERVICES | Facility: HOME HEALTHCARE | Age: 63
End: 2023-09-08
Payer: COMMERCIAL

## 2023-09-08 VITALS — OXYGEN SATURATION: 98 % | DIASTOLIC BLOOD PRESSURE: 88 MMHG | SYSTOLIC BLOOD PRESSURE: 158 MMHG | HEART RATE: 94 BPM

## 2023-09-08 VITALS
HEART RATE: 84 BPM | TEMPERATURE: 97.8 F | DIASTOLIC BLOOD PRESSURE: 80 MMHG | RESPIRATION RATE: 20 BRPM | OXYGEN SATURATION: 98 % | SYSTOLIC BLOOD PRESSURE: 150 MMHG

## 2023-09-08 PROCEDURE — G0152 HHCP-SERV OF OT,EA 15 MIN: HCPCS

## 2023-09-08 PROCEDURE — G0299 HHS/HOSPICE OF RN EA 15 MIN: HCPCS

## 2023-09-11 ENCOUNTER — OFFICE VISIT (OUTPATIENT)
Dept: SURGERY | Facility: HOSPITAL | Age: 63
End: 2023-09-11

## 2023-09-11 VITALS
HEART RATE: 92 BPM | HEIGHT: 72 IN | DIASTOLIC BLOOD PRESSURE: 83 MMHG | TEMPERATURE: 97.4 F | BODY MASS INDEX: 19.05 KG/M2 | WEIGHT: 140.6 LBS | RESPIRATION RATE: 16 BRPM | SYSTOLIC BLOOD PRESSURE: 164 MMHG

## 2023-09-11 DIAGNOSIS — G54.6 PHANTOM LIMB PAIN (HCC): ICD-10-CM

## 2023-09-11 DIAGNOSIS — Z89.512 S/P BKA (BELOW KNEE AMPUTATION), LEFT (HCC): Primary | ICD-10-CM

## 2023-09-11 DIAGNOSIS — G89.11 ACUTE PAIN DUE TO INJURY: ICD-10-CM

## 2023-09-11 DIAGNOSIS — Z89.512 STATUS POST BELOW-KNEE AMPUTATION OF LEFT LOWER EXTREMITY (HCC): ICD-10-CM

## 2023-09-11 DIAGNOSIS — M79.672 PAIN IN LEFT FOOT: ICD-10-CM

## 2023-09-11 PROCEDURE — 99024 POSTOP FOLLOW-UP VISIT: CPT | Performed by: SURGERY

## 2023-09-11 RX ORDER — GABAPENTIN 300 MG/1
CAPSULE ORAL
Qty: 630 CAPSULE | Refills: 0 | Status: SHIPPED | OUTPATIENT
Start: 2023-09-11 | End: 2023-12-10

## 2023-09-11 NOTE — PROGRESS NOTES
Assessment/Plan:   Mitchel Rojas is a 66 y.o.male who comes in today for postoperative check after left BKA done for diabetic foot wound and osteomyelitis on 8/22/2023. Had some difficulty at home postoperatively. He was denied inpatient rehab. He did have a fall and was seen in the emergency department. He is doing better. He is being followed by [facility] and home PT. Continues with some pain at incision site and phantom pain. Feels using occasional Dilaudid prescribed by his family medical doctor. Otherwise denies any swelling, drainage, open wounds at site or fevers or chills. On exam left stump is fully intact with staples in place. No swelling, erythema, induration, or ecchymosis. Mild tenderness to distal stump. No signs of infection. All staples removed intact and without difficulty. Steri-Strips applied. Pathology: Lower extremity, "Below knee amputation left," Amputation:  - Remodeled bone with acute osteomyelitis  - Adjacent granulation tissue with acute inflammation and necrosis  Pathology reviewed with patient and wife. Pathology as expected with wound inflammation and osteomyelitis. At this point patient should continue physical therapy. He should continue nonweightbearing to left stump. He should leave Steri-Strips in place until they fall off on their own. He may shower over the site with soap and water. He does not need to cover incision at this time however states he feels better with dressing in place. He may use light ace compresson to stump. He should return in 3 weeks for further evaluation and clearance to begin prosthesis fitting. Can provide prosthesis company information at that time. Wean narcotics as able. Discusseds with patient and wife. All questions answered. HPI:  Mitchel Rojas is a 66 y.o.male who comes in today for postoperative check after recent BKA as above. Patient is doing well. He did fall at home after discharge but not injure stump. He is following with home [facility] and home physical therapy. Patient states he is doing better. States he is eating well. He is eating high-protein meals. His blood sugars are well controlled. He denies any swelling, drainage, erythema, fevers or chills. Does continue to complain of pain at site and phantom pain. He has taken Neurontin and occasional Dilaudid provided by his family medical doctor. ROS:  General ROS: negative for - chills, fatigue, fever or night sweats, weight loss  Respiratory ROS: no cough, shortness of breath, or wheezing  Cardiovascular ROS: no chest pain or dyspnea on exertion  Abdomen ROS: no pain, N/V  Genito-Urinary ROS: no dysuria, trouble voiding, or hematuria  Musculoskeletal ROS: negative for - gait disturbance, joint pain or muscle pain  Neurological ROS: no TIA or stroke symptoms  Skin ROS: as per HPI    ALLERGIES  Cefuroxime and Amoxicillin-pot clavulanate    Current Outpatient Medications:   •  acetaminophen (TYLENOL) 325 mg tablet, Take 3 tablets (975 mg total) by mouth every 6 (six) hours, Disp: 120 tablet, Rfl: 0  •  atorvastatin (LIPITOR) 40 mg tablet, TAKE 1 TABLET BY MOUTH EVERY DAY, Disp: 90 tablet, Rfl: 1  •  DULoxetine (CYMBALTA) 30 mg delayed release capsule, Take 1 capsule (30 mg total) by mouth daily, Disp: 30 capsule, Rfl: 0  •  gabapentin (Neurontin) 300 mg capsule, Take 2 capsules (600 mg total) by mouth 2 (two) times a day AND 3 capsules (900 mg total) daily at bedtime. , Disp: 630 capsule, Rfl: 0  •  Glucagon, rDNA, (Glucagon Emergency) 1 MG KIT, , Disp: , Rfl:   •  HumaLOG 100 UNIT/ML injection, inject up to 80 units in INSULIN PUMP daily as directed by prescriber, Disp: , Rfl:   •  HYDROmorphone (DILAUDID) 2 mg tablet, Take 1 tab by mouth every 3 hours for moderate pain and 2 tabs by mouth every 3 hours for severe pain., Disp: 60 tablet, Rfl: 0  •  insulin lispro (HumaLOG) 100 units/mL, 80 Units by Subcutaneous Insulin Pump route in the morning.  80 units via insulin pump  Indications: Type 2 Diabetes (Patient not taking: Reported on 9/5/2023), Disp: , Rfl:   •  methocarbamol (ROBAXIN) 500 mg tablet, Take 1 tablet (500 mg total) by mouth every 6 (six) hours as needed for muscle spasms, Disp: 120 tablet, Rfl: 0  •  metoprolol succinate (TOPROL-XL) 100 mg 24 hr tablet, Take 1 tablet (100 mg total) by mouth daily, Disp: 90 tablet, Rfl: 1  •  nicotine (NICODERM CQ) 21 mg/24 hr TD 24 hr patch, Place 1 patch on the skin over 24 hours daily Do not start before July 5, 2023., Disp: 28 patch, Rfl: 0  Past Medical History:   Diagnosis Date   • Chronic foot ulcer ([address]) 09/07/2018   • Diabetes mellitus ([address])    • Diabetic foot ulcer ([address]) 5/8/2023   • Gout    • High cholesterol    • Hypertension    • Visual impairment 11/1/2022    Cateract     Past Surgical History:   Procedure Laterality Date   • CATARACT EXTRACTION Right 01/2023   • COMPLEX WOUND CLOSURE TO EXTREMITY Left 07/18/2019    Procedure: COMPLEX CLOSURE LEFT CHEEK;  Surgeon: Adelina Mancera MD;  Location:  MAIN OR;  Service: Plastics   • FACIAL/NECK BIOPSY Left 07/18/2019    Procedure: EXCISION LEFT CHEEK CYST;  Surgeon: Adelina Mancera MD;  Location:  MAIN OR;  Service: Plastics   • HERNIA REPAIR Left     several times   • KNEE ARTHROSCOPY Left     torn meniscus   • LEG AMPUTATION THROUGH LOWER TIBIA AND FIBULA Left 8/22/2023    Procedure: AMPUTATION BELOW KNEE (BKA);   Surgeon: Karma Dhillon MD;  Location:  MAIN OR;  Service: General   • [place] AMPUTATION FOOT TRANSMETARSAL Left 6/30/2023    Procedure: AMPUTATION TRANSMETATARSAL (TMA);  Surgeon: Rica Peres DPM;  Location:  MAIN OR;  Service: Podiatry   • WOUND DEBRIDEMENT Left 5/30/2023    Procedure: DEBRIDEMENT WOUND LEFT FOOT WOUND WITH EXCISION OF INFECTED BONE AND WOUND VAC APPLICATION;  Surgeon: Nicole Hussein DPM;  Location: [facility];  Service: Podiatry     Family History   Problem Relation Age of Onset   • Heart disease Father    • Diabetes type I Father    • Diabetes type II Mother    • No Known Problems Sister    • Alcohol abuse Brother    • Diabetes type I Brother    • No Known Problems Brother    • No Known Problems Son    • No Known Problems Daughter       reports that he has been smoking cigarettes. He has a 20.00 pack-year smoking history. He has never used smokeless tobacco. He reports current alcohol use. He reports that he does not use drugs.     PHYSICAL EXAM  Vitals:    09/11/23 1347   BP: 164/83   Pulse: 92   Resp: 16   Temp: (!) 97.4 °F (36.3 °C)     Weight (last 2 days)     Date/Time Weight    09/11/23 1347 63.8 (140.6)        General: normal, cooperative, no distress  Incision: clean, dry, and intact and healing well  All staple removed intact and without difficulty  Incision remains fully intact  No erythema, induration, open wound, drainage, signs of infection      Briana Hartmann

## 2023-09-12 ENCOUNTER — HOME CARE VISIT (OUTPATIENT)
Dept: HOME HEALTH SERVICES | Facility: HOME HEALTHCARE | Age: 63
End: 2023-09-12
Payer: COMMERCIAL

## 2023-09-12 VITALS — HEART RATE: 96 BPM | DIASTOLIC BLOOD PRESSURE: 88 MMHG | SYSTOLIC BLOOD PRESSURE: 150 MMHG | OXYGEN SATURATION: 97 %

## 2023-09-12 VITALS
SYSTOLIC BLOOD PRESSURE: 155 MMHG | RESPIRATION RATE: 16 BRPM | DIASTOLIC BLOOD PRESSURE: 85 MMHG | HEART RATE: 90 BPM | OXYGEN SATURATION: 98 %

## 2023-09-12 PROCEDURE — G0152 HHCP-SERV OF OT,EA 15 MIN: HCPCS

## 2023-09-12 PROCEDURE — G0157 HHC PT ASSISTANT EA 15: HCPCS

## 2023-09-12 PROCEDURE — G0155 HHCP-SVS OF CSW,EA 15 MIN: HCPCS

## 2023-09-12 PROCEDURE — G0299 HHS/HOSPICE OF RN EA 15 MIN: HCPCS

## 2023-09-13 ENCOUNTER — TELEPHONE (OUTPATIENT)
Age: 63
End: 2023-09-13

## 2023-09-13 RX ORDER — HYDROMORPHONE HYDROCHLORIDE 2 MG/1
TABLET ORAL
Qty: 60 TABLET | Refills: 0 | Status: SHIPPED | OUTPATIENT
Start: 2023-09-13 | End: 2023-09-20

## 2023-09-13 RX ORDER — METHOCARBAMOL 500 MG/1
500 TABLET, FILM COATED ORAL EVERY 6 HOURS PRN
Qty: 120 TABLET | Refills: 0 | Status: SHIPPED | OUTPATIENT
Start: 2023-09-13

## 2023-09-13 NOTE — TELEPHONE ENCOUNTER
Caller: Pastor Elgin Armando/RN/[facility]    Doctor: Dr. Milad Mcarthur    Reason for call: asking if pt has seen Dr Charla Bruno in June but never came for post op w/. She will reach out to pt/wife to get them scheduled to see Dr Milad Mcarthur    Call back#: [phone]/ANSARIANA

## 2023-09-14 ENCOUNTER — HOME CARE VISIT (OUTPATIENT)
Dept: HOME HEALTH SERVICES | Facility: HOME HEALTHCARE | Age: 63
End: 2023-09-14
Payer: COMMERCIAL

## 2023-09-14 VITALS
RESPIRATION RATE: 16 BRPM | DIASTOLIC BLOOD PRESSURE: 88 MMHG | OXYGEN SATURATION: 100 % | HEART RATE: 95 BPM | SYSTOLIC BLOOD PRESSURE: 156 MMHG | TEMPERATURE: 96.2 F

## 2023-09-14 PROCEDURE — G0299 HHS/HOSPICE OF RN EA 15 MIN: HCPCS

## 2023-09-14 PROCEDURE — G0151 HHCP-SERV OF PT,EA 15 MIN: HCPCS

## 2023-09-14 NOTE — CASE COMMUNICATION
Quirino Connect to doctors Gilles and [employer] of stump wound. Patient removed ace wrap todayl scab and one steristrip came off and wound is now open with scant serosang drainage. Requested if ok to apply betadine adaptic 4x4 taya ace three times per week. Obtained verbal order from Gilles to apply betadine adaptic 4x4 taya ace today then soap and water pat dry adaptic 4x4 taya ace three times per week moving forward.

## 2023-09-16 ENCOUNTER — HOME CARE VISIT (OUTPATIENT)
Dept: HOME HEALTH SERVICES | Facility: HOME HEALTHCARE | Age: 63
End: 2023-09-16
Payer: COMMERCIAL

## 2023-09-16 VITALS
DIASTOLIC BLOOD PRESSURE: 84 MMHG | RESPIRATION RATE: 16 BRPM | OXYGEN SATURATION: 98 % | HEART RATE: 80 BPM | TEMPERATURE: 97 F | SYSTOLIC BLOOD PRESSURE: 142 MMHG

## 2023-09-16 PROCEDURE — G0299 HHS/HOSPICE OF RN EA 15 MIN: HCPCS

## 2023-09-18 ENCOUNTER — HOME CARE VISIT (OUTPATIENT)
Dept: HOME HEALTH SERVICES | Facility: HOME HEALTHCARE | Age: 63
End: 2023-09-18
Payer: COMMERCIAL

## 2023-09-18 VITALS
HEART RATE: 100 BPM | RESPIRATION RATE: 16 BRPM | OXYGEN SATURATION: 97 % | SYSTOLIC BLOOD PRESSURE: 130 MMHG | TEMPERATURE: 97.8 F | DIASTOLIC BLOOD PRESSURE: 70 MMHG

## 2023-09-18 VITALS — SYSTOLIC BLOOD PRESSURE: 142 MMHG | OXYGEN SATURATION: 98 % | DIASTOLIC BLOOD PRESSURE: 80 MMHG | HEART RATE: 93 BPM

## 2023-09-18 PROCEDURE — G0151 HHCP-SERV OF PT,EA 15 MIN: HCPCS

## 2023-09-18 PROCEDURE — G0300 HHS/HOSPICE OF LPN EA 15 MIN: HCPCS

## 2023-09-19 ENCOUNTER — HOME CARE VISIT (OUTPATIENT)
Dept: HOME HEALTH SERVICES | Facility: HOME HEALTHCARE | Age: 63
End: 2023-09-19
Payer: COMMERCIAL

## 2023-09-20 ENCOUNTER — HOME CARE VISIT (OUTPATIENT)
Dept: HOME HEALTH SERVICES | Facility: HOME HEALTHCARE | Age: 63
End: 2023-09-20
Payer: COMMERCIAL

## 2023-09-20 ENCOUNTER — TELEMEDICINE (OUTPATIENT)
Dept: FAMILY MEDICINE CLINIC | Facility: HOSPITAL | Age: 63
End: 2023-09-20
Payer: COMMERCIAL

## 2023-09-20 VITALS
OXYGEN SATURATION: 98 % | SYSTOLIC BLOOD PRESSURE: 128 MMHG | RESPIRATION RATE: 16 BRPM | DIASTOLIC BLOOD PRESSURE: 70 MMHG | TEMPERATURE: 98.5 F | HEART RATE: 88 BPM

## 2023-09-20 VITALS — BODY MASS INDEX: 19.64 KG/M2 | WEIGHT: 145 LBS | HEIGHT: 72 IN

## 2023-09-20 DIAGNOSIS — G54.6 PHANTOM LIMB PAIN (HCC): ICD-10-CM

## 2023-09-20 DIAGNOSIS — Z89.512 STATUS POST BELOW-KNEE AMPUTATION OF LEFT LOWER EXTREMITY (HCC): ICD-10-CM

## 2023-09-20 DIAGNOSIS — F32.5 MAJOR DEPRESSIVE DISORDER IN FULL REMISSION, UNSPECIFIED WHETHER RECURRENT (HCC): Primary | ICD-10-CM

## 2023-09-20 PROCEDURE — 99214 OFFICE O/P EST MOD 30 MIN: CPT | Performed by: FAMILY MEDICINE

## 2023-09-20 PROCEDURE — G0300 HHS/HOSPICE OF LPN EA 15 MIN: HCPCS

## 2023-09-20 RX ORDER — HYDROMORPHONE HYDROCHLORIDE 2 MG/1
4 TABLET ORAL EVERY 6 HOURS PRN
Qty: 60 TABLET | Refills: 0 | Status: SHIPPED | OUTPATIENT
Start: 2023-09-20 | End: 2023-09-23

## 2023-09-20 NOTE — PROGRESS NOTES
Virtual Regular Visit    Verification of patient location:    Patient is located at Home in the following state in which I hold an active license [place]      Assessment/Plan:    Problem List Items Addressed This Visit        Other    Major depressive disorder in full remission ([address]) - Primary    Status post below-knee amputation of left lower extremity (HCC)    Relevant Medications    HYDROmorphone (DILAUDID) 2 mg tablet   Other Visit Diagnoses     Phantom limb pain (HCC)        Relevant Medications    HYDROmorphone (DILAUDID) 2 mg tablet      pain control. Continue with dilaudid. May take 4 mg tab every 6 hours for severe pain. Decrease to 2 mg for moderate pain. Increase gabapentin to 900-900-900. Reports he has enough  Capsules for now. He will fu with Ortho. Mdd. Stable. Continue with cymbalta. Fu with me in aobut 4 weeks. Reason for visit is   Chief Complaint   Patient presents with   • Follow-up   • Virtual Regular Visit        Encounter provider Ranee Goldberg, MD    Provider located at [facility]. [phone] [facility]  [facility] [id number]      Recent Visits  No visits were found meeting these conditions. Showing recent visits within past 7 days and meeting all other requirements  Today's Visits  Date Type Provider Dept   09/20/23 Telemedicine Ranee Goldberg, MD [facility] 0   Showing today's visits and meeting all other requirements  Future Appointments  No visits were found meeting these conditions. Showing future appointments within next 150 days and meeting all other requirements       The patient was identified by name and date of birth. Vicky Brian was informed that this is a telemedicine visit and that the visit is being conducted through the niiu. He agrees to proceed. .  My office door was closed. No one else was in the room.   He acknowledged consent and understanding of privacy and security of the video platform. The patient has agreed to participate and understands they can discontinue the visit at any time. Patient is aware this is a billable service. Subjective  Matti Khanna is a 66 y.o. male  . [facility] is seen for fu. Reports pain control is getting better. Does think gabapentin can be increased due to ongoing phantom limb pain. He has been on dilaudid, 2 mg tabs, took about 5 tabs yesterday. Needing 4 mg tab to control pain when needed. Mdd. Doing well on cymbalta. Needing refills. No si/hi. Overall reports doing better. No cigarettes or alcohol since his hospitalization. Feeling he is getting better. Has ongoing fu with orthopedics. Past Medical History:   Diagnosis Date   • Chronic foot ulcer ([address]) 09/07/2018   • Diabetes mellitus ([address])    • Diabetic foot ulcer ([address]) 5/8/2023   • Gout    • High cholesterol    • Hypertension    • Visual impairment 11/1/2022    Cateract       Past Surgical History:   Procedure Laterality Date   • CATARACT EXTRACTION Right 01/2023   • COMPLEX WOUND CLOSURE TO EXTREMITY Left 07/18/2019    Procedure: COMPLEX CLOSURE LEFT CHEEK;  Surgeon: Sergio Young MD;  Location:  MAIN OR;  Service: Plastics   • FACIAL/NECK BIOPSY Left 07/18/2019    Procedure: EXCISION LEFT CHEEK CYST;  Surgeon: Sergio Young MD;  Location:  MAIN OR;  Service: Plastics   • HERNIA REPAIR Left     several times   • KNEE ARTHROSCOPY Left     torn meniscus   • LEG AMPUTATION THROUGH LOWER TIBIA AND FIBULA Left 8/22/2023    Procedure: AMPUTATION BELOW KNEE (BKA);   Surgeon: Bill Ross MD;  Location:  MAIN OR;  Service: General   • [place] AMPUTATION FOOT TRANSMETARSAL Left 6/30/2023    Procedure: AMPUTATION TRANSMETATARSAL (TMA);  Surgeon: Aimee Marinelli DPM;  Location:  MAIN OR;  Service: Podiatry   • WOUND DEBRIDEMENT Left 5/30/2023    Procedure: DEBRIDEMENT WOUND LEFT FOOT WOUND WITH EXCISION OF INFECTED BONE AND WOUND VAC APPLICATION;  Surgeon: Benjamin Kelly DPM;  Location:  MAIN OR;  Service: Podiatry       Current Outpatient Medications   Medication Sig Dispense Refill   • acetaminophen (TYLENOL) 325 mg tablet Take 3 tablets (975 mg total) by mouth every 6 (six) hours 120 tablet 0   • atorvastatin (LIPITOR) 40 mg tablet TAKE 1 TABLET BY MOUTH EVERY DAY 90 tablet 1   • DULoxetine (CYMBALTA) 30 mg delayed release capsule Take 1 capsule (30 mg total) by mouth daily 30 capsule 0   • gabapentin (Neurontin) 300 mg capsule Take 2 capsules (600 mg total) by mouth 2 (two) times a day AND 3 capsules (900 mg total) daily at bedtime. 630 capsule 0   • Glucagon, rDNA, (Glucagon Emergency) 1 MG KIT      • HumaLOG 100 UNIT/ML injection inject up to 80 units in INSULIN PUMP daily as directed by prescriber     • HYDROmorphone (DILAUDID) 2 mg tablet Take 2 tablets (4 mg total) by mouth every 6 (six) hours as needed for moderate pain or severe pain Max Daily Amount: 16 mg 60 tablet 0   • methocarbamol (ROBAXIN) 500 mg tablet Take 1 tablet (500 mg total) by mouth every 6 (six) hours as needed for muscle spasms 120 tablet 0   • metoprolol succinate (TOPROL-XL) 100 mg 24 hr tablet Take 1 tablet (100 mg total) by mouth daily 90 tablet 1   • nicotine (NICODERM CQ) 21 mg/24 hr TD 24 hr patch Place 1 patch on the skin over 24 hours daily Do not start before July 5, 2023. 28 patch 0   • insulin lispro (HumaLOG) 100 units/mL 80 Units by Subcutaneous Insulin Pump route in the morning. 80 units via insulin pump  Indications: Type 2 Diabetes (Patient not taking: Reported on 9/5/2023)       No current facility-administered medications for this visit.         Allergies   Allergen Reactions   • Cefuroxime Other (See Comments) and GI Intolerance   • Amoxicillin-Pot Clavulanate Nausea Only and GI Intolerance       Review of Systems    Video Exam    Vitals:    09/20/23 1039   Weight: 65.8 kg (145 lb)   Height: 6' (1.829 m)       Physical Exam  Vitals reviewed. Constitutional:       Appearance: Normal appearance. Pulmonary:      Effort: Pulmonary effort is normal.   Neurological:      Mental Status: He is alert and oriented to person, place, and time. Mental status is at baseline.    Psychiatric:         Behavior: Behavior normal.          Visit Time  Total Visit Duration: 15

## 2023-09-21 ENCOUNTER — TELEPHONE (OUTPATIENT)
Dept: FAMILY MEDICINE CLINIC | Facility: HOSPITAL | Age: 63
End: 2023-09-21

## 2023-09-21 ENCOUNTER — HOME CARE VISIT (OUTPATIENT)
Dept: HOME HEALTH SERVICES | Facility: HOME HEALTHCARE | Age: 63
End: 2023-09-21
Payer: COMMERCIAL

## 2023-09-21 VITALS — DIASTOLIC BLOOD PRESSURE: 80 MMHG | SYSTOLIC BLOOD PRESSURE: 130 MMHG | OXYGEN SATURATION: 98 % | HEART RATE: 78 BPM

## 2023-09-21 PROCEDURE — G0151 HHCP-SERV OF PT,EA 15 MIN: HCPCS

## 2023-09-21 NOTE — TELEPHONE ENCOUNTER
Thomas--[facility]--Can order be placed for outpatient physical therapy.  [id number]-Orthopedic after care following surgical amputation

## 2023-09-22 ENCOUNTER — APPOINTMENT (EMERGENCY)
Dept: RADIOLOGY | Facility: HOSPITAL | Age: 63
End: 2023-09-22
Payer: COMMERCIAL

## 2023-09-22 ENCOUNTER — HOME HEALTH ADMISSION (OUTPATIENT)
Dept: HOME HEALTH SERVICES | Facility: HOME HEALTHCARE | Age: 63
End: 2023-09-22
Payer: COMMERCIAL

## 2023-09-22 ENCOUNTER — HOSPITAL ENCOUNTER (OUTPATIENT)
Facility: HOSPITAL | Age: 63
Setting detail: OBSERVATION
Discharge: HOME HEALTH | End: 2023-09-23
Attending: EMERGENCY MEDICINE | Admitting: SURGERY
Payer: COMMERCIAL

## 2023-09-22 DIAGNOSIS — G54.6 PHANTOM LIMB PAIN (HCC): ICD-10-CM

## 2023-09-22 DIAGNOSIS — T81.30XA WOUND DEHISCENCE: Primary | ICD-10-CM

## 2023-09-22 DIAGNOSIS — E10.40 TYPE 1 DIABETES MELLITUS WITH DIABETIC NEUROPATHY (HCC): ICD-10-CM

## 2023-09-22 DIAGNOSIS — N18.2 STAGE 2 CHRONIC KIDNEY DISEASE: ICD-10-CM

## 2023-09-22 DIAGNOSIS — Z89.512 HX OF BKA, LEFT (HCC): ICD-10-CM

## 2023-09-22 DIAGNOSIS — I10 BENIGN ESSENTIAL HYPERTENSION: ICD-10-CM

## 2023-09-22 DIAGNOSIS — Z89.512 STATUS POST BELOW-KNEE AMPUTATION OF LEFT LOWER EXTREMITY (HCC): Primary | ICD-10-CM

## 2023-09-22 DIAGNOSIS — E78.2 MIXED HYPERLIPIDEMIA: ICD-10-CM

## 2023-09-22 LAB
ALBUMIN SERPL BCP-MCNC: 3.9 G/DL (ref 3.5–5)
ALP SERPL-CCNC: 111 U/L (ref 34–104)
ALT SERPL W P-5'-P-CCNC: 28 U/L (ref 7–52)
ANION GAP SERPL CALCULATED.3IONS-SCNC: 3 MMOL/L
APTT PPP: 30 SECONDS (ref 23–37)
AST SERPL W P-5'-P-CCNC: 32 U/L (ref 13–39)
BASOPHILS # BLD AUTO: 0.14 THOUSANDS/ÂΜL (ref 0–0.1)
BASOPHILS NFR BLD AUTO: 2 % (ref 0–1)
BILIRUB SERPL-MCNC: 0.29 MG/DL (ref 0.2–1)
BUN SERPL-MCNC: 12 MG/DL (ref 5–25)
CALCIUM SERPL-MCNC: 9.5 MG/DL (ref 8.4–10.2)
CHLORIDE SERPL-SCNC: 102 MMOL/L (ref 96–108)
CO2 SERPL-SCNC: 28 MMOL/L (ref 21–32)
CREAT SERPL-MCNC: 1.15 MG/DL (ref 0.6–1.3)
EOSINOPHIL # BLD AUTO: 1.39 THOUSAND/ÂΜL (ref 0–0.61)
EOSINOPHIL NFR BLD AUTO: 17 % (ref 0–6)
ERYTHROCYTE [DISTWIDTH] IN BLOOD BY AUTOMATED COUNT: 14.1 % (ref 11.6–15.1)
GFR SERPL CREATININE-BSD FRML MDRD: 68 ML/MIN/1.73SQ M
GLUCOSE SERPL-MCNC: 152 MG/DL (ref 65–140)
GLUCOSE SERPL-MCNC: 169 MG/DL (ref 65–140)
HCT VFR BLD AUTO: 38.2 % (ref 36.5–49.3)
HGB BLD-MCNC: 12.1 G/DL (ref 12–17)
IMM GRANULOCYTES # BLD AUTO: 0.04 THOUSAND/UL (ref 0–0.2)
IMM GRANULOCYTES NFR BLD AUTO: 1 % (ref 0–2)
INR PPP: 0.87 (ref 0.84–1.19)
LYMPHOCYTES # BLD AUTO: 2.54 THOUSANDS/ÂΜL (ref 0.6–4.47)
LYMPHOCYTES NFR BLD AUTO: 31 % (ref 14–44)
MCH RBC QN AUTO: 29.5 PG (ref 26.8–34.3)
MCHC RBC AUTO-ENTMCNC: 31.7 G/DL (ref 31.4–37.4)
MCV RBC AUTO: 93 FL (ref 82–98)
MONOCYTES # BLD AUTO: 1.07 THOUSAND/ÂΜL (ref 0.17–1.22)
MONOCYTES NFR BLD AUTO: 13 % (ref 4–12)
NEUTROPHILS # BLD AUTO: 3.15 THOUSANDS/ÂΜL (ref 1.85–7.62)
NEUTS SEG NFR BLD AUTO: 36 % (ref 43–75)
NRBC BLD AUTO-RTO: 0 /100 WBCS
PLATELET # BLD AUTO: 333 THOUSANDS/UL (ref 149–390)
PMV BLD AUTO: 8.8 FL (ref 8.9–12.7)
POTASSIUM SERPL-SCNC: 4.4 MMOL/L (ref 3.5–5.3)
PROT SERPL-MCNC: 7.2 G/DL (ref 6.4–8.4)
PROTHROMBIN TIME: 12.5 SECONDS (ref 11.6–14.5)
RBC # BLD AUTO: 4.1 MILLION/UL (ref 3.88–5.62)
SODIUM SERPL-SCNC: 133 MMOL/L (ref 135–147)
WBC # BLD AUTO: 8.33 THOUSAND/UL (ref 4.31–10.16)

## 2023-09-22 PROCEDURE — 85025 COMPLETE CBC W/AUTO DIFF WBC: CPT | Performed by: EMERGENCY MEDICINE

## 2023-09-22 PROCEDURE — 99204 OFFICE O/P NEW MOD 45 MIN: CPT | Performed by: SURGERY

## 2023-09-22 PROCEDURE — 99244 OFF/OP CNSLTJ NEW/EST MOD 40: CPT | Performed by: INTERNAL MEDICINE

## 2023-09-22 PROCEDURE — 97167 OT EVAL HIGH COMPLEX 60 MIN: CPT

## 2023-09-22 PROCEDURE — 96375 TX/PRO/DX INJ NEW DRUG ADDON: CPT

## 2023-09-22 PROCEDURE — 12021 TX SUPFC WND DEHSN W/PACKING: CPT | Performed by: SURGERY

## 2023-09-22 PROCEDURE — 36415 COLL VENOUS BLD VENIPUNCTURE: CPT | Performed by: EMERGENCY MEDICINE

## 2023-09-22 PROCEDURE — 99284 EMERGENCY DEPT VISIT MOD MDM: CPT

## 2023-09-22 PROCEDURE — 73560 X-RAY EXAM OF KNEE 1 OR 2: CPT

## 2023-09-22 PROCEDURE — 85610 PROTHROMBIN TIME: CPT | Performed by: EMERGENCY MEDICINE

## 2023-09-22 PROCEDURE — 80053 COMPREHEN METABOLIC PANEL: CPT | Performed by: EMERGENCY MEDICINE

## 2023-09-22 PROCEDURE — 82948 REAGENT STRIP/BLOOD GLUCOSE: CPT

## 2023-09-22 PROCEDURE — 85730 THROMBOPLASTIN TIME PARTIAL: CPT | Performed by: EMERGENCY MEDICINE

## 2023-09-22 PROCEDURE — 99285 EMERGENCY DEPT VISIT HI MDM: CPT | Performed by: EMERGENCY MEDICINE

## 2023-09-22 PROCEDURE — 97163 PT EVAL HIGH COMPLEX 45 MIN: CPT

## 2023-09-22 PROCEDURE — 87147 CULTURE TYPE IMMUNOLOGIC: CPT | Performed by: SURGERY

## 2023-09-22 PROCEDURE — 96374 THER/PROPH/DIAG INJ IV PUSH: CPT

## 2023-09-22 PROCEDURE — 96376 TX/PRO/DX INJ SAME DRUG ADON: CPT

## 2023-09-22 PROCEDURE — 87081 CULTURE SCREEN ONLY: CPT | Performed by: SURGERY

## 2023-09-22 RX ORDER — METHOCARBAMOL 500 MG/1
500 TABLET, FILM COATED ORAL EVERY 6 HOURS PRN
Status: DISCONTINUED | OUTPATIENT
Start: 2023-09-22 | End: 2023-09-23 | Stop reason: HOSPADM

## 2023-09-22 RX ORDER — DULOXETIN HYDROCHLORIDE 30 MG/1
30 CAPSULE, DELAYED RELEASE ORAL DAILY
Status: DISCONTINUED | OUTPATIENT
Start: 2023-09-22 | End: 2023-09-23 | Stop reason: HOSPADM

## 2023-09-22 RX ORDER — ALPRAZOLAM 0.5 MG/1
0.5 TABLET ORAL 2 TIMES DAILY PRN
Status: DISCONTINUED | OUTPATIENT
Start: 2023-09-22 | End: 2023-09-23 | Stop reason: HOSPADM

## 2023-09-22 RX ORDER — NALOXONE HYDROCHLORIDE 0.4 MG/ML
0.1 INJECTION, SOLUTION INTRAMUSCULAR; INTRAVENOUS; SUBCUTANEOUS
Status: DISCONTINUED | OUTPATIENT
Start: 2023-09-22 | End: 2023-09-23 | Stop reason: HOSPADM

## 2023-09-22 RX ORDER — GABAPENTIN 300 MG/1
900 CAPSULE ORAL 3 TIMES DAILY
Status: DISCONTINUED | OUTPATIENT
Start: 2023-09-22 | End: 2023-09-23 | Stop reason: HOSPADM

## 2023-09-22 RX ORDER — OXYCODONE HYDROCHLORIDE 5 MG/1
5 TABLET ORAL EVERY 4 HOURS PRN
Status: DISCONTINUED | OUTPATIENT
Start: 2023-09-22 | End: 2023-09-23 | Stop reason: HOSPADM

## 2023-09-22 RX ORDER — OXYCODONE HYDROCHLORIDE 5 MG/1
5 TABLET ORAL EVERY 4 HOURS PRN
Status: DISCONTINUED | OUTPATIENT
Start: 2023-09-22 | End: 2023-09-22

## 2023-09-22 RX ORDER — HYDROMORPHONE HCL/PF 1 MG/ML
1 SYRINGE (ML) INJECTION ONCE
Status: COMPLETED | OUTPATIENT
Start: 2023-09-22 | End: 2023-09-22

## 2023-09-22 RX ORDER — ACETAMINOPHEN 325 MG/1
975 TABLET ORAL EVERY 8 HOURS SCHEDULED
Status: DISCONTINUED | OUTPATIENT
Start: 2023-09-22 | End: 2023-09-23 | Stop reason: HOSPADM

## 2023-09-22 RX ORDER — POLYETHYLENE GLYCOL 3350 17 G/17G
17 POWDER, FOR SOLUTION ORAL DAILY
Status: DISCONTINUED | OUTPATIENT
Start: 2023-09-22 | End: 2023-09-23 | Stop reason: HOSPADM

## 2023-09-22 RX ORDER — ENOXAPARIN SODIUM 100 MG/ML
30 INJECTION SUBCUTANEOUS
Status: DISCONTINUED | OUTPATIENT
Start: 2023-09-23 | End: 2023-09-23 | Stop reason: HOSPADM

## 2023-09-22 RX ORDER — MIDAZOLAM HYDROCHLORIDE 2 MG/2ML
2 INJECTION, SOLUTION INTRAMUSCULAR; INTRAVENOUS ONCE
Status: COMPLETED | OUTPATIENT
Start: 2023-09-22 | End: 2023-09-22

## 2023-09-22 RX ORDER — NICOTINE 21 MG/24HR
1 PATCH, TRANSDERMAL 24 HOURS TRANSDERMAL DAILY
Status: DISCONTINUED | OUTPATIENT
Start: 2023-09-22 | End: 2023-09-23 | Stop reason: HOSPADM

## 2023-09-22 RX ORDER — LISINOPRIL 10 MG/1
10 TABLET ORAL DAILY
COMMUNITY

## 2023-09-22 RX ORDER — METOPROLOL SUCCINATE 50 MG/1
100 TABLET, EXTENDED RELEASE ORAL DAILY
Status: DISCONTINUED | OUTPATIENT
Start: 2023-09-22 | End: 2023-09-23 | Stop reason: HOSPADM

## 2023-09-22 RX ORDER — HYDROMORPHONE HCL/PF 1 MG/ML
0.5 SYRINGE (ML) INJECTION ONCE
Status: COMPLETED | OUTPATIENT
Start: 2023-09-22 | End: 2023-09-22

## 2023-09-22 RX ORDER — KETAMINE HCL IN NACL, ISO-OSM 100MG/10ML
50 SYRINGE (ML) INJECTION ONCE
Status: COMPLETED | OUTPATIENT
Start: 2023-09-22 | End: 2023-09-22

## 2023-09-22 RX ORDER — ATORVASTATIN CALCIUM 40 MG/1
40 TABLET, FILM COATED ORAL
Status: DISCONTINUED | OUTPATIENT
Start: 2023-09-22 | End: 2023-09-23 | Stop reason: HOSPADM

## 2023-09-22 RX ORDER — GABAPENTIN 300 MG/1
300 CAPSULE ORAL 4 TIMES DAILY
Status: DISCONTINUED | OUTPATIENT
Start: 2023-09-22 | End: 2023-09-22

## 2023-09-22 RX ORDER — AMOXICILLIN 250 MG
1 CAPSULE ORAL
Status: DISCONTINUED | OUTPATIENT
Start: 2023-09-22 | End: 2023-09-23 | Stop reason: HOSPADM

## 2023-09-22 RX ORDER — HYDROMORPHONE HCL/PF 1 MG/ML
0.5 SYRINGE (ML) INJECTION
Status: DISCONTINUED | OUTPATIENT
Start: 2023-09-22 | End: 2023-09-22 | Stop reason: SDUPTHER

## 2023-09-22 RX ORDER — OXYCODONE HYDROCHLORIDE 10 MG/1
10 TABLET ORAL EVERY 4 HOURS PRN
Status: DISCONTINUED | OUTPATIENT
Start: 2023-09-22 | End: 2023-09-23 | Stop reason: HOSPADM

## 2023-09-22 RX ORDER — HYDROMORPHONE HCL/PF 1 MG/ML
0.5 SYRINGE (ML) INJECTION
Status: DISCONTINUED | OUTPATIENT
Start: 2023-09-22 | End: 2023-09-23 | Stop reason: HOSPADM

## 2023-09-22 RX ORDER — HYDROMORPHONE HCL/PF 1 MG/ML
2 SYRINGE (ML) INJECTION ONCE
Status: COMPLETED | OUTPATIENT
Start: 2023-09-22 | End: 2023-09-22

## 2023-09-22 RX ADMIN — GABAPENTIN 300 MG: 300 CAPSULE ORAL at 12:40

## 2023-09-22 RX ADMIN — OXYCODONE HYDROCHLORIDE 10 MG: 10 TABLET ORAL at 15:16

## 2023-09-22 RX ADMIN — SENNOSIDES AND DOCUSATE SODIUM 1 TABLET: 50; 8.6 TABLET ORAL at 21:18

## 2023-09-22 RX ADMIN — METOPROLOL SUCCINATE 100 MG: 50 TABLET, EXTENDED RELEASE ORAL at 12:40

## 2023-09-22 RX ADMIN — ATORVASTATIN CALCIUM 40 MG: 40 TABLET, FILM COATED ORAL at 16:34

## 2023-09-22 RX ADMIN — GABAPENTIN 300 MG: 300 CAPSULE ORAL at 16:34

## 2023-09-22 RX ADMIN — ACETAMINOPHEN 975 MG: 325 TABLET, FILM COATED ORAL at 15:17

## 2023-09-22 RX ADMIN — HYDROMORPHONE HYDROCHLORIDE 2 MG: 1 INJECTION, SOLUTION INTRAMUSCULAR; INTRAVENOUS; SUBCUTANEOUS at 08:02

## 2023-09-22 RX ADMIN — HYDROMORPHONE HYDROCHLORIDE 0.5 MG: 1 INJECTION, SOLUTION INTRAMUSCULAR; INTRAVENOUS; SUBCUTANEOUS at 14:24

## 2023-09-22 RX ADMIN — Medication 50 MG: at 09:07

## 2023-09-22 RX ADMIN — METHOCARBAMOL 500 MG: 500 TABLET ORAL at 16:33

## 2023-09-22 RX ADMIN — HYDROMORPHONE HYDROCHLORIDE 1 MG: 1 INJECTION, SOLUTION INTRAMUSCULAR; INTRAVENOUS; SUBCUTANEOUS at 09:33

## 2023-09-22 RX ADMIN — OXYCODONE HYDROCHLORIDE 10 MG: 10 TABLET ORAL at 19:30

## 2023-09-22 RX ADMIN — HYDROMORPHONE HYDROCHLORIDE 1 MG: 1 INJECTION, SOLUTION INTRAMUSCULAR; INTRAVENOUS; SUBCUTANEOUS at 07:05

## 2023-09-22 RX ADMIN — HYDROMORPHONE HYDROCHLORIDE 0.5 MG: 1 INJECTION, SOLUTION INTRAMUSCULAR; INTRAVENOUS; SUBCUTANEOUS at 17:45

## 2023-09-22 RX ADMIN — NICOTINE 1 PATCH: 21 PATCH, EXTENDED RELEASE TRANSDERMAL at 12:40

## 2023-09-22 RX ADMIN — HYDROMORPHONE HYDROCHLORIDE 0.5 MG: 1 INJECTION, SOLUTION INTRAMUSCULAR; INTRAVENOUS; SUBCUTANEOUS at 21:38

## 2023-09-22 RX ADMIN — DULOXETINE HYDROCHLORIDE 30 MG: 30 CAPSULE, DELAYED RELEASE ORAL at 12:40

## 2023-09-22 RX ADMIN — ACETAMINOPHEN 975 MG: 325 TABLET, FILM COATED ORAL at 21:18

## 2023-09-22 RX ADMIN — MIDAZOLAM 1 MG: 1 INJECTION INTRAMUSCULAR; INTRAVENOUS at 09:06

## 2023-09-22 RX ADMIN — GABAPENTIN 900 MG: 300 CAPSULE ORAL at 21:18

## 2023-09-22 RX ADMIN — SILVER NITRATE APPLICATORS 1 APPLICATOR: 25; 75 STICK TOPICAL at 09:19

## 2023-09-22 RX ADMIN — HYDROMORPHONE HYDROCHLORIDE 0.5 MG: 1 INJECTION, SOLUTION INTRAMUSCULAR; INTRAVENOUS; SUBCUTANEOUS at 11:44

## 2023-09-22 NOTE — CONSULTS
Consultation - Mitchel Rojas 66 y.o. male MRN: [id number]    Unit/Bed#: ED 12 Encounter: [id number]      Assessment/Plan     Assessment/Plan:  Left BKA dehiscence s/p fall   -pt reports he fell this am and opened his incision  -BKA done 8/22  -repaired at bedside under conscious sedation  -will admit for pain control  -medicine for medical management   -Seen and repaired with Dr. Callie Kerr at home    HTN  -Toprol at home    Gout  -not on medication    T1DM  -insulin    CKD2  -trend creat  -avoid nephrotoxic medication    History of Present Illness     HPI: Mitchel Rojas is a 66y.o. year old male with pmh for HLD, HTN, Gout, T1DM, CKD2, s/p left BKA 8/22 who presents to the ER after falling at home this morning which resulted in his wound opening up. He reports that he fell about 6 am today. He is in much pain since this happened. He has not eaten since last evening. Consult to surgery general  Consult performed by: Jigar Beasley PA-C  Consult ordered by: Thi Chacko DO          Review of Systems   Constitutional: Negative for chills and fever. HENT: Negative for ear pain and sore throat. Eyes: Negative for pain and visual disturbance. Respiratory: Negative for cough and shortness of breath. Cardiovascular: Negative for chest pain and palpitations. Gastrointestinal: Negative for abdominal pain and vomiting. Genitourinary: Negative for dysuria and hematuria. Musculoskeletal: Positive for gait problem. Negative for arthralgias and back pain. Skin: Positive for wound. Negative for color change and rash. Neurological: Negative for seizures and syncope. All other systems reviewed and are negative.       Historical Information   Past Medical History:   Diagnosis Date   • Chronic foot ulcer ([address]) 09/07/2018   • Diabetes mellitus ([address])    • Diabetic foot ulcer ([address]) 5/8/2023   • Gout    • High cholesterol    • Hypertension    • Visual impairment 11/1/2022    Cateract Past Surgical History:   Procedure Laterality Date   • CATARACT EXTRACTION Right 2023   • COMPLEX WOUND CLOSURE TO EXTREMITY Left 2019    Procedure: COMPLEX CLOSURE LEFT CHEEK;  Surgeon: Eric Willett MD;  Location:  MAIN OR;  Service: Plastics   • FACIAL/NECK BIOPSY Left 2019    Procedure: EXCISION LEFT CHEEK CYST;  Surgeon: Eric Willett MD;  Location:  MAIN OR;  Service: Plastics   • HERNIA REPAIR Left     several times   • KNEE ARTHROSCOPY Left     torn meniscus   • LEG AMPUTATION THROUGH LOWER TIBIA AND FIBULA Left 2023    Procedure: AMPUTATION BELOW KNEE (BKA);   Surgeon: Radha Gonzales MD;  Location:  MAIN OR;  Service: General   • [place] AMPUTATION FOOT TRANSMETARSAL Left 2023    Procedure: AMPUTATION TRANSMETATARSAL (TMA);  Surgeon: Meredith Mohs, DPM;  Location:  MAIN OR;  Service: Podiatry   • WOUND DEBRIDEMENT Left 2023    Procedure: DEBRIDEMENT WOUND LEFT FOOT WOUND WITH EXCISION OF INFECTED BONE AND WOUND VAC APPLICATION;  Surgeon: Anne Wang DPM;  Location:  MAIN OR;  Service: Podiatry     Social History   Social History     Substance and Sexual Activity   Alcohol Use Yes    Comment: 3/4 beers a day     Social History     Substance and Sexual Activity   Drug Use Never     Social History     Tobacco Use   Smoking Status Every Day   • Packs/day: 1.00   • Years: 20.00   • Total pack years: 20.00   • Types: Cigarettes   • Last attempt to quit: 2022   • Years since quittin.7   Smokeless Tobacco Never   Tobacco Comments    encouraged smoking cessation     E-Cigarette/Vaping   • E-Cigarette Use Never User      E-Cigarette/Vaping Substances   • Nicotine No    • THC No    • CBD No    • Flavoring No    • Other No    • Unknown No       Family History: non-contributory    Meds/Allergies   all current active meds have been reviewed  Allergies   Allergen Reactions   • Cefuroxime Other (See Comments) and GI Intolerance   • Amoxicillin-Pot Clavulanate Nausea Only and GI Intolerance       Objective   Vitals: Blood pressure 143/77, pulse 89, temperature 98.5 °F (36.9 °C), resp. rate 18, SpO2 99 %. No intake or output data in the 24 hours ending 09/22/23 0842  Invasive Devices     Peripheral Intravenous Line  Duration           Peripheral IV 09/22/23 Right Antecubital <1 day          Line  Duration           Pump Device Insulin pump Anterior; Left Abdomen 84 days                Physical Exam  Vitals and nursing note reviewed. Constitutional:       General: He is not in acute distress. Appearance: He is well-developed. HENT:      Head: Normocephalic and atraumatic. Eyes:      Conjunctiva/sclera: Conjunctivae normal.   Cardiovascular:      Rate and Rhythm: Normal rate and regular rhythm. Heart sounds: No murmur heard. Pulmonary:      Effort: Pulmonary effort is normal. No respiratory distress. Breath sounds: Normal breath sounds. Abdominal:      Palpations: Abdomen is soft. Tenderness: There is no abdominal tenderness. Musculoskeletal:         General: No swelling. Cervical back: Neck supple. Skin:     General: Skin is warm and dry. Capillary Refill: Capillary refill takes less than 2 seconds. Comments: Left BKA stump dehisced and pulsatile bleeding noted upon dressing removal.    Neurological:      General: No focal deficit present. Mental Status: He is alert and oriented to person, place, and time. Psychiatric:         Mood and Affect: Mood normal.         Lab Results: I have personally reviewed pertinent reports. Imaging Studies: I have personally reviewed pertinent reports. EKG, Pathology, and Other Studies: I have personally reviewed pertinent reports. VTE Prophylaxis: Reason for no pharmacologic prophylaxis pt in ER    Code Status: Prior  Advance Directive and Living Will:      Power of :    POLST:      Counseling / Coordination of Care  Total floor / unit time spent today 45 minutes.  Greater than 50% of total time was spent with the patient and / or family counseling and / or coordination of care. A description of the counseling / coordination of care: in my consultation note.

## 2023-09-22 NOTE — ASSESSMENT & PLAN NOTE
Tylenol scheduled  Robaxin as needed  Oxy 5 mg, 10 mg every 4 hours as needed, Dilaudid for breakthrough pain  Xanax as needed

## 2023-09-22 NOTE — DISCHARGE INSTR - AVS FIRST PAGE
Wound care instructions:   -Clean wound with soap and water daily  -Dry with clean gauze  -Apply Dermagran to open areas  -Cover wound with clean 4x4 gauze, ABD pad, and wrap gently with Kerlix.   -Wrap with ACE wrap with light compression as needed for edema    Ice 20 min on and 20 min off    Elevate left leg while at rest.

## 2023-09-22 NOTE — ASSESSMENT & PLAN NOTE
Lab Results   Component Value Date    HGBA1C 7.9 (H) 07/20/2023       Recent Labs     09/22/23  1626   POCGLU 169*       Blood Sugar Average: Last 72 hrs:  (P) 169   On insulin pump  Diabetic diet  Daily Accu-Cheks  Hypoglycemia protocol

## 2023-09-22 NOTE — CASE MANAGEMENT
Case Management Assessment & Discharge Planning Note    Patient name Sandrita Campos  Location /-75 MRN [id number]  : 1960 Date 2023       Current Admission Date: 2023  Current Admission Diagnosis:Benign essential hypertension  Patient Active Problem List    Diagnosis Date Noted   • Major depressive disorder in full remission ([address]) 2023   • Continuous opioid dependence ([address]) 2023   • Acute pain due to injury 2023   • Osteomyelitis ([address]) 2023   • Type 1 diabetes mellitus with diabetic neuropathy ([address]) 2023   • Status post below-knee amputation of left lower extremity ([address]) 2023   • Stage 2 chronic kidney disease 2023   • Hyponatremia 2023   • Alcohol abuse 2023   • Nicotine abuse 2023   • 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, with fat layer exposed ([address]) 2023   • Type 1 diabetes mellitus with hyperglycemia ([address]) 2022   • Diabetic polyneuropathy associated with type 1 diabetes mellitus ([address]) 2022   • Microalbuminuria due to type 1 diabetes mellitus ([address]) 2022   • Hypoglycemia unawareness associated with type 1 diabetes mellitus ([address]) 2022   • Cigarette nicotine dependence without complication    • Gout of ankle 2022   • Chronic fatigue syndrome 2022   • Lower urinary tract symptoms due to benign prostatic hyperplasia 2022   • 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 ([address]) 2022   • Chronic pain 2022   • Insulin pump in place 2018   • Benign essential hypertension 2011   • Mixed hyperlipidemia 10/25/2010   • ED (erectile dysfunction) of organic origin 10/25/2010      LOS (days): 0  Geometric Mean LOS (GMLOS) (days):   Days to GMLOS:     OBJECTIVE:              Current admission status: Observation       Preferred Pharmacy:   [address] [place] [address] #[id number] ANTON Yeung - [address] [place] [address]  Phone: [phone] Fax: [phone]    Primary Care Provider: Shara Benson MD    Primary Insurance: [id number] [facility]  Secondary Insurance:     ASSESSMENT:  Rosemary Proxies     Saul Lopez [address] Representative - Spouse   Primary Phone: [phone] [facility]  Home Phone: [phone]               Advance Directives  Does patient have a [address]?: No  Was patient offered paperwork?: Yes (declined)  Does patient currently have a Health Care decision maker?: Yes, please see Health Care Proxy section  Does patient have Advance Directives?: No  Was patient offered paperwork?: Yes (declined)    Obs Notice Signed: 09/22/23    Readmission Root Cause  30 Day Readmission: No    Patient Information  Admitted from[de-identified] Home  Mental Status: Alert  During Assessment patient was accompanied by: Spouse  Assessment information provided by[de-identified] Patient, Spouse  Support Systems: Spouse/significant other, [facility] of Residence: [address] do you live in?: [address] [facility] entry access options.  Select all that apply.: Stairs  Number of steps to enter home.: 2  Do the steps have railings?: No  Type of Current Residence: Bi-level  Upon entering residence, is there a bedroom on the main floor (no further steps)?: Yes  Upon entering residence, is there a bathroom on the main floor (no further steps)?: Yes  In the last 12 months, was there a time when you were not able to pay the mortgage or rent on time?: No  In the last 12 months, how many places have you lived?: 1  In the last 12 months, was there a time when you did not have a steady place to sleep or slept in a shelter (including now)?: No  Homeless/housing insecurity resource given?: N/A  Living Arrangements: Lives w/ Spouse/significant other  Is patient a ?: Yes  Is patient active with [facility])?: No    Activities of Daily Living Prior to Admission  Functional Status: Independent  Completes ADLs independently?: Yes  Ambulates independently?: Yes  Does patient use assisted devices?: Yes  Assisted Devices (DME) used: [place], Wheelchair, Straight Cane  Does patient currently own DME?: Yes  What DME does the patient currently own?: Straight Kimmy Rase, Wheelchair  Does patient have a history of Outpatient Therapy (PT/OT)?: No  Does the patient have a history of Short-Term Rehab?: No  Does patient have a history of HHC?: Yes (hx [facility])  Does patient currently have [facility]?: No    Patient Information Continued  Does patient have prescription coverage?: Yes  Within the past 12 months, you worried that your food would run out before you got the money to buy more.: Never true  Within the past 12 months, the food you bought just didn't last and you didn't have money to get more.: Never true  Food insecurity resource given?: N/A  Does patient receive dialysis treatments?: No  Does patient have a history of substance abuse?: No  Does patient have a history of Mental Health Diagnosis?: No    Means of Transportation  Means of Transport to Appts[de-identified] Family transport  In the past 12 months, has lack of transportation kept you from medical appointments or from getting medications?: No  In the past 12 months, has lack of transportation kept you from meetings, work, or from getting things needed for daily living?: No  Was application for public transport provided?: N/A    DISCHARGE DETAILS:    Discharge planning discussed with[de-identified] patient and wife  Freedom of Choice: Yes  Comments - Freedom of Choice: CM discussed therapies recommendation for [facility]. Pt and wife are agreeable.   CM contacted family/caregiver?: Yes  Were Treatment Team discharge recommendations reviewed with patient/caregiver?: Yes  Did patient/caregiver verbalize understanding of patient care needs?: Yes  Were patient/caregiver advised of the risks associated with not following Treatment Team discharge recommendations?: Yes    Contacts  Patient Contacts: Jose Manning (wife)  Relationship to Patient[de-identified] Family  Contact Method: In Person  Reason/Outcome: Referral, Discharge [address]         Is the patient interested in [facility] at discharge?: Yes  [facility] requested[de-identified] Nursing, Physical Therapy, Occupational [address] Name[de-identified] [facility] External Referral Reason (only applicable if external HHA name selected): Patient has established relationship with provider  [facility] Provider[de-identified] PCP  Home Health Services Needed[de-identified] Evaluate Functional Status and Safety, Gait/ADL Training, Strengthening/Theraputic Exercises to Improve Function, Diabetes Management, Wound/Ostomy Care  Homebound Criteria Met[de-identified] Uses an Assist Device (i.e. cane, walker, etc), Requires the Assistance of Another Person for Safe Ambulation or to Leave the Home  Supporting Clincal Findings[de-identified] Limited Endurance    DME Referral Provided  Referral made for DME?: No    Other Referral/Resources/Interventions Provided:  Interventions: [facility]  Referral Comments: Referral to [facility]     Treatment Team Recommendation: Home with [facility]  Discharge Destination Plan[de-identified] Home with [facility] at Discharge : Family     Additional Comments: Met with pt and pt's wife Jose Manning to discuss the role of CM and to discuss any help pt may need prior to dc. Pt lives with Jose Manning in a bi-level home with 2 KASSY. Pt performed ADL's indptly pta, pt uses a cane, RW, and WC. No hx of rehab. No hx of mental health or D&A treatment. Pt's preferred pharmacy is [facility] . Pt reports recently finishing with [facility] and about to start outpt therapy on 9/26. CM discussed therapies recommendation for [facility]; pt and wife are agreeable and preferring [facility]. [facility] referral sent. [facility] accepted. CM added [facility] to dc instructions and informed of tentative dc for tomorrow 9/23. Pt's wife to transport home at dc.

## 2023-09-22 NOTE — PLAN OF CARE
Problem: PHYSICAL THERAPY ADULT  Goal: Performs mobility at highest level of function for planned discharge setting. See evaluation for individualized goals. Description: Treatment/Interventions: Functional transfer training, LE strengthening/ROM, Elevations, Therapeutic exercise, Endurance training, Patient/family training, Equipment eval/education, Bed mobility, Gait training          See flowsheet documentation for full assessment, interventions and recommendations. 9/22/2023 1630 by Chong Peña PT  Note:    Problem List: Impaired balance, Decreased mobility, Decreased skin integrity, Orthopedic restrictions, Pain  Assessment: Odilon Waldrop is a 66 y.o. Male who presents to [address] [place] [address] on 9/22/2023 from home w/ c/o fall onto residual limb and diagnosis of L BKA dehiscence s/p fall. Orders for PT eval and treat received, w/ activity orders of NWB L LE and fall precautions. Pt presents w/ comorbidities of L BKA (8/22/2023), HLD, HTN, T1DM, CKD Stage 2, . At baseline, pt mobilizes supervision w/ RW vs WC. Upon evaluation, pt presents w/ the following deficits: impaired skin integrity, impaired balance and pain limiting functional mobility. Upon eval, pt requires supervision for bed mobility, supervision for transfers, and supervision for gait. Based on this PT evaluation today, patient's discharge recommendation is for Level III. During this admission, pt would benefit from continued skilled inpatient PT in the acute care setting in order to address the abovementioned deficits to maximize function and mobility before DC from acute care. See flowsheet documentation for full assessment.

## 2023-09-22 NOTE — PHYSICAL THERAPY NOTE
PHYSICAL THERAPY EVALUATION NOTE    Patient Name: Jasen Avelar  JNVWA'J Date: 2023    AGE:   66 y.o. Mrn:   [id number]  ADMIT DX:  Mixed hyperlipidemia [E78.2]  Benign essential hypertension [I10]  Leg pain [M79.606]  Wound dehiscence [T81.30XA]  Type 1 diabetes mellitus with diabetic neuropathy (HCC) [E10.40]  Stage 2 chronic kidney disease [N18.2]    Past Medical History:   Diagnosis Date    Chronic foot ulcer ([address]) 2018    Diabetes mellitus ([address])     Diabetic foot ulcer ([address]) 2023    Gout     High cholesterol     Hypertension     Visual impairment 2022    Cateract     Length Of Stay: 0  PHYSICAL THERAPY EVALUATION :   Patient's identity confirmed via 2 patient identifiers (full name and ) at start of session       23 1521   PT Last Visit   PT Visit Date 23   Note Type   Note type Evaluation   Pain Assessment   Pain Assessment Tool 0-10   Pain Score 10 - Worst Possible Pain   Pain Location/Orientation Orientation: Left  (Over residual limb site and combined w/ phantom limb)   Patient's Stated Pain Goal No pain   Hospital Pain Intervention(s) Repositioned; Ambulation/increased activity; Emotional support   Restrictions/Precautions   Weight Bearing Precautions Per Order Yes   LLE Weight Bearing Per Order NWB   Other Precautions Bed Alarm; Fall Risk;Pain;WBS   Home Living   Type of [address] [place] [address] Dr Two level;Performs ADLs on one level;Stairs to enter with rails  (1+1 KASSY, 4 steps to main level)   Bathroom Shower/Tub Tub/shower unit   Bathroom Equipment Grab bars in [address]; Wheelchair-manual   Additional Comments Pt reports using WC, but does very well for short household distances w/ RW in/out of bathroom   Prior Function   Level of [place] Independent with ADLs; Independent with functional mobility; Needs assistance with IADLS   Lives With Spouse  (+ Dogs)   Receives Help From Family   IADLs Family/Friend/Other provides transportation; Family/Friend/Other provides meals; Family/Friend/Other provides medication management   Falls in the last 6 months 1 to 4   General   Family/Caregiver Present No  (Wife briefly enters/exits to go home and get his glasses)   Cognition   Overall Cognitive Status WFL   Arousal/Participation Alert   Attention Within functional limits   Orientation Level Oriented X4   Memory Within functional limits   Following Commands Follows all commands and directions without difficulty   Comments Pt cooperative, pleasant throughout session. Clearly in significant pain but motivated to mobilize. Pt spents time verbalizing and appearing very proud of his progress since his initial amputation 1 month ago. Provided continued encouragement and praise that he does appear to be doing really well and to not let this stop him from being successful   Subjective   Subjective "I'm just so frustrated. It was healing so nicely.  I get on and off the toilet hundreds of times without a problem, I think it was because my sugar was low"   RLE Assessment   RLE Assessment WFL   Strength RLE   RLE Overall Strength 4/5   LLE Assessment   LLE Assessment WFL   Strength LLE   LLE Overall Strength 4-/5   Light Touch   RLE Light Touch Grossly intact   LLE Light Touch   (phantom limb)   Bed Mobility   Supine to Sit 5  Supervision   Sit to Supine 5  Supervision   Additional Comments Pt performed supine <> sit x 2, tried to urinate while sitting EOB,   Transfers   Sit to Stand 5  Supervision   Additional items Armrests   Stand to Sit 5  Supervision   Additional items Armrests   Toilet transfer 5  Supervision   Additional items Assist x 1  (commode over toilet for height, grab bars)   Ambulation/Elevation   Gait pattern   (3 point gait hopping)   Gait Assistance 5  Supervision   Additional items Assist x 1   Assistive Device Rolling walker   Distance 25 ft x 2  (toileting between) Ambulation/Elevation Additional Comments Pt requests therapist to stay close by due to significant pain and to be safe. Demonstrates and verbalizes good safety awareness throughout, takes his time with tasks. Anticipate patient would be modified I for ALL mobility if pain better controlled   Balance   Static Sitting Normal   Dynamic Sitting Normal   Static Standing Fair +   Dynamic Standing Fair +   Ambulatory Fair   Activity Tolerance   Activity Tolerance Patient limited by pain  (despite significant pain, is able to tolerate activity well)   Medical Staff Made Aware OT Tere, CECE Perez, Provided session summary to SL[facility] Gen Surg AP on call via TT   Nurse Made Aware RN Obie Melgoza and [facility]   Assessment   Problem List Impaired balance;Decreased mobility; Decreased skin integrity;Orthopedic restrictions;Pain   Assessment Delta Giuliano is a 66 y.o. Male who presents to [address] [place] [address] on 9/22/2023 from home w/ c/o fall onto residual limb and diagnosis of L BKA dehiscence s/p fall. Orders for PT eval and treat received, w/ activity orders of NWB L LE and fall precautions. Pt presents w/ comorbidities of L BKA (8/22/2023), HLD, HTN, T1DM, CKD Stage 2, . At baseline, pt mobilizes supervision w/ RW vs WC. Upon evaluation, pt presents w/ the following deficits: impaired skin integrity, impaired balance and pain limiting functional mobility. Upon eval, pt requires supervision for bed mobility, supervision for transfers, and supervision for gait. Based on this PT evaluation today, patient's discharge recommendation is for Level III. During this admission, pt would benefit from continued skilled inpatient PT in the acute care setting in order to address the abovementioned deficits to maximize function and mobility before DC from acute care.    Goals   Patient Goals to have less pain, get back to having his residual limb healing well   STG Expiration Date 10/02/23   Short Term Goal #1 Patient will: Perform all bed mobility tasks modified independent to improve pt's independence w/ repositioning for decrease risk of skin breakdown, Perform all transfers modified independent consistently from various height surfaces in order to improve I w/ engagement w/ real-world environments/situations, Ambulate at least 75 ft. with roller walker w/ supervision w/o LOB to facilitate return and engagement w/ previous living environment, Navigate 6 stairs w/ supervision with unilateral handrail to either improve independence w/ entering home and/or so patient can fully access living areas in home and Propel in wheelchair for at least 150 ft w/ w/ supervision over tile surface in order to be able to use WC as alternate means of mobility   PT Treatment Day 0   Plan   Treatment/Interventions Functional transfer training;LE strengthening/ROM; Elevations; Therapeutic exercise; Endurance training;Patient/family training;Equipment eval/education; Bed mobility;Gait training   PT Frequency 2-3x/wk   Recommendation   UB Rehab Discharge Recommendation (PT/OT) Level 3   AM-PAC Basic Mobility Inpatient   Turning in Flat Bed Without Bedrails 4   Lying on Back to Sitting on Edge of Flat Bed Without Bedrails 3   Moving Bed to Chair 3   Standing Up From Chair Using Arms 3   Walk in Room 3   Climb 3-5 Stairs With Railing 3   Basic Mobility Inpatient Raw Score 19   Basic Mobility Standardized Score 42.48   Highest Level Of Mobility   JH-HLM Goal 6: Walk 10 steps or more   JH-HLM Achieved 7: Walk 25 feet or more   End of Consult   Patient Position at End of Consult Supine;Bed/Chair alarm activated; All needs within reach  (Pt requesting to lay back down in bed due to significant pain)       The patient's AM-PAC Basic Mobility Inpatient Short Form Raw Score is 19, Standardized Score is 42.48.      Pt would benefit from skilled inpatient PT during this admission in order to facilitate progress towards goals to maximize functional independence    Pernell Mcclelland, PT, DPT

## 2023-09-22 NOTE — ASSESSMENT & PLAN NOTE
Previous hospitalization secondary to osteomyelitis  Patient had fall this morning and wound dehiscence  Surgery consulted  Pain management  Zofran as needed

## 2023-09-22 NOTE — OCCUPATIONAL THERAPY NOTE
Occupational Therapy Evaluation     Patient Name: Phil Barbosa  [id number] Date: 9/22/2023  Problem List  Active Problems:    Benign essential hypertension    Past Medical History  Past Medical History:   Diagnosis Date    Chronic foot ulcer ([address]) 09/07/2018    Diabetes mellitus ([address])     Diabetic foot ulcer ([address]) 5/8/2023    Gout     High cholesterol     Hypertension     Visual impairment 11/1/2022    Cateract     Past Surgical History  Past Surgical History:   Procedure Laterality Date    CATARACT EXTRACTION Right 01/2023    COMPLEX WOUND CLOSURE TO EXTREMITY Left 07/18/2019    Procedure: COMPLEX CLOSURE LEFT CHEEK;  Surgeon: Lucero Szymanski MD;  Location: [facility];  Service: Plastics    FACIAL/NECK BIOPSY Left 07/18/2019    Procedure: EXCISION LEFT CHEEK CYST;  Surgeon: Lucero Szymanski MD;  [facility];  Service: Plastics    HERNIA REPAIR Left     several times    KNEE ARTHROSCOPY Left     torn meniscus    LEG AMPUTATION THROUGH LOWER TIBIA AND FIBULA Left 8/22/2023    Procedure: AMPUTATION BELOW KNEE (BKA); Surgeon: Ade Beth MD;  Location: [facility];  Service: General    RI AMPUTATION FOOT TRANSMETARSAL Left 6/30/2023    Procedure: AMPUTATION TRANSMETATARSAL (TMA);  Surgeon: Cayden Garcia DPM;  [facility];  Service: Podiatry    WOUND DEBRIDEMENT Left 5/30/2023    Procedure: DEBRIDEMENT WOUND LEFT FOOT WOUND WITH EXCISION OF INFECTED BONE AND WOUND VAC APPLICATION;  Surgeon: Benjamin Kelly DPM;  [facility];  Service: Podiatry             09/22/23 1520   OT Last Visit   OT Visit Date 09/22/23   Note Type   Note type Evaluation   Pain Assessment   Pain Assessment Tool 0-10   Pain Score 10 - Worst Possible Pain   Pain Location/Orientation Orientation: Left; Other (Comment)  (BKA)   Patient's Stated Pain Goal No pain   Hospital Pain Intervention(s) Ambulation/increased activity;Repositioned;Elevated   Restrictions/Precautions   Weight Bearing Precautions Per Order No   Other Precautions Bed Alarm; Fall Risk;Pain;WBS   Home Living   Type of [address] [place] [address] Dr Two level;Stairs to enter with rails; Performs ADLs on one level  (1+1 KASSY & 4 Steps to main level)   Bathroom Shower/Tub Tub/shower unit  (Pt reports using the bathroom vanity & window sill for toilet txfers)   Bathroom Equipment Grab bars in [address]; Wheelchair-manual   Additional Comments Pt reports mainly using WC but reports he does very well with the RW. Prior Function   Level of [place] Independent with ADLs; Independent with functional mobility; Needs assistance with IADLS   Lives With Spouse   Receives Help From Family   IADLs Family/Friend/Other provides transportation; Family/Friend/Other provides meals; Family/Friend/Other provides medication management   Falls in the last 6 months 1 to 4   Lifestyle   Autonomy Pt reports he is independent with ADLs but only performs them with someone home for safety reasons. Reciprocal Relationships Supportive wife & family   Intrinsic Gratification Loves his dog Becky   General   Additional Pertinent History Pt with fall this AM resulting in dehiscing of his sutures on his BKA wound. Pt reports his blood sugars were low this AM when transferring off toilet. Pt expresses frustration as he states he has done toilet txfers "thousands of times," and is worried this is going to set him back. Family/Caregiver Present No   Additional General Comments Pt reports just having been DCed [facility] & was ready to begin OPPT. Subjective   Subjective Pt expresses frustration with his current situation because he felt his BKA surgical wound was healing very well & that he was doing functionally well at home. Pt expresses concerns with this setting him back in his healing process.  pt visibly in extreme pain as noted by whole body jerking movement   ADL   Eating Assistance [address] Supervision/Setup   LB Bathing Assistance 5  Supervision/Setup   UB Dressing Assistance 5  Supervision/Setup   LB Dressing Assistance 5  Supervision/Setup   LB Dressing Deficit Pull up over hips   Toileting Assistance  5  Supervision/Setup   Toileting Deficit Clothing management up;Clothing management down;Grab bar use;Supervison/safety  (BSC over toilet. Pt able to manage shorts/underwear up & down with unilateral UE support on GB)   Bed Mobility   Supine to Sit 5  Supervision   Sit to Supine 5  Supervision   Transfers   Sit to Stand 5  Supervision   Additional items Armrests   Stand to Sit 5  Supervision   Additional items Armrests   Toilet transfer 5  Supervision   Additional items Commode;Armrests; Other  (over toilet, grab bar)   Functional Mobility   Functional Mobility 5  Supervision   Additional Comments Close supervision for mobility due to high pain level   Additional items Rolling walker   Balance   Static Sitting Normal   Dynamic Sitting Normal   Static Standing Fair +   Dynamic Standing Fair   Ambulatory Fair   Activity Tolerance   Activity Tolerance Patient tolerated treatment well;Patient limited by pain  (Despite extreme pain, pt tolerate activity very well)   Medical Staff Made Aware PT Marjorie   Nurse Made Aware MEGAN [facility] Assessment   RUE Assessment WFL   LUE Assessment   LUE Assessment WFL   Cognition   Overall Cognitive Status WFL   Arousal/Participation Alert   Attention Within functional limits   Orientation Level Oriented X4   Memory Within functional limits   Following Commands Follows all commands and directions without difficulty   Assessment   Limitation Decreased self-care trans;Decreased high-level ADLs; Decreased ADL status   Prognosis Good   Assessment Pt is a 66 y.o. male seen for OT evaluation at [facility], admitted 9/22/2023 s/p fall with wound dehiscence. OT completed extensive review of pt's medical and social history.  Comorbidities affecting pt's functional performance at time of assessment include: HTN, chronic fatigue syndrome, DM1, s/p L BKA, chronic pain, diabetic polyneuropathy, h/o alcohol abuse, h/o MDD. Personal factors affecting pt at time of IE include: steps to enter environment, difficulty performing ADLS, difficulty performing IADLS  and environment. Prior to admission, pt was living with his wife in a bi-level home with 1+1 then 4STE. Pt was I w/  ADLS and A w/ IADLS, (-) drove, & required use of wheelchair  and RW PTA. Upon evaluation: Pt requires S for bed mobility, S for functional mobility/transfers, S for UB ADLs and S for LB ADLS 2* the following deficits impacting occupational performance: decreased balance, increased pain and orthopedic restrictions. Full objective findings from OT assessment regarding body systems outlined above. Pt to benefit from continued skilled OT tx while in the hospital to address deficits as defined above and maximize level of functional independence w/ ADL's and functional mobility. Occupational Performance areas to address include: grooming, bathing/shower, toilet hygiene, dressing, functional mobility, community mobility and clothing management. Based on findings, pt is of high complexity. The patient's raw score on the AM-PAC Daily Activity inpatient short form is 21, standardized score is 44.27, greater than 39.4. Patients at this level are likely to benefit from DC to home. However, please refer to therapist recommendation for discharge planning given other factors that may influence destination. At this time, OT recommendations at time of discharge are DC with Level III resources. Goals   Patient Goals Pt wants to have less pain   Plan   Treatment Interventions ADL retraining;Functional transfer training;Equipment evaluation/education;Patient/family training; Compensatory technique education;Continued evaluation   Goal Expiration Date 10/02/23   OT Treatment Day 0   OT Frequency 2-3x/wk Recommendation   UB Rehab Discharge Recommendation (PT/OT) Level 3   AM-PAC Daily Activity Inpatient   Lower Body Dressing 3   Bathing 3   Toileting 3   Upper Body Dressing 4   Grooming 4   Eating 4   Daily Activity Raw Score 21   Daily Activity Standardized Score (Calc for Raw Score >=11) 44.27   AM-PAC Applied Cognition Inpatient   Following a Speech/Presentation 4   Understanding Ordinary Conversation 4   Taking Medications 4   Remembering Where Things Are Placed or Put Away 4   Remembering List of 4-5 Errands 4   Taking Care of Complicated Tasks 4   Applied Cognition Raw Score 24   Applied Cognition Standardized Score 62.21   End of Consult   Education Provided Yes   Patient Position at End of Consult Supine;Bed/Chair alarm activated; All needs within reach   Nurse Communication Nurse aware of consult     Pt will achieve the following goals within 10 days. *Pt will complete LB bathing and dressing with Mod I & DME PRN. *Pt will complete toileting w/ Mod I w/ G hygiene/thoroughness using DME PRN    *Pt will perform functional transfers with on/off all surfaces with Mod I using DME as needed w/ G balance/safety. *Pt will improve functional mobility during ADL/IADL/leisure tasks to Mod I using DME as needed w/ G balance/safety. *Pt will improve standing balance to G for 8-10 minutes during purposeful activity w/ Mod I & G endurance.      *Assess DME needs    Vanita Perez OTR/L

## 2023-09-22 NOTE — ED PROVIDER NOTES
History  Chief Complaint   Patient presents with   • Leg Pain     Pt arrived to ER c/o leg pain from an open incision from a LBKA that he underwent on . Per EMS upon their arrival the stump incision was "open and bleeding noted." Pt apparenty fell as he attempted to stand from using the toilet. This is a 81-year-old male who had a left BKA on  fell in the bathroom today and has a wound dehiscence. Denies any other areas of injury no blood thinners no neck or back pain      History provided by:  Patient and EMS personnel  Medical Problem  Location:  Left leg  Quality:  Surgical wound dehiscence  Severity:  Severe  Onset quality:  Sudden  Timing:  Constant  Progression:  Unchanged  Chronicity:  New  Context:  Left leg surgical wound dehiscence      Prior to Admission Medications   Prescriptions Last Dose Informant Patient Reported? Taking? DULoxetine (CYMBALTA) 30 mg delayed release capsule   No No   Sig: Take 1 capsule (30 mg total) by mouth daily   Glucagon, rDNA, (Glucagon Emergency) 1 MG KIT  Self Yes No   HYDROmorphone (DILAUDID) 2 mg tablet   No No   Sig: Take 2 tablets (4 mg total) by mouth every 6 (six) hours as needed for moderate pain or severe pain Max Daily Amount: 16 mg   HumaLOG 100 UNIT/ML injection  Self Yes No   Sig: inject up to 80 units in INSULIN PUMP daily as directed by prescriber   acetaminophen (TYLENOL) 325 mg tablet  Self No No   Sig: Take 3 tablets (975 mg total) by mouth every 6 (six) hours   atorvastatin (LIPITOR) 40 mg tablet  Self No No   Sig: TAKE 1 TABLET BY MOUTH EVERY DAY   gabapentin (Neurontin) 300 mg capsule   No No   Sig: Take 2 capsules (600 mg total) by mouth 2 (two) times a day AND 3 capsules (900 mg total) daily at bedtime. insulin lispro (HumaLOG) 100 units/mL  Self Yes No   Si Units by Subcutaneous Insulin Pump route in the morning.  80 units via insulin pump  Indications: Type 2 Diabetes   Patient not taking: Reported on 2023 methocarbamol (ROBAXIN) 500 mg tablet   No No   Sig: Take 1 tablet (500 mg total) by mouth every 6 (six) hours as needed for muscle spasms   metoprolol succinate (TOPROL-XL) 100 mg 24 hr tablet  Self No No   Sig: Take 1 tablet (100 mg total) by mouth daily   nicotine (NICODERM CQ) 21 mg/24 hr TD 24 hr patch  Self No No   Sig: Place 1 patch on the skin over 24 hours daily Do not start before July 5, 2023. Facility-Administered Medications: None       Past Medical History:   Diagnosis Date   • Chronic foot ulcer ([address]) 09/07/2018   • Diabetes mellitus ([address])    • Diabetic foot ulcer ([address]) 5/8/2023   • Gout    • High cholesterol    • Hypertension    • Visual impairment 11/1/2022    Cateract       Past Surgical History:   Procedure Laterality Date   • CATARACT EXTRACTION Right 01/2023   • COMPLEX WOUND CLOSURE TO EXTREMITY Left 07/18/2019    Procedure: COMPLEX CLOSURE LEFT CHEEK;  Surgeon: Janice Yao MD;  Location:  MAIN OR;  Service: Plastics   • FACIAL/NECK BIOPSY Left 07/18/2019    Procedure: EXCISION LEFT CHEEK CYST;  Surgeon: Janice Yao MD;  Location:  MAIN OR;  Service: Plastics   • HERNIA REPAIR Left     several times   • KNEE ARTHROSCOPY Left     torn meniscus   • LEG AMPUTATION THROUGH LOWER TIBIA AND FIBULA Left 8/22/2023    Procedure: AMPUTATION BELOW KNEE (BKA);   Surgeon: Flavio Jean MD;  Location:  MAIN OR;  Service: General   • AK AMPUTATION FOOT TRANSMETARSAL Left 6/30/2023    Procedure: AMPUTATION TRANSMETATARSAL (TMA);  Surgeon: Jonathan Fink DPM;  Location:  MAIN OR;  Service: Podiatry   • WOUND DEBRIDEMENT Left 5/30/2023    Procedure: DEBRIDEMENT WOUND LEFT FOOT WOUND WITH EXCISION OF INFECTED BONE AND WOUND VAC APPLICATION;  Surgeon: Kelly Baker DPM;  Location:  MAIN OR;  Service: Podiatry       Family History   Problem Relation Age of Onset   • Heart disease Father    • Diabetes type I Father    • Diabetes type II Mother    • No Known Problems Sister    • Alcohol abuse Brother    • Diabetes type I Brother    • No Known Problems Brother    • No Known Problems Son    • No Known Problems Daughter      I have reviewed and agree with the history as documented. E-Cigarette/Vaping   • E-Cigarette Use Never User      E-Cigarette/Vaping Substances   • Nicotine No    • THC No    • CBD No    • Flavoring No    • Other No    • Unknown No      Social History     Tobacco Use   • Smoking status: Every Day     Packs/day: 1.00     Years: 20.00     Total pack years: 20.00     Types: Cigarettes     Last attempt to quit: 2022     Years since quittin.7   • Smokeless tobacco: Never   • Tobacco comments:     encouraged smoking cessation   Vaping Use   • Vaping Use: Never used   Substance Use Topics   • Alcohol use: Yes     Comment: 3/4 beers a day   • Drug use: Never       Review of Systems   Skin: Positive for wound. All other systems reviewed and are negative. Physical Exam  Physical Exam  Vitals and nursing note reviewed. Constitutional:       General: He is in acute distress. HENT:      Head: Normocephalic and atraumatic. Right Ear: External ear normal.      Left Ear: External ear normal.   Eyes:      General: No scleral icterus. Right eye: No discharge. Left eye: No discharge. Extraocular Movements: Extraocular movements intact. Pupils: Pupils are equal, round, and reactive to light. Cardiovascular:      Rate and Rhythm: Normal rate and regular rhythm. Pulses: Normal pulses. Heart sounds: No murmur heard. No friction rub. No gallop. Pulmonary:      Effort: No respiratory distress. Breath sounds: No stridor. No wheezing, rhonchi or rales. Abdominal:      General: There is no distension. Palpations: Abdomen is soft. Tenderness: There is no abdominal tenderness. There is no guarding or rebound. Musculoskeletal:         General: Tenderness and signs of injury present. Cervical back: Neck supple. No rigidity or tenderness. Comments: Left BKA wound dehiscence   Skin:     Comments: Left leg surgical wound dehiscence   Neurological:      Mental Status: He is alert and oriented to person, place, and time. Cranial Nerves: No cranial nerve deficit. Psychiatric:         Thought Content:  Thought content normal.         Vital Signs  ED Triage Vitals   Temperature Pulse Respirations Blood Pressure SpO2   09/22/23 0644 09/22/23 0637 09/22/23 0637 09/22/23 0637 09/22/23 0637   98.5 °F (36.9 °C) 89 18 143/77 99 %      Temp src Heart Rate Source Patient Position - Orthostatic VS BP Location FiO2 (%)   -- 09/22/23 0916 09/22/23 0637 09/22/23 0637 --    Monitor Lying Right arm       Pain Score       09/22/23 0637       10 - Worst Possible Pain           Vitals:    09/22/23 1045 09/22/23 1143 09/22/23 1152 09/22/23 1240   BP: 119/73 132/75 125/68 129/66   Pulse: 97 96 92 94   Patient Position - Orthostatic VS:  Lying Lying          Visual Acuity      ED Medications  Medications   acetaminophen (TYLENOL) tablet 975 mg (has no administration in time range)   gabapentin (NEURONTIN) capsule 300 mg (300 mg Oral Given 9/22/23 1240)   methocarbamol (ROBAXIN) tablet 500 mg (has no administration in time range)   oxyCODONE (ROXICODONE) IR tablet 5 mg (has no administration in time range)   naloxone (NARCAN) injection 0.1 mg (has no administration in time range)   HYDROmorphone (DILAUDID) injection 0.5 mg (has no administration in time range)   atorvastatin (LIPITOR) tablet 40 mg (has no administration in time range)   metoprolol succinate (TOPROL-XL) 24 hr tablet 100 mg (100 mg Oral Given 9/22/23 1240)   nicotine (NICODERM CQ) 21 mg/24 hr TD 24 hr patch 1 patch (1 patch Transdermal Medication Applied 9/22/23 1240)   DULoxetine (CYMBALTA) delayed release capsule 30 mg (30 mg Oral Given 9/22/23 1240)   HYDROmorphone (DILAUDID) injection 1 mg (1 mg Intravenous Given 9/22/23 0705)   HYDROmorphone (DILAUDID) injection 2 mg (2 mg Intravenous Given 9/22/23 0802)   midazolam (VERSED) injection 2 mg (1 mg Intravenous Given 9/22/23 0906)   Ketamine HCl 50 mg (50 mg Intravenous Given 9/22/23 0907)   silver nitrate-potassium nitrate (ARZOL SILVER NITRATE) 21-67 % applicator 1 applicator (1 applicator Topical Given 9/22/23 0919)   HYDROmorphone (DILAUDID) injection 1 mg (1 mg Intravenous Given 9/22/23 0933)   HYDROmorphone (DILAUDID) injection 0.5 mg (0.5 mg Intravenous Given 9/22/23 1144)       Diagnostic Studies  Results Reviewed     Procedure Component Value Units Date/Time    Protime-INR [[id number]]  (Normal) Collected: 09/22/23 0712    Lab Status: Final result Specimen: Blood from Arm, Right Updated: 09/22/23 0732     Protime 12.5 seconds      INR 0.87    APTT [[id number]]  (Normal) Collected: 09/22/23 0712    Lab Status: Final result Specimen: Blood from Arm, Right Updated: 09/22/23 0732     PTT 30 seconds     Comprehensive metabolic panel [[id number]]  (Abnormal) Collected: 09/22/23 0712    Lab Status: Final result Specimen: Blood from Arm, Right Updated: 09/22/23 0732     Sodium 133 mmol/L      Potassium 4.4 mmol/L      Chloride 102 mmol/L      CO2 28 mmol/L      ANION GAP 3 mmol/L      BUN 12 mg/dL      Creatinine 1.15 mg/dL      Glucose 152 mg/dL      Calcium 9.5 mg/dL      AST 32 U/L      ALT 28 U/L      Alkaline Phosphatase 111 U/L      Total Protein 7.2 g/dL      Albumin 3.9 g/dL      Total Bilirubin 0.29 mg/dL      eGFR 68 ml/min/1.73sq m     Narrative:      [place] guidelines for Chronic Kidney Disease (CKD):   •  Stage 1 with normal or high GFR (GFR > 90 mL/min/1.73 square meters)  •  Stage 2 Mild CKD (GFR = 60-89 mL/min/1.73 square meters)  •  Stage 3A Moderate CKD (GFR = 45-59 mL/min/1.73 square meters)  •  Stage 3B Moderate CKD (GFR = 30-44 mL/min/1.73 square meters)  •  Stage 4 Severe CKD (GFR = 15-29 mL/min/1.73 square meters)  •  Stage 5 End Stage CKD (GFR <15 mL/min/1.73 square meters)  Note: GFR calculation is accurate only with a steady state creatinine    CBC and differential [[id number]]  (Abnormal) Collected: 09/22/23 0712    Lab Status: Final result Specimen: Blood from Arm, Right Updated: 09/22/23 0718     WBC 8.33 Thousand/uL      RBC 4.10 Million/uL      Hemoglobin 12.1 g/dL      Hematocrit 38.2 %      MCV 93 fL      MCH 29.5 pg      MCHC 31.7 g/dL      RDW 14.1 %      MPV 8.8 fL      Platelets 473 Thousands/uL      nRBC 0 /100 WBCs      Neutrophils Relative 36 %      Immat GRANS % 1 %      Lymphocytes Relative 31 %      Monocytes Relative 13 %      Eosinophils Relative 17 %      Basophils Relative 2 %      Neutrophils Absolute 3.15 Thousands/µL      Immature Grans Absolute 0.04 Thousand/uL      Lymphocytes Absolute 2.54 Thousands/µL      Monocytes Absolute 1.07 Thousand/µL      Eosinophils Absolute 1.39 Thousand/µL      Basophils Absolute 0.14 Thousands/µL                  XR knee 1 or 2 vw left   ED Interpretation by Mike Hdz DO (09/22 3564)   No acute fracture or dislocation      Final Result by Linda Cm MD (09/22 7505)      Small foci of air noted in the superficial soft tissue over the distal aspect of the stump could be related to  wound dehiscence      There is no lytic lesion or periosteal reaction. Resident: Tonya Segovia, the attending radiologist, have reviewed the images and agree with the final report above. Workstation performed: [id number]                    Procedures  Procedures         ED Course  ED Course as of 09/22/23 1245   Fri Sep 22, 2023   3150 Patient was seen by surgical PA in the emergency room pending decision whether or not to take to the operating room                               SBIRT 22yo+    Flowsheet Row Most Recent Value   Initial Alcohol Screen: US AUDIT-C     1. How often do you have a drink containing alcohol? 0 Filed at: 09/22/2023 0646   2.  How many drinks containing alcohol do you have on a typical day you are drinking? 0 Filed at: 09/22/2023 0646   3a. Male UNDER 65: How often do you have five or more drinks on one occasion? 0 Filed at: 09/22/2023 0646   3b. FEMALE Any Age, or MALE 65+: How often do you have 4 or more drinks on one occassion? 0 Filed at: 09/22/2023 0646   Audit-C Score 0 Filed at: 09/22/2023 1358   PRINCESS: How many times in the past year have you. .. Used an illegal drug or used a prescription medication for non-medical reasons? Never Filed at: 09/22/2023 8215                    Medical Decision Making  Wound dehiscence we will check basic labs pain control and consult surgery    Amount and/or Complexity of Data Reviewed  Labs: ordered. Radiology: ordered and independent interpretation performed. Risk  Prescription drug management. Disposition  Final diagnoses:   Wound dehiscence - Left BKA surgical site     Time reflects when diagnosis was documented in both MDM as applicable and the Disposition within this note     Time User Action Codes Description Comment    9/22/2023  7:55 AM Paulina Pott Add [T81.30XA] Wound dehiscence     9/22/2023  7:56 AM Paulina Pott Modify [T81.30XA] Wound dehiscence Left BKA surgical site    9/22/2023 10:28 AM Anice Stakes P Add [E10.40] Type 1 diabetes mellitus with diabetic neuropathy ([address])     9/22/2023 10:28 AM Anice Stakes P Add [N18.2] Stage 2 chronic kidney disease     9/22/2023 10:28 AM Anice Stakes P Add [I10] Benign essential hypertension     9/22/2023 10:28 AM Leela Sanches Add [E78.2] Mixed hyperlipidemia       ED Disposition     ED Disposition   Admit    Condition   Stable    Date/Time   Fri Sep 22, 2023 11:12 AM    Comment   Case was discussed with *Dr Zari Peterson** and the patient's admission status was agreed to be Admission Status: observation status to the service of Dr. Zari Peterson .            Follow-up Information    None         Patient's Medications   Discharge Prescriptions    No medications on file       No discharge procedures on file.     PDMP Review       Value Time User    PDMP Reviewed  Yes 9/20/2023 10:59 AM Veda Ayala MD          ED Provider  Electronically Signed by               Inder Moura DO  09/22/23 1533

## 2023-09-22 NOTE — CONSULTS
[address] [facility]  Consult  Name: Kalpesh Contreras 66 y.o. male I MRN: [id number]  Unit/Bed#: -01 I Date of Admission: 9/22/2023   Date of Service: 9/22/2023 I Hospital Day: 0    Inpatient consult to Internal Medicine  Consult performed by: Giovani Mcmillan MD  Consult ordered by: Sera Louis PA-C          Assessment/Plan   Hx of BKA, [facility])  Assessment & Plan  Previous hospitalization secondary to osteomyelitis  Patient had fall this morning and wound dehiscence  Surgery consulted  Pain management  Zofran as needed    Acute pain due to injury  Assessment & Plan  Tylenol scheduled  Robaxin as needed  Oxy 5 mg, 10 mg every 4 hours as needed, Dilaudid for breakthrough pain  Xanax as needed    Nicotine abuse  Assessment & Plan  encourage cessation  Nicotine replacement    Diabetic polyneuropathy associated with type 1 diabetes mellitus ([address])  Assessment & Plan  Lab Results   Component Value Date    HGBA1C 7.9 (H) 07/20/2023       Recent Labs     09/22/23  1626   POCGLU 169*       Blood Sugar Average: Last 72 hrs:  (P) 169   On insulin pump  Diabetic diet  Daily Accu-Cheks  Hypoglycemia protocol    Benign essential hypertension  Assessment & Plan  Continue Toprol 100 mg  Previously on lisinopril held with previous discharge  If blood pressure is consistently high, will start lisinopril             VTE Prophylaxis:   Moderate Risk (Score 3-4) - Pharmacological DVT Prophylaxis Ordered: enoxaparin (Lovenox). Total Time Spent on Date of Encounter in care of patient: 65 mins. This time was spent on one or more of the following: performing physical exam; counseling and coordination of care; obtaining or reviewing history; documenting in the medical record; reviewing/ordering tests, medications or procedures; communicating with other healthcare professionals and discussing with patient's family/caregivers. Collaboration of Care:  Were Recommendations Directly Discussed with Primary Treatment Team? Yes    History of Present Illness:  Geo Ryan is a 66 y.o. male past medical history of type 1 diabetes, CKD, tobacco abuse, HTN, history of left BKA who is originally admitted to the surgical service due to fall with wound dehiscence on his left BKA stump. We are consulted for medical management of his high blood pressure, glucose, pain management. Patient at the time of my evaluation seems to be in pain even though he received multiple doses of Dilaudid  Review of Systems:  Review of Systems   Constitutional: Negative. HENT: Negative. Eyes: Negative. Respiratory: Negative. Cardiovascular: Negative. Gastrointestinal: Negative. Genitourinary: Negative. Musculoskeletal:        Leg pain   Skin: Negative. Neurological: Negative. Psychiatric/Behavioral: Negative. Past Medical and Surgical History:   Past Medical History:   Diagnosis Date   • Chronic foot ulcer ([address]) 09/07/2018   • Diabetes mellitus ([address])    • Diabetic foot ulcer ([address]) 5/8/2023   • Gout    • High cholesterol    • Hypertension    • Visual impairment 11/1/2022    Cateract       Past Surgical History:   Procedure Laterality Date   • CATARACT EXTRACTION Right 01/2023   • COMPLEX WOUND CLOSURE TO EXTREMITY Left 07/18/2019    Procedure: COMPLEX CLOSURE LEFT CHEEK;  Surgeon: Rubio España MD;  Location:  MAIN OR;  Service: Plastics   • FACIAL/NECK BIOPSY Left 07/18/2019    Procedure: EXCISION LEFT CHEEK CYST;  Surgeon: Rubio España MD;  Location:  MAIN OR;  Service: Plastics   • HERNIA REPAIR Left     several times   • KNEE ARTHROSCOPY Left     torn meniscus   • LEG AMPUTATION THROUGH LOWER TIBIA AND FIBULA Left 8/22/2023    Procedure: AMPUTATION BELOW KNEE (BKA);   Surgeon: Rylie Flor MD;  Location:  MAIN OR;  Service: General   • SD AMPUTATION FOOT TRANSMETARSAL Left 6/30/2023    Procedure: AMPUTATION TRANSMETATARSAL (TMA);  Surgeon: Ivania Sorto DPM;  Location:  MAIN OR;  Service: Podiatry   • WOUND DEBRIDEMENT Left 2023    Procedure: DEBRIDEMENT WOUND LEFT FOOT WOUND WITH EXCISION OF INFECTED BONE AND WOUND VAC APPLICATION;  Surgeon: Bo Schulte DPM;  Location:  MAIN OR;  Service: Podiatry       Meds/Allergies:  all medications and allergies reviewed    Allergies: Allergies   Allergen Reactions   • Cefuroxime Other (See Comments) and GI Intolerance   • Amoxicillin-Pot Clavulanate Nausea Only and GI Intolerance       Social History:  Marital Status: /Civil Union  Substance Use History:   Social History     Substance and Sexual Activity   Alcohol Use Yes    Comment: 3/4 beers a day     Social History     Tobacco Use   Smoking Status Every Day   • Packs/day: 1.00   • Years: 20.00   • Total pack years: 20.00   • Types: Cigarettes   • Last attempt to quit: 2022   • Years since quittin.7   Smokeless Tobacco Never   Tobacco Comments    encouraged smoking cessation     Social History     Substance and Sexual Activity   Drug Use Never       Family History:  Family History   Problem Relation Age of Onset   • Heart disease Father    • Diabetes type I Father    • Diabetes type II Mother    • No Known Problems Sister    • Alcohol abuse Brother    • Diabetes type I Brother    • No Known Problems Brother    • No Known Problems Son    • No Known Problems Daughter        Physical Exam:   Vitals:   Blood Pressure: 129/72 (23 1421)  Pulse: 91 (23 1421)  Temperature: 97.7 °F (36.5 °C) (23 1421)  Respirations: 17 (23 1421)  Height: 6' (182.9 cm) (23 1700)  Weight - Scale: 65.8 kg (145 lb) (23 1700)  SpO2: 98 % (23 1421)    Physical Exam  Vitals reviewed. Constitutional:       Comments: Very fidgety, seems to be in pain   HENT:      Head: Normocephalic. Mouth/Throat:      Mouth: Mucous membranes are moist.      Pharynx: Oropharynx is clear. Eyes:      Extraocular Movements: Extraocular movements intact. Cardiovascular:      Rate and Rhythm: Normal rate and regular rhythm. Pulmonary:      Effort: Pulmonary effort is normal.      Breath sounds: Normal breath sounds. Abdominal:      General: Bowel sounds are normal.      Palpations: Abdomen is soft. Musculoskeletal:      Comments: Left BKA  bandaged   Skin:     Capillary Refill: Capillary refill takes 2 to 3 seconds. Neurological:      Mental Status: He is alert and oriented to person, place, and time. Psychiatric:         Attention and Perception: Attention normal.         Mood and Affect: Mood is anxious. Behavior: Behavior is cooperative.          Cognition and Memory: Cognition and memory normal.        Additional Data:   Lab Results:    Results from last 7 days   Lab Units 09/22/23  0712   WBC Thousand/uL 8.33   HEMOGLOBIN g/dL 12.1   HEMATOCRIT % 38.2   PLATELETS Thousands/uL 333   NEUTROS PCT % 36*   LYMPHS PCT % 31   MONOS PCT % 13*   EOS PCT % 17*     Results from last 7 days   Lab Units 09/22/23  0712   SODIUM mmol/L 133*   POTASSIUM mmol/L 4.4   CHLORIDE mmol/L 102   CO2 mmol/L 28   BUN mg/dL 12   CREATININE mg/dL 1.15   ANION GAP mmol/L 3   CALCIUM mg/dL 9.5   ALBUMIN g/dL 3.9   TOTAL BILIRUBIN mg/dL 0.29   ALK PHOS U/L 111*   ALT U/L 28   AST U/L 32   GLUCOSE RANDOM mg/dL 152*     Results from last 7 days   Lab Units 09/22/23  0712   INR  0.87         Lab Results   Component Value Date/Time    HGBA1C 7.9 (H) 07/20/2023 09:08 AM    HGBA1C 7.9 (H) 03/31/2023 08:16 AM    HGBA1C 7.9 03/31/2023 12:00 AM    HGBA1C 8.5 (H) 11/16/2022 08:19 AM    HGBA1C 8.5 11/16/2022 12:00 AM    HGBA1C 9.0 (H) 04/15/2022 08:38 AM    HGBA1C 7.9 (H) 11/02/2021 06:24 AM     Results from last 7 days   Lab Units 09/22/23  1626   POC GLUCOSE mg/dl 169*           Imaging: Reviewed radiology reports from this admission including: xray(s)  XR knee 1 or 2 vw left   ED Interpretation by Mahogany King DO (09/22 1118)   No acute fracture or dislocation      Final Result by Gisele Gonzalez MD (09/22 3681)      Small foci of air noted in the superficial soft tissue over the distal aspect of the stump could be related to  wound dehiscence      There is no lytic lesion or periosteal reaction. Resident: Ann Colon, the attending radiologist, have reviewed the images and agree with the final report above. Workstation performed: [id number]             EKG, Pathology, and Other Studies Reviewed on Admission:   · EKG: No EKG obtained. ** Please Note: This note may have been constructed using a voice recognition system.  **

## 2023-09-22 NOTE — PLAN OF CARE
Problem: Potential for Falls  Goal: Patient will remain free of falls  Description: INTERVENTIONS:  - Educate patient/family on patient safety including physical limitations  - Instruct patient to call for assistance with activity   - Consult OT/PT to assist with strengthening/mobility   - Keep Call bell within reach  - Keep bed low and locked with side rails adjusted as appropriate  - Keep care items and personal belongings within reach  - Initiate and maintain comfort rounds  - Make Fall Risk Sign visible to staff  - Offer Toileting every 2 Hours, in advance of need  - Initiate/Maintain bed alarm  - Obtain necessary fall risk management equipment: non slip socks  - Apply yellow socks and bracelet for high fall risk patients  - Consider moving patient to room near nurses station  Outcome: Progressing     Problem: MOBILITY - ADULT  Goal: Maintain or return to baseline ADL function  Description: INTERVENTIONS:  -  Assess patient's ability to carry out ADLs; assess patient's baseline for ADL function and identify physical deficits which impact ability to perform ADLs (bathing, care of mouth/teeth, toileting, grooming, dressing, etc.)  - Assess/evaluate cause of self-care deficits   - Assess range of motion  - Assess patient's mobility; develop plan if impaired  - Assess patient's need for assistive devices and provide as appropriate  - Encourage maximum independence but intervene and supervise when necessary  - Involve family in performance of ADLs  - Assess for home care needs following discharge   - Consider OT consult to assist with ADL evaluation and planning for discharge  - Provide patient education as appropriate  Outcome: Progressing  Goal: Maintains/Returns to pre admission functional level  Description: INTERVENTIONS:  - Perform BMAT or MOVE assessment daily.   - Set and communicate daily mobility goal to care team and patient/family/caregiver.    - Collaborate with rehabilitation services on mobility goals if consulted  - Perform Range of Motion 5 times a day. - Reposition patient every 2 hours.   - Dangle patient 3 times a day  - Stand patient 3 times a day  - Ambulate patient 3 times a day  - Out of bed to chair 3 times a day   - Out of bed for meals 3 times a day  - Out of bed for toileting  - Record patient progress and toleration of activity level   Outcome: Progressing     Problem: Prexisting or High Potential for Compromised Skin Integrity  Goal: Skin integrity is maintained or improved  Description: INTERVENTIONS:  - Identify patients at risk for skin breakdown  - Assess and monitor skin integrity  - Assess and monitor nutrition and hydration status  - Monitor labs   - Assess for incontinence   - Turn and reposition patient  - Assist with mobility/ambulation  - Relieve pressure over bony prominences  - Avoid friction and shearing  - Provide appropriate hygiene as needed including keeping skin clean and dry  - Evaluate need for skin moisturizer/barrier cream  - Collaborate with interdisciplinary team   - Patient/family teaching  - Consider wound care consult   Outcome: Progressing

## 2023-09-22 NOTE — ASSESSMENT & PLAN NOTE
Continue Toprol 100 mg  Previously on lisinopril held with previous discharge  If blood pressure is consistently high, will start lisinopril

## 2023-09-22 NOTE — PLAN OF CARE
Problem: OCCUPATIONAL THERAPY ADULT  Goal: Performs self-care activities at highest level of function for planned discharge setting. See evaluation for individualized goals. Description: Treatment Interventions: ADL retraining, Functional transfer training, Equipment evaluation/education, Patient/family training, Compensatory technique education, Continued evaluation          See flowsheet documentation for full assessment, interventions and recommendations. Outcome: Progressing  Note: Limitation: Decreased self-care trans, Decreased high-level ADLs, Decreased ADL status  Prognosis: Good  Assessment: Pt is a 66 y.o. male seen for OT evaluation at [facility], admitted 9/22/2023 s/p fall with wound dehiscence. OT completed extensive review of pt's medical and social history. Comorbidities affecting pt's functional performance at time of assessment include: HTN, chronic fatigue syndrome, DM1, s/p L BKA, chronic pain, diabetic polyneuropathy, h/o alcohol abuse, h/o MDD. Personal factors affecting pt at time of IE include: steps to enter environment, difficulty performing ADLS, difficulty performing IADLS  and environment. Prior to admission, pt was living with his wife in a bi-level home with 1+1 then 4STE. Pt was I w/  ADLS and A w/ IADLS, (-) drove, & required use of wheelchair  and RW PTA. Upon evaluation: Pt requires S for bed mobility, S for functional mobility/transfers, S for UB ADLs and S for LB ADLS 2* the following deficits impacting occupational performance: decreased balance, increased pain and orthopedic restrictions. Full objective findings from OT assessment regarding body systems outlined above. Pt to benefit from continued skilled OT tx while in the hospital to address deficits as defined above and maximize level of functional independence w/ ADL's and functional mobility.  Occupational Performance areas to address include: grooming, bathing/shower, toilet hygiene, dressing, functional mobility, community mobility and clothing management. Based on findings, pt is of high complexity. The patient's raw score on the AM-PAC Daily Activity inpatient short form is 21, standardized score is 44.27, greater than 39.4. Patients at this level are likely to benefit from DC to home. However, please refer to therapist recommendation for discharge planning given other factors that may influence destination. At this time, OT recommendations at time of discharge are DC with Level III resources.

## 2023-09-23 VITALS
DIASTOLIC BLOOD PRESSURE: 63 MMHG | RESPIRATION RATE: 15 BRPM | HEIGHT: 72 IN | HEART RATE: 91 BPM | BODY MASS INDEX: 19.64 KG/M2 | SYSTOLIC BLOOD PRESSURE: 114 MMHG | WEIGHT: 145 LBS | TEMPERATURE: 97.7 F | OXYGEN SATURATION: 92 %

## 2023-09-23 LAB
ANION GAP SERPL CALCULATED.3IONS-SCNC: 6 MMOL/L
BASOPHILS # BLD AUTO: 0.14 THOUSANDS/ÂΜL (ref 0–0.1)
BASOPHILS NFR BLD AUTO: 1 % (ref 0–1)
BUN SERPL-MCNC: 11 MG/DL (ref 5–25)
CALCIUM SERPL-MCNC: 8.9 MG/DL (ref 8.4–10.2)
CHLORIDE SERPL-SCNC: 102 MMOL/L (ref 96–108)
CO2 SERPL-SCNC: 26 MMOL/L (ref 21–32)
CREAT SERPL-MCNC: 1.01 MG/DL (ref 0.6–1.3)
EOSINOPHIL # BLD AUTO: 0.93 THOUSAND/ÂΜL (ref 0–0.61)
EOSINOPHIL NFR BLD AUTO: 9 % (ref 0–6)
ERYTHROCYTE [DISTWIDTH] IN BLOOD BY AUTOMATED COUNT: 13.9 % (ref 11.6–15.1)
GFR SERPL CREATININE-BSD FRML MDRD: 79 ML/MIN/1.73SQ M
GLUCOSE SERPL-MCNC: 114 MG/DL (ref 65–140)
GLUCOSE SERPL-MCNC: 136 MG/DL (ref 65–140)
GLUCOSE SERPL-MCNC: 196 MG/DL (ref 65–140)
HCT VFR BLD AUTO: 32.4 % (ref 36.5–49.3)
HGB BLD-MCNC: 10.3 G/DL (ref 12–17)
IMM GRANULOCYTES # BLD AUTO: 0.02 THOUSAND/UL (ref 0–0.2)
IMM GRANULOCYTES NFR BLD AUTO: 0 % (ref 0–2)
LYMPHOCYTES # BLD AUTO: 1.8 THOUSANDS/ÂΜL (ref 0.6–4.47)
LYMPHOCYTES NFR BLD AUTO: 17 % (ref 14–44)
MCH RBC QN AUTO: 29.3 PG (ref 26.8–34.3)
MCHC RBC AUTO-ENTMCNC: 31.8 G/DL (ref 31.4–37.4)
MCV RBC AUTO: 92 FL (ref 82–98)
MONOCYTES # BLD AUTO: 1.48 THOUSAND/ÂΜL (ref 0.17–1.22)
MONOCYTES NFR BLD AUTO: 14 % (ref 4–12)
MRSA NOSE QL CULT: ABNORMAL
MRSA NOSE QL CULT: ABNORMAL
NEUTROPHILS # BLD AUTO: 6.21 THOUSANDS/ÂΜL (ref 1.85–7.62)
NEUTS SEG NFR BLD AUTO: 59 % (ref 43–75)
NRBC BLD AUTO-RTO: 0 /100 WBCS
PLATELET # BLD AUTO: 284 THOUSANDS/UL (ref 149–390)
PMV BLD AUTO: 9.1 FL (ref 8.9–12.7)
POTASSIUM SERPL-SCNC: 4.4 MMOL/L (ref 3.5–5.3)
RBC # BLD AUTO: 3.51 MILLION/UL (ref 3.88–5.62)
SODIUM SERPL-SCNC: 134 MMOL/L (ref 135–147)
WBC # BLD AUTO: 10.58 THOUSAND/UL (ref 4.31–10.16)

## 2023-09-23 PROCEDURE — 82948 REAGENT STRIP/BLOOD GLUCOSE: CPT

## 2023-09-23 PROCEDURE — 99024 POSTOP FOLLOW-UP VISIT: CPT | Performed by: SURGERY

## 2023-09-23 PROCEDURE — 80048 BASIC METABOLIC PNL TOTAL CA: CPT | Performed by: INTERNAL MEDICINE

## 2023-09-23 PROCEDURE — 85025 COMPLETE CBC W/AUTO DIFF WBC: CPT | Performed by: INTERNAL MEDICINE

## 2023-09-23 PROCEDURE — 99232 SBSQ HOSP IP/OBS MODERATE 35: CPT | Performed by: PHYSICIAN ASSISTANT

## 2023-09-23 RX ORDER — HYDROMORPHONE HYDROCHLORIDE 2 MG/1
2 TABLET ORAL EVERY 4 HOURS PRN
Qty: 18 TABLET | Refills: 0 | Status: SHIPPED | OUTPATIENT
Start: 2023-09-23 | End: 2023-09-29 | Stop reason: SDUPTHER

## 2023-09-23 RX ADMIN — METHOCARBAMOL 500 MG: 500 TABLET ORAL at 09:55

## 2023-09-23 RX ADMIN — OXYCODONE HYDROCHLORIDE 10 MG: 10 TABLET ORAL at 00:13

## 2023-09-23 RX ADMIN — METOPROLOL SUCCINATE 100 MG: 50 TABLET, EXTENDED RELEASE ORAL at 09:10

## 2023-09-23 RX ADMIN — ENOXAPARIN SODIUM 30 MG: 30 INJECTION SUBCUTANEOUS at 09:09

## 2023-09-23 RX ADMIN — HYDROMORPHONE HYDROCHLORIDE 0.5 MG: 1 INJECTION, SOLUTION INTRAMUSCULAR; INTRAVENOUS; SUBCUTANEOUS at 06:39

## 2023-09-23 RX ADMIN — HYDROMORPHONE HYDROCHLORIDE 0.5 MG: 1 INJECTION, SOLUTION INTRAMUSCULAR; INTRAVENOUS; SUBCUTANEOUS at 02:34

## 2023-09-23 RX ADMIN — GABAPENTIN 900 MG: 300 CAPSULE ORAL at 09:10

## 2023-09-23 RX ADMIN — HYDROMORPHONE HYDROCHLORIDE 0.5 MG: 1 INJECTION, SOLUTION INTRAMUSCULAR; INTRAVENOUS; SUBCUTANEOUS at 09:55

## 2023-09-23 RX ADMIN — NICOTINE 1 PATCH: 21 PATCH, EXTENDED RELEASE TRANSDERMAL at 09:10

## 2023-09-23 RX ADMIN — METHOCARBAMOL 500 MG: 500 TABLET ORAL at 06:39

## 2023-09-23 RX ADMIN — OXYCODONE HYDROCHLORIDE 10 MG: 10 TABLET ORAL at 13:02

## 2023-09-23 RX ADMIN — METHOCARBAMOL 500 MG: 500 TABLET ORAL at 00:13

## 2023-09-23 RX ADMIN — DULOXETINE HYDROCHLORIDE 30 MG: 30 CAPSULE, DELAYED RELEASE ORAL at 09:08

## 2023-09-23 RX ADMIN — OXYCODONE HYDROCHLORIDE 10 MG: 10 TABLET ORAL at 04:57

## 2023-09-23 RX ADMIN — OXYCODONE HYDROCHLORIDE 10 MG: 10 TABLET ORAL at 09:08

## 2023-09-23 NOTE — ASSESSMENT & PLAN NOTE
Encourage cessation as this remains risk factor for poor wound healing  Nicotine replacement while admitted

## 2023-09-23 NOTE — ASSESSMENT & PLAN NOTE
Hx of non healing diabetic foot wound with OM s/p BKA 8/22/23   Presented this admit with wound dehiscence s/p fall   Repaired in ED under conscious sedation   Surgery primary   Pain management per primary team   Local wound care, serial skin exams

## 2023-09-23 NOTE — PROGRESS NOTES
Progress Note - Gisel Simental 66 y.o. male MRN: [id number]    Unit/Bed#: -01 Encounter: [id number]      Assessment/Plan:  Left BKA dehiscence s/p fall   -pt reports he fell this am and opened his incision  -h/o prior falls since his BKA  -BKA done 8/22  -repaired at bedside under conscious sedation 9/22  -pain control  -medicine for medical management   -CM for VNA  -PT/OT for home safety recs  -dressing changed today  -if pain controlled, poss d/c today vs tomorrow with VNA     HLD  -Lipitor at home     HTN  -Toprol at home     Gout  -not on medication     T1DM  -insulin     CKD2  -trend creat  -avoid nephrotoxic medication    Subjective:   Pt appears much more comfortable compared to yesterday. He states he does feel improved in his pain. Objective:     Vitals: Blood pressure 114/63, pulse 91, temperature 97.7 °F (36.5 °C), resp. rate 15, height 6' (1.829 m), weight 65.8 kg (145 lb), SpO2 92 %. ,Body mass index is 19.67 kg/m². Intake/Output Summary (Last 24 hours) at 9/23/2023 0211  Last data filed at 9/22/2023 1808  Gross per 24 hour   Intake 480 ml   Output --   Net 480 ml       Physical Exam: General appearance: alert and oriented, in no acute distress  Left bka stump wound is clean and mild bloody ooze noted. Dressing changed. See photo in chart. Invasive Devices     Peripheral Intravenous Line  Duration           Peripheral IV 09/22/23 Right Antecubital 1 day          Line  Duration           Pump Device Insulin pump Anterior; Left Abdomen 85 days                Lab, Imaging and other studies: I have personally reviewed pertinent reports.     VTE Pharmacologic Prophylaxis: Enoxaparin (Lovenox)  VTE Mechanical Prophylaxis: sequential compression device

## 2023-09-23 NOTE — ASSESSMENT & PLAN NOTE
Lab Results   Component Value Date    HGBA1C 7.9 (H) 07/20/2023     Recent Labs     09/22/23  1626 09/23/23  0705   POCGLU 169* 114     Blood Sugar Average: Last 72 hrs:  (P) 141.5   Continue patient managed insulin pump   Diabetic diet  Daily Accu-Cheks

## 2023-09-23 NOTE — ASSESSMENT & PLAN NOTE
Continue Toprol 100 mg  Previously on lisinopril which was held on previous discharge  If blood pressure is consistently high, will start lisinopril  D/c telemetry

## 2023-09-23 NOTE — PROGRESS NOTES
[address] [facility]  Progress Note  Name: Inderjit Estevez  MRN: [id number]  Unit/Bed#: -01 I Date of Admission: 9/22/2023   Date of Service: 9/23/2023 I Hospital Day: 0    Assessment/Plan   * Hx of BKA, left ([address])  Assessment & Plan  Hx of non healing diabetic foot wound with OM s/p BKA 8/22/23   Presented this admit with wound dehiscence s/p fall   Repaired in ED under conscious sedation   Surgery primary   Pain management per primary team   Local wound care, serial skin exams     Diabetic polyneuropathy associated with type 1 diabetes mellitus [facility])  Assessment & Plan  Lab Results   Component Value Date    HGBA1C 7.9 (H) 07/20/2023     Recent Labs     09/22/23  1626 09/23/23  0705   POCGLU 169* 114     Blood Sugar Average: Last 72 hrs:  (P) 141.5   Continue patient managed insulin pump   Diabetic diet  Daily Accu-Cheks     Acute pain due to injury  Assessment & Plan  Pain control per primary team     Nicotine abuse  Assessment & Plan  Encourage cessation as this remains risk factor for poor wound healing  Nicotine replacement while admitted     Benign essential hypertension  Assessment & Plan  Continue Toprol 100 mg  Previously on lisinopril which was held on previous discharge  If blood pressure is consistently high, will start lisinopril  D/c telemetry        VTE Pharmacologic Prophylaxis: VTE Score: 3 Moderate Risk (Score 3-4) - Pharmacological DVT Prophylaxis Ordered: enoxaparin (Lovenox). Patient Centered Rounds: I performed bedside rounds with nursing staff today. Education and Discussions with Family / Patient: Patient declined call to . Total Time Spent on Date of Encounter in care of patient: 35 mins.  This time was spent on one or more of the following: performing physical exam; counseling and coordination of care; obtaining or reviewing history; documenting in the medical record; reviewing/ordering tests, medications or procedures; communicating with other healthcare professionals and discussing with patient's family/caregivers. Current Length of Stay: 0 day(s)  Current Patient Status: Observation   Certification Statement: deferred to primary team.   Discharge Plan: [facility] is following this patient on consult. They are medically stable for discharge when deemed appropriate by primary service. Code Status: Prior    Subjective:   Pt seen and examined at bedside. His pain is improved. He might go home today. Objective:     Vitals:   Temp (24hrs), Av.8 °F (36.6 °C), Min:97.7 °F (36.5 °C), Max:97.9 °F (36.6 °C)    Temp:  [97.7 °F (36.5 °C)-97.9 °F (36.6 °C)] 97.7 °F (36.5 °C)  HR:  [] 91  Resp:  [15-17] 15  BP: (114-129)/(63-72) 114/63  SpO2:  [92 %-98 %] 92 %  Body mass index is 19.67 kg/m². Input and Output Summary (last 24 hours): Intake/Output Summary (Last 24 hours) at 2023 1235  Last data filed at 2023 1000  Gross per 24 hour   Intake 960 ml   Output --   Net 960 ml       Physical Exam:   Physical Exam  Constitutional:       Appearance: Normal appearance. HENT:      Head: Normocephalic and atraumatic. Mouth/Throat:      Mouth: Mucous membranes are moist.   Eyes:      Extraocular Movements: Extraocular movements intact. Cardiovascular:      Rate and Rhythm: Normal rate and regular rhythm. Abdominal:      General: Abdomen is flat. Palpations: Abdomen is soft. Musculoskeletal:         General: No swelling. Normal range of motion. Cervical back: Normal range of motion and neck supple. Comments: L BKA    Skin:     General: Skin is warm and dry. Neurological:      General: No focal deficit present. Mental Status: He is alert.    Psychiatric:         Mood and Affect: Mood normal.         Behavior: Behavior normal.        Additional Data:     Labs:  Results from last 7 days   Lab Units 23  0417   WBC Thousand/uL 10.58*   HEMOGLOBIN g/dL 10.3*   HEMATOCRIT % 32.4*   PLATELETS Thousands/uL 284 NEUTROS PCT % 59   LYMPHS PCT % 17   MONOS PCT % 14*   EOS PCT % 9*     Results from last 7 days   Lab Units 09/23/23  0417 09/22/23  0712   SODIUM mmol/L 134* 133*   POTASSIUM mmol/L 4.4 4.4   CHLORIDE mmol/L 102 102   CO2 mmol/L 26 28   BUN mg/dL 11 12   CREATININE mg/dL 1.01 1.15   ANION GAP mmol/L 6 3   CALCIUM mg/dL 8.9 9.5   ALBUMIN g/dL  --  3.9   TOTAL BILIRUBIN mg/dL  --  0.29   ALK PHOS U/L  --  111*   ALT U/L  --  28   AST U/L  --  32   GLUCOSE RANDOM mg/dL 136 152*     Results from last 7 days   Lab Units 09/22/23  0712   INR  0.87     Results from last 7 days   Lab Units 09/23/23  1138 09/23/23  0705 09/22/23  1626   POC GLUCOSE mg/dl 196* 114 169*               Lines/Drains:  Invasive Devices     Peripheral Intravenous Line  Duration           Peripheral IV 09/22/23 Right Antecubital 1 day          Line  Duration           Pump Device Insulin pump Anterior; Left Abdomen 85 days                  Telemetry:  Telemetry Orders (From admission, onward)             Telemetry Monitoring  Continuous x 24 Hours (Telem)        Expiring   Question:  Reason for 24 Hour Telemetry  Answer:  Drug overdose known to cause cardiac arrhythmias (e.g. - Cocaine, TCA, Amphetamines, Phenothiazines, Digoxin, etc.) Meds known to need cardiac monitoring: Amiodarone, Dopamine, Dobutamine, Phenytoin  Comment:  needs cardiac monitoring for Pain medication                 Telemetry Reviewed: Normal Sinus Rhythm  Indication for Continued Telemetry Use: No indication for continued use. Will discontinue.               Imaging: Reviewed radiology reports from this admission including: xray(s)    Recent Cultures (last 7 days):         Last 24 Hours Medication List:   Current Facility-Administered Medications   Medication Dose Route Frequency Provider Last Rate   • acetaminophen  975 mg Oral Q8H [address] Justo Sandoval PA-C     • ALPRAZolam  0.5 mg Oral BID PRN Wanda Kimbrough MD     • atorvastatin  40 mg Oral Daily With [employer] Jamel Helms MD     • DULoxetine  30 mg Oral Daily Arron Henry MD     • enoxaparin  30 mg Subcutaneous Q24H [facility] Arron Henry MD     • gabapentin  900 mg Oral TID Arron Henry MD     • HYDROmorphone  0.5 mg Intravenous Q3H PRN Miriam Hunter PA-C     • insulin lispro  80 Units Subcutaneous Insulin Pump Daily PRN Arron Henry MD     • methocarbamol  500 mg Oral Q6H PRN Miriam Hunter PA-C     • metoprolol succinate  100 mg Oral Daily Arron Henry MD     • naloxone  0.1 mg Intravenous Q1MIN PRN Miriam Hunter PA-C     • nicotine  1 patch Transdermal Daily Arron Henry MD     • oxyCODONE  10 mg Oral Q4H PRN Arron Henry MD     • oxyCODONE  5 mg Oral Q4H PRN Arron Henry MD     • patient maintained insulin pump  1 each Subcutaneous Q8H Arron Henry MD     • polyethylene glycol  17 g Oral Daily Arron Henry MD     • senna-docusate sodium  1 tablet Oral HS Arron Henry MD          Today, Patient Was Seen By: Tyra Brownlee PA-C    **Please Note: This note may have been constructed using a voice recognition system. **

## 2023-09-23 NOTE — CASE MANAGEMENT
Case Management Discharge Planning Note    Patient name Meenu Schmidt  Location [address] [facility] [place] [id number] MRN [id number]  : 1960 Date 2023       Current Admission Date: 2023  Current Admission Diagnosis:Hx of BKA, left [facility])   Patient Active Problem List    Diagnosis Date Noted   • Hx of BKA, left ([address]) 2023   • Major depressive disorder in full remission ([address]) 2023   • Continuous opioid dependence ([address]) 2023   • Acute pain due to injury 2023   • Osteomyelitis ([address]) 2023   • Type 1 diabetes mellitus with diabetic neuropathy ([address]) 2023   • Status post below-knee amputation of left lower extremity ([address]) 2023   • Stage 2 chronic kidney disease 2023   • Hyponatremia 2023   • Alcohol abuse 2023   • Nicotine abuse 2023   • 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, with fat layer exposed ([address]) 2023   • Type 1 diabetes mellitus with hyperglycemia ([address]) 2022   • Diabetic polyneuropathy associated with type 1 diabetes mellitus ([address]) 2022   • Microalbuminuria due to type 1 diabetes mellitus ([address]) 2022   • Hypoglycemia unawareness associated with type 1 diabetes mellitus ([address]) 2022   • Cigarette nicotine dependence without complication    • Gout of ankle 2022   • Chronic fatigue syndrome 2022   • Lower urinary tract symptoms due to benign prostatic hyperplasia 2022   • 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 ([address]) 2022   • Chronic pain 2022   • Insulin pump in place 2018   • Benign essential hypertension 2011   • Mixed hyperlipidemia 10/25/2010   • ED (erectile dysfunction) of organic origin 10/25/2010      LOS (days): 0  Geometric Mean LOS (GMLOS) (days):   Days to GMLOS:     OBJECTIVE:            Current admission status: Observation   Preferred Pharmacy:   [id number] - [address] [place] [address]  Phone: [phone] Fax: [phone]    Primary Care Provider: Seng Hinton MD    [place] [id number] [facility] [place]:     DISCHARGE DETAILS:           CM confirmed SOC with [facility] tomorrow. They can do wound care 3xwk and instruct someone else for the other days to do the dressing changes. Notified provider of same.

## 2023-09-23 NOTE — PLAN OF CARE
Problem: Potential for Falls  Goal: Patient will remain free of falls  Description: INTERVENTIONS:  - Educate patient/family on patient safety including physical limitations  - Instruct patient to call for assistance with activity   - Consult OT/PT to assist with strengthening/mobility   - Keep Call bell within reach  - Keep bed low and locked with side rails adjusted as appropriate  - Keep care items and personal belongings within reach  - Initiate and maintain comfort rounds  - Make Fall Risk Sign visible to staff  - Offer Toileting every 2 Hours, in advance of need  - Initiate/Maintain bed alarm  - Obtain necessary fall risk management equipment: non slip socks  - Apply yellow socks and bracelet for high fall risk patients  - Consider moving patient to room near nurses station  Outcome: Progressing     Problem: MOBILITY - ADULT  Goal: Maintain or return to baseline ADL function  Description: INTERVENTIONS:  -  Assess patient's ability to carry out ADLs; assess patient's baseline for ADL function and identify physical deficits which impact ability to perform ADLs (bathing, care of mouth/teeth, toileting, grooming, dressing, etc.)  - Assess/evaluate cause of self-care deficits   - Assess range of motion  - Assess patient's mobility; develop plan if impaired  - Assess patient's need for assistive devices and provide as appropriate  - Encourage maximum independence but intervene and supervise when necessary  - Involve family in performance of ADLs  - Assess for home care needs following discharge   - Consider OT consult to assist with ADL evaluation and planning for discharge  - Provide patient education as appropriate  Outcome: Progressing  Goal: Maintains/Returns to pre admission functional level  Description: INTERVENTIONS:  - Perform BMAT or MOVE assessment daily.   - Set and communicate daily mobility goal to care team and patient/family/caregiver.    - Collaborate with rehabilitation services on mobility goals if consulted  - Perform Range of Motion 5 times a day. - Reposition patient every 2 hours.   - Dangle patient 3 times a day  - Stand patient 3 times a day  - Ambulate patient 3 times a day  - Out of bed to chair 3 times a day   - Out of bed for meals 3 times a day  - Out of bed for toileting  - Record patient progress and toleration of activity level   Outcome: Progressing     Problem: Prexisting or High Potential for Compromised Skin Integrity  Goal: Skin integrity is maintained or improved  Description: INTERVENTIONS:  - Identify patients at risk for skin breakdown  - Assess and monitor skin integrity  - Assess and monitor nutrition and hydration status  - Monitor labs   - Assess for incontinence   - Turn and reposition patient  - Assist with mobility/ambulation  - Relieve pressure over bony prominences  - Avoid friction and shearing  - Provide appropriate hygiene as needed including keeping skin clean and dry  - Evaluate need for skin moisturizer/barrier cream  - Collaborate with interdisciplinary team   - Patient/family teaching  - Consider wound care consult   Outcome: Progressing     Problem: PAIN - ADULT  Goal: Verbalizes/displays adequate comfort level or baseline comfort level  Description: Interventions:  - Encourage patient to monitor pain and request assistance  - Assess pain using appropriate pain scale  - Administer analgesics based on type and severity of pain and evaluate response  - Implement non-pharmacological measures as appropriate and evaluate response  - Consider cultural and social influences on pain and pain management  - Notify physician/advanced practitioner if interventions unsuccessful or patient reports new pain  Outcome: Progressing     Problem: INFECTION - ADULT  Goal: Absence of fever/infection during neutropenic period  Description: INTERVENTIONS:  - Monitor WBC    Outcome: Progressing     Problem: SAFETY ADULT  Goal: Patient will remain free of falls  Description: INTERVENTIONS:  - Educate patient/family on patient safety including physical limitations  - Instruct patient to call for assistance with activity   - Consult OT/PT to assist with strengthening/mobility   - Keep Call bell within reach  - Keep bed low and locked with side rails adjusted as appropriate  - Keep care items and personal belongings within reach  - Initiate and maintain comfort rounds  - Make Fall Risk Sign visible to staff  - Offer Toileting every 2 Hours, in advance of need  - Initiate/Maintain bed alarm  - Obtain necessary fall risk management equipment: non slip socks  - Apply yellow socks and bracelet for high fall risk patients  - Consider moving patient to room near nurses station  Outcome: Progressing  Goal: Maintain or return to baseline ADL function  Description: INTERVENTIONS:  -  Assess patient's ability to carry out ADLs; assess patient's baseline for ADL function and identify physical deficits which impact ability to perform ADLs (bathing, care of mouth/teeth, toileting, grooming, dressing, etc.)  - Assess/evaluate cause of self-care deficits   - Assess range of motion  - Assess patient's mobility; develop plan if impaired  - Assess patient's need for assistive devices and provide as appropriate  - Encourage maximum independence but intervene and supervise when necessary  - Involve family in performance of ADLs  - Assess for home care needs following discharge   - Consider OT consult to assist with ADL evaluation and planning for discharge  - Provide patient education as appropriate  Outcome: Progressing  Goal: Maintains/Returns to pre admission functional level  Description: INTERVENTIONS:  - Perform BMAT or MOVE assessment daily.   - Set and communicate daily mobility goal to care team and patient/family/caregiver. - Collaborate with rehabilitation services on mobility goals if consulted  - Perform Range of Motion 5 times a day. - Reposition patient every 2 hours.   - Dangle patient 3 times a day  - Stand patient 3 times a day  - Ambulate patient 3 times a day  - Out of bed to chair 3 times a day   - Out of bed for meals 3 times a day  - Out of bed for toileting  - Record patient progress and toleration of activity level   Outcome: Progressing     Problem: DISCHARGE PLANNING  Goal: Discharge to home or other facility with appropriate resources  Description: INTERVENTIONS:  - Identify barriers to discharge w/patient and caregiver  - Arrange for needed discharge resources and transportation as appropriate  - Identify discharge learning needs (meds, wound care, etc.)  - Arrange for interpretive services to assist at discharge as needed  - Refer to Case Management Department for coordinating discharge planning if the patient needs post-hospital services based on physician/advanced practitioner order or complex needs related to functional status, cognitive ability, or social support system  Outcome: Progressing     Problem: Nutrition/Hydration-ADULT  Goal: Nutrient/Hydration intake appropriate for improving, restoring or maintaining nutritional needs  Description: Monitor and assess patient's nutrition/hydration status for malnutrition. Collaborate with interdisciplinary team and initiate plan and interventions as ordered. Monitor patient's weight and dietary intake as ordered or per policy. Utilize nutrition screening tool and intervene as necessary. Determine patient's food preferences and provide high-protein, high-caloric foods as appropriate.      INTERVENTIONS:  - Monitor oral intake, urinary output, labs, and treatment plans  - Assess nutrition and hydration status and recommend course of action  - Evaluate amount of meals eaten  - Assist patient with eating if necessary   - Allow adequate time for meals  - Recommend/ encourage appropriate diets, oral nutritional supplements, and vitamin/mineral supplements  - Order, calculate, and assess calorie counts as needed  - Recommend, monitor, and adjust tube feedings and TPN/PPN based on assessed needs  - Assess need for intravenous fluids  - Provide specific nutrition/hydration education as appropriate  - Include patient/family/caregiver in decisions related to nutrition  Outcome: Progressing     Problem: SKIN/TISSUE INTEGRITY - ADULT  Goal: Skin Integrity remains intact(Skin Breakdown Prevention)  Description: Assess:  -Perform Pineda assessment every shift  -Clean and moisturize skin every day  -Inspect skin when repositioning, toileting, and assisting with ADLS  -Assess under medical devices such as bracelets every shift  -Assess extremities for adequate circulation and sensation     Bed Management:  -Have minimal linens on bed & keep smooth, unwrinkled  -Change linens as needed when moist or perspiring  -Avoid sitting or lying in one position for more than 2 hours while in bed  -Keep HOB at 30 degrees     Toileting:  -Offer bedside commode  -Assess for incontinence every 2 hours  -Use incontinent care products after each incontinent episode such as barrier cream    Activity:  -Mobilize patient 5 times a day  -Encourage activity and walks on unit  -Encourage or provide ROM exercises   -Turn and reposition patient every 2 Hours  -Use appropriate equipment to lift or move patient in bed  -Instruct/ Assist with weight shifting every 2 hours when out of bed in chair  -Consider limitation of chair time 3 hour intervals    Skin Care:  -Avoid use of baby powder, tape, friction and shearing, hot water or constrictive clothing  -Relieve pressure over bony prominences using allevyn  -Do not massage red bony areas    Next Steps:  -Teach patient strategies to minimize risks such as weight shifting   -Consider consults to  interdisciplinary teams such as wound care  Outcome: Progressing  Goal: Incision(s), wounds(s) or drain site(s) healing without S/S of infection  Description: INTERVENTIONS  - Assess and document dressing, incision, wound bed, drain sites and surrounding tissue  - Provide patient and family education  - Perform skin care/dressing changes every day  Outcome: Progressing  Goal: Pressure injury heals and does not worsen  Description: Interventions:  - Implement low air loss mattress or specialty surface (Criteria met)  - Apply silicone foam dressing  - Instruct/assist with weight shifting every 120 minutes when in chair   - Limit chair time to 3 hour intervals  - Use special pressure reducing interventions such as weight shifting when in chair   - Apply fecal or urinary incontinence containment device   - Perform passive or active ROM every 6 hours  - Turn and reposition patient & offload bony prominences every 2 hours   - Utilize friction reducing device or surface for transfers   - Consider consults to  interdisciplinary teams such as wound care  - Use incontinent care products after each incontinent episode such as barrier cream  - Consider nutrition services referral as needed  Outcome: Progressing     Problem: MUSCULOSKELETAL - ADULT  Goal: Maintain or return mobility to safest level of function  Description: INTERVENTIONS:  - Assess patient's ability to carry out ADLs; assess patient's baseline for ADL function and identify physical deficits which impact ability to perform ADLs (bathing, care of mouth/teeth, toileting, grooming, dressing, etc.)  - Assess/evaluate cause of self-care deficits   - Assess range of motion  - Assess patient's mobility  - Assess patient's need for assistive devices and provide as appropriate  - Encourage maximum independence but intervene and supervise when necessary  - Involve family in performance of ADLs  - Assess for home care needs following discharge   - Consider OT consult to assist with ADL evaluation and planning for discharge  - Provide patient education as appropriate  Outcome: Progressing  Goal: Maintain proper alignment of affected body part  Description: INTERVENTIONS:  - Support, maintain and protect limb and body alignment  - Provide patient/ family with appropriate education  Outcome: Progressing

## 2023-09-23 NOTE — PLAN OF CARE
Problem: Potential for Falls  Goal: Patient will remain free of falls  Description: INTERVENTIONS:  - Educate patient/family on patient safety including physical limitations  - Instruct patient to call for assistance with activity   - Consult OT/PT to assist with strengthening/mobility   - Keep Call bell within reach  - Keep bed low and locked with side rails adjusted as appropriate  - Keep care items and personal belongings within reach  - Initiate and maintain comfort rounds  - Make Fall Risk Sign visible to staff  - Offer Toileting every 2 Hours, in advance of need  - Initiate/Maintain bed alarm  - Obtain necessary fall risk management equipment: non slip socks  - Apply yellow socks and bracelet for high fall risk patients  - Consider moving patient to room near nurses station  Outcome: Progressing     Problem: MOBILITY - ADULT  Goal: Maintain or return to baseline ADL function  Description: INTERVENTIONS:  -  Assess patient's ability to carry out ADLs; assess patient's baseline for ADL function and identify physical deficits which impact ability to perform ADLs (bathing, care of mouth/teeth, toileting, grooming, dressing, etc.)  - Assess/evaluate cause of self-care deficits   - Assess range of motion  - Assess patient's mobility; develop plan if impaired  - Assess patient's need for assistive devices and provide as appropriate  - Encourage maximum independence but intervene and supervise when necessary  - Involve family in performance of ADLs  - Assess for home care needs following discharge   - Consider OT consult to assist with ADL evaluation and planning for discharge  - Provide patient education as appropriate  Outcome: Progressing  Goal: Maintains/Returns to pre admission functional level  Description: INTERVENTIONS:  - Perform BMAT or MOVE assessment daily.   - Set and communicate daily mobility goal to care team and patient/family/caregiver.    - Collaborate with rehabilitation services on mobility goals if consulted  - Perform Range of Motion 5 times a day. - Reposition patient every 2 hours.   - Dangle patient 3 times a day  - Stand patient 3 times a day  - Ambulate patient 3 times a day  - Out of bed to chair 3 times a day   - Out of bed for meals 3 times a day  - Out of bed for toileting  - Record patient progress and toleration of activity level   Outcome: Progressing     Problem: Prexisting or High Potential for Compromised Skin Integrity  Goal: Skin integrity is maintained or improved  Description: INTERVENTIONS:  - Identify patients at risk for skin breakdown  - Assess and monitor skin integrity  - Assess and monitor nutrition and hydration status  - Monitor labs   - Assess for incontinence   - Turn and reposition patient  - Assist with mobility/ambulation  - Relieve pressure over bony prominences  - Avoid friction and shearing  - Provide appropriate hygiene as needed including keeping skin clean and dry  - Evaluate need for skin moisturizer/barrier cream  - Collaborate with interdisciplinary team   - Patient/family teaching  - Consider wound care consult   Outcome: Progressing     Problem: PAIN - ADULT  Goal: Verbalizes/displays adequate comfort level or baseline comfort level  Description: Interventions:  - Encourage patient to monitor pain and request assistance  - Assess pain using appropriate pain scale  - Administer analgesics based on type and severity of pain and evaluate response  - Implement non-pharmacological measures as appropriate and evaluate response  - Consider cultural and social influences on pain and pain management  - Notify physician/advanced practitioner if interventions unsuccessful or patient reports new pain  Outcome: Progressing     Problem: INFECTION - ADULT  Goal: Absence of fever/infection during neutropenic period  Description: INTERVENTIONS:  - Monitor WBC    Outcome: Progressing     Problem: SAFETY ADULT  Goal: Patient will remain free of falls  Description: INTERVENTIONS:  - Educate patient/family on patient safety including physical limitations  - Instruct patient to call for assistance with activity   - Consult OT/PT to assist with strengthening/mobility   - Keep Call bell within reach  - Keep bed low and locked with side rails adjusted as appropriate  - Keep care items and personal belongings within reach  - Initiate and maintain comfort rounds  - Make Fall Risk Sign visible to staff  - Offer Toileting every 2 Hours, in advance of need  - Initiate/Maintain bed alarm  - Obtain necessary fall risk management equipment: non slip socks  - Apply yellow socks and bracelet for high fall risk patients  - Consider moving patient to room near nurses station  Outcome: Progressing  Goal: Maintain or return to baseline ADL function  Description: INTERVENTIONS:  -  Assess patient's ability to carry out ADLs; assess patient's baseline for ADL function and identify physical deficits which impact ability to perform ADLs (bathing, care of mouth/teeth, toileting, grooming, dressing, etc.)  - Assess/evaluate cause of self-care deficits   - Assess range of motion  - Assess patient's mobility; develop plan if impaired  - Assess patient's need for assistive devices and provide as appropriate  - Encourage maximum independence but intervene and supervise when necessary  - Involve family in performance of ADLs  - Assess for home care needs following discharge   - Consider OT consult to assist with ADL evaluation and planning for discharge  - Provide patient education as appropriate  Outcome: Progressing  Goal: Maintains/Returns to pre admission functional level  Description: INTERVENTIONS:  - Perform BMAT or MOVE assessment daily.   - Set and communicate daily mobility goal to care team and patient/family/caregiver. - Collaborate with rehabilitation services on mobility goals if consulted  - Perform Range of Motion 5 times a day. - Reposition patient every 2 hours.   - Dangle patient 3 times a day  - Stand patient 3 times a day  - Ambulate patient 3 times a day  - Out of bed to chair 3 times a day   - Out of bed for meals 3 times a day  - Out of bed for toileting  - Record patient progress and toleration of activity level   Outcome: Progressing     Problem: DISCHARGE PLANNING  Goal: Discharge to home or other facility with appropriate resources  Description: INTERVENTIONS:  - Identify barriers to discharge w/patient and caregiver  - Arrange for needed discharge resources and transportation as appropriate  - Identify discharge learning needs (meds, wound care, etc.)  - Arrange for interpretive services to assist at discharge as needed  - Refer to Case Management Department for coordinating discharge planning if the patient needs post-hospital services based on physician/advanced practitioner order or complex needs related to functional status, cognitive ability, or social support system  Outcome: Progressing     Problem: Nutrition/Hydration-ADULT  Goal: Nutrient/Hydration intake appropriate for improving, restoring or maintaining nutritional needs  Description: Monitor and assess patient's nutrition/hydration status for malnutrition. Collaborate with interdisciplinary team and initiate plan and interventions as ordered. Monitor patient's weight and dietary intake as ordered or per policy. Utilize nutrition screening tool and intervene as necessary. Determine patient's food preferences and provide high-protein, high-caloric foods as appropriate.      INTERVENTIONS:  - Monitor oral intake, urinary output, labs, and treatment plans  - Assess nutrition and hydration status and recommend course of action  - Evaluate amount of meals eaten  - Assist patient with eating if necessary   - Allow adequate time for meals  - Recommend/ encourage appropriate diets, oral nutritional supplements, and vitamin/mineral supplements  - Order, calculate, and assess calorie counts as needed  - Recommend, monitor, and adjust tube feedings and TPN/PPN based on assessed needs  - Assess need for intravenous fluids  - Provide specific nutrition/hydration education as appropriate  - Include patient/family/caregiver in decisions related to nutrition  Outcome: Progressing     Problem: SKIN/TISSUE INTEGRITY - ADULT  Goal: Skin Integrity remains intact(Skin Breakdown Prevention)  Description: Assess:  -Perform Pineda assessment every x  -Clean and moisturize skin every x  -Inspect skin when repositioning, toileting, and assisting with ADLS  -Assess under medical devices such as x every x  -Assess extremities for adequate circulation and sensation     Bed Management:  -Have minimal linens on bed & keep smooth, unwrinkled  -Change linens as needed when moist or perspiring  -Avoid sitting or lying in one position for more than x hours while in bed  -Keep HOB at [facility]     Toileting:  -Offer bedside commode  -Assess for incontinence every xx  -Use incontinent care products after each incontinent episode such as x    Activity:  -Mobilize patient x times a day  -Encourage activity and walks on unit  -Encourage or provide ROM exercises   -Turn and reposition patient every x Hours  -Use appropriate equipment to lift or move patient in bed  -Instruct/ Assist with weight shifting every x when out of bed in chair  -Consider limitation of chair time x hour intervals    Skin Care:  -Avoid use of baby powder, tape, friction and shearing, hot water or constrictive clothing  -Relieve pressure over bony prominences using x  -Do not massage red bony areas    Next Steps:  -Teach patient strategies to minimize risks such as x   -Consider consults to  interdisciplinary teams such as x  Outcome: Progressing  Goal: Incision(s), wounds(s) or drain site(s) healing without S/S of infection  Description: INTERVENTIONS  - Assess and document dressing, incision, wound bed, drain sites and surrounding tissue  - Provide patient and family education  - Perform skin care/dressing changes every x  Outcome: Progressing  Goal: Pressure injury heals and does not worsen  Description: Interventions:  - Implement low air loss mattress or specialty surface (Criteria met)  - Apply silicone foam dressing  - Instruct/assist with weight shifting every x minutes when in chair   - Limit chair time to x hour intervals  - Use special pressure reducing interventions such as x when in chair   - Apply fecal or urinary incontinence containment device   - Perform passive or active ROM every x  - Turn and reposition patient & offload bony prominences every x hours   - Utilize friction reducing device or surface for transfers   - Consider consults to  interdisciplinary teams such as x  - Use incontinent care products after each incontinent episode such as x  - Consider nutrition services referral as needed  Outcome: Progressing     Problem: MUSCULOSKELETAL - ADULT  Goal: Maintain or return mobility to safest level of function  Description: INTERVENTIONS:  - Assess patient's ability to carry out ADLs; assess patient's baseline for ADL function and identify physical deficits which impact ability to perform ADLs (bathing, care of mouth/teeth, toileting, grooming, dressing, etc.)  - Assess/evaluate cause of self-care deficits   - Assess range of motion  - Assess patient's mobility  - Assess patient's need for assistive devices and provide as appropriate  - Encourage maximum independence but intervene and supervise when necessary  - Involve family in performance of ADLs  - Assess for home care needs following discharge   - Consider OT consult to assist with ADL evaluation and planning for discharge  - Provide patient education as appropriate  Outcome: Progressing  Goal: Maintain proper alignment of affected body part  Description: INTERVENTIONS:  - Support, maintain and protect limb and body alignment  - Provide patient/ family with appropriate education  Outcome: Progressing

## 2023-09-24 ENCOUNTER — HOME CARE VISIT (OUTPATIENT)
Dept: HOME HEALTH SERVICES | Facility: HOME HEALTHCARE | Age: 63
End: 2023-09-24
Payer: COMMERCIAL

## 2023-09-24 VITALS
OXYGEN SATURATION: 98 % | HEIGHT: 72 IN | BODY MASS INDEX: 19.64 KG/M2 | SYSTOLIC BLOOD PRESSURE: 120 MMHG | WEIGHT: 145 LBS | DIASTOLIC BLOOD PRESSURE: 70 MMHG | HEART RATE: 88 BPM | TEMPERATURE: 97.3 F | RESPIRATION RATE: 20 BRPM

## 2023-09-24 PROCEDURE — G0299 HHS/HOSPICE OF RN EA 15 MIN: HCPCS

## 2023-09-24 PROCEDURE — 400013 VN SOC

## 2023-09-25 ENCOUNTER — TRANSITIONAL CARE MANAGEMENT (OUTPATIENT)
Dept: FAMILY MEDICINE CLINIC | Facility: HOSPITAL | Age: 63
End: 2023-09-25

## 2023-09-25 ENCOUNTER — HOME CARE VISIT (OUTPATIENT)
Dept: HOME HEALTH SERVICES | Facility: HOME HEALTHCARE | Age: 63
End: 2023-09-25
Payer: COMMERCIAL

## 2023-09-25 PROCEDURE — G0300 HHS/HOSPICE OF LPN EA 15 MIN: HCPCS

## 2023-09-26 ENCOUNTER — HOME CARE VISIT (OUTPATIENT)
Dept: HOME HEALTH SERVICES | Facility: HOME HEALTHCARE | Age: 63
End: 2023-09-26
Payer: COMMERCIAL

## 2023-09-26 VITALS — DIASTOLIC BLOOD PRESSURE: 58 MMHG | SYSTOLIC BLOOD PRESSURE: 124 MMHG | HEART RATE: 105 BPM | OXYGEN SATURATION: 96 %

## 2023-09-26 VITALS
DIASTOLIC BLOOD PRESSURE: 72 MMHG | RESPIRATION RATE: 16 BRPM | TEMPERATURE: 97.5 F | SYSTOLIC BLOOD PRESSURE: 128 MMHG | HEART RATE: 80 BPM | OXYGEN SATURATION: 100 %

## 2023-09-26 PROCEDURE — G0299 HHS/HOSPICE OF RN EA 15 MIN: HCPCS

## 2023-09-26 PROCEDURE — G0152 HHCP-SERV OF OT,EA 15 MIN: HCPCS

## 2023-09-27 ENCOUNTER — HOME CARE VISIT (OUTPATIENT)
Dept: HOME HEALTH SERVICES | Facility: HOME HEALTHCARE | Age: 63
End: 2023-09-27
Payer: COMMERCIAL

## 2023-09-27 ENCOUNTER — TELEPHONE (OUTPATIENT)
Dept: FAMILY MEDICINE CLINIC | Facility: HOSPITAL | Age: 63
End: 2023-09-27

## 2023-09-27 VITALS
HEART RATE: 107 BPM | TEMPERATURE: 97.4 F | DIASTOLIC BLOOD PRESSURE: 72 MMHG | SYSTOLIC BLOOD PRESSURE: 118 MMHG | RESPIRATION RATE: 16 BRPM | OXYGEN SATURATION: 100 %

## 2023-09-27 VITALS — SYSTOLIC BLOOD PRESSURE: 122 MMHG | DIASTOLIC BLOOD PRESSURE: 70 MMHG | HEART RATE: 90 BPM | OXYGEN SATURATION: 98 %

## 2023-09-27 DIAGNOSIS — T81.30XA WOUND DEHISCENCE: ICD-10-CM

## 2023-09-27 DIAGNOSIS — Z89.512 HX OF BKA, LEFT (HCC): ICD-10-CM

## 2023-09-27 PROCEDURE — G0151 HHCP-SERV OF PT,EA 15 MIN: HCPCS

## 2023-09-27 PROCEDURE — G0299 HHS/HOSPICE OF RN EA 15 MIN: HCPCS

## 2023-09-27 RX ORDER — HYDROMORPHONE HYDROCHLORIDE 2 MG/1
2 TABLET ORAL EVERY 4 HOURS PRN
Qty: 18 TABLET | Refills: 0 | Status: CANCELLED | OUTPATIENT
Start: 2023-09-27

## 2023-09-28 ENCOUNTER — HOME CARE VISIT (OUTPATIENT)
Dept: HOME HEALTH SERVICES | Facility: HOME HEALTHCARE | Age: 63
End: 2023-09-28
Payer: COMMERCIAL

## 2023-09-28 VITALS
DIASTOLIC BLOOD PRESSURE: 78 MMHG | SYSTOLIC BLOOD PRESSURE: 160 MMHG | OXYGEN SATURATION: 100 % | RESPIRATION RATE: 16 BRPM | TEMPERATURE: 96.9 F | HEART RATE: 81 BPM

## 2023-09-28 DIAGNOSIS — I10 BENIGN ESSENTIAL HYPERTENSION: ICD-10-CM

## 2023-09-28 PROCEDURE — G0299 HHS/HOSPICE OF RN EA 15 MIN: HCPCS

## 2023-09-28 RX ORDER — METOPROLOL SUCCINATE 100 MG/1
100 TABLET, EXTENDED RELEASE ORAL DAILY
Qty: 90 TABLET | Refills: 1 | Status: SHIPPED | OUTPATIENT
Start: 2023-09-28

## 2023-09-29 ENCOUNTER — HOME CARE VISIT (OUTPATIENT)
Dept: HOME HEALTH SERVICES | Facility: HOME HEALTHCARE | Age: 63
End: 2023-09-29
Payer: COMMERCIAL

## 2023-09-29 ENCOUNTER — OFFICE VISIT (OUTPATIENT)
Dept: SURGERY | Facility: HOSPITAL | Age: 63
End: 2023-09-29

## 2023-09-29 VITALS
RESPIRATION RATE: 16 BRPM | SYSTOLIC BLOOD PRESSURE: 109 MMHG | TEMPERATURE: 97.4 F | BODY MASS INDEX: 19.67 KG/M2 | HEIGHT: 72 IN | HEART RATE: 96 BPM | DIASTOLIC BLOOD PRESSURE: 69 MMHG

## 2023-09-29 DIAGNOSIS — G89.18 POST-OP PAIN: ICD-10-CM

## 2023-09-29 DIAGNOSIS — F41.9 ANXIETY: ICD-10-CM

## 2023-09-29 DIAGNOSIS — G54.6 PHANTOM LIMB PAIN (HCC): ICD-10-CM

## 2023-09-29 DIAGNOSIS — Z89.512 S/P BKA (BELOW KNEE AMPUTATION), LEFT (HCC): ICD-10-CM

## 2023-09-29 DIAGNOSIS — T81.30XA WOUND DEHISCENCE: ICD-10-CM

## 2023-09-29 DIAGNOSIS — Z89.512 HX OF BKA, LEFT (HCC): ICD-10-CM

## 2023-09-29 DIAGNOSIS — T81.30XA WOUND DEHISCENCE: Primary | ICD-10-CM

## 2023-09-29 PROCEDURE — 99024 POSTOP FOLLOW-UP VISIT: CPT | Performed by: PHYSICIAN ASSISTANT

## 2023-09-29 RX ORDER — DULOXETIN HYDROCHLORIDE 30 MG/1
30 CAPSULE, DELAYED RELEASE ORAL DAILY
Qty: 30 CAPSULE | Refills: 0 | Status: SHIPPED | OUTPATIENT
Start: 2023-09-29 | End: 2023-10-02 | Stop reason: SDUPTHER

## 2023-09-29 RX ORDER — NALOXONE HYDROCHLORIDE 4 MG/.1ML
SPRAY NASAL
Qty: 1 EACH | Refills: 0 | Status: SHIPPED | OUTPATIENT
Start: 2023-09-29 | End: 2024-09-28

## 2023-09-29 RX ORDER — HYDROMORPHONE HYDROCHLORIDE 2 MG/1
2 TABLET ORAL EVERY 4 HOURS PRN
Qty: 18 TABLET | Refills: 0 | Status: SHIPPED | OUTPATIENT
Start: 2023-09-29 | End: 2023-10-11

## 2023-09-29 RX ORDER — OXYCODONE HYDROCHLORIDE 5 MG/1
5 TABLET ORAL EVERY 6 HOURS PRN
Qty: 12 TABLET | Refills: 0 | Status: SHIPPED | OUTPATIENT
Start: 2023-09-29 | End: 2023-10-02

## 2023-09-29 NOTE — PATIENT INSTRUCTIONS
Wound care:    Open wound left BKA  -Wash wound daily with soap and water, pat dry  -Apply Maxorb to midline wound opening  -Apply Dermagran to 2 smaller medial openings  -Cover with sterile 4 x 4, ABD, light Ace compression  -Wound care should be completed daily  -Continue to monitor wound site, call office with questions or concerns  -Follow-up in 2 weeks as scheduled  -Schedule appointment with pain management for soon as possible

## 2023-09-29 NOTE — CASE COMMUNICATION
Ship to [facility] Pt. Home  Insurance  [employer]     Wound 1 Left BKA      Full  XX        All items are ordered by the each unless otherwise noted.   PLEASE DO NOT ORDER BY THE BOX  Request specific quantity needed  For Private Insurances order should be for a 2 week period  For [facility] patients order should be for a 1 week period    Calcium Alginates  (In place of Aquacel or Maxsorb)  Alginate 4x4 [id number]       2    Moist Wound Products  Dermagran [phone]       2

## 2023-09-30 ENCOUNTER — HOME CARE VISIT (OUTPATIENT)
Dept: HOME HEALTH SERVICES | Facility: HOME HEALTHCARE | Age: 63
End: 2023-09-30
Payer: COMMERCIAL

## 2023-09-30 VITALS
DIASTOLIC BLOOD PRESSURE: 80 MMHG | OXYGEN SATURATION: 99 % | RESPIRATION RATE: 18 BRPM | TEMPERATURE: 97.3 F | HEART RATE: 68 BPM | SYSTOLIC BLOOD PRESSURE: 142 MMHG

## 2023-09-30 PROCEDURE — G0299 HHS/HOSPICE OF RN EA 15 MIN: HCPCS

## 2023-09-30 NOTE — PROGRESS NOTES
Assessment/Plan:   Melany Sheets is a 66 y.o.male who comes in today for postoperative check after undergoing BKA for diabetic foot wound and osteomyelitis on 8/22/2023. Patient was seen postoperatively and had been doing well. He recently fell at home and heard a dehiscence of his left BKA stump. Dehiscence of left BKA stump  -Repaired in ER on 9/22/2023, left partially open for drainage, was admitted for pain control and discharged home the following day  -Patient continues with VNA care at home  -Complains of continued pain at site. -Minimal drainage from midline wound opening  -Denies any fevers or chills, purulent drainage    -Wound with mild surrounding erythema, no increased warmth, no purulent drainage, signs of active infection  -Patient will continue wound care with Maxorb over medial wound opening. Will use Dermagran to 2 other medial open wound sites  -Patient should continue physical therapy and nonweightbearing to left stump  -Reevaluate in 2 weeks  -Instructions provided for patient and wound care nurses  -Call with any questions or concerns    Opioiod dependence  -Patient has been using oral Dilaudid being provided by his family medical doctor  -States he has none left at home and that his family medical doctor will not provide further pain medication  -Complaining of severe pain in office today  -Reviewed PDMP, patient received 60 tablets 10 days ago  -We will provide 12 tablets of oxycodone today  -Patient was referred to pain management clinic for further pain control management  -Patient is on Neurontin      HPI:  Melany Sheets is a 66 y.o.male who comes in today for postoperative check after recent left BKA as above. Patient fell at home on 9/22 and suffered a dehiscence of his left BKA stump. Stump was repaired in the emergency department. He was left partially open for drainage. He was admitted for 1 day for pain control. He has since been followed by visiting nurses at home. Complains of continued continued severe pain at site. X-rays were negative for any fracture. Complains of minimal drainage and midline opening. Denies any fevers or chills. ROS:  General ROS: negative for - chills, fatigue, fever or night sweats, weight loss  Respiratory ROS: no cough, shortness of breath, or wheezing  Cardiovascular ROS: no chest pain or dyspnea on exertion  Abdomen ROS: no pain, N/V  Genito-Urinary ROS: no dysuria, trouble voiding, or hematuria  Musculoskeletal ROS: negative for - gait disturbance, joint pain or muscle pain  Neurological ROS: no TIA or stroke symptoms  Skin ROS:as per HPI    ALLERGIES  Cefuroxime and Amoxicillin-pot clavulanate    Current Outpatient Medications:     acetaminophen (TYLENOL) 325 mg tablet, Take 3 tablets (975 mg total) by mouth every 6 (six) hours, Disp: 120 tablet, Rfl: 0    atorvastatin (LIPITOR) 40 mg tablet, TAKE 1 TABLET BY MOUTH EVERY DAY, Disp: 90 tablet, Rfl: 1    gabapentin (Neurontin) 300 mg capsule, Take 2 capsules (600 mg total) by mouth 2 (two) times a day AND 3 capsules (900 mg total) daily at bedtime. (Patient taking differently: 900mg TID), Disp: 630 capsule, Rfl: 0    Glucagon, rDNA, (Glucagon Emergency) 1 MG KIT, , Disp: , Rfl:     HumaLOG 100 UNIT/ML injection, inject up to 80 units in INSULIN PUMP daily as directed by prescriber, Disp: , Rfl:     lisinopril (ZESTRIL) 10 mg tablet, Take 10 mg by mouth daily, Disp: , Rfl:     metoprolol succinate (TOPROL-XL) 100 mg 24 hr tablet, take 1 tablet by mouth once daily, Disp: 90 tablet, Rfl: 1    naloxone (NARCAN) 4 mg/0.1 mL nasal spray, Administer 1 spray into a nostril.  If no response after 2-3 minutes, give another dose in the other nostril using a new spray., Disp: 1 each, Rfl: 0    nicotine (NICODERM CQ) 21 mg/24 hr TD 24 hr patch, Place 1 patch on the skin over 24 hours daily Do not start before July 5, 2023., Disp: 28 patch, Rfl: 0    oxyCODONE (Roxicodone) 5 immediate release tablet, Take 1 tablet (5 mg total) by mouth every 6 (six) hours as needed for severe pain for up to 3 days Max Daily Amount: 20 mg, Disp: 12 tablet, Rfl: 0    DULoxetine (CYMBALTA) 30 mg delayed release capsule, take 1 capsule by mouth once daily, Disp: 30 capsule, Rfl: 0    HYDROmorphone (DILAUDID) 2 mg tablet, Take 1 tablet (2 mg total) by mouth every 4 (four) hours as needed for moderate pain for up to 18 doses Max Daily Amount: 12 mg, Disp: 18 tablet, Rfl: 0    insulin lispro (HumaLOG) 100 units/mL, 80 Units by Subcutaneous Insulin Pump route in the morning. 80 units via insulin pump  Indications: Type 2 Diabetes (Patient not taking: Reported on 9/5/2023), Disp: , Rfl:     methocarbamol (ROBAXIN) 500 mg tablet, Take 1 tablet (500 mg total) by mouth every 6 (six) hours as needed for muscle spasms, Disp: 120 tablet, Rfl: 0  Past Medical History:   Diagnosis Date    Chronic foot ulcer ([address]) 09/07/2018    Diabetes mellitus ([address])     Diabetic foot ulcer ([address]) 5/8/2023    Gout     High cholesterol     Hypertension     Visual impairment 11/1/2022    Cateract     Past Surgical History:   Procedure Laterality Date    CATARACT EXTRACTION Right 01/2023    COMPLEX WOUND CLOSURE TO EXTREMITY Left 07/18/2019    Procedure: COMPLEX CLOSURE LEFT CHEEK;  Surgeon: Slime Oquendo MD;  Location:  MAIN OR;  Service: Plastics    FACIAL/NECK BIOPSY Left 07/18/2019    Procedure: EXCISION LEFT CHEEK CYST;  Surgeon: Slime Oquendo MD;  Location:  MAIN OR;  Service: Plastics    HERNIA REPAIR Left     several times    KNEE ARTHROSCOPY Left     torn meniscus    LEG AMPUTATION THROUGH LOWER TIBIA AND FIBULA Left 8/22/2023    Procedure: AMPUTATION BELOW KNEE (BKA);   Surgeon: Marko Clayton MD;  Location:  MAIN OR;  Service: General    IA AMPUTATION FOOT TRANSMETARSAL Left 6/30/2023    Procedure: AMPUTATION TRANSMETATARSAL (TMA);  Surgeon: Sonia Hilario DPM;  Location:  MAIN OR;  Service: Podiatry    WOUND DEBRIDEMENT Left 5/30/2023 Procedure: DEBRIDEMENT WOUND LEFT FOOT WOUND WITH EXCISION OF INFECTED BONE AND WOUND VAC APPLICATION;  Surgeon: Cheryl Michael DPM;  Location:  MAIN OR;  Service: Podiatry     Family History   Problem Relation Age of Onset    Heart disease Father     Diabetes type I Father     Diabetes type II Mother     No Known Problems Sister     Alcohol abuse Brother     Diabetes type I Brother     No Known Problems Brother     No Known Problems Son     No Known Problems Daughter       reports that he has been smoking cigarettes. He has a 20.00 pack-year smoking history. He has never used smokeless tobacco. He reports current alcohol use. He reports that he does not use drugs. PHYSICAL EXAM    Vitals:    09/29/23 1057   BP: 109/69   Pulse: 96   Resp: 16   Temp: (!) 97.4 °F (36.3 °C)       Weight (last 2 days)       Date/Time Weight    09/29/23 1057 --     Weight: PATIENT IN WHEELCHAIR at 09/29/23 1057            General: normal, cooperative, no distress, thin    Incision: Left BKA stump with incision site with multiple wound openings. Midline with small hematoma and minimal serous drainage. 2 medial open areas with visible muscle without drainage. Mild surrounding erythema. No increased skin warmth. No induration. No purulent drainage. No foul odor.       Feliciana Phoenix

## 2023-10-01 ENCOUNTER — HOME CARE VISIT (OUTPATIENT)
Dept: HOME HEALTH SERVICES | Facility: HOME HEALTHCARE | Age: 63
End: 2023-10-01
Payer: COMMERCIAL

## 2023-10-01 VITALS
RESPIRATION RATE: 18 BRPM | TEMPERATURE: 97 F | OXYGEN SATURATION: 98 % | HEART RATE: 98 BPM | SYSTOLIC BLOOD PRESSURE: 118 MMHG | DIASTOLIC BLOOD PRESSURE: 72 MMHG

## 2023-10-01 PROCEDURE — G0299 HHS/HOSPICE OF RN EA 15 MIN: HCPCS

## 2023-10-02 ENCOUNTER — TELEPHONE (OUTPATIENT)
Dept: FAMILY MEDICINE CLINIC | Facility: HOSPITAL | Age: 63
End: 2023-10-02

## 2023-10-02 ENCOUNTER — HOME CARE VISIT (OUTPATIENT)
Dept: HOME HEALTH SERVICES | Facility: HOME HEALTHCARE | Age: 63
End: 2023-10-02
Payer: COMMERCIAL

## 2023-10-02 DIAGNOSIS — Z89.512 S/P BKA (BELOW KNEE AMPUTATION), LEFT (HCC): ICD-10-CM

## 2023-10-02 DIAGNOSIS — G89.18 POST-OP PAIN: ICD-10-CM

## 2023-10-02 DIAGNOSIS — F41.9 ANXIETY: ICD-10-CM

## 2023-10-02 DIAGNOSIS — G54.6 PHANTOM LIMB PAIN (HCC): ICD-10-CM

## 2023-10-02 PROCEDURE — G0299 HHS/HOSPICE OF RN EA 15 MIN: HCPCS

## 2023-10-02 NOTE — TELEPHONE ENCOUNTER
December 10, 2020      Antony Rose MD  [address]  [facility] [address]           [facility] 3rd Fl  [address]  [facility] [address]  Phone: [phone]          Patient: Ann Elizabeth Canavan   MR Number: [id number]   YOB: 1959   Date of Visit: 12/9/2020       Dear Dr. Antony Rose:    Thank you for referring Ann Canavan to me for evaluation. Attached you will find relevant portions of my assessment and plan of care.    If you have questions, please do not hesitate to call me. I look forward to following Ann Canavan along with you.    Sincerely,    Mau Real MD    Enclosure  CC:  No Recipients    If you would like to receive this communication electronically, please contact [email or web address] or [phone] to request more information on [employer] Link access.    For providers and/or their staff who would like to refer a patient to [facility], please contact us through our one-stop-shop provider referral line, [facility] [place], at [phone].    If you feel you have received this communication in error or would no longer like to receive these types of communications, please e-mail [email or web address]          Needs refills for     DULoxetine (CYMBALTA) 30 mg delayed release capsule     oxyCODONE (Roxicodone) 5 immediate release tablet     Patient has an appt for Pain Management on 10/10/23    He is hoping he can get enough of the Oxycodone to last until he sees them next week. Asks for a call back to let him know either way if Dr Marily Parada is willing to refill for him.

## 2023-10-02 NOTE — TELEPHONE ENCOUNTER
Needs refills for      DULoxetine (CYMBALTA) 30 mg delayed release capsule      oxyCODONE (Roxicodone) 5 immediate release tablet      Patient has an appt for Pain Management on 10/10/23     He is hoping he can get enough of the Oxycodone to last until he sees them next week. Asks for a call back to let him know either way if Dr Marily Parada is willing to refill for him.

## 2023-10-03 ENCOUNTER — HOME CARE VISIT (OUTPATIENT)
Dept: HOME HEALTH SERVICES | Facility: HOME HEALTHCARE | Age: 63
End: 2023-10-03
Payer: COMMERCIAL

## 2023-10-03 VITALS
HEART RATE: 100 BPM | RESPIRATION RATE: 20 BRPM | OXYGEN SATURATION: 97 % | DIASTOLIC BLOOD PRESSURE: 80 MMHG | SYSTOLIC BLOOD PRESSURE: 170 MMHG | TEMPERATURE: 97.4 F

## 2023-10-03 VITALS
DIASTOLIC BLOOD PRESSURE: 80 MMHG | HEART RATE: 75 BPM | RESPIRATION RATE: 16 BRPM | SYSTOLIC BLOOD PRESSURE: 140 MMHG | TEMPERATURE: 97.7 F

## 2023-10-03 VITALS
DIASTOLIC BLOOD PRESSURE: 78 MMHG | SYSTOLIC BLOOD PRESSURE: 141 MMHG | HEART RATE: 73 BPM | RESPIRATION RATE: 19 BRPM | OXYGEN SATURATION: 99 %

## 2023-10-03 PROCEDURE — G0299 HHS/HOSPICE OF RN EA 15 MIN: HCPCS

## 2023-10-03 PROCEDURE — G0157 HHC PT ASSISTANT EA 15: HCPCS

## 2023-10-03 RX ORDER — DULOXETIN HYDROCHLORIDE 30 MG/1
30 CAPSULE, DELAYED RELEASE ORAL DAILY
Qty: 30 CAPSULE | Refills: 0 | Status: SHIPPED | OUTPATIENT
Start: 2023-10-03

## 2023-10-03 RX ORDER — OXYCODONE HYDROCHLORIDE 5 MG/1
5 TABLET ORAL EVERY 6 HOURS PRN
Qty: 12 TABLET | Refills: 0 | OUTPATIENT
Start: 2023-10-03 | End: 2023-10-06

## 2023-10-04 ENCOUNTER — HOME CARE VISIT (OUTPATIENT)
Dept: HOME HEALTH SERVICES | Facility: HOME HEALTHCARE | Age: 63
End: 2023-10-04
Payer: COMMERCIAL

## 2023-10-04 VITALS
DIASTOLIC BLOOD PRESSURE: 60 MMHG | OXYGEN SATURATION: 100 % | SYSTOLIC BLOOD PRESSURE: 120 MMHG | TEMPERATURE: 97 F | RESPIRATION RATE: 18 BRPM | HEART RATE: 66 BPM

## 2023-10-04 PROCEDURE — G0299 HHS/HOSPICE OF RN EA 15 MIN: HCPCS

## 2023-10-04 NOTE — TELEPHONE ENCOUNTER
Patient reports he is not taking the Dilaudid as he was not helping. States the Oxy was helping the most. Is in a lot of pain and gabapentin and tylenol is not helping.

## 2023-10-05 ENCOUNTER — HOME CARE VISIT (OUTPATIENT)
Dept: HOME HEALTH SERVICES | Facility: HOME HEALTHCARE | Age: 63
End: 2023-10-05
Payer: COMMERCIAL

## 2023-10-05 VITALS
TEMPERATURE: 98.6 F | DIASTOLIC BLOOD PRESSURE: 70 MMHG | OXYGEN SATURATION: 99 % | HEART RATE: 92 BPM | RESPIRATION RATE: 18 BRPM | SYSTOLIC BLOOD PRESSURE: 130 MMHG

## 2023-10-05 PROCEDURE — G0300 HHS/HOSPICE OF LPN EA 15 MIN: HCPCS

## 2023-10-05 NOTE — TELEPHONE ENCOUNTER
Patient called back. I relayed the message you had said. He is very upset saying that the gabapentin and tylenol are not helping at all and would really like the oxycodone. Patient said he wants to speak with you on the phone. No appts open for tomorrow. Would like this dealt with today if possible.

## 2023-10-06 ENCOUNTER — HOME CARE VISIT (OUTPATIENT)
Dept: HOME HEALTH SERVICES | Facility: HOME HEALTHCARE | Age: 63
End: 2023-10-06
Payer: COMMERCIAL

## 2023-10-06 ENCOUNTER — TELEPHONE (OUTPATIENT)
Dept: SURGERY | Facility: HOSPITAL | Age: 63
End: 2023-10-06

## 2023-10-06 VITALS
RESPIRATION RATE: 18 BRPM | DIASTOLIC BLOOD PRESSURE: 64 MMHG | TEMPERATURE: 97 F | HEART RATE: 100 BPM | SYSTOLIC BLOOD PRESSURE: 120 MMHG

## 2023-10-06 PROCEDURE — G0151 HHCP-SERV OF PT,EA 15 MIN: HCPCS

## 2023-10-06 PROCEDURE — G0299 HHS/HOSPICE OF RN EA 15 MIN: HCPCS

## 2023-10-06 NOTE — TELEPHONE ENCOUNTER
Received call from patient requesting more pain medication. Spoke to provided who stated we will not prescribe any more narcotics because patient has been referred to pain management and had received prescriptions from PCP. Recommended patient call PCP or pain management for more narcotics, but we are limited in what can be given to patient. Patient states he has an appointment scheduled with pain management and PCP has declined pain meds. Told patient he needs to seek medications from either PCP or pain management or manage pain with OTC Tylenol/ibuprofen medications. Patient voiced understanding. Patient to be seen on 10/13 in our office for follow up wound check.

## 2023-10-07 ENCOUNTER — HOME CARE VISIT (OUTPATIENT)
Dept: HOME HEALTH SERVICES | Facility: HOME HEALTHCARE | Age: 63
End: 2023-10-07
Payer: COMMERCIAL

## 2023-10-07 ENCOUNTER — VBI (OUTPATIENT)
Dept: ADMINISTRATIVE | Facility: OTHER | Age: 63
End: 2023-10-07

## 2023-10-07 VITALS
DIASTOLIC BLOOD PRESSURE: 78 MMHG | HEART RATE: 98 BPM | SYSTOLIC BLOOD PRESSURE: 128 MMHG | OXYGEN SATURATION: 98 % | TEMPERATURE: 97.8 F | RESPIRATION RATE: 18 BRPM

## 2023-10-07 PROCEDURE — G0299 HHS/HOSPICE OF RN EA 15 MIN: HCPCS

## 2023-10-09 ENCOUNTER — HOME CARE VISIT (OUTPATIENT)
Dept: HOME HEALTH SERVICES | Facility: HOME HEALTHCARE | Age: 63
End: 2023-10-09
Payer: COMMERCIAL

## 2023-10-09 VITALS — OXYGEN SATURATION: 98 % | SYSTOLIC BLOOD PRESSURE: 133 MMHG | DIASTOLIC BLOOD PRESSURE: 76 MMHG | HEART RATE: 93 BPM

## 2023-10-09 VITALS
SYSTOLIC BLOOD PRESSURE: 133 MMHG | OXYGEN SATURATION: 98 % | TEMPERATURE: 97.8 F | DIASTOLIC BLOOD PRESSURE: 76 MMHG | RESPIRATION RATE: 16 BRPM | HEART RATE: 93 BPM

## 2023-10-09 PROCEDURE — G0151 HHCP-SERV OF PT,EA 15 MIN: HCPCS

## 2023-10-09 PROCEDURE — G0300 HHS/HOSPICE OF LPN EA 15 MIN: HCPCS

## 2023-10-10 ENCOUNTER — HOME CARE VISIT (OUTPATIENT)
Dept: HOME HEALTH SERVICES | Facility: HOME HEALTHCARE | Age: 63
End: 2023-10-10
Payer: COMMERCIAL

## 2023-10-11 ENCOUNTER — TELEMEDICINE (OUTPATIENT)
Dept: FAMILY MEDICINE CLINIC | Facility: HOSPITAL | Age: 63
End: 2023-10-11
Payer: COMMERCIAL

## 2023-10-11 ENCOUNTER — HOME CARE VISIT (OUTPATIENT)
Dept: HOME HEALTH SERVICES | Facility: HOME HEALTHCARE | Age: 63
End: 2023-10-11
Payer: COMMERCIAL

## 2023-10-11 ENCOUNTER — TELEPHONE (OUTPATIENT)
Dept: FAMILY MEDICINE CLINIC | Facility: HOSPITAL | Age: 63
End: 2023-10-11

## 2023-10-11 VITALS
WEIGHT: 145 LBS | BODY MASS INDEX: 19.64 KG/M2 | DIASTOLIC BLOOD PRESSURE: 70 MMHG | HEART RATE: 99 BPM | HEIGHT: 72 IN | SYSTOLIC BLOOD PRESSURE: 136 MMHG

## 2023-10-11 VITALS
OXYGEN SATURATION: 99 % | RESPIRATION RATE: 18 BRPM | TEMPERATURE: 97.6 F | SYSTOLIC BLOOD PRESSURE: 110 MMHG | DIASTOLIC BLOOD PRESSURE: 60 MMHG | HEART RATE: 90 BPM

## 2023-10-11 DIAGNOSIS — G54.6 PHANTOM LIMB PAIN (HCC): ICD-10-CM

## 2023-10-11 DIAGNOSIS — M79.672 PAIN IN LEFT FOOT: ICD-10-CM

## 2023-10-11 DIAGNOSIS — F41.9 ANXIETY: ICD-10-CM

## 2023-10-11 PROCEDURE — 99214 OFFICE O/P EST MOD 30 MIN: CPT | Performed by: FAMILY MEDICINE

## 2023-10-11 PROCEDURE — G0299 HHS/HOSPICE OF RN EA 15 MIN: HCPCS

## 2023-10-11 RX ORDER — GABAPENTIN 300 MG/1
CAPSULE ORAL
Qty: 210 CAPSULE | Refills: 2
Start: 2023-10-11

## 2023-10-11 RX ORDER — DULOXETIN HYDROCHLORIDE 60 MG/1
60 CAPSULE, DELAYED RELEASE ORAL DAILY
Qty: 30 CAPSULE | Refills: 2 | Status: SHIPPED | OUTPATIENT
Start: 2023-10-11 | End: 2024-01-09

## 2023-10-11 NOTE — TELEPHONE ENCOUNTER
Per voicemail @ [address] [place] [address], this is Mary Shipley from [facility]. I'm calling in regards to Doctor Naomi patient Diego Pollard. I'm driving. I do not have a date of birth with me, but he resides on [facility] in [place]. I'm calling to say when I was at his house today, his heart rate was 144. It had three cups of coffee and had not taken his metoprolol. I was going to call the ambulance but I had him take his metoprolol and by the end of my visit about 45 minutes later, his heart rate came down to 90. So I wanted to let the doctor know to see if you might want to either order a cardiac monitor for the patient or if he wanted to adjust his metoprolol. I have noticed certain mornings when I get there he is in the one 20s and then I give him his metoprolol when I get there because he doesn't always take it bright and early and then his heart rate does come down into the 90s so this is not the first occurrence I am concerned. My number is [phone]. Thank you.

## 2023-10-11 NOTE — PROGRESS NOTES
Virtual Regular Visit    Verification of patient location:    Patient is located at Home in the following state in which I hold an active license [place]      Assessment/Plan:    Problem List Items Addressed This Visit    None  Visit Diagnoses     Phantom limb pain ([address])        Relevant Medications    DULoxetine (CYMBALTA) 60 mg delayed release capsule    gabapentin (Neurontin) 300 mg capsule    Anxiety        Relevant Medications    DULoxetine (CYMBALTA) 60 mg delayed release capsule    Pain in left foot        Relevant Medications    gabapentin (Neurontin) 300 mg capsule               Reason for visit is   Chief Complaint   Patient presents with   • Follow-up     Discuss medications    • Virtual Regular Visit          Encounter provider Sheron Smith MD    Provider located at [address] [facility]  [place] [address] [id number]      Recent Visits  No visits were found meeting these conditions. Showing recent visits within past 7 days and meeting all other requirements  Today's Visits  Date Type Provider Dept   10/11/23 Telemedicine Sheron Smith MD [facility] [facility] [place] 0   Showing today's visits and meeting all other requirements  Future Appointments  No visits were found meeting these conditions. Showing future appointments within next 150 days and meeting all other requirements       The patient was identified by name and date of birth. Bre Markham was informed that this is a telemedicine visit and that the visit is being conducted through the [employer]. He agrees to proceed. .  My office door was closed. No one else was in the room. He acknowledged consent and understanding of privacy and security of the video platform. The patient has agreed to participate and understands they can discontinue the visit at any time. Patient is aware this is a billable service.      Subjective  Bre Jobyamile is a 66 y.o. male  . Patient is seen for fu. Since the last visit he has been hospitalized due to wound dehiscence. Working with surgery and has upcoming apt. The last office visit he was on dilaudid which was continued for pain control ( started in hospital). Along with this he has been on gabapentin 900 TID and more recently cymbalta 30 mg daily. Reports dilaudid was no longer effective and surgery gave him oxycodone which was helpful. He has not been on opioids for about a week and a half and reports he is dealing with it. Requesting/asking about an increase in dosing of cymbalta. He was referred to Pain mgt which he intends to keep. However as stated he is dealing with the pain and he has been off of opioids. Reports he is now off of cigarettes and has no plans of going back. He is smoking a non nicotine herbal substance but plans on getting off of this as well. He has not had an alcohol beverage in about 3 months. Past Medical History:   Diagnosis Date   • Chronic foot ulcer ([address]) 09/07/2018   • Diabetes mellitus ([address])    • Diabetic foot ulcer ([address]) 5/8/2023   • Gout    • High cholesterol    • Hypertension    • Visual impairment 11/1/2022    Cateract       Past Surgical History:   Procedure Laterality Date   • CATARACT EXTRACTION Right 01/2023   • COMPLEX WOUND CLOSURE TO EXTREMITY Left 07/18/2019    Procedure: COMPLEX CLOSURE LEFT CHEEK;  Surgeon: Nunu Saul MD;  Location:  MAIN OR;  Service: Plastics   • FACIAL/NECK BIOPSY Left 07/18/2019    Procedure: EXCISION LEFT CHEEK CYST;  Surgeon: Nunu Saul MD;  Location:  MAIN OR;  Service: Plastics   • HERNIA REPAIR Left     several times   • KNEE ARTHROSCOPY Left     torn meniscus   • LEG AMPUTATION THROUGH LOWER TIBIA AND FIBULA Left 8/22/2023    Procedure: AMPUTATION BELOW KNEE (BKA);   Surgeon: Jaimee Keating MD;  Location:  MAIN OR;  Service: General   • FL AMPUTATION FOOT TRANSMETARSAL Left 6/30/2023    Procedure: AMPUTATION TRANSMETATARSAL (TMA);  Surgeon: Silvia Shea DPM;  Location:  MAIN OR;  Service: Podiatry   • WOUND DEBRIDEMENT Left 5/30/2023    Procedure: DEBRIDEMENT WOUND LEFT FOOT WOUND WITH EXCISION OF INFECTED BONE AND WOUND VAC APPLICATION;  Surgeon: Meseret Salas DPM;  Location:  MAIN OR;  Service: Podiatry       Current Outpatient Medications   Medication Sig Dispense Refill   • acetaminophen (TYLENOL) 325 mg tablet Take 3 tablets (975 mg total) by mouth every 6 (six) hours 120 tablet 0   • atorvastatin (LIPITOR) 40 mg tablet TAKE 1 TABLET BY MOUTH EVERY DAY 90 tablet 1   • DULoxetine (CYMBALTA) 60 mg delayed release capsule Take 1 capsule (60 mg total) by mouth daily 30 capsule 2   • gabapentin (Neurontin) 300 mg capsule 900mg  capsule 2   • Glucagon, rDNA, (Glucagon Emergency) 1 MG KIT      • HumaLOG 100 UNIT/ML injection inject up to 80 units in INSULIN PUMP daily as directed by prescriber     • metoprolol succinate (TOPROL-XL) 100 mg 24 hr tablet take 1 tablet by mouth once daily 90 tablet 1   • lisinopril (ZESTRIL) 10 mg tablet Take 10 mg by mouth daily       No current facility-administered medications for this visit. Allergies   Allergen Reactions   • Cefuroxime Other (See Comments) and GI Intolerance   • Amoxicillin-Pot Clavulanate Nausea Only and GI Intolerance       Review of Systems   Constitutional:  Negative for activity change, appetite change, fatigue, fever and unexpected weight change. Video Exam    Vitals:    10/11/23 1048   BP: 136/70   Pulse: 99   Weight: 65.8 kg (145 lb)   Height: 6' (1.829 m)       Physical Exam  Vitals and nursing note reviewed. Constitutional:       Appearance: Normal appearance. Pulmonary:      Effort: Pulmonary effort is normal.   Neurological:      Mental Status: He is alert and oriented to person, place, and time.    Psychiatric:         Mood and Affect: Mood normal.         Behavior: Behavior normal. Visit Time  Total Visit Duration: 12

## 2023-10-12 ENCOUNTER — HOME CARE VISIT (OUTPATIENT)
Dept: HOME HEALTH SERVICES | Facility: HOME HEALTHCARE | Age: 63
End: 2023-10-12
Payer: COMMERCIAL

## 2023-10-12 VITALS
DIASTOLIC BLOOD PRESSURE: 70 MMHG | OXYGEN SATURATION: 98 % | HEART RATE: 86 BPM | TEMPERATURE: 98.8 F | SYSTOLIC BLOOD PRESSURE: 140 MMHG | RESPIRATION RATE: 16 BRPM

## 2023-10-12 PROCEDURE — G0300 HHS/HOSPICE OF LPN EA 15 MIN: HCPCS

## 2023-10-13 ENCOUNTER — HOME CARE VISIT (OUTPATIENT)
Dept: HOME HEALTH SERVICES | Facility: HOME HEALTHCARE | Age: 63
End: 2023-10-13
Payer: COMMERCIAL

## 2023-10-13 VITALS — DIASTOLIC BLOOD PRESSURE: 74 MMHG | SYSTOLIC BLOOD PRESSURE: 120 MMHG | HEART RATE: 80 BPM | OXYGEN SATURATION: 98 %

## 2023-10-13 PROCEDURE — G0151 HHCP-SERV OF PT,EA 15 MIN: HCPCS

## 2023-10-14 ENCOUNTER — HOME CARE VISIT (OUTPATIENT)
Dept: HOME HEALTH SERVICES | Facility: HOME HEALTHCARE | Age: 63
End: 2023-10-14
Payer: COMMERCIAL

## 2023-10-14 VITALS
RESPIRATION RATE: 18 BRPM | SYSTOLIC BLOOD PRESSURE: 130 MMHG | HEART RATE: 92 BPM | DIASTOLIC BLOOD PRESSURE: 60 MMHG | OXYGEN SATURATION: 97 %

## 2023-10-14 PROCEDURE — G0299 HHS/HOSPICE OF RN EA 15 MIN: HCPCS

## 2023-10-16 ENCOUNTER — HOME CARE VISIT (OUTPATIENT)
Dept: HOME HEALTH SERVICES | Facility: HOME HEALTHCARE | Age: 63
End: 2023-10-16
Payer: COMMERCIAL

## 2023-10-16 ENCOUNTER — TELEPHONE (OUTPATIENT)
Dept: FAMILY MEDICINE CLINIC | Facility: HOSPITAL | Age: 63
End: 2023-10-16

## 2023-10-16 VITALS
SYSTOLIC BLOOD PRESSURE: 140 MMHG | RESPIRATION RATE: 18 BRPM | TEMPERATURE: 97.5 F | HEART RATE: 92 BPM | DIASTOLIC BLOOD PRESSURE: 80 MMHG | OXYGEN SATURATION: 95 %

## 2023-10-16 PROCEDURE — G0299 HHS/HOSPICE OF RN EA 15 MIN: HCPCS

## 2023-10-16 NOTE — TELEPHONE ENCOUNTER
Please call. There were concerns about his heart rate and sy tmpoms associated with this. VNA was concerned about this thus apt was recommended and made. I still recommend an inperson visit.

## 2023-10-16 NOTE — TELEPHONE ENCOUNTER
Alli Roman from [address] left the following message asking if patient can be seen virtually today instead of coming in:    Hi, this is Obed Benavides, one of the visiting nurses from [facility]. I'm here with Saad Martin 1960. Here's a 10:00 appointment today with Doctor Rema Deleon. He does not have transportation, His wife is at work, he's wheelchair bound and has two flights of steps to be able to come down. So I guess in the future maybe we need to get [employer] out there for him, but we would still need to work on getting him down those steps. So I know I had sent a message to Doctor Taz Mead regarding his heart rate. I did update my message to Doctor Vuong today. Heart rate is better. If you guys could call him back and let me know. Let him know if you could do a virtual appointment today or if he just needs to cancel that appointment and reschedule when he can get out of the house better. I know he really wanted to see him physically and do that cardiac assessment that I'd asked for, but it's just not going to happen today. So you could do a virtual visit and call him and let him know or reschedule again. His heart rate is better today and they did send a note and Epic personally to Doctor Radames Salgado. So if you could please call Emily [place] at [phone], I'd appreciate it.  We're good with that message

## 2023-10-16 NOTE — TELEPHONE ENCOUNTER
Patient states heart rate was only an issue 1 day as he did not take his medication that day. Has been better since. Steven Meier from [facility] is out multiple times a week and is monitoring this as well.  Scheduled 11/8 for an in-person appt

## 2023-10-17 ENCOUNTER — HOME CARE VISIT (OUTPATIENT)
Dept: HOME HEALTH SERVICES | Facility: HOME HEALTHCARE | Age: 63
End: 2023-10-17
Payer: COMMERCIAL

## 2023-10-17 DIAGNOSIS — E10.65 TYPE 1 DIABETES MELLITUS WITH HYPERGLYCEMIA (HCC): Primary | ICD-10-CM

## 2023-10-17 PROCEDURE — G0157 HHC PT ASSISTANT EA 15: HCPCS

## 2023-10-17 RX ORDER — INSULIN LISPRO 100 [IU]/ML
INJECTION, SOLUTION INTRAVENOUS; SUBCUTANEOUS
Qty: 20 ML | Refills: 0 | Status: SHIPPED | OUTPATIENT
Start: 2023-10-17

## 2023-10-17 NOTE — TELEPHONE ENCOUNTER
Patient left a message. Humalog is no longer covered by his insurance. Insulin lispro is preferred. Ok to send generic?

## 2023-10-18 ENCOUNTER — HOME CARE VISIT (OUTPATIENT)
Dept: HOME HEALTH SERVICES | Facility: HOME HEALTHCARE | Age: 63
End: 2023-10-18
Payer: COMMERCIAL

## 2023-10-18 VITALS
DIASTOLIC BLOOD PRESSURE: 65 MMHG | RESPIRATION RATE: 16 BRPM | SYSTOLIC BLOOD PRESSURE: 142 MMHG | OXYGEN SATURATION: 95 % | HEART RATE: 66 BPM

## 2023-10-18 PROCEDURE — G0299 HHS/HOSPICE OF RN EA 15 MIN: HCPCS

## 2023-10-19 ENCOUNTER — HOME CARE VISIT (OUTPATIENT)
Dept: HOME HEALTH SERVICES | Facility: HOME HEALTHCARE | Age: 63
End: 2023-10-19
Payer: COMMERCIAL

## 2023-10-19 PROCEDURE — G0157 HHC PT ASSISTANT EA 15: HCPCS

## 2023-10-20 ENCOUNTER — OFFICE VISIT (OUTPATIENT)
Dept: SURGERY | Facility: HOSPITAL | Age: 63
End: 2023-10-20

## 2023-10-20 ENCOUNTER — HOME CARE VISIT (OUTPATIENT)
Dept: HOME HEALTH SERVICES | Facility: HOME HEALTHCARE | Age: 63
End: 2023-10-20
Payer: COMMERCIAL

## 2023-10-20 VITALS — SYSTOLIC BLOOD PRESSURE: 148 MMHG | DIASTOLIC BLOOD PRESSURE: 83 MMHG | TEMPERATURE: 97.3 F | HEART RATE: 83 BPM

## 2023-10-20 DIAGNOSIS — T81.30XA WOUND DEHISCENCE: Primary | ICD-10-CM

## 2023-10-20 DIAGNOSIS — Z89.512 S/P BKA (BELOW KNEE AMPUTATION), LEFT (HCC): ICD-10-CM

## 2023-10-20 NOTE — PROGRESS NOTES
Assessment/Plan:   Antione Landaverde is a 66 y.o.male who comes in today for postoperative check . Patient underwent BKA for diabetic foot wound and osteomyelitis on 8/22/2023. Patient was seen postoperatively and had been doing well. He fell at home and had a dehiscence of his left BKA stump that repaired in the ED on 9/22/23. At 2-week check patient was doing well. He continued to have some issues with pain control. Patient has been completing dressing changes with assistance of VNA with Maxorb over medial wound opening. He was using Dermagran to 2 other sites which have now closed. Pain has significantly improved. He is no longer using narcotic pain control. On exam today area continues to heal well. There is 1 medial wound opening measuring approximately 2 x 1 x 0.2 cm. Small amount of distal slough with no noted drainage. Mild surrounding erythema without induration.     -Distal slough was debrided  -Patient to continue wound care with Dermagran and dry sterile gauze dressing. Area should be cleansed daily with soap and water and dressing changed at that time. He should continue VNA care. Ace dressing as needed for comfort.  -We will have patient return in 4 weeks for reevaluation and possible clearance for prosthesis fitting if wound has fully closed  -Patient will call for sooner appointment if has issues with wound healing at home    DM 1  -Patient has insulin pump  -Continue tight blood sugar control for optimal wound healing      HPI:  Antione Landaverde is a 66 y.o.male who comes in today for postoperative check after recent  on BKA wound site dehiscence as above. Patient presents with wife today. States he is doing much better. Pain is better controlled. He is no longer using narcotic pain control. He has been using Neurontin and Cymbalta for neuropathic pain. States wound has been healing well. He is being followed by visiting nurses. Has only 1 medial wound opening at this time. No associated fevers or chills. States he has been eating well. No nausea or vomiting. He is anxious for wound to heal to proceed with prosthesis evaluation.     ROS:  General ROS: negative for - chills, fatigue, fever or night sweats, weight loss  Respiratory ROS: no cough, shortness of breath, or wheezing  Cardiovascular ROS: no chest pain or dyspnea on exertion  Abdomen ROS: no pain, N/V  Genito-Urinary ROS: no dysuria, trouble voiding, or hematuria  Musculoskeletal ROS: negative for - gait disturbance, joint pain or muscle pain  Neurological ROS: no TIA or stroke symptoms  Skin ROS: as per HPI    ALLERGIES  Cefuroxime and Amoxicillin-pot clavulanate    Current Outpatient Medications:     acetaminophen (TYLENOL) 325 mg tablet, Take 3 tablets (975 mg total) by mouth every 6 (six) hours, Disp: 120 tablet, Rfl: 0    atorvastatin (LIPITOR) 40 mg tablet, TAKE 1 TABLET BY MOUTH EVERY DAY, Disp: 90 tablet, Rfl: 1    DULoxetine (CYMBALTA) 60 mg delayed release capsule, Take 1 capsule (60 mg total) by mouth daily, Disp: 30 capsule, Rfl: 2    gabapentin (Neurontin) 300 mg capsule, 900mg TID, Disp: 210 capsule, Rfl: 2    Glucagon, rDNA, (Glucagon Emergency) 1 MG KIT, , Disp: , Rfl:     insulin lispro (HumaLOG) 100 units/mL injection, Inject up to 80 units in INSULIN PUMP daily, Disp: 20 mL, Rfl: 0    lisinopril (ZESTRIL) 10 mg tablet, Take 10 mg by mouth daily, Disp: , Rfl:     metoprolol succinate (TOPROL-XL) 100 mg 24 hr tablet, take 1 tablet by mouth once daily, Disp: 90 tablet, Rfl: 1  Past Medical History:   Diagnosis Date    Chronic foot ulcer ([address]) 09/07/2018    Diabetes mellitus ([address])     Diabetic foot ulcer ([address]) 5/8/2023    Gout     High cholesterol     Hypertension     Visual impairment 11/1/2022    Cateract     Past Surgical History:   Procedure Laterality Date    CATARACT EXTRACTION Right 01/2023    COMPLEX WOUND CLOSURE TO EXTREMITY Left 07/18/2019    Procedure: COMPLEX CLOSURE LEFT CHEEK;  Surgeon: Chava Bejarano MD;  Location:  MAIN OR;  Service: Plastics    FACIAL/NECK BIOPSY Left 07/18/2019    Procedure: EXCISION LEFT CHEEK CYST;  Surgeon: Chava Bejarano MD;  Location:  MAIN OR;  Service: Plastics    HERNIA REPAIR Left     several times    KNEE ARTHROSCOPY Left     torn meniscus    LEG AMPUTATION THROUGH LOWER TIBIA AND FIBULA Left 8/22/2023    Procedure: AMPUTATION BELOW KNEE (BKA); Surgeon: Millie Kimble MD;  Location: [facility] MAIN OR;  Service: General    [place] AMPUTATION FOOT TRANSMETARSAL Left 6/30/2023    Procedure: AMPUTATION TRANSMETATARSAL (TMA);  Surgeon: Dudley Byrd DPM;  Location: [facility];  Service: Podiatry    WOUND DEBRIDEMENT Left 5/30/2023    Procedure: DEBRIDEMENT WOUND LEFT FOOT WOUND WITH EXCISION OF INFECTED BONE AND WOUND VAC APPLICATION;  Surgeon: Karsten Miles DPM;  Location: [facility]IN OR;  Service: Podiatry     Family History   Problem Relation Age of Onset    Heart disease Father     Diabetes type I Father     Diabetes type II Mother     No Known Problems Sister     Alcohol abuse Brother     Diabetes type I Brother     No Known Problems Brother     No Known Problems Son     No Known Problems Daughter       reports that he quit smoking about 4 weeks ago. His smoking use included cigarettes. He has a 20.00 pack-year smoking history. He has never used smokeless tobacco. He reports current alcohol use. He reports that he does not use drugs. PHYSICAL EXAM    Vitals:    10/20/23 1056   BP: 148/83   Pulse: 83   Temp: (!) 97.3 °F (36.3 °C)     Weight (last 2 days)       None            General: normal, cooperative, no distress  Incision: clean, dry  BKA site with medial wound opening measuring approximately 2 x 1 x 0.2 cm with small amount of slough at wound base. No active drainage. Mild surrounding erythema. No induration or signs of active infection.       Everett Chimera

## 2023-10-22 ENCOUNTER — HOME CARE VISIT (OUTPATIENT)
Dept: HOME HEALTH SERVICES | Facility: HOME HEALTHCARE | Age: 63
End: 2023-10-22
Payer: COMMERCIAL

## 2023-10-22 VITALS
DIASTOLIC BLOOD PRESSURE: 70 MMHG | OXYGEN SATURATION: 99 % | RESPIRATION RATE: 18 BRPM | TEMPERATURE: 97.5 F | SYSTOLIC BLOOD PRESSURE: 124 MMHG | HEART RATE: 83 BPM

## 2023-10-22 PROCEDURE — G0299 HHS/HOSPICE OF RN EA 15 MIN: HCPCS

## 2023-10-24 ENCOUNTER — HOME CARE VISIT (OUTPATIENT)
Dept: HOME HEALTH SERVICES | Facility: HOME HEALTHCARE | Age: 63
End: 2023-10-24
Payer: COMMERCIAL

## 2023-10-24 VITALS
HEART RATE: 90 BPM | RESPIRATION RATE: 18 BRPM | OXYGEN SATURATION: 97 % | TEMPERATURE: 97.7 F | SYSTOLIC BLOOD PRESSURE: 140 MMHG | DIASTOLIC BLOOD PRESSURE: 80 MMHG

## 2023-10-24 PROCEDURE — G0151 HHCP-SERV OF PT,EA 15 MIN: HCPCS

## 2023-10-24 PROCEDURE — G0299 HHS/HOSPICE OF RN EA 15 MIN: HCPCS

## 2023-10-25 VITALS — OXYGEN SATURATION: 100 % | HEART RATE: 74 BPM | SYSTOLIC BLOOD PRESSURE: 132 MMHG | DIASTOLIC BLOOD PRESSURE: 80 MMHG

## 2023-10-26 ENCOUNTER — HOME CARE VISIT (OUTPATIENT)
Dept: HOME HEALTH SERVICES | Facility: HOME HEALTHCARE | Age: 63
End: 2023-10-26
Payer: COMMERCIAL

## 2023-10-26 VITALS
HEART RATE: 92 BPM | SYSTOLIC BLOOD PRESSURE: 150 MMHG | DIASTOLIC BLOOD PRESSURE: 80 MMHG | TEMPERATURE: 97.8 F | OXYGEN SATURATION: 98 % | RESPIRATION RATE: 18 BRPM

## 2023-10-26 PROCEDURE — G0299 HHS/HOSPICE OF RN EA 15 MIN: HCPCS

## 2023-10-26 PROCEDURE — G0157 HHC PT ASSISTANT EA 15: HCPCS

## 2023-10-26 NOTE — NURSING NOTE
Pt complaining of 10/10 pain when pt was woken up for q2 BS check. Pt complained is not getting any relief from either pain meds and asked if he can get something else ordered. RN reached out to [facility] provider. Provider increased pain meds and ordered RN to give dilaudid 1mg now. When RN went into pts room to give pain medication, Pt was sleeping. RN holding off on giving pain med for now bc pt also complaining all night about "how he hasn't been getting any sleep bc were bothering him every couple hours. " Warm/Dry

## 2023-10-28 ENCOUNTER — HOME CARE VISIT (OUTPATIENT)
Dept: HOME HEALTH SERVICES | Facility: HOME HEALTHCARE | Age: 63
End: 2023-10-28
Payer: COMMERCIAL

## 2023-10-28 VITALS
DIASTOLIC BLOOD PRESSURE: 60 MMHG | SYSTOLIC BLOOD PRESSURE: 110 MMHG | RESPIRATION RATE: 18 BRPM | TEMPERATURE: 97.4 F | HEART RATE: 70 BPM | OXYGEN SATURATION: 95 %

## 2023-10-28 PROCEDURE — G0299 HHS/HOSPICE OF RN EA 15 MIN: HCPCS

## 2023-10-30 ENCOUNTER — HOME CARE VISIT (OUTPATIENT)
Dept: HOME HEALTH SERVICES | Facility: HOME HEALTHCARE | Age: 63
End: 2023-10-30
Payer: COMMERCIAL

## 2023-10-30 VITALS — HEART RATE: 82 BPM | DIASTOLIC BLOOD PRESSURE: 80 MMHG | SYSTOLIC BLOOD PRESSURE: 136 MMHG | OXYGEN SATURATION: 99 %

## 2023-10-30 VITALS
DIASTOLIC BLOOD PRESSURE: 80 MMHG | TEMPERATURE: 97.6 F | OXYGEN SATURATION: 98 % | RESPIRATION RATE: 18 BRPM | SYSTOLIC BLOOD PRESSURE: 140 MMHG | HEART RATE: 90 BPM

## 2023-10-30 DIAGNOSIS — M79.672 PAIN IN LEFT FOOT: ICD-10-CM

## 2023-10-30 DIAGNOSIS — G54.6 PHANTOM LIMB PAIN (HCC): ICD-10-CM

## 2023-10-30 PROCEDURE — G0299 HHS/HOSPICE OF RN EA 15 MIN: HCPCS

## 2023-10-30 PROCEDURE — G0151 HHCP-SERV OF PT,EA 15 MIN: HCPCS

## 2023-10-31 RX ORDER — GABAPENTIN 300 MG/1
900 CAPSULE ORAL 3 TIMES DAILY
Qty: 210 CAPSULE | Refills: 2 | Status: SHIPPED | OUTPATIENT
Start: 2023-10-31 | End: 2024-01-29

## 2023-11-01 ENCOUNTER — HOME CARE VISIT (OUTPATIENT)
Dept: HOME HEALTH SERVICES | Facility: HOME HEALTHCARE | Age: 63
End: 2023-11-01
Payer: COMMERCIAL

## 2023-11-01 ENCOUNTER — TELEPHONE (OUTPATIENT)
Dept: FAMILY MEDICINE CLINIC | Facility: HOSPITAL | Age: 63
End: 2023-11-01

## 2023-11-01 VITALS
TEMPERATURE: 97.7 F | DIASTOLIC BLOOD PRESSURE: 80 MMHG | HEART RATE: 90 BPM | RESPIRATION RATE: 18 BRPM | SYSTOLIC BLOOD PRESSURE: 140 MMHG | OXYGEN SATURATION: 97 %

## 2023-11-01 PROCEDURE — G0299 HHS/HOSPICE OF RN EA 15 MIN: HCPCS

## 2023-11-01 PROCEDURE — G0151 HHCP-SERV OF PT,EA 15 MIN: HCPCS

## 2023-11-01 NOTE — TELEPHONE ENCOUNTER
[address] [facility] called, [place] I[place], and left message on med refill line. reports his gabapentin was increased by you. Asking if you could re write the script for  3 in AM 3 in afternoon and 3 in the evening. Her number is [phone] if we have any questions.    Please send to [facility] aid in [facility] [address] thank you

## 2023-11-02 ENCOUNTER — VBI (OUTPATIENT)
Dept: ADMINISTRATIVE | Facility: OTHER | Age: 63
End: 2023-11-02

## 2023-11-03 ENCOUNTER — HOME CARE VISIT (OUTPATIENT)
Dept: HOME HEALTH SERVICES | Facility: HOME HEALTHCARE | Age: 63
End: 2023-11-03
Payer: COMMERCIAL

## 2023-11-03 VITALS
DIASTOLIC BLOOD PRESSURE: 70 MMHG | RESPIRATION RATE: 18 BRPM | SYSTOLIC BLOOD PRESSURE: 120 MMHG | TEMPERATURE: 97.6 F | OXYGEN SATURATION: 99 % | HEART RATE: 98 BPM

## 2023-11-03 PROCEDURE — G0299 HHS/HOSPICE OF RN EA 15 MIN: HCPCS

## 2023-11-06 ENCOUNTER — HOME CARE VISIT (OUTPATIENT)
Dept: HOME HEALTH SERVICES | Facility: HOME HEALTHCARE | Age: 63
End: 2023-11-06
Payer: COMMERCIAL

## 2023-11-06 VITALS
TEMPERATURE: 97.8 F | HEART RATE: 100 BPM | SYSTOLIC BLOOD PRESSURE: 140 MMHG | OXYGEN SATURATION: 97 % | RESPIRATION RATE: 18 BRPM | DIASTOLIC BLOOD PRESSURE: 70 MMHG

## 2023-11-06 PROCEDURE — G0299 HHS/HOSPICE OF RN EA 15 MIN: HCPCS

## 2023-11-07 ENCOUNTER — HOME CARE VISIT (OUTPATIENT)
Dept: HOME HEALTH SERVICES | Facility: HOME HEALTHCARE | Age: 63
End: 2023-11-07
Payer: COMMERCIAL

## 2023-11-07 PROCEDURE — G0157 HHC PT ASSISTANT EA 15: HCPCS

## 2023-11-08 ENCOUNTER — HOME CARE VISIT (OUTPATIENT)
Dept: HOME HEALTH SERVICES | Facility: HOME HEALTHCARE | Age: 63
End: 2023-11-08
Payer: COMMERCIAL

## 2023-11-08 ENCOUNTER — OFFICE VISIT (OUTPATIENT)
Dept: FAMILY MEDICINE CLINIC | Facility: HOSPITAL | Age: 63
End: 2023-11-08
Payer: COMMERCIAL

## 2023-11-08 VITALS
HEART RATE: 86 BPM | SYSTOLIC BLOOD PRESSURE: 120 MMHG | TEMPERATURE: 97.8 F | RESPIRATION RATE: 18 BRPM | OXYGEN SATURATION: 99 % | DIASTOLIC BLOOD PRESSURE: 70 MMHG

## 2023-11-08 VITALS
BODY MASS INDEX: 20.15 KG/M2 | HEART RATE: 92 BPM | DIASTOLIC BLOOD PRESSURE: 60 MMHG | WEIGHT: 148.8 LBS | HEIGHT: 72 IN | SYSTOLIC BLOOD PRESSURE: 122 MMHG | TEMPERATURE: 96.7 F

## 2023-11-08 DIAGNOSIS — F32.5 MAJOR DEPRESSIVE DISORDER IN FULL REMISSION, UNSPECIFIED WHETHER RECURRENT (HCC): ICD-10-CM

## 2023-11-08 DIAGNOSIS — E10.65 TYPE 1 DIABETES MELLITUS WITH HYPERGLYCEMIA (HCC): Primary | ICD-10-CM

## 2023-11-08 DIAGNOSIS — Z89.512 HX OF BKA, LEFT (HCC): ICD-10-CM

## 2023-11-08 DIAGNOSIS — F17.211 CIGARETTE NICOTINE DEPENDENCE IN REMISSION: ICD-10-CM

## 2023-11-08 DIAGNOSIS — E10.40 TYPE 1 DIABETES MELLITUS WITH DIABETIC NEUROPATHY (HCC): Primary | ICD-10-CM

## 2023-11-08 DIAGNOSIS — I10 BENIGN ESSENTIAL HYPERTENSION: ICD-10-CM

## 2023-11-08 DIAGNOSIS — Z23 ENCOUNTER FOR IMMUNIZATION: ICD-10-CM

## 2023-11-08 DIAGNOSIS — G54.6 PHANTOM LIMB PAIN (HCC): ICD-10-CM

## 2023-11-08 DIAGNOSIS — F10.10 ALCOHOL ABUSE: ICD-10-CM

## 2023-11-08 LAB
LEFT EYE DIABETIC RETINOPATHY: NORMAL
LEFT EYE IMAGE QUALITY: NORMAL
LEFT EYE MACULAR EDEMA: NORMAL
LEFT EYE OTHER RETINOPATHY: NORMAL
RIGHT EYE DIABETIC RETINOPATHY: NORMAL
RIGHT EYE IMAGE QUALITY: NORMAL
RIGHT EYE MACULAR EDEMA: NORMAL
RIGHT EYE OTHER RETINOPATHY: NORMAL
SEVERITY (EYE EXAM): NORMAL
SL AMB POCT HEMOGLOBIN AIC: 8.7 (ref ?–6.5)

## 2023-11-08 PROCEDURE — 90471 IMMUNIZATION ADMIN: CPT

## 2023-11-08 PROCEDURE — 99215 OFFICE O/P EST HI 40 MIN: CPT | Performed by: FAMILY MEDICINE

## 2023-11-08 PROCEDURE — 83036 HEMOGLOBIN GLYCOSYLATED A1C: CPT | Performed by: FAMILY MEDICINE

## 2023-11-08 PROCEDURE — 90686 IIV4 VACC NO PRSV 0.5 ML IM: CPT

## 2023-11-08 PROCEDURE — G0299 HHS/HOSPICE OF RN EA 15 MIN: HCPCS

## 2023-11-08 RX ORDER — INSULIN ASPART 100 [IU]/ML
INJECTION, SOLUTION INTRAVENOUS; SUBCUTANEOUS
Qty: 30 ML | Refills: 5 | Status: SHIPPED | OUTPATIENT
Start: 2023-11-08 | End: 2023-11-09 | Stop reason: SDUPTHER

## 2023-11-09 ENCOUNTER — HOME CARE VISIT (OUTPATIENT)
Dept: HOME HEALTH SERVICES | Facility: HOME HEALTHCARE | Age: 63
End: 2023-11-09
Payer: COMMERCIAL

## 2023-11-09 VITALS — DIASTOLIC BLOOD PRESSURE: 78 MMHG | SYSTOLIC BLOOD PRESSURE: 148 MMHG

## 2023-11-09 DIAGNOSIS — E10.65 TYPE 1 DIABETES MELLITUS WITH HYPERGLYCEMIA (HCC): ICD-10-CM

## 2023-11-09 PROCEDURE — G0151 HHCP-SERV OF PT,EA 15 MIN: HCPCS

## 2023-11-09 RX ORDER — INSULIN ASPART 100 [IU]/ML
INJECTION, SOLUTION INTRAVENOUS; SUBCUTANEOUS
Qty: 30 ML | Refills: 5 | Status: SHIPPED | OUTPATIENT
Start: 2023-11-09 | End: 2023-11-15 | Stop reason: CLARIF

## 2023-11-09 NOTE — TELEPHONE ENCOUNTER
The patient called and stated that the Novolog prescription needed to be switched to generic in order to be filled. I was able to send that over to the pharmacy and make the patient aware as well.

## 2023-11-10 ENCOUNTER — HOME CARE VISIT (OUTPATIENT)
Dept: HOME HEALTH SERVICES | Facility: HOME HEALTHCARE | Age: 63
End: 2023-11-10
Payer: COMMERCIAL

## 2023-11-10 VITALS
RESPIRATION RATE: 18 BRPM | OXYGEN SATURATION: 99 % | TEMPERATURE: 97.4 F | DIASTOLIC BLOOD PRESSURE: 60 MMHG | SYSTOLIC BLOOD PRESSURE: 120 MMHG | HEART RATE: 84 BPM

## 2023-11-10 PROBLEM — F17.201 NICOTINE DEPENDENCE IN REMISSION: Status: ACTIVE | Noted: 2023-05-29

## 2023-11-10 PROBLEM — L97.422 DIABETIC ULCER OF LEFT MIDFOOT ASSOCIATED WITH TYPE 1 DIABETES MELLITUS, WITH FAT LAYER EXPOSED (HCC): Status: RESOLVED | Noted: 2023-04-06 | Resolved: 2023-11-10

## 2023-11-10 PROBLEM — E10.621 DIABETIC ULCER OF LEFT MIDFOOT ASSOCIATED WITH TYPE 1 DIABETES MELLITUS, WITH FAT LAYER EXPOSED (HCC): Status: RESOLVED | Noted: 2023-04-06 | Resolved: 2023-11-10

## 2023-11-10 PROCEDURE — G0299 HHS/HOSPICE OF RN EA 15 MIN: HCPCS

## 2023-11-10 NOTE — PROGRESS NOTES
Name: Miller Nobles      : 1960      MRN: [id number]  Encounter Provider: Denver Pou, MD  Encounter Date: 2023   Encounter department: [address]     1. Type 1 diabetes mellitus with diabetic neuropathy (HCC)  -     POCT hemoglobin A1c  -     IRIS Diabetic eye exam    No episodes of hypoglcemia. Elevate A1c compared to previus. He will work on dietary control. Will fu with endocrionolog. 2. Phantom limb pain (HCC)    On gabapentin at 900 TID. Has had difficulty getting the prescriptions. Wife to call the pharmacy to clarify what the issue is. 4. Encounter for immunization  -     influenza vaccine, quadrivalent, 0.5 mL, preservative-free, for adult and pediatric patients 6 mos+ ([place], [address], FLULAVAL, FLUZONE)    5. Benign essential hypertension  Stable. 6. Alcohol abuse    Has not been drinking. In remission. 7. Hx of BKA, left (HCC)    Wound is improved. Will be working on getting a prosthesis done. 8. Cigarette nicotine dependence in remission  In remission. 9. Major depressive disorder in full remission, unspecified whether recurrent ([address])    Currenltt on cymbalta , a lot of frustrations due to ongoing stressors due to recent left BKA. Subjective      [place] is here for fu of chronic conditions. Type 1 DM, followed by Endo. No episodes of hypoglycemia. On insulin pump. Has apt with Endo in January. S/p BKA. Difficlty adjusting to this. Pain currenlty managed with use of gabapentin 900 mg tid. Has had difficulty getting this medication. Wife will be calling to clarify the issue. Htn. No lightheadedness, no dizziness. Depression. Ongoing use of cymbalta. Reports this is stable. No si/hi. Review of Systems   Constitutional: Negative. Negative for activity change, appetite change, chills and diaphoresis.    HENT:  Negative for congestion and dental problem. Respiratory: Negative. Negative for apnea, chest tightness, shortness of breath and wheezing. Cardiovascular: Negative. Negative for chest pain, palpitations and leg swelling. Gastrointestinal: Negative. Negative for abdominal distention, abdominal pain, constipation, diarrhea and nausea. Genitourinary: Negative. Negative for difficulty urinating, dysuria and frequency. Current Outpatient Medications on File Prior to Visit   Medication Sig   • acetaminophen (TYLENOL) 325 mg tablet Take 3 tablets (975 mg total) by mouth every 6 (six) hours   • atorvastatin (LIPITOR) 40 mg tablet TAKE 1 TABLET BY MOUTH EVERY DAY   • DULoxetine (CYMBALTA) 60 mg delayed release capsule Take 1 capsule (60 mg total) by mouth daily   • gabapentin (Neurontin) 300 mg capsule Take 3 capsules (900 mg total) by mouth 3 (three) times a day 900mg TID   • Glucagon, rDNA, (Glucagon Emergency) 1 MG KIT    • lisinopril (ZESTRIL) 10 mg tablet Take 10 mg by mouth daily   • metoprolol succinate (TOPROL-XL) 100 mg 24 hr tablet take 1 tablet by mouth once daily       Objective     /60   Pulse 92   Temp (!) 96.7 °F (35.9 °C)   Ht 6' (1.829 m)   Wt 67.5 kg (148 lb 12.8 oz)   BMI 20.18 kg/m²     Physical Exam  Constitutional:       General: He is not in acute distress. Appearance: Normal appearance. He is well-developed. HENT:      Head: Normocephalic and atraumatic. Right Ear: External ear normal.      Left Ear: External ear normal.      Nose: Nose normal.      Mouth/Throat:      Mouth: Mucous membranes are moist.   Eyes:      Conjunctiva/sclera: Conjunctivae normal.      Pupils: Pupils are equal, round, and reactive to light. Cardiovascular:      Rate and Rhythm: Normal rate and regular rhythm. Heart sounds: Normal heart sounds. No murmur heard. No friction rub. No gallop. Pulmonary:      Effort: Pulmonary effort is normal. No respiratory distress. Breath sounds: Normal breath sounds.  No wheezing or rales. Chest:      Chest wall: No tenderness. Abdominal:      General: Bowel sounds are normal. There is no distension. Palpations: Abdomen is soft. There is no mass. Tenderness: There is no abdominal tenderness. There is no guarding or rebound. Musculoskeletal:         General: Normal range of motion. Cervical back: Normal range of motion and neck supple. Left Lower Extremity: Left leg is amputated below knee. Skin:     General: Skin is warm. Neurological:      Mental Status: He is alert and oriented to person, place, and time. Psychiatric:         Mood and Affect: Mood normal.         Behavior: Behavior normal.         Thought Content: Thought content normal.         Judgment: Judgment normal.       Jackson Salgado MD  I have spent a total time of 40 minutes on 11/10/23 in caring for this patient including Risks and benefits of tx options, Instructions for management, Patient and family education, Risk factor reductions, Impressions, Counseling / Coordination of care, Documenting in the medical record, Reviewing / ordering tests, medicine, procedures  , and Obtaining or reviewing history  .

## 2023-11-13 ENCOUNTER — HOME CARE VISIT (OUTPATIENT)
Dept: HOME HEALTH SERVICES | Facility: HOME HEALTHCARE | Age: 63
End: 2023-11-13
Payer: COMMERCIAL

## 2023-11-13 VITALS
TEMPERATURE: 97.8 F | DIASTOLIC BLOOD PRESSURE: 70 MMHG | OXYGEN SATURATION: 96 % | HEART RATE: 72 BPM | RESPIRATION RATE: 18 BRPM | SYSTOLIC BLOOD PRESSURE: 140 MMHG

## 2023-11-13 PROCEDURE — G0299 HHS/HOSPICE OF RN EA 15 MIN: HCPCS

## 2023-11-14 ENCOUNTER — HOME CARE VISIT (OUTPATIENT)
Dept: HOME HEALTH SERVICES | Facility: HOME HEALTHCARE | Age: 63
End: 2023-11-14
Payer: COMMERCIAL

## 2023-11-14 PROCEDURE — G0157 HHC PT ASSISTANT EA 15: HCPCS

## 2023-11-15 ENCOUNTER — HOME CARE VISIT (OUTPATIENT)
Dept: HOME HEALTH SERVICES | Facility: HOME HEALTHCARE | Age: 63
End: 2023-11-15
Payer: COMMERCIAL

## 2023-11-15 DIAGNOSIS — E10.65 TYPE 1 DIABETES MELLITUS WITH HYPERGLYCEMIA (HCC): Primary | ICD-10-CM

## 2023-11-15 RX ORDER — INSULIN LISPRO 100 [IU]/ML
INJECTION, SOLUTION INTRAVENOUS; SUBCUTANEOUS
Qty: 30 ML | Refills: 5 | Status: SHIPPED | OUTPATIENT
Start: 2023-11-15

## 2023-11-16 ENCOUNTER — HOME CARE VISIT (OUTPATIENT)
Dept: HOME HEALTH SERVICES | Facility: HOME HEALTHCARE | Age: 63
End: 2023-11-16
Payer: COMMERCIAL

## 2023-11-16 VITALS
SYSTOLIC BLOOD PRESSURE: 138 MMHG | HEART RATE: 94 BPM | RESPIRATION RATE: 18 BRPM | DIASTOLIC BLOOD PRESSURE: 76 MMHG | OXYGEN SATURATION: 100 %

## 2023-11-16 PROCEDURE — G0299 HHS/HOSPICE OF RN EA 15 MIN: HCPCS

## 2023-11-17 ENCOUNTER — HOME CARE VISIT (OUTPATIENT)
Dept: HOME HEALTH SERVICES | Facility: HOME HEALTHCARE | Age: 63
End: 2023-11-17
Payer: COMMERCIAL

## 2023-11-17 VITALS — HEART RATE: 99 BPM | SYSTOLIC BLOOD PRESSURE: 132 MMHG | DIASTOLIC BLOOD PRESSURE: 80 MMHG | OXYGEN SATURATION: 98 %

## 2023-11-17 PROCEDURE — G0151 HHCP-SERV OF PT,EA 15 MIN: HCPCS

## 2023-11-20 ENCOUNTER — OFFICE VISIT (OUTPATIENT)
Dept: SURGERY | Facility: HOSPITAL | Age: 63
End: 2023-11-20

## 2023-11-20 VITALS
HEIGHT: 72 IN | HEART RATE: 85 BPM | TEMPERATURE: 95 F | SYSTOLIC BLOOD PRESSURE: 153 MMHG | BODY MASS INDEX: 20.18 KG/M2 | DIASTOLIC BLOOD PRESSURE: 78 MMHG | RESPIRATION RATE: 16 BRPM

## 2023-11-20 DIAGNOSIS — T81.30XA WOUND DEHISCENCE: Primary | ICD-10-CM

## 2023-11-20 PROCEDURE — 99024 POSTOP FOLLOW-UP VISIT: CPT | Performed by: PHYSICIAN ASSISTANT

## 2023-11-20 NOTE — PROGRESS NOTES
Assessment/Plan:   Dandy Ennis is a 66 y.o.male who comes in today for postoperative check after left BKA done on 8/22/2023 for diabetic foot wound with osteomyelitis. Postoperative course was complicated by fall and wound dehiscence. Incision site was repaired in the emergency department on 9/22/2023. Patient has been followed by our office. Status post left BKA with dehiscence as above  -Patient last seen 1 month ago with wound almost fully healed with small superficial skin midline opening  -Site is now fully healed on evaluation today  -Midline area still with thin skin covering, no open wounds    -Patient provided with information for companies to begin prosthesis fitting/stump   -Would allow 2 more weeks for adequate healing prior to starting stump   -Prescription provided for prosthetic evaluation  -Patient does not require further surgical follow-up  -He should continue to call with any questions, concerns    DM1  -Patient has insulin pump  -Continue tight blood sugar control for optimal wound healing      HPI:  Dandy Ennis is a 66 y.o.male who comes in today for postoperative check after recent  on BKA as above. Wound is now fully healed. Last seen 4 weeks ago with small superficial opening remaining. Denies pain at site. No drainage. No fevers or chills.        ROS:  General ROS: negative for - chills, fatigue, fever or night sweats, weight loss  Respiratory ROS: no cough, shortness of breath, or wheezing  Cardiovascular ROS: no chest pain or dyspnea on exertion  Abdomen ROS: no pain, N/V  Genito-Urinary ROS: no dysuria, trouble voiding, or hematuria  Musculoskeletal ROS: negative for - gait disturbance, joint pain or muscle pain  Neurological ROS: no TIA or stroke symptoms  Skin ROS: incision site    ALLERGIES  Cefuroxime and Amoxicillin-pot clavulanate    Current Outpatient Medications:     acetaminophen (TYLENOL) 325 mg tablet, Take 3 tablets (975 mg total) by mouth every 6 (six) hours, Disp: 120 tablet, Rfl: 0    atorvastatin (LIPITOR) 40 mg tablet, TAKE 1 TABLET BY MOUTH EVERY DAY, Disp: 90 tablet, Rfl: 1    DULoxetine (CYMBALTA) 60 mg delayed release capsule, Take 1 capsule (60 mg total) by mouth daily, Disp: 30 capsule, Rfl: 2    gabapentin (Neurontin) 300 mg capsule, Take 3 capsules (900 mg total) by mouth 3 (three) times a day 900mg TID, Disp: 210 capsule, Rfl: 2    Glucagon, rDNA, (Glucagon Emergency) 1 MG KIT, , Disp: , Rfl:     insulin lispro (HumaLOG) 100 units/mL injection, Use up to 80 units daily via insulin pump, Disp: 30 mL, Rfl: 5    lisinopril (ZESTRIL) 10 mg tablet, Take 10 mg by mouth daily, Disp: , Rfl:     metoprolol succinate (TOPROL-XL) 100 mg 24 hr tablet, take 1 tablet by mouth once daily, Disp: 90 tablet, Rfl: 1  Past Medical History:   Diagnosis Date    Chronic foot ulcer ([address]) 09/07/2018    Diabetes mellitus ([address])     Diabetic foot ulcer ([address]) 05/08/2023    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, with fat layer exposed ([address]) 04/06/2023    Gout     High cholesterol     Hypertension     Visual impairment 11/01/2022    Cateract     Past Surgical History:   Procedure Laterality Date    CATARACT EXTRACTION Right 01/2023    COMPLEX WOUND CLOSURE TO EXTREMITY Left 07/18/2019    Procedure: COMPLEX CLOSURE LEFT CHEEK;  Surgeon: Lucero Szymanski MD;  Location: [facility];  Service: Plastics    FACIAL/NECK BIOPSY Left 07/18/2019    Procedure: EXCISION LEFT CHEEK CYST;  Surgeon: Lucero Szymanski MD;  Location: [facility];  Service: Plastics    HERNIA REPAIR Left     several times    KNEE ARTHROSCOPY Left     torn meniscus    LEG AMPUTATION THROUGH LOWER TIBIA AND FIBULA Left 8/22/2023    Procedure: AMPUTATION BELOW KNEE (BKA);   Surgeon: Ade Beth MD;  Location:  MAIN OR;  Service: General    AR AMPUTATION FOOT TRANSMETARSAL Left 6/30/2023    Procedure: AMPUTATION TRANSMETATARSAL (TMA);  Surgeon: Cayden Garcia NOAH;  Location:  MAIN OR;  Service: Podiatry    WOUND DEBRIDEMENT Left 5/30/2023    Procedure: DEBRIDEMENT WOUND LEFT FOOT WOUND WITH EXCISION OF INFECTED BONE AND WOUND VAC APPLICATION;  Surgeon: Amalia Mccoy DPM;  Location:  MAIN OR;  Service: Podiatry     Family History   Problem Relation Age of Onset    Heart disease Father     Diabetes type I Father     Diabetes type II Mother     No Known Problems Sister     Alcohol abuse Brother     Diabetes type I Brother     No Known Problems Brother     No Known Problems Son     No Known Problems Daughter       reports that he quit smoking about 8 weeks ago. His smoking use included cigarettes. He has a 20.00 pack-year smoking history. He has never used smokeless tobacco. He reports current alcohol use. He reports that he does not use drugs. PHYSICAL EXAM  Vitals:    11/20/23 1305   BP: 153/78   Pulse: 85   Resp: 16   Temp: (!) 95 °F (35 °C)         General: normal, cooperative, no distress  Incision: left BKA site clean, dry, and intact and healing well  Prior open site now fully closed.   No drainage, no erythema, no signs of underlying infection      Herminia Hartmann

## 2023-11-21 ENCOUNTER — HOME CARE VISIT (OUTPATIENT)
Dept: HOME HEALTH SERVICES | Facility: HOME HEALTHCARE | Age: 63
End: 2023-11-21
Payer: COMMERCIAL

## 2023-11-21 VITALS
SYSTOLIC BLOOD PRESSURE: 120 MMHG | RESPIRATION RATE: 20 BRPM | DIASTOLIC BLOOD PRESSURE: 60 MMHG | OXYGEN SATURATION: 96 % | HEART RATE: 86 BPM | TEMPERATURE: 97.8 F

## 2023-11-21 PROCEDURE — G0299 HHS/HOSPICE OF RN EA 15 MIN: HCPCS

## 2024-01-04 ENCOUNTER — TELEPHONE (OUTPATIENT)
Dept: FAMILY MEDICINE CLINIC | Facility: HOSPITAL | Age: 64
End: 2024-01-04

## 2024-01-08 NOTE — TELEPHONE ENCOUNTER
01/08/24 12:05 PM        The office's request has been received, reviewed, and the patient chart updated. The PCP has successfully been removed with a patient attribution note. This message will now be completed.        Thank you  Geovanna Ramírez

## 2024-01-16 ENCOUNTER — VBI (OUTPATIENT)
Dept: ADMINISTRATIVE | Facility: OTHER | Age: 64
End: 2024-01-16

## 2024-01-16 NOTE — TELEPHONE ENCOUNTER
Upon review of the In Basket request we were able to locate, review, and update the patient chart as requested for Diabetic Eye Exam.    Any additional questions or concerns should be emailed to the Practice Liaisons via the appropriate education email address, please do not reply via In Basket.    Thank you  Flip Maynard MA

## 2024-03-04 ENCOUNTER — EVALUATION (OUTPATIENT)
Facility: CLINIC | Age: 64
End: 2024-03-04
Payer: COMMERCIAL

## 2024-03-04 DIAGNOSIS — R26.89 OTHER ABNORMALITIES OF GAIT AND MOBILITY: ICD-10-CM

## 2024-03-04 DIAGNOSIS — R26.89 IMPAIRMENT OF BALANCE: ICD-10-CM

## 2024-03-04 DIAGNOSIS — G54.6 PHANTOM LIMB PAIN (HCC): ICD-10-CM

## 2024-03-04 DIAGNOSIS — S88.112D UNILATERAL COMPLETE BKA, LEFT, SUBSEQUENT ENCOUNTER (HCC): Primary | ICD-10-CM

## 2024-03-04 PROCEDURE — 97162 PT EVAL MOD COMPLEX 30 MIN: CPT

## 2024-03-04 NOTE — LETTER
2024    Elian Mcadams MD  [address] [place] [id number]    Patient: Greyson Lopez   YOB: 1960   Date of Visit: 3/4/2024     Encounter Diagnosis     ICD-10-CM    1. Unilateral complete BKA, left, subsequent encounter ([facility])  S88.112D       2. Other abnormalities of gait and mobility  R26.89       3. Impairment of balance  R26.89       4. Phantom limb pain (HCC)  G54.6           Dear Dr. Mcadams:    Thank you for your recent referral of Greyson Lopez. Please review the attached evaluation summary from Greyson's recent visit.     Please verify that you agree with the plan of care by signing the attached order.     If you have any questions or concerns, please do not hesitate to call.     I sincerely appreciate the opportunity to share in the care of one of your patients and hope to have another opportunity to work with you in the near future.       Sincerely,    Omar Mancera, PT      Referring Provider:      I certify that I have read the below Plan of Care and certify the need for these services furnished under this plan of treatment while under my care.                    Elian Mcadams MD  [address] [place] [phone]  Via Fax: [phone]          PT Evaluation     Today's date: 3/4/2024  Patient name: Greyson Lopez  : 1960  MRN: [id number]  Referring provider: Elian Mcadams MD  Dx: No diagnosis found.               Assessment  Assessment details: Pt is a 63 y.o male who is 7 months s/p L BKA. He received his prosthetic 1 week ago. Pt's ambulation reflected circumduction of the L LE and hip hike, as well as reduced L stance time and R weight shift, is consistent with BKA presentation. 2MWT reflects CV deficits, and TUG reflects increased risk for falls. Pt's gait speed classifies him as a household ambulator at this time. Pt exhibits sufficient muscle strength to perform gait tasks, which will aid him in his recovery. Pt will benefit from skilled PT  intervnetion focused on balance, gait, and strengthening so that he can navigate his environment with increased safety and stability.   Impairments: abnormal coordination, abnormal gait, abnormal muscle firing, abnormal or restricted ROM, abnormal movement, activity intolerance, impaired balance, impaired physical strength, lacks appropriate home exercise program, safety issue, weight-bearing intolerance and poor posture     Symptom irritability: moderateUnderstanding of Dx/Px/POC: good   Prognosis: good    Goals  STG: to be achieved in 4 weeks  1. Pt will be able to ambulate 40ft independently with RW so that he can perform short walking tasks in his home.   2. Pt will be independent with HEP  3. Pt will be able to stand with arms over head for 5 mins to complete overhead reaching tasks such as changing a light bulb  4. ABC to be completed and subsequent goal to be set.   5. Pt will tolerate 6MWT task    LTG: to be achieved in 8 weeks  1. Pt will improve TUG by 5s to reflect improved short distance mobility.  2. Pt will be able to ambulate for 10 minutes without requiring a rest so that he can assist his wife in performing grocery shopping tasks  3. Pt will improve 2mwt by 112ft to reflect improvement in CV endurance and function.        Plan  Plan details: TE: for strengthening, stretching, and flexibility  TA: for functional participation  NR: for balance, coordination, reaction time  MT: for STM, IASTM, joint mobilizations  Gait Training: to improve gait mechanics.    Patient would benefit from: skilled physical therapy and orthotics  Planned modality interventions: cryotherapy, electrical stimulation/Russian stimulation and thermotherapy: hydrocollator packs  Planned therapy interventions: abdominal trunk stabilization, activity modification, balance, balance/weight bearing training, body mechanics training, coordination, functional ROM exercises, gait training, graded activity, graded exercise, graded motor,  home exercise program, joint mobilization, manual therapy, motor coordination training, neuromuscular re-education, patient education, prosthetic fitting/training, strengthening, stretching, therapeutic activities, therapeutic exercise, therapeutic training and transfer training  Frequency: 2x week  Duration in visits: 20  Duration in weeks: 10  Plan of Care beginning date: 3/4/2024  Plan of Care expiration date: 2024  Treatment plan discussed with: patient and family      Subjective Evaluation    History of Present Illness  Date of surgery: 2023  Mechanism of injury: surgery  Mechanism of injury: Pt sustained a left below knee amputation in 2023. Pt then fell a moth after surgery & reopened his incision site. He fell in the bathroom. He has been wheelchair bound ever since. He received home health until November and they worked on ambulation without a prosthetic. He is able to complete bathing tasks, dressing tasks, and car transfers independently without his residual limb. He just received his prosthetic last week. He only ambulate with it in the doctors office. He notes some discomfort over his tibial crest when he wears the prosthetic, as well as transient redness. He does have pain when he takes it off. He also notes that he is experiencing some phantom limb pain described as a nerve pain in his foot. He is working with [employer] for his prosthetic. He completes daily skin checks. He would like to improve his walking to increase his independence.   Patient Goals  Patient goals for therapy: increased strength, independence with ADLs/IADLs, improved balance and decreased pain  Patient goal: improved walking  Pain  Current pain ratin  At best pain ratin  At worst pain ratin    Social Support  Steps to enter house: yes  5  Stairs in house: yes   8  Lives in: multiple-level home  Lives with: spouse    Employment status: not working  Hand dominance: right    Treatments  Previous  treatment: physical therapy and home therapy  Current treatment: medication      Objective     Ambulation   Weight-Bearing Status   Assistive device used: wheeled walker  Neuro Exam:     Wheelchair mobility   Propelling: independent with prosthesis: yes    Functional outcomes   Gait speed: 0.26m/s  2 minute walk test: 148ft  TU.54 (seconds)    Amputee   Level of amputation: left   Phantom pain: phantom pain    Residual skin inspection   Skin integrity: normal  Edema: no  Surgical incision: intact, scarring around distal tibia  Residual limb shape: normal   Wounds: mild redness that resolved with taking the limb off.  Scarring: Along incision site  Palpation: no pain reported  Sensation to light touch: intact  Contralateral limb inspection   Sensation to light touch: intact             Precautions: DM, Gout, HTN, Visual Impairment L Amputee, Falls Risk      IE 3/4            Gait Speed 0.26m/s RW + chair follow            TUG 54.24m/s RW + chair follow            2MWT 148ft w RW + chair follow            Redman Scale 2            Neuro Re-Ed             Weight shifting             Uneven surface navigation             Side stepping                                                                 Ther Ex                                                                                                                     Ther Activity                                       Gait Training             Ambulation with RW             L AMINATA resisted walking             Modalities

## 2024-03-04 NOTE — PROGRESS NOTES
PT Evaluation     Today's date: 3/4/2024  Patient name: Greyson Lopez  : 1960  MRN: [id number]  Referring provider: Elian Mcadams MD  Dx: No diagnosis found.               Assessment  Assessment details: Pt is a 63 y.o male who is 7 months s/p L BKA. He received his prosthetic 1 week ago. Pt's ambulation reflected circumduction of the L LE and hip hike, as well as reduced L stance time and R weight shift, is consistent with BKA presentation. 2MWT reflects CV deficits, and TUG reflects increased risk for falls. Pt's gait speed classifies him as a household ambulator at this time. Pt exhibits sufficient muscle strength to perform gait tasks, which will aid him in his recovery. Pt will benefit from skilled PT intervnetion focused on balance, gait, and strengthening so that he can navigate his environment with increased safety and stability.   Impairments: abnormal coordination, abnormal gait, abnormal muscle firing, abnormal or restricted ROM, abnormal movement, activity intolerance, impaired balance, impaired physical strength, lacks appropriate home exercise program, safety issue, weight-bearing intolerance and poor posture     Symptom irritability: moderateUnderstanding of Dx/Px/POC: good   Prognosis: good    Goals  STG: to be achieved in 4 weeks  1. Pt will be able to ambulate 40ft independently with RW so that he can perform short walking tasks in his home.   2. Pt will be independent with HEP  3. Pt will be able to stand with arms over head for 5 mins to complete overhead reaching tasks such as changing a light bulb  4. ABC to be completed and subsequent goal to be set.   5. Pt will tolerate 6MWT task    LTG: to be achieved in 8 weeks  1. Pt will improve TUG by 5s to reflect improved short distance mobility.  2. Pt will be able to ambulate for 10 minutes without requiring a rest so that he can assist his wife in performing grocery shopping tasks  3. Pt will improve 2mwt by 112ft to reflect improvement in  CV endurance and function.        Plan  Plan details: TE: for strengthening, stretching, and flexibility  TA: for functional participation  NR: for balance, coordination, reaction time  MT: for STM, IASTM, joint mobilizations  Gait Training: to improve gait mechanics.    Patient would benefit from: skilled physical therapy and orthotics  Planned modality interventions: cryotherapy, electrical stimulation/Russian stimulation and thermotherapy: hydrocollator packs  Planned therapy interventions: abdominal trunk stabilization, activity modification, balance, balance/weight bearing training, body mechanics training, coordination, functional ROM exercises, gait training, graded activity, graded exercise, graded motor, home exercise program, joint mobilization, manual therapy, motor coordination training, neuromuscular re-education, patient education, prosthetic fitting/training, strengthening, stretching, therapeutic activities, therapeutic exercise, therapeutic training and transfer training  Frequency: 2x week  Duration in visits: 20  Duration in weeks: 10  Plan of Care beginning date: 3/4/2024  Plan of Care expiration date: 5/13/2024  Treatment plan discussed with: patient and family      Subjective Evaluation    History of Present Illness  Date of surgery: 8/22/2023  Mechanism of injury: surgery  Mechanism of injury: Pt sustained a left below knee amputation in August of 2023. Pt then fell a moth after surgery & reopened his incision site. He fell in the bathroom. He has been wheelchair bound ever since. He received home health until November and they worked on ambulation without a prosthetic. He is able to complete bathing tasks, dressing tasks, and car transfers independently without his residual limb. He just received his prosthetic last week. He only ambulate with it in the doctors office. He notes some discomfort over his tibial crest when he wears the prosthetic, as well as transient redness. He does have pain  when he takes it off. He also notes that he is experiencing some phantom limb pain described as a nerve pain in his foot. He is working with [employer] for his prosthetic. He completes daily skin checks. He would like to improve his walking to increase his independence.   Patient Goals  Patient goals for therapy: increased strength, independence with ADLs/IADLs, improved balance and decreased pain  Patient goal: improved walking  Pain  Current pain ratin  At best pain ratin  At worst pain ratin    Social Support  Steps to enter house: yes  5  Stairs in house: yes   8  Lives in: multiple-level home  Lives with: spouse    Employment status: not working  Hand dominance: right    Treatments  Previous treatment: physical therapy and home therapy  Current treatment: medication      Objective     Ambulation   Weight-Bearing Status   Assistive device used: wheeled walker  Neuro Exam:     Wheelchair mobility   Propelling: independent with prosthesis: yes    Functional outcomes   Gait speed: 0.26m/s  2 minute walk test: 148ft  TU.54 (seconds)    Amputee   Level of amputation: left   Phantom pain: phantom pain    Residual skin inspection   Skin integrity: normal  Edema: no  Surgical incision: intact, scarring around distal tibia  Residual limb shape: normal   Wounds: mild redness that resolved with taking the limb off.  Scarring: Along incision site  Palpation: no pain reported  Sensation to light touch: intact  Contralateral limb inspection   Sensation to light touch: intact             Precautions: DM, Gout, HTN, Visual Impairment L Amputee, Falls Risk      IE 3/4            Gait Speed 0.26m/s RW + chair follow            TUG 54.24m/s RW + chair follow            2MWT 148ft w RW + chair follow            Redman Scale 2            Neuro Re-Ed             Weight shifting             Uneven surface navigation             Side stepping                                                                 Ther  Ex                                                                                                                     Ther Activity                                       Gait Training             Ambulation with RW             L LE resisted walking             Modalities

## 2024-03-11 ENCOUNTER — TELEPHONE (OUTPATIENT)
Age: 64
End: 2024-03-11

## 2024-03-11 ENCOUNTER — TELEPHONE (OUTPATIENT)
Dept: ENDOCRINOLOGY | Facility: HOSPITAL | Age: 64
End: 2024-03-11

## 2024-03-11 ENCOUNTER — OFFICE VISIT (OUTPATIENT)
Facility: CLINIC | Age: 64
End: 2024-03-11
Payer: COMMERCIAL

## 2024-03-11 DIAGNOSIS — R26.89 IMPAIRMENT OF BALANCE: ICD-10-CM

## 2024-03-11 DIAGNOSIS — R26.89 OTHER ABNORMALITIES OF GAIT AND MOBILITY: ICD-10-CM

## 2024-03-11 DIAGNOSIS — E10.65 TYPE 1 DIABETES MELLITUS WITH HYPERGLYCEMIA (HCC): Primary | ICD-10-CM

## 2024-03-11 DIAGNOSIS — S88.112D UNILATERAL COMPLETE BKA, LEFT, SUBSEQUENT ENCOUNTER (HCC): Primary | ICD-10-CM

## 2024-03-11 DIAGNOSIS — G54.6 PHANTOM LIMB PAIN (HCC): ICD-10-CM

## 2024-03-11 PROCEDURE — 97112 NEUROMUSCULAR REEDUCATION: CPT

## 2024-03-11 PROCEDURE — 97116 GAIT TRAINING THERAPY: CPT

## 2024-03-11 RX ORDER — INSULIN GLARGINE 100 [IU]/ML
18 INJECTION, SOLUTION SUBCUTANEOUS DAILY
Qty: 15 ML | Refills: 0 | Status: SHIPPED | OUTPATIENT
Start: 2024-03-11 | End: 2024-03-12

## 2024-03-11 RX ORDER — PEN NEEDLE, DIABETIC 32GX 5/32"
NEEDLE, DISPOSABLE MISCELLANEOUS DAILY
Qty: 30 EACH | Refills: 0 | Status: SHIPPED | OUTPATIENT
Start: 2024-03-11

## 2024-03-11 NOTE — TELEPHONE ENCOUNTER
Patient called, he had a pump failure, will be receiving a new one soon but would like to know what his back up plan should be, insulin dosing and so forth. Thank you.

## 2024-03-11 NOTE — TELEPHONE ENCOUNTER
According to his last pump download from his last office visit he was sleeping roughly 18 units of basal insulin.  For that I sent over prescription of Lantus which she will take 18 units daily while he is not on his pump.  As far as his mealtime he can continue with 1 unit for every 10 g of carbohydrates with the Humalog from the vial.

## 2024-03-11 NOTE — PROGRESS NOTES
"Daily Note     Today's date: 3/11/2024  Patient name: Greyson Lopez  : 1960  MRN: [id number]  Referring provider: Elian Mcadams MD  Dx:   Encounter Diagnosis     ICD-10-CM    1. Unilateral complete BKA, left, subsequent encounter ([facility])  S88.112D       2. Other abnormalities of gait and mobility  R26.89       3. Impairment of balance  R26.89       4. Phantom limb pain ([facility])  G54.6                      Subjective: Pt states that he has been walking 2x/day at home. He is doing really well.       Objective: See treatment diary below      Assessment: Pt participated in a skilled PT session focused on balance,gait, and strengthening. Pt was able to ambulate increased duration today without a rest break, exhibited increased R knee flexion in stance paired with circumduction. Trialed AP weight shifting in the // and pt exhibited increased p! And discomfort with shift. Side stepping performed without UE support. Pt noted increased pain at the conclusion of session, so trialed donning a sock and pt did not report much change. Will continue to monitor to assess if prosthetic needs to be adjusted. Pt will continue to benefit from skilled PT so that he can navigate his environment with increased safety and stability.       Plan: Continue per plan of care.      Precautions: DM, Gout, HTN, Visual Impairment L Amputee, Falls Risk      IE 3/4 3/11           Gait Speed 0.26m/s RW + chair follow            TUG 54.24m/s RW + chair follow            2MWT 148ft w RW + chair follow            Redman Scale 2            Neuro Re-Ed             Weight shifting  In // AP with 1 UE support 30x           Uneven surface navigation             Side stepping  In // 6x no UE support, 6x without UE support           Unsupported stance  3x30\" cueing to pick head up and shift weight to left no UE support           Education  Donning/doffing socks, signs to look for in case prosthetic needs to be adjusted.                                    "   Ther Ex                                                                                                                     Ther Activity                                       Gait Training             Ambulation with RW  [id number] with WC follow and RW           L LE resisted walking             Modalities

## 2024-03-11 NOTE — TELEPHONE ENCOUNTER
Patient l/m - E.Pepe    Pharm is out of long acting insulin that was sent.    Please call patient    [phone]

## 2024-03-12 ENCOUNTER — TELEPHONE (OUTPATIENT)
Age: 64
End: 2024-03-12

## 2024-03-12 DIAGNOSIS — E10.65 TYPE 1 DIABETES MELLITUS WITH HYPERGLYCEMIA (HCC): Primary | ICD-10-CM

## 2024-03-12 RX ORDER — INSULIN GLARGINE 100 [IU]/ML
18 INJECTION, SOLUTION SUBCUTANEOUS DAILY
Qty: 15 ML | Refills: 0 | Status: SHIPPED | OUTPATIENT
Start: 2024-03-12 | End: 2024-03-19 | Stop reason: SDUPTHER

## 2024-03-12 NOTE — TELEPHONE ENCOUNTER
Patient looking to set up appointment to help set up his medtronic pump. Please contact patient to set up appointment.

## 2024-03-12 NOTE — TELEPHONE ENCOUNTER
Greyson's medtronic pump  and he got a new one shipped to him. He's having trouble with transportation and want to know if you could walk him through anything over the phone or if he needs to be seen. He might be able to make a later time spot if he gets a ride from his son. He already called [employer] and they referred him back to our office. Can you follow up with him?

## 2024-03-13 ENCOUNTER — OFFICE VISIT (OUTPATIENT)
Facility: CLINIC | Age: 64
End: 2024-03-13
Payer: COMMERCIAL

## 2024-03-13 DIAGNOSIS — G54.6 PHANTOM LIMB PAIN (HCC): ICD-10-CM

## 2024-03-13 DIAGNOSIS — S88.112D UNILATERAL COMPLETE BKA, LEFT, SUBSEQUENT ENCOUNTER (HCC): Primary | ICD-10-CM

## 2024-03-13 DIAGNOSIS — R26.89 OTHER ABNORMALITIES OF GAIT AND MOBILITY: ICD-10-CM

## 2024-03-13 DIAGNOSIS — R26.89 IMPAIRMENT OF BALANCE: ICD-10-CM

## 2024-03-13 PROCEDURE — 97116 GAIT TRAINING THERAPY: CPT

## 2024-03-19 ENCOUNTER — OFFICE VISIT (OUTPATIENT)
Dept: ENDOCRINOLOGY | Facility: HOSPITAL | Age: 64
End: 2024-03-19
Payer: COMMERCIAL

## 2024-03-19 VITALS
OXYGEN SATURATION: 99 % | SYSTOLIC BLOOD PRESSURE: 140 MMHG | HEIGHT: 72 IN | BODY MASS INDEX: 22.21 KG/M2 | WEIGHT: 164 LBS | DIASTOLIC BLOOD PRESSURE: 82 MMHG | HEART RATE: 92 BPM

## 2024-03-19 DIAGNOSIS — E10.65 TYPE 1 DIABETES MELLITUS WITH HYPERGLYCEMIA (HCC): Primary | ICD-10-CM

## 2024-03-19 DIAGNOSIS — E78.2 MIXED HYPERLIPIDEMIA: ICD-10-CM

## 2024-03-19 DIAGNOSIS — I10 BENIGN ESSENTIAL HYPERTENSION: ICD-10-CM

## 2024-03-19 DIAGNOSIS — E10.649 HYPOGLYCEMIA UNAWARENESS ASSOCIATED WITH TYPE 1 DIABETES MELLITUS (HCC): ICD-10-CM

## 2024-03-19 DIAGNOSIS — E10.29 MICROALBUMINURIA DUE TO TYPE 1 DIABETES MELLITUS  (HCC): ICD-10-CM

## 2024-03-19 DIAGNOSIS — R80.9 MICROALBUMINURIA DUE TO TYPE 1 DIABETES MELLITUS  (HCC): ICD-10-CM

## 2024-03-19 DIAGNOSIS — Z96.41 INSULIN PUMP IN PLACE: ICD-10-CM

## 2024-03-19 DIAGNOSIS — E10.40 TYPE 1 DIABETES MELLITUS WITH DIABETIC NEUROPATHY (HCC): ICD-10-CM

## 2024-03-19 PROCEDURE — 99215 OFFICE O/P EST HI 40 MIN: CPT | Performed by: INTERNAL MEDICINE

## 2024-03-19 PROCEDURE — 95251 CONT GLUC MNTR ANALYSIS I&R: CPT | Performed by: INTERNAL MEDICINE

## 2024-03-19 RX ORDER — OMEGA-3 FATTY ACIDS/FISH OIL 300-1000MG
CAPSULE ORAL
COMMUNITY

## 2024-03-19 RX ORDER — INSULIN GLARGINE 100 [IU]/ML
18 INJECTION, SOLUTION SUBCUTANEOUS DAILY
Qty: 15 ML | Refills: 0 | Status: SHIPPED | OUTPATIENT
Start: 2024-03-19

## 2024-03-19 NOTE — PROGRESS NOTES
3/20/2024    Assessment/Plan     1. Type 1 diabetes mellitus with hyperglycemia (HCC)  -     Comprehensive metabolic panel; Future; Expected date: 05/27/2024  -     Hemoglobin A1C; Future; Expected date: 05/27/2024  -     TSH, 3rd generation; Future; Expected date: 05/27/2024  -     Insulin Glargine Solostar (Basaglar KwikPen) 100 UNIT/ML SOPN; Inject 0.18 mL (18 Units total) under the skin in the morning If pump fails    2. Type 1 diabetes mellitus with diabetic neuropathy (HCC)  -     Comprehensive metabolic panel; Future; Expected date: 05/27/2024  -     Hemoglobin A1C; Future; Expected date: 05/27/2024  -     TSH, 3rd generation; Future; Expected date: 05/27/2024    3. Microalbuminuria due to type 1 diabetes mellitus   Assessment & Plan:  He continues to utilize lisinopril 10 mg daily. His urine microalbumin has improved.    Orders:  -     Comprehensive metabolic panel; Future; Expected date: 05/27/2024  -     Hemoglobin A1C; Future; Expected date: 05/27/2024  -     TSH, 3rd generation; Future; Expected date: 05/27/2024    4. Hypoglycemia unawareness associated with type 1 diabetes mellitus (HCC)  Assessment & Plan:  He will continue to utilize his sensor.    Orders:  -     Comprehensive metabolic panel; Future; Expected date: 05/27/2024  -     Hemoglobin A1C; Future; Expected date: 05/27/2024  -     TSH, 3rd generation; Future; Expected date: 05/27/2024    5. Benign essential hypertension  -     Comprehensive metabolic panel; Future; Expected date: 05/27/2024  -     Hemoglobin A1C; Future; Expected date: 05/27/2024  -     TSH, 3rd generation; Future; Expected date: 05/27/2024    6. Insulin pump in place  -     Comprehensive metabolic panel; Future; Expected date: 05/27/2024  -     Hemoglobin A1C; Future; Expected date: 05/27/2024  -     TSH, 3rd generation; Future; Expected date: 05/27/2024    7. Mixed hyperlipidemia  Assessment & Plan:  His lipid profile is elevated this visit for unclear reasons. For now, he  will continue the same atorvastatin and I will repeat his lipid profile in the future.    Orders:  -     Comprehensive metabolic panel; Future; Expected date: 05/27/2024  -     Hemoglobin A1C; Future; Expected date: 05/27/2024  -     TSH, 3rd generation; Future; Expected date: 05/27/2024         1.  Type 1 diabetes.  His most recent hemoglobin A1c has gone up quite a bit. He recently had an issue with his pump that failed and his new pump has the wrong basal and bolus basal rates, so I have adjusted all his basal rates to 12 AM to 3:30 AM, 0.55 units per hour, 3:30 AM to 8 AM 0.75 units per hour, 8 AM to 12 PM 0.9 units per hour, 12 PM to 8:30 PM 0.9 units/h, and 8:30 PM to 12 AM 0.6 units/h. His bolus rates are the same. I tried to switch his new pump into automode, but I was unable to successfully do that. I have asked him to call [employer] to try to get that switched to auto mode. He is also to call [employer] to work on getting his 770G insulin pump upgraded to the 780 G with the new Guardian-4 sensor as I think this will help improve his blood sugars.    2.  Diabetic neuropathy.  He is post left BKA. He does follow with podiatry regularly and diabetic foot exams are up to date.    5.  Hypertension.  He is normotensive in the office on his current dose of lisinopril and metoprolol. He will continue lisinopril 10 mg a day.    The patient will follow up in 3 months with preceding hemoglobin A1c, CMP, and TSH.    CC: Diabetes type I, blood pressure, lipid follow-up    History of Present Illness     HPI: Greyson Lopez is a 64 y.o. year old male with type 1 diabetes with neuropathy, retinopathy, microalbuminuria, hypoglycemic unawareness diagnosed 31 years ago, hypertension, hyperlipidemia, who presents for follow-up visit.     Diabetic complications include neuropathy, nephropathy, retinopathy, and hypoglycemic. He denies heart attack, stroke, or claudication.     He is utilizing the Medtronic 770G insulin pump with  Cortex sensor integrated with the pump. He received this pump over 3 years ago and has been on insulin pump therapy for 16 years.  He is in auto mode 0% of the time.  Basal rates:  12 AM to 3:30 AM 0.5 units/h, 3:30 AM to 8 AM 0.65 units/h, 8 AM to 12 PM 0.85 units/h, 12 PM to 8:30 PM 0.8 units/h, and 8:30 PM to 12 AM 0.6 units/h.  Bolus rates:  1 unit per 10 g carbohydrate with each meal.  Insulin sensitivity factor:  1 unit for every 40 blood sugar points for target blood glucose of 120.  Insulin action:  4 hours.  Total daily insulin dosage 40 to 7.2 units with 68% bolus and 32% basal    He is using Lispro, which he feels does not work as well as the Humalog or NovoLog. It does not react fast enough. He is doing his bolus system, doing the bolus wizer, and plugging it in the amount of carbs. Three hours after he eats, his blood sugars are high. He has gotten a new He denies polyuria, nocturia, polydipsia, polyphagia, any headaches, lightheadedness, vertigo, chest pain or dyspnea. He tries to eat 3 meals a day. He weighed 140 pounds when he had his first surgery. He now drinks a pot of coffee and a breakfast sandwich.     Hypoglycemia episodes: YES  H/o of hypoglycemia causing hospitalization or intervention such as glucagon injection or ambulance call NO.  Hypoglycemia symptoms: He wakes up at 3:00 AM with low blood sugar levels.    Blood Sugar/Glucometer/Pump/CGM review: He is utilizing 770G sensor integrated with his insulin pump.  Insulin pump and sensor download from 3/6/2024 through 3/19/2024 was reviewed in the office today.  He is only wearing his sensor 47% of the time and is in manual mode 49% of the time.  Average glucose is 196 mg/dL ±52 mg/dL.  50% of blood sugars are in target range, 45% high, 5% very high, 0% low, and 0% very low..   Since his new pump, his blood sugars are close to 200 every morning. He is not eating ice cream or snacking at nighttime. He compensates for his weight gain by  ensuring his blood sugar levels remain low, particularly by 3:00 AM. However, he is currently not utilizing Auto Mode due to the malfunction of his insulin pump.    He has undergone multiple hospitalizations since his last visit, including a procedure to address a foot ulcer on the left foot, which involved the removal of several small bones from his big toe. Despite attempts to heal the condition by amputating all his toes, the healing process did not proceed as expected. In 08/2023, he underwent a below-the-knee amputation due to a fall that resulted in damage to his prosthesis. His other foot remains unaffected, and he continues to receive regular care from a foot specialist. He denies any current wounds on his foot and reports no numbness or tingling sensations. To manage phantom sensations, he is currently taking gabapentin.    The patient's last eye exam was in .  He denies any blurry vision. He had cataract surgery on his right eye last year. He was scheduled for his left eye this past year, but he wanted to wait until he was out of a wheelchair. The patient's last foot exam was in April 2023 at the endocrine office visit.        Last A1C was   Lab Results   Component Value Date    HGBA1C 10.0 (H) 03/09/2024   .        Review of Systems  The pertinent positive and negative findings are as noted in the HPI.    Historical Information   Past Medical History:   Diagnosis Date    Chronic foot ulcer (HCC) 09/07/2018    Diabetes mellitus (HCC)     Diabetic foot ulcer (HCC) 05/08/2023    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, with fat layer exposed (HCC) 04/06/2023    Gout     High cholesterol     Hypertension     Visual impairment 11/01/2022    Cateract     Past Surgical History:   Procedure Laterality Date    CATARACT EXTRACTION Right 01/2023    COMPLEX WOUND CLOSURE TO EXTREMITY Left 07/18/2019    Procedure: COMPLEX CLOSURE LEFT CHEEK;  Surgeon: Basilio Pierce MD;  Location:  [facility];  Service: Plastics    FACIAL/NECK BIOPSY Left 2019    Procedure: EXCISION LEFT CHEEK CYST;  Surgeon: Basilio Pierce MD;  Location: [facility];  Service: Plastics    HERNIA REPAIR Left     several times    KNEE ARTHROSCOPY Left     torn meniscus    LEG AMPUTATION THROUGH LOWER TIBIA AND FIBULA Left 2023    Procedure: AMPUTATION BELOW KNEE (BKA);  Surgeon: Niko Umana MD;  Location: [facility];  Service: General    KS AMPUTATION FOOT TRANSMETARSAL Left 2023    Procedure: AMPUTATION TRANSMETATARSAL (TMA);  Surgeon: Lorenzo Chew DPM;  Location: [facility];  Service: Podiatry    WOUND DEBRIDEMENT Left 2023    Procedure: DEBRIDEMENT WOUND LEFT FOOT WOUND WITH EXCISION OF INFECTED BONE AND WOUND VAC APPLICATION;  Surgeon: Mercy Baez DPM;  Location: [facility];  Service: Podiatry     Social History   Social History     Substance and Sexual Activity   Alcohol Use Yes    Comment: 3/4 beers a day     Social History     Substance and Sexual Activity   Drug Use Never     Social History     Tobacco Use   Smoking Status Former    Current packs/day: 0.00    Average packs/day: 1 pack/day for 20.0 years (20.0 ttl pk-yrs)    Types: Cigarettes    Start date: 2003    Quit date: 2023    Years since quittin.4   Smokeless Tobacco Never   Tobacco Comments    encouraged smoking cessation     Family History:   Family History   Problem Relation Age of Onset    Heart disease Father     Diabetes type I Father     Diabetes type II Mother     No Known Problems Sister     Alcohol abuse Brother     Diabetes type I Brother     No Known Problems Brother     No Known Problems Son     No Known Problems Daughter        Meds/Allergies   Current Outpatient Medications   Medication Sig Dispense Refill    atorvastatin (LIPITOR) 40 mg tablet TAKE 1 TABLET BY MOUTH EVERY DAY 90 tablet 1    DULoxetine (CYMBALTA) 60 mg delayed release capsule Take 1 capsule (60 mg total) by mouth daily 30 capsule  2    gabapentin (Neurontin) 300 mg capsule Take 3 capsules (900 mg total) by mouth 3 (three) times a day 900mg  capsule 2    Glucagon, rDNA, (Glucagon Emergency) 1 MG KIT       Ibuprofen (Advil) 200 MG CAPS Take by mouth      Insulin Glargine Solostar (Basaglar KwikPen) 100 UNIT/ML SOPN Inject 0.18 mL (18 Units total) under the skin in the morning If pump fails 15 mL 0    insulin lispro (HumaLOG) 100 units/mL injection Use up to 80 units daily via insulin pump 30 mL 5    Insulin Pen Needle (BD Pen Needle Shannan U/F) 32G X 4 MM MISC Use daily With insulin pen 30 each 0    lisinopril (ZESTRIL) 10 mg tablet Take 10 mg by mouth daily      acetaminophen (TYLENOL) 325 mg tablet Take 3 tablets (975 mg total) by mouth every 6 (six) hours (Patient not taking: Reported on 3/19/2024) 120 tablet 0    metoprolol succinate (TOPROL-XL) 100 mg 24 hr tablet take 1 tablet by mouth once daily 90 tablet 1     No current facility-administered medications for this visit.     Allergies   Allergen Reactions    Cefuroxime Other (See Comments) and GI Intolerance    Amoxicillin-Pot Clavulanate Nausea Only and GI Intolerance       Objective   Vitals: Blood pressure 140/82, pulse 92, height 6' (1.829 m), weight 74.4 kg (164 lb), SpO2 99%.  Invasive Devices       Line  Duration             Pump Device Insulin pump Anterior;Left Abdomen 264 days                    Physical Exam  Physical exam normal with pertinent positives and negatives.  Neck: Thyroid normal in size. No palpable nodules.  Respiratory: Lungs clear.   Cardiovascular: Regular heart rhythm. No murmurs.  Lower extremities: No lower extremity edema. Right lower extremity without edema. Left lower extremity post BKA.    The history was obtained from the review of the chart and from the patient.    Lab Results:    Most recent Alc is  Lab Results   Component Value Date    HGBA1C 10.0 (H) 03/09/2024               Lab Results   Component Value Date    CREATININE 1.44 (H) 03/09/2024     CREATININE 1.01 09/23/2023    CREATININE 1.15 09/22/2023    BUN 18 03/09/2024    K 5.0 03/09/2024    CL 97 (L) 03/09/2024    CO2 27 03/09/2024     GFR, Calculated   Date Value Ref Range Status   10/26/2020 81 >60 mL/min/1.73m2 Final     Comment:     mL/min per 1.73 square meters                                            Normal Function or Mild Renal    Disease (if clinically at risk):  >or=60  Moderately Decreased:                30-59  Severely Decreased:                  15-29  Renal Failure:                         <15                                            -American GFR: multiply reported GFR by 1.16    Please note that the eGFR is based on the CKD-EPI calculation, and is not intended to be used for drug dosing.                                            Note: Calculated GFR may not be an accurate indicator of renal function if the patient's renal function is not in a steady state.     eGFRcr   Date Value Ref Range Status   03/09/2024 55 (L) >59 Final         Lab Results   Component Value Date    HDL 77 07/20/2023    TRIG 84 07/20/2023       Lab Results   Component Value Date    ALT 13 03/09/2024    AST 14 03/09/2024    ALKPHOS 90 03/09/2024       Lab Results   Component Value Date    TSH 0.78 06/03/2021         CMP showed a glucose of 163 fasting, sodium 134, and chloride 97, but was otherwise normal.     Urine microalbumin to creatinine ratio was 31.4.     Total cholesterol 209, triglyceride 100, HDL 49, .    Future Appointments   Date Time Provider Department Center   3/21/2024  8:00 AM Omar Mancera, PT [facility]   3/27/2024  9:30 AM Omar Mancera, PT [facility]   3/29/2024  8:00 AM Omar Mancera PT [facility]   4/1/2024  2:15 PM Omar Mancera, PT [facility]   4/3/2024  2:15 PM Omar Mancera, PT [facility]   4/8/2024  2:15 PM Omar Mancera, PT [facility]   4/10/2024  2:15 PM Omar Mancera, PT [facility]    4/15/2024  2:15 PM Omar Mancera, PT [facility]   4/17/2024  2:15 PM Omar Mancera, PT [facility]   4/22/2024  2:15 PM Omar Mancera, PT [facility]   4/24/2024  2:15 PM Omar Mancera, PT [facility]   4/29/2024  2:15 PM Omar Mancera, PT [facility]   5/1/2024  2:15 PM Omar Mancera, PT [facility]   7/10/2024  8:20 AM Eileen Smith MD [facility]       Transcribed for Eileen Smith MD, by Peter Ann on 03/20/24 at 6:07 PM. Powered by Dragon [employer].

## 2024-03-19 NOTE — PATIENT INSTRUCTIONS
Is 10.0%. this is higher.     I increased the basal rates for now to what they were before.     May have to call [employer] to get them to let you switch to automode.     Call [employer] about the 780 G pump software upgrde and changing to the guardian 4 sensor.     Follow up in 3 months with blood work.

## 2024-03-20 ENCOUNTER — OFFICE VISIT (OUTPATIENT)
Facility: CLINIC | Age: 64
End: 2024-03-20
Payer: COMMERCIAL

## 2024-03-20 DIAGNOSIS — R26.89 IMPAIRMENT OF BALANCE: ICD-10-CM

## 2024-03-20 DIAGNOSIS — G54.6 PHANTOM LIMB PAIN (HCC): ICD-10-CM

## 2024-03-20 DIAGNOSIS — R26.89 OTHER ABNORMALITIES OF GAIT AND MOBILITY: ICD-10-CM

## 2024-03-20 DIAGNOSIS — S88.112D UNILATERAL COMPLETE BKA, LEFT, SUBSEQUENT ENCOUNTER (HCC): Primary | ICD-10-CM

## 2024-03-20 PROCEDURE — 97116 GAIT TRAINING THERAPY: CPT

## 2024-03-20 PROCEDURE — 97530 THERAPEUTIC ACTIVITIES: CPT

## 2024-03-20 NOTE — PROGRESS NOTES
"Daily Note     Today's date: 3/20/2024  Patient name: Greyson Lopez  : 1960  MRN: [id number]  Referring provider: Elian Mcadams MD  Dx:   Encounter Diagnosis     ICD-10-CM    1. Unilateral complete BKA, left, subsequent encounter ([facility])  S88.112D       2. Other abnormalities of gait and mobility  R26.89       3. Impairment of balance  R26.89       4. Phantom limb pain ([facility])  G54.6               Start Time: 1553  Stop Time: 1630  Total time in clinic (min): 37 minutes    Subjective: Pt notes he has some residual limb tenderness on the front of his tibia after last session.     Objective: See treatment diary below      Assessment: Pt participated in a skilled PT session focused on balance,gait, and strengthening. Pt exhibited improved terminal knee extension on the R with gait today with RW as compared to previous visit. Improved stride length and parth with cane with SPC as compared to previous visits. Furthermore, pt noted minimal Reported significant R LE fatigue after.  Trialed curb navigation, pt exhibited occasional LOB and required intermittent cues for sequencing of AD with step navigation. Pt will continue to benefit from skilled PT so that he can navigate his environment with increased safety and stability.       Plan: Continue per plan of care.      Precautions: DM, Gout, HTN, Visual Impairment L Amputee, Falls Risk      IE 3/4 3/11 3/13 3/20         Gait Speed 0.26m/s RW + chair follow            TUG 54.24m/s RW + chair follow            2MWT 148ft w RW + chair follow            Redman Scale 2            Neuro Re-Ed             Weight shifting  In // AP with 1 UE support 30x In // AP with 1 UE support 30x    In // M/L 1 UE support 30x          Uneven surface navigation             Side stepping  In // 6x no UE support, 6x without UE support  In SOLO 6x20ft without UE support         Unsupported stance  3x30\" cueing to pick head up and shift weight to left no UE support           Education  " Donning/doffing socks, signs to look for in case prosthetic needs to be adjusted.                                      Ther Ex                                                                                                                     Ther Activity             Step ups    In SOLO with SPC 6 inch step occasional Griselda + VC for sequencing x30                      Gait Training             Ambulation with RW  [id number] with WC follow and RW [id number]  [id number] with RW         SPC gait   In SOLO 6x20ft  In SOLO 8x20ft         No UE support   In // 4x down and back SOLO          Modalities

## 2024-03-21 ENCOUNTER — OFFICE VISIT (OUTPATIENT)
Facility: CLINIC | Age: 64
End: 2024-03-21
Payer: COMMERCIAL

## 2024-03-21 DIAGNOSIS — R26.89 IMPAIRMENT OF BALANCE: ICD-10-CM

## 2024-03-21 DIAGNOSIS — S88.112D UNILATERAL COMPLETE BKA, LEFT, SUBSEQUENT ENCOUNTER (HCC): Primary | ICD-10-CM

## 2024-03-21 DIAGNOSIS — R26.89 OTHER ABNORMALITIES OF GAIT AND MOBILITY: ICD-10-CM

## 2024-03-21 DIAGNOSIS — G54.6 PHANTOM LIMB PAIN (HCC): ICD-10-CM

## 2024-03-21 PROCEDURE — 97116 GAIT TRAINING THERAPY: CPT

## 2024-03-21 PROCEDURE — 97112 NEUROMUSCULAR REEDUCATION: CPT

## 2024-03-21 PROCEDURE — 97530 THERAPEUTIC ACTIVITIES: CPT

## 2024-03-21 NOTE — PROGRESS NOTES
"Daily Note     Today's date: 3/21/2024  Patient name: Greyson Lopez  : 1960  MRN: [id number]  Referring provider: Elian Mcadams MD  Dx:   Encounter Diagnosis     ICD-10-CM    1. Unilateral complete BKA, left, subsequent encounter ([facility])  S88.112D       2. Other abnormalities of gait and mobility  R26.89       3. Impairment of balance  R26.89       4. Phantom limb pain ([facility])  G54.6                            Subjective: Pt notes he feels good today, no increased tenderness or soreness.      Objective: See treatment diary below      Assessment: Pt participated in a skilled PT session focused on balance,gait, and strengthening. Improved stride length observed with gait with SPC today. Backwards walking implemented with SPC in SOLo, pt exhibited increased fear and hesitancy as well as reduced R stride length & reduced L stance time. 1 LOB on curb navigation requiring Griselda, pt able to correct for this.  Hurdles introduced and pt able to perform with SPC and increased caution. Pt will continue to benefit from skilled PT so that he can navigate his environment with increased safety and stability.       Plan: Continue per plan of care.      Precautions: DM, Gout, HTN, Visual Impairment L Amputee, Falls Risk      IE 3/4 3/11 3/13 3/20 3/21        Gait Speed 0.26m/s RW + chair follow            TUG 54.24m/s RW + chair follow            2MWT 148ft w RW + chair follow            Redman Scale 2            Neuro Re-Ed             Weight shifting  In // AP with 1 UE support 30x In // AP with 1 UE support 30x    In // M/L 1 UE support 30x          Uneven surface navigation             Side stepping  In // 6x no UE support, 6x without UE support  In SOLO 6x20ft without UE support In SOLO 6x20ft without UE support        Unsupported stance  3x30\" cueing to pick head up and shift weight to left no UE support           Education  Donning/doffing socks, signs to look for in case prosthetic needs to be adjusted.          "   Backwards walking     8x with SPC in SOLO         Murray navigation     With SPC over murray low [id number] 5 hurdles        Ther Ex                                                                                                                     Ther Activity             Step ups    In SOLO with SPC 6 inch step occasional Griselda + VC for sequencing x30 In SOLO with SPC 6 inch step occasional Griselda + VC for sequencing x30                     Gait Training             Ambulation with RW  [id number] with WC follow and RW [id number]  [id number] with RW [id number] with RW        SPC gait   In SOLO 6x20ft  In SOLO 8x20ft In SOLO 8x20ft        No UE support   In // 4x down and back SOLO          Modalities

## 2024-03-26 ENCOUNTER — DOCUMENTATION (OUTPATIENT)
Dept: ENDOCRINOLOGY | Facility: HOSPITAL | Age: 64
End: 2024-03-26

## 2024-03-27 ENCOUNTER — OFFICE VISIT (OUTPATIENT)
Facility: CLINIC | Age: 64
End: 2024-03-27
Payer: COMMERCIAL

## 2024-03-27 DIAGNOSIS — R26.89 OTHER ABNORMALITIES OF GAIT AND MOBILITY: ICD-10-CM

## 2024-03-27 DIAGNOSIS — G54.6 PHANTOM LIMB PAIN (HCC): ICD-10-CM

## 2024-03-27 DIAGNOSIS — S88.112D UNILATERAL COMPLETE BKA, LEFT, SUBSEQUENT ENCOUNTER (HCC): Primary | ICD-10-CM

## 2024-03-27 DIAGNOSIS — R26.89 IMPAIRMENT OF BALANCE: ICD-10-CM

## 2024-03-27 PROCEDURE — 97116 GAIT TRAINING THERAPY: CPT

## 2024-03-27 PROCEDURE — 97530 THERAPEUTIC ACTIVITIES: CPT

## 2024-03-27 PROCEDURE — 97112 NEUROMUSCULAR REEDUCATION: CPT

## 2024-03-27 NOTE — PROGRESS NOTES
"Daily Note     Today's date: 3/27/2024  Patient name: Greyson Lopez  : 1960  MRN: [id number]  Referring provider: Elian Mcadams MD  Dx:   Encounter Diagnosis     ICD-10-CM    1. Unilateral complete BKA, left, subsequent encounter ([facility])  S88.112D       2. Other abnormalities of gait and mobility  R26.89       3. Impairment of balance  R26.89       4. Phantom limb pain ([facility])  G54.6                              Subjective: Pt notes he feels good today, no increased tenderness or soreness.  He did the stairs since he was last here with assistance from his sister. It went well, but he was unsure about the sequencing.    Objective: See treatment diary below      Assessment: Pt participated in a skilled PT session focused on balance,gait, and strengthening. Improved stride length observed with gait with SPC today. Side stepping performing without SPC today and pt exhibited occasional foot drag due to reduced UE support with weight shifting. Ambulation in // without UE support performed and pt exhibited improved stride length symmetry, however, still exhibits increased stance time R as compared to L. Pt will continue to benefit from skilled PT so that he can navigate his environment with increased safety and stability.       Plan: Continue per plan of care.      Precautions: DM, Gout, HTN, Visual Impairment L Amputee, Falls Risk      IE 3/4 3/11 3/13 3/20 3/21 3/26       Gait Speed 0.26m/s RW + chair follow            TUG 54.24m/s RW + chair follow            2MWT 148ft w RW + chair follow            Redman Scale 2            Neuro Re-Ed             Weight shifting  In // AP with 1 UE support 30x In // AP with 1 UE support 30x    In // M/L 1 UE support 30x          Uneven surface navigation             Side stepping  In // 6x no UE support, 6x without UE support  In SOLO 6x20ft without UE support In SOLO 6x20ft without UE support In SOLO 6x20ft without UE support no AD       Unsupported stance  3x30\" cueing to " pick head up and shift weight to left no UE support           Education  Donning/doffing socks, signs to look for in case prosthetic needs to be adjusted.            Backwards walking     8x with SPC in SOLO         Murray navigation     With SPC over murray low [id number] 5 hurdles With SPC over urdle low setting 8x 5 hurdles       Ther Ex                                                                                                                     Ther Activity             Step ups    In SOLO with SPC 6 inch step occasional Griselda + VC for sequencing x30 In SOLO with SPC 6 inch step occasional Griselda + VC for sequencing x30 In SOLO with SPC 8 inch step occasional Griselda + VC for sequencing x30                    Gait Training             Ambulation with RW  [id number] with WC follow and RW [id number]  [id number] with RW [id number] with RW        SPC gait   In SOLO 6x20ft  In SOLO 8x20ft In SOLO 8x20ft In SOLO 8x20ft       No UE support   In // 4x down and back SOLO   In // 4x down and back SOLO       Modalities

## 2024-03-29 ENCOUNTER — OFFICE VISIT (OUTPATIENT)
Facility: CLINIC | Age: 64
End: 2024-03-29
Payer: COMMERCIAL

## 2024-03-29 DIAGNOSIS — R26.89 IMPAIRMENT OF BALANCE: ICD-10-CM

## 2024-03-29 DIAGNOSIS — S88.112D UNILATERAL COMPLETE BKA, LEFT, SUBSEQUENT ENCOUNTER (HCC): Primary | ICD-10-CM

## 2024-03-29 DIAGNOSIS — R26.89 OTHER ABNORMALITIES OF GAIT AND MOBILITY: ICD-10-CM

## 2024-03-29 DIAGNOSIS — G54.6 PHANTOM LIMB PAIN (HCC): ICD-10-CM

## 2024-03-29 PROCEDURE — 97112 NEUROMUSCULAR REEDUCATION: CPT

## 2024-03-29 PROCEDURE — 97530 THERAPEUTIC ACTIVITIES: CPT

## 2024-03-29 NOTE — PROGRESS NOTES
"Daily Note     Today's date: 3/29/2024  Patient name: Greyson Lopez  : 1960  MRN: [id number]  Referring provider: Elian Mcadams MD  Dx:   Encounter Diagnosis     ICD-10-CM    1. Unilateral complete BKA, left, subsequent encounter ([facility])  S88.112D       2. Other abnormalities of gait and mobility  R26.89       3. Impairment of balance  R26.89       4. Phantom limb pain ([facility])  G54.6                                Subjective: Pt states that he is feeling okay, he would like to try to get into and out of the car & drive himself here next week.    Objective: See treatment diary below      Assessment: Pt participated in a skilled PT session focused on balance,gait, and strengthening. Pt noted that he had some phantom limb discomfort during backwards stepping, Pt stated that he would like to drive himself here next week so encouraged him to f/u with his physician about driving clearance. Nikita navigation performed and pt exhibited increased difficulty leading with the R LE as compared to L  . Pt will continue to benefit from skilled PT so that he can navigate his environment with increased safety and stability.       Plan: Continue per plan of care.      Precautions: DM, Gout, HTN, Visual Impairment L Amputee, Falls Risk      IE 3/4 3/11 3/13 3/20 3/21 3/26 3/29      Gait Speed 0.26m/s RW + chair follow            TUG 54.24m/s RW + chair follow            2MWT 148ft w RW + chair follow            Redman Scale 2            Neuro Re-Ed             Weight shifting  In // AP with 1 UE support 30x In // AP with 1 UE support 30x    In // M/L 1 UE support 30x          Uneven surface navigation             Side stepping  In // 6x no UE support, 6x without UE support  In SOLO 6x20ft without UE support In SOLO 6x20ft without UE support In SOLO 6x20ft without UE support no AD In SOLO 6x20ft without UE support No AD      Unsupported stance  3x30\" cueing to pick head up and shift weight to left no UE support         "   Education  Donning/doffing socks, signs to look for in case prosthetic needs to be adjusted.            Backwards walking     8x with SPC in SOLO   8x no AD in SOLO       Murray navigation     With SPC over murray low [id number] 5 hurdles With SPC over urdle low setting 8x 5 hurdles With SPC over low murray 8x 5 hurdles alternating LE leading      Ther Ex                                                                                                                     Ther Activity             Step ups    In SOLO with SPC 6 inch step occasional Griselda + VC for sequencing x30 In SOLO with SPC 6 inch step occasional Griselda + VC for sequencing x30 In SOLO with SPC 8 inch step occasional Griselda + VC for sequencing x30 In SOLO with SPC 8 inch step occasional Griselda + VC for sequencing x30                   Gait Training             Ambulation with RW  [id number] with WC follow and RW [id number]  [id number] with RW [id number] with RW        SPC gait   In SOLO 6x20ft  In SOLO 8x20ft In SOLO 8x20ft In SOLO 8x20ft In SOLO [id number]      No UE support   In // 4x down and back SOLO   In // 4x down and back SOLO 6x in SOLO      Modalities

## 2024-04-01 ENCOUNTER — OFFICE VISIT (OUTPATIENT)
Facility: CLINIC | Age: 64
End: 2024-04-01
Payer: COMMERCIAL

## 2024-04-01 DIAGNOSIS — R26.89 IMPAIRMENT OF BALANCE: ICD-10-CM

## 2024-04-01 DIAGNOSIS — S88.112D UNILATERAL COMPLETE BKA, LEFT, SUBSEQUENT ENCOUNTER (HCC): Primary | ICD-10-CM

## 2024-04-01 DIAGNOSIS — G54.6 PHANTOM LIMB PAIN (HCC): ICD-10-CM

## 2024-04-01 DIAGNOSIS — R26.89 OTHER ABNORMALITIES OF GAIT AND MOBILITY: ICD-10-CM

## 2024-04-01 PROCEDURE — 97116 GAIT TRAINING THERAPY: CPT

## 2024-04-01 PROCEDURE — 97530 THERAPEUTIC ACTIVITIES: CPT

## 2024-04-01 NOTE — PROGRESS NOTES
Daily Note     Today's date: 2024  Patient name: Greyson Lopez  : 1960  MRN: [id number]  Referring provider: Elian Mcadams MD  Dx:   Encounter Diagnosis     ICD-10-CM    1. Unilateral complete BKA, left, subsequent encounter ([facility])  S88.112D       2. Other abnormalities of gait and mobility  R26.89       3. Impairment of balance  R26.89       4. Phantom limb pain ([facility])  G54.6                                  Subjective: Pt states he did not try driving his car. He walked with a cane in the house and it went well. His wife said he looked better with a cane than he does with the RW. He was having a lot of discomfort right in the bottom of his knee by the end of the day from beingon his feet and cooking all day.     Objective: See treatment diary below      Assessment: Pt participated in a skilled PT session focused on balance,gait, and strengthening. Treadmill intervention implemented to challenge increasing stride length, pt reported rapid fatigue in his R LE reflecting increased uneven offloading and increased stance time in R during gait. Step ups performed today intermittently without UE support, pt required occasional CGA to maintain balance. Urdles performed without UE support as well and pt also required occasional CGA for balance. Pt will continue to benefit from skilled PT so that he can navigate his environment with increased safety and stability.       Plan: Continue per plan of care.      Precautions: DM, Gout, HTN, Visual Impairment L Amputee, Falls Risk      IE 3/4 3/11 3/13 3/20 3/21 3/26 3/29 4/1     Gait Speed 0.26m/s RW + chair follow            TUG 54.24m/s RW + chair follow            2MWT 148ft w RW + chair follow            Redman Scale 2            Neuro Re-Ed             Weight shifting  In // AP with 1 UE support 30x In // AP with 1 UE support 30x    In // M/L 1 UE support 30x          Uneven surface navigation             Side stepping  In // 6x no UE support, 6x without UE  "support  In SOLO 6x20ft without UE support In SOLO 6x20ft without UE support In SOLO 6x20ft without UE support no AD In SOLO 6x20ft without UE support No AD      Unsupported stance  3x30\" cueing to pick head up and shift weight to left no UE support           Education  Donning/doffing socks, signs to look for in case prosthetic needs to be adjusted.            Backwards walking     8x with SPC in SOLO   8x no AD in SOLO       Murray navigation     With SPC over murray low [id number] 5 hurdles With SPC over urdle low setting 8x 5 hurdles With SPC over low murray 8x 5 hurdles alternating LE leading No AD in // SOLO 5 hurdles 8x low setting alternating leading LE     Ther Ex                                                                                                                     Ther Activity             Step ups    In SOLO with SPC 6 inch step occasional Griselda + VC for sequencing x30 In SOLO with SPC 6 inch step occasional Griselda + VC for sequencing x30 In SOLO with SPC 8 inch step occasional Griselda + VC for sequencing x30 In SOLO with SPC 8 inch step occasional Griselda + VC for sequencing x30 In SOLO with SPC, occasional use of no AD, 8 inch step x30                  Gait Training             Ambulation with RW  [id number] with WC follow and RW [id number]  [id number] with RW [id number] with RW        SPC gait   In SOLO 6x20ft  In SOLO 8x20ft In SOLO 8x20ft In SOLO 8x20ft In SOLO [id number] In SOLO [id number]     Treadmill Support        5 min speed 1.8mph with UE support     No UE support   In // 4x down and back SOLO   In // 4x down and back SOLO 6x in SOLO 6x in SOLO     Modalities                                            "

## 2024-04-03 ENCOUNTER — OFFICE VISIT (OUTPATIENT)
Facility: CLINIC | Age: 64
End: 2024-04-03
Payer: COMMERCIAL

## 2024-04-03 DIAGNOSIS — R26.89 OTHER ABNORMALITIES OF GAIT AND MOBILITY: ICD-10-CM

## 2024-04-03 DIAGNOSIS — S88.112D UNILATERAL COMPLETE BKA, LEFT, SUBSEQUENT ENCOUNTER (HCC): Primary | ICD-10-CM

## 2024-04-03 DIAGNOSIS — G54.6 PHANTOM LIMB PAIN (HCC): ICD-10-CM

## 2024-04-03 DIAGNOSIS — R26.89 IMPAIRMENT OF BALANCE: ICD-10-CM

## 2024-04-03 PROCEDURE — 97112 NEUROMUSCULAR REEDUCATION: CPT

## 2024-04-03 PROCEDURE — 97530 THERAPEUTIC ACTIVITIES: CPT

## 2024-04-03 PROCEDURE — 97116 GAIT TRAINING THERAPY: CPT

## 2024-04-03 NOTE — PROGRESS NOTES
Daily Note     Today's date: 4/3/2024  Patient name: Greyson Lopez  : 1960  MRN: [id number]  Referring provider: Elian Mcadams MD  Dx:   Encounter Diagnosis     ICD-10-CM    1. Unilateral complete BKA, left, subsequent encounter ([facility])  S88.112D       2. Other abnormalities of gait and mobility  R26.89       3. Impairment of balance  R26.89       4. Phantom limb pain ([facility])  G54.6                         Start Time: 1415  Stop Time: 1500  Total time in clinic (min): 45 minutes    Subjective: Pt states that he is doing okay today. He presents with his boots so that he can be prepared to go fishing this weekend      Objective: See treatment diary below      Assessment: Pt participated in a skilled PT session focused on balance,gait, and strengthening. Treadmill intervention speed reduced due to pt wearing boots & increased incidents of reduced foot clearance. Uneven surface gait added with RW to prepare pt for his upcoming fishing trip with uneven surface navigation. Pt exhibited reduced pace with task but was able to maintain his balance without error. Increased LOB globally throughout session, especially with transitioning from one foot to another. Pt will continue to benefit from skilled PT so that he can navigate his environment with increased safety and stability.       Plan: Continue per plan of care.      Precautions: DM, Gout, HTN, Visual Impairment L Amputee, Falls Risk      IE 3/4 3/11 3/13 3/20 3/21 3/26 3/29 4/1 4/3   Gait Speed 0.26m/s RW + chair follow           TUG 54.24m/s RW + chair follow           2MWT 148ft w RW + chair follow           Redman Scale 2           Neuro Re-Ed            Weight shifting  In // AP with 1 UE support 30x In // AP with 1 UE support 30x    In // M/L 1 UE support 30x         Uneven surface navigation         In SOLO + RW over uneven surface mat 5 laps CS   Side stepping  In // 6x no UE support, 6x without UE support  In SOLO 6x20ft without UE support In SOLO 6x20ft  "without UE support In SOLO 6x20ft without UE support no AD In SOLO 6x20ft without UE support No AD     Unsupported stance  3x30\" cueing to pick head up and shift weight to left no UE support          Education  Donning/doffing socks, signs to look for in case prosthetic needs to be adjusted.           Backwards walking     8x with SPC in SOLO   8x no AD in SOLO   8x in // no AD in SOLO   Murray navigation     With SPC over murray low [id number] 5 hurdles With SPC over urdle low setting 8x 5 hurdles With SPC over low murray 8x 5 hurdles alternating LE leading No AD in // SOLO 5 hurdles 8x low setting alternating leading LE No AD in SOLO 5 hurdles 8x low setting alternating LE taps.    Ther Ex                                                                                                            Ther Activity            Step ups    In SOLO with SPC 6 inch step occasional Griselda + VC for sequencing x30 In SOLO with SPC 6 inch step occasional Griselda + VC for sequencing x30 In SOLO with SPC 8 inch step occasional Griselda + VC for sequencing x30 In SOLO with SPC 8 inch step occasional Griselda + VC for sequencing x30 In SOLO with SPC, occasional use of no AD, 8 inch step x30 In SOLO with SPC, occasional use of no AD, 8 inch step x30               Gait Training            Ambulation with RW  [id number] with WC follow and RW [id number]  [id number] with RW [id number] with RW       SPC gait   In SOLO 6x20ft  In SOLO 8x20ft In SOLO 8x20ft In SOLO 8x20ft In SOLO [id number] In SOLO [id number]    Treadmill Support        5 min speed 1.8mph with UE support 5 min speed 1.5mph with UE support, pt wearing boots today so reduced speed due to reduced foot clearance   No UE support   In // 4x down and back SOLO   In // 4x down and back SOLO 6x in SOLO 6x in SOLO 6x in HEYDI   Modalities                                         "

## 2024-04-08 ENCOUNTER — OFFICE VISIT (OUTPATIENT)
Facility: CLINIC | Age: 64
End: 2024-04-08
Payer: COMMERCIAL

## 2024-04-08 DIAGNOSIS — R26.89 OTHER ABNORMALITIES OF GAIT AND MOBILITY: ICD-10-CM

## 2024-04-08 DIAGNOSIS — R26.89 IMPAIRMENT OF BALANCE: ICD-10-CM

## 2024-04-08 DIAGNOSIS — G54.6 PHANTOM LIMB PAIN (HCC): ICD-10-CM

## 2024-04-08 DIAGNOSIS — S88.112D UNILATERAL COMPLETE BKA, LEFT, SUBSEQUENT ENCOUNTER (HCC): Primary | ICD-10-CM

## 2024-04-08 PROCEDURE — 97112 NEUROMUSCULAR REEDUCATION: CPT

## 2024-04-08 NOTE — PROGRESS NOTES
PT Re-Evaluation     Today's date: 2024  Patient name: Greyson Lopez  : 1960  MRN: [id number]  Referring provider: Elian Mcadams MD  Dx:   Encounter Diagnosis     ICD-10-CM    1. Unilateral complete BKA, left, subsequent encounter ([facility])  S88.112D       2. Other abnormalities of gait and mobility  R26.89       3. Impairment of balance  R26.89       4. Phantom limb pain ([facility])  G54.6                      Assessment  Assessment details: 2024: Pt is a 63 y.o. male who is 7 months s/p L BKA and has received 10 visits of skilled PT to address balance, gait, and strength deficits. Today's assessment reflects significant improvement in gait speed, CV endurance, and standing tolerance, as noted by progression in testing from 2MWT to 6MWT and significant improvement in gait speed. Pt's improvement in gait speed also progresses his classification from the level of a household ambulator to the level of a limited community ambulator. Furthermore, TUG was performed with RW & SPC, and pt was able to complete TUG faster with SPC than RW. Pt noted difficulty descending stairs, so stair navigation was performed. Educated pt on step-to pattern with use of SPC to aid in lowering down the stairs. Pt will therefore continue to benefit from skilled PT focused on balance, gait, and strengthening so that he can navigate his environment with increased safety and stability.     At IE: Pt is a 63 y.o male who is 7 months s/p L BKA. He received his prosthetic 1 week ago. Pt's ambulation reflected circumduction of the L LE and hip hike, as well as reduced L stance time and R weight shift, is consistent with BKA presentation. 2MWT reflects CV deficits, and TUG reflects increased risk for falls. Pt's gait speed classifies him as a household ambulator at this time. Pt exhibits sufficient muscle strength to perform gait tasks, which will aid him in his recovery. Pt will benefit from skilled PT intervnetion focused on balance, gait,  and strengthening so that he can navigate his environment with increased safety and stability.   Impairments: abnormal coordination, abnormal gait, abnormal muscle firing, abnormal or restricted ROM, abnormal movement, activity intolerance, impaired balance, impaired physical strength, lacks appropriate home exercise program, safety issue, weight-bearing intolerance and poor posture     Symptom irritability: moderateUnderstanding of Dx/Px/POC: good   Prognosis: good    Goals  STG: to be achieved in 4 weeks  1. Pt will be able to ambulate 40ft independently with RW so that he can perform short walking tasks in his home. MET  2. Pt will be independent with HEP: MET  3. Pt will be able to stand with arms over head for 5 mins to complete overhead reaching tasks such as changing a light bulb.  4. ABC to be completed and subsequent goal to be set. Ongoing  5. Pt will tolerate 6MWT task MET    LTG: to be achieved in 8 weeks  1. Pt will improve TUG by 5s to reflect improved short distance mobility. MET   2. Pt will be able to ambulate for 10 minutes without requiring a rest so that he can assist his wife in performing grocery shopping tasks ONGOING  3. Pt will improve 2mwt by 112ft to reflect improvement in CV endurance and function. MET  4. New 4/8: pt will be able to navigate stairs without an AD ascending and descending  5. New 4/8: pt will be able to ambulate 100ft on uneven surface so that he can navigate [place] fishing [place].         Plan  Plan details: TE: for strengthening, stretching, and flexibility  TA: for functional participation  NR: for balance, coordination, reaction time  MT: for STM, IASTM, joint mobilizations  Gait Training: to improve gait mechanics.    Patient would benefit from: skilled physical therapy and orthotics  Planned modality interventions: cryotherapy, electrical stimulation/Russian stimulation and thermotherapy: hydrocollator packs  Planned therapy interventions: abdominal trunk  stabilization, activity modification, balance, balance/weight bearing training, body mechanics training, coordination, functional ROM exercises, gait training, graded activity, graded exercise, graded motor, home exercise program, joint mobilization, manual therapy, motor coordination training, neuromuscular re-education, patient education, prosthetic fitting/training, strengthening, stretching, therapeutic activities, therapeutic exercise, therapeutic training and transfer training  Frequency: 2x week  Duration in visits: 20  Duration in weeks: 10  Plan of Care beginning date: 3/4/2024  Plan of Care expiration date: 5/13/2024  Treatment plan discussed with: patient and family        Subjective Evaluation    History of Present Illness  Date of surgery: 8/22/2023  Mechanism of injury: surgery  Mechanism of injury: 4/8/2024: Pt was albe to go fishing this weekend with help from his son. He feels he is ready to egin training with his bane more frequently and had some challenges navigating the uneven surfaces with his walker. He did not like wearing his fishing boots. He drove himself here today and has driven three other times. He feels he is able to get out of his bar and walk in with his RW independently at this time. He has no problem going up the stairs with UE support, coming down is still a challenge. He hasn't tried it as much. He notes the railing will be on the left side descending and R side descending.     At IE: Pt sustained a left below knee amputation in August of 2023. Pt then fell a moth after surgery & reopened his incision site. He fell in the bathroom. He has been wheelchair bound ever since. He received home health until November and they worked on ambulation without a prosthetic. He is able to complete bathing tasks, dressing tasks, and car transfers independently without his residual limb. He just received his prosthetic last week. He only ambulate with it in the doctors office. He notes some  discomfort over his tibial crest when he wears the prosthetic, as well as transient redness. He does have pain when he takes it off. He also notes that he is experiencing some phantom limb pain described as a nerve pain in his foot. He is working with [employer] for his prosthetic. He completes daily skin checks. He would like to improve his walking to increase his independence.   Patient Goals  Patient goals for therapy: increased strength, independence with ADLs/IADLs, improved balance and decreased pain  Patient goal: improved walking  Pain  Current pain ratin  At best pain ratin  At worst pain ratin    Social Support  Steps to enter house: yes  5  Stairs in house: yes   8  Lives in: multiple-level home  Lives with: spouse    Employment status: not working  Hand dominance: right    Treatments  Previous treatment: physical therapy and home therapy  Current treatment: medication        Objective     Ambulation   Weight-Bearing Status   Assistive device used: wheeled walker  Neuro Exam:     Wheelchair mobility   Propelling: independent with prosthesis: yes    Functional outcomes   Gait speed: 0.26m/s  2 minute walk test: 148ft  TU.54 (seconds)    Amputee   Level of amputation: left   Phantom pain: phantom pain    Residual skin inspection   Skin integrity: normal  Edema: no  Surgical incision: intact, scarring around distal tibia  Residual limb shape: normal   Wounds: mild redness that resolved with taking the limb off.  Scarring: Along incision site  Palpation: no pain reported  Sensation to light touch: intact  Contralateral limb inspection   Sensation to light touch: intact             Precautions: DM, Gout, HTN, Visual Impairment L Amputee, Falls Risk  POC expires: 2024      IE 3/4 3/11 3/13 3/20 3/21 3/26 3/29 4/1 4/3 4/8   Gait Speed 0.26m/s RW + chair follow         SPC: 0.81m/s    RW: 0.90m/s   TUG 54.24m/s RW + chair follow         TUG with RW: 16.95s    With SPC: 12.06s   6MWT  "148ft w RW + chair follow         690ft with RW no chair follow    Redman Scale 2            Neuro Re-Ed             Weight shifting  In // AP with 1 UE support 30x In // AP with 1 UE support 30x    In // M/L 1 UE support 30x          Uneven surface navigation         In SOLO + RW over uneven surface mat 5 laps CS    Side stepping  In // 6x no UE support, 6x without UE support  In SOLO 6x20ft without UE support In SOLO 6x20ft without UE support In SOLO 6x20ft without UE support no AD In SOLO 6x20ft without UE support No AD      Unsupported stance  3x30\" cueing to pick head up and shift weight to left no UE support           Education  Donning/doffing socks, signs to look for in case prosthetic needs to be adjusted.         Progress made thus far   Backwards walking     8x with SPC in SOLO   8x no AD in SOLO   8x in // no AD in SOLO    Tests and measures          TUG, 6MWT, Gait speed Review of results and goals moving forward   Murray navigation     With SPC over murray low [id number] 5 hurdles With SPC over urdle low setting 8x 5 hurdles With SPC over low murray 8x 5 hurdles alternating LE leading No AD in // SOLO 5 hurdles 8x low setting alternating leading LE No AD in SOLO 5 hurdles 8x low setting alternating LE taps.     Ther Ex                                                                                                                     Ther Activity             Step ups    In SOLO with SPC 6 inch step occasional Griselda + VC for sequencing x30 In SOLO with SPC 6 inch step occasional Griselda + VC for sequencing x30 In SOLO with SPC 8 inch step occasional Griselda + VC for sequencing x30 In SOLO with SPC 8 inch step occasional Griselda + VC for sequencing x30 In SOLO with SPC, occasional use of no AD, 8 inch step x30 In SOLO with SPC, occasional use of no AD, 8 inch step x30    Stair navigation          2x11 steps ascending & descending. Ascending no Ad, just R HR, descending SPC on R UE + L HR CS   Gait Training         "     Ambulation with RW  [id number] with WC follow and RW [id number]  [id number] with RW [id number] with RW        SPC gait   In SOLO 6x20ft  In SOLO 8x20ft In SOLO 8x20ft In SOLO 8x20ft In SOLO [id number] In SOLO [id number]     Treadmill Support        5 min speed 1.8mph with UE support 5 min speed 1.5mph with UE support, pt wearing boots today so reduced speed due to reduced foot clearance    No UE support   In // 4x down and back SOLO   In // 4x down and back SOLO 6x in SOLO 6x in SOLO 6x in HEYDI    Modalities

## 2024-04-09 ENCOUNTER — TELEPHONE (OUTPATIENT)
Dept: DIABETES SERVICES | Facility: HOSPITAL | Age: 64
End: 2024-04-09

## 2024-04-10 ENCOUNTER — OFFICE VISIT (OUTPATIENT)
Facility: CLINIC | Age: 64
End: 2024-04-10
Payer: COMMERCIAL

## 2024-04-10 DIAGNOSIS — R26.89 OTHER ABNORMALITIES OF GAIT AND MOBILITY: ICD-10-CM

## 2024-04-10 DIAGNOSIS — G54.6 PHANTOM LIMB PAIN (HCC): ICD-10-CM

## 2024-04-10 DIAGNOSIS — R26.89 IMPAIRMENT OF BALANCE: ICD-10-CM

## 2024-04-10 DIAGNOSIS — S88.112D UNILATERAL COMPLETE BKA, LEFT, SUBSEQUENT ENCOUNTER (HCC): Primary | ICD-10-CM

## 2024-04-10 PROCEDURE — 97112 NEUROMUSCULAR REEDUCATION: CPT

## 2024-04-10 PROCEDURE — 97530 THERAPEUTIC ACTIVITIES: CPT

## 2024-04-10 NOTE — PROGRESS NOTES
PT Re-Evaluation     Today's date: 4/10/2024  Patient name: Greyson Lopez  : 1960  MRN: [id number]  Referring provider: Elian Mcadams MD  Dx:   No diagnosis found.                 Assessment  Assessment details: 2024: Pt is a 63 y.o. male who is 7 months s/p L BKA and has received 10 visits of skilled PT to address balance, gait, and strength deficits. Today's assessment reflects significant improvement in gait speed, CV endurance, and standing tolerance, as noted by progression in testing from 2MWT to 6MWT and significant improvement in gait speed. Pt's improvement in gait speed also progresses his classification from the level of a household ambulator to the level of a limited community ambulator. Furthermore, TUG was performed with RW & SPC, and pt was able to complete TUG faster with SPC than RW. Pt noted difficulty descending stairs, so stair navigation was performed. Educated pt on step-to pattern with use of SPC to aid in lowering down the stairs. Pt will therefore continue to benefit from skilled PT focused on balance, gait, and strengthening so that he can navigate his environment with increased safety and stability.     At IE: Pt is a 63 y.o male who is 7 months s/p L BKA. He received his prosthetic 1 week ago. Pt's ambulation reflected circumduction of the L LE and hip hike, as well as reduced L stance time and R weight shift, is consistent with BKA presentation. 2MWT reflects CV deficits, and TUG reflects increased risk for falls. Pt's gait speed classifies him as a household ambulator at this time. Pt exhibits sufficient muscle strength to perform gait tasks, which will aid him in his recovery. Pt will benefit from skilled PT intervnetion focused on balance, gait, and strengthening so that he can navigate his environment with increased safety and stability.   Impairments: abnormal coordination, abnormal gait, abnormal muscle firing, abnormal or restricted ROM, abnormal movement, activity  intolerance, impaired balance, impaired physical strength, lacks appropriate home exercise program, safety issue, weight-bearing intolerance and poor posture     Symptom irritability: moderateUnderstanding of Dx/Px/POC: good   Prognosis: good    Goals  STG: to be achieved in 4 weeks  1. Pt will be able to ambulate 40ft independently with RW so that he can perform short walking tasks in his home. MET  2. Pt will be independent with HEP: MET  3. Pt will be able to stand with arms over head for 5 mins to complete overhead reaching tasks such as changing a light bulb.  4. ABC to be completed and subsequent goal to be set. Ongoing  5. Pt will tolerate 6MWT task MET    LTG: to be achieved in 8 weeks  1. Pt will improve TUG by 5s to reflect improved short distance mobility. MET   2. Pt will be able to ambulate for 10 minutes without requiring a rest so that he can assist his wife in performing grocery shopping tasks ONGOING  3. Pt will improve 2mwt by 112ft to reflect improvement in CV endurance and function. MET  4. New 4/8: pt will be able to navigate stairs without an AD ascending and descending  5. New 4/8: pt will be able to ambulate 100ft on uneven surface so that he can navigate the gravel at the [facility] pond.         Plan  Plan details: TE: for strengthening, stretching, and flexibility  TA: for functional participation  NR: for balance, coordination, reaction time  MT: for STM, IASTM, joint mobilizations  Gait Training: to improve gait mechanics.    Patient would benefit from: skilled physical therapy and orthotics  Planned modality interventions: cryotherapy, electrical stimulation/Russian stimulation and thermotherapy: hydrocollator packs  Planned therapy interventions: abdominal trunk stabilization, activity modification, balance, balance/weight bearing training, body mechanics training, coordination, functional ROM exercises, gait training, graded activity, graded exercise, graded motor, home exercise program,  joint mobilization, manual therapy, motor coordination training, neuromuscular re-education, patient education, prosthetic fitting/training, strengthening, stretching, therapeutic activities, therapeutic exercise, therapeutic training and transfer training  Frequency: 2x week  Duration in visits: 20  Duration in weeks: 10  Plan of Care beginning date: 3/4/2024  Plan of Care expiration date: 5/13/2024  Treatment plan discussed with: patient and family        Subjective Evaluation    History of Present Illness  Date of surgery: 8/22/2023  Mechanism of injury: surgery  Mechanism of injury: 4/8/2024: Pt was albe to go fishing this weekend with help from his son. He feels he is ready to egin training with his bane more frequently and had some challenges navigating the uneven surfaces with his walker. He did not like wearing his fishing boots. He drove himself here today and has driven three other times. He feels he is able to get out of his bar and walk in with his RW independently at this time. He has no problem going up the stairs with UE support, coming down is still a challenge. He hasn't tried it as much. He notes the railing will be on the left side descending and R side descending.     At IE: Pt sustained a left below knee amputation in August of 2023. Pt then fell a moth after surgery & reopened his incision site. He fell in the bathroom. He has been wheelchair bound ever since. He received home health until November and they worked on ambulation without a prosthetic. He is able to complete bathing tasks, dressing tasks, and car transfers independently without his residual limb. He just received his prosthetic last week. He only ambulate with it in the doctors office. He notes some discomfort over his tibial crest when he wears the prosthetic, as well as transient redness. He does have pain when he takes it off. He also notes that he is experiencing some phantom limb pain described as a nerve pain in his foot. He  is working with [employer] for his prosthetic. He completes daily skin checks. He would like to improve his walking to increase his independence.   Patient Goals  Patient goals for therapy: increased strength, independence with ADLs/IADLs, improved balance and decreased pain  Patient goal: improved walking  Pain  Current pain ratin  At best pain ratin  At worst pain ratin    Social Support  Steps to enter house: yes  5  Stairs in house: yes   8  Lives in: multiple-level home  Lives with: spouse    Employment status: not working  Hand dominance: right    Treatments  Previous treatment: physical therapy and home therapy  Current treatment: medication        Objective     Ambulation   Weight-Bearing Status   Assistive device used: wheeled walker  Neuro Exam:     Wheelchair mobility   Propelling: independent with prosthesis: yes    Functional outcomes   Gait speed: 0.26m/s  2 minute walk test: 148ft  TU.54 (seconds)    Amputee   Level of amputation: left   Phantom pain: phantom pain    Residual skin inspection   Skin integrity: normal  Edema: no  Surgical incision: intact, scarring around distal tibia  Residual limb shape: normal   Wounds: mild redness that resolved with taking the limb off.  Scarring: Along incision site  Palpation: no pain reported  Sensation to light touch: intact  Contralateral limb inspection   Sensation to light touch: intact             Precautions: DM, Gout, HTN, Visual Impairment L Amputee, Falls Risk  POC expires: 2024      IE 3/4 3/11 3/13 3/20 3/21 3/26 3/29 4/1 4/3 4/8   Gait Speed 0.26m/s RW + chair follow         SPC: 0.81m/s    RW: 0.90m/s   TUG 54.24m/s RW + chair follow         TUG with RW: 16.95s    With SPC: 12.06s   6MWT 148ft w RW + chair follow         690ft with RW no chair follow    Redman Scale 2            Neuro Re-Ed             Weight shifting  In // AP with 1 UE support 30x In // AP with 1 UE support 30x    In // M/L 1 UE support 30x         "  Uneven surface navigation         In SOLO + RW over uneven surface mat 5 laps CS    Side stepping  In // 6x no UE support, 6x without UE support  In SOLO 6x20ft without UE support In SOLO 6x20ft without UE support In SOLO 6x20ft without UE support no AD In SOLO 6x20ft without UE support No AD      Unsupported stance  3x30\" cueing to pick head up and shift weight to left no UE support           Education  Donning/doffing socks, signs to look for in case prosthetic needs to be adjusted.         Progress made thus far   Backwards walking     8x with SPC in SOLO   8x no AD in SOLO   8x in // no AD in SOLO    Tests and measures          TUG, 6MWT, Gait speed Review of results and goals moving forward   Murray navigation     With SPC over murray low [id number] 5 hurdles With SPC over urdle low setting 8x 5 hurdles With SPC over low murray 8x 5 hurdles alternating LE leading No AD in // SOLO 5 hurdles 8x low setting alternating leading LE No AD in SOLO 5 hurdles 8x low setting alternating LE taps.     Ther Ex                                                                                                                     Ther Activity             Step ups    In SOLO with SPC 6 inch step occasional Griselda + VC for sequencing x30 In SOLO with SPC 6 inch step occasional Griselda + VC for sequencing x30 In SOLO with SPC 8 inch step occasional Griselda + VC for sequencing x30 In SOLO with SPC 8 inch step occasional Griselda + VC for sequencing x30 In SOLO with SPC, occasional use of no AD, 8 inch step x30 In SOLO with SPC, occasional use of no AD, 8 inch step x30    Stair navigation          2x11 steps ascending & descending. Ascending no Ad, just R HR, descending SPC on R UE + L HR CS   Gait Training             Ambulation with RW  [id number] with WC follow and RW [id number]  [id number] with RW [id number] with RW        SPC gait   In SOLO 6x20ft  In SOLO 8x20ft In SOLO 8x20ft In SOLO 8x20ft In SOLO [id number] In SOLO [id number]     Treadmill Support       "  5 min speed 1.8mph with UE support 5 min speed 1.5mph with UE support, pt wearing boots today so reduced speed due to reduced foot clearance    No UE support   In // 4x down and back SOLO   In // 4x down and back SOLO 6x in SOLO 6x in SOLO 6x in HEYDI    Modalities

## 2024-04-10 NOTE — PROGRESS NOTES
Daily Note     Today's date: 4/10/2024  Patient name: Greyson Lopez  : 1960  MRN: [id number]  Referring provider: Elian Mcadams MD  Dx:   Encounter Diagnosis     ICD-10-CM    1. Unilateral complete BKA, left, subsequent encounter ([facility])  S88.112D       2. Other abnormalities of gait and mobility  R26.89       3. Impairment of balance  R26.89       4. Phantom limb pain ([facility])  G54.6                      Subjective: Pt states that he is doing okay today. He walked in by himself today with his SPC.       Objective: See treatment diary below      Assessment: Pt participated in a skilled PT session focused on balance, gait, and strengthening. Stair navigation was performed with SPC today and pt exhibited improved control compared to last visit. Gait training was performed without a device and pt exhibited reduced stride length with posterior stepping, as well as reduced L Limb stance time. Pt continues to exhibit L circumduction, and lack of terminal R knee extension with gait training without AD, so plan to contact orthotist about prosthesis. Pt will continue to benefit from skilled PT so that he can navigate his environment with increased safety and stability.       Plan: Continue per plan of care. Reach out to orthotists about prosthesis     Precautions: DM, Gout, HTN, Visual Impairment L Amputee, Falls Risk  POC expires: 2024      IE 3/4 3/11 3/13 3/20 3/21 3/26 3/29 4/1 4/3 4/8 4/10   Gait Speed 0.26m/s RW + chair follow         SPC: 0.81m/s    RW: 0.90m/s    TUG 54.24m/s RW + chair follow         TUG with RW: 16.95s    With SPC: 12.06s    6MWT 148ft w RW + chair follow         690ft with RW no chair follow     Redman Scale 2             Neuro Re-Ed              Weight shifting  In // AP with 1 UE support 30x In // AP with 1 UE support 30x    In // M/L 1 UE support 30x           Uneven surface navigation         In SOLO + RW over uneven surface mat 5 laps CS  In SOLO + RW over uneven surface mat laps 5  "CS   Side stepping  In // 6x no UE support, 6x without UE support  In SOLO 6x20ft without UE support In SOLO 6x20ft without UE support In SOLO 6x20ft without UE support no AD In SOLO 6x20ft without UE support No AD    In SOLO 8x 20ft no UE Support   Unsupported stance  3x30\" cueing to pick head up and shift weight to left no UE support            Education  Donning/doffing socks, signs to look for in case prosthetic needs to be adjusted.         Progress made thus far    Backwards walking     8x with SPC in SOLO   8x no AD in SOLO   8x in // no AD in SOLO  8x in SOLO no AD   Tests and measures          TUG, 6MWT, Gait speed Review of results and goals moving forward    Murray navigation     With SPC over murray low [id number] 5 hurdles With SPC over urdle low setting 8x 5 hurdles With SPC over low murray 8x 5 hurdles alternating LE leading No AD in // SOLO 5 hurdles 8x low setting alternating leading LE No AD in SOLO 5 hurdles 8x low setting alternating LE taps.      Ther Ex                                                                                                                              Ther Activity              Step ups    In SOLO with SPC 6 inch step occasional Griselda + VC for sequencing x30 In SOLO with SPC 6 inch step occasional Griselda + VC for sequencing x30 In SOLO with SPC 8 inch step occasional Griselda + VC for sequencing x30 In SOLO with SPC 8 inch step occasional Griselda + VC for sequencing x30 In SOLO with SPC, occasional use of no AD, 8 inch step x30 In SOLO with SPC, occasional use of no AD, 8 inch step x30     Stair navigation          2x11 steps ascending & descending. Ascending no Ad, just R HR, descending SPC on R UE + L HR CS 2x11 steps ascending & descending Ascending no AD, just R HR, descending, HR + SPC   Gait Training              Ambulation with RW  [id number] with WC follow and RW [id number]  [id number] with RW [id number] with RW         SPC gait   In SOLO 6x20ft  In SOLO 8x20ft In SOLO 8x20ft In SOLO " 8x20ft In SOLO [id number] In SOLO [id number]      Treadmill Support        5 min speed 1.8mph with UE support 5 min speed 1.5mph with UE support, pt wearing boots today so reduced speed due to reduced foot clearance     No UE support   In // 4x down and back SOLO   In // 4x down and back SOLO 6x in SOLO 6x in SOLO 6x in HEYDI  8x in SOLO    Modalities

## 2024-04-15 ENCOUNTER — APPOINTMENT (OUTPATIENT)
Facility: CLINIC | Age: 64
End: 2024-04-15
Payer: COMMERCIAL

## 2024-04-17 ENCOUNTER — OFFICE VISIT (OUTPATIENT)
Facility: CLINIC | Age: 64
End: 2024-04-17
Payer: COMMERCIAL

## 2024-04-17 DIAGNOSIS — G54.6 PHANTOM LIMB PAIN (HCC): ICD-10-CM

## 2024-04-17 DIAGNOSIS — R26.89 OTHER ABNORMALITIES OF GAIT AND MOBILITY: ICD-10-CM

## 2024-04-17 DIAGNOSIS — R26.89 IMPAIRMENT OF BALANCE: ICD-10-CM

## 2024-04-17 DIAGNOSIS — S88.112D UNILATERAL COMPLETE BKA, LEFT, SUBSEQUENT ENCOUNTER (HCC): Primary | ICD-10-CM

## 2024-04-17 PROCEDURE — 97530 THERAPEUTIC ACTIVITIES: CPT

## 2024-04-17 NOTE — PROGRESS NOTES
Daily Note     Today's date: 2024  Patient name: Greyson Lopez  : 1960  MRN: [id number]  Referring provider: Elian Mcadams MD  Dx:   Encounter Diagnosis     ICD-10-CM    1. Unilateral complete BKA, left, subsequent encounter ([facility])  S88.112D       2. Other abnormalities of gait and mobility  R26.89       3. Impairment of balance  R26.89       4. Phantom limb pain ([facility])  G54.6                        Subjective: Pt states that he is not having much pain today.        Objective: See treatment diary below      Assessment: Pt participated in a skilled PT session focused on balance, gait, and strengthening. Stair navigation was performed with SPC today and pt exhibited improved control compared to last visit. STS form low chair was practiced to prepare pt for fishing in his beach chair as pt noted he has a hard time rising from the beach chair. Pt was able to perform with occasional Griselda for balance. Farmer's carries were implemented to challenge pt's to ambulate while carrying fishing gear, and the weight was also held like a pan to mimic cooking activities. Pt will continue to benefit from skilled PT so that he can navigate his environment with increased safety and stability.       Plan: Continue per plan of care. Reach out to orthotists about prosthesis     Precautions: DM, Gout, HTN, Visual Impairment L Amputee, Falls Risk  POC expires: 2024      IE 3/4 3/11 3/13 3/20 3/21 3/26 3/29 4/1 4/3 4/8 4/10 4/17   Gait Speed 0.26m/s RW + chair follow         SPC: 0.81m/s    RW: 0.90m/s     TUG 54.24m/s RW + chair follow         TUG with RW: 16.95s    With SPC: 12.06s     6MWT 148ft w RW + chair follow         690ft with RW no chair follow      Redman Scale 2              Neuro Re-Ed               Weight shifting  In // AP with 1 UE support 30x In // AP with 1 UE support 30x    In // M/L 1 UE support 30x            Uneven surface navigation         In SOLO + RW over uneven surface mat 5 laps CS  In SOLO +  "RW over uneven surface mat laps 5 CS    Side stepping  In // 6x no UE support, 6x without UE support  In SOLO 6x20ft without UE support In SOLO 6x20ft without UE support In SOLO 6x20ft without UE support no AD In SOLO 6x20ft without UE support No AD    In SOLO 8x 20ft no UE Support    Unsupported stance  3x30\" cueing to pick head up and shift weight to left no UE support             Education  Donning/doffing socks, signs to look for in case prosthetic needs to be adjusted.         Progress made thus far     Backwards walking     8x with SPC in SOLO   8x no AD in SOLO   8x in // no AD in SOLO  8x in SOLO no AD 8x in SOLO no AD   Tests and measures          TUG, 6MWT, Gait speed Review of results and goals moving forward     Murray navigation     With SPC over murray low [id number] 5 hurdles With SPC over urdle low setting 8x 5 hurdles With SPC over low murray 8x 5 hurdles alternating LE leading No AD in // SOLO 5 hurdles 8x low setting alternating leading LE No AD in SOLO 5 hurdles 8x low setting alternating LE taps.       Ther Ex                                                                                                                                       Ther Activity               Step ups    In SOLO with SPC 6 inch step occasional Griselda + VC for sequencing x30 In SOLO with SPC 6 inch step occasional Griselda + VC for sequencing x30 In SOLO with SPC 8 inch step occasional Griselda + VC for sequencing x30 In SOLO with SPC 8 inch step occasional Griselda + VC for sequencing x30 In SOLO with SPC, occasional use of no AD, 8 inch step x30 In SOLO with SPC, occasional use of no AD, 8 inch step x30      STS            From low chair without arm rests without UE support 1x5 intermittent Griselda to steady balance   Farmer's Carry            7.5lb KB holding on 1 side and then holding on the other 30 laps down and back    Carrying like a pan 10x L and R UE  7.5 lb KB   Stair navigation          2x11 steps ascending & descending. " Ascending no Ad, just R HR, descending SPC on R UE + L HR CS 2x11 steps ascending & descending Ascending no AD, just R HR, descending, HR + SPC 2x11 step ascending and descending no AD just R HR descending HR + SPC   Gait Training               Ambulation with RW  [id number] with WC follow and RW [id number]  [id number] with RW [id number] with RW          SPC gait   In SOLO 6x20ft  In SOLO 8x20ft In SOLO 8x20ft In SOLO 8x20ft In SOLO [id number] In SOLO [id number]       Treadmill Support        5 min speed 1.8mph with UE support 5 min speed 1.5mph with UE support, pt wearing boots today so reduced speed due to reduced foot clearance      No UE support   In // 4x down and back SOLO   In // 4x down and back SOLO 6x in SOLO 6x in SOLO 6x in HEYDI  8x in SOLO     Modalities

## 2024-04-22 ENCOUNTER — APPOINTMENT (OUTPATIENT)
Facility: CLINIC | Age: 64
End: 2024-04-22
Payer: COMMERCIAL

## 2024-04-23 ENCOUNTER — DOCUMENTATION (OUTPATIENT)
Dept: ENDOCRINOLOGY | Facility: HOSPITAL | Age: 64
End: 2024-04-23

## 2024-04-23 NOTE — PROGRESS NOTES
[employer] order for pump and CGM supplies completed and faxed with the last chart note to [phone].

## 2024-04-24 ENCOUNTER — OFFICE VISIT (OUTPATIENT)
Facility: CLINIC | Age: 64
End: 2024-04-24
Payer: COMMERCIAL

## 2024-04-24 DIAGNOSIS — S88.112D UNILATERAL COMPLETE BKA, LEFT, SUBSEQUENT ENCOUNTER (HCC): Primary | ICD-10-CM

## 2024-04-24 DIAGNOSIS — G54.6 PHANTOM LIMB PAIN (HCC): ICD-10-CM

## 2024-04-24 DIAGNOSIS — R26.89 IMPAIRMENT OF BALANCE: ICD-10-CM

## 2024-04-24 DIAGNOSIS — R26.89 OTHER ABNORMALITIES OF GAIT AND MOBILITY: ICD-10-CM

## 2024-04-24 PROCEDURE — 97530 THERAPEUTIC ACTIVITIES: CPT

## 2024-04-24 NOTE — PROGRESS NOTES
Daily Note     Today's date: 2024  Patient name: Greyson Lopez  : 1960  MRN: [id number]  Referring provider: Elian Mcadams MD  Dx:   Encounter Diagnosis     ICD-10-CM    1. Unilateral complete BKA, left, subsequent encounter ([facility])  S88.112D       2. Other abnormalities of gait and mobility  R26.89       3. Impairment of balance  R26.89       4. Phantom limb pain ([facility])  G54.6                          Subjective: Pt was able to make his lunch today standing up. He also walked around [employer] without his cane and just his cart. He walked the whole time. He is now standing to brush his teeth an d does not have to do it in his wheelchair. He states it was mildly challenging. He would like to work on standing and picking things up from the ground.       Objective: See treatment diary below      Assessment: Pt participated in a skilled PT session focused on balance, gait, and strengthening. Due to difficulty picking objects up off the floor trialed picking up polyspots to address pt's balance. Pt able to complete largely close supervision with 1 instance of Griselda. Progressed STS to rising from the low, compliant chair in the lobby. Pt exhibited increased difficulty garnering momentum and exhibited intermittent use of the R LE against the chair to stabilize in stance. No difficulty noted with KB passes today. All gait activities in session performed without SPC. Stair navigation was performed and pt was able to trial step through gait pattern, however did require CGA and expressed discomfort with task. Pt will continue to benefit from skilled PT so that he can navigate his environment with increased safety and stability.       Plan: Continue per plan of care. Reach out to orthotists about prosthesis     Precautions: DM, Gout, HTN, Visual Impairment L Amputee, Falls Risk  POC expires: 2024      IE 3/4 3/11 3/13 3/20 3/21 3/26 3/29 4/1 4/3 4/8 4/10 4/17 4/24   Gait Speed 0.26m/s RW + chair follow         SPC:  "0.81m/s    RW: 0.90m/s      TUG 54.24m/s RW + chair follow         TUG with RW: 16.95s    With SPC: 12.06s      6MWT 148ft w RW + chair follow         690ft with RW no chair follow       Redman Scale 2               Neuro Re-Ed                Weight shifting  In // AP with 1 UE support 30x In // AP with 1 UE support 30x    In // M/L 1 UE support 30x             Uneven surface navigation         In SOLO + RW over uneven surface mat 5 laps CS  In SOLO + RW over uneven surface mat laps 5 CS     Side stepping  In // 6x no UE support, 6x without UE support  In SOLO 6x20ft without UE support In SOLO 6x20ft without UE support In SOLO 6x20ft without UE support no AD In SOLO 6x20ft without UE support No AD    In SOLO 8x 20ft no UE Support     Unsupported stance  3x30\" cueing to pick head up and shift weight to left no UE support              Education  Donning/doffing socks, signs to look for in case prosthetic needs to be adjusted.         Progress made thus far      Backwards walking     8x with SPC in SOLO   8x no AD in SOLO   8x in // no AD in SOLO  8x in SOLO no AD 8x in SOLO no AD    Tests and measures          TUG, 6MWT, Gait speed Review of results and goals moving forward      Murray navigation     With SPC over murray low [id number] 5 hurdles With SPC over urdle low setting 8x 5 hurdles With SPC over low murray 8x 5 hurdles alternating LE leading No AD in // SOLO 5 hurdles 8x low setting alternating leading LE No AD in SOLO 5 hurdles 8x low setting alternating LE taps.        Ther Ex                                                                                                                                                Ther Activity                Step ups    In SOLO with SPC 6 inch step occasional Griselda + VC for sequencing x30 In SOLO with SPC 6 inch step occasional Griselda + VC for sequencing x30 In SOLO with SPC 8 inch step occasional Griselda + VC for sequencing x30 In SOLO with SPC 8 inch step occasional Griselda + " VC for sequencing x30 In SOLO with SPC, occasional use of no AD, 8 inch step x30 In SOLO with SPC, occasional use of no AD, 8 inch step x30       Picking up items from the floor             Polyspot  12 spots intermittent use of SPC, 1 instance of Griselda, otherwise CGA   STS            From low chair without arm rests without UE support 1x5 intermittent Griselda to steady balance From squishy chair in [facility] 1x10 no UE support   Farmer's Carry            7.5lb KB holding on 1 side and then holding on the other 30 laps down and back    Carrying like a pan 10x L and R UE  7.5 lb KB 10lb KB holding on 1 side then passing it hand to hand 8 laps down and back     Carrying like a pan 10x L & R 10lb kB   Stair navigation          2x11 steps ascending & descending. Ascending no Ad, just R HR, descending SPC on R UE + L HR CS 2x11 steps ascending & descending Ascending no AD, just R HR, descending, HR + SPC 2x11 step ascending and descending no AD just R HR descending HR + SPC 1x11 step ascending & descending step through 1 HR + SPC   Gait Training                Ambulation with RW  [id number] with WC follow and RW [id number]  [id number] with RW [id number] with RW           SPC gait   In SOLO 6x20ft  In SOLO 8x20ft In SOLO 8x20ft In SOLO 8x20ft In SOLO [id number] In SOLO [id number]        Treadmill Support        5 min speed 1.8mph with UE support 5 min speed 1.5mph with UE support, pt wearing boots today so reduced speed due to reduced foot clearance       No UE support   In // 4x down and back SOLO   In // 4x down and back SOLO 6x in SOLO 6x in SOLO 6x in HEYDI  8x in SOLO      Modalities

## 2024-04-29 ENCOUNTER — APPOINTMENT (OUTPATIENT)
Facility: CLINIC | Age: 64
End: 2024-04-29
Payer: COMMERCIAL

## 2024-05-01 ENCOUNTER — OFFICE VISIT (OUTPATIENT)
Facility: CLINIC | Age: 64
End: 2024-05-01
Payer: COMMERCIAL

## 2024-05-01 DIAGNOSIS — S88.112D UNILATERAL COMPLETE BKA, LEFT, SUBSEQUENT ENCOUNTER (HCC): Primary | ICD-10-CM

## 2024-05-01 DIAGNOSIS — R26.89 OTHER ABNORMALITIES OF GAIT AND MOBILITY: ICD-10-CM

## 2024-05-01 DIAGNOSIS — G54.6 PHANTOM LIMB PAIN (HCC): ICD-10-CM

## 2024-05-01 DIAGNOSIS — R26.89 IMPAIRMENT OF BALANCE: ICD-10-CM

## 2024-05-01 PROCEDURE — 97112 NEUROMUSCULAR REEDUCATION: CPT

## 2024-05-01 NOTE — PROGRESS NOTES
PT Discharge    Today's date: 2024  Patient name: Greyson Lopez  : 1960  MRN: [id number]  Referring provider: Elian Mcadams MD  Dx:   Encounter Diagnosis     ICD-10-CM    1. Unilateral complete BKA, left, subsequent encounter ([facility])  S88.112D       2. Other abnormalities of gait and mobility  R26.89       3. Impairment of balance  R26.89       4. Phantom limb pain ([facility])  G54.6                        Assessment  Assessment details: 2024: Pt is a 63 y.o. who received 14 visits of skilled pt to address LE deficits s/p L BKA. Today's assessment was completed with LRAD, and no AD at times, reflecting progression from previous visits. Pt's gait speed indicated that he is an unlimited community ambulator with and without his AD today. Pt exhibited statistically significant improvement in 6MWT as well. Due to insurance visit limitations, pt is taking a break from PT at this time to focus on HEP with plans to return in the fall. Pt was given updated HEP to focus on during his hiatus. Pt is therefor discharged from PT at this time.    2024: Pt is a 63 y.o. male who is 7 months s/p L BKA and has received 10 visits of skilled PT to address balance, gait, and strength deficits. Today's assessment reflects significant improvement in gait speed, CV endurance, and standing tolerance, as noted by progression in testing from 2MWT to 6MWT and significant improvement in gait speed. Pt's improvement in gait speed also progresses his classification from the level of a household ambulator to the level of a limited community ambulator. Furthermore, TUG was performed with RW & SPC, and pt was able to complete TUG faster with SPC than RW. Pt noted difficulty descending stairs, so stair navigation was performed. Educated pt on step-to pattern with use of SPC to aid in lowering down the stairs. Pt will therefore continue to benefit from skilled PT focused on balance, gait, and strengthening so that he can navigate his  environment with increased safety and stability.     At IE: Pt is a 63 y.o male who is 7 months s/p L BKA. He received his prosthetic 1 week ago. Pt's ambulation reflected circumduction of the L LE and hip hike, as well as reduced L stance time and R weight shift, is consistent with BKA presentation. 2MWT reflects CV deficits, and TUG reflects increased risk for falls. Pt's gait speed classifies him as a household ambulator at this time. Pt exhibits sufficient muscle strength to perform gait tasks, which will aid him in his recovery. Pt will benefit from skilled PT intervnetion focused on balance, gait, and strengthening so that he can navigate his environment with increased safety and stability.   Impairments: abnormal coordination, abnormal gait, abnormal muscle firing, abnormal or restricted ROM, abnormal movement, activity intolerance, impaired balance, impaired physical strength, lacks appropriate home exercise program, safety issue, weight-bearing intolerance and poor posture     Symptom irritability: moderateUnderstanding of Dx/Px/POC: good   Prognosis: good    Goals  STG: to be achieved in 4 weeks  1. Pt will be able to ambulate 40ft independently with RW so that he can perform short walking tasks in his home. MET  2. Pt will be independent with HEP: MET  3. Pt will be able to stand with arms over head for 5 mins to complete overhead reaching tasks such as changing a light bulb. MET  4. ABC to be completed and subsequent goal to be set. MET  5. Pt will tolerate 6MWT task MET    LTG: to be achieved in 8 weeks  1. Pt will improve TUG by 5s to reflect improved short distance mobility. MET   2. Pt will be able to ambulate for 10 minutes without requiring a rest so that he can assist his wife in performing grocery shopping tasks ONGOING  3. Pt will improve 2mwt by 112ft to reflect improvement in CV endurance and function. MET  4. New 4/8: pt will be able to navigate stairs without an AD ascending and descending    5. New 4/8: pt will be able to ambulate 100ft on uneven surface so that he can navigate [place].         Plan  Plan details: TE: for strengthening, stretching, and flexibility  TA: for functional participation  NR: for balance, coordination, reaction time  MT: for STM, IASTM, joint mobilizations  Gait Training: to improve gait mechanics.    Patient would benefit from: skilled physical therapy and orthotics  Planned modality interventions: cryotherapy, electrical stimulation/Russian stimulation and thermotherapy: hydrocollator packs  Planned therapy interventions: abdominal trunk stabilization, activity modification, balance, balance/weight bearing training, body mechanics training, coordination, functional ROM exercises, gait training, graded activity, graded exercise, graded motor, home exercise program, joint mobilization, manual therapy, motor coordination training, neuromuscular re-education, patient education, prosthetic fitting/training, strengthening, stretching, therapeutic activities, therapeutic exercise, therapeutic training and transfer training  Frequency: 2x week  Duration in visits: 20  Duration in weeks: 10  Plan of Care beginning date: 3/4/2024  Plan of Care expiration date: 5/13/2024  Treatment plan discussed with: patient and family        Subjective Evaluation    History of Present Illness  Date of surgery: 8/22/2023  Mechanism of injury: surgery  Mechanism of injury: 5/1/2024: Pt states that he has been counting his steps for about a week now. Sunday was 4000. He puts it on as soon as he gets up. He signed up for a health couch opportunity that incentives him for activity     4/8/2024: Pt was albe to go fishing this weekend with help from his son. He feels he is ready to egin training with his bane more frequently and had some challenges navigating the uneven surfaces with his walker. He did not like wearing his fishing boots. He drove himself here today and has driven  three other times. He feels he is able to get out of his bar and walk in with his RW independently at this time. He has no problem going up the stairs with UE support, coming down is still a challenge. He hasn't tried it as much. He notes the railing will be on the left side descending and R side descending.     At IE: Pt sustained a left below knee amputation in 2023. Pt then fell a moth after surgery & reopened his incision site. He fell in the bathroom. He has been wheelchair bound ever since. He received home health until November and they worked on ambulation without a prosthetic. He is able to complete bathing tasks, dressing tasks, and car transfers independently without his residual limb. He just received his prosthetic last week. He only ambulate with it in the doctors office. He notes some discomfort over his tibial crest when he wears the prosthetic, as well as transient redness. He does have pain when he takes it off. He also notes that he is experiencing some phantom limb pain described as a nerve pain in his foot. He is working with [employer] for his prosthetic. He completes daily skin checks. He would like to improve his walking to increase his independence.   Patient Goals  Patient goals for therapy: increased strength, independence with ADLs/IADLs, improved balance and decreased pain  Patient goal: improved walking  Pain  Current pain ratin  At best pain ratin  At worst pain ratin    Social Support  Steps to enter house: yes  5  Stairs in house: yes   8  Lives in: multiple-level home  Lives with: spouse    Employment status: not working  Hand dominance: right    Treatments  Previous treatment: physical therapy and home therapy  Current treatment: medication      Objective     Ambulation   Weight-Bearing Status   Assistive device used: wheeled walker  Neuro Exam:     Wheelchair mobility   Propelling: independent with prosthesis: yes    Functional outcomes   Gait speed:  "0.26m/s  2 minute walk test: 148ft  TU.54 (seconds)    Amputee   Level of amputation: left   Phantom pain: phantom pain    Residual skin inspection   Skin integrity: normal  Edema: no  Surgical incision: intact, scarring around distal tibia  Residual limb shape: normal   Wounds: mild redness that resolved with taking the limb off.  Scarring: Along incision site  Palpation: no pain reported  Sensation to light touch: intact  Contralateral limb inspection   Sensation to light touch: intact             Precautions: DM, Gout, HTN, Visual Impairment L Amputee, Falls Risk  POC expires: 2024  [employer] Access Code: [email or web address]       IE 3/4 3/11 3/13 3/20 3/21 3/26 3/29 4/1 4/3 4/8 4/10 4/17 4/24 5/1   Gait Speed 0.26m/s RW + chair follow         SPC: 0.81m/s    RW: 0.90m/s    No AD SS: 1.06m/s    No AD Fast Speed: 1.41m/s    TUG 54.24m/s RW + chair follow         TUG with RW: 16.95s    With SPC: 12.06s    With SPC: 8.14s    Without SPC: 8.25s   6MWT 148ft w RW + chair follow         690ft with RW no chair follow     1701ft with intermittent SPC use   Redman Scale 2             10   Neuro Re-Ed                 Weight shifting  In // AP with 1 UE support 30x In // AP with 1 UE support 30x    In // M/L 1 UE support 30x              Uneven surface navigation         In SOLO + RW over uneven surface mat 5 laps CS  In SOLO + RW over uneven surface mat laps 5 CS      Side stepping  In // 6x no UE support, 6x without UE support  In SOLO 6x20ft without UE support In SOLO 6x20ft without UE support In SOLO 6x20ft without UE support no AD In SOLO 6x20ft without UE support No AD    In SOLO 8x 20ft no UE Support      Unsupported stance  3x30\" cueing to pick head up and shift weight to left no UE support               Education  Donning/doffing socks, signs to look for in case prosthetic needs to be adjusted.         Progress made thus far       Backwards walking     8x with SPC in SOLO   8x no AD in SOLO   8x in // no AD in " SOLO  8x in SOLO no AD 8x in SOLO no AD     Tests and measures          TUG, 6MWT, Gait speed Review of results and goals moving forward    TUG, 6MWT, gait speed, review of results and updated HEP.    Murray navigation     With SPC over murray low [id number] 5 hurdles With SPC over urdle low setting 8x 5 hurdles With SPC over low murray 8x 5 hurdles alternating LE leading No AD in // SOLO 5 hurdles 8x low setting alternating leading LE No AD in SOLO 5 hurdles 8x low setting alternating LE taps.         Ther Ex                                                                                                                                                         Ther Activity                 Step ups    In SOLO with SPC 6 inch step occasional Griselda + VC for sequencing x30 In SOLO with SPC 6 inch step occasional Griselda + VC for sequencing x30 In SOLO with SPC 8 inch step occasional Griselda + VC for sequencing x30 In SOLO with SPC 8 inch step occasional Griselda + VC for sequencing x30 In SOLO with SPC, occasional use of no AD, 8 inch step x30 In SOLO with SPC, occasional use of no AD, 8 inch step x30        Picking up items from the floor             Polyspot  12 spots intermittent use of SPC, 1 instance of Griselda, otherwise CGA    STS            From low chair without arm rests without UE support 1x5 intermittent Griselda to steady balance From squishy chair in [facility] 1x10 no UE support    Farmer's Carry            7.5lb KB holding on 1 side and then holding on the other 30 laps down and back    Carrying like a pan 10x L and R UE  7.5 lb KB 10lb KB holding on 1 side then passing it hand to hand 8 laps down and back     Carrying like a pan 10x L & R 10lb kB    Stair navigation          2x11 steps ascending & descending. Ascending no Ad, just R HR, descending SPC on R UE + L HR CS 2x11 steps ascending & descending Ascending no AD, just R HR, descending, HR + SPC 2x11 step ascending and descending no AD just R HR descending HR + SPC  1x11 step ascending & descending step through 1 HR + SPC    Gait Training                 Ambulation with RW  [id number] with WC follow and RW [id number]  [id number] with RW [id number] with RW            SPC gait   In SOLO 6x20ft  In SOLO 8x20ft In SOLO 8x20ft In SOLO 8x20ft In SOLO [id number] In SOLO [id number]         Treadmill Support        5 min speed 1.8mph with UE support 5 min speed 1.5mph with UE support, pt wearing boots today so reduced speed due to reduced foot clearance        No UE support   In // 4x down and back SOLO   In // 4x down and back SOLO 6x in SOLO 6x in SOLO 6x in HEYDI  8x in SOLO       Modalities

## 2024-05-09 NOTE — TELEPHONE ENCOUNTER
Received word from [employer], Feroz is not interested in the free upgrade to 780G and declines the offer at this time. He knows he has until the end of April to change his mind for free.   
Yes

## 2024-05-10 LAB
LEFT EYE DIABETIC RETINOPATHY: POSITIVE
RIGHT EYE DIABETIC RETINOPATHY: POSITIVE
SEVERITY (EYE EXAM): NORMAL

## 2024-05-25 ENCOUNTER — VBI (OUTPATIENT)
Dept: ADMINISTRATIVE | Facility: OTHER | Age: 64
End: 2024-05-25

## 2024-07-10 ENCOUNTER — OFFICE VISIT (OUTPATIENT)
Dept: ENDOCRINOLOGY | Facility: HOSPITAL | Age: 64
End: 2024-07-10
Payer: COMMERCIAL

## 2024-07-10 VITALS
WEIGHT: 157 LBS | SYSTOLIC BLOOD PRESSURE: 120 MMHG | BODY MASS INDEX: 21.26 KG/M2 | DIASTOLIC BLOOD PRESSURE: 80 MMHG | HEIGHT: 72 IN | HEART RATE: 103 BPM

## 2024-07-10 DIAGNOSIS — E78.2 MIXED HYPERLIPIDEMIA: ICD-10-CM

## 2024-07-10 DIAGNOSIS — R80.9 MICROALBUMINURIA DUE TO TYPE 1 DIABETES MELLITUS  (HCC): ICD-10-CM

## 2024-07-10 DIAGNOSIS — E10.29 MICROALBUMINURIA DUE TO TYPE 1 DIABETES MELLITUS  (HCC): ICD-10-CM

## 2024-07-10 DIAGNOSIS — I10 BENIGN ESSENTIAL HYPERTENSION: ICD-10-CM

## 2024-07-10 DIAGNOSIS — Z96.41 INSULIN PUMP IN PLACE: ICD-10-CM

## 2024-07-10 DIAGNOSIS — E10.65 TYPE 1 DIABETES MELLITUS WITH HYPERGLYCEMIA (HCC): Primary | ICD-10-CM

## 2024-07-10 DIAGNOSIS — E10.42 DIABETIC POLYNEUROPATHY ASSOCIATED WITH TYPE 1 DIABETES MELLITUS (HCC): ICD-10-CM

## 2024-07-10 DIAGNOSIS — E10.649 HYPOGLYCEMIA UNAWARENESS ASSOCIATED WITH TYPE 1 DIABETES MELLITUS (HCC): ICD-10-CM

## 2024-07-10 PROCEDURE — 99214 OFFICE O/P EST MOD 30 MIN: CPT | Performed by: INTERNAL MEDICINE

## 2024-07-10 PROCEDURE — 95251 CONT GLUC MNTR ANALYSIS I&R: CPT | Performed by: INTERNAL MEDICINE

## 2024-07-10 RX ORDER — INSULIN LISPRO 100 [IU]/ML
INJECTION, SOLUTION INTRAVENOUS; SUBCUTANEOUS
Qty: 30 ML | Refills: 5 | Status: SHIPPED | OUTPATIENT
Start: 2024-07-10

## 2024-07-10 NOTE — PATIENT INSTRUCTIONS
Hgba1c is 9.0%. this may be falsely high from higher sugars several months ago.     Most recent pump download shows hgba1c would be around 7.    Let's give it time.     No change for now.     Follow up in 3-4 months with blood work.

## 2024-07-10 NOTE — PROGRESS NOTES
7/10/2024    Assessment & Plan      Diagnoses and all orders for this visit:    Type 1 diabetes mellitus with hyperglycemia (HCC)  -     Comprehensive metabolic panel; Future  -     CBC and differential; Future  -     Hemoglobin A1C; Future  -     TSH, 3rd generation; Future  -     Lipid Panel with Direct LDL reflex; Future  -     Fructosamine; Future  -     insulin lispro (HumALOG/ADMELOG) 100 units/mL injection; Use up to 80 units daily via insulin pump    Diabetic polyneuropathy associated with type 1 diabetes mellitus (HCC)  -     Comprehensive metabolic panel; Future  -     CBC and differential; Future  -     Hemoglobin A1C; Future  -     TSH, 3rd generation; Future  -     Lipid Panel with Direct LDL reflex; Future  -     Fructosamine; Future    Microalbuminuria due to type 1 diabetes mellitus  (HCC)  -     Comprehensive metabolic panel; Future  -     CBC and differential; Future  -     Hemoglobin A1C; Future  -     TSH, 3rd generation; Future  -     Lipid Panel with Direct LDL reflex; Future  -     Fructosamine; Future    Hypoglycemia unawareness associated with type 1 diabetes mellitus (HCC)  -     Comprehensive metabolic panel; Future  -     CBC and differential; Future  -     Hemoglobin A1C; Future  -     TSH, 3rd generation; Future  -     Lipid Panel with Direct LDL reflex; Future  -     Fructosamine; Future    Benign essential hypertension  -     Comprehensive metabolic panel; Future  -     CBC and differential; Future  -     Hemoglobin A1C; Future  -     TSH, 3rd generation; Future  -     Lipid Panel with Direct LDL reflex; Future  -     Fructosamine; Future    Insulin pump in place  -     Comprehensive metabolic panel; Future  -     CBC and differential; Future  -     Hemoglobin A1C; Future  -     TSH, 3rd generation; Future  -     Lipid Panel with Direct LDL reflex; Future  -     Fructosamine; Future    Mixed hyperlipidemia  -     Comprehensive metabolic panel; Future  -     CBC and differential;  Future  -     Hemoglobin A1C; Future  -     TSH, 3rd generation; Future  -     Lipid Panel with Direct LDL reflex; Future  -     Fructosamine; Future          Assessment & Plan  1. Type 1 diabetes.  The patient's most recent hemoglobin A1c level is 9 percent, which does not correspond to his insulin pump download, suggesting a false elevation due to elevated blood sugar levels 3 months ago when he was not in auto mode with his insulin pump. The current plan is to maintain the current pump rates and allow more time to assess his hemoglobin A1c levels. He will persist with his Medtronic 770G insulin pump with sensor. The possibility of upgrading to the Medtronic 780 pump and sensor will be discussed once the pump is out of warranty.    2. Diabetic neuropathy.  The patient's neuropathic symptoms remain unchanged, and his phantom pain symptoms in his left leg have shown improvement. A diabetic foot exam on the right foot was performed today.  He has been decreasing his gabapentin dosage as able.    3. Microalbuminuria.  The patient's last urine microalbumin to creatinine ratio has improved. The current lisinopril 10 mg daily regimen will be continued.    4. Hypoglycemic unawareness.  The patient will persist with the Medtronic sensor and pump.    5. Hypertension.  The patient's blood pressure is normotensive in the office today. The current regimen of lisinopril and metoprolol will be continued.    6. Hyperlipidemia.  The patient's daily intake of atorvastatin 40 mg is inconsistent. It is advised that he maintains the daily intake of atorvastatin. A lipid profile will be repeated during the next visit.    Follow-up  The patient is scheduled for a follow-up visit in 3 to 4 months, with pre-visit hemoglobin A1c, CMP, CBC, fructosamine, TSH, and lipid panel to be conducted prior to the visit.        CC: Diabetes type I, blood pressure, lipid follow-up    History of Present Illness    HPI: Greyson Lopez is a 64-year-old  male with type 1 diabetes with neuropathy, retinopathy, microalbuminuria, hypoglycemic unawareness diagnosed 32 years ago, hypertension, hyperlipidemia, who presents for follow-up visit.     Diabetes complications include neuropathy, nephropathy, retinopathy and hypoglycemic unawa,. He denies heart attack, stroke, or claudication.     He is utilizing a Medtronic 770G insulin pump with Medtronic sensor integrated with the pump. He received this pump over 3 years ago and has been on insulin pump therapy for 16 years.  He is in auto mode 94% of the time.  Basal rates:  12 AM to 3:30 AM 0.55 units/h, 3:30 AM to 8 AM 0.75 units/h, 8 AM to 8:30 PM 0.9 units/h, 8:30 PM to 12 AM 0.6 units/h.  Bolus rates:  1 unit per 10 g carbohydrate with each meal.  Insulin sensitivity factor:  1 unit for every 40 blood sugar points for target blood glucose of 120.  Insulin action:  4 hours.  Total daily insulin dosage of 46.3 units with 47% bolus and 53% basal.    The patient is currently on a regimen of generic lispro via the insulin pump for his diabetes. He reports no recent hospital visits. He experiences nocturia, necessitating once or twice per night. He denies experiencing polydipsia or polyphagia.     He underwent cataract surgery on his left eye eye performed approximately 2 months ago. He has visited his ophthalmologist post-surgery and is scheduled for a follow-up with his regular ophthalmologist in 6 months. His last retina specialist visit was prior to his left eye surgery.     He denies experiencing numbness or tingling in his right foot, wounds or sores, or phantom pain since his left leg was amputated, for which he takes gabapentin. He reports no wounds on his stump, but experiences irritation at the bottom due to sweating. He consulted a specialist in [place], who diagnosed a blister. He applies a Band-Aid to the affected area.     He has hyperlipidemia and takes atorvastatin 40 mg daily.  He denies experiencing  chest pain or shortness of breath.    He has hypertension and diabetic microalbuminuria and takes lisinopril 10 mg daily and metoprolol 100 mg daily.  He denies experiencing headaches, lightheadedness, or dizziness upon standing.    Blood Sugar/Glucometer/Pump/CGM review: He is utilizing a Medtronic 770 G insulin pump with Medtronic sensor integrated with his insulin pump.  Insulin pump and sensor download from 6/27/2024 through 7/10/2024 was reviewed in the office today.  CGM was active 93% of the time.  Average glucose is 182 mg/dL ±63 mg/dL.  75% of blood sugars are in target range, 20% high, 5% very high, 0% low, and 0% very low.    Historical Information   Past Medical History:   Diagnosis Date    Chronic foot ulcer (HCC) 09/07/2018    Diabetes mellitus (HCC)     Diabetic foot ulcer (HCC) 05/08/2023    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, with fat layer exposed (HCC) 04/06/2023    Gout     High cholesterol     Hypertension     Visual impairment 11/01/2022    Cateract     Past Surgical History:   Procedure Laterality Date    CATARACT EXTRACTION Right 01/2023    CATARACT EXTRACTION Left 2024    COMPLEX WOUND CLOSURE TO EXTREMITY Left 07/18/2019    Procedure: COMPLEX CLOSURE LEFT CHEEK;  Surgeon: Basilio Pierce MD;  Location:  MAIN OR;  Service: Plastics    FACIAL/NECK BIOPSY Left 07/18/2019    Procedure: EXCISION LEFT CHEEK CYST;  Surgeon: Basilio Pierce MD;  Location:  MAIN OR;  Service: Plastics    HERNIA REPAIR Left     several times    KNEE ARTHROSCOPY Left     torn meniscus    LEG AMPUTATION THROUGH LOWER TIBIA AND FIBULA Left 08/22/2023    Procedure: AMPUTATION BELOW KNEE (BKA);  Surgeon: Niko Umana MD;  Location:  MAIN OR;  Service: General    IL AMPUTATION FOOT TRANSMETARSAL Left 06/30/2023    Procedure: AMPUTATION TRANSMETATARSAL (TMA);  Surgeon: Lorenzo Chew DPM;  Location:  MAIN OR;  Service: Podiatry    WOUND DEBRIDEMENT Left 05/30/2023     Procedure: DEBRIDEMENT WOUND LEFT FOOT WOUND WITH EXCISION OF INFECTED BONE AND WOUND VAC APPLICATION;  Surgeon: Mercy Baez DPM;  Location:  MAIN OR;  Service: Podiatry     Social History   Social History     Substance and Sexual Activity   Alcohol Use Yes    Comment: 3/4 beers a day     Social History     Substance and Sexual Activity   Drug Use Never     Social History     Tobacco Use   Smoking Status Former    Current packs/day: 0.00    Average packs/day: 1 pack/day for 20.0 years (20.0 ttl pk-yrs)    Types: Cigarettes    Start date: 2003    Quit date: 2023    Years since quittin.8   Smokeless Tobacco Never   Tobacco Comments    encouraged smoking cessation     Family History:   Family History   Problem Relation Age of Onset    Heart disease Father     Diabetes type I Father     Diabetes type II Mother     No Known Problems Sister     Alcohol abuse Brother     Diabetes type I Brother     No Known Problems Brother     No Known Problems Son     No Known Problems Daughter        Meds/Allergies   Current Outpatient Medications   Medication Sig Dispense Refill    atorvastatin (LIPITOR) 40 mg tablet TAKE 1 TABLET BY MOUTH EVERY DAY 90 tablet 1    DULoxetine (CYMBALTA) 60 mg delayed release capsule Take 1 capsule (60 mg total) by mouth daily 30 capsule 2    gabapentin (Neurontin) 300 mg capsule Take 3 capsules (900 mg total) by mouth 3 (three) times a day 900mg  capsule 2    Glucagon, rDNA, (Glucagon Emergency) 1 MG KIT       Ibuprofen (Advil) 200 MG CAPS Take by mouth      Insulin Glargine Solostar (Basaglar KwikPen) 100 UNIT/ML SOPN Inject 0.18 mL (18 Units total) under the skin in the morning If pump fails 15 mL 0    insulin lispro (HumALOG/ADMELOG) 100 units/mL injection Use up to 80 units daily via insulin pump 30 mL 5    Insulin Pen Needle (BD Pen Needle Shannan U/F) 32G X 4 MM MISC Use daily With insulin pen 30 each 0    lisinopril (ZESTRIL) 10 mg tablet Take 10 mg by mouth daily       metoprolol succinate (TOPROL-XL) 100 mg 24 hr tablet take 1 tablet by mouth once daily 90 tablet 1    acetaminophen (TYLENOL) 325 mg tablet Take 3 tablets (975 mg total) by mouth every 6 (six) hours (Patient not taking: Reported on 3/19/2024) 120 tablet 0     No current facility-administered medications for this visit.     Allergies   Allergen Reactions    Cefuroxime Other (See Comments) and GI Intolerance    Amoxicillin-Pot Clavulanate Nausea Only and GI Intolerance       Objective   Vitals: Blood pressure 120/80, pulse 103, height 6' (1.829 m), weight 71.2 kg (157 lb).  Invasive Devices       Line  Duration             Pump Device Insulin pump Anterior;Left Abdomen 376 days                    Physical Exam  Physical exam normal with pertinent positives and negatives.    Thyroid is normal in size. No palpable nodules.  Lungs are clear to auscultation.  Heart has a regular rate and rhythm. No murmurs.  Left leg has a below the knee amputation. Diabetic foot exam on the right foot shows no edema, no ulcerations of the feet. Healing skin on the dorsum of the right foot from an old blister. Dorsalis pedis and posterior tibialis pulses are 1+. Vibration sensation diminished to the first toe DIP joint on the right. Monofilament sensation intact to the right foot.  Patient's shoes and socks removed.    Right Foot/Ankle   Right Foot Inspection  Skin Exam: skin normal and skin intact. No dry skin, no warmth, no callus, no erythema, no maceration, no abnormal color, no pre-ulcer, no ulcer and no callus.     Toe Exam: No swelling and  no right toe deformity    Sensory   Vibration: diminished  Monofilament testing: intact    Vascular  Capillary refills: < 3 seconds  The right DP pulse is 1+. The right PT pulse is 1+.     Left Foot/Ankle  Left Foot Inspection  Amputation: amputation left foot (Comments: BKA)    Assign Risk Category  Deformity present  Loss of protective sensation  Weak pulses  Risk: 3        The history was  obtained from the review of the chart and from the patient.    Lab Results:    Most recent Alc is  Lab Results   Component Value Date    HGBA1C 9.0 (H) 07/08/2024             Blood work performed on 7/8/2024 showed a CMP with a glucose of 80 fasting, creatinine 1.47 with a GFR 53, but was otherwise normal.  Lab Results   Component Value Date    CREATININE 1.47 (H) 07/08/2024    CREATININE 1.44 (H) 03/09/2024    CREATININE 1.01 09/23/2023    BUN 14 07/08/2024    K 4.9 07/08/2024     07/08/2024    CO2 26 07/08/2024     GFR, Calculated   Date Value Ref Range Status   10/26/2020 81 >60 mL/min/1.73m2 Final     Comment:     mL/min per 1.73 square meters                                            Normal Function or Mild Renal    Disease (if clinically at risk):  >or=60  Moderately Decreased:                30-59  Severely Decreased:                  15-29  Renal Failure:                         <15                                            -American GFR: multiply reported GFR by 1.16    Please note that the eGFR is based on the CKD-EPI calculation, and is not intended to be used for drug dosing.                                            Note: Calculated GFR may not be an accurate indicator of renal function if the patient's renal function is not in a steady state.     eGFRcr   Date Value Ref Range Status   07/08/2024 53 (L) >59 Final         Lab Results   Component Value Date    HDL 77 07/20/2023    TRIG 84 07/20/2023       Lab Results   Component Value Date    ALT 10 07/08/2024    AST 10 07/08/2024    ALKPHOS 95 07/08/2024       Lab Results   Component Value Date    TSH 0.78 06/03/2021             Future Appointments   Date Time Provider Department Center   11/14/2024  9:20 AM Eileen Smith MD [facility]

## 2024-10-31 ENCOUNTER — TELEPHONE (OUTPATIENT)
Age: 64
End: 2024-10-31

## 2024-11-06 LAB
ALBUMIN SERPL-MCNC: 4 G/DL (ref 3.5–5.7)
ALP SERPL-CCNC: 75 U/L (ref 35–120)
ALT SERPL-CCNC: 11 U/L
ANION GAP SERPL CALCULATED.3IONS-SCNC: 12 MMOL/L (ref 3–11)
AST SERPL-CCNC: 13 U/L
BASOPHILS # BLD AUTO: 0.1 THOU/CMM (ref 0–0.1)
BASOPHILS NFR BLD AUTO: 1 %
BILIRUB SERPL-MCNC: 0.7 MG/DL (ref 0.2–1)
BUN SERPL-MCNC: 14 MG/DL (ref 7–28)
CALCIUM SERPL-MCNC: 9.1 MG/DL (ref 8.5–10.5)
CHLORIDE SERPL-SCNC: 96 MMOL/L (ref 100–109)
CHOLEST SERPL-MCNC: 208 MG/DL
CHOLEST/HDLC SERPL: 2.2 {RATIO}
CO2 SERPL-SCNC: 26 MMOL/L (ref 21–31)
CREAT SERPL-MCNC: 1.36 MG/DL (ref 0.53–1.3)
CYTOLOGY CMNT CVX/VAG CYTO-IMP: ABNORMAL
DIFFERENTIAL METHOD BLD: ABNORMAL
EOSINOPHIL # BLD AUTO: 0.1 THOU/CMM (ref 0–0.5)
EOSINOPHIL NFR BLD AUTO: 1 %
ERYTHROCYTE [DISTWIDTH] IN BLOOD BY AUTOMATED COUNT: 18.1 % (ref 12–16)
EST. AVERAGE GLUCOSE BLD GHB EST-MCNC: 214 MG/DL
GFR/BSA.PRED SERPLBLD CYS-BASED-ARV: 59 ML/MIN/{1.73_M2}
GLUCOSE SERPL-MCNC: 192 MG/DL (ref 65–99)
HBA1C MFR BLD: 9.1 %
HCT VFR BLD AUTO: 40.4 % (ref 37–48)
HDLC SERPL-MCNC: 94 MG/DL (ref 23–92)
HGB BLD-MCNC: 13.3 G/DL (ref 12.5–17)
LDLC SERPL CALC-MCNC: 100 MG/DL
LYMPHOCYTES # BLD AUTO: 1.4 THOU/CMM (ref 1–3)
LYMPHOCYTES NFR BLD AUTO: 16 %
MCH RBC QN AUTO: 29.4 PG (ref 27–36)
MCHC RBC AUTO-ENTMCNC: 33 G/DL (ref 32–37)
MCV RBC AUTO: 89 FL (ref 80–100)
MONOCYTES # BLD AUTO: 1.2 THOU/CMM (ref 0.3–1)
MONOCYTES NFR BLD AUTO: 13 %
NEUTROPHILS # BLD AUTO: 6.5 THOU/CMM (ref 1.8–7.8)
NEUTROPHILS NFR BLD AUTO: 69 %
NONHDLC SERPL-MCNC: 114 MG/DL
PLATELET # BLD AUTO: 353 THOU/CMM (ref 140–350)
PMV BLD REES-ECKER: 7.6 FL (ref 7.5–11.3)
POTASSIUM SERPL-SCNC: 5.6 MMOL/L (ref 3.5–5.2)
PROT SERPL-MCNC: 6.9 G/DL (ref 6.3–8.3)
RBC # BLD AUTO: 4.54 MILL/CMM (ref 4–5.4)
SODIUM SERPL-SCNC: 134 MMOL/L (ref 135–145)
TRIGL SERPL-MCNC: 72 MG/DL
WBC # BLD AUTO: 9.3 THOU/CMM (ref 4–10.5)

## 2024-11-08 LAB — FRUCTOSAMINE SERPL-SCNC: 372 UMOL/L

## 2024-11-14 ENCOUNTER — OFFICE VISIT (OUTPATIENT)
Dept: ENDOCRINOLOGY | Facility: HOSPITAL | Age: 64
End: 2024-11-14
Payer: COMMERCIAL

## 2024-11-14 VITALS
OXYGEN SATURATION: 99 % | WEIGHT: 149.4 LBS | HEIGHT: 72 IN | BODY MASS INDEX: 20.24 KG/M2 | SYSTOLIC BLOOD PRESSURE: 118 MMHG | HEART RATE: 105 BPM | DIASTOLIC BLOOD PRESSURE: 70 MMHG

## 2024-11-14 DIAGNOSIS — R80.9 MICROALBUMINURIA DUE TO TYPE 1 DIABETES MELLITUS  (HCC): ICD-10-CM

## 2024-11-14 DIAGNOSIS — Z96.41 INSULIN PUMP IN PLACE: ICD-10-CM

## 2024-11-14 DIAGNOSIS — E10.42 DIABETIC POLYNEUROPATHY ASSOCIATED WITH TYPE 1 DIABETES MELLITUS (HCC): ICD-10-CM

## 2024-11-14 DIAGNOSIS — E10.29 MICROALBUMINURIA DUE TO TYPE 1 DIABETES MELLITUS  (HCC): ICD-10-CM

## 2024-11-14 DIAGNOSIS — E78.2 MIXED HYPERLIPIDEMIA: ICD-10-CM

## 2024-11-14 DIAGNOSIS — E10.65 TYPE 1 DIABETES MELLITUS WITH HYPERGLYCEMIA (HCC): Primary | ICD-10-CM

## 2024-11-14 DIAGNOSIS — E10.649 HYPOGLYCEMIA UNAWARENESS ASSOCIATED WITH TYPE 1 DIABETES MELLITUS (HCC): ICD-10-CM

## 2024-11-14 DIAGNOSIS — E78.5 HYPERLIPIDEMIA, UNSPECIFIED HYPERLIPIDEMIA TYPE: ICD-10-CM

## 2024-11-14 DIAGNOSIS — I10 BENIGN ESSENTIAL HYPERTENSION: ICD-10-CM

## 2024-11-14 PROCEDURE — 99215 OFFICE O/P EST HI 40 MIN: CPT | Performed by: INTERNAL MEDICINE

## 2024-11-14 PROCEDURE — 95251 CONT GLUC MNTR ANALYSIS I&R: CPT | Performed by: INTERNAL MEDICINE

## 2024-11-14 RX ORDER — ARIPIPRAZOLE 2 MG/1
2 TABLET ORAL DAILY
COMMUNITY

## 2024-11-14 RX ORDER — ATORVASTATIN CALCIUM 40 MG/1
40 TABLET, FILM COATED ORAL DAILY
Qty: 90 TABLET | Refills: 1 | Status: SHIPPED | OUTPATIENT
Start: 2024-11-14

## 2024-11-14 RX ORDER — INSULIN LISPRO 100 [IU]/ML
INJECTION, SOLUTION INTRAVENOUS; SUBCUTANEOUS
Qty: 30 ML | Refills: 5 | Status: SHIPPED | OUTPATIENT
Start: 2024-11-14

## 2024-11-14 NOTE — PATIENT INSTRUCTIONS
Hgba1c is 9.1%. this is still high but may be partly due to the hospital visit and the higher sugars after meals from taking insulin bolus after meals.     Work on insulin bolus pre meals, try giving 50% calculated bolus pre meal and then if eats more than that, give rest of bolus after meal.     Continue all other insulin pump rates the same for now.     I recommend restarting the atorvastatin for cholesterol.     Follow up in 3-4 months with blood work.

## 2024-11-14 NOTE — PROGRESS NOTES
11/15/2024    Assessment & Plan      Diagnoses and all orders for this visit:    Type 1 diabetes mellitus with hyperglycemia (HCC)  -     Comprehensive metabolic panel; Future  -     Hemoglobin A1C; Future  -     Lipid Panel with Direct LDL reflex; Future  -     Fructosamine; Future  -     insulin lispro (HumALOG/ADMELOG) 100 units/mL injection; Use up to 80 units daily via insulin pump    Diabetic polyneuropathy associated with type 1 diabetes mellitus (HCC)  -     Comprehensive metabolic panel; Future  -     Hemoglobin A1C; Future  -     Lipid Panel with Direct LDL reflex; Future  -     Fructosamine; Future    Hypoglycemia unawareness associated with type 1 diabetes mellitus (HCC)  -     Comprehensive metabolic panel; Future  -     Hemoglobin A1C; Future  -     Lipid Panel with Direct LDL reflex; Future  -     Fructosamine; Future    Microalbuminuria due to type 1 diabetes mellitus  (HCC)  -     Comprehensive metabolic panel; Future  -     Hemoglobin A1C; Future  -     Lipid Panel with Direct LDL reflex; Future  -     Fructosamine; Future    Benign essential hypertension  -     Comprehensive metabolic panel; Future  -     Hemoglobin A1C; Future  -     Lipid Panel with Direct LDL reflex; Future  -     Fructosamine; Future    Insulin pump in place  -     Comprehensive metabolic panel; Future  -     Hemoglobin A1C; Future  -     Lipid Panel with Direct LDL reflex; Future  -     Fructosamine; Future    Mixed hyperlipidemia  -     Comprehensive metabolic panel; Future  -     Hemoglobin A1C; Future  -     Lipid Panel with Direct LDL reflex; Future  -     Fructosamine; Future    Hyperlipidemia, unspecified hyperlipidemia type  -     atorvastatin (LIPITOR) 40 mg tablet; Take 1 tablet (40 mg total) by mouth daily    Other orders  -     ARIPiprazole (ABILIFY) 2 mg tablet; Take 2 mg by mouth daily          Assessment & Plan  1. Type 1 diabetes.  Most recent hemoglobin A1c is 9.1%. This demonstrates very high blood sugars,  Chief Complaint  Chief Complaint   Patient presents with    Rash     On back patient states that it has been on there for since .           Subjective          Denisse Flores presents to [facility] for   History of Present Illness    She traveled to [place] 5/15- for a , had 8 spider bites, that have resolved now. Went to ER upon returning home, took prednisone. She did use a different detergent while in [place]. Today states flank rash started , now moving around to abdomen, it is warm, erythemic, not painful or itchy, she states it feels like a sunburn, she feels feverish, alternating hot/cold, her mouth felt numb yesterday, denies swelling or difficulty breathing, s/s have now resolved. She has not changed lotions, soaps, detergents, foods.     Her  is having nephrectomy, has been out of town, taking care of her father, she is stressed, missed appt last mo for labs and f/u.       The following portions of the patient's history were reviewed and updated as appropriate: allergies, current medications, past family history, past medical history, past social history, past surgical history and problem list.    Past Medical History:   Diagnosis Date    Allergic since childhood    Anxiety     Arthritis     Cancer     Environmental allergies     High cholesterol     History of panic attacks     History of skin cancer 1970'S    History of transfusion     HL (hearing loss)     Insomnia     Knee pain     Migraine     Urinary tract infection     Vitamin D deficiency      Past Surgical History:   Procedure Laterality Date     SECTION      X 3    CHOLECYSTECTOMY      DILATATION AND CURETTAGE      JOINT MANIPULATION Left 10/22/2019    Procedure: MANIPULATION OF KNEE with steriod injection;  Surgeon: Pedro Priest MD;  Location: [facility]  Service: Orthopedics    JOINT REPLACEMENT      KNEE MINI REVISION Left 08/15/2019    Procedure: LEFT TOTAL KNEE  ARTHROPLASTY REVISION POLY CHANGE;  Surgeon: Pedro Priest MD;  Location: [facility];  Service: Orthopedics    AL ARTHRP KNE CONDYLE&PLATU MEDIAL&LAT COMPARTMENTS Left 08/22/2017    Procedure: LT TOTAL KNEE ARTHROPLASTY;  Surgeon: Pedro Priest MD;  Location: [facility];  Service: Orthopedics    SPINAL CORD STIMULATOR IMPLANT      Later removed    TOTAL ABDOMINAL HYSTERECTOMY WITH SALPINGO OOPHORECTOMY  2009    fibroids     Family History   Problem Relation Age of Onset    Migraines Mother     Heart attack Maternal Uncle     Diabetes Maternal Grandfather     Heart attack Maternal Grandfather      Social History     Tobacco Use    Smoking status: Never     Passive exposure: Never    Smokeless tobacco: Never   Substance Use Topics    Alcohol use: Yes     Alcohol/week: 0.0 standard drinks of alcohol     Comment: ABOUT 2X YEARLY       Current Outpatient Medications:     B Complex-C-Iron (Super B-Complex/Iron/Vitamin C) tablet, , Disp: , Rfl:     benzonatate (TESSALON) 100 MG capsule, Take 1 capsule by mouth 3 (Three) Times a Day As Needed for Cough., Disp: 12 capsule, Rfl: 0    escitalopram (LEXAPRO) 10 MG tablet, TAKE 1 TABLET BY MOUTH DAILY, Disp: 90 tablet, Rfl: 1    estradiol (ESTRACE) 1 MG tablet, TAKE 1 TABLET BY MOUTH DAILY, Disp: 90 tablet, Rfl: 1    Omega-3 Fatty Acids (FISH OIL) 1000 MG capsule capsule, Take 1 capsule by mouth Daily With Breakfast., Disp: , Rfl:     traMADol (ULTRAM) 50 MG tablet, TAKE 2 TABLETS BY MOUTH THREE TIMES DAILY AS NEEDED FOR PAIN, Disp: , Rfl:     vitamin C (ASCORBIC ACID) 500 MG tablet, Take 1 tablet by mouth Daily., Disp: , Rfl:     vitamin D (ERGOCALCIFEROL) 1.25 MG (33072 UT) capsule capsule, TAKE ONE CAPSULE BY MOUTH ONCE A WEEK ON MONDAYS, Disp: 12 capsule, Rfl: 1    zolpidem (AMBIEN) 10 MG tablet, TAKE 1 TABLET BY MOUTH AT NIGHT AS NEEDED FOR SLEEP, Disp: 30 tablet, Rfl: 5    methylPREDNISolone (MEDROL) 4 MG dose pack, Take as directed on package instructions.,  but his GMI on his Medtronic report is 6.9%. This implies that some of his elevated sugars are related to a recent hospital visit and possibly higher sugars after meals as he also has an elevated fructosamine. Taking his insulin after meals may be contributing to the higher blood sugars after meals. He has not been taking his boluses before meals due to fear of low blood sugar as he is not always sure how much he is going to eat. It is recommended that he calculate what he thinks he might eat and perhaps give 50% of that bolus before the meal. If he eats more than that, he can give the rest after the meal to hopefully prevent significant spikes in blood sugars post meals. He will continue all other insulin pump rates the same for now. The possibility of adjusting his insulin sensitivity down to 30 or 35 was discussed, but this will be held on for now.    2. Diabetic neuropathy.  He reports no new neuropathic symptoms and continues to utilize gabapentin. He experiences some phantom pain in his amputated foot and his below the knee left amputation.    3. Hypoglycemic unawareness.  He will continue to utilize the Medtronic sensor.    4. Microalbuminuria.  He will continue to utilize lisinopril 20 mg daily.    5. Hypertension.  He is normotensive in the office on his current dose of lisinopril 20 mg daily.    6. Hyperlipidemia.  He has stopped atorvastatin. It is recommended that he restart atorvastatin 40 mg daily based on his elevated lipid profile.    Follow-up  Return in 3 to 4 months for follow up with preceding hemoglobin A1c, CMP, lipid panel, and fructosamine.    I have spent a total time of 40 minutes in caring for this patient on the day of the visit/encounter including Diagnostic results, Prognosis, Risks and benefits of tx options, Instructions for management, Patient and family education, Importance of tx compliance, Risk factor reductions, Impressions, Counseling / Coordination of care, Documenting in the  "Disp: 21 tablet, Rfl: 0    Objective   Vital Signs:   /66 (BP Location: Left arm, Patient Position: Sitting, Cuff Size: Adult)   Pulse 76   Temp 98.5 °F (36.9 °C) (Temporal)   Ht 165.1 cm (65\")   Wt 66.7 kg (147 lb)   SpO2 95%   BMI 24.46 kg/m²           Physical Exam  Vitals and nursing note reviewed.   Constitutional:       General: She is not in acute distress.     Appearance: She is well-developed. She is not diaphoretic.   Neck:      Thyroid: No thyromegaly.      Vascular: No JVD.   Musculoskeletal:         General: No tenderness. Normal range of motion.   Skin:     General: Skin is warm and dry.      Findings: Erythema (dry, macular erythema B flank and bilateral abdomen) and rash present.   Neurological:      Mental Status: She is alert and oriented to person, place, and time.      Sensory: No sensory deficit.   Psychiatric:         Behavior: Behavior normal.         Thought Content: Thought content normal.         Judgment: Judgment normal.          Result Review :     Office Visit on 06/18/2024   Component Date Value Ref Range Status    Total Cholesterol 06/18/2024 242 (H)  0 - 200 mg/dL Final    Triglycerides 06/18/2024 129  0 - 150 mg/dL Final    HDL Cholesterol 06/18/2024 71 (H)  40 - 60 mg/dL Final    LDL Cholesterol  06/18/2024 148 (H)  0 - 100 mg/dL Final    VLDL Cholesterol 06/18/2024 23  5 - 40 mg/dL Final    LDL/HDL Ratio 06/18/2024 2.05   Final    Glucose 06/18/2024 84  65 - 99 mg/dL Final    BUN 06/18/2024 10  8 - 23 mg/dL Final    Creatinine 06/18/2024 0.75  0.57 - 1.00 mg/dL Final    Sodium 06/18/2024 142  136 - 145 mmol/L Final    Potassium 06/18/2024 3.8  3.5 - 5.2 mmol/L Final    Chloride 06/18/2024 107  98 - 107 mmol/L Final    CO2 06/18/2024 27.2  22.0 - 29.0 mmol/L Final    Calcium 06/18/2024 9.3  8.6 - 10.5 mg/dL Final    Total Protein 06/18/2024 6.6  6.0 - 8.5 g/dL Final    Albumin 06/18/2024 4.1  3.5 - 5.2 g/dL Final    ALT (SGPT) 06/18/2024 19  1 - 33 U/L Final    AST " medical record, Reviewing / ordering tests, medicine, procedures  , and Obtaining or reviewing history  .      CC: Diabetes type I, blood pressure, lipid follow-up    History of Present Illness    HPI: Greyson Lopez is a 64-year-old male with type 1 diabetes, here for a follow-up visit.    He was diagnosed with diabetes 32 years ago and has developed complications including neuropathy, retinopathy, nephropathy, and hypoglycemic unawareness.     He is utilizing a Medtronic 770G insulin pump with AirSage sensor integrated with the pump. He received this pump over 3 years ago and has been on insulin pump therapy for 16 years.  He is in auto mode 93% of the time.  Basal rates:  12 AM to 3:30 AM 0.55 units/h, 3:30 AM to 8 AM 0.75 units/h, 8 AM to 8:30 PM 0.9 units/h, 8:30 PM to 12 AM 0.6 units/h.  Bolus rates:  1 unit per 10 g carbohydrate with each meal.  Insulin sensitivity factor:  1 unit for every 40 blood sugar points for target blood glucose of 120.  Insulin action:  4 hours.  Total daily insulin dosage 27.9 units with 51% bolus and 49% basal.    He administers boluses after meals and reports that the generic insulin takes longer to work. If his blood sugar level is high after a meal, he administers the correct amount of insulin, but it takes a couple of hours for his blood sugar level to drop below 200. If he administers too much insulin, his blood sugar level drops within two hours.     A few months ago, he was hospitalized due to kidney failure. His blood sugar level was 278 when he was admitted to the ER, and during his hospital stay, his blood sugar levels were consistently over 300 for two days. He was administered insulin twice daily.     He does not experience frequent urination, excessive thirst or hunger, blurry vision, or numbness or tingling sensations. He occasionally experiences pain in his foot, despite not having a foot. He has had cataract surgery.  He is post below the knee amputation on the  (SGOT) 06/18/2024 20  1 - 32 U/L Final    Alkaline Phosphatase 06/18/2024 59  39 - 117 U/L Final    Total Bilirubin 06/18/2024 0.3  0.0 - 1.2 mg/dL Final    Globulin 06/18/2024 2.5  gm/dL Final    A/G Ratio 06/18/2024 1.6  g/dL Final    BUN/Creatinine Ratio 06/18/2024 13.3  7.0 - 25.0 Final    Anion Gap 06/18/2024 7.8  5.0 - 15.0 mmol/L Final    eGFR 06/18/2024 90.1  >60.0 mL/min/1.73 Final    TSH 06/18/2024 2.370  0.270 - 4.200 uIU/mL Final    25 Hydroxy, Vitamin D 06/18/2024 42.4  30.0 - 100.0 ng/ml Final    Vitamin B-12 06/18/2024 367  211 - 946 pg/mL Final    WBC 06/18/2024 5.95  3.40 - 10.80 10*3/mm3 Final    RBC 06/18/2024 4.03  3.77 - 5.28 10*6/mm3 Final    Hemoglobin 06/18/2024 13.2  12.0 - 15.9 g/dL Final    Hematocrit 06/18/2024 38.9  34.0 - 46.6 % Final    MCV 06/18/2024 96.5  79.0 - 97.0 fL Final    MCH 06/18/2024 32.8  26.6 - 33.0 pg Final    MCHC 06/18/2024 33.9  31.5 - 35.7 g/dL Final    RDW 06/18/2024 11.9 (L)  12.3 - 15.4 % Final    RDW-SD 06/18/2024 42.2  37.0 - 54.0 fl Final    MPV 06/18/2024 10.7  6.0 - 12.0 fL Final    Platelets 06/18/2024 264  140 - 450 10*3/mm3 Final    Neutrophil % 06/18/2024 55.6  42.7 - 76.0 % Final    Lymphocyte % 06/18/2024 25.7  19.6 - 45.3 % Final    Monocyte % 06/18/2024 10.8  5.0 - 12.0 % Final    Eosinophil % 06/18/2024 6.9 (H)  0.3 - 6.2 % Final    Basophil % 06/18/2024 0.8  0.0 - 1.5 % Final    Immature Grans % 06/18/2024 0.2  0.0 - 0.5 % Final    Neutrophils, Absolute 06/18/2024 3.31  1.70 - 7.00 10*3/mm3 Final    Lymphocytes, Absolute 06/18/2024 1.53  0.70 - 3.10 10*3/mm3 Final    Monocytes, Absolute 06/18/2024 0.64  0.10 - 0.90 10*3/mm3 Final    Eosinophils, Absolute 06/18/2024 0.41 (H)  0.00 - 0.40 10*3/mm3 Final    Basophils, Absolute 06/18/2024 0.05  0.00 - 0.20 10*3/mm3 Final    Immature Grans, Absolute 06/18/2024 0.01  0.00 - 0.05 10*3/mm3 Final    nRBC 06/18/2024 0.0  0.0 - 0.2 /100 WBC Final                  BMI is within normal parameters. No other  left.    He is currently taking gabapentin 900 mg three times a day. He has not taken atorvastatin 40 mg a day for cholesterol. He is on lisinopril 20 mg a day but does not take metoprolol. He reports no chest pain or shortness of breath.     He also reports no history of heart attack, stroke, or claudication.    Blood Sugar/Glucometer/Pump/CGM review: He utilizes a Medtronic sensor integrated with his Medtronic insulin pump.  Insulin pump and sensor download from 11/1/2024 through 11/14/2024 was reviewed in the office today.  CGM was active 92% of the time.  Average glucose is 169 mg/dL ±50 mg/dL.  GMI is estimated to be 6.9%.  79% of blood sugars are in target range, 19% high, 1% very high, 1% low, and 0% very low.      Historical Information   Past Medical History:   Diagnosis Date    Chronic foot ulcer (HCC) 09/07/2018    Diabetes mellitus (HCC)     Diabetic foot ulcer (HCC) 05/08/2023    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, with fat layer exposed (HCC) 04/06/2023    Gout     High cholesterol     Hypertension     Visual impairment 11/01/2022    Cateract     Past Surgical History:   Procedure Laterality Date    CATARACT EXTRACTION Right 01/2023    CATARACT EXTRACTION Left 2024    COMPLEX WOUND CLOSURE TO EXTREMITY Left 07/18/2019    Procedure: COMPLEX CLOSURE LEFT CHEEK;  Surgeon: Basilio Pierce MD;  Location:  MAIN OR;  Service: Plastics    FACIAL/NECK BIOPSY Left 07/18/2019    Procedure: EXCISION LEFT CHEEK CYST;  Surgeon: Basilio Pierce MD;  Location:  MAIN OR;  Service: Plastics    HERNIA REPAIR Left     several times    KNEE ARTHROSCOPY Left     torn meniscus    LEG AMPUTATION THROUGH LOWER TIBIA AND FIBULA Left 08/22/2023    Procedure: AMPUTATION BELOW KNEE (BKA);  Surgeon: Niko Umana MD;  Location:  MAIN OR;  Service: General    MD AMPUTATION FOOT TRANSMETARSAL Left 06/30/2023    Procedure: AMPUTATION TRANSMETATARSAL (TMA);  Surgeon: Lorenzo Chew  NOAH;  Location: [facility];  Service: Podiatry    WOUND DEBRIDEMENT Left 2023    Procedure: DEBRIDEMENT WOUND LEFT FOOT WOUND WITH EXCISION OF INFECTED BONE AND WOUND VAC APPLICATION;  Surgeon: Mercy Baez DPM;  Location: [facility];  Service: Podiatry     Social History   Social History     Substance and Sexual Activity   Alcohol Use Yes    Comment: 3/4 beers a day     Social History     Substance and Sexual Activity   Drug Use Never     Social History     Tobacco Use   Smoking Status Former    Current packs/day: 0.00    Average packs/day: 1 pack/day for 20.0 years (20.0 ttl pk-yrs)    Types: Cigarettes    Start date: 2003    Quit date: 2023    Years since quittin.1   Smokeless Tobacco Never   Tobacco Comments    encouraged smoking cessation     Family History:   Family History   Problem Relation Age of Onset    Diabetes type II Mother     Heart disease Father     Diabetes type I Father     No Known Problems Sister     Alcohol abuse Brother     Diabetes type I Brother     No Known Problems Brother     No Known Problems Daughter     No Known Problems Son        Meds/Allergies   Current Outpatient Medications   Medication Sig Dispense Refill    ARIPiprazole (ABILIFY) 2 mg tablet Take 2 mg by mouth daily      atorvastatin (LIPITOR) 40 mg tablet Take 1 tablet (40 mg total) by mouth daily 90 tablet 1    gabapentin (Neurontin) 300 mg capsule Take 3 capsules (900 mg total) by mouth 3 (three) times a day 900mg TID (Patient taking differently: Take 900 mg by mouth 3 (three) times a day 100-200 3 times a day) 210 capsule 2    Glucagon, rDNA, (Glucagon Emergency) 1 MG KIT       Ibuprofen (Advil) 200 MG CAPS Take by mouth      Insulin Glargine Solostar (Basaglar KwikPen) 100 UNIT/ML SOPN Inject 0.18 mL (18 Units total) under the skin in the morning If pump fails 15 mL 0    insulin lispro (HumALOG/ADMELOG) 100 units/mL injection Use up to 80 units daily via insulin pump 30 mL 5    Insulin Pen Needle (BD  follow-up for BMI required.           Assessment and Plan    Diagnoses and all orders for this visit:    1. Dermatitis (Primary)  Comments:  unknown etiology, likely viral vs contact.   start medrol dose pack  cbc today    2. Vitamin D deficiency  -     Vitamin D,25-Hydroxy    3. B12 deficiency  -     Vitamin B12    4. Preventative health care  -     Lipid Panel  -     Comprehensive Metabolic Panel  -     TSH  -     Vitamin D,25-Hydroxy  -     Vitamin B12  -     CBC & Differential    Other orders  -     methylPREDNISolone (MEDROL) 4 MG dose pack; Take as directed on package instructions.  Dispense: 21 tablet; Refill: 0    All other conditions stable, cont current med regimen    I spent 30 minutes caring for Denisse Flores on this date of service. This time includes time spent by me in the following activities: preparing for the visit, reviewing tests, performing a medically appropriate examination and/or evaluation , counseling and educating the patient/family/caregiver, ordering medications, tests, or procedures and documenting information in the medical record        Follow Up     Return in 6 months (on 12/18/2024), or if symptoms worsen or fail to improve in 3-4 days, for  Recheck HLD, pain, insomnia.  Patient was given instructions and counseling regarding her condition or for health maintenance advice. Please see specific information pulled into the AVS if appropriate.        Part of this note may be an electronic transcription/translation of spoken language to printed text using the Dragon Dictation System   Pen Needle Shannan U/F) 32G X 4 MM MISC Use daily With insulin pen 30 each 0    lisinopril (ZESTRIL) 20 mg tablet Take 20 mg by mouth daily      acetaminophen (TYLENOL) 325 mg tablet Take 3 tablets (975 mg total) by mouth every 6 (six) hours (Patient not taking: Reported on 3/19/2024) 120 tablet 0    DULoxetine (CYMBALTA) 60 mg delayed release capsule Take 1 capsule (60 mg total) by mouth daily 30 capsule 2    metoprolol succinate (TOPROL-XL) 100 mg 24 hr tablet take 1 tablet by mouth once daily (Patient not taking: Reported on 11/14/2024) 90 tablet 1     No current facility-administered medications for this visit.     Allergies   Allergen Reactions    Cefuroxime Other (See Comments) and GI Intolerance    Amoxicillin-Pot Clavulanate Nausea Only and GI Intolerance       Objective   Vitals: Blood pressure 118/70, pulse 105, height 6' (1.829 m), weight 67.8 kg (149 lb 6.4 oz), SpO2 99%.  Invasive Devices       Line  Duration             Pump Device Insulin pump Anterior;Left Abdomen 505 days                    Physical Exam    Thyroid is normal in size. No palpable nodules.  Lungs are clear to auscultation.  Heart has a regular rate and rhythm. No murmurs.  Right lower extremity shows no edema. Left lower extremity is post below the knee amputation with prosthesis.      The history was obtained from the review of the chart and from the patient.    Lab Results:    Most recent Alc is  Lab Results   Component Value Date    HGBA1C 9.1 (H) 11/06/2024           Blood work performed on 11/6/2024 showed a fructosamine of 373.    CBC demonstrates a platelet count of 351 but was otherwise normal.    CMP showed a glucose of 192 random, creatinine 1.36, sodium 134, potassium 5.6, chloride 96, GFR 59, but was otherwise normal.    Lab Results   Component Value Date    CREATININE 1.36 (H) 11/06/2024    CREATININE 1.36 (H) 08/14/2024    CREATININE 1.47 (H) 07/08/2024    BUN 14 11/06/2024    K 5.6 (H) 11/06/2024    CL 96 (L) 11/06/2024     CO2 26 11/06/2024     GFR, Calculated   Date Value Ref Range Status   10/26/2020 81 >60 mL/min/1.73m2 Final     Comment:     mL/min per 1.73 square meters                                            Normal Function or Mild Renal    Disease (if clinically at risk):  >or=60  Moderately Decreased:                30-59  Severely Decreased:                  15-29  Renal Failure:                         <15                                            -American GFR: multiply reported GFR by 1.16    Please note that the eGFR is based on the CKD-EPI calculation, and is not intended to be used for drug dosing.                                            Note: Calculated GFR may not be an accurate indicator of renal function if the patient's renal function is not in a steady state.     eGFRcr   Date Value Ref Range Status   08/14/2024 59 (L) >59 Final     eGFR   Date Value Ref Range Status   11/06/2024 59 (L) >59 Final     Total cholesterol 208, LDL cholesterol 100.    Lab Results   Component Value Date    HDL 94 (H) 11/06/2024    TRIG 72 11/06/2024       Lab Results   Component Value Date    ALT 11 11/06/2024    AST 13 11/06/2024    ALKPHOS 75 11/06/2024       Lab Results   Component Value Date    TSH 0.78 06/03/2021             Future Appointments   Date Time Provider Department Center   4/24/2025 10:40 AM Eileen Smith MD [facility]

## 2025-01-31 NOTE — ASSESSMENT & PLAN NOTE
He continues to utilize lisinopril 10 mg daily. His urine microalbumin has improved.  
He will continue to utilize his sensor.  
His lipid profile is elevated this visit for unclear reasons. For now, he will continue the same atorvastatin and I will repeat his lipid profile in the future.  
[id number]

## 2025-03-03 LAB — HBA1C MFR BLD HPLC: 9 %

## 2025-03-13 ENCOUNTER — DOCUMENTATION (OUTPATIENT)
Dept: ENDOCRINOLOGY | Facility: HOSPITAL | Age: 65
End: 2025-03-13

## 2025-04-24 ENCOUNTER — OFFICE VISIT (OUTPATIENT)
Dept: ENDOCRINOLOGY | Facility: HOSPITAL | Age: 65
End: 2025-04-24
Payer: COMMERCIAL

## 2025-04-24 VITALS
HEIGHT: 72 IN | BODY MASS INDEX: 19.77 KG/M2 | DIASTOLIC BLOOD PRESSURE: 70 MMHG | WEIGHT: 146 LBS | HEART RATE: 112 BPM | SYSTOLIC BLOOD PRESSURE: 130 MMHG | OXYGEN SATURATION: 96 %

## 2025-04-24 DIAGNOSIS — E10.29 MICROALBUMINURIA DUE TO TYPE 1 DIABETES MELLITUS  (HCC): ICD-10-CM

## 2025-04-24 DIAGNOSIS — I10 BENIGN ESSENTIAL HYPERTENSION: ICD-10-CM

## 2025-04-24 DIAGNOSIS — E10.649 HYPOGLYCEMIA UNAWARENESS ASSOCIATED WITH TYPE 1 DIABETES MELLITUS (HCC): ICD-10-CM

## 2025-04-24 DIAGNOSIS — E10.65 TYPE 1 DIABETES MELLITUS WITH HYPERGLYCEMIA (HCC): Primary | ICD-10-CM

## 2025-04-24 DIAGNOSIS — E10.42 DIABETIC POLYNEUROPATHY ASSOCIATED WITH TYPE 1 DIABETES MELLITUS (HCC): ICD-10-CM

## 2025-04-24 DIAGNOSIS — Z96.41 INSULIN PUMP IN PLACE: ICD-10-CM

## 2025-04-24 DIAGNOSIS — R80.9 MICROALBUMINURIA DUE TO TYPE 1 DIABETES MELLITUS  (HCC): ICD-10-CM

## 2025-04-24 DIAGNOSIS — E78.2 MIXED HYPERLIPIDEMIA: ICD-10-CM

## 2025-04-24 PROCEDURE — 99214 OFFICE O/P EST MOD 30 MIN: CPT | Performed by: INTERNAL MEDICINE

## 2025-04-24 RX ORDER — NALTREXONE HYDROCHLORIDE 50 MG/1
1 TABLET, FILM COATED ORAL DAILY
COMMUNITY
Start: 2025-04-17

## 2025-04-24 NOTE — ASSESSMENT & PLAN NOTE
Lipid profile is good.  He will continue the same atorvastatin 40 mg daily.    Orders:    Comprehensive metabolic panel; Future    Hemoglobin A1C; Future    TSH, 3rd generation; Future    Fructosamine; Future    Albumin / creatinine urine ratio; Future

## 2025-04-24 NOTE — PROGRESS NOTES
Name: Greyson Lopez      : 1960      MRN: [id number]  Encounter Provider: Eileen Smith MD  Encounter Date: 2025   Encounter department: [facility]    No chief complaint on file.  :  Assessment & Plan  Type 1 diabetes mellitus with hyperglycemia (HCC)    Lab Results   Component Value Date    HGBA1C 9.0 2025     Hemoglobin A1c is 9%.  This did not improve as much as it should have.  This is still too high.  Unfortunately, I was unable to get a pump download to make any changes to his insulin pump regimen.  He will continue his current pump settings until I get that download.    Orders:    Comprehensive metabolic panel; Future    Hemoglobin A1C; Future    TSH, 3rd generation; Future    Fructosamine; Future    Albumin / creatinine urine ratio; Future    Microalbuminuria due to type 1 diabetes mellitus  (HCC)    Lab Results   Component Value Date    HGBA1C 9.0 2025     He will continue lisinopril 20 mg daily for renal protection and treatment.  A urine microalbumin to creatinine ratio will be performed with his next visit.    Orders:    Comprehensive metabolic panel; Future    Hemoglobin A1C; Future    TSH, 3rd generation; Future    Fructosamine; Future    Albumin / creatinine urine ratio; Future    Hypoglycemia unawareness associated with type 1 diabetes mellitus (HCC)    Lab Results   Component Value Date    HGBA1C 9.0 2025     He will continue to utilize his Dexcom continuous glucose monitoring system integrated with his insulin pump for safety.  Orders:    Comprehensive metabolic panel; Future    Hemoglobin A1C; Future    TSH, 3rd generation; Future    Fructosamine; Future    Albumin / creatinine urine ratio; Future    Diabetic polyneuropathy associated with type 1 diabetes mellitus (HCC)    Lab Results   Component Value Date    HGBA1C 9.0 2025     He has some neuropathic symptoms.  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  He does have a small wound and is following with the appropriate specialty.  Diabetic foot exams are up to date.  Orders:    Comprehensive metabolic panel; Future    Hemoglobin A1C; Future    TSH, 3rd generation; Future    Fructosamine; Future    Albumin / creatinine urine ratio; Future    Insulin pump in place    Orders:    Comprehensive metabolic panel; Future    Hemoglobin A1C; Future    TSH, 3rd generation; Future    Fructosamine; Future    Albumin / creatinine urine ratio; Future    Mixed hyperlipidemia  Lipid profile is good.  He will continue the same atorvastatin 40 mg daily.    Orders:    Comprehensive metabolic panel; Future    Hemoglobin A1C; Future    TSH, 3rd generation; Future    Fructosamine; Future    Albumin / creatinine urine ratio; Future    Benign essential hypertension  He is normotensive in the office today.  He will continue the current dose of lisinopril 20 mg daily.      Orders:    Comprehensive metabolic panel; Future    Hemoglobin A1C; Future    TSH, 3rd generation; Future    Fructosamine; Future    Albumin / creatinine urine ratio; Future    I have asked him to follow-up in 3 to 4 months with preceding hemoglobin A1c, CMP, TSH, fructosamine, and urine microalbumin to creatinine ratio.  Assessment & Plan  1. Type 1 diabetes mellitus.  - Utilizing a [employer] 770G insulin pump with Humalog (lispro) insulin.  - A1c was 9 in 03/2025, improved from previous readings but not at the desired level.  - No changes to insulin pump rates until the download is available for review.  - Adjustments to basal boluses may be considered once the download is accessible. Will contact the office if additional insulin is required.    2. Neuropathy.  - Experiences phantom pains and numbness in the left leg.  - Currently taking gabapentin 300 mg, two capsules three times a day.  - Continues this regimen for neuropathy management.  - No new symptoms reported.    3. Hypertension.  - Currently taking  lisinopril 20 mg daily for blood pressure management.  - Continues this medication.  - No chest pain or shortness of breath reported.  - Will contact the office if additional blood pressure medication is required.    4. Hyperlipidemia.  - Cholesterol levels are within normal limits.  - Currently taking atorvastatin 40 mg daily.  - Continues this medication.  - No new symptoms reported.    5. Depression.  - Currently on duloxetine for depression and neuropathy management.  - Continues this medication.  - No new symptoms reported.  - No changes in medication regimen.    6. Left leg wound.  - Sore on the left leg started two weeks ago, currently the size of a pencil eraser.  - Using a special cream and a pad to relieve pressure and aid healing.  - No discharge or drainage reported.  - Follow-up with the doctor in [facility] on Monday.    Follow-up: The patient will follow up in 3 to 4 months.      History of Present Illness   History of Present Illness  Greyson Lopez is a 65-year-old male with a history of type 1 diabetes diagnosed 32 years ago, accompanied by neuropathy, retinopathy, nephropathy, and hypoglycemic unawareness. He also has hypertension and hyperlipidemia, presenting for a follow-up visit.     Diabetes complications include neuropathy, nephropathy, retinopathy, and hypoglycemic unawareness, but he reports no history of heart attack, stroke, or claudication.     He is utilizing a Medtronic 770G insulin pump with a [employer] sensor integrated with the pump. He received this pump over 4 years ago and has been on insulin pump therapy for 17 years.    No polyuria or polydipsia is reported, and nocturia is rare. Excessive hunger or thirst is not experienced. No ophthalmological examination has been conducted in over a year, but an appointment is planned soon. Cataract surgery was performed a year ago. No numbness, tingling, pain, or burning sensations are reported in the right foot, and there are no wounds on  the foot. No specific time of day when blood sugar levels are particularly high has been noticed. Hospitalization has not been required recently. The last A1c level was 9 in 03/2025, which was disappointing to him. Humalog insulin is currently administered via his pump.    A sore on the left leg has necessitated the use of a cane for ambulation. The sore, which started as a pinhole 2 weeks ago during a trip to [place], has since enlarged to the size of a pencil eraser. No discharge or drainage from the sore is reported. Consultation with his primary care physician in [facility], Dr. Eileen Louis, is planned regarding this issue. A prosthetic doctor performed a minor procedure on the area of the sore to alleviate pressure and facilitate healing. An antibiotic regimen is currently being followed, and a special cream is applied to the sore, which is covered with a pad and a regular Band-Aid. Improvement in the sore is believed, and a revisit to the doctor is planned for Monday. No phantom pains or numbness in the area where the foot used to be are reported. A podiatrist has not yet been consulted. Gabapentin 300 mg 2 tablets 3 times a day is taken for phantom pains and numbness.    Atorvastatin 40 mg daily is taken for cholesterol management.    Lisinopril 20 mg daily is taken for blood pressure management. Metoprolol is no longer taken. No chest pain or shortness of breath is reported.    Duloxetine is taken for depression and neuropathy management.    FAMILY HISTORY  His father was blind in one eye.  Greyson Lopez is a 65 y.o. male with type 1 diabetes seen in follow up. Reports complications of neuropathy, nephropathy, retinopathy, and hypoglycemic unawareness. Denies recent severe hypoglycemic or severe hyperglycemic episodes. Denies any issues with his current regimen. Last A1C was 9%. Denies recent illness, hospitalization or steroid use.     CGM Interpretation:  Date Range: He utilizes a [employer] sensor  integrated with his 7 70G insulin pump.  Unfortunately, no insulin pump and sensor download was available in the office today.       Current regimen:   Humalog insulin via the insulin pump.    Atorvastatin 40 mg daily for hyperlipidemia.    Lisinopril 20 mg daily for renal protection and hypertension.    Gabapentin 300 mg 2 tablets 3 times a day for neuropathy.    Patient is on a Medtronic 770G pump prescribed by endocrinology.  He has been on this pump for 17 years. He denies any malfunctioning of the pump.  He is received this pump 4 years ago.  Unfortunately, no insulin pump download was available in the office today, pump settings are from previous note.  Current Insulin pump settings:  Basal rate: 12 AM to 3:30 AM 0.55 units/h, 3:30 AM to 8 AM 0.75 units/h, 8 AM to 8:30 PM 0.9 units/h, 8:30 PM to 12 AM 0.6 units/h.    Insulin to carb ratio:1 unit per 10 g carbohydrate with each meal  Insulin sensitivity factor: 1 unit for 40 blood sugar points for target blood glucose  BG target: 120  Active insulin time: 4 hours  Type of insulin: Humalog insulin  Discussed with patient in case of malfunctioning of the pump to use basal and bolus therapy as backup which is prescribed to the patient. Also notified patient to call clinic if any issues.     Last Eye Exam: 05/10/2024  Last Foot Exam: 07/10/2024  Health Maintenance   Topic Date Due    Diabetic Eye Exam  05/10/2025    Diabetic Foot Exam  07/10/2025     Pertinent Medical History   Greyson Lopez is a 65-year-old male with a history of type 1 diabetes diagnosed 32 years ago, accompanied by neuropathy, retinopathy, nephropathy, and hypoglycemic unawareness. He also has hypertension and hyperlipidemia, presenting for a follow-up visit.     Diabetes complications include neuropathy, nephropathy, retinopathy, and hypoglycemic unawareness, but he reports no history of heart attack, stroke, or claudication.     He is utilizing a Medtronic 770G insulin pump with a [employer]  sensor integrated with the pump. He received this pump over 4 years ago and has been on insulin pump therapy for 17 years.        Review of Systems as per HPI    Medical History Reviewed by provider this encounter:     .  Current Outpatient Medications on File Prior to Visit   Medication Sig Dispense Refill    acetaminophen (TYLENOL) 325 mg tablet Take 3 tablets (975 mg total) by mouth every 6 (six) hours 120 tablet 0    ARIPiprazole (ABILIFY) 2 mg tablet Take 2 mg by mouth daily      atorvastatin (LIPITOR) 40 mg tablet Take 1 tablet (40 mg total) by mouth daily 90 tablet 1    DULoxetine (CYMBALTA) 60 mg delayed release capsule Take 1 capsule (60 mg total) by mouth daily 30 capsule 2    gabapentin (Neurontin) 300 mg capsule Take 3 capsules (900 mg total) by mouth 3 (three) times a day 900mg TID (Patient taking differently: Take 600 mg by mouth 3 (three) times a day 900mg TID) 210 capsule 2    Glucagon, rDNA, (Glucagon Emergency) 1 MG KIT       Ibuprofen (Advil) 200 MG CAPS Take by mouth      Insulin Glargine Solostar (Basaglar KwikPen) 100 UNIT/ML SOPN Inject 0.18 mL (18 Units total) under the skin in the morning If pump fails 15 mL 0    insulin lispro (HumALOG/ADMELOG) 100 units/mL injection Use up to 80 units daily via insulin pump 30 mL 5    Insulin Pen Needle (BD Pen Needle Shannan U/F) 32G X 4 MM MISC Use daily With insulin pen 30 each 0    lisinopril (ZESTRIL) 20 mg tablet Take 20 mg by mouth daily      naltrexone (REVIA) 50 mg tablet Take 1 tablet by mouth in the morning       No current facility-administered medications on file prior to visit.      Social History     Tobacco Use    Smoking status: Former     Current packs/day: 0.00     Average packs/day: 1 pack/day for 20.0 years (20.0 ttl pk-yrs)     Types: Cigarettes     Start date: 2003     Quit date: 2023     Years since quittin.5    Smokeless tobacco: Never    Tobacco comments:     encouraged smoking cessation   Vaping Use    Vaping status:  Never Used   Substance and Sexual Activity    Alcohol use: Yes     Comment: 3/4 beers a day    Drug use: Never    Sexual activity: Not Currently        Medical History Reviewed by provider this encounter:     .    Objective   /70   Pulse (!) 112   Ht 6' (1.829 m)   Wt 66.2 kg (146 lb)   SpO2 96%   BMI 19.80 kg/m²      Body mass index is 19.8 kg/m².  Wt Readings from Last 3 Encounters:   04/24/25 66.2 kg (146 lb)   11/14/24 67.8 kg (149 lb 6.4 oz)   07/10/24 71.2 kg (157 lb)   Physical Exam    Physical Exam  Vitals and nursing note reviewed.   Constitutional:       General: He is not in acute distress.     Appearance: Normal appearance. He is well-developed.   HENT:      Head: Normocephalic and atraumatic.   Eyes:      Conjunctiva/sclera: Conjunctivae normal.   Neck:      Vascular: No carotid bruit.      Comments: Thyroid normal in size.   Cardiovascular:      Rate and Rhythm: Normal rate and regular rhythm.      Heart sounds: Normal heart sounds. No murmur heard.  Pulmonary:      Effort: Pulmonary effort is normal. No respiratory distress.      Breath sounds: Normal breath sounds. No wheezing.   Abdominal:      Palpations: Abdomen is soft.   Musculoskeletal:         General: No swelling.      Cervical back: Neck supple.      Right lower leg: No edema.      Comments: Post left BKA.   Lymphadenopathy:      Cervical: No cervical adenopathy.   Skin:     General: Skin is warm and dry.      Capillary Refill: Capillary refill takes less than 2 seconds.   Neurological:      Mental Status: He is alert and oriented to person, place, and time.   Psychiatric:         Mood and Affect: Mood normal.       Results    Labs:   Lab Results   Component Value Date    HGBA1C 9.0 03/03/2025    HGBA1C 9.1 (H) 11/06/2024    HGBA1C 9.0 (H) 07/08/2024       Blood work performed on 3/3/2025 at [facility] showed a CMP with a glucose of 211 fasting, chloride 95, but was otherwise normal.    Total cholesterol 226, triglycerides  71, , LDL 80.    CBC was normal.    Lab Results   Component Value Date    CREATININE 1.36 (H) 11/06/2024    CREATININE 1.36 (H) 08/14/2024    CREATININE 1.47 (H) 07/08/2024    BUN 14 11/06/2024    K 5.6 (H) 11/06/2024    CL 96 (L) 11/06/2024    CO2 26 11/06/2024     GFR, Calculated   Date Value Ref Range Status   10/26/2020 81 >60 mL/min/1.73m2 Final     Comment:     mL/min per 1.73 square meters                                            Normal Function or Mild Renal    Disease (if clinically at risk):  >or=60  Moderately Decreased:                30-59  Severely Decreased:                  15-29  Renal Failure:                         <15                                            -American GFR: multiply reported GFR by 1.16    Please note that the eGFR is based on the CKD-EPI calculation, and is not intended to be used for drug dosing.                                            Note: Calculated GFR may not be an accurate indicator of renal function if the patient's renal function is not in a steady state.     eGFRcr   Date Value Ref Range Status   08/14/2024 59 (L) >59 Final     eGFR   Date Value Ref Range Status   11/06/2024 59 (L) >59 Final     Lab Results   Component Value Date    HDL 94 (H) 11/06/2024    TRIG 72 11/06/2024     Lab Results   Component Value Date    ALT 11 11/06/2024    AST 13 11/06/2024    ALKPHOS 75 11/06/2024     Lab Results   Component Value Date    KHR2HUXJPEOL 1.356 07/20/2023    PTL4PVALFADN 0.981 04/15/2022       Patient Instructions   Hgba1c is 9.0%. this is still too high.     For now, no change in insulin pump rates until we get the download.     The cholesterol is good. Continue the same atorvastatin.     Follow up in 3-4 months with blood work.     Discussed with the patient and all questioned fully answered. He will call me if any problems arise.

## 2025-04-24 NOTE — ASSESSMENT & PLAN NOTE
Orders:    Comprehensive metabolic panel; Future    Hemoglobin A1C; Future    TSH, 3rd generation; Future    Fructosamine; Future    Albumin / creatinine urine ratio; Future

## 2025-04-24 NOTE — PATIENT INSTRUCTIONS
Hgba1c is 9.0%. this is still too high.     For now, no change in insulin pump rates until we get the download.     The cholesterol is good. Continue the same atorvastatin.     Follow up in 3-4 months with blood work.

## 2025-04-24 NOTE — ASSESSMENT & PLAN NOTE
Lab Results   Component Value Date    HGBA1C 9.0 03/03/2025     He will continue to utilize his Dexcom continuous glucose monitoring system integrated with his insulin pump for safety.  Orders:    Comprehensive metabolic panel; Future    Hemoglobin A1C; Future    TSH, 3rd generation; Future    Fructosamine; Future    Albumin / creatinine urine ratio; Future

## 2025-04-24 NOTE — ASSESSMENT & PLAN NOTE
Lab Results   Component Value Date    HGBA1C 9.0 03/03/2025     Hemoglobin A1c is 9%.  This did not improve as much as it should have.  This is still too high.  Unfortunately, I was unable to get a pump download to make any changes to his insulin pump regimen.  He will continue his current pump settings until I get that download.    Orders:    Comprehensive metabolic panel; Future    Hemoglobin A1C; Future    TSH, 3rd generation; Future    Fructosamine; Future    Albumin / creatinine urine ratio; Future

## 2025-04-24 NOTE — ASSESSMENT & PLAN NOTE
Lab Results   Component Value Date    HGBA1C 9.0 03/03/2025     He has some neuropathic symptoms.  He is post below the knee amputation on the left.  He does have a small wound and is following with the appropriate specialty.  Diabetic foot exams are up to date.  Orders:    Comprehensive metabolic panel; Future    Hemoglobin A1C; Future    TSH, 3rd generation; Future    Fructosamine; Future    Albumin / creatinine urine ratio; Future

## 2025-04-24 NOTE — ASSESSMENT & PLAN NOTE
He is normotensive in the office today.  He will continue the current dose of lisinopril 20 mg daily.      Orders:    Comprehensive metabolic panel; Future    Hemoglobin A1C; Future    TSH, 3rd generation; Future    Fructosamine; Future    Albumin / creatinine urine ratio; Future

## 2025-04-24 NOTE — ASSESSMENT & PLAN NOTE
Lab Results   Component Value Date    HGBA1C 9.0 03/03/2025     He will continue lisinopril 20 mg daily for renal protection and treatment.  A urine microalbumin to creatinine ratio will be performed with his next visit.    Orders:    Comprehensive metabolic panel; Future    Hemoglobin A1C; Future    TSH, 3rd generation; Future    Fructosamine; Future    Albumin / creatinine urine ratio; Future

## 2025-04-28 ENCOUNTER — TELEPHONE (OUTPATIENT)
Dept: ENDOCRINOLOGY | Facility: HOSPITAL | Age: 65
End: 2025-04-28

## 2025-05-01 NOTE — TELEPHONE ENCOUNTER
Call patient.  We were able to get his insulin pump and sensor download.  His hemoglobin A1c based on that 2 weeks should be around 7% but he is having some high spikes in blood sugars post meals.  These are particularly evident at lunch and dinner.  I would like him to adjust his insulin to carbohydrate ratio to 1 unit per 8 g carbohydrate.  I would not make any other adjustments.

## 2025-05-07 NOTE — TELEPHONE ENCOUNTER
I was able to call the patient and make him aware of the provider's review of his last Pump download.  He will make adjustments as recommended and he will call back if there are any other issues.

## 2025-05-23 ENCOUNTER — TELEPHONE (OUTPATIENT)
Dept: DIABETES SERVICES | Facility: HOSPITAL | Age: 65
End: 2025-05-23

## 2025-05-23 NOTE — TELEPHONE ENCOUNTER
Received a message from Dejah with [employer]. Pt is still on 770 with G3.  Patient may be out of warrenty and benefit from transition to 780 and G4. Ok to pursue?

## 2025-06-17 ENCOUNTER — DOCUMENTATION (OUTPATIENT)
Dept: ENDOCRINOLOGY | Facility: HOSPITAL | Age: 65
End: 2025-06-17

## 2025-06-25 ENCOUNTER — TELEPHONE (OUTPATIENT)
Dept: FAMILY MEDICINE CLINIC | Facility: CLINIC | Age: 65
End: 2025-06-25

## 2025-06-25 NOTE — TELEPHONE ENCOUNTER
Attempted to call pt  regarding appt scheduled to inform him we do not do Medical Marijuana Certifications. Pt answered but signal was not good and call dropped.

## 2025-06-26 DIAGNOSIS — E10.65 TYPE 1 DIABETES MELLITUS WITH HYPERGLYCEMIA (HCC): ICD-10-CM

## 2025-06-26 DIAGNOSIS — Z96.41 INSULIN PUMP IN PLACE: Primary | ICD-10-CM

## 2025-07-17 PROBLEM — Z79.4 INSULIN LONG-TERM USE (HCC): Status: ACTIVE | Noted: 2025-07-17

## 2025-07-17 PROBLEM — Z72.0 TOBACCO ABUSE: Status: ACTIVE | Noted: 2025-07-17

## 2025-07-17 PROBLEM — Z87.898 HISTORY OF ALCOHOL USE DISORDER: Status: ACTIVE | Noted: 2025-07-17

## 2025-07-17 PROBLEM — I50.9 CONGESTIVE HEART FAILURE (CHF) (HCC): Status: ACTIVE | Noted: 2025-07-17

## 2025-07-17 PROBLEM — F17.200 TOBACCO USE DISORDER: Status: ACTIVE | Noted: 2025-07-17

## 2025-07-17 PROBLEM — H91.90 HEARING LOSS: Status: ACTIVE | Noted: 2025-07-17

## 2025-07-17 PROBLEM — I42.9 CARDIOMYOPATHY (HCC): Status: ACTIVE | Noted: 2025-07-17

## 2025-07-17 PROBLEM — I42.6 ALCOHOLIC CARDIOMYOPATHY (HCC): Status: ACTIVE | Noted: 2025-07-17

## 2025-07-17 PROBLEM — I47.29 NSVT (NONSUSTAINED VENTRICULAR TACHYCARDIA) (HCC): Status: ACTIVE | Noted: 2025-07-17

## 2025-07-17 PROBLEM — E11.9 DIABETES MELLITUS (HCC): Status: ACTIVE | Noted: 2025-07-17

## 2025-07-17 PROBLEM — I50.20 HFREF (HEART FAILURE WITH REDUCED EJECTION FRACTION) (HCC): Status: ACTIVE | Noted: 2025-07-17

## 2025-07-17 PROBLEM — I47.19 PAT (PAROXYSMAL ATRIAL TACHYCARDIA) (HCC): Status: ACTIVE | Noted: 2025-07-17

## 2025-07-24 ENCOUNTER — TELEPHONE (OUTPATIENT)
Age: 65
End: 2025-07-24

## 2025-07-24 NOTE — TELEPHONE ENCOUNTER
1 week CHF follow up phone call: No answer, unable to leave message. Will schedule second attempt to contact for 7/25

## 2025-07-25 ENCOUNTER — TELEPHONE (OUTPATIENT)
Age: 65
End: 2025-07-25

## 2025-07-25 NOTE — TELEPHONE ENCOUNTER
Second 1 week attempt for CHF f/u call unsuccessful.  Unable to leave a message.  Will retry to contact patient in 2 weeks.

## 2025-08-13 ENCOUNTER — TELEPHONE (OUTPATIENT)
Age: 65
End: 2025-08-13

## 2025-08-13 PROBLEM — E10.40 TYPE 1 DIABETES MELLITUS WITH DIABETIC NEUROPATHY (HCC): Status: RESOLVED | Noted: 2023-08-02 | Resolved: 2025-08-13

## 2025-08-13 PROBLEM — E10.52 TYPE 1 DIABETES MELLITUS WITH DIABETIC PERIPHERAL ANGIOPATHY WITH GANGRENE (HCC): Status: ACTIVE | Noted: 2025-08-13

## 2025-08-13 PROBLEM — I50.23 ACUTE ON CHRONIC SYSTOLIC CONGESTIVE HEART FAILURE (HCC): Status: ACTIVE | Noted: 2025-07-27

## 2025-08-13 PROBLEM — I50.9 CONGESTIVE HEART FAILURE (CHF) (HCC): Status: RESOLVED | Noted: 2025-07-17 | Resolved: 2025-08-13

## 2025-08-13 PROBLEM — F32.1 MAJOR DEPRESSIVE DISORDER, SINGLE EPISODE, MODERATE (HCC): Status: ACTIVE | Noted: 2025-08-13

## 2025-08-13 PROBLEM — F41.9 ANXIETY: Status: ACTIVE | Noted: 2025-08-13

## 2025-08-13 PROBLEM — H35.60 RETINAL HEMORRHAGE: Status: ACTIVE | Noted: 2025-08-13

## 2025-08-13 PROBLEM — E10.3293 TYPE 1 DIABETES MELLITUS WITH MILD NONPROLIFERATIVE RETINOPATHY OF BOTH EYES WITHOUT MACULAR EDEMA (HCC): Status: RESOLVED | Noted: 2025-08-13 | Resolved: 2025-08-13

## 2025-08-13 PROBLEM — N17.9 AKI (ACUTE KIDNEY INJURY) (HCC): Status: ACTIVE | Noted: 2025-07-27

## 2025-08-13 PROBLEM — Z89.512 ACQUIRED ABSENCE OF LEFT LEG BELOW KNEE (HCC): Status: ACTIVE | Noted: 2025-08-13

## 2025-08-13 PROBLEM — I42.9 CARDIOMYOPATHY (HCC): Status: RESOLVED | Noted: 2025-07-17 | Resolved: 2025-08-13

## 2025-08-13 PROBLEM — N40.1 BENIGN PROSTATIC HYPERPLASIA WITH LOWER URINARY TRACT SYMPTOMS: Status: ACTIVE | Noted: 2025-08-13

## 2025-08-13 PROBLEM — H04.129 DRY EYE SYNDROME: Status: ACTIVE | Noted: 2025-08-13

## 2025-08-13 PROBLEM — M86.9 OSTEOMYELITIS (HCC): Status: RESOLVED | Noted: 2023-08-19 | Resolved: 2025-08-13

## 2025-08-13 PROBLEM — H52.4 PRESBYOPIA: Status: ACTIVE | Noted: 2025-08-13

## 2025-08-13 PROBLEM — E11.9 DIABETES MELLITUS (HCC): Status: RESOLVED | Noted: 2025-07-17 | Resolved: 2025-08-13

## 2025-08-13 PROBLEM — N18.32 STAGE 3B CHRONIC KIDNEY DISEASE (HCC): Status: ACTIVE | Noted: 2025-08-10

## 2025-08-13 PROBLEM — E10.3293 TYPE 1 DIABETES MELLITUS WITH MILD NONPROLIFERATIVE RETINOPATHY OF BOTH EYES WITHOUT MACULAR EDEMA (HCC): Status: ACTIVE | Noted: 2025-08-13

## 2025-08-13 PROBLEM — I73.9 PVD (PERIPHERAL VASCULAR DISEASE) (HCC): Status: ACTIVE | Noted: 2025-08-10

## 2025-08-13 PROBLEM — E10.65 TYPE 1 DIABETES MELLITUS WITH HYPERGLYCEMIA (HCC): Status: RESOLVED | Noted: 2022-04-19 | Resolved: 2025-08-13

## 2025-08-19 ENCOUNTER — TELEPHONE (OUTPATIENT)
Age: 65
End: 2025-08-19

## 2025-08-20 ENCOUNTER — OFFICE VISIT (OUTPATIENT)
Age: 65
End: 2025-08-20
Payer: COMMERCIAL

## 2025-08-20 VITALS
BODY MASS INDEX: 18.65 KG/M2 | DIASTOLIC BLOOD PRESSURE: 46 MMHG | SYSTOLIC BLOOD PRESSURE: 106 MMHG | TEMPERATURE: 95 F | HEART RATE: 67 BPM | WEIGHT: 137.7 LBS | HEIGHT: 72 IN | OXYGEN SATURATION: 92 %

## 2025-08-20 DIAGNOSIS — I42.6 ALCOHOLIC CARDIOMYOPATHY (HCC): ICD-10-CM

## 2025-08-20 DIAGNOSIS — E10.3213 TYPE 1 DIABETES MELLITUS WITH MILD NONPROLIFERATIVE RETINOPATHY OF BOTH EYES AND MACULAR EDEMA (HCC): Primary | ICD-10-CM

## 2025-08-20 DIAGNOSIS — I10 BENIGN ESSENTIAL HYPERTENSION: ICD-10-CM

## 2025-08-20 DIAGNOSIS — L97.509 NON-PRESSURE CHRONIC ULCER OF OTHER PART OF UNSPECIFIED FOOT WITH UNSPECIFIED SEVERITY (HCC): ICD-10-CM

## 2025-08-20 PROCEDURE — 99214 OFFICE O/P EST MOD 30 MIN: CPT | Performed by: NURSE PRACTITIONER

## 2025-08-20 RX ORDER — SPIRONOLACTONE 25 MG/1
25 TABLET ORAL DAILY
COMMUNITY
Start: 2025-07-16 | End: 2025-08-27

## 2025-08-27 PROBLEM — G93.41 METABOLIC ENCEPHALOPATHY: Status: ACTIVE | Noted: 2025-08-27

## 2025-08-27 PROBLEM — I95.9 HYPOTENSION: Status: ACTIVE | Noted: 2025-08-27

## 2025-08-27 PROBLEM — D64.9 ANEMIA: Status: ACTIVE | Noted: 2025-08-27

## 2025-08-28 PROBLEM — E87.20 LACTIC ACIDOSIS: Status: ACTIVE | Noted: 2025-08-28

## (undated) DEVICE — ACE WRAP 4 IN UNSTERILE

## (undated) DEVICE — SUPER SPONGES,MEDIUM: Brand: KERLIX

## (undated) DEVICE — BANDAGE, ESMARK LF STR 6"X9' (20/CS): Brand: CYPRESS

## (undated) DEVICE — GAUZE SPONGES,16 PLY: Brand: CURITY

## (undated) DEVICE — SAW BLADE RECIPROCATING SINGLE SIDED 258 ORTHO

## (undated) DEVICE — WET SKIN PREP TRAY: Brand: MEDLINE INDUSTRIES, INC.

## (undated) DEVICE — HYDROPHILIC WOUND DRESSING WITH ZINC PLUS VITAMINS A AND B6.: Brand: DERMAGRAN®-B

## (undated) DEVICE — CUFF TOURNIQUET 18 X 4 IN QUICK CONNECT DISP 1 BLADDER

## (undated) DEVICE — NEEDLE 18 G X 1 1/2

## (undated) DEVICE — SUT VICRYL 0 REEL 54 IN J287G

## (undated) DEVICE — NEPTUNE E-SEP SMOKE EVACUATION PENCIL, COATED, 70MM BLADE, PUSH BUTTON SWITCH: Brand: NEPTUNE E-SEP

## (undated) DEVICE — POV-IOD SOLUTION 4OZ BT

## (undated) DEVICE — ACE WRAP 4 IN STERILE

## (undated) DEVICE — GLOVE SRG BIOGEL 9

## (undated) DEVICE — STOCKINETTE IMPERVIOUS

## (undated) DEVICE — PAD GROUNDING ADULT

## (undated) DEVICE — BETHLEHEM UNIVERSAL  MIONR EXT: Brand: CARDINAL HEALTH

## (undated) DEVICE — UNIVERSAL MAJOR EXTREMITY,KIT: Brand: CARDINAL HEALTH

## (undated) DEVICE — ABDOMINAL PAD: Brand: DERMACEA

## (undated) DEVICE — INTENDED FOR TISSUE SEPARATION, AND OTHER PROCEDURES THAT REQUIRE A SHARP SURGICAL BLADE TO PUNCTURE OR CUT.: Brand: BARD-PARKER ® CARBON RIB-BACK BLADES

## (undated) DEVICE — STOCKINETTE REGULAR

## (undated) DEVICE — PLASTER ROLL SPLINT 6 X 260 IN XFAST SET

## (undated) DEVICE — SUT VICRYL 5-0 P-1 18 IN J490G

## (undated) DEVICE — SYRINGE 5ML LL

## (undated) DEVICE — SUT VICRYL 2-0 CT-1 27 IN J259H

## (undated) DEVICE — KERLIX BANDAGE ROLL: Brand: KERLIX

## (undated) DEVICE — PLUMEPEN PRO 10FT

## (undated) DEVICE — SUT ETHILON 3-0 PS-1 18 IN 1663H

## (undated) DEVICE — NEEDLE 27 G X 1 1/4

## (undated) DEVICE — CHLORAPREP HI-LITE 26ML ORANGE

## (undated) DEVICE — 3M™ STERI-STRIP™ COMPOUND BENZOIN TINCTURE 40 BAGS/CARTON 4 CARTONS/CASE C1544: Brand: 3M™ STERI-STRIP™

## (undated) DEVICE — CAST PADDING 4 IN SYNTHETIC NON-STRL

## (undated) DEVICE — ASTOUND STANDARD SURGICAL GOWN, XXL: Brand: CONVERTORS

## (undated) DEVICE — GLOVE SRG BIOGEL ECLIPSE 8

## (undated) DEVICE — GLOVE INDICATOR PI UNDERGLOVE SZ 6.5 BLUE

## (undated) DEVICE — SKIN MARKER DUAL TIP WITH RULER CAP, FLEXIBLE RULER AND LABELS: Brand: DEVON

## (undated) DEVICE — PROXIMATE SKIN STAPLERS (35 WIDE) CONTAINS 35 STAINLESS STEEL STAPLES (FIXED HEAD): Brand: PROXIMATE

## (undated) DEVICE — CURITY NON-ADHERENT STRIPS: Brand: CURITY

## (undated) DEVICE — TUBING SUCTION 5MM X 12 FT

## (undated) DEVICE — DRESSING XEROFORM 5 X 9

## (undated) DEVICE — SCD SEQUENTIAL COMPRESSION COMFORT SLEEVE MEDIUM KNEE LENGTH: Brand: KENDALL SCD

## (undated) DEVICE — SUT ETHILON 6-0 P-3 18 IN 1698G

## (undated) DEVICE — VAC CANISTER 500ML

## (undated) DEVICE — CUFF TOURNIQUET 18 X 5.5 IN QUICK CONNECT 2BLA

## (undated) DEVICE — DRAPE EQUIPMENT RF WAND

## (undated) DEVICE — SPONGE LAP 18 X 18 IN STRL RFD

## (undated) DEVICE — OCCLUSIVE GAUZE STRIP,3% BISMUTH TRIBROMOPHENATE IN PETROLATUM BLEND: Brand: XEROFORM

## (undated) DEVICE — HANDPIECE SET WITH RETRACTABLE COAXIAL FAN SPRAY TIP AND SUCTION TUBE: Brand: INTERPULSE

## (undated) DEVICE — SURGICAL CLIPPER BLADE GENERAL USE

## (undated) DEVICE — INTENDED FOR TISSUE SEPARATION, AND OTHER PROCEDURES THAT REQUIRE A SHARP SURGICAL BLADE TO PUNCTURE OR CUT.: Brand: BARD-PARKER SAFETY BLADES SIZE 15, STERILE

## (undated) DEVICE — NEEDLE 25G X 1 1/2

## (undated) DEVICE — 10FR FRAZIER SUCTION HANDLE: Brand: CARDINAL HEALTH

## (undated) DEVICE — GLOVE SRG BIOGEL ECLIPSE 6.5

## (undated) DEVICE — SUT MONOCRYL 3-0 SH 27 IN Y416H

## (undated) DEVICE — GLOVE SRG LF STRL BGL SKNSNS 7 PF

## (undated) DEVICE — GLOVE SRG BIOGEL 7

## (undated) DEVICE — SYRINGE 10ML LL

## (undated) DEVICE — BULB SYRINGE,IRRIGATION WITH PROTECTIVE CAP: Brand: DOVER

## (undated) DEVICE — SUT VICRYL 3-0 SH 27 IN J416H

## (undated) DEVICE — SUT VICRYL 0 CT-1 27 IN J340H

## (undated) DEVICE — CURITY STRETCH BANDAGE: Brand: CURITY

## (undated) DEVICE — 3M™ STERI-STRIP™ REINFORCED ADHESIVE SKIN CLOSURES, R1547, 1/2 IN X 4 IN (12 MM X 100 MM), 6 STRIPS/ENVELOPE: Brand: 3M™ STERI-STRIP™

## (undated) DEVICE — 2000CC GUARDIAN II: Brand: GUARDIAN

## (undated) DEVICE — VAC DRESSING SENSATRAC SMALL

## (undated) DEVICE — ACE WRAP 6 IN UNSTERILE

## (undated) DEVICE — GLOVE SRG BIOGEL 6.5

## (undated) DEVICE — GLOVE INDICATOR PI UNDERGLOVE SZ 8 BLUE

## (undated) DEVICE — SPONGE LAP 18 X 18 IN

## (undated) DEVICE — ELECTRODE BLADE MOD E-Z CLEAN  2.75IN 7CM -0012AM

## (undated) DEVICE — IMPERVIOUS STOCKINETTE: Brand: DEROYAL

## (undated) DEVICE — GLOVE INDICATOR PI UNDERGLOVE SZ 7.5 BLUE

## (undated) DEVICE — BLADE ACL FINE 9.5X25.5X0.4MM